# Patient Record
Sex: MALE | Race: WHITE | NOT HISPANIC OR LATINO | Employment: OTHER | URBAN - METROPOLITAN AREA
[De-identification: names, ages, dates, MRNs, and addresses within clinical notes are randomized per-mention and may not be internally consistent; named-entity substitution may affect disease eponyms.]

---

## 2017-01-02 ENCOUNTER — PATIENT MESSAGE (OUTPATIENT)
Dept: RHEUMATOLOGY | Facility: CLINIC | Age: 47
End: 2017-01-02

## 2017-01-03 DIAGNOSIS — L40.50 PSORIATIC ARTHRITIS: Primary | ICD-10-CM

## 2017-01-03 RX ORDER — PREDNISONE 10 MG/1
10 TABLET ORAL DAILY
Qty: 30 TABLET | Refills: 4 | Status: SHIPPED | OUTPATIENT
Start: 2017-01-03 | End: 2017-02-02

## 2017-01-06 ENCOUNTER — HOSPITAL ENCOUNTER (OUTPATIENT)
Dept: RADIOLOGY | Facility: OTHER | Age: 47
Discharge: HOME | End: 2017-01-06
Attending: INTERNAL MEDICINE
Payer: COMMERCIAL

## 2017-01-06 DIAGNOSIS — N20.0 RECURRENT NEPHROLITHIASIS: ICD-10-CM

## 2017-01-06 PROCEDURE — 76770 US EXAM ABDO BACK WALL COMP: CPT | Mod: 26,,, | Performed by: RADIOLOGY

## 2017-01-06 PROCEDURE — 76770 US EXAM ABDO BACK WALL COMP: CPT | Mod: TC

## 2017-01-27 ENCOUNTER — PATIENT MESSAGE (OUTPATIENT)
Dept: UROLOGY | Facility: CLINIC | Age: 47
End: 2017-01-27

## 2017-01-30 DIAGNOSIS — N20.0 RENAL CALCULI: Primary | ICD-10-CM

## 2017-01-31 ENCOUNTER — TELEPHONE (OUTPATIENT)
Dept: UROLOGY | Facility: CLINIC | Age: 47
End: 2017-01-31

## 2017-01-31 ENCOUNTER — HOSPITAL ENCOUNTER (OUTPATIENT)
Dept: RADIOLOGY | Facility: OTHER | Age: 47
Discharge: HOME | End: 2017-01-31
Attending: UROLOGY
Payer: COMMERCIAL

## 2017-01-31 DIAGNOSIS — N20.0 RENAL CALCULI: ICD-10-CM

## 2017-01-31 PROCEDURE — 74000 XR ABDOMEN AP 1 VIEW: CPT | Mod: TC

## 2017-01-31 PROCEDURE — 74000 XR ABDOMEN AP 1 VIEW: CPT | Mod: 26,,, | Performed by: RADIOLOGY

## 2017-01-31 NOTE — TELEPHONE ENCOUNTER
----- Message from Kumar Duval Jr., MD sent at 1/31/2017  1:27 PM CST -----  milind small renal stones  Can consider r renal eswl then left if he wishes or ?pass on his own

## 2017-02-01 ENCOUNTER — OFFICE VISIT (OUTPATIENT)
Dept: UROLOGY | Facility: CLINIC | Age: 47
End: 2017-02-01
Payer: COMMERCIAL

## 2017-02-01 ENCOUNTER — PATIENT MESSAGE (OUTPATIENT)
Dept: RHEUMATOLOGY | Facility: CLINIC | Age: 47
End: 2017-02-01

## 2017-02-01 VITALS
HEART RATE: 68 BPM | WEIGHT: 129 LBS | HEIGHT: 67 IN | DIASTOLIC BLOOD PRESSURE: 63 MMHG | BODY MASS INDEX: 20.25 KG/M2 | SYSTOLIC BLOOD PRESSURE: 101 MMHG

## 2017-02-01 DIAGNOSIS — N20.0 RENAL CALCULI: Primary | ICD-10-CM

## 2017-02-01 PROCEDURE — 99999 PR PBB SHADOW E&M-EST. PATIENT-LVL III: CPT | Mod: PBBFAC,,, | Performed by: UROLOGY

## 2017-02-01 PROCEDURE — 99213 OFFICE O/P EST LOW 20 MIN: CPT | Mod: S$GLB,,, | Performed by: UROLOGY

## 2017-02-01 RX ORDER — PREDNISONE 5 MG/1
TABLET ORAL
Refills: 1 | COMMUNITY
Start: 2017-01-04 | End: 2017-02-01

## 2017-02-01 NOTE — PROGRESS NOTES
Subjective:       Patient ID: Rebel Rodriguez is a 47 y.o. male.    Chief Complaint: renal calculi (rtc today with xray.)    HPI patient with multiple stones.  On KUB he has 43-4 mm stones on the right and 2 small ones on the left on ultrasound he's there is a 9 mm stone in the left upper pole but I really can't see it on KUB.  He's doing well.  He needs a repeat metabolic stone workup.  He is not taking Urocit-K and L1 recheck.  We discussed pros and cons of diet and he will try and eat things in moderation    Past Medical History   Diagnosis Date    Achilles tendon rupture 10/9/2013    Allergy     Anxiety     Arthritis      psoriatric    Degenerative disc disease     Drug-induced lupus erythematosus     Hyperlipidemia     Kidney stone     Psoriatic arthritis     TIA (transient ischemic attack)     Ulcer      high school       Past Surgical History   Procedure Laterality Date    Upper endoscopy w/ esophageal manometry      Ureteral stent placement      Achilles tendon surgery         Family History   Problem Relation Age of Onset    Pancreatic cancer Father     Ulcerative colitis Father     Cancer Father 61     pancreatic ca    Crohn's disease Mother     Osteoarthritis Maternal Grandmother     Crohn's disease Maternal Grandmother     Cataracts Maternal Grandmother     Cataracts Maternal Grandfather     Cataracts Paternal Grandmother     Cataracts Paternal Grandfather        Social History     Social History    Marital status: Single     Spouse name: N/A    Number of children: N/A    Years of education: N/A     Occupational History    music production Self Employed     Social History Main Topics    Smoking status: Never Smoker    Smokeless tobacco: Never Used    Alcohol use No      Comment: cocktails    Drug use: No    Sexual activity: Not on file     Other Topics Concern    Not on file     Social History Narrative       Allergies:  Erythromycin and  Infliximab    Medications:    Current Outpatient Prescriptions:     aspirin (ECOTRIN) 81 MG EC tablet, Take 81 mg by mouth once daily.  , Disp: , Rfl:     atorvastatin (LIPITOR) 10 MG tablet, Take 1 tablet (10 mg total) by mouth once daily., Disp: 90 tablet, Rfl: 3    hydroxychloroquine (PLAQUENIL) 200 mg tablet, TAKE 1 AND 1/2 TABLETS(300 MG) BY MOUTH EVERY DAY, Disp: 135 tablet, Rfl: 1    leflunomide (ARAVA) 20 MG Tab, Take 1 tablet (20 mg total) by mouth once daily., Disp: 90 tablet, Rfl: 0    loratadine-pseudoephedrine  mg (CLARITIN-D 24-HOUR)  mg per 24 hr tablet, Take 1 tablet by mouth once daily., Disp: , Rfl:     predniSONE (DELTASONE) 10 MG tablet, Take 1 tablet (10 mg total) by mouth once daily., Disp: 30 tablet, Rfl: 4    Current Facility-Administered Medications:     acetaminophen tablet 500 mg, 500 mg, Oral, PRN, Esau Beverly MD    promethazine injection 12.5 mg, 12.5 mg, Intravenous, PRN, Esau Beverly MD    Review of Systems   Constitutional: Negative for activity change, appetite change, chills, diaphoresis, fatigue, fever and unexpected weight change.   HENT: Negative for congestion, dental problem, hearing loss, mouth sores, postnasal drip, rhinorrhea, sinus pressure and trouble swallowing.    Eyes: Negative for pain, discharge and itching.   Respiratory: Negative for apnea, cough, choking, chest tightness, shortness of breath and wheezing.    Cardiovascular: Negative for chest pain, palpitations and leg swelling.   Gastrointestinal: Negative for abdominal distention, abdominal pain, anal bleeding, blood in stool, constipation, diarrhea, nausea, rectal pain and vomiting.   Endocrine: Negative for polydipsia and polyuria.   Genitourinary: Negative for decreased urine volume, difficulty urinating, discharge, dysuria, enuresis, flank pain, frequency, genital sores, hematuria, penile pain, penile swelling, scrotal swelling, testicular pain and urgency.   Musculoskeletal:  Negative for arthralgias, back pain and myalgias.   Skin: Negative for color change, rash and wound.   Neurological: Negative for dizziness, syncope, speech difficulty, light-headedness and headaches.   Hematological: Negative for adenopathy. Does not bruise/bleed easily.   Psychiatric/Behavioral: Negative for behavioral problems, confusion, hallucinations and sleep disturbance.       Objective:      Physical Exam   Constitutional: He appears well-developed.   HENT:   Head: Normocephalic.   Cardiovascular: Normal rate.    Pulmonary/Chest: Effort normal.   Abdominal: Soft.   Neurological: He is alert.   Skin: Skin is warm.     Psychiatric: He has a normal mood and affect.       Assessment:       1. Renal calculi        Plan:       Rebel was seen today for renal calculi.    Diagnoses and all orders for this visit:    Renal calculi        Renal calculi    Renal calculi  -     Magnesium; Future; Expected date: 2/1/17  -     PTH, intact; Future; Expected date: 2/1/17  -     Uric acid; Future; Expected date: 2/1/17  -     Phosphorus; Future; Expected date: 2/1/17  -     Calcium; Future; Expected date: 2/1/17  -     UroRisk Diagnostic Profile, 24 Hour; Future    rtc 4 mos w kub

## 2017-02-01 NOTE — MR AVS SNAPSHOT
[facility] 4th Floor  [address]  [facility] [address]  Phone: [phone]                  Rebel Rodriguez   2017 8:45 AM   Office Visit    Description:  Male : 1970   Provider:  Kumar Duval Jr., MD   Department:  [facility] 4th Floor           Reason for Visit     renal calculi           Diagnoses this Visit        Comments    Renal calculi    -  Primary            To Do List           Future Appointments        Provider Department Dept Phone    2017 9:00 AM Emir Almaraz MD [facility] [phone]    2017 10:00 AM LAB, [facility] [phone]    2017 10:15 AM LAB, [facility] [phone]    2/15/2017 8:00 AM Jeronimo Winters MD [facility] [phone]      Goals (5 Years of Data)     None      [facility]     [facility] Nurse Care Line -  Assistance  Registered nurses in the [facility] provide clinical advisement, health education, appointment booking, and other advisory services.  Call for this free service at [phone].             Medications           Message regarding Medications     Verify the changes and/or additions to your medication regime listed below are the same as discussed with your clinician today.  If any of these changes or additions are incorrect, please notify your healthcare provider.        STOP taking these medications     STELARA 45 mg/0.5 mL Syrg syringe Inject 0.5 mLs (45 mg total) every 12 weeks - Subcutaneous    tamsulosin (FLOMAX) 0.4 mg Cp24 Take 1 capsule (0.4 mg total) by mouth once daily.           Verify that the below list of medications is an accurate representation of the medications you are currently taking.  If none reported, the list may be blank. If incorrect, please contact your healthcare provider. Carry this list with you in case of emergency.           Current Medications     aspirin (ECOTRIN) 81  "MG EC tablet Take 81 mg by mouth once daily.      atorvastatin (LIPITOR) 10 MG tablet Take 1 tablet (10 mg total) by mouth once daily.    hydroxychloroquine (PLAQUENIL) 200 mg tablet TAKE 1 AND 1/2 TABLETS(300 MG) BY MOUTH EVERY DAY    leflunomide (ARAVA) 20 MG Tab Take 1 tablet (20 mg total) by mouth once daily.    loratadine-pseudoephedrine  mg (CLARITIN-D 24-HOUR)  mg per 24 hr tablet Take 1 tablet by mouth once daily.    predniSONE (DELTASONE) 10 MG tablet Take 1 tablet (10 mg total) by mouth once daily.           Clinical Reference Information           Vital Signs - Last Recorded  Most recent update: 2/1/2017  9:24 AM by Olga Nagy MA    BP Pulse Ht Wt BMI    101/63 68 5' 7" (1.702 m) 58.5 kg (128 lb 15.5 oz) 20.2 kg/m2      Blood Pressure          Most Recent Value    BP  101/63      Allergies as of 2/1/2017     Erythromycin    Infliximab      Immunizations Administered on Date of Encounter - 2/1/2017     None      Orders Placed During Today's Visit     Future Labs/Procedures Expected by Expires    Calcium  2/1/2017 4/2/2018    Magnesium  2/1/2017 4/2/2018    Phosphorus  2/1/2017 4/2/2018    PTH, intact  2/1/2017 4/2/2018    Uric acid  2/1/2017 4/2/2018    X-Ray Abdomen AP 1 View  2/1/2017 2/1/2018    UroRisk Diagnostic Profile, 24 Hour  As directed 2/1/2018      "

## 2017-02-02 ENCOUNTER — TELEPHONE (OUTPATIENT)
Dept: PHARMACY | Facility: CLINIC | Age: 47
End: 2017-02-02

## 2017-02-02 DIAGNOSIS — L93.2 DRUG-INDUCED LUPUS ERYTHEMATOSUS: ICD-10-CM

## 2017-02-02 DIAGNOSIS — T50.905A DRUG-INDUCED LUPUS ERYTHEMATOSUS: ICD-10-CM

## 2017-02-02 DIAGNOSIS — L40.50 PSORIATIC ARTHRITIS: Primary | ICD-10-CM

## 2017-02-06 ENCOUNTER — PATIENT MESSAGE (OUTPATIENT)
Dept: RHEUMATOLOGY | Facility: CLINIC | Age: 47
End: 2017-02-06

## 2017-02-09 ENCOUNTER — LAB VISIT (OUTPATIENT)
Dept: LAB | Facility: OTHER | Age: 47
End: 2017-02-09
Attending: INTERNAL MEDICINE
Payer: COMMERCIAL

## 2017-02-09 DIAGNOSIS — L93.2 DRUG-INDUCED LUPUS ERYTHEMATOSUS: ICD-10-CM

## 2017-02-09 DIAGNOSIS — T50.905A DRUG-INDUCED LUPUS ERYTHEMATOSUS: ICD-10-CM

## 2017-02-09 PROCEDURE — 81001 URINALYSIS AUTO W/SCOPE: CPT

## 2017-02-09 PROCEDURE — 84156 ASSAY OF PROTEIN URINE: CPT

## 2017-02-10 LAB
BACTERIA #/AREA URNS AUTO: ABNORMAL /HPF
BILIRUB UR QL STRIP: NEGATIVE
CLARITY UR REFRACT.AUTO: CLEAR
COLOR UR AUTO: YELLOW
CREAT UR-MCNC: 216 MG/DL
GLUCOSE UR QL STRIP: NEGATIVE
HGB UR QL STRIP: ABNORMAL
KETONES UR QL STRIP: NEGATIVE
LEUKOCYTE ESTERASE UR QL STRIP: NEGATIVE
MICROSCOPIC COMMENT: ABNORMAL
NITRITE UR QL STRIP: NEGATIVE
PH UR STRIP: 6 [PH] (ref 5–8)
PROT UR QL STRIP: NEGATIVE
PROT UR-MCNC: 15 MG/DL
PROT/CREAT RATIO, UR: 0.07
RBC #/AREA URNS AUTO: 91 /HPF (ref 0–4)
SP GR UR STRIP: 1.01 (ref 1–1.03)
URN SPEC COLLECT METH UR: ABNORMAL
UROBILINOGEN UR STRIP-ACNC: NEGATIVE EU/DL
WBC #/AREA URNS AUTO: 3 /HPF (ref 0–5)

## 2017-02-11 ENCOUNTER — PATIENT MESSAGE (OUTPATIENT)
Dept: RHEUMATOLOGY | Facility: CLINIC | Age: 47
End: 2017-02-11

## 2017-02-17 NOTE — TELEPHONE ENCOUNTER
PA for Cosentyx DENIED because plan criteria requires patient has tried and failed Humira after at least 2 months of therapy. - Preferred regimen on patient's plan.

## 2017-03-02 ENCOUNTER — PATIENT MESSAGE (OUTPATIENT)
Dept: RHEUMATOLOGY | Facility: CLINIC | Age: 47
End: 2017-03-02

## 2017-03-03 RX ORDER — LEFLUNOMIDE 20 MG/1
TABLET ORAL
Qty: 90 TABLET | Refills: 0 | Status: SHIPPED | OUTPATIENT
Start: 2017-03-03 | End: 2017-03-15 | Stop reason: SDUPTHER

## 2017-03-15 ENCOUNTER — OFFICE VISIT (OUTPATIENT)
Dept: RHEUMATOLOGY | Facility: CLINIC | Age: 47
End: 2017-03-15
Payer: COMMERCIAL

## 2017-03-15 VITALS
HEART RATE: 84 BPM | SYSTOLIC BLOOD PRESSURE: 117 MMHG | WEIGHT: 128.13 LBS | BODY MASS INDEX: 20.06 KG/M2 | DIASTOLIC BLOOD PRESSURE: 68 MMHG

## 2017-03-15 DIAGNOSIS — Z51.81 MEDICATION MONITORING ENCOUNTER: ICD-10-CM

## 2017-03-15 DIAGNOSIS — L40.50 PSORIATIC ARTHRITIS: ICD-10-CM

## 2017-03-15 DIAGNOSIS — E55.9 HYPOVITAMINOSIS D: ICD-10-CM

## 2017-03-15 DIAGNOSIS — Z79.899 HIGH RISK MEDICATION USE: Primary | ICD-10-CM

## 2017-03-15 DIAGNOSIS — T50.905A DRUG-INDUCED LUPUS ERYTHEMATOSUS: ICD-10-CM

## 2017-03-15 DIAGNOSIS — L93.2 DRUG-INDUCED LUPUS ERYTHEMATOSUS: ICD-10-CM

## 2017-03-15 DIAGNOSIS — Z79.52 CURRENT USE OF STEROID MEDICATION: ICD-10-CM

## 2017-03-15 PROCEDURE — 99214 OFFICE O/P EST MOD 30 MIN: CPT | Mod: S$GLB,,, | Performed by: INTERNAL MEDICINE

## 2017-03-15 PROCEDURE — 1160F RVW MEDS BY RX/DR IN RCRD: CPT | Mod: S$GLB,,, | Performed by: INTERNAL MEDICINE

## 2017-03-15 PROCEDURE — 99999 PR PBB SHADOW E&M-EST. PATIENT-LVL III: CPT | Mod: PBBFAC,,, | Performed by: INTERNAL MEDICINE

## 2017-03-15 RX ORDER — PREDNISONE 10 MG/1
10 TABLET ORAL DAILY
COMMUNITY
End: 2017-11-03 | Stop reason: SDUPTHER

## 2017-03-15 ASSESSMENT — ROUTINE ASSESSMENT OF PATIENT INDEX DATA (RAPID3)
TOTAL RAPID3 SCORE: 3.61
PSYCHOLOGICAL DISTRESS SCORE: 2.2
FATIGUE SCORE: 6
MDHAQ FUNCTION SCORE: .7
PAIN SCORE: 4
PATIENT GLOBAL ASSESSMENT SCORE: 4.5
WHEN YOU AWAKENED IN THE MORNING OVER THE LAST WEEK, PLEASE INDICATE THE AMOUNT OF TIME IT TAKES UNTIL YOU ARE AS LIMBER AS YOU WILL BE FOR THE DAY: 20 MINS
AM STIFFNESS SCORE: 1, YES

## 2017-03-15 ASSESSMENT — SYSTEMIC LUPUS ERYTHEMATOSUS DISEASE ACTIVITY INDEX (SLEDAI): TOTAL_SCORE: 0

## 2017-03-15 NOTE — PROGRESS NOTES
I  Have personally take the history and examined the patient and agree with fellow's note as stated above.

## 2017-03-15 NOTE — PROGRESS NOTES
Subjective:     Patient ID: Rebel Rodriguez is a 46 y.o. male.    Chief Complaint: Psoriatic Arthritis. Psoriasis, infliximab induced lupus    HPI       Rebel Rodriguez is a 47 y.o. male with psoriatic arthritis and infliximab induced lupus presenting for follow up. Drug induced Lupus: Related to infliximab (last dose 2/29/16; + aphosholipids-anticardiolipin IgM mild +dsDNA: 1:1280,  DEE+ 1:1280 homogenous ,  + anti histone ab, CH50 low at 51 along with recurrent intermittent fevers, chills, arthralgias. Treated with plaquenil 300 mg daily, Leflunomide 20 mg daily    Interval History:   Last visit 12/2016  Pt denies any recurrent fevers or chills with this past lupus flare. No serositis, oral ulcers, new rashes. Pt sts that after getting over mild trauma ( catching a glass with his foot) he is doing fairly well. His joints are not bothering him currently. He is able to function and open doors/jars which was not the case on previous visits. Pt remains on prednisone 10 mg daily. He does report intermittent joint swelling.  He has currently taken 4 doses of Stelara,  4th dose was 11/14/16, 3rd dose was on 8/19/16 but has been off of this in preparation for a transition to Cosentyx however pt's insurance denied the medication as other TNFi have not been tried (given drug induced lupus to infliximab, he can not use other TNFi given this).       The following portions of the patient's history were reviewed and updated as appropriate: allergies, current medications, past family history, past medical history, past social history, past surgical history and problem list.    Review of Systems  General: Appetite good. No fever, chills or sweats. + weight loss   Psychological: No insomnia, +anxiety, no  depression  Ophthalmic: No blurred vision.  ENT: No sore throat or hoarseness. No mouth ulcers.  +dry mouth   Allergy and Immunology: No history of frequent infections or adenopathy.  Hematological and Lymphatic: No easy bruising  or bleeding.  Endocrine: No polyuria, polyphagia or polydipsia. No heat/cold intolerance. No hair loss.  Respiratory: No SOB, cough or wheezing.  Cardiovascular: No CP or palpitations. Denies any PND, orthopnea.   Gastrointestinal: No dyspepsia or change in bowel habits. No melena.  Genito-Urinary: No dysuria, No  Hematuria  Musculoskeletal: see above   Neurological: No TIA or stroke symptoms. + Headache. No weakness.   Dermatological: No new rash.     Objective:       Physical Examination:   Vitals:   /68  Pulse 84  Wt 58.1 kg (128 lb 1.6 oz)  BMI 20.06 kg/m2   Physical Exam   Constitutional: He is oriented to person, place, and time and well-developed, well-nourished, and in no distress.   HENT:   Head: Atraumatic.   Mouth/Throat: Oropharynx is clear and moist.   Eyes: Conjunctivae and EOM are normal. No scleral icterus.   Neck: Normal range of motion. Neck supple.   Cardiovascular: Normal rate, regular rhythm, normal heart sounds and intact distal pulses.  Exam reveals no gallop and no friction rub.    No murmur heard.  Pulmonary/Chest: Effort normal. No respiratory distress. He has no wheezes. He exhibits no tenderness.   Abdominal: Soft. Bowel sounds are normal. There is no tenderness.     Right Side Rheumatological Exam     Examination finds the shoulder, elbow, wrist, knee, 1st PIP, 1st MCP, 2nd PIP, 2nd MCP, 3rd MCP, 3rd PIP, 4th PIP, 4th MCP, 5th PIP, 5th MCP, SC joint, AC joint, 1st CMC, 2nd DIP, 3rd DIP, 4th DIP, 5th DIP, hip, ankle, talocalcaneal, tarsus, 1st MTP, 2nd MTP, 3rd MTP, 4th MTP, 5th MTP, 1st toe IP, 2nd toe IP, 3rd toe IP, 4th toe IP and 5th toe IP normal.      Muscle Strength (0-5 scale):  Neck Flexion:  5  Deltoid:  5  Biceps: 5/5   Triceps:  5  : 5/5   Iliopsoas: 5  Quadriceps:  5   Distal Lower Extremity: 5    Left Side Rheumatological Exam     Examination finds the shoulder, elbow, wrist, knee, 1st PIP, 1st MCP, 2nd PIP, 2nd MCP, 3rd PIP, 3rd MCP, 4th PIP, 4th MCP, 5th PIP,  5th MCP, SC joint, AC joint, 1st CMC, 2nd DIP, 3rd DIP, 4th DIP, 5th DIP, hip, ankle, talocalcaneal, tarsus, 1st MTP, 2nd MTP, 3rd MTP, 4th MTP, 5th MTP, 1st toe IP, 2nd toe IP, 3rd toe IP, 4th toe IP and 5th toe IP normal.    Muscle Strength (0-5 scale):  Neck Flexion:  5  Deltoid:  5  Biceps: 5/5   Triceps:  5  :  5/5   Iliopsoas: 5  Quadriceps:  5   Distal Lower Extremity: 5      Lymphadenopathy:     He has no cervical adenopathy.   Neurological: He is alert and oriented to person, place, and time. Gait normal.   Skin: Skin is warm and dry. Rash (hyperpigmented area to the lateral aspect of the lower leg near ankles bilaterally. Right lower leg with small patch of psoriasis) noted. No pallor.   Musculoskeletal: Normal range of motion except for cervical spine ( limited lateral flexion on left and limited lateral rotation on the right) . He exhibits no tenderness or deformity.         Imaging  No new imaging.     Lab Review    Results for JON RODRIGUEZ (MRN [id number]) as of 3/15/2017 15:41   Ref. Range 2/10/2017 10:46   WBC Latest Ref Range: 3.90 - 12.70 K/uL 8.74   RBC Latest Ref Range: 4.60 - 6.20 M/uL 4.68   Hemoglobin Latest Ref Range: 14.0 - 18.0 g/dL 13.8 (L)   Hematocrit Latest Ref Range: 40.0 - 54.0 % 41.7   MCV Latest Ref Range: 82 - 98 fL 89   MCH Latest Ref Range: 27.0 - 31.0 pg 29.5   MCHC Latest Ref Range: 32.0 - 36.0 % 33.1   RDW Latest Ref Range: 11.5 - 14.5 % 13.9   Platelets Latest Ref Range: 150 - 350 K/uL 208     Results for JON RODRIGUEZ (MRN [id number]) as of 3/15/2017 15:41   Ref. Range 2/10/2017 10:46   Sodium Latest Ref Range: 136 - 145 mmol/L 140   Potassium Latest Ref Range: 3.5 - 5.1 mmol/L 3.7   Chloride Latest Ref Range: 95 - 110 mmol/L 106   CO2 Latest Ref Range: 23 - 29 mmol/L 25   Anion Gap Latest Ref Range: 8 - 16 mmol/L 9   BUN, Bld Latest Ref Range: 6 - 20 mg/dL 12   Creatinine Latest Ref Range: 0.5 - 1.4 mg/dL 1.0   eGFR if non African American Latest Ref Range: >60 mL/min/1.73  m^2 >60.0   eGFR if African American Latest Ref Range: >60 mL/min/1.73 m^2 >60.0   Glucose Latest Ref Range: 70 - 110 mg/dL 84   Calcium Latest Ref Range: 8.7 - 10.5 mg/dL 9.5   Alkaline Phosphatase Latest Ref Range: 55 - 135 U/L 57   Total Protein Latest Ref Range: 6.0 - 8.4 g/dL 6.9   Albumin Latest Ref Range: 3.5 - 5.2 g/dL 3.7   Total Bilirubin Latest Ref Range: 0.1 - 1.0 mg/dL 0.3   AST Latest Ref Range: 10 - 40 U/L 17   ALT Latest Ref Range: 10 - 44 U/L 15   CRP Latest Ref Range: 0.0 - 8.2 mg/L 1.2     Results for JON RODRIGUEZ (MRN [id number]) as of 3/15/2017 15:41   Ref. Range 6/14/2016 11:34 7/11/2016 12:44 9/14/2016 10:30 12/7/2016 09:58 2/10/2017 10:46   CRP Latest Ref Range: 0.0 - 8.2 mg/L 0.6 2.4 0.9 0.9 1.2     Results for JON RODRIGUEZ (MRN [id number]) as of 3/15/2017 15:41   Ref. Range 6/14/2016 11:34 7/11/2016 12:44 9/14/2016 10:30 12/7/2016 09:58 2/10/2017 10:46   Sed Rate Latest Ref Range: 0 - 10 mm/Hr 11 (H) 15 (H) 17 (H) 17 (H) 6         Results for JON RODRIGUEZ (MRN [id number]) as of 3/15/2017 15:41   Ref. Range 7/11/2016 12:44 7/13/2016 11:12 7/13/2016 12:44 9/14/2016 10:30 12/7/2016 09:58 2/10/2017 10:46   ds DNA Ab Latest Ref Range: Negative 1:10  Positive 1:1280 (A)   Positive 1:640 (A) Positive 1:320 (A) Positive 1:80 (A)     Results for JON RODRIGUEZ (MRN [id number]) as of 3/15/2017 15:41   Ref. Range 7/13/2016 12:44   Anti-Histone Antibody Latest Ref Range: 0.0 - 0.9 Units 1.3 (H)       Assessment:      Psoriatic Arthritis- DAPSA 9.62 (low activity), previously 11.09;  Pt has poor control compared to TNF inhibitor but given drug induced lupus unable to take further TNFs. He has failed ustekinumab and is currently off therapy. Awaiting appeal from insurance for secukinumab, plan to start with weekly loading dose once approved.  Sx controlled currently with prednisone 10 mg daily   Drug induced lupus- stable, SLEDAI 0. Complements normal and dsDNA continues to trend down: currently 1:80; Related  to infliximab (last dose 2/29/16; + antiphosholipids-anticardiolipin IgM mild +dsDNA: 1:1280,  DEE+ 1:1280 homogenous , +anti histone, CH50 low at 51 along with recurrent intermittent fevers, chills, arthralgias.  HTN/CVD- no contraindications to current therapy, on statin and aspirin, due for repeat lipid panel   TIA- no NSAIDs for msk pain  High risk medication use/Medication monitoring encounter- no toxic side effects seen. No steroid side effects. Needs DEXA  Immunizations- Pneumococcal up to date, needs Quadrivalent Flu vaccine.    Stelara:  4th dose was 11/14/16, 3rd dose was on 8/19/16.    Plan:     Psoriatic arthritis:   -  Continue prednisone 10 mg daily for now. Awaiting approval for IL 17 inhibitor, secukinumab given poor control with IL 12 and 23 inhibition with ustekinumab. Pt unable to use TNFs  -  Continue plaquenil 300 mg daily and lefluonamide 20 mg daily, no dose changes.   -  No NSAIDs due to past TIA.   - handout on Secukinumab given to patient.   - surveillance labs: Arthritis survey, DEXA given chronic steroid use. Vitamin D level. Lipid panel  TB Gold.   - Discuss appeals process with pharmacy.     RTC  3 months with standing labs.    Signed,  Jeronimo Winters  Rheumatology Fellow

## 2017-03-15 NOTE — PATIENT INSTRUCTIONS
1) Eat Right: Your diet should be rich in fruits, vegetables, potassium, and low-fat dairy products. You should also reduce your intake of sodium and fats, particularly saturated fats.    2) Maintain a Healthy Weight: Try to achieve and maintain a healthy weight. If you are unsure what this means for you, please contact our clinic.    3) Exercise: Try to get at least 30 minutes of aerobic exercise every day.    4) Moderate Your Alcohol Consumption: Limit your alcohol intake to 0-1 drinks per day.    5) Monitor Your Blood Pressure: You should check your blood pressure regularly. Notify our clinic if your blood pressure readings are consistently higher than recommended.

## 2017-03-16 ENCOUNTER — HOSPITAL ENCOUNTER (OUTPATIENT)
Dept: RADIOLOGY | Facility: OTHER | Age: 47
Discharge: HOME | End: 2017-03-16
Attending: INTERNAL MEDICINE
Payer: COMMERCIAL

## 2017-03-16 DIAGNOSIS — Z79.52 CURRENT USE OF STEROID MEDICATION: ICD-10-CM

## 2017-03-16 DIAGNOSIS — L40.50 PSORIATIC ARTHRITIS: ICD-10-CM

## 2017-03-16 DIAGNOSIS — Z51.81 MEDICATION MONITORING ENCOUNTER: ICD-10-CM

## 2017-03-16 DIAGNOSIS — Z79.899 HIGH RISK MEDICATION USE: ICD-10-CM

## 2017-03-16 DIAGNOSIS — L93.2 DRUG-INDUCED LUPUS ERYTHEMATOSUS: ICD-10-CM

## 2017-03-16 DIAGNOSIS — T50.905A DRUG-INDUCED LUPUS ERYTHEMATOSUS: ICD-10-CM

## 2017-03-16 PROCEDURE — 77077 JOINT SURVEY SINGLE VIEW: CPT | Mod: TC

## 2017-03-16 PROCEDURE — 77080 DXA BONE DENSITY AXIAL: CPT | Mod: 26,,, | Performed by: RADIOLOGY

## 2017-03-16 PROCEDURE — 77080 DXA BONE DENSITY AXIAL: CPT | Mod: TC

## 2017-03-16 PROCEDURE — 77077 JOINT SURVEY SINGLE VIEW: CPT | Mod: 26,,, | Performed by: RADIOLOGY

## 2017-03-21 ENCOUNTER — PATIENT MESSAGE (OUTPATIENT)
Dept: RHEUMATOLOGY | Facility: CLINIC | Age: 47
End: 2017-03-21

## 2017-03-23 ENCOUNTER — LAB VISIT (OUTPATIENT)
Dept: LAB | Facility: OTHER | Age: 47
End: 2017-03-23
Attending: INTERNAL MEDICINE
Payer: COMMERCIAL

## 2017-03-23 DIAGNOSIS — L93.2 DRUG-INDUCED LUPUS ERYTHEMATOSUS: ICD-10-CM

## 2017-03-23 DIAGNOSIS — L40.50 PSORIATIC ARTHRITIS: ICD-10-CM

## 2017-03-23 DIAGNOSIS — Z79.52 CURRENT USE OF STEROID MEDICATION: ICD-10-CM

## 2017-03-23 DIAGNOSIS — Z51.81 MEDICATION MONITORING ENCOUNTER: ICD-10-CM

## 2017-03-23 DIAGNOSIS — Z79.899 HIGH RISK MEDICATION USE: ICD-10-CM

## 2017-03-23 DIAGNOSIS — T50.905A DRUG-INDUCED LUPUS ERYTHEMATOSUS: ICD-10-CM

## 2017-03-23 PROCEDURE — 86480 TB TEST CELL IMMUN MEASURE: CPT

## 2017-03-28 LAB
MITOGEN NIL: >10 IU/ML
NIL: 0.06 IU/ML
TB ANTIGEN NIL: 0 IU/ML
TB ANTIGEN: 0.06 IU/ML
TB GOLD: NEGATIVE

## 2017-04-11 ENCOUNTER — TELEPHONE (OUTPATIENT)
Dept: PHARMACY | Facility: CLINIC | Age: 47
End: 2017-04-11

## 2017-04-11 NOTE — TELEPHONE ENCOUNTER
FOR DOCUMENTATION ONLY:  Cosentyx approved via Appeal x 1 year  3/23/17 through 4/7/18   PA# [id number]  $5,143.05 co pay    ANURADHA override approved    Case ID: [id number]    4/11/17 through 4/11/18

## 2017-04-17 ENCOUNTER — PATIENT MESSAGE (OUTPATIENT)
Dept: RHEUMATOLOGY | Facility: CLINIC | Age: 47
End: 2017-04-17

## 2017-04-18 NOTE — TELEPHONE ENCOUNTER
Inform patient the specialty medication Cosentyx co-pay coupon information was received and input in [employer] and copay is $0. Patient scheduled for a face to face consultation along with injection training/  on Thurs 04/20 @2pm at [facility] (due to patient insurance can't bill at [address] location). Inform the medication is in stock. Explain to patient he will only receive the first 4 pen/syringe from [facility] due to insurance won't allow us to bill the medication for QTY 5 for a 35 day supply. Inform patient we will reach out to him regarding his medication refill in about 3 weeks after he receive the medication on Thurs 04/20.  Patient voiced understanding.

## 2017-04-19 ENCOUNTER — TELEPHONE (OUTPATIENT)
Dept: PHARMACY | Facility: CLINIC | Age: 47
End: 2017-04-19

## 2017-04-20 ENCOUNTER — TELEPHONE (OUTPATIENT)
Dept: PHARMACY | Facility: CLINIC | Age: 47
End: 2017-04-20

## 2017-04-20 NOTE — TELEPHONE ENCOUNTER
Initial Cosentyx SensoReady Pens 150mg consult completed on 2017.  Patient started Cosentyx Prefilled Syringe 150mg on 2017. Confirmed 2 patient identifiers - name and . Therapy Appropriate.    - Cosentyx Prefilled Syringe 150mg:  Inject 1 syringe (150 mg) every week x 5 weeks, then 1 syringe  (150 mg) every 4 weeks.    -Storage: Refrigerate between 36-46 degrees Fahrenheit  Use within ONE HOUR of taking out of fridge.    -Injection technique:  - Wash hands before and after injection.  - Monthly RX will come with gauze, bandaids, and alcohol swabs.  - Patient may inject in either the tops of the thighs, abdomen- but at least 2 inches away from her belly button, or the outer part of her upper arm. Patient was instructed to rotate injections sites.  - Examine device to ensure no particulates, cloudiness, etc.  - Patient is to wipe down the injection site with the alcohol pad, wait to dry.  Gently squeeze the area of the cleaned skin and hold it firmly. Place the pen flat at a 90 degree angle against the raised area of skin that is being squeezed, and then push down firmly on the pen to start the injection. The 1st 'click' indicates the start and the second 'click' indicates that the injection is almost complete. A green indicator will fill the window when completed, and pen can be removed.  - Patient will use sharps container; once full, per [place] law, she/ he may lock the sharps container and place in her trash. She/ he can then contact the Pharmacy and we will replace the sharps at no additional charge.    -Potential Side effects:  Injection site reactions, diarrhea, cold like symptoms.  Patient advised to call if any signs of allergic reaction, infection, or use of live vaccines.    -DDI: Medication list reviewed and potential DDIs addressed.  Patient verbalized understanding. Compliance stressed. Patient advised to keep a calendar marking dates of injections to ensure better compliance. Patient advised  to call myself or provider should any questions arise. Patient  Started Cosentyx Prefilled Syringe on 4/20/2017. Consultation included: indication; goals of treatment; administration; storage and handling; side effects; how to handle side effects; the importance of compliance; how to handle missed doses; the importance of laboratory monitoring; the importance of keeping all follow up appointments. Patient understands to report any medication changes to [facility] and provider. All questions answered and addressed to patients satisfaction. I will f/u with her in 1 week from start, [facility] to contact patient in 3 weeks for refills.      Patient came in for consult and to self administer first dose of Cosentyx. He administered it in the right thigh. We discussed other injection sites due to not having much to pinch on his thighs. Was nervous about stomach but after demonstrating how it would be easier to pinch a piece and administer her said he would consider for the next dose.     IMPORTANT TO NOTE WITH FIRST INJECTION:   * In right thigh and did start to swell some, but went    down after a few minutes  * He became very nauseous   * Broke out in a full body sweat    After a few minutes he began to feel much better. He believed the experience he was having could be due to the news of his neighbor dying today, putting his cat to rest, being worried about this treatment starting after his past experience and amongst other things. He stayed for an additional 20 minutes as we discussed the drug further and his goal for the future of being able to put on some weight and participate in recreational activities.     Due to the confusion on the prescription as far as pens and syringes and the prior authorization being filled out specifically for syringes, we dispensed and he administered a syringe. I observed and he expressed some difficulty with being able to completely push down the plunger on the syringe, due to the arthritis in his  hands. He currently has a month supply of the syringe. If possible I would suggest that we get a new Rx from your office specifically for pens so we can submit a prior authorization for the pens and have that option for him in the future for the next fill should the syringe still give him some difficulty.      When leaving he looked much better and expressed great hopefulness that this regimen will work for him. He is aware I will be calling him at the end of next week to see how the second dose was and was advised at that time to have his friend or neighbor stay with him while he administers it, should he have any reaction to it. He acknowledged.

## 2017-04-27 ENCOUNTER — TELEPHONE (OUTPATIENT)
Dept: PHARMACY | Facility: CLINIC | Age: 47
End: 2017-04-27

## 2017-05-03 DIAGNOSIS — L93.2 DRUG-INDUCED LUPUS ERYTHEMATOSUS: ICD-10-CM

## 2017-05-03 DIAGNOSIS — T50.905A DRUG-INDUCED LUPUS ERYTHEMATOSUS: ICD-10-CM

## 2017-05-03 RX ORDER — HYDROXYCHLOROQUINE SULFATE 200 MG/1
TABLET, FILM COATED ORAL
Qty: 135 TABLET | Refills: 0 | Status: SHIPPED | OUTPATIENT
Start: 2017-05-03 | End: 2017-05-09 | Stop reason: SDUPTHER

## 2017-05-09 DIAGNOSIS — T50.905A DRUG-INDUCED LUPUS ERYTHEMATOSUS: ICD-10-CM

## 2017-05-09 DIAGNOSIS — L93.2 DRUG-INDUCED LUPUS ERYTHEMATOSUS: ICD-10-CM

## 2017-05-09 RX ORDER — HYDROXYCHLOROQUINE SULFATE 200 MG/1
TABLET, FILM COATED ORAL
Qty: 135 TABLET | Refills: 3 | Status: SHIPPED | OUTPATIENT
Start: 2017-05-09 | End: 2018-06-04 | Stop reason: SDUPTHER

## 2017-05-11 ENCOUNTER — TELEPHONE (OUTPATIENT)
Dept: PHARMACY | Facility: CLINIC | Age: 47
End: 2017-05-11

## 2017-06-01 DIAGNOSIS — E78.5 HYPERLIPIDEMIA: ICD-10-CM

## 2017-06-01 RX ORDER — ATORVASTATIN CALCIUM 10 MG/1
TABLET, FILM COATED ORAL
Qty: 90 TABLET | Refills: 3 | Status: SHIPPED | OUTPATIENT
Start: 2017-06-01 | End: 2020-06-04 | Stop reason: SDUPTHER

## 2017-06-01 RX ORDER — LEFLUNOMIDE 20 MG/1
TABLET ORAL
Qty: 90 TABLET | Refills: 0 | Status: SHIPPED | OUTPATIENT
Start: 2017-06-01 | End: 2017-06-21 | Stop reason: SDUPTHER

## 2017-06-05 ENCOUNTER — TELEPHONE (OUTPATIENT)
Dept: PHARMACY | Facility: CLINIC | Age: 47
End: 2017-06-05

## 2017-06-05 NOTE — TELEPHONE ENCOUNTER
cosentyx refill call, verified name and . 1 dose on hand for Thursday, patient will need his refill for the following Thursday 6/15. Patient states that he will  his refill on 6/15, $0 copay. No missed doses, no new medications or allergies and has no questions for a pharmacist at this time.    Katie Hondroulis [facility]- Refill Technician  Phone: [phone]

## 2017-06-15 ENCOUNTER — LAB VISIT (OUTPATIENT)
Dept: LAB | Facility: OTHER | Age: 47
End: 2017-06-15
Attending: INTERNAL MEDICINE
Payer: COMMERCIAL

## 2017-06-15 DIAGNOSIS — T50.905A DRUG-INDUCED LUPUS ERYTHEMATOSUS: ICD-10-CM

## 2017-06-15 DIAGNOSIS — L93.2 DRUG-INDUCED LUPUS ERYTHEMATOSUS: ICD-10-CM

## 2017-06-15 LAB
BILIRUB UR QL STRIP: NEGATIVE
CLARITY UR: CLEAR
COLOR UR: YELLOW
CREAT UR-MCNC: 151 MG/DL
GLUCOSE UR QL STRIP: NEGATIVE
HGB UR QL STRIP: ABNORMAL
KETONES UR QL STRIP: NEGATIVE
LEUKOCYTE ESTERASE UR QL STRIP: NEGATIVE
NITRITE UR QL STRIP: NEGATIVE
PH UR STRIP: 6 [PH] (ref 5–8)
PROT UR QL STRIP: NEGATIVE
PROT UR-MCNC: 13 MG/DL
PROT/CREAT RATIO, UR: 0.09
SP GR UR STRIP: 1.02 (ref 1–1.03)
URN SPEC COLLECT METH UR: ABNORMAL
UROBILINOGEN UR STRIP-ACNC: NEGATIVE EU/DL

## 2017-06-15 PROCEDURE — 81003 URINALYSIS AUTO W/O SCOPE: CPT

## 2017-06-15 PROCEDURE — 82570 ASSAY OF URINE CREATININE: CPT

## 2017-06-21 ENCOUNTER — TELEPHONE (OUTPATIENT)
Dept: OPTOMETRY | Facility: CLINIC | Age: 47
End: 2017-06-21

## 2017-06-21 ENCOUNTER — PATIENT MESSAGE (OUTPATIENT)
Dept: RHEUMATOLOGY | Facility: CLINIC | Age: 47
End: 2017-06-21

## 2017-06-21 ENCOUNTER — OFFICE VISIT (OUTPATIENT)
Dept: RHEUMATOLOGY | Facility: CLINIC | Age: 47
End: 2017-06-21
Payer: COMMERCIAL

## 2017-06-21 VITALS
HEIGHT: 66 IN | DIASTOLIC BLOOD PRESSURE: 59 MMHG | SYSTOLIC BLOOD PRESSURE: 111 MMHG | WEIGHT: 128.38 LBS | BODY MASS INDEX: 20.63 KG/M2 | HEART RATE: 78 BPM

## 2017-06-21 DIAGNOSIS — M62.838 MUSCLE SPASM: ICD-10-CM

## 2017-06-21 DIAGNOSIS — E55.9 HYPOVITAMINOSIS D: ICD-10-CM

## 2017-06-21 DIAGNOSIS — M19.90 ARTHRITIS: Primary | ICD-10-CM

## 2017-06-21 PROCEDURE — 99999 PR PBB SHADOW E&M-EST. PATIENT-LVL III: CPT | Mod: PBBFAC,,, | Performed by: INTERNAL MEDICINE

## 2017-06-21 PROCEDURE — 99213 OFFICE O/P EST LOW 20 MIN: CPT | Mod: S$GLB,,, | Performed by: INTERNAL MEDICINE

## 2017-06-21 RX ORDER — CYCLOBENZAPRINE HCL 10 MG
10 TABLET ORAL 3 TIMES DAILY PRN
Qty: 60 TABLET | Refills: 2 | Status: SHIPPED | OUTPATIENT
Start: 2017-06-21 | End: 2017-07-21

## 2017-06-21 RX ORDER — PREDNISONE 5 MG/1
7.5 TABLET ORAL DAILY
Qty: 90 TABLET | Refills: 3 | Status: SHIPPED | OUTPATIENT
Start: 2017-06-21 | End: 2017-08-20

## 2017-06-21 ASSESSMENT — ROUTINE ASSESSMENT OF PATIENT INDEX DATA (RAPID3)
PSYCHOLOGICAL DISTRESS SCORE: 2.2
AM STIFFNESS SCORE: 1, YES
PAIN SCORE: 0
MDHAQ FUNCTION SCORE: .8
WHEN YOU AWAKENED IN THE MORNING OVER THE LAST WEEK, PLEASE INDICATE THE AMOUNT OF TIME IT TAKES UNTIL YOU ARE AS LIMBER AS YOU WILL BE FOR THE DAY: 30 MINS
FATIGUE SCORE: 6
PATIENT GLOBAL ASSESSMENT SCORE: 6
TOTAL RAPID3 SCORE: 2.89

## 2017-06-21 ASSESSMENT — SYSTEMIC LUPUS ERYTHEMATOSUS DISEASE ACTIVITY INDEX (SLEDAI): TOTAL_SCORE: 0

## 2017-06-21 NOTE — PROGRESS NOTES
Subjective:     Patient ID: Rebel Rodriguez is a 46 y.o. male.    Chief Complaint: Psoriatic Arthritis. Psoriasis, infliximab induced lupus    HPI       Rebel Rodriguez is a 47 y.o. male with psoriatic arthritis and infliximab induced lupus presenting for follow up. Drug induced Lupus: Related to infliximab (last dose 2/29/16; + aphosholipids-anticardiolipin IgM mild +dsDNA: 1:1280,  DEE+ 1:1280 homogenous ,  + anti histone ab, CH50 low at 51 along with recurrent intermittent fevers, chills, arthralgias. Treated with plaquenil 300 mg daily, Leflunomide 20 mg daily    Interval History:    Pt denies any recurrent fevers or chills with this past lupus flare. No serositis, oral ulcers, new rashes.He is now on  Cosentyx- did 5 week loading dose starting 4/20, last dose was 6/15/17. He reports he is doing well from his PsA however pt notes worsening neck pain on the left side worsening over the past several weeks. He notes sleeping at an awkward position due to his sinus problems. Pt reports his pain is 7/10 and is persistent throughout the day. Pt reports a small patch of psoriasis on the right lower leg otherwise doing well. He reports poor sleep due to sinus pressure and his neck pain.  Pt is on prednisone 10 mg daily.     The following portions of the patient's history were reviewed and updated as appropriate: allergies, current medications, past family history, past medical history, past social history, past surgical history and problem list.    Review of Systems  General: Appetite good. No fever, chills or sweats. + weight loss   Psychological: No insomnia, +anxiety, no  depression  Ophthalmic: No blurred vision.  ENT: No sore throat or hoarseness. No mouth ulcers.  +dry mouth, +sinus pressure, nasal congestion   Allergy and Immunology: No history of frequent infections or adenopathy.  Hematological and Lymphatic: No easy bruising or bleeding.  Endocrine: No polyuria, polyphagia or polydipsia. No heat/cold  "intolerance. No hair loss.  Respiratory: No SOB, cough or wheezing.  Cardiovascular: No CP or palpitations. Denies any PND, orthopnea.   Gastrointestinal: No dyspepsia or change in bowel habits. No melena.  Genito-Urinary: No dysuria, No  Hematuria  Musculoskeletal: see above   Neurological: No TIA or stroke symptoms. + Headache. No weakness.   Dermatological: No new rash.     Objective:       Physical Examination:   Vitals:   BP (!) 111/59   Pulse 78   Ht 5' 6" (1.676 m)   Wt 58.2 kg (128 lb 6.4 oz)   BMI 20.72 kg/m²    Physical Exam   Constitutional: He is oriented to person, place, and time and well-developed, well-nourished, and in no distress.   HENT:   Head: Atraumatic.   Mouth/Throat: Oropharynx is clear and moist.   Eyes: Conjunctivae and EOM are normal. No scleral icterus.   Neck: Normal range of motion. Neck supple.   Cardiovascular: Normal rate, regular rhythm, normal heart sounds and intact distal pulses.  Exam reveals no gallop and no friction rub.    No murmur heard.  Pulmonary/Chest: Effort normal. No respiratory distress. He has no wheezes. He exhibits no tenderness.   Abdominal: Soft. Bowel sounds are normal. There is no tenderness.     Right Side Rheumatological Exam     Examination finds the shoulder, elbow, wrist, knee, 1st PIP, 1st MCP, 2nd PIP, 2nd MCP, 3rd MCP, 3rd PIP, 4th PIP, 4th MCP, 5th PIP, 5th MCP, SC joint, AC joint, 1st CMC, 2nd DIP, 3rd DIP, 4th DIP, 5th DIP, hip, ankle, talocalcaneal, tarsus, 1st MTP, 2nd MTP, 3rd MTP, 4th MTP, 5th MTP, 1st toe IP, 2nd toe IP, 3rd toe IP, 4th toe IP and 5th toe IP normal.      Muscle Strength (0-5 scale):  Neck Flexion:  5  Deltoid:  5  Biceps: 5/5   Triceps:  5  : 5/5   Iliopsoas: 5  Quadriceps:  5   Distal Lower Extremity: 5    Left Side Rheumatological Exam     Examination finds the shoulder, elbow, wrist, knee, 1st PIP, 1st MCP, 2nd PIP, 2nd MCP, 3rd PIP, 3rd MCP, 4th PIP, 4th MCP, 5th PIP, 5th MCP, SC joint, AC joint, 1st CMC, 2nd DIP, " 3rd DIP, 4th DIP, 5th DIP, hip, ankle, talocalcaneal, tarsus, 1st MTP, 2nd MTP, 3rd MTP, 4th MTP, 5th MTP, 1st toe IP, 2nd toe IP, 3rd toe IP, 4th toe IP and 5th toe IP normal.    Muscle Strength (0-5 scale):  Neck Flexion:  5  Deltoid:  5  Biceps: 5/5   Triceps:  5  :  5/5   Iliopsoas: 5  Quadriceps:  5   Distal Lower Extremity: 5      Lymphadenopathy:     He has no cervical adenopathy.   Neurological: He is alert and oriented to person, place, and time. Gait normal.   Skin: Skin is warm and dry. Rash (hyperpigmented area to the lateral aspect of the lower leg near ankles bilaterally. Right lower leg with small patch of psoriasis) noted. No pallor.   Musculoskeletal: Normal range of motion except for cervical spine ( limited lateral flexion on left and limited lateral rotation on the right) . He exhibits no tenderness or deformity.       SJC-O  TJC- O    Imaging  No new imaging.     Lab Review  Results for JON RODRIGUEZ (MRN [id number]) as of 6/21/2017 17:08   Ref. Range 6/15/2017 12:53   WBC Latest Ref Range: 3.90 - 12.70 K/uL 9.77   RBC Latest Ref Range: 4.60 - 6.20 M/uL 4.72   Hemoglobin Latest Ref Range: 14.0 - 18.0 g/dL 14.0   Hematocrit Latest Ref Range: 40.0 - 54.0 % 41.6   MCV Latest Ref Range: 82 - 98 fL 88   MCH Latest Ref Range: 27.0 - 31.0 pg 29.7   MCHC Latest Ref Range: 32.0 - 36.0 % 33.7   RDW Latest Ref Range: 11.5 - 14.5 % 13.5   Platelets Latest Ref Range: 150 - 350 K/uL 252       Results for JON RODRIGUEZ (MRN [id number]) as of 6/21/2017 17:08   Ref. Range 7/11/2016 12:44 9/14/2016 10:30 12/7/2016 09:58 2/10/2017 10:46 6/15/2017 12:53   Sed Rate Latest Ref Range: 0 - 10 mm/Hr 15 (H) 17 (H) 17 (H) 6 13 (H)     Results for JON RODRIGUEZ (MRN [id number]) as of 6/21/2017 17:08   Ref. Range 7/11/2016 12:44 9/14/2016 10:30 12/7/2016 09:58 2/10/2017 10:46 6/15/2017 12:53   CRP Latest Ref Range: 0.0 - 8.2 mg/L 2.4 0.9 0.9 1.2 0.4     Results for JON RODRIGUEZ (MRN [id number]) as of 6/21/2017 17:08   Ref.  Range 6/15/2017 12:53   Sodium Latest Ref Range: 136 - 145 mmol/L 140   Potassium Latest Ref Range: 3.5 - 5.1 mmol/L 3.6   Chloride Latest Ref Range: 95 - 110 mmol/L 105   CO2 Latest Ref Range: 23 - 29 mmol/L 25   Anion Gap Latest Ref Range: 8 - 16 mmol/L 10   BUN, Bld Latest Ref Range: 6 - 20 mg/dL 12   Creatinine Latest Ref Range: 0.5 - 1.4 mg/dL 1.0   eGFR if non African American Latest Ref Range: >60 mL/min/1.73 m^2 >60   eGFR if  Latest Ref Range: >60 mL/min/1.73 m^2 >60   Glucose Latest Ref Range: 70 - 110 mg/dL 79   Calcium Latest Ref Range: 8.7 - 10.5 mg/dL 9.3   Alkaline Phosphatase Latest Ref Range: 55 - 135 U/L 55   Total Protein Latest Ref Range: 6.0 - 8.4 g/dL 6.6   Albumin Latest Ref Range: 3.5 - 5.2 g/dL 3.6   Total Bilirubin Latest Ref Range: 0.1 - 1.0 mg/dL 0.4   AST Latest Ref Range: 10 - 40 U/L 26   ALT Latest Ref Range: 10 - 44 U/L 26   CRP Latest Ref Range: 0.0 - 8.2 mg/L 0.4     Results for JON RODRIGUEZ (MRN [id number]) as of 6/21/2017 17:08   Ref. Range 9/14/2016 10:30 12/7/2016 09:58 2/10/2017 10:46 6/15/2017 12:53   ds DNA Ab Latest Ref Range: Negative 1:10  Positive 1:640 (A) Positive 1:320 (A) Positive 1:80 (A) Positive 1:10 (A)   Complement (C-3) Latest Ref Range: 50 - 180 mg/dL 96 99 101 95   Complement (C-4) Latest Ref Range: 11 - 44 mg/dL 23 26 26 25     Assessment:      Psoriatic Arthritis- DAPSA 9.5 (low activity), previously 11.09;   TNF inhibitor caused drug induced lupus unable to take further TNFs. He has failed ustekinumab. Doing well on  secukinumabcurrently with prednisone 10 mg daily   Drug induced lupus- stable, SLEDAI 0. Complements normal and dsDNA continues to trend down: currently 1:10; Related to infliximab (last dose 2/29/16; + antiphosholipids-anticardiolipin IgM mild +dsDNA: 1:1280,  DEE+ 1:1280 homogenous , +anti histone, CH50 low at 51 along with recurrent intermittent fevers, chills, arthralgias.  HTN/CVD- no contraindications to current therapy, on  statin and aspirin, due for repeat lipid panel   TIA- no NSAIDs for msk pain  High risk medication use/Medication monitoring encounter- no toxic side effects seen. No steroid side effects. Needs DEXA  Immunizations- Pneumococcal up to date, needs Quadrivalent Flu vaccine.  Psoriasis <10 % BSA  Plan:     Psoriatic arthritis:   -  Decrease prednisone to 7.5 mg daily for 6 weeks then down to 5 mg daily. No change with ustekinumab.  -  Continue plaquenil 300 mg daily and lefluonamide 20 mg daily, no dose changes.   -  No NSAIDs due to past TIA.   - surveillance labs: DEXA given chronic steroid use.   - start 50,000 units ergo for low vitamin D    Muscle spasm  - left trapezius- flexeril 10 mg qhs can increase to TID.  - use moist heat, referral to PT/OT(hand)    RTC  3 months with standing labs.    Singh Richardson Sedrick  Rheumatology Fellow

## 2017-06-22 ENCOUNTER — CLINICAL SUPPORT (OUTPATIENT)
Dept: OPHTHALMOLOGY | Facility: CLINIC | Age: 47
End: 2017-06-22
Payer: COMMERCIAL

## 2017-06-22 ENCOUNTER — OFFICE VISIT (OUTPATIENT)
Dept: OPTOMETRY | Facility: CLINIC | Age: 47
End: 2017-06-22
Payer: COMMERCIAL

## 2017-06-22 DIAGNOSIS — H52.203 HYPEROPIA WITH ASTIGMATISM AND PRESBYOPIA, BILATERAL: ICD-10-CM

## 2017-06-22 DIAGNOSIS — M19.90 ARTHRITIS: ICD-10-CM

## 2017-06-22 DIAGNOSIS — H52.03 HYPEROPIA WITH ASTIGMATISM AND PRESBYOPIA, BILATERAL: ICD-10-CM

## 2017-06-22 DIAGNOSIS — Z79.899 HISTORY OF LONG-TERM TREATMENT WITH HIGH-RISK MEDICATION: Primary | ICD-10-CM

## 2017-06-22 DIAGNOSIS — Z79.899 HISTORY OF LONG-TERM TREATMENT WITH HIGH-RISK MEDICATION: ICD-10-CM

## 2017-06-22 DIAGNOSIS — H52.4 HYPEROPIA WITH ASTIGMATISM AND PRESBYOPIA, BILATERAL: ICD-10-CM

## 2017-06-22 PROCEDURE — 92083 EXTENDED VISUAL FIELD XM: CPT | Mod: S$GLB,,, | Performed by: OPTOMETRIST

## 2017-06-22 PROCEDURE — 92014 COMPRE OPH EXAM EST PT 1/>: CPT | Mod: S$GLB,,, | Performed by: OPTOMETRIST

## 2017-06-22 PROCEDURE — 92015 DETERMINE REFRACTIVE STATE: CPT | Mod: S$GLB,,, | Performed by: OPTOMETRIST

## 2017-06-22 PROCEDURE — 92134 CPTRZ OPH DX IMG PST SGM RTA: CPT | Mod: S$GLB,,, | Performed by: OPTOMETRIST

## 2017-06-22 PROCEDURE — 99999 PR PBB SHADOW E&M-EST. PATIENT-LVL II: CPT | Mod: PBBFAC,,, | Performed by: OPTOMETRIST

## 2017-06-22 RX ORDER — SECUKINUMAB 150 MG/ML
150 INJECTION SUBCUTANEOUS DAILY
COMMUNITY
Start: 2017-06-05 | End: 2017-09-13 | Stop reason: SDUPTHER

## 2017-06-22 NOTE — PROGRESS NOTES
HPI     Patient's last dilated exam was: 10/6/2016  Pt states:  Here for plaquenil check. Would also like to update glasses rx   and try contacts, feels slight blurred vision for reading small print.    Patient denies flashes, floaters, pain and double vision.       Last edited by Vianney Florez, PCT on 6/22/2017  1:27 PM.   (History)        ROS     Negative for: Constitutional, Gastrointestinal, Neurological, Skin,   Genitourinary, Musculoskeletal, HENT, Endocrine, Cardiovascular, Eyes,   Respiratory, Psychiatric, Allergic/Imm, Heme/Lymph    Last edited by Ana Bennett, OD on 6/22/2017  2:13 PM. (History)        Assessment /Plan     For exam results, see Encounter Report.    History of long-term treatment with high-risk medication  -     OCT- Retina     psoriatric Arthritis  -     OCT- Retina    Hyperopia with astigmatism and presbyopia, bilateral          1-2.  All testing normal OU--no retinopathy.  Monitor yearly.  3.  Bifocal rx given.  Distance contact lens trials ordered--new wearer.  RTC 1-2 weeks for dispense and instruct.

## 2017-07-06 ENCOUNTER — CLINICAL SUPPORT (OUTPATIENT)
Dept: REHABILITATION | Facility: HOSPITAL | Age: 47
End: 2017-07-06
Attending: INTERNAL MEDICINE
Payer: COMMERCIAL

## 2017-07-06 DIAGNOSIS — M54.2 NECK PAIN: Primary | ICD-10-CM

## 2017-07-06 PROCEDURE — 97161 PT EVAL LOW COMPLEX 20 MIN: CPT

## 2017-07-06 NOTE — PROGRESS NOTES
Physical Therapy Evaluation    Name: Rebel Rodriguez  Clinic Number: [id number]    Diagnosis: No diagnosis found.  Physician: Jeronimo Winters MD  Treatment Orders: PT Eval and Treat    Past Medical History:   Diagnosis Date    Achilles tendon rupture 10/9/2013    Allergy     Anxiety     Arthritis     psoriatric    Degenerative disc disease     Drug-induced lupus erythematosus     Hyperlipidemia     Kidney stone     Psoriatic arthritis     TIA (transient ischemic attack)     Ulcer     high school     Current Outpatient Prescriptions   Medication Sig    aspirin (ECOTRIN) 81 MG EC tablet Take 81 mg by mouth once daily.      atorvastatin (LIPITOR) 10 MG tablet Take 1 tablet (10 mg total) by mouth once daily.    atorvastatin (LIPITOR) 10 MG tablet TAKE 1 TABLET BY MOUTH DAILY    COSENTYX 150 mg/mL Syrg Take 150 mg by mouth once daily.    cyclobenzaprine (FLEXERIL) 10 MG tablet Take 1 tablet (10 mg total) by mouth 3 (three) times daily as needed for Muscle spasms.    hydroxychloroquine (PLAQUENIL) 200 mg tablet TAKE 1 AND 1/2 TABLETS(300 MG) BY MOUTH EVERY DAY    leflunomide (ARAVA) 20 MG Tab Take 1 tablet (20 mg total) by mouth once daily.    loratadine-pseudoephedrine  mg (CLARITIN-D 24-HOUR)  mg per 24 hr tablet Take 1 tablet by mouth once daily.    predniSONE (DELTASONE) 10 MG tablet Take 10 mg by mouth once daily.    predniSONE (DELTASONE) 5 MG tablet Take 1.5 tablets (7.5 mg total) by mouth once daily.     Current Facility-Administered Medications   Medication    acetaminophen tablet 500 mg    promethazine injection 12.5 mg     Review of patient's allergies indicates:   Allergen Reactions    Erythromycin Nausea And Vomiting    Infliximab Other (See Comments)     Lupus with fever and acute arthritis     Precautions: none    Evaluation Date: 7/6/2017  Visit # authorized: 1/20  Authorization period: 12/31/2017  Plan of Care Expiration: 8/6/2017    Subjective     Onset/BENNETT:  gradual    Primary concern/ Chief complaints:    Rebel is a 47 y.o. male that presents to [facility] secondary to left sided neck pain.  Injury/surgery occurred on 1 year ago.  X-ray was taken and revealed stenosis, degenerative disc disease mainly at C4-5 and to a lesser degree C5-6, and C6-7. Previous treatment included flexeril which seemed to help. Pt reports that sleeping, being upright increases neck pain and reports neck pain at worst is a 8 on the VAS. Pt uses heat, tiger balm, biofreeze to control neck pain symptoms. Is open to trying a TENS unit. Has not had PT for his neck pain before. Pt has a decreased ability to perform ADLs such as standing, checking his mirrors when driving, turning his head, sleeping. Pt reports intermittent numbness and tingling in the fingertips of both hands, and will feel a shocking pain in the ribcage when he sneezes. Reports that his neck pain will keep him up at night, and he will sleep with his neck ending up in a position of extension and left side bending by the time he wakes up. Also has sinus problems and will get intense headaches because of it. 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  No past history of MVA or trauma. No surgeries to the neck. No cultural, environmental, or spiritual barriers identified to treatment or learning.    Pain Scale: Rebel rates pain on a scale of 0-10 to be 8 at worst; 7 currently; 6 at best .    Patient Goals: Pt would like to decrease pain and increase function so he can return to pain-free completion of all normal daily activities.      Objective     Observation: slouched posture    Cervical Range of Motion:    Degrees Pain   Flexion 60 +     Extension 40 Tight, pressure pain in the C7 region     Right Rotation 55 -     Left Rotation 50 +     Right Side Bending 30 -   Left Side Bending 25 +      Shoulder Range of Motion:   Shoulder Left Right    Flexion WNL WNL   Abduction WNL WNL   ER WNL WNL   IR WNL WNL     Strength:  Cervical MMT   Flexion 2-   Extension 3+   Right Side Bend 3+   Left Side Bend 3+     Upper Extremity Strength  (R) UE  (L) UE    Shoulder flexion: 4+/5 Shoulder flexion: 4+/5   Shoulder Abduction: 4+/5 Shoulder abduction: 4+/5   Shoulder ER 5/5 Shoulder ER 5/5   Shoulder IR 5/5 Shoulder IR 5/5   Elbow flexion: 5/5 Elbow flexion: 5/5   Elbow extension: 5/5 Elbow extension: 5/5   Wrist flexion: 5/5 Wrist flexion: 5/5   Wrist extension: 5/5 Wrist extension: 5/5    5/5 : 5/5   Lower Trap 4+/5 Lower Trap 4+/5   Middle Trap 4/5 Middle Trap 4/5   Rhomboids 4+/5 Rhomboids 4+/5       Special Tests:  Distraction -   Compression -       Joint Mobility: WNL to PAs, tender to transverse glides to L in C5-6 going to the right      Palpation: ttp at bilateral levator scapulae, L upper trapezius, bilateral lower trapezius      Sensation: mildly decreased to light touch to left fingertips of index, middle and ring finger    Flexibility: decreased in bilateral levator scapulae, upper trapezius, and pectorals    G-code Reporting:  Category: mobility (Mobility, Self-Care, etc. )  Tool: FOTO neck  Score: 46% impairment  Goal:  CJ ( 20%-40% impairment)  Current:  CK ( 40%-60% impairment)        PT Evaluation Completed? Yes  Discussed Plan of Care with patient: Yes    TREATMENT:  Rebel received therapeutic exercises to develop strength and endurance, flexibility for 15 minutes including: levator scapula stretch, upper trap stretch. (HEP code = [id number])    Instructed pt. Regarding: Proper technique with all exercises. Pt demo good understanding of the education provided. Rebel demonstrated good return demonstration of activities.      Assessment     This is a 47 y.o. male referred to outpatient physical therapy and presents with a medical diagnosis of muscle spasm and demonstrates limitations as described in the problem list. Pt will benefit  from physical therapy services in order to maximize pain free functional independence. The following goals were discussed with the patient and patient is in agreement with them as to be addressed in the treatment plan. Pt was given a HEP consisting of levator scapula stretch, upper trap stretch. (HEP code = [id number]). Pt verbally understood the instructions as they were given and demonstrated proper form and technique during therapy. Pt was advised to perform these exercises free of pain, and to stop performing them if pain occurs.     Pt presents with decreased range of motion into all planes of motion in the cervical spine, and would benefit from stretching of bilateral upper trapezius, bilateral levator scapulae, bilateral scalenes and pectorals in order to decrease muscle guarding and pain as well as to allow for increases of range of motion into all directions. To decrease stresses on the cervical spine with completion of ADLs, pt would benefit from strengthening of periscapular musculature and stretching of pectorals as tolerated for postural re-education. Due to pt's symptoms of tingling/numbness in the median nerve distribution, pt may benefit from nerve mobilization exercises as well in order to decrease nerve impingement in the neck and LUE.    Patient History Examination Clinical Presentation Clinical Decision Making   Comorbidities:  none    Personal Factors:  Decreased availability due to work         Activity and Participation Restriction:  mobility    Body Systems:  Musculoskeletal  neuromuscular    Body Regions:  Neck  shoulder Stable and uncomplicated     Low            Medical necessity is demonstrated by the following IMPAIRMENTS/PROBLEM LIST:   1)Increase in pain level limiting function   2)Decreased range of motion limiting function   3)Decreased strength limiting function   4)Impaired posture   5)Lack of HEP    GOALS: Short Term Goals: 3 weeks  1. Report decreased neck pain  <   / =  6  /10  to  increase tolerance for completion of ADLs  2. Increased sidebending AROM to 30 deg bilaterally to demonstrate improved mobility  3. Increased MMT  for lower trapezius to 5/5 to increase tolerance for ADL and work activities.  4. Pt to report a decreased amount of pain with sitting and standing without support in order to demonstrate improved activity tolerance  5. Pt to tolerate HEP to improve ROM and independence with ADL's    Long Term Goals: 6 weeks  1. Report decreased neck pain  <   / =  5  /10  to increase tolerance for completion of ADLs  2. Increased rotation AROM to 60 deg bilaterally to demonstrate improved mobility  3. Increased MMT  for middle trapezius to 5/5  to increase tolerance for ADL and work activities.  4.  Pt will report at CJ level (20-40% impaired) on FOTO score for neck pain disability to demonstrate decrease in disability and improvement in neck pain.  5. Pt to be Independent with HEP to improve ROM and independence with ADL's      Plan     Pt will be treated by physical therapy 1-3 times a week for 6 weeks for Pt Education, HEP, therapeutic exercises, neuromuscular re-education, joint mobilizations, modalities prn to achieve established goals. Rebel may at times be seen by a PTA as part of the Rehab Team.     Cont PT for  6  weeks.     Petty Maldonado, CHERI  7/6/2017    I certify the need for these services furnished under this plan of treatment and while under my care.    ______________________________ Physician/Referring Practitioner  Date of Signature

## 2017-07-06 NOTE — PLAN OF CARE
Physical Therapy Evaluation    Name: Rebel Rodriguez  Clinic Number: [id number]    Diagnosis: No diagnosis found.  Physician: Jeronimo Winters MD  Treatment Orders: PT Eval and Treat    Past Medical History:   Diagnosis Date    Achilles tendon rupture 10/9/2013    Allergy     Anxiety     Arthritis     psoriatric    Degenerative disc disease     Drug-induced lupus erythematosus     Hyperlipidemia     Kidney stone     Psoriatic arthritis     TIA (transient ischemic attack)     Ulcer     high school     Current Outpatient Prescriptions   Medication Sig    aspirin (ECOTRIN) 81 MG EC tablet Take 81 mg by mouth once daily.      atorvastatin (LIPITOR) 10 MG tablet Take 1 tablet (10 mg total) by mouth once daily.    atorvastatin (LIPITOR) 10 MG tablet TAKE 1 TABLET BY MOUTH DAILY    COSENTYX 150 mg/mL Syrg Take 150 mg by mouth once daily.    cyclobenzaprine (FLEXERIL) 10 MG tablet Take 1 tablet (10 mg total) by mouth 3 (three) times daily as needed for Muscle spasms.    hydroxychloroquine (PLAQUENIL) 200 mg tablet TAKE 1 AND 1/2 TABLETS(300 MG) BY MOUTH EVERY DAY    leflunomide (ARAVA) 20 MG Tab Take 1 tablet (20 mg total) by mouth once daily.    loratadine-pseudoephedrine  mg (CLARITIN-D 24-HOUR)  mg per 24 hr tablet Take 1 tablet by mouth once daily.    predniSONE (DELTASONE) 10 MG tablet Take 10 mg by mouth once daily.    predniSONE (DELTASONE) 5 MG tablet Take 1.5 tablets (7.5 mg total) by mouth once daily.     Current Facility-Administered Medications   Medication    acetaminophen tablet 500 mg    promethazine injection 12.5 mg     Review of patient's allergies indicates:   Allergen Reactions    Erythromycin Nausea And Vomiting    Infliximab Other (See Comments)     Lupus with fever and acute arthritis     Precautions: none    Evaluation Date: 7/6/2017  Visit # authorized: 1/20  Authorization period: 12/31/2017  Plan of Care Expiration: 8/6/2017    Subjective     Onset/BENNETT:  gradual    Primary concern/ Chief complaints:    Rebel is a 47 y.o. male that presents to [facility] secondary to left sided neck pain.  Injury/surgery occurred on 1 year ago.  X-ray was taken and revealed stenosis, degenerative disc disease mainly at C4-5 and to a lesser degree C5-6, and C6-7. Previous treatment included flexeril which seemed to help. Pt reports that sleeping, being upright increases neck pain and reports neck pain at worst is a 8 on the VAS. Pt uses heat, tiger balm, biofreeze to control neck pain symptoms. Is open to trying a TENS unit. Has not had PT for his neck pain before. Pt has a decreased ability to perform ADLs such as standing, checking his mirrors when driving, turning his head, sleeping. Pt reports intermittent numbness and tingling in the fingertips of both hands, and will feel a shocking pain in the ribcage when he sneezes. Reports that his neck pain will keep him up at night, and he will sleep with his neck ending up in a position of extension and left side bending by the time he wakes up. Also has sinus problems and will get intense headaches because of it. 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  No past history of MVA or trauma. No surgeries to the neck. No cultural, environmental, or spiritual barriers identified to treatment or learning.    Pain Scale: Rebel rates pain on a scale of 0-10 to be 8 at worst; 7 currently; 6 at best .    Patient Goals: Pt would like to decrease pain and increase function so he can return to pain-free completion of all normal daily activities.      Objective     Observation: slouched posture    Cervical Range of Motion:    Degrees Pain   Flexion 60 +     Extension 40 Tight, pressure pain in the C7 region     Right Rotation 55 -     Left Rotation 50 +     Right Side Bending 30 -   Left Side Bending 25 +      Shoulder Range of Motion:   Shoulder Left Right    Flexion WNL WNL   Abduction WNL WNL   ER WNL WNL   IR WNL WNL     Strength:  Cervical MMT   Flexion 2-   Extension 3+   Right Side Bend 3+   Left Side Bend 3+     Upper Extremity Strength  (R) UE  (L) UE    Shoulder flexion: 4+/5 Shoulder flexion: 4+/5   Shoulder Abduction: 4+/5 Shoulder abduction: 4+/5   Shoulder ER 5/5 Shoulder ER 5/5   Shoulder IR 5/5 Shoulder IR 5/5   Elbow flexion: 5/5 Elbow flexion: 5/5   Elbow extension: 5/5 Elbow extension: 5/5   Wrist flexion: 5/5 Wrist flexion: 5/5   Wrist extension: 5/5 Wrist extension: 5/5    5/5 : 5/5   Lower Trap 4+/5 Lower Trap 4+/5   Middle Trap 4/5 Middle Trap 4/5   Rhomboids 4+/5 Rhomboids 4+/5       Special Tests:  Distraction -   Compression -       Joint Mobility: WNL to PAs, tender to transverse glides to L in C5-6 going to the right      Palpation: ttp at bilateral levator scapulae, L upper trapezius, bilateral lower trapezius      Sensation: mildly decreased to light touch to left fingertips of index, middle and ring finger    Flexibility: decreased in bilateral levator scapulae, upper trapezius, and pectorals    G-code Reporting:  Category: mobility (Mobility, Self-Care, etc. )  Tool: FOTO neck  Score: 46% impairment  Goal:  CJ ( 20%-40% impairment)  Current:  CK ( 40%-60% impairment)        PT Evaluation Completed? Yes  Discussed Plan of Care with patient: Yes    TREATMENT:  Rebel received therapeutic exercises to develop strength and endurance, flexibility for 15 minutes including: levator scapula stretch, upper trap stretch. (HEP code = [id number])    Instructed pt. Regarding: Proper technique with all exercises. Pt demo good understanding of the education provided. Rebel demonstrated good return demonstration of activities.      Assessment     This is a 47 y.o. male referred to outpatient physical therapy and presents with a medical diagnosis of muscle spasm and demonstrates limitations as described in the problem list. Pt will benefit  from physical therapy services in order to maximize pain free functional independence. The following goals were discussed with the patient and patient is in agreement with them as to be addressed in the treatment plan. Pt was given a HEP consisting of levator scapula stretch, upper trap stretch. (HEP code = [id number]). Pt verbally understood the instructions as they were given and demonstrated proper form and technique during therapy. Pt was advised to perform these exercises free of pain, and to stop performing them if pain occurs.     Pt presents with decreased range of motion into all planes of motion in the cervical spine, and would benefit from stretching of bilateral upper trapezius, bilateral levator scapulae, bilateral scalenes and pectorals in order to decrease muscle guarding and pain as well as to allow for increases of range of motion into all directions. To decrease stresses on the cervical spine with completion of ADLs, pt would benefit from strengthening of periscapular musculature and stretching of pectorals as tolerated for postural re-education. Due to pt's symptoms of tingling/numbness in the median nerve distribution, pt may benefit from nerve mobilization exercises as well in order to decrease nerve impingement in the neck and LUE.    Patient History Examination Clinical Presentation Clinical Decision Making   Comorbidities:  none    Personal Factors:  Decreased availability due to work         Activity and Participation Restriction:  mobility    Body Systems:  Musculoskeletal  neuromuscular    Body Regions:  Neck  shoulder Stable and uncomplicated     Low            Medical necessity is demonstrated by the following IMPAIRMENTS/PROBLEM LIST:   1)Increase in pain level limiting function   2)Decreased range of motion limiting function   3)Decreased strength limiting function   4)Impaired posture   5)Lack of HEP    GOALS: Short Term Goals: 3 weeks  1. Report decreased neck pain  <   / =  6  /10  to  increase tolerance for completion of ADLs  2. Increased sidebending AROM to 30 deg bilaterally to demonstrate improved mobility  3. Increased MMT  for lower trapezius to 5/5 to increase tolerance for ADL and work activities.  4. Pt to report a decreased amount of pain with sitting and standing without support in order to demonstrate improved activity tolerance  5. Pt to tolerate HEP to improve ROM and independence with ADL's    Long Term Goals: 6 weeks  1. Report decreased neck pain  <   / =  5  /10  to increase tolerance for completion of ADLs  2. Increased rotation AROM to 60 deg bilaterally to demonstrate improved mobility  3. Increased MMT  for middle trapezius to 5/5  to increase tolerance for ADL and work activities.  4.  Pt will report at CJ level (20-40% impaired) on FOTO score for neck pain disability to demonstrate decrease in disability and improvement in neck pain.  5. Pt to be Independent with HEP to improve ROM and independence with ADL's      Plan     Pt will be treated by physical therapy 1-3 times a week for 6 weeks for Pt Education, HEP, therapeutic exercises, neuromuscular re-education, joint mobilizations, modalities prn to achieve established goals. Rebel may at times be seen by a PTA as part of the Rehab Team.     Cont PT for  6  weeks.     Petty Maldonado, CHERI  7/6/2017    I certify the need for these services furnished under this plan of treatment and while under my care.    ______________________________ Physician/Referring Practitioner  Date of Signature

## 2017-07-07 ENCOUNTER — TELEPHONE (OUTPATIENT)
Dept: PHARMACY | Facility: CLINIC | Age: 47
End: 2017-07-07

## 2017-07-11 ENCOUNTER — TELEPHONE (OUTPATIENT)
Dept: PHARMACY | Facility: CLINIC | Age: 47
End: 2017-07-11

## 2017-07-11 ENCOUNTER — TELEPHONE (OUTPATIENT)
Dept: OPTOMETRY | Facility: CLINIC | Age: 47
End: 2017-07-11

## 2017-07-11 ENCOUNTER — PATIENT MESSAGE (OUTPATIENT)
Dept: OPTOMETRY | Facility: CLINIC | Age: 47
End: 2017-07-11

## 2017-07-13 ENCOUNTER — CLINICAL SUPPORT (OUTPATIENT)
Dept: REHABILITATION | Facility: HOSPITAL | Age: 47
End: 2017-07-13
Attending: INTERNAL MEDICINE
Payer: COMMERCIAL

## 2017-07-13 DIAGNOSIS — M54.2 NECK PAIN: Primary | ICD-10-CM

## 2017-07-13 PROCEDURE — 97140 MANUAL THERAPY 1/> REGIONS: CPT

## 2017-07-13 PROCEDURE — 97110 THERAPEUTIC EXERCISES: CPT

## 2017-07-13 NOTE — PROGRESS NOTES
"Physical Therapy Evaluation  Time in 1425  Time out 1525    Name: Rebel Rodriguez  Clinic Number: [id number]    Diagnosis:   Encounter Diagnosis   Name Primary?    Neck pain Yes     Physician: Jeronimo Winters MD  Treatment Orders: PT Eval and Treat    Precautions: none    Evaluation Date: 7/6/2017  Visit # authorized: 1/20  Authorization period: 12/31/2017  Plan of Care Expiration: 8/6/2017    Subjective     Pt stated "pain is not as relentless as before". Pain scale 6/10.    Objective     Observation: slouched posture    Cervical Range of Motion:    Degrees Pain   Flexion 60 +     Extension 40 Tight, pressure pain in the C7 region     Right Rotation 55 -     Left Rotation 50 +     Right Side Bending 30 -   Left Side Bending 25 +      Shoulder Range of Motion:   Shoulder Left Right   Flexion WNL WNL   Abduction WNL WNL   ER WNL WNL   IR WNL WNL     Strength:  Cervical MMT   Flexion 2-   Extension 3+   Right Side Bend 3+   Left Side Bend 3+     Upper Extremity Strength  (R) UE  (L) UE    Shoulder flexion: 4+/5 Shoulder flexion: 4+/5   Shoulder Abduction: 4+/5 Shoulder abduction: 4+/5   Shoulder ER 5/5 Shoulder ER 5/5   Shoulder IR 5/5 Shoulder IR 5/5   Elbow flexion: 5/5 Elbow flexion: 5/5   Elbow extension: 5/5 Elbow extension: 5/5   Wrist flexion: 5/5 Wrist flexion: 5/5   Wrist extension: 5/5 Wrist extension: 5/5    5/5 : 5/5   Lower Trap 4+/5 Lower Trap 4+/5   Middle Trap 4/5 Middle Trap 4/5   Rhomboids 4+/5 Rhomboids 4+/5       Special Tests:  Distraction -   Compression -         TREATMENT:  Moist heat - neck x 10'   Manual therapy - occipital realease, cervical PROM and stretching   Seated UT, levator 3x/30" ea   Supine siesta 30x   Supine 1/2 bolster pec stretch 5'  UBE 3' ea scap pro/retraction   GTB Rows 30x/3"      .      Assessment     This is a 47 y.o. male referred to outpatient physical therapy and presents with a medical diagnosis of muscle spasm and demonstrates limitations as described in " the problem list. Pt will benefit from physical therapy services in order to maximize pain free functional independence. The following goals were discussed with the patient and patient is in agreement with them as to be addressed in the treatment plan. Pt was given a HEP consisting of levator scapula stretch, upper trap stretch. (HEP code = [id number]). Pt verbally understood the instructions as they were given and demonstrated proper form and technique during therapy. Pt was advised to perform these exercises free of pain, and to stop performing them if pain occurs.     Pt presents with decreased range of motion into all planes of motion in the cervical spine, and would benefit from stretching of bilateral upper trapezius, bilateral levator scapulae, bilateral scalenes and pectorals in order to decrease muscle guarding and pain as well as to allow for increases of range of motion into all directions. To decrease stresses on the cervical spine with completion of ADLs, pt would benefit from strengthening of periscapular musculature and stretching of pectorals as tolerated for postural re-education. Due to pt's symptoms of tingling/numbness in the median nerve distribution, pt may benefit from nerve mobilization exercises as well in order to decrease nerve impingement in the neck and LUE.    Patient History Examination Clinical Presentation Clinical Decision Making   Comorbidities:  none    Personal Factors:  Decreased availability due to work         Activity and Participation Restriction:  mobility    Body Systems:  Musculoskeletal  neuromuscular    Body Regions:  Neck  shoulder Stable and uncomplicated     Low            Medical necessity is demonstrated by the following IMPAIRMENTS/PROBLEM LIST:   1)Increase in pain level limiting function   2)Decreased range of motion limiting function   3)Decreased strength limiting function   4)Impaired posture   5)Lack of HEP    GOALS: Short Term Goals: 3 weeks  1. Report  decreased neck pain  <   / =  6  /10  to increase tolerance for completion of ADLs  2. Increased sidebending AROM to 30 deg bilaterally to demonstrate improved mobility  3. Increased MMT  for lower trapezius to 5/5 to increase tolerance for ADL and work activities.  4. Pt to report a decreased amount of pain with sitting and standing without support in order to demonstrate improved activity tolerance  5. Pt to tolerate HEP to improve ROM and independence with ADL's    Long Term Goals: 6 weeks  1. Report decreased neck pain  <   / =  5  /10  to increase tolerance for completion of ADLs  2. Increased rotation AROM to 60 deg bilaterally to demonstrate improved mobility  3. Increased MMT  for middle trapezius to 5/5  to increase tolerance for ADL and work activities.  4.  Pt will report at CJ level (20-40% impaired) on FOTO score for neck pain disability to demonstrate decrease in disability and improvement in neck pain.  5. Pt to be Independent with HEP to improve ROM and independence with ADL's      Plan     Pt will be treated by physical therapy 1-3 times a week for 6 weeks for Pt Education, HEP, therapeutic exercises, neuromuscular re-education, joint mobilizations, modalities prn to achieve established goals. Rebel may at times be seen by a PTA as part of the Rehab Team.     Cont PT for  6  weeks.   Tee Burgess PTA, ST[facility]  7/6/2017

## 2017-07-14 ENCOUNTER — TELEPHONE (OUTPATIENT)
Dept: OPTOMETRY | Facility: CLINIC | Age: 47
End: 2017-07-14

## 2017-07-14 NOTE — TELEPHONE ENCOUNTER
----- Message from Wilfredo Sanchez sent at 7/14/2017 12:16 PM CDT -----  Contact: Rebel Rodriguez [[id number]]  Pt called back to schedule cl dispense pt wants to be seen prior to next available,please call back t [phone],thanks

## 2017-07-17 ENCOUNTER — OFFICE VISIT (OUTPATIENT)
Dept: OPTOMETRY | Facility: CLINIC | Age: 47
End: 2017-07-17

## 2017-07-17 DIAGNOSIS — H52.03 HYPEROPIA WITH ASTIGMATISM AND PRESBYOPIA, BILATERAL: Primary | ICD-10-CM

## 2017-07-17 DIAGNOSIS — H52.4 HYPEROPIA WITH ASTIGMATISM AND PRESBYOPIA, BILATERAL: Primary | ICD-10-CM

## 2017-07-17 DIAGNOSIS — H52.203 HYPEROPIA WITH ASTIGMATISM AND PRESBYOPIA, BILATERAL: Primary | ICD-10-CM

## 2017-07-17 PROCEDURE — 92310 CONTACT LENS FITTING OU: CPT | Mod: ,,, | Performed by: OPTOMETRIST

## 2017-07-17 PROCEDURE — 99499 UNLISTED E&M SERVICE: CPT | Mod: S$GLB,,, | Performed by: OPTOMETRIST

## 2017-07-17 PROCEDURE — 99999 PR PBB SHADOW E&M-EST. PATIENT-LVL I: CPT | Mod: PBBFAC,,, | Performed by: OPTOMETRIST

## 2017-07-17 NOTE — PROGRESS NOTES
HPI     Last eye exam was 6/22/17 with Dr. Bennett.  Patient here for CL fitting. Hasn't worn them in a while but remembers how   to insert/remove them. Wanted to know what strength readers to use with   them.     Last edited by Jodie Tran on 7/17/2017  2:31 PM. (History)            Assessment /Plan     For exam results, see Encounter Report.    Hyperopia with astigmatism and presbyopia, bilateral          1.  Contact lens rx given.  Will wear +1.50 OTC readers.    RTC 1 year for plaquenil exam.

## 2017-07-20 ENCOUNTER — CLINICAL SUPPORT (OUTPATIENT)
Dept: REHABILITATION | Facility: HOSPITAL | Age: 47
End: 2017-07-20
Attending: INTERNAL MEDICINE
Payer: COMMERCIAL

## 2017-07-20 DIAGNOSIS — M54.2 NECK PAIN: Primary | ICD-10-CM

## 2017-07-20 PROCEDURE — 97110 THERAPEUTIC EXERCISES: CPT

## 2017-07-20 NOTE — PROGRESS NOTES
"Physical Therapy Daily Note    Name: Rebel Rodriguez  Clinic Number: [id number]    Diagnosis:   No diagnosis found.  Physician: Jeronimo Winters MD  Treatment Orders: PT Eval and Treat    Precautions: none    Evaluation Date: 7/6/2017  Visit # authorized: 3/20  Authorization period: 12/31/2017  Plan of Care Expiration: 8/6/2017    Subjective     Pt stated hasn't done any stretching yet today, but did well after last time. Has no more of that relentless, acute pain. Pain scale 4/10.    Objective     Observation: slouched posture    Cervical Range of Motion:    Degrees Pain   Flexion 60 +     Extension 40 Tight, pressure pain in the C7 region     Right Rotation 55 -     Left Rotation 50 +     Right Side Bending 30 -   Left Side Bending 25 +      TREATMENT: (total therex time = 50 min)  Moist heat - neck x 10'   Manual therapy - occipital realease, cervical PROM and stretching   Seated UT, levator, SCM 3x/45" ea   Supine siesta 30x   Supine 1/2 bolster pec stretch 5'  Standing pec stretch 2x30 sec BUE  UBE 3' ea scap pro/retraction   GTB Rows 30x/3"    .      Assessment     This is a 47 y.o. male referred to outpatient physical therapy and presents with a medical diagnosis of muscle spasm and demonstrates limitations as described in the problem list. Pt will benefit from physical therapy services in order to maximize pain free functional independence. The following goals were discussed with the patient and patient is in agreement with them as to be addressed in the treatment plan. Pt was given a HEP consisting of levator scapula stretch, upper trap stretch. (HEP code = [id number]). Pt verbally understood the instructions as they were given and demonstrated proper form and technique during therapy. Pt was advised to perform these exercises free of pain, and to stop performing them if pain occurs.     Pt presents with decreased range of motion into all planes of motion in the cervical spine, and would benefit from stretching " of bilateral upper trapezius, bilateral levator scapulae, bilateral scalenes and pectorals in order to decrease muscle guarding and pain as well as to allow for increases of range of motion into all directions. To decrease stresses on the cervical spine with completion of ADLs, pt would benefit from strengthening of periscapular musculature and stretching of pectorals as tolerated for postural re-education. Due to pt's symptoms of tingling/numbness in the median nerve distribution, pt may benefit from nerve mobilization exercises as well in order to decrease nerve impingement in the neck and LUE. Added pectoral and sternocleidomastoid stretches to HEP today to decrease hinging with cervical extension and decrease forward head and rounded shoulder posture.    Patient History Examination Clinical Presentation Clinical Decision Making   Comorbidities:  none    Personal Factors:  Decreased availability due to work         Activity and Participation Restriction:  mobility    Body Systems:  Musculoskeletal  neuromuscular    Body Regions:  Neck  shoulder Stable and uncomplicated     Low            Medical necessity is demonstrated by the following IMPAIRMENTS/PROBLEM LIST:   1)Increase in pain level limiting function   2)Decreased range of motion limiting function   3)Decreased strength limiting function   4)Impaired posture   5)Lack of HEP    GOALS: Short Term Goals: 3 weeks  1. Report decreased neck pain  <   / =  6  /10  to increase tolerance for completion of ADLs  2. Increased sidebending AROM to 30 deg bilaterally to demonstrate improved mobility  3. Increased MMT  for lower trapezius to 5/5 to increase tolerance for ADL and work activities.  4. Pt to report a decreased amount of pain with sitting and standing without support in order to demonstrate improved activity tolerance  5. Pt to tolerate HEP to improve ROM and independence with ADL's    Long Term Goals: 6 weeks  1. Report decreased neck pain  <   / =  5  /10   to increase tolerance for completion of ADLs  2. Increased rotation AROM to 60 deg bilaterally to demonstrate improved mobility  3. Increased MMT  for middle trapezius to 5/5  to increase tolerance for ADL and work activities.  4.  Pt will report at CJ level (20-40% impaired) on FOTO score for neck pain disability to demonstrate decrease in disability and improvement in neck pain.  5. Pt to be Independent with HEP to improve ROM and independence with ADL's      Plan     Pt will be treated by physical therapy 1-3 times a week for 6 weeks for Pt Education, HEP, therapeutic exercises, neuromuscular re-education, joint mobilizations, modalities prn to achieve established goals. Rebel may at times be seen by a PTA as part of the Rehab Team.     Cont PT for  6  weeks.     Petty Maldonado DPT  7/20/2017

## 2017-07-27 ENCOUNTER — CLINICAL SUPPORT (OUTPATIENT)
Dept: REHABILITATION | Facility: HOSPITAL | Age: 47
End: 2017-07-27
Attending: INTERNAL MEDICINE
Payer: COMMERCIAL

## 2017-07-27 DIAGNOSIS — M54.2 NECK PAIN: Primary | ICD-10-CM

## 2017-07-27 PROCEDURE — 97110 THERAPEUTIC EXERCISES: CPT

## 2017-07-27 NOTE — PROGRESS NOTES
"Physical Therapy Daily Note    Name: Rebel Rodriguez  Clinic Number: [id number]    Diagnosis:   Encounter Diagnosis   Name Primary?    Neck pain Yes     Physician: Jeronimo Winters MD  Treatment Orders: PT Eval and Treat    Precautions: none    Evaluation Date: 7/6/2017  Visit # authorized: 4/20  Authorization period: 12/31/2017  Plan of Care Expiration: 8/6/2017    Subjective     Pt stated that his neck is a little aggravated today. Doing his HEP regularly. Pain scale 4/10.    Objective     Observation: slouched posture    Cervical Range of Motion:    Degrees Pain   Flexion 60 +     Extension 40 Tight, pressure pain in the C7 region     Right Rotation 55 -     Left Rotation 50 +     Right Side Bending 35 -   Left Side Bending 30 +      TREATMENT: (total therex time = 50 min)  Moist heat - neck x 10'   Manual therapy - occipital release x4 min, cervical PROM and stretching of scalenes 2x1 min each sides  Seated UT, levator, SCM 2x/1min ea   Supine siesta 30x   Supine 1/2 bolster pec stretch 5'  Standing pec stretch 2x30 sec BUE  UBE 3' ea scap pro/retraction   GTB Rows 30x/3" NP      Assessment     This is a 47 y.o. male referred to outpatient physical therapy and presents with a medical diagnosis of muscle spasm and demonstrates limitations as described in the problem list. Pt will benefit from physical therapy services in order to maximize pain free functional independence. The following goals were discussed with the patient and patient is in agreement with them as to be addressed in the treatment plan. Pt was given a HEP consisting of levator scapula stretch, upper trap stretch. (HEP code = [id number]). Pt verbally understood the instructions as they were given and demonstrated proper form and technique during therapy. Pt was advised to perform these exercises free of pain, and to stop performing them if pain occurs.     Pt presents with mildly increased range of motion into side bending today, but would benefit " from continued stretching of bilateral upper trapezius, bilateral levator scapulae, bilateral scalenes and pectorals in order to decrease muscle guarding and pain as well as to allow for increases of range of motion into all directions. Manual scalene stretches were added to today's exercises and this was tolerated well. Increased tightness noted on left side. Pt may benefit from dry needling to further decrease stiffness.    Patient History Examination Clinical Presentation Clinical Decision Making   Comorbidities:  none    Personal Factors:  Decreased availability due to work         Activity and Participation Restriction:  mobility    Body Systems:  Musculoskeletal  neuromuscular    Body Regions:  Neck  shoulder Stable and uncomplicated     Low            Medical necessity is demonstrated by the following IMPAIRMENTS/PROBLEM LIST:   1)Increase in pain level limiting function   2)Decreased range of motion limiting function   3)Decreased strength limiting function   4)Impaired posture   5)Lack of HEP    GOALS: Short Term Goals: 3 weeks  1. Report decreased neck pain  <   / =  6  /10  to increase tolerance for completion of ADLs  2. Increased sidebending AROM to 30 deg bilaterally to demonstrate improved mobility  3. Increased MMT  for lower trapezius to 5/5 to increase tolerance for ADL and work activities.  4. Pt to report a decreased amount of pain with sitting and standing without support in order to demonstrate improved activity tolerance  5. Pt to tolerate HEP to improve ROM and independence with ADL's    Long Term Goals: 6 weeks  1. Report decreased neck pain  <   / =  5  /10  to increase tolerance for completion of ADLs  2. Increased rotation AROM to 60 deg bilaterally to demonstrate improved mobility  3. Increased MMT  for middle trapezius to 5/5  to increase tolerance for ADL and work activities.  4.  Pt will report at CJ level (20-40% impaired) on FOTO score for neck pain disability to demonstrate  decrease in disability and improvement in neck pain.  5. Pt to be Independent with HEP to improve ROM and independence with ADL's      Plan     Pt will be treated by physical therapy 1-3 times a week for 6 weeks for Pt Education, HEP, therapeutic exercises, neuromuscular re-education, joint mobilizations, modalities prn to achieve established goals. Rebel may at times be seen by a PTA as part of the Rehab Team.     Cont PT for  6  weeks.     Petty Maldonado DPT  7/27/2017

## 2017-08-03 ENCOUNTER — CLINICAL SUPPORT (OUTPATIENT)
Dept: REHABILITATION | Facility: HOSPITAL | Age: 47
End: 2017-08-03
Attending: INTERNAL MEDICINE
Payer: COMMERCIAL

## 2017-08-03 DIAGNOSIS — M54.2 NECK PAIN: Primary | ICD-10-CM

## 2017-08-03 PROCEDURE — 97110 THERAPEUTIC EXERCISES: CPT

## 2017-08-03 NOTE — PROGRESS NOTES
"Physical Therapy Daily Note    Name: Rebel Rodriguez  Clinic Number: [id number]    Diagnosis:   Encounter Diagnosis   Name Primary?    Neck pain Yes     Physician: Jeronimo Winters MD  Treatment Orders: PT Eval and Treat    Precautions: none    Evaluation Date: 7/6/2017  Visit # authorized: 4/20  Authorization period: 12/31/2017  Plan of Care Expiration: 8/6/2017    Subjective     Pt doing ok today, isn't having the severe pain he started with. Doing his HEP regularly. Pain scale 4/10.    Objective     Observation: slouched posture    Cervical Range of Motion:    Degrees Pain   Flexion 60 +     Extension 40 Tight, pressure pain in the C7 region     Right Rotation 55 -     Left Rotation 50 +     Right Side Bending 35 -   Left Side Bending 30 +      TREATMENT: (total therex time = 50 min)  Moist heat - neck x 10'   Manual therapy - occipital release x4 min, cervical PROM and stretching of scalenes 2x1 min each sides  Seated UT, levator, SCM 2x/1min ea   Supine siesta 30x   Supine 1/2 bolster pec stretch 5' (T and U formation)  Standing pec stretch 2x30 sec BUE  UBE 3' ea scap pro/retraction   GTB Rows 30x/3" NP      Assessment     This is a 47 y.o. male referred to outpatient physical therapy and presents with a medical diagnosis of muscle spasm and demonstrates limitations as described in the problem list. Pt will benefit from physical therapy services in order to maximize pain free functional independence. The following goals were discussed with the patient and patient is in agreement with them as to be addressed in the treatment plan. Pt was given a HEP consisting of levator scapula stretch, upper trap stretch. (HEP code = [id number]). Pt verbally understood the instructions as they were given and demonstrated proper form and technique during therapy. Pt was advised to perform these exercises free of pain, and to stop performing them if pain occurs.     Pt presents with mildly increased range of motion into side " bending today, but would benefit from continued stretching of bilateral upper trapezius, bilateral levator scapulae, bilateral scalenes and pectorals in order to decrease muscle guarding and pain as well as to allow for increases of range of motion into all directions. Manual scalene stretches were continued today and this was tolerated well. Increased tightness continued to be noted on left side. Pt may benefit from dry needling to further decrease stiffness.    Patient History Examination Clinical Presentation Clinical Decision Making   Comorbidities:  none    Personal Factors:  Decreased availability due to work         Activity and Participation Restriction:  mobility    Body Systems:  Musculoskeletal  neuromuscular    Body Regions:  Neck  shoulder Stable and uncomplicated     Low            Medical necessity is demonstrated by the following IMPAIRMENTS/PROBLEM LIST:   1)Increase in pain level limiting function   2)Decreased range of motion limiting function   3)Decreased strength limiting function   4)Impaired posture   5)Lack of HEP    GOALS: Short Term Goals: 3 weeks  1. Report decreased neck pain  <   / =  6  /10  to increase tolerance for completion of ADLs  2. Increased sidebending AROM to 30 deg bilaterally to demonstrate improved mobility  3. Increased MMT  for lower trapezius to 5/5 to increase tolerance for ADL and work activities.  4. Pt to report a decreased amount of pain with sitting and standing without support in order to demonstrate improved activity tolerance  5. Pt to tolerate HEP to improve ROM and independence with ADL's    Long Term Goals: 6 weeks  1. Report decreased neck pain  <   / =  5  /10  to increase tolerance for completion of ADLs  2. Increased rotation AROM to 60 deg bilaterally to demonstrate improved mobility  3. Increased MMT  for middle trapezius to 5/5  to increase tolerance for ADL and work activities.  4.  Pt will report at CJ level (20-40% impaired) on FOTO score for neck  pain disability to demonstrate decrease in disability and improvement in neck pain.  5. Pt to be Independent with HEP to improve ROM and independence with ADL's      Plan     Pt will be treated by physical therapy 1-3 times a week for 6 weeks for Pt Education, HEP, therapeutic exercises, neuromuscular re-education, joint mobilizations, modalities prn to achieve established goals. Rebel may at times be seen by a PTA as part of the Rehab Team.     Cont PT for  6  weeks.     Petty Maldonado DPT  8/3/2017

## 2017-08-04 ENCOUNTER — TELEPHONE (OUTPATIENT)
Dept: PHARMACY | Facility: CLINIC | Age: 47
End: 2017-08-04

## 2017-08-10 ENCOUNTER — CLINICAL SUPPORT (OUTPATIENT)
Dept: REHABILITATION | Facility: HOSPITAL | Age: 47
End: 2017-08-10
Attending: INTERNAL MEDICINE
Payer: COMMERCIAL

## 2017-08-10 DIAGNOSIS — M54.2 NECK PAIN: Primary | ICD-10-CM

## 2017-08-10 PROCEDURE — 97110 THERAPEUTIC EXERCISES: CPT

## 2017-08-10 NOTE — PROGRESS NOTES
"Physical Therapy Daily Note    Name: Rebel Rodriguez  Clinic Number: [id number]    Diagnosis:   Encounter Diagnosis   Name Primary?    Neck pain Yes     Physician: Jeronimo Winters MD  Treatment Orders: PT Eval and Treat    Precautions: none    Evaluation Date: 7/6/2017  Visit # authorized: 6/20  Authorization period: 12/31/2017  Plan of Care Expiration: 8/6/2017    Subjective     Pt doing ok, still working on his stretches. Doing his HEP regularly. Pain scale 4/10.    Objective     Observation: slouched posture    Cervical Range of Motion:    Degrees Pain   Flexion 60 +     Extension 40 Tight, pressure pain in the C7 region     Right Rotation 55 -     Left Rotation 50 +     Right Side Bending 35 -   Left Side Bending 30 +      TREATMENT: (total therex time = 60 min)  UBE x3 min forward and 3 min reverse  Seated UT, levator, SCM 2x/1min ea   Manual therapy - occipital release x4 min, cervical PROM and stretching of scalenes 2x1 min each sides (NP)  Supine siesta 30x   Supine 1/2 bolster pec stretch 5' (T and U formation)  Standing pec stretch 2x30 sec BUE  UBE 3' ea scap pro/retraction   GTB Rows 30x/3" NP  Standing HA with physioball 3x10  Standing flexion with physioball 3x10      Assessment     This is a 47 y.o. male referred to outpatient physical therapy and presents with a medical diagnosis of muscle spasm and demonstrates limitations as described in the problem list. Pt will benefit from physical therapy services in order to maximize pain free functional independence. The following goals were discussed with the patient and patient is in agreement with them as to be addressed in the treatment plan. Pt was given a HEP consisting of levator scapula stretch, upper trap stretch. (HEP code = [id number]). Pt verbally understood the instructions as they were given and demonstrated proper form and technique during therapy. Pt was advised to perform these exercises free of pain, and to stop performing them if pain occurs. "     Pt presents with mildly increased range of motion into side bending today, but would benefit from continued stretching of bilateral upper trapezius, bilateral levator scapulae, bilateral scalenes and pectorals in order to decrease muscle guarding and pain as well as to allow for increases of range of motion into all directions. Tolerated new physioball exercises for periscapular strengthening well and would benefit from continuation. Pt may benefit from dry needling to further decrease stiffness.    Patient History Examination Clinical Presentation Clinical Decision Making   Comorbidities:  none    Personal Factors:  Decreased availability due to work         Activity and Participation Restriction:  mobility    Body Systems:  Musculoskeletal  neuromuscular    Body Regions:  Neck  shoulder Stable and uncomplicated     Low            Medical necessity is demonstrated by the following IMPAIRMENTS/PROBLEM LIST:   1)Increase in pain level limiting function   2)Decreased range of motion limiting function   3)Decreased strength limiting function   4)Impaired posture   5)Lack of HEP    GOALS: Short Term Goals: 3 weeks  1. Report decreased neck pain  <   / =  6  /10  to increase tolerance for completion of ADLs  2. Increased sidebending AROM to 30 deg bilaterally to demonstrate improved mobility  3. Increased MMT  for lower trapezius to 5/5 to increase tolerance for ADL and work activities.  4. Pt to report a decreased amount of pain with sitting and standing without support in order to demonstrate improved activity tolerance  5. Pt to tolerate HEP to improve ROM and independence with ADL's    Long Term Goals: 6 weeks  1. Report decreased neck pain  <   / =  5  /10  to increase tolerance for completion of ADLs  2. Increased rotation AROM to 60 deg bilaterally to demonstrate improved mobility  3. Increased MMT  for middle trapezius to 5/5  to increase tolerance for ADL and work activities.  4.  Pt will report at CJ level  (20-40% impaired) on FOTO score for neck pain disability to demonstrate decrease in disability and improvement in neck pain.  5. Pt to be Independent with HEP to improve ROM and independence with ADL's      Plan     Pt will be treated by physical therapy 1-3 times a week for 6 weeks for Pt Education, HEP, therapeutic exercises, neuromuscular re-education, joint mobilizations, modalities prn to achieve established goals. Rebel may at times be seen by a PTA as part of the Rehab Team.     Cont PT for  6  weeks.     Petty Maldonado DPT  8/10/2017

## 2017-08-17 ENCOUNTER — CLINICAL SUPPORT (OUTPATIENT)
Dept: REHABILITATION | Facility: HOSPITAL | Age: 47
End: 2017-08-17
Attending: INTERNAL MEDICINE
Payer: COMMERCIAL

## 2017-08-17 DIAGNOSIS — M54.2 NECK PAIN: Primary | ICD-10-CM

## 2017-08-17 PROCEDURE — 97110 THERAPEUTIC EXERCISES: CPT

## 2017-08-17 NOTE — PROGRESS NOTES
"Physical Therapy Daily Note    Name: Rebel Rodriguez  Clinic Number: [id number]    Diagnosis:   Encounter Diagnosis   Name Primary?    Neck pain Yes     Physician: Jeronimo Winters MD  Treatment Orders: PT Eval and Treat    Precautions: none    Evaluation Date: 7/6/2017  Visit # authorized: 7/20  Authorization period: 12/31/2017  Plan of Care Expiration: 8/6/2017    Subjective     Pt doing ok, still working on his stretches. Now has a posture shirt. Doing his HEP regularly. Pain scale 3-4/10.    Objective     Observation: slouched posture    Cervical Range of Motion:    Degrees Pain   Flexion 60 +     Extension 40 Tight, pressure pain in the C7 region     Right Rotation 55 -     Left Rotation 50 +     Right Side Bending 35 -   Left Side Bending 30 +      TREATMENT: (total therex time = 60 min)  UBE x3 min forward and 3 min reverse  Seated UT, levator, SCM 2x/1min ea   Manual therapy - occipital release x4 min, cervical PROM and stretching of scalenes 2x1 min each sides (NP)  Supine siesta 30x   Supine 1/2 bolster pec stretch 5' (T and U formation)  Standing pec stretch 2x30 sec BUE  UBE 3' ea scap pro/retraction   GTB Rows 30x/3" NP  Standing HA with physioball 3x10  Standing flexion with physioball 3x10      Assessment     This is a 47 y.o. male referred to outpatient physical therapy and presents with a medical diagnosis of muscle spasm and demonstrates limitations as described in the problem list. Pt will benefit from physical therapy services in order to maximize pain free functional independence. The following goals were discussed with the patient and patient is in agreement with them as to be addressed in the treatment plan. Pt was given a HEP consisting of levator scapula stretch, upper trap stretch. (HEP code = [id number]). Pt verbally understood the instructions as they were given and demonstrated proper form and technique during therapy. Pt was advised to perform these exercises free of pain, and to stop " performing them if pain occurs.     Pt presents with mildly increased range of motion into side bending today, but would benefit from continued stretching of bilateral upper trapezius, bilateral levator scapulae, bilateral scalenes and pectorals in order to decrease muscle guarding and pain as well as to allow for increases of range of motion into all directions. To continue physioball exercises for periscapular strengthening and chest expansion. Pt may benefit from dry needling to further decrease stiffness.    Patient History Examination Clinical Presentation Clinical Decision Making   Comorbidities:  none    Personal Factors:  Decreased availability due to work         Activity and Participation Restriction:  mobility    Body Systems:  Musculoskeletal  neuromuscular    Body Regions:  Neck  shoulder Stable and uncomplicated     Low            Medical necessity is demonstrated by the following IMPAIRMENTS/PROBLEM LIST:   1)Increase in pain level limiting function   2)Decreased range of motion limiting function   3)Decreased strength limiting function   4)Impaired posture   5)Lack of HEP    GOALS: Short Term Goals: 3 weeks  1. Report decreased neck pain  <   / =  6  /10  to increase tolerance for completion of ADLs  2. Increased sidebending AROM to 30 deg bilaterally to demonstrate improved mobility  3. Increased MMT  for lower trapezius to 5/5 to increase tolerance for ADL and work activities.  4. Pt to report a decreased amount of pain with sitting and standing without support in order to demonstrate improved activity tolerance  5. Pt to tolerate HEP to improve ROM and independence with ADL's    Long Term Goals: 6 weeks  1. Report decreased neck pain  <   / =  5  /10  to increase tolerance for completion of ADLs  2. Increased rotation AROM to 60 deg bilaterally to demonstrate improved mobility  3. Increased MMT  for middle trapezius to 5/5  to increase tolerance for ADL and work activities.  4.  Pt will report at  CJ level (20-40% impaired) on FOTO score for neck pain disability to demonstrate decrease in disability and improvement in neck pain.  5. Pt to be Independent with HEP to improve ROM and independence with ADL's      Plan     Pt will be treated by physical therapy 1-3 times a week for 6 weeks for Pt Education, HEP, therapeutic exercises, neuromuscular re-education, joint mobilizations, modalities prn to achieve established goals. Rebel may at times be seen by a PTA as part of the Rehab Team.     Cont PT for  6  weeks.     Petty Maldonado DPT  8/17/2017

## 2017-08-21 ENCOUNTER — PATIENT MESSAGE (OUTPATIENT)
Dept: UROLOGY | Facility: CLINIC | Age: 47
End: 2017-08-21

## 2017-08-24 ENCOUNTER — CLINICAL SUPPORT (OUTPATIENT)
Dept: REHABILITATION | Facility: HOSPITAL | Age: 47
End: 2017-08-24
Attending: INTERNAL MEDICINE
Payer: COMMERCIAL

## 2017-08-24 DIAGNOSIS — M54.2 NECK PAIN: Primary | ICD-10-CM

## 2017-08-24 PROCEDURE — 97140 MANUAL THERAPY 1/> REGIONS: CPT

## 2017-08-24 PROCEDURE — 97110 THERAPEUTIC EXERCISES: CPT

## 2017-08-28 NOTE — PROGRESS NOTES
"Physical Therapy Daily Note  Diagnosis:   Encounter Diagnosis   Name Primary?    Neck pain Yes     Physician: Jeronimo Winters MD  Treatment Orders: PT Eval and Treat    Precautions: none    Evaluation Date: 7/6/2017  Visit # authorized: 8/20  Authorization period: 12/31/2017  Plan of Care Expiration: 8/6/2017    Subjective     Pt states having mild pain on neck and upper trap muscles but feeling much better overall after started PT.     Objective     Observation: slouched posture    Cervical Range of Motion: done by PT    Degrees Pain   Flexion 60 +     Extension 40 Tight, pressure pain in the C7 region     Right Rotation 55 -     Left Rotation 50 +     Right Side Bending 35 -   Left Side Bending 30 +      TREATMENT: (total therex time = 60 min)  UBE x3 min forward and 3 min reverse  Seated UT, levator, SCM 2x/1min ea   Manual therapy - occipital release with soft tissue mobs on B upper trap muscles and neck extensor muscles ~ 15 minutes   Supine siesta 30x   Supine 1/2 bolster pec stretch 5' (T and U formation)  Standing pec stretch 2x30 sec BUE  UBE 3' ea scap pro/retraction   GTB Rows 30x/3"   GTB shoulder extension 3 x 10 reps   Towel on wall working scapula muscles 20 reps   Bruegger exercise otb 20 reps   Standing HA with physioball 3x10  Standing flexion with physioball 3x10      Assessment     This is a 47 y.o. male referred to outpatient physical therapy and presents with a medical diagnosis of muscle spasm and demonstrates limitations as described in the problem list. Pt will benefit from physical therapy services in order to maximize pain free functional independence. The following goals were discussed with the patient and patient is in agreement with them as to be addressed in the treatment plan. Pt was given a HEP consisting of levator scapula stretch, upper trap stretch. (HEP code = [id number]). Pt verbally understood the instructions as they were given and demonstrated proper form and technique " during therapy. Pt was advised to perform these exercises free of pain, and to stop performing them if pain occurs.     Pt presents with mildly increased range of motion into side bending today, but would benefit from continued stretching of bilateral upper trapezius, bilateral levator scapulae, bilateral scalenes and pectorals in order to decrease muscle guarding and pain as well as to allow for increases of range of motion into all directions. To continue physioball exercises for periscapular strengthening and chest expansion. Pt may benefit from dry needling to further decrease stiffness.    Patient History Examination Clinical Presentation Clinical Decision Making   Comorbidities:  none    Personal Factors:  Decreased availability due to work         Activity and Participation Restriction:  mobility    Body Systems:  Musculoskeletal  neuromuscular    Body Regions:  Neck  shoulder Stable and uncomplicated     Low            Medical necessity is demonstrated by the following IMPAIRMENTS/PROBLEM LIST:   1)Increase in pain level limiting function   2)Decreased range of motion limiting function   3)Decreased strength limiting function   4)Impaired posture   5)Lack of HEP    GOALS: Short Term Goals: 3 weeks  1. Report decreased neck pain  <   / =  6  /10  to increase tolerance for completion of ADLs  2. Increased sidebending AROM to 30 deg bilaterally to demonstrate improved mobility  3. Increased MMT  for lower trapezius to 5/5 to increase tolerance for ADL and work activities.  4. Pt to report a decreased amount of pain with sitting and standing without support in order to demonstrate improved activity tolerance  5. Pt to tolerate HEP to improve ROM and independence with ADL's    Long Term Goals: 6 weeks  1. Report decreased neck pain  <   / =  5  /10  to increase tolerance for completion of ADLs  2. Increased rotation AROM to 60 deg bilaterally to demonstrate improved mobility  3. Increased MMT  for middle  trapezius to 5/5  to increase tolerance for ADL and work activities.  4.  Pt will report at CJ level (20-40% impaired) on FOTO score for neck pain disability to demonstrate decrease in disability and improvement in neck pain.  5. Pt to be Independent with HEP to improve ROM and independence with ADL's      Plan     Pt will be treated by physical therapy 1-3 times a week for 6 weeks for Pt Education, HEP, therapeutic exercises, neuromuscular re-education, joint mobilizations, modalities prn to achieve established goals. Rebel may at times be seen by a PTA as part of the Rehab Team. PT/PTA face-to-face:discussed Pt's POC and progress towards established PT goals.  Cont PT for  6  weeks.   Sharon Morrow, PTA  [facility]

## 2017-08-31 ENCOUNTER — TELEPHONE (OUTPATIENT)
Dept: PHARMACY | Facility: CLINIC | Age: 47
End: 2017-08-31

## 2017-09-04 RX ORDER — LEFLUNOMIDE 20 MG/1
TABLET ORAL
Qty: 90 TABLET | Refills: 0 | Status: SHIPPED | OUTPATIENT
Start: 2017-09-04 | End: 2018-08-03 | Stop reason: SDUPTHER

## 2017-09-05 ENCOUNTER — TELEPHONE (OUTPATIENT)
Dept: PHARMACY | Facility: CLINIC | Age: 47
End: 2017-09-05

## 2017-09-07 ENCOUNTER — CLINICAL SUPPORT (OUTPATIENT)
Dept: REHABILITATION | Facility: HOSPITAL | Age: 47
End: 2017-09-07
Attending: INTERNAL MEDICINE
Payer: COMMERCIAL

## 2017-09-07 DIAGNOSIS — M54.2 NECK PAIN: Primary | ICD-10-CM

## 2017-09-07 PROCEDURE — 97110 THERAPEUTIC EXERCISES: CPT

## 2017-09-07 NOTE — PROGRESS NOTES
"Physical Therapy Daily Note  Diagnosis:   Encounter Diagnosis   Name Primary?    Neck pain Yes     Physician: Jeronimo Winters MD  Treatment Orders: PT Eval and Treat    Precautions: none    Evaluation Date: 7/6/2017  Visit # authorized: 8/20  Authorization period: 12/31/2017  Plan of Care Expiration: 8/6/2017    Subjective     Pt states that he still gets stiffness, but not pain like he used to.    Objective     Observation: slouched posture    Cervical Range of Motion: done by PT    Degrees Pain   Flexion 60 +     Extension 40 Tight, pressure pain in the C7 region     Right Rotation 55 -     Left Rotation 50 +     Right Side Bending 35 -   Left Side Bending 30 +      TREATMENT: (total therex time = 60 min)  UBE x3 min forward and 3 min reverse  Seated UT, levator, SCM 2x/1min ea   Manual therapy - occipital release with soft tissue mobs on B upper trap muscles and neck extensor muscles ~ 15 minutes   Supine siesta 30x   Supine 1/2 bolster pec stretch 5' (T and U formation)  Standing pec stretch 2x30 sec BUE  GTB Rows 30x/3"   GTB shoulder extension 3 x 10 reps   Towel on wall working scapula muscles 20 reps   Bruegger exercise otb 20 reps   Standing HA with physioball 3x10  Standing flexion with physioball 3x10      Assessment     This is a 47 y.o. male referred to outpatient physical therapy and presents with a medical diagnosis of muscle spasm and demonstrates limitations as described in the problem list. Pt will benefit from physical therapy services in order to maximize pain free functional independence. The following goals were discussed with the patient and patient is in agreement with them as to be addressed in the treatment plan. Pt was given a HEP consisting of levator scapula stretch, upper trap stretch. (HEP code = [id number]). Pt verbally understood the instructions as they were given and demonstrated proper form and technique during therapy. Pt was advised to perform these exercises free of pain, " and to stop performing them if pain occurs.     Pt presents with mildly increased range of motion into side bending today, but would benefit from continued stretching of bilateral upper trapezius, bilateral levator scapulae, bilateral scalenes and pectorals in order to decrease muscle guarding and pain as well as to allow for increases of range of motion into all directions. To continue physioball exercises for periscapular strengthening and chest expansion. Pt may benefit from dry needling to further decrease stiffness if cleared by MD staff.    Patient History Examination Clinical Presentation Clinical Decision Making   Comorbidities:  none    Personal Factors:  Decreased availability due to work         Activity and Participation Restriction:  mobility    Body Systems:  Musculoskeletal  neuromuscular    Body Regions:  Neck  shoulder Stable and uncomplicated     Low            Medical necessity is demonstrated by the following IMPAIRMENTS/PROBLEM LIST:   1)Increase in pain level limiting function   2)Decreased range of motion limiting function   3)Decreased strength limiting function   4)Impaired posture   5)Lack of HEP    GOALS: Short Term Goals: 3 weeks  1. Report decreased neck pain  <   / =  6  /10  to increase tolerance for completion of ADLs MET:9/7/2017  2. Increased sidebending AROM to 30 deg bilaterally to demonstrate improved mobility MET:9/7/2017  3. Increased MMT  for lower trapezius to 5/5 to increase tolerance for ADL and work activities.  4. Pt to report a decreased amount of pain with sitting and standing without support in order to demonstrate improved activity tolerance MET:9/7/2017  5. Pt to tolerate HEP to improve ROM and independence with ADL's MET:9/7/2017    Long Term Goals: 6 weeks  1. Report decreased neck pain  <   / =  5  /10  to increase tolerance for completion of ADLs  2. Increased rotation AROM to 60 deg bilaterally to demonstrate improved mobility  3. Increased MMT  for middle  trapezius to 5/5  to increase tolerance for ADL and work activities.  4.  Pt will report at CJ level (20-40% impaired) on FOTO score for neck pain disability to demonstrate decrease in disability and improvement in neck pain.  5. Pt to be Independent with HEP to improve ROM and independence with ADL's      Plan     Pt will be treated by physical therapy 1-3 times a week for 6 weeks for Pt Education, HEP, therapeutic exercises, neuromuscular re-education, joint mobilizations, modalities prn to achieve established goals. Rebel may at times be seen by a PTA as part of the Rehab Team. PT/PTA face-to-face:discussed Pt's POC and progress towards established PT goals.  Cont PT for  6  weeks.   Petty Maldonado, PT  9/7/2017

## 2017-09-11 ENCOUNTER — PATIENT MESSAGE (OUTPATIENT)
Dept: RHEUMATOLOGY | Facility: CLINIC | Age: 47
End: 2017-09-11

## 2017-09-11 ENCOUNTER — LAB VISIT (OUTPATIENT)
Dept: LAB | Facility: OTHER | Age: 47
End: 2017-09-11
Attending: INTERNAL MEDICINE
Payer: COMMERCIAL

## 2017-09-11 DIAGNOSIS — T50.905A DRUG-INDUCED LUPUS ERYTHEMATOSUS: ICD-10-CM

## 2017-09-11 DIAGNOSIS — L93.2 DRUG-INDUCED LUPUS ERYTHEMATOSUS: ICD-10-CM

## 2017-09-11 LAB
BILIRUB UR QL STRIP: ABNORMAL
CLARITY UR: CLEAR
COLOR UR: YELLOW
CREAT UR-MCNC: 231.6 MG/DL
GLUCOSE UR QL STRIP: NEGATIVE
HGB UR QL STRIP: ABNORMAL
KETONES UR QL STRIP: ABNORMAL
LEUKOCYTE ESTERASE UR QL STRIP: NEGATIVE
MICROSCOPIC COMMENT: ABNORMAL
NITRITE UR QL STRIP: NEGATIVE
PH UR STRIP: 6 [PH] (ref 5–8)
PROT UR QL STRIP: NEGATIVE
PROT UR-MCNC: 21 MG/DL
PROT/CREAT RATIO, UR: 0.09
RBC #/AREA URNS HPF: 55 /HPF (ref 0–4)
SP GR UR STRIP: >=1.03 (ref 1–1.03)
URN SPEC COLLECT METH UR: ABNORMAL
UROBILINOGEN UR STRIP-ACNC: NEGATIVE EU/DL

## 2017-09-11 PROCEDURE — 82570 ASSAY OF URINE CREATININE: CPT

## 2017-09-11 PROCEDURE — 81000 URINALYSIS NONAUTO W/SCOPE: CPT

## 2017-09-13 ENCOUNTER — OFFICE VISIT (OUTPATIENT)
Dept: RHEUMATOLOGY | Facility: CLINIC | Age: 47
End: 2017-09-13
Payer: COMMERCIAL

## 2017-09-13 VITALS
SYSTOLIC BLOOD PRESSURE: 108 MMHG | BODY MASS INDEX: 19.25 KG/M2 | HEIGHT: 68 IN | DIASTOLIC BLOOD PRESSURE: 58 MMHG | HEART RATE: 76 BPM | WEIGHT: 127 LBS

## 2017-09-13 DIAGNOSIS — R53.81 PHYSICAL DECONDITIONING: ICD-10-CM

## 2017-09-13 DIAGNOSIS — E55.9 HYPOVITAMINOSIS D: ICD-10-CM

## 2017-09-13 DIAGNOSIS — M47.812 SPONDYLOSIS OF CERVICAL REGION WITHOUT MYELOPATHY OR RADICULOPATHY: ICD-10-CM

## 2017-09-13 DIAGNOSIS — L40.9 PSORIASIS: ICD-10-CM

## 2017-09-13 DIAGNOSIS — R29.2 HYPERREFLEXIA OF LOWER EXTREMITY: ICD-10-CM

## 2017-09-13 DIAGNOSIS — L40.50 PSORIATIC ARTHRITIS: Primary | ICD-10-CM

## 2017-09-13 PROCEDURE — 99213 OFFICE O/P EST LOW 20 MIN: CPT | Mod: S$GLB,,, | Performed by: INTERNAL MEDICINE

## 2017-09-13 PROCEDURE — 3008F BODY MASS INDEX DOCD: CPT | Mod: S$GLB,,, | Performed by: INTERNAL MEDICINE

## 2017-09-13 PROCEDURE — 99999 PR PBB SHADOW E&M-EST. PATIENT-LVL IV: CPT | Mod: PBBFAC,,, | Performed by: INTERNAL MEDICINE

## 2017-09-13 RX ORDER — SECUKINUMAB 150 MG/ML
300 INJECTION SUBCUTANEOUS
Qty: 6 SYRINGE | Refills: 0 | Status: SHIPPED | OUTPATIENT
Start: 2017-09-13 | End: 2018-01-22 | Stop reason: SDUPTHER

## 2017-09-13 ASSESSMENT — ROUTINE ASSESSMENT OF PATIENT INDEX DATA (RAPID3)
PSYCHOLOGICAL DISTRESS SCORE: 2.2
PAIN SCORE: 6
PATIENT GLOBAL ASSESSMENT SCORE: 6
TOTAL RAPID3 SCORE: 4.89
WHEN YOU AWAKENED IN THE MORNING OVER THE LAST WEEK, PLEASE INDICATE THE AMOUNT OF TIME IT TAKES UNTIL YOU ARE AS LIMBER AS YOU WILL BE FOR THE DAY: 30 MINS
FATIGUE SCORE: 7
AM STIFFNESS SCORE: 1, YES
MDHAQ FUNCTION SCORE: .8

## 2017-09-13 NOTE — PROGRESS NOTES
Subjective:     Patient ID: Rebel Rodriguez is a 46 y.o. male.    Chief Complaint: Psoriatic Arthritis. Psoriasis, infliximab induced lupus    HPI       Rebel Rodriguez is a 47 y.o. male with psoriatic arthritis and infliximab induced lupus presenting for follow up. Drug induced Lupus: Related to infliximab (last dose 2/29/16; + aphosholipids-anticardiolipin IgM mild +dsDNA: 1:1280,  DEE+ 1:1280 homogenous ,  + anti histone ab, CH50 low at 51 along with recurrent intermittent fevers, chills, arthralgias. Treated with plaquenil 300 mg daily, Leflunomide 20 mg daily    Interval History:    Pt denies any recurrent fevers or chills with this past lupus flare. No serositis, oral ulcers, new rashes.He is now on  Cosentyx- did 5 week loading dose starting 4/20, last dose was 9/5/17. He is on 150 mg SC monthly. He reports he is doing well with arthritis sx, noted increased swelling with the wrist last week. He has an increase in psoriasis on the left ankle and the groin which is new over the last 6 weeks using tar tx. Pt feels fatigued from his medical situation and he also notes difficulty passing a kidney stone last week. Pt sts he neck is somewhat better with PT and Flexeril q hs helps with sleep and the neck but he uses it prn.  He still has sinus issues and is waiting to have surgery. Pt is on prednisone 2.5 mg which was tapered since our last visit from 10 mg.     Pt reports being off balanced and has fallen a few times. He notes difficulty with running( trouble with coordination and kicking feet forward).   The following portions of the patient's history were reviewed and updated as appropriate: allergies, current medications, past family history, past medical history, past social history, past surgical history and problem list.    Review of Systems  General: Appetite good. No fever, chills or sweats. + weight loss   Psychological: No insomnia, +anxiety, no  depression  Ophthalmic: No blurred vision.  ENT: No sore  "throat or hoarseness. No mouth ulcers.  +dry mouth, +sinus pressure, nasal congestion   Allergy and Immunology: No history of frequent infections or adenopathy.  Hematological and Lymphatic: No easy bruising or bleeding.  Endocrine: No polyuria, polyphagia or polydipsia. No heat/cold intolerance. No hair loss.  Respiratory: No SOB, cough or wheezing.  Cardiovascular: No CP or palpitations. Denies any PND, orthopnea.   Gastrointestinal: No dyspepsia or change in bowel habits. No melena.  Genito-Urinary: No dysuria, No  Hematuria  Musculoskeletal: see above   Neurological: No TIA or stroke symptoms. + Headache. No weakness.   Dermatological: + psoriasis .     Objective:       Physical Examination:   Vitals:   BP (!) 108/58   Pulse 76   Ht 5' 8.4" (1.737 m)   Wt 57.6 kg (127 lb)   BMI 19.09 kg/m²    Physical Exam   Constitutional: He is oriented to person, place, and time and well-developed, well-nourished, and in no distress.   HENT:   Head: Atraumatic.   Mouth/Throat: Oropharynx is clear and moist.   Eyes: Conjunctivae and EOM are normal. No scleral icterus.   Neck: Normal range of motion. Neck supple.   Cardiovascular: Normal rate, regular rhythm, normal heart sounds and intact distal pulses.  Exam reveals no gallop and no friction rub.    No murmur heard.  Pulmonary/Chest: Effort normal. No respiratory distress. He has no wheezes. He exhibits no tenderness.   Abdominal: Soft. Bowel sounds are normal. There is no tenderness.     Right Side Rheumatological Exam     Examination finds the shoulder, elbow, wrist, knee, 1st PIP, 1st MCP, 2nd PIP, 2nd MCP, 3rd MCP, 3rd PIP, 4th PIP, 4th MCP, 5th PIP, 5th MCP, SC joint, AC joint, 1st CMC, 2nd DIP, 3rd DIP, 4th DIP, 5th DIP, hip, ankle, talocalcaneal, tarsus, 1st MTP, 2nd MTP, 3rd MTP, 4th MTP, 5th MTP, 1st toe IP, 2nd toe IP, 3rd toe IP, 4th toe IP and 5th toe IP normal.      Muscle Strength (0-5 scale):  Neck Flexion:  5  Deltoid:  5  Biceps: 5/5   Triceps:  5  : " 5/5   Iliopsoas: 5  Quadriceps:  4.8   Distal Lower Extremity: 5    Left Side Rheumatological Exam     Examination finds the shoulder, elbow, wrist, knee, 1st PIP, 1st MCP, 2nd PIP, 3rd PIP, 3rd MCP, 4th PIP, 4th MCP, 5th PIP, 5th MCP, SC joint, AC joint, 1st CMC, 2nd DIP, 3rd DIP, 4th DIP, 5th DIP, hip, ankle, talocalcaneal, tarsus, 1st MTP, 2nd MTP, 3rd MTP, 4th MTP, 5th MTP, 1st toe IP, 2nd toe IP, 3rd toe IP, 4th toe IP and 5th toe IP normal.    Muscle Strength (0-5 scale):  Neck Flexion:  5  Deltoid:  5  Biceps: 5/5   Triceps:  5  :  5/5   Iliopsoas: 5  Quadriceps:  4.8   Distal Lower Extremity: 5      Lymphadenopathy:     He has no cervical adenopathy.   Neurological: He is alert and oriented to person, place, and time. Gait normal. Pt has hyperreflexia in the knees and UE, normal reflexes in the lower extremities   Skin: Skin is warm and dry. Rash (hyperpigmented area to the lateral aspect of the lower leg near ankles bilaterally. Right lower leg with small patch of psoriasis) noted. No pallor.   Musculoskeletal: Normal range of motion except for cervical spine ( limited lateral flexion on left and limited lateral rotation on the right) . He exhibits no tenderness or deformity.       SJC-1- left 2nd mcp mild synovitis   TJC- O    Imaging  No new imaging.     Lab Review  Labs reviewed. DsDNA pending     Assessment:      Psoriatic Arthritis- DAPSA 13 (low activity), previously 9;   TNF inhibitor caused drug induced lupus unable to take further TNFs. He has failed ustekinumab. Doing well on  secukinumab 150 mg SC monthly, however psoriasis is now more active. currently with prednisone 2.5 mg daily   Drug induced lupus- stable, SLEDAI 0. Complements normal. Pending dsDNA.  Related to infliximab (last dose 2/29/16; + antiphosholipids-anticardiolipin IgM mild +dsDNA: 1:1280,  DEE+ 1:1280 homogenous , +anti histone, CH50 low at 51 along with recurrent intermittent fevers, chills, arthralgias.  HTN/CVD- no  contraindications to current therapy, on statin and aspirin  TIA- no NSAIDs for msk pain  High risk medication use/Medication monitoring encounter- no toxic side effects seen. No steroid side effects.   Immunizations- Pneumococcal up to date, needs Quadrivalent Flu vaccine.  Psoriasis <10 % BSA  Plan:     Psoriatic arthritis:   -  continue prednisone 2.5 mg daily.   - Increase Cosentyx to 300 mg SC monthly given active psoriasis   -  Continue plaquenil 300 mg daily and lefluonamide 20 mg daily, no dose changes.   -  No NSAIDs due to past TIA.   - continue 50,000 units ergo for low vitamin D  - vit d with next labs    Muscle spasm/Neck pain  - left trapezius- flexeril 10 mg qhs can increase to TID.  - use moist heat, referral to PT/OT   - MRI c spine given hyperreflexia on exam     RTC  3 months with standing labs.    Dylan,  Jeronimo Winters  Rheumatology Fellow

## 2017-09-14 ENCOUNTER — TELEPHONE (OUTPATIENT)
Dept: RHEUMATOLOGY | Facility: CLINIC | Age: 47
End: 2017-09-14

## 2017-09-22 ENCOUNTER — TELEPHONE (OUTPATIENT)
Dept: RHEUMATOLOGY | Facility: CLINIC | Age: 47
End: 2017-09-22

## 2017-09-22 ENCOUNTER — HOSPITAL ENCOUNTER (OUTPATIENT)
Dept: RADIOLOGY | Facility: OTHER | Age: 47
Discharge: HOME | End: 2017-09-22
Attending: INTERNAL MEDICINE
Payer: COMMERCIAL

## 2017-09-22 ENCOUNTER — PATIENT MESSAGE (OUTPATIENT)
Dept: RHEUMATOLOGY | Facility: CLINIC | Age: 47
End: 2017-09-22

## 2017-09-22 DIAGNOSIS — M48.02 SPINAL STENOSIS OF CERVICAL REGION: ICD-10-CM

## 2017-09-22 DIAGNOSIS — G95.20 CERVICAL SPINAL CORD COMPRESSION: Primary | ICD-10-CM

## 2017-09-22 DIAGNOSIS — R29.2 HYPERREFLEXIA OF LOWER EXTREMITY: ICD-10-CM

## 2017-09-22 DIAGNOSIS — M47.812 SPONDYLOSIS OF CERVICAL REGION WITHOUT MYELOPATHY OR RADICULOPATHY: ICD-10-CM

## 2017-09-22 PROCEDURE — A9585 GADOBUTROL INJECTION: HCPCS | Performed by: INTERNAL MEDICINE

## 2017-09-22 PROCEDURE — 72156 MRI NECK SPINE W/O & W/DYE: CPT | Mod: TC

## 2017-09-22 PROCEDURE — 25500020 PHARM REV CODE 255: Performed by: INTERNAL MEDICINE

## 2017-09-22 PROCEDURE — 72156 MRI NECK SPINE W/O & W/DYE: CPT | Mod: 26,,, | Performed by: RADIOLOGY

## 2017-09-22 RX ORDER — GADOBUTROL 604.72 MG/ML
6 INJECTION INTRAVENOUS
Status: COMPLETED | OUTPATIENT
Start: 2017-09-22 | End: 2017-09-22

## 2017-09-22 RX ADMIN — GADOBUTROL 6 ML: 604.72 INJECTION INTRAVENOUS at 10:09

## 2017-09-22 NOTE — TELEPHONE ENCOUNTER
Your neck MRI shows severe degenerative disc disease C4-5 due to disc bulge and osteoarthritis of the adjacent joints causing narrowing of the canal through which the nerves to the arms pass and also impinging on the spinal cord especially on the right side. Would suggest you see one of our Neurosurgeon's to review and advise on best course of action, next steps which could include surgery. Liz will arrange. ELIEL

## 2017-09-22 NOTE — TELEPHONE ENCOUNTER
----- Message from Davy Franklin sent at 9/22/2017  2:23 PM CDT -----  Regarding: Pt Call Back  Contact: [phone]  Pt returning call from 2:05 today in regards to scheduling an appointment with Neurosurgery per Dr. Beverly.  Pt can be reached at [phone].

## 2017-09-22 NOTE — TELEPHONE ENCOUNTER
Left message for pt. To call clinic back to schedule appointment with Neurosurgery per Dr. Beverly

## 2017-09-26 ENCOUNTER — PATIENT MESSAGE (OUTPATIENT)
Dept: RHEUMATOLOGY | Facility: CLINIC | Age: 47
End: 2017-09-26

## 2017-09-29 ENCOUNTER — PATIENT MESSAGE (OUTPATIENT)
Dept: RHEUMATOLOGY | Facility: CLINIC | Age: 47
End: 2017-09-29

## 2017-09-29 ENCOUNTER — TELEPHONE (OUTPATIENT)
Dept: PHARMACY | Facility: CLINIC | Age: 47
End: 2017-09-29

## 2017-10-02 DIAGNOSIS — M47.812 SPONDYLOSIS OF CERVICAL REGION WITHOUT MYELOPATHY OR RADICULOPATHY: Primary | ICD-10-CM

## 2017-10-05 ENCOUNTER — PATIENT MESSAGE (OUTPATIENT)
Dept: RHEUMATOLOGY | Facility: CLINIC | Age: 47
End: 2017-10-05

## 2017-10-27 ENCOUNTER — TELEPHONE (OUTPATIENT)
Dept: PHARMACY | Facility: CLINIC | Age: 47
End: 2017-10-27

## 2017-10-30 ENCOUNTER — PATIENT MESSAGE (OUTPATIENT)
Dept: ADMINISTRATIVE | Facility: OTHER | Age: 47
End: 2017-10-30

## 2017-11-01 ENCOUNTER — PATIENT MESSAGE (OUTPATIENT)
Dept: ADMINISTRATIVE | Facility: OTHER | Age: 47
End: 2017-11-01

## 2017-11-03 RX ORDER — PREDNISONE 10 MG/1
10 TABLET ORAL DAILY
Qty: 30 TABLET | Refills: 0 | Status: SHIPPED | OUTPATIENT
Start: 2017-11-03 | End: 2018-08-03

## 2017-11-15 NOTE — PROGRESS NOTES
Name: Rebel Rodriguez  Referring Provider: Jeronimo Winters MD  PT Order: PT evaluate and treat  Clinical #: [id number]  Discharge Summary Date: 11/15/2017  Diagnosis:   Encounter Diagnosis   Name Primary?    Neck pain Yes       Patient was seen for 7 OP PT visits from 7/20/2017 to 9/7/2017. Pt missed/no visit 1 scheduled sessions. Treatment included: evaluation, HEP, pt education, aquatic therapy, joint mobilizations, ther ex, and stretching. PT unable to fully assess goal achievement as pt did not return for follow up sessions/did not reschedule follow up visits. This patient is discharged from OP PT Services.    Petty Maldonado DPT  11/15/2017

## 2017-11-22 RX ORDER — CYCLOBENZAPRINE HCL 10 MG
10 TABLET ORAL 3 TIMES DAILY PRN
Qty: 90 TABLET | Refills: 4 | Status: SHIPPED | OUTPATIENT
Start: 2017-11-22 | End: 2017-12-02

## 2017-11-24 ENCOUNTER — TELEPHONE (OUTPATIENT)
Dept: PHARMACY | Facility: CLINIC | Age: 47
End: 2017-11-24

## 2017-12-06 RX ORDER — LEFLUNOMIDE 20 MG/1
TABLET ORAL
Qty: 90 TABLET | Refills: 0 | Status: SHIPPED | OUTPATIENT
Start: 2017-12-06 | End: 2018-03-07 | Stop reason: SDUPTHER

## 2017-12-13 ENCOUNTER — PATIENT MESSAGE (OUTPATIENT)
Dept: RHEUMATOLOGY | Facility: CLINIC | Age: 47
End: 2017-12-13

## 2017-12-13 ENCOUNTER — TELEPHONE (OUTPATIENT)
Dept: RHEUMATOLOGY | Facility: CLINIC | Age: 47
End: 2017-12-13

## 2017-12-13 ENCOUNTER — LAB VISIT (OUTPATIENT)
Dept: LAB | Facility: OTHER | Age: 47
End: 2017-12-13
Attending: INTERNAL MEDICINE
Payer: COMMERCIAL

## 2017-12-13 DIAGNOSIS — L93.2 DRUG-INDUCED LUPUS ERYTHEMATOSUS: ICD-10-CM

## 2017-12-13 DIAGNOSIS — T50.905A DRUG-INDUCED LUPUS ERYTHEMATOSUS: ICD-10-CM

## 2017-12-13 DIAGNOSIS — D64.9 ANEMIA, UNSPECIFIED TYPE: Primary | ICD-10-CM

## 2017-12-13 LAB
BILIRUB UR QL STRIP: NEGATIVE
CLARITY UR: CLEAR
COLOR UR: YELLOW
CREAT UR-MCNC: 100.4 MG/DL
GLUCOSE UR QL STRIP: NEGATIVE
HGB UR QL STRIP: ABNORMAL
KETONES UR QL STRIP: NEGATIVE
LEUKOCYTE ESTERASE UR QL STRIP: NEGATIVE
NITRITE UR QL STRIP: NEGATIVE
PH UR STRIP: 7 [PH] (ref 5–8)
PROT UR QL STRIP: NEGATIVE
PROT UR-MCNC: 8 MG/DL
PROT/CREAT RATIO, UR: 0.08
SP GR UR STRIP: 1.02 (ref 1–1.03)
URN SPEC COLLECT METH UR: ABNORMAL
UROBILINOGEN UR STRIP-ACNC: NEGATIVE EU/DL

## 2017-12-13 PROCEDURE — 81003 URINALYSIS AUTO W/O SCOPE: CPT

## 2017-12-13 PROCEDURE — 84156 ASSAY OF PROTEIN URINE: CPT

## 2017-12-14 ENCOUNTER — TELEPHONE (OUTPATIENT)
Dept: RHEUMATOLOGY | Facility: CLINIC | Age: 47
End: 2017-12-14

## 2017-12-27 ENCOUNTER — LAB VISIT (OUTPATIENT)
Dept: LAB | Facility: OTHER | Age: 47
End: 2017-12-27
Payer: COMMERCIAL

## 2017-12-27 DIAGNOSIS — D64.9 ANEMIA, UNSPECIFIED TYPE: ICD-10-CM

## 2017-12-27 LAB
BASOPHILS # BLD AUTO: 0.01 K/UL
BASOPHILS NFR BLD: 0.2 %
DIFFERENTIAL METHOD: ABNORMAL
EOSINOPHIL # BLD AUTO: 0.1 K/UL
EOSINOPHIL NFR BLD: 1.7 %
ERYTHROCYTE [DISTWIDTH] IN BLOOD BY AUTOMATED COUNT: 13.3 %
FERRITIN SERPL-MCNC: 80 NG/ML
FOLATE SERPL-MCNC: 14.2 NG/ML
HAPTOGLOB SERPL-MCNC: 216 MG/DL
HCT VFR BLD AUTO: 40.6 %
HGB BLD-MCNC: 13.5 G/DL
IRON SERPL-MCNC: 57 UG/DL
LDH SERPL L TO P-CCNC: 184 U/L
LYMPHOCYTES # BLD AUTO: 1.6 K/UL
LYMPHOCYTES NFR BLD: 27.7 %
MCH RBC QN AUTO: 29.4 PG
MCHC RBC AUTO-ENTMCNC: 33.3 G/DL
MCV RBC AUTO: 89 FL
MONOCYTES # BLD AUTO: 0.8 K/UL
MONOCYTES NFR BLD: 12.8 %
NEUTROPHILS # BLD AUTO: 3.4 K/UL
NEUTROPHILS NFR BLD: 57.4 %
PATH REV BLD -IMP: NORMAL
PLATELET # BLD AUTO: 226 K/UL
PMV BLD AUTO: 9.4 FL
RBC # BLD AUTO: 4.59 M/UL
RETICS/RBC NFR AUTO: 1 %
SATURATED IRON: 17 %
TOTAL IRON BINDING CAPACITY: 339 UG/DL
TRANSFERRIN SERPL-MCNC: 229 MG/DL
VIT B12 SERPL-MCNC: 663 PG/ML
WBC # BLD AUTO: 5.85 K/UL

## 2017-12-27 PROCEDURE — 36415 COLL VENOUS BLD VENIPUNCTURE: CPT

## 2017-12-27 PROCEDURE — 83615 LACTATE (LD) (LDH) ENZYME: CPT

## 2017-12-27 PROCEDURE — 85025 COMPLETE CBC W/AUTO DIFF WBC: CPT

## 2017-12-27 PROCEDURE — 82746 ASSAY OF FOLIC ACID SERUM: CPT

## 2017-12-27 PROCEDURE — 83540 ASSAY OF IRON: CPT

## 2017-12-27 PROCEDURE — 85045 AUTOMATED RETICULOCYTE COUNT: CPT

## 2017-12-27 PROCEDURE — 82607 VITAMIN B-12: CPT

## 2017-12-27 PROCEDURE — 82728 ASSAY OF FERRITIN: CPT

## 2017-12-27 PROCEDURE — 85060 BLOOD SMEAR INTERPRETATION: CPT | Mod: ,,, | Performed by: PATHOLOGY

## 2017-12-27 PROCEDURE — 83010 ASSAY OF HAPTOGLOBIN QUANT: CPT

## 2017-12-28 ENCOUNTER — TELEPHONE (OUTPATIENT)
Dept: PHARMACY | Facility: HOSPITAL | Age: 47
End: 2017-12-28

## 2017-12-28 ENCOUNTER — TELEPHONE (OUTPATIENT)
Dept: PHARMACY | Facility: CLINIC | Age: 47
End: 2017-12-28

## 2017-12-28 LAB — PATH REV BLD -IMP: NORMAL

## 2017-12-28 NOTE — TELEPHONE ENCOUNTER
Patient contacted to schedule pickup of cosentyx. Patient verified he has not started any new medications. Patient has not developed any new drug allergies or medical conditions. Patient scheduled his pickup for 12/29/17. Patient verified no doses were missed in the past 4 weeks and has 0 doses remaining on hand. Patient verified understanding of the refill process and has no additional questions at the time. 12-28 -17.

## 2018-01-06 NOTE — PROGRESS NOTES
[facility] 7th Fl  Neurosurgery    SUBJECTIVE:     History of Present Illness:  Rebel Rodriguez is a 47 y.o. male who presents as a referral from Dr. Beverly for neurosurgical evaluation. He has a history of psoriatic arthritis on cosentyx and infliximab induced lupus, on chronic low dose prednisone (ranging 2.5-10mg) for approximately two years. He presents today for evaluation of chronic midline neck and radiating bilateral posterior shoulder pain, as well as paresthesias of the hands that has been ongoing for years. His radicular pain radiates into the C4 distribution of his shoulders and his paresthesias are in the C6-7 distributions of his hands in the last three digits. Over the past 6 months, he has developed gait imbalance, difficulty with fine motor tasks, dropping objects, and spasticity of the RLE. He denies rapid progression of these symptoms. He denies recent trauma or b/b changes.     Review of patient's allergies indicates:   Allergen Reactions    Erythromycin Nausea And Vomiting    Infliximab Other (See Comments)     Lupus with fever and acute arthritis       Past Medical History:   Diagnosis Date    Achilles tendon rupture 10/9/2013    Allergy     Anxiety     Arthritis     psoriatric    Degenerative disc disease     Drug-induced lupus erythematosus     Hyperlipidemia     Kidney stone     Psoriatic arthritis     TIA (transient ischemic attack)     Ulcer     high school       Past Surgical History:   Procedure Laterality Date    ACHILLES TENDON SURGERY      UPPER ENDOSCOPY W/ ESOPHAGEAL MANOMETRY      URETERAL STENT PLACEMENT          Family History   Problem Relation Age of Onset    Pancreatic cancer Father     Ulcerative colitis Father     Cancer Father 61     pancreatic ca    Crohn's disease Mother     Osteoarthritis Maternal Grandmother     Crohn's disease Maternal Grandmother     Cataracts Maternal Grandmother     Cataracts Maternal Grandfather     Cataracts  "Paternal Grandmother     Cataracts Paternal Grandfather        Social History     Social History    Marital status: Single     Spouse name: N/A    Number of children: N/A    Years of education: N/A     Occupational History    music production Self Employed     Social History Main Topics    Smoking status: Never Smoker    Smokeless tobacco: Never Used    Alcohol use No      Comment: cocktails    Drug use: No    Sexual activity: Not on file     Other Topics Concern    Not on file     Social History Narrative    No narrative on file       Review of Systems:  Constitutional: no fever, chills or night sweats. No changes in weight   Eyes: no visual changes   ENT: no nasal congestion or sore throat   Respiratory: no cough or shortness of breath   Cardiovascular: no chest pain or palpitations   Gastrointestinal: no nausea or vomiting   Genitourinary: no hematuria or dysuria   Integument/Breast: no rash or pruritis   Hematologic/Lymphatic: no easy bruising or lymphadenopathy   Musculoskeletal: no arthralgias or myalgias.   Neurological: no seizures or tremors   Behavioral/Psych: no auditory or visual hallucinations   Endocrine: no heat or cold intolerance     OBJECTIVE:     Vital Signs (Most Recent):  Vitals:    01/08/18 0822   BP: 96/69   Pulse: 79   Temp: 98.1 °F (36.7 °C)   TempSrc: Oral   Weight: 58 kg (127 lb 14.4 oz)   Height: 5' 8.4" (1.737 m)       Physical Exam:  General: well developed, well nourished, no distress.   Head: normocephalic, atraumatic  Neurologic: Alert and oriented. Thought content appropriate.  GCS: Motor: 6/Verbal: 5/Eyes: 4 GCS Total: 15  Mental Status: Awake, Alert, Oriented x 4  Language: No aphasia  Speech: No dysarthria  Cranial nerves: face symmetric, tongue midline, CN II-XII grossly intact.   Eyes: pupils equal, round, reactive to light with accomodation, EOMI.  Pulmonary: normal respirations, no signs of respiratory distress  Abdomen: soft, non-distended, not tender to " palpation  Sensory: intact to light touch throughout    Motor Strength: Moves all extremities spontaneously with good tone. No abnormal movements seen.     Strength  Deltoids Triceps Biceps Wrist Extension Wrist Flexion Hand    Upper: R 5/5 4/5 4+/5 5/5 5/5 5/5    L 5/5 4/5 4+/5 5/5 5/5 5/5     Iliopsoas Quadriceps Knee  Flexion Tibialis  anterior Gastro- cnemius EHL   Lower: R 5/5 5/5 5/5 5/5 5/5 5/5    L 5/5 5/5 5/5 5/5 5/5 5/5     DTR's: 3 + and symmetric in UE; RLE 3+ throughout, LLE 2+ throughout  Magallanes: trace left  Clonus: trace left  Pulses: 2+ and symmetric radial and dorsalis pedis.  Skin: Skin is warm, dry and intact.  Gait: normal  Tandem Gait: Some difficulty         Able to walk on heels & toes    Diagnostic Results:  MRI cervical spine reviewed.  Multilevel cervical spondylosis, most significant of C4-5 with paracentral disc herniation and resultant severe bilateral nf and central stenosis. Also present at C5-6 with left paracentral disc herniation and resultant severe bilateral nf and central stenosis. Also present at C6-7 with moderate-severe bilateral nf and central stenosis.    ASSESSMENT/PLAN:     Rebel Rodriguez is a 47 y.o. male with history of psoriatic arthritis on cosentyx and infliximab induced lupus, on chronic low dose prednisone who presents with cervical stenosis with associated radiuclopathy and myelopathy. His pain travels in the C4 distribution of his shoulders and his paresthesias are in the C6-7 distributions of his hands. These symptoms correlate with his MRI findings. I believe he will ultimately require decompression and fusion. I will refer him for flexion extension films, CT of the cervical spine, and to  For surgical evaluation. I have asked that he obtain clearance for a steroid holiday from his rheumatologist, Dr. Beverly. He knows to contact me urgently for any new or worsening symptoms.      Brenda Zhao PA-C  Neurosurgery

## 2018-01-08 ENCOUNTER — OFFICE VISIT (OUTPATIENT)
Dept: NEUROSURGERY | Facility: CLINIC | Age: 48
End: 2018-01-08
Payer: COMMERCIAL

## 2018-01-08 VITALS
DIASTOLIC BLOOD PRESSURE: 69 MMHG | TEMPERATURE: 98 F | WEIGHT: 127.88 LBS | SYSTOLIC BLOOD PRESSURE: 96 MMHG | BODY MASS INDEX: 19.38 KG/M2 | HEART RATE: 79 BPM | HEIGHT: 68 IN

## 2018-01-08 DIAGNOSIS — G95.9 CERVICAL MYELOPATHY WITH CERVICAL RADICULOPATHY: Primary | ICD-10-CM

## 2018-01-08 DIAGNOSIS — M54.12 CERVICAL MYELOPATHY WITH CERVICAL RADICULOPATHY: Primary | ICD-10-CM

## 2018-01-08 DIAGNOSIS — G95.9 CERVICAL MYELOPATHY: ICD-10-CM

## 2018-01-08 PROCEDURE — 99999 PR PBB SHADOW E&M-EST. PATIENT-LVL IV: CPT | Mod: PBBFAC,,, | Performed by: PHYSICIAN ASSISTANT

## 2018-01-08 PROCEDURE — 99204 OFFICE O/P NEW MOD 45 MIN: CPT | Mod: S$GLB,,, | Performed by: PHYSICIAN ASSISTANT

## 2018-01-22 ENCOUNTER — TELEPHONE (OUTPATIENT)
Dept: PHARMACY | Facility: CLINIC | Age: 48
End: 2018-01-22

## 2018-01-22 DIAGNOSIS — L40.9 PSORIASIS: ICD-10-CM

## 2018-01-22 DIAGNOSIS — L40.50 PSORIATIC ARTHRITIS: ICD-10-CM

## 2018-01-22 RX ORDER — SECUKINUMAB 150 MG/ML
300 INJECTION SUBCUTANEOUS
Qty: 2 ML | Refills: 0 | Status: SHIPPED | OUTPATIENT
Start: 2018-01-22 | End: 2018-05-24

## 2018-02-03 ENCOUNTER — PATIENT MESSAGE (OUTPATIENT)
Dept: RHEUMATOLOGY | Facility: CLINIC | Age: 48
End: 2018-02-03

## 2018-02-06 DIAGNOSIS — L40.50 PSORIATIC ARTHRITIS: Primary | ICD-10-CM

## 2018-03-07 ENCOUNTER — OFFICE VISIT (OUTPATIENT)
Dept: RHEUMATOLOGY | Facility: CLINIC | Age: 48
End: 2018-03-07
Payer: COMMERCIAL

## 2018-03-07 ENCOUNTER — TELEPHONE (OUTPATIENT)
Dept: PHARMACY | Facility: CLINIC | Age: 48
End: 2018-03-07

## 2018-03-07 VITALS
HEIGHT: 68 IN | SYSTOLIC BLOOD PRESSURE: 117 MMHG | DIASTOLIC BLOOD PRESSURE: 75 MMHG | BODY MASS INDEX: 19.27 KG/M2 | HEART RATE: 80 BPM | WEIGHT: 127.13 LBS

## 2018-03-07 DIAGNOSIS — T50.905A DRUG-INDUCED LUPUS ERYTHEMATOSUS: ICD-10-CM

## 2018-03-07 DIAGNOSIS — N20.0 RECURRENT NEPHROLITHIASIS: ICD-10-CM

## 2018-03-07 DIAGNOSIS — Z51.81 MEDICATION MONITORING ENCOUNTER: Primary | ICD-10-CM

## 2018-03-07 DIAGNOSIS — L93.2 DRUG-INDUCED LUPUS ERYTHEMATOSUS: ICD-10-CM

## 2018-03-07 DIAGNOSIS — L40.50 PSORIATIC ARTHRITIS: ICD-10-CM

## 2018-03-07 PROCEDURE — 99214 OFFICE O/P EST MOD 30 MIN: CPT | Mod: S$GLB,,, | Performed by: INTERNAL MEDICINE

## 2018-03-07 PROCEDURE — 99999 PR PBB SHADOW E&M-EST. PATIENT-LVL III: CPT | Mod: PBBFAC,,, | Performed by: INTERNAL MEDICINE

## 2018-03-07 NOTE — PROGRESS NOTES
I  Have personally take the history and examined the patient and agree with fellow's note as stated above. S/p C4-7 cervical fusion late [facility], successful, wears collar when up and about, attending PT [facility] near home. Passed another kidney stone post op. 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 Last dose of secukinumab 300mg sc in Dec. Able to resume now. SLE appears to be resolved. ELIEL

## 2018-03-07 NOTE — PROGRESS NOTES
Subjective:     Patient ID: Rebel Rodriguez is a 46 y.o. male.    Chief Complaint: Psoriatic Arthritis. Psoriasis, infliximab induced lupus    HPI       Rebel Rodriguez is a 48 y.o. male with psoriatic arthritis and infliximab induced lupus presenting for follow up. Drug induced Lupus: Related to infliximab (last dose 2/29/16; + aphosholipids-anticardiolipin IgM mild +dsDNA: 1:1280,  DEE+ 1:1280 homogenous ,  + anti histone ab, CH50 low at 51 along with recurrent intermittent fevers, chills, arthralgias. Treated with plaquenil 300 mg daily, Leflunomide 20 mg daily    Interval History:    Pt denies any recurrent fevers or chills with this past lupus flare. No serositis, oral ulcers, new rashes.He is now on  Cosentyx- did 5 week loading dose starting 4/20.    He is on 150 mg SC monthly but this was later increased to 300 mg due to active psoriasis.    He reports he is doing well with arthritis sx however he has held Cosentyx due to a recent c spine decompression and fusion on 1/29/18.   Pt is doing well post op and is doing PT.  He remains on Leflunomide and HCQ. Pt is on 5 mg prednisone.   Pt also notes an increase joint aches and psoriasis on the left ankle and groin.   He past another renal stone after his surgery as well, previous urology workup showed a hypocitraturic nephrolithiasis in 2013       The following portions of the patient's history were reviewed and updated as appropriate: allergies, current medications, past family history, past medical history, past social history, past surgical history and problem list.    Review of Systems  General: Appetite good. No fever, chills or sweats. + weight loss   Psychological: No insomnia, +anxiety, no  depression  Ophthalmic: No blurred vision.  ENT: No sore throat or hoarseness. No mouth ulcers.  +dry mouth,   Allergy and Immunology: No history of frequent infections or adenopathy.  Hematological and Lymphatic: No easy bruising or bleeding.  Endocrine: No polyuria,  polyphagia or polydipsia. No heat/cold intolerance. No hair loss.  Respiratory: No SOB, cough or wheezing.  Cardiovascular: No CP or palpitations. Denies any PND, orthopnea.   Gastrointestinal: No dyspepsia or change in bowel habits. No melena.  Genito-Urinary: No dysuria, No  Hematuria  Musculoskeletal: see above   Neurological: No TIA or stroke symptoms.  No weakness.   Dermatological: + psoriasis .     Objective:       Physical Examination:   Vitals:   There were no vitals taken for this visit.   Physical Exam   Constitutional: He is oriented to person, place, and time and well-developed, well-nourished, and in no distress.   HENT:   Head: Atraumatic.   Mouth/Throat: Oropharynx is clear and moist.   Eyes: Conjunctivae and EOM are normal. No scleral icterus.   Neck: Normal range of motion. Neck supple.   Cardiovascular: Normal rate, regular rhythm, normal heart sounds and intact distal pulses.  Exam reveals no gallop and no friction rub.    No murmur heard.  Pulmonary/Chest: Effort normal. No respiratory distress. He has no wheezes. He exhibits no tenderness.   Abdominal: Soft. Bowel sounds are normal. There is no tenderness.     Right Side Rheumatological Exam     Examination finds the shoulder, elbow, wrist, knee, 1st PIP, 1st MCP, 2nd PIP, 2nd MCP, 3rd MCP, 3rd PIP, 4th PIP, 4th MCP, 5th PIP, 5th MCP, SC joint, AC joint, 1st CMC, 2nd DIP, 3rd DIP, 4th DIP, 5th DIP, hip, ankle, talocalcaneal, tarsus, 1st MTP, 2nd MTP, 3rd MTP, 4th MTP, 5th MTP, 1st toe IP, 2nd toe IP, 3rd toe IP, 4th toe IP and 5th toe IP normal.      Muscle Strength (0-5 scale):  Neck Flexion:  5  Deltoid:  5  Biceps: 5/5   Triceps:  5  : 5/5   Iliopsoas: 5  Quadriceps:  4.8   Distal Lower Extremity: 5    Left Side Rheumatological Exam     Examination finds the shoulder, elbow, wrist, knee, 1st PIP, 1st MCP, 2nd PIP, 3rd PIP, 3rd MCP, 4th PIP, 4th MCP, 5th PIP, 5th MCP, SC joint, AC joint, 1st CMC, 2nd DIP, 3rd DIP, 4th DIP, 5th DIP, hip,  ankle, talocalcaneal, tarsus, 1st MTP, 2nd MTP, 3rd MTP, 4th MTP, 5th MTP, 1st toe IP, 2nd toe IP, 3rd toe IP, 4th toe IP and 5th toe IP normal.    Muscle Strength (0-5 scale):  Neck Flexion:  5  Deltoid:  5  Biceps: 5/5   Triceps:  5  :  5/5   Iliopsoas: 5  Quadriceps:  4.8   Distal Lower Extremity: 5      Lymphadenopathy:     He has no cervical adenopathy.   Neurological: He is alert and oriented to person, place, and time. Gait normal. hyperreflexia in the ankles/knees improved post decompression surgery.   Skin: Skin is warm and dry. Rash (hyperpigmented area to the lateral aspect of the lower leg near ankles bilaterally. Right lower leg with small patch of psoriasis) noted. No pallor.   Musculoskeletal: Normal range of motion except for cervical spine ( limited lateral flexion on left and limited lateral rotation on the right) . He exhibits no tenderness or deformity.       SJC-0  TJC- 0    Imaging  No new imaging.     Lab Review  Labs reviewed. DsDNA pending            Assessment:      Psoriatic Arthritis- DAPSA  Pending labs.    TNF inhibitor caused drug induced lupus unable to take further TNFs. He has failed ustekinumab. Doing well on  secukinumab  mg SC monthly, however psoriasis and arthritis is now more active after holding Cosentyx for his surgery. currently with prednisone 10 mg daily   Drug induced lupus- stable, SLEDAI 0. Pending complements and dsDNA.  Related to infliximab (last dose 2/29/16; + antiphosholipids-anticardiolipin IgM mild +dsDNA: 1:1280,  DEE+ 1:1280 homogenous , +anti histone, CH50 low at 51 along with recurrent intermittent fevers, chills, arthralgias.  HTN/CVD- no contraindications to current therapy, on statin and aspirin  TIA- no NSAIDs for msk pain  High risk medication use/Medication monitoring encounter- no toxic side effects seen. No steroid side effects.   Immunizations- Pneumococcal up to date, needs Quadrivalent Flu vaccine.  Psoriasis <10 % BSA  Nephrolithiasis:  Previous UroRisk showed  a hypocitraturic nephrolithiasis in 2013  Plan:     Psoriatic arthritis:   -  continue prednisone 5 mg daily.   - continue Cosentyx to 300 mg SC monthly. Plan to wean prednisone once Cosentyx restarted.   -  Continue plaquenil 300 mg daily and lefluonamide 20 mg daily, no dose changes.   -  No NSAIDs due to past TIA.   - continue 50,000 units ergo for low vitamin D      Nephrolithiasis: Previous UroRisk showed  a hypocitraturic nephrolithiasis in 2013  - repeat Uro Risk, if low citrate still present will treat with polycitra k as this may reduce recurrence.     Standing labs today along with lipid panel     RTC  3 months with standing labs.    Signed,  Jeronimo Winters  Rheumatology Fellow

## 2018-03-08 ENCOUNTER — LAB VISIT (OUTPATIENT)
Dept: LAB | Facility: OTHER | Age: 48
End: 2018-03-08
Payer: COMMERCIAL

## 2018-03-08 DIAGNOSIS — Z51.81 MEDICATION MONITORING ENCOUNTER: ICD-10-CM

## 2018-03-08 DIAGNOSIS — L40.50 PSORIATIC ARTHRITIS: ICD-10-CM

## 2018-03-08 LAB
ALBUMIN SERPL BCP-MCNC: 4.1 G/DL
ALP SERPL-CCNC: 71 U/L
ALT SERPL W/O P-5'-P-CCNC: 17 U/L
ANION GAP SERPL CALC-SCNC: 11 MMOL/L
AST SERPL-CCNC: 24 U/L
BASOPHILS # BLD AUTO: 0.02 K/UL
BASOPHILS NFR BLD: 0.3 %
BILIRUB SERPL-MCNC: 0.4 MG/DL
BUN SERPL-MCNC: 11 MG/DL
C3 SERPL-MCNC: 91 MG/DL
C4 SERPL-MCNC: 24 MG/DL
CALCIUM SERPL-MCNC: 9.9 MG/DL
CHLORIDE SERPL-SCNC: 106 MMOL/L
CHOLEST SERPL-MCNC: 152 MG/DL
CHOLEST/HDLC SERPL: 2.1 {RATIO}
CO2 SERPL-SCNC: 25 MMOL/L
CREAT SERPL-MCNC: 1 MG/DL
CRP SERPL-MCNC: 0.2 MG/L
DIFFERENTIAL METHOD: NORMAL
EOSINOPHIL # BLD AUTO: 0.1 K/UL
EOSINOPHIL NFR BLD: 1.5 %
ERYTHROCYTE [DISTWIDTH] IN BLOOD BY AUTOMATED COUNT: 13.5 %
ERYTHROCYTE [SEDIMENTATION RATE] IN BLOOD BY WESTERGREN METHOD: 5 MM/HR
EST. GFR  (AFRICAN AMERICAN): >60 ML/MIN/1.73 M^2
EST. GFR  (NON AFRICAN AMERICAN): >60 ML/MIN/1.73 M^2
GLUCOSE SERPL-MCNC: 87 MG/DL
HCT VFR BLD AUTO: 43.4 %
HDLC SERPL-MCNC: 71 MG/DL
HDLC SERPL: 46.7 %
HGB BLD-MCNC: 14 G/DL
LDLC SERPL CALC-MCNC: 67.8 MG/DL
LYMPHOCYTES # BLD AUTO: 2.2 K/UL
LYMPHOCYTES NFR BLD: 30.1 %
MCH RBC QN AUTO: 28.5 PG
MCHC RBC AUTO-ENTMCNC: 32.3 G/DL
MCV RBC AUTO: 88 FL
MONOCYTES # BLD AUTO: 0.7 K/UL
MONOCYTES NFR BLD: 10.1 %
NEUTROPHILS # BLD AUTO: 4.2 K/UL
NEUTROPHILS NFR BLD: 57.9 %
NONHDLC SERPL-MCNC: 81 MG/DL
PLATELET # BLD AUTO: 220 K/UL
PMV BLD AUTO: 9.3 FL
POTASSIUM SERPL-SCNC: 3.9 MMOL/L
PROT SERPL-MCNC: 7.2 G/DL
RBC # BLD AUTO: 4.91 M/UL
SODIUM SERPL-SCNC: 142 MMOL/L
TRIGL SERPL-MCNC: 66 MG/DL
WBC # BLD AUTO: 7.2 K/UL

## 2018-03-08 PROCEDURE — 86160 COMPLEMENT ANTIGEN: CPT | Mod: 59

## 2018-03-08 PROCEDURE — 86160 COMPLEMENT ANTIGEN: CPT

## 2018-03-08 PROCEDURE — 85025 COMPLETE CBC W/AUTO DIFF WBC: CPT

## 2018-03-08 PROCEDURE — 36415 COLL VENOUS BLD VENIPUNCTURE: CPT

## 2018-03-08 PROCEDURE — 80061 LIPID PANEL: CPT

## 2018-03-08 PROCEDURE — 80053 COMPREHEN METABOLIC PANEL: CPT

## 2018-03-08 PROCEDURE — 86225 DNA ANTIBODY NATIVE: CPT

## 2018-03-08 PROCEDURE — 85651 RBC SED RATE NONAUTOMATED: CPT

## 2018-03-08 PROCEDURE — 86140 C-REACTIVE PROTEIN: CPT

## 2018-03-09 LAB — DSDNA AB SER-ACNC: NORMAL [IU]/ML

## 2018-03-22 RX ORDER — LEFLUNOMIDE 20 MG/1
TABLET ORAL
Qty: 90 TABLET | Refills: 0 | Status: SHIPPED | OUTPATIENT
Start: 2018-03-22 | End: 2018-08-03 | Stop reason: SDUPTHER

## 2018-03-29 ENCOUNTER — TELEPHONE (OUTPATIENT)
Dept: PHARMACY | Facility: CLINIC | Age: 48
End: 2018-03-29

## 2018-04-05 NOTE — TELEPHONE ENCOUNTER
FOR DOCUMENTATION  Cosentyx renewal PA approved x 1 year  Dates: 4/5/18 through 4/5/19  Case ID: [id number]

## 2018-04-27 ENCOUNTER — TELEPHONE (OUTPATIENT)
Dept: PHARMACY | Facility: CLINIC | Age: 48
End: 2018-04-27

## 2018-05-24 ENCOUNTER — TELEPHONE (OUTPATIENT)
Dept: PHARMACY | Facility: CLINIC | Age: 48
End: 2018-05-24

## 2018-05-24 ENCOUNTER — TELEPHONE (OUTPATIENT)
Dept: RHEUMATOLOGY | Facility: CLINIC | Age: 48
End: 2018-05-24

## 2018-05-24 DIAGNOSIS — L40.50 PSORIATIC ARTHRITIS: ICD-10-CM

## 2018-05-24 DIAGNOSIS — L40.9 PSORIASIS: ICD-10-CM

## 2018-05-24 RX ORDER — SECUKINUMAB 150 MG/ML
300 INJECTION SUBCUTANEOUS
Qty: 2 ML | Refills: 0 | Status: SHIPPED | OUTPATIENT
Start: 2018-05-24 | End: 2018-06-22

## 2018-05-24 NOTE — TELEPHONE ENCOUNTER
----- Message from Edelmira Strauss sent at 5/24/2018  3:17 PM CDT -----  [facility]   Need clarification on COSENTYX 150 mg/mL Syrg

## 2018-05-25 ENCOUNTER — TELEPHONE (OUTPATIENT)
Dept: PHARMACY | Facility: CLINIC | Age: 48
End: 2018-05-25

## 2018-05-30 DIAGNOSIS — E78.5 HYPERLIPIDEMIA: ICD-10-CM

## 2018-05-30 RX ORDER — ATORVASTATIN CALCIUM 10 MG/1
TABLET, FILM COATED ORAL
Qty: 90 TABLET | Refills: 3 | Status: SHIPPED | OUTPATIENT
Start: 2018-05-30 | End: 2018-08-03 | Stop reason: SDUPTHER

## 2018-06-02 ENCOUNTER — PATIENT MESSAGE (OUTPATIENT)
Dept: RHEUMATOLOGY | Facility: CLINIC | Age: 48
End: 2018-06-02

## 2018-06-04 DIAGNOSIS — T50.905A DRUG-INDUCED LUPUS ERYTHEMATOSUS: ICD-10-CM

## 2018-06-04 DIAGNOSIS — L93.2 DRUG-INDUCED LUPUS ERYTHEMATOSUS: ICD-10-CM

## 2018-06-04 RX ORDER — HYDROXYCHLOROQUINE SULFATE 200 MG/1
TABLET, FILM COATED ORAL
Qty: 135 TABLET | Refills: 3 | Status: SHIPPED | OUTPATIENT
Start: 2018-06-04 | End: 2019-06-25

## 2018-06-22 DIAGNOSIS — L40.9 PSORIASIS: ICD-10-CM

## 2018-06-22 DIAGNOSIS — L40.50 PSORIATIC ARTHRITIS: ICD-10-CM

## 2018-06-26 RX ORDER — SECUKINUMAB 150 MG/ML
300 INJECTION SUBCUTANEOUS
Qty: 2 ML | Refills: 0 | Status: SHIPPED | OUTPATIENT
Start: 2018-06-26 | End: 2018-07-24 | Stop reason: SDUPTHER

## 2018-06-27 ENCOUNTER — TELEPHONE (OUTPATIENT)
Dept: PHARMACY | Facility: CLINIC | Age: 48
End: 2018-06-27

## 2018-06-28 NOTE — TELEPHONE ENCOUNTER
Refill readiness for Cosentyx confirmed with patient; name/ confirmed; no missed doses; no new medications; no side effects noted; Patient will  18

## 2018-07-12 ENCOUNTER — TELEPHONE (OUTPATIENT)
Dept: RHEUMATOLOGY | Facility: CLINIC | Age: 48
End: 2018-07-12

## 2018-07-12 DIAGNOSIS — L40.50 PSA (PSORIATIC ARTHRITIS): Primary | ICD-10-CM

## 2018-07-12 RX ORDER — LEFLUNOMIDE 20 MG/1
TABLET ORAL
Qty: 30 TABLET | Refills: 0 | Status: SHIPPED | OUTPATIENT
Start: 2018-07-12 | End: 2018-08-14 | Stop reason: SDUPTHER

## 2018-07-12 NOTE — TELEPHONE ENCOUNTER
Please schedule overdue standing lupus labs and QG-TB this week and so I can refill lelfunomide and also needs f/u with me with or without fellow. Thanks ELIEL

## 2018-07-16 ENCOUNTER — TELEPHONE (OUTPATIENT)
Dept: RHEUMATOLOGY | Facility: CLINIC | Age: 48
End: 2018-07-16

## 2018-07-16 ENCOUNTER — LAB VISIT (OUTPATIENT)
Dept: LAB | Facility: OTHER | Age: 48
End: 2018-07-16
Payer: COMMERCIAL

## 2018-07-16 DIAGNOSIS — L40.50 PSORIATIC ARTHRITIS: ICD-10-CM

## 2018-07-16 DIAGNOSIS — D64.9 ANEMIA, UNSPECIFIED TYPE: Primary | ICD-10-CM

## 2018-07-16 LAB
ALBUMIN SERPL BCP-MCNC: 3.8 G/DL
ALP SERPL-CCNC: 57 U/L
ALT SERPL W/O P-5'-P-CCNC: 24 U/L
ANION GAP SERPL CALC-SCNC: 9 MMOL/L
AST SERPL-CCNC: 25 U/L
BASOPHILS # BLD AUTO: 0.02 K/UL
BASOPHILS NFR BLD: 0.3 %
BILIRUB SERPL-MCNC: 0.4 MG/DL
BUN SERPL-MCNC: 12 MG/DL
C3 SERPL-MCNC: 87 MG/DL
C4 SERPL-MCNC: 23 MG/DL
CALCIUM SERPL-MCNC: 9.6 MG/DL
CHLORIDE SERPL-SCNC: 104 MMOL/L
CO2 SERPL-SCNC: 25 MMOL/L
CREAT SERPL-MCNC: 0.9 MG/DL
CRP SERPL-MCNC: 0.3 MG/L
DIFFERENTIAL METHOD: ABNORMAL
EOSINOPHIL # BLD AUTO: 0.2 K/UL
EOSINOPHIL NFR BLD: 2.4 %
ERYTHROCYTE [DISTWIDTH] IN BLOOD BY AUTOMATED COUNT: 13.4 %
ERYTHROCYTE [SEDIMENTATION RATE] IN BLOOD: 5 MM/HR
EST. GFR  (AFRICAN AMERICAN): >60 ML/MIN/1.73 M^2
EST. GFR  (NON AFRICAN AMERICAN): >60 ML/MIN/1.73 M^2
GLUCOSE SERPL-MCNC: 87 MG/DL
HCT VFR BLD AUTO: 41.1 %
HGB BLD-MCNC: 13.6 G/DL
LYMPHOCYTES # BLD AUTO: 1.6 K/UL
LYMPHOCYTES NFR BLD: 25.9 %
MCH RBC QN AUTO: 29 PG
MCHC RBC AUTO-ENTMCNC: 33.1 G/DL
MCV RBC AUTO: 88 FL
MONOCYTES # BLD AUTO: 0.6 K/UL
MONOCYTES NFR BLD: 8.7 %
NEUTROPHILS # BLD AUTO: 3.9 K/UL
NEUTROPHILS NFR BLD: 62.4 %
PLATELET # BLD AUTO: 252 K/UL
PMV BLD AUTO: 9.8 FL
POTASSIUM SERPL-SCNC: 4.1 MMOL/L
PROT SERPL-MCNC: 6.8 G/DL
RBC # BLD AUTO: 4.69 M/UL
SODIUM SERPL-SCNC: 138 MMOL/L
WBC # BLD AUTO: 6.3 K/UL

## 2018-07-16 PROCEDURE — 86140 C-REACTIVE PROTEIN: CPT

## 2018-07-16 PROCEDURE — 86160 COMPLEMENT ANTIGEN: CPT | Mod: 59

## 2018-07-16 PROCEDURE — 86225 DNA ANTIBODY NATIVE: CPT

## 2018-07-16 PROCEDURE — 80053 COMPREHEN METABOLIC PANEL: CPT

## 2018-07-16 PROCEDURE — 86160 COMPLEMENT ANTIGEN: CPT

## 2018-07-16 PROCEDURE — 85025 COMPLETE CBC W/AUTO DIFF WBC: CPT

## 2018-07-16 PROCEDURE — 85651 RBC SED RATE NONAUTOMATED: CPT

## 2018-07-16 PROCEDURE — 36415 COLL VENOUS BLD VENIPUNCTURE: CPT

## 2018-07-17 LAB — DSDNA AB SER-ACNC: NORMAL [IU]/ML

## 2018-07-20 DIAGNOSIS — L40.9 PSORIASIS: ICD-10-CM

## 2018-07-20 DIAGNOSIS — L40.50 PSORIATIC ARTHRITIS: ICD-10-CM

## 2018-07-20 RX ORDER — SECUKINUMAB 150 MG/ML
300 INJECTION SUBCUTANEOUS
Qty: 2 ML | Refills: 0 | OUTPATIENT
Start: 2018-07-20

## 2018-07-20 RX ORDER — SECUKINUMAB 150 MG/ML
300 INJECTION SUBCUTANEOUS
Qty: 2 ML | Refills: 0 | Status: CANCELLED | OUTPATIENT
Start: 2018-07-19

## 2018-07-20 RX ORDER — CYCLOBENZAPRINE HCL 10 MG
TABLET ORAL
COMMUNITY
Start: 2018-07-12 | End: 2018-08-03

## 2018-07-20 NOTE — TELEPHONE ENCOUNTER
Called pt and LVM stating that he is due for an annual visit to cont receiving refills '  Left office number for pt to call and schedule annual

## 2018-07-24 ENCOUNTER — TELEPHONE (OUTPATIENT)
Dept: PHARMACY | Facility: CLINIC | Age: 48
End: 2018-07-24

## 2018-07-24 ENCOUNTER — PATIENT MESSAGE (OUTPATIENT)
Dept: RHEUMATOLOGY | Facility: CLINIC | Age: 48
End: 2018-07-24

## 2018-07-25 ENCOUNTER — TELEPHONE (OUTPATIENT)
Dept: OTOLARYNGOLOGY | Facility: CLINIC | Age: 48
End: 2018-07-25

## 2018-07-25 ENCOUNTER — LAB VISIT (OUTPATIENT)
Dept: LAB | Facility: OTHER | Age: 48
End: 2018-07-25
Payer: COMMERCIAL

## 2018-07-25 DIAGNOSIS — D64.9 ANEMIA, UNSPECIFIED TYPE: ICD-10-CM

## 2018-07-25 LAB
BASOPHILS # BLD AUTO: 0.03 K/UL
BASOPHILS NFR BLD: 0.5 %
DIFFERENTIAL METHOD: NORMAL
EOSINOPHIL # BLD AUTO: 0.2 K/UL
EOSINOPHIL NFR BLD: 4 %
ERYTHROCYTE [DISTWIDTH] IN BLOOD BY AUTOMATED COUNT: 13.5 %
FERRITIN SERPL-MCNC: 38 NG/ML
HAPTOGLOB SERPL-MCNC: 190 MG/DL
HCT VFR BLD AUTO: 42.7 %
HGB BLD-MCNC: 14.2 G/DL
IRON SERPL-MCNC: 67 UG/DL
LDH SERPL L TO P-CCNC: 185 U/L
LYMPHOCYTES # BLD AUTO: 2 K/UL
LYMPHOCYTES NFR BLD: 32.9 %
MCH RBC QN AUTO: 29.2 PG
MCHC RBC AUTO-ENTMCNC: 33.3 G/DL
MCV RBC AUTO: 88 FL
MONOCYTES # BLD AUTO: 0.6 K/UL
MONOCYTES NFR BLD: 9.7 %
NEUTROPHILS # BLD AUTO: 3.2 K/UL
NEUTROPHILS NFR BLD: 52.7 %
PATH REV BLD -IMP: NORMAL
PLATELET # BLD AUTO: 236 K/UL
PMV BLD AUTO: 9.9 FL
RBC # BLD AUTO: 4.87 M/UL
RETICS/RBC NFR AUTO: 1.3 %
SATURATED IRON: 19 %
TOTAL IRON BINDING CAPACITY: 357 UG/DL
TRANSFERRIN SERPL-MCNC: 241 MG/DL
WBC # BLD AUTO: 6.07 K/UL

## 2018-07-25 PROCEDURE — 85045 AUTOMATED RETICULOCYTE COUNT: CPT

## 2018-07-25 PROCEDURE — 36415 COLL VENOUS BLD VENIPUNCTURE: CPT

## 2018-07-25 PROCEDURE — 83010 ASSAY OF HAPTOGLOBIN QUANT: CPT

## 2018-07-25 PROCEDURE — 82728 ASSAY OF FERRITIN: CPT

## 2018-07-25 PROCEDURE — 85025 COMPLETE CBC W/AUTO DIFF WBC: CPT

## 2018-07-25 PROCEDURE — 85060 BLOOD SMEAR INTERPRETATION: CPT | Mod: ,,, | Performed by: PATHOLOGY

## 2018-07-25 PROCEDURE — 83615 LACTATE (LD) (LDH) ENZYME: CPT

## 2018-07-25 PROCEDURE — 83540 ASSAY OF IRON: CPT

## 2018-07-26 ENCOUNTER — TELEPHONE (OUTPATIENT)
Dept: OTOLARYNGOLOGY | Facility: CLINIC | Age: 48
End: 2018-07-26

## 2018-07-26 LAB — PATH REV BLD -IMP: NORMAL

## 2018-07-27 ENCOUNTER — TELEPHONE (OUTPATIENT)
Dept: OTOLARYNGOLOGY | Facility: CLINIC | Age: 48
End: 2018-07-27

## 2018-07-27 ENCOUNTER — PATIENT MESSAGE (OUTPATIENT)
Dept: OTOLARYNGOLOGY | Facility: CLINIC | Age: 48
End: 2018-07-27

## 2018-08-03 ENCOUNTER — CLINICAL SUPPORT (OUTPATIENT)
Dept: OPHTHALMOLOGY | Facility: CLINIC | Age: 48
End: 2018-08-03
Payer: COMMERCIAL

## 2018-08-03 ENCOUNTER — OFFICE VISIT (OUTPATIENT)
Dept: OPTOMETRY | Facility: CLINIC | Age: 48
End: 2018-08-03
Payer: COMMERCIAL

## 2018-08-03 DIAGNOSIS — H52.203 HYPEROPIA WITH ASTIGMATISM AND PRESBYOPIA, BILATERAL: ICD-10-CM

## 2018-08-03 DIAGNOSIS — M19.90 ARTHRITIS: ICD-10-CM

## 2018-08-03 DIAGNOSIS — H52.4 HYPEROPIA WITH ASTIGMATISM AND PRESBYOPIA, BILATERAL: ICD-10-CM

## 2018-08-03 DIAGNOSIS — Z79.899 ENCOUNTER FOR LONG-TERM (CURRENT) USE OF HIGH-RISK MEDICATION: Primary | ICD-10-CM

## 2018-08-03 DIAGNOSIS — H52.03 HYPEROPIA WITH ASTIGMATISM AND PRESBYOPIA, BILATERAL: ICD-10-CM

## 2018-08-03 PROCEDURE — 92134 CPTRZ OPH DX IMG PST SGM RTA: CPT | Mod: S$GLB,,, | Performed by: OPTOMETRIST

## 2018-08-03 PROCEDURE — 99999 PR PBB SHADOW E&M-EST. PATIENT-LVL I: CPT | Mod: PBBFAC,,, | Performed by: OPTOMETRIST

## 2018-08-03 PROCEDURE — 92014 COMPRE OPH EXAM EST PT 1/>: CPT | Mod: S$GLB,,, | Performed by: OPTOMETRIST

## 2018-08-03 PROCEDURE — 92083 EXTENDED VISUAL FIELD XM: CPT | Mod: S$GLB,,, | Performed by: OPTOMETRIST

## 2018-08-03 NOTE — PROGRESS NOTES
HPI     Concerns About Ocular Health    Additional comments: plaquenil ck with HVF and OCT           Comments   Pt here for plaquenil ck with HVF and OCT . Pt state no changes but pt   states he is having difficulty with adjusting to new glasses.    1. Plaquenil Pt  2. Psoriatic Arthritis       Last edited by Deanne Koenig MA on 8/3/2018 11:09 AM. (History)            Assessment /Plan     For exam results, see Encounter Report.    Encounter for long-term (current) use of high-risk medication  -     [facility] Visual Field - OU - Extended - Both Eyes  -     OCT- Retina     psoriatric Arthritis  -     [facility] Visual Field - OU - Extended - Both Eyes  -     OCT- Retina    Hyperopia with astigmatism and presbyopia, bilateral            1-2.  All testing normal OU-no retinopathy.  Monitor yearly.  3.  Bifocal rx given

## 2018-08-14 RX ORDER — LEFLUNOMIDE 20 MG/1
TABLET ORAL
Qty: 90 TABLET | Refills: 0 | Status: SHIPPED | OUTPATIENT
Start: 2018-08-14 | End: 2018-12-12 | Stop reason: SDUPTHER

## 2018-08-17 ENCOUNTER — TELEPHONE (OUTPATIENT)
Dept: PHARMACY | Facility: CLINIC | Age: 48
End: 2018-08-17

## 2018-08-21 ENCOUNTER — TELEPHONE (OUTPATIENT)
Dept: PHARMACY | Facility: CLINIC | Age: 48
End: 2018-08-21

## 2018-08-22 ENCOUNTER — TELEPHONE (OUTPATIENT)
Dept: RHEUMATOLOGY | Facility: CLINIC | Age: 48
End: 2018-08-22

## 2018-08-22 DIAGNOSIS — L40.50 PSA (PSORIATIC ARTHRITIS): Primary | ICD-10-CM

## 2018-09-06 ENCOUNTER — OFFICE VISIT (OUTPATIENT)
Dept: OTOLARYNGOLOGY | Facility: CLINIC | Age: 48
End: 2018-09-06
Payer: COMMERCIAL

## 2018-09-06 ENCOUNTER — LAB VISIT (OUTPATIENT)
Dept: LAB | Facility: HOSPITAL | Age: 48
End: 2018-09-06
Attending: INTERNAL MEDICINE
Payer: COMMERCIAL

## 2018-09-06 VITALS — HEART RATE: 71 BPM | DIASTOLIC BLOOD PRESSURE: 68 MMHG | SYSTOLIC BLOOD PRESSURE: 111 MMHG

## 2018-09-06 DIAGNOSIS — J34.2 DEVIATED NASAL SEPTUM: Primary | ICD-10-CM

## 2018-09-06 DIAGNOSIS — J32.0 CHRONIC MAXILLARY SINUSITIS: ICD-10-CM

## 2018-09-06 DIAGNOSIS — J34.3 NASAL TURBINATE HYPERTROPHY: ICD-10-CM

## 2018-09-06 DIAGNOSIS — L93.2 DRUG-INDUCED LUPUS ERYTHEMATOSUS: ICD-10-CM

## 2018-09-06 DIAGNOSIS — T50.905A DRUG-INDUCED LUPUS ERYTHEMATOSUS: ICD-10-CM

## 2018-09-06 DIAGNOSIS — L40.50 PSA (PSORIATIC ARTHRITIS): ICD-10-CM

## 2018-09-06 LAB
ALBUMIN SERPL BCP-MCNC: 4.3 G/DL
ALP SERPL-CCNC: 67 U/L
ALT SERPL W/O P-5'-P-CCNC: 18 U/L
ANION GAP SERPL CALC-SCNC: 7 MMOL/L
AST SERPL-CCNC: 25 U/L
BASOPHILS # BLD AUTO: 0.05 K/UL
BASOPHILS NFR BLD: 0.9 %
BILIRUB SERPL-MCNC: 0.6 MG/DL
BUN SERPL-MCNC: 11 MG/DL
C3 SERPL-MCNC: 92 MG/DL
C4 SERPL-MCNC: 25 MG/DL
CALCIUM SERPL-MCNC: 10.3 MG/DL
CHLORIDE SERPL-SCNC: 105 MMOL/L
CO2 SERPL-SCNC: 29 MMOL/L
CREAT SERPL-MCNC: 1.1 MG/DL
CRP SERPL-MCNC: 0.3 MG/L
DIFFERENTIAL METHOD: NORMAL
EOSINOPHIL # BLD AUTO: 0.2 K/UL
EOSINOPHIL NFR BLD: 3.9 %
ERYTHROCYTE [DISTWIDTH] IN BLOOD BY AUTOMATED COUNT: 13.1 %
ERYTHROCYTE [SEDIMENTATION RATE] IN BLOOD BY WESTERGREN METHOD: 6 MM/HR
EST. GFR  (AFRICAN AMERICAN): >60 ML/MIN/1.73 M^2
EST. GFR  (NON AFRICAN AMERICAN): >60 ML/MIN/1.73 M^2
GLUCOSE SERPL-MCNC: 92 MG/DL
HBV CORE AB SERPL QL IA: NEGATIVE
HBV SURFACE AB SER-ACNC: NEGATIVE M[IU]/ML
HBV SURFACE AG SERPL QL IA: NEGATIVE
HCT VFR BLD AUTO: 47.1 %
HCV AB SERPL QL IA: NEGATIVE
HEPATITIS A ANTIBODY, IGG: NEGATIVE
HGB BLD-MCNC: 15.1 G/DL
HIV 1+2 AB+HIV1 P24 AG SERPL QL IA: NEGATIVE
IMM GRANULOCYTES # BLD AUTO: 0.01 K/UL
IMM GRANULOCYTES NFR BLD AUTO: 0.2 %
LYMPHOCYTES # BLD AUTO: 1.7 K/UL
LYMPHOCYTES NFR BLD: 30.9 %
MCH RBC QN AUTO: 28.5 PG
MCHC RBC AUTO-ENTMCNC: 32.1 G/DL
MCV RBC AUTO: 89 FL
MONOCYTES # BLD AUTO: 0.7 K/UL
MONOCYTES NFR BLD: 12.6 %
NEUTROPHILS # BLD AUTO: 2.8 K/UL
NEUTROPHILS NFR BLD: 51.5 %
NRBC BLD-RTO: 0 /100 WBC
PLATELET # BLD AUTO: 232 K/UL
PMV BLD AUTO: 9.3 FL
POTASSIUM SERPL-SCNC: 4.2 MMOL/L
PROT SERPL-MCNC: 7.6 G/DL
RBC # BLD AUTO: 5.3 M/UL
SODIUM SERPL-SCNC: 141 MMOL/L
WBC # BLD AUTO: 5.38 K/UL

## 2018-09-06 PROCEDURE — 86706 HEP B SURFACE ANTIBODY: CPT

## 2018-09-06 PROCEDURE — 86703 HIV-1/HIV-2 1 RESULT ANTBDY: CPT

## 2018-09-06 PROCEDURE — 99214 OFFICE O/P EST MOD 30 MIN: CPT | Mod: S$GLB,,, | Performed by: OTOLARYNGOLOGY

## 2018-09-06 PROCEDURE — 86790 VIRUS ANTIBODY NOS: CPT

## 2018-09-06 PROCEDURE — 86225 DNA ANTIBODY NATIVE: CPT

## 2018-09-06 PROCEDURE — 87340 HEPATITIS B SURFACE AG IA: CPT

## 2018-09-06 PROCEDURE — 86803 HEPATITIS C AB TEST: CPT

## 2018-09-06 PROCEDURE — 99999 PR PBB SHADOW E&M-EST. PATIENT-LVL II: CPT | Mod: PBBFAC,,, | Performed by: OTOLARYNGOLOGY

## 2018-09-06 PROCEDURE — 80053 COMPREHEN METABOLIC PANEL: CPT

## 2018-09-06 PROCEDURE — 86160 COMPLEMENT ANTIGEN: CPT | Mod: 59

## 2018-09-06 PROCEDURE — 86140 C-REACTIVE PROTEIN: CPT

## 2018-09-06 PROCEDURE — 86592 SYPHILIS TEST NON-TREP QUAL: CPT

## 2018-09-06 PROCEDURE — 85652 RBC SED RATE AUTOMATED: CPT

## 2018-09-06 PROCEDURE — 86682 HELMINTH ANTIBODY: CPT

## 2018-09-06 PROCEDURE — 85025 COMPLETE CBC W/AUTO DIFF WBC: CPT

## 2018-09-06 PROCEDURE — 86160 COMPLEMENT ANTIGEN: CPT

## 2018-09-06 PROCEDURE — 86704 HEP B CORE ANTIBODY TOTAL: CPT

## 2018-09-06 NOTE — H&P (VIEW-ONLY)
Subjective:      Rebel is a 48 y.o. male who comes for follow-up of nasal obstruction.  His last visit with me was on 7/5/2016.  No change, used Flonase, saline and OTC meds, no improvement in nasal breathing bilaterally, moderately severe and worse at night and in the mornings.  Thick nonpurulent mucus bilaterally and postnasal drip, daily persistent pressure-pain in the cheeks, no recent sinus infections.    SNOT-22 score = 51, NOSE score = 70%    The patient's medications, allergies, past medical, surgical, social and family histories were reviewed and updated as appropriate.    A detailed review of systems was obtained with pertinent positives as per the above HPI, and otherwise negative.        Objective:     /68   Pulse 71        Constitutional:   He appears well-developed. He is cooperative. Normal speech.  No hoarse voice.      Head:  Normocephalic. Salivary glands normal.  Facial strength is normal.      Ears:    Right Ear: No drainage or tenderness. Tympanic membrane is not perforated. Tympanic membrane mobility is normal. No middle ear effusion. No decreased hearing is noted.   Left Ear: No drainage or tenderness. Tympanic membrane is not perforated. Tympanic membrane mobility is normal.  No middle ear effusion. No decreased hearing is noted.     Nose:  Septal deviation present. No mucosal edema, rhinorrhea or polyps. No epistaxis. Turbinate hypertrophy.  Turbinates normal and no turbinate masses.  Right sinus exhibits maxillary sinus tenderness. Right sinus exhibits no frontal sinus tenderness. Left sinus exhibits maxillary sinus tenderness. Left sinus exhibits no frontal sinus tenderness.   Marked septal deviation to right with large anterior bone spur on left.    Mouth/Throat  Oropharynx clear and moist without lesions or asymmetry and normal uvula midline. He does not have dentures. Normal dentition. No oral lesions or mucous membrane lesions. No oropharyngeal exudate or posterior  oropharyngeal erythema. Mirror exam not performed due to patient tolerance.  Mirror exam not performed due to patient tolerance.      Neck:  Neck normal without thyromegaly masses, asymmetry, normal tracheal structure, crepitus, and tenderness, thyroid normal, trachea normal and no adenopathy. Normal range of motion present.     He has no cervical adenopathy.     Cardiovascular:   Regular rhythm.      Pulmonary/Chest:   Effort normal.     Psychiatric:   He has a normal mood and affect. His speech is normal and behavior is normal.     Neurological:   No cranial nerve deficit.     Skin:   No rash noted.       Procedure    None        Data Reviewed    WBC (K/uL)   Date Value   09/06/2018 5.38     Eosinophil% (%)   Date Value   09/06/2018 3.9     Eos # (K/uL)   Date Value   09/06/2018 0.2     Platelets (K/uL)   Date Value   09/06/2018 232     Glucose (mg/dL)   Date Value   09/06/2018 92     No results found for: IGE    I independently reviewed the images of the CT sinuses dated 3/7/16. Pertinent findings include patchy opacification in the maxillary sinuses with narrow OMCs.        Assessment:     1. Deviated nasal septum    2. Nasal turbinate hypertrophy    3. Chronic maxillary sinusitis         Plan:     He would benefit from endoscopic sinus surgery and septoplasty for the treatment of his condition.  This would include the maxillary sinuses and would be bilateral.  Inferior turbinate reduction would be included.  I discussed the risks, benefits and alternatives to surgery with the patient, as well as the expected postoperative course.  I gave him the opportunity to ask questions and I answered all of them.  I provided relevant printed information on his condition for him to review at home.  Same-day discharge is anticipated.  He may have anesthesia triage by telephone. He will also need to stop aspirin 1 week prior to surgery. The surgery will be tentatively scheduled for September 14.  He will need to return for a  postoperative visit 1 week after surgery.  Follow-up for surgery.

## 2018-09-07 LAB
DSDNA AB SER-ACNC: NORMAL [IU]/ML
RPR SER QL: NORMAL

## 2018-09-10 ENCOUNTER — TELEPHONE (OUTPATIENT)
Dept: OTOLARYNGOLOGY | Facility: CLINIC | Age: 48
End: 2018-09-10

## 2018-09-10 DIAGNOSIS — J34.3 NASAL TURBINATE HYPERTROPHY: ICD-10-CM

## 2018-09-10 DIAGNOSIS — J34.2 DEVIATED NASAL SEPTUM: Primary | ICD-10-CM

## 2018-09-10 LAB — STRONGYLOIDES ANTIBODY IGG: NEGATIVE

## 2018-09-10 NOTE — TELEPHONE ENCOUNTER
Spoke with patient to confirm hold ASA 7 days prior to surgery.  Patient stated that he stopped on Saturday. He is asking if he may take melatonin or Benadryl the night before surgery to help him sleep due to anxiety prior to surgery.

## 2018-09-11 ENCOUNTER — OFFICE VISIT (OUTPATIENT)
Dept: RHEUMATOLOGY | Facility: CLINIC | Age: 48
End: 2018-09-11
Payer: COMMERCIAL

## 2018-09-11 ENCOUNTER — TELEPHONE (OUTPATIENT)
Dept: OTOLARYNGOLOGY | Facility: CLINIC | Age: 48
End: 2018-09-11

## 2018-09-11 VITALS
BODY MASS INDEX: 19.48 KG/M2 | SYSTOLIC BLOOD PRESSURE: 100 MMHG | HEIGHT: 68 IN | WEIGHT: 128.5 LBS | HEART RATE: 75 BPM | DIASTOLIC BLOOD PRESSURE: 58 MMHG

## 2018-09-11 DIAGNOSIS — L93.2 DRUG-INDUCED LUPUS ERYTHEMATOSUS: ICD-10-CM

## 2018-09-11 DIAGNOSIS — M19.90 ARTHRITIS: Primary | ICD-10-CM

## 2018-09-11 DIAGNOSIS — T50.905A DRUG-INDUCED LUPUS ERYTHEMATOSUS: ICD-10-CM

## 2018-09-11 DIAGNOSIS — L40.9 PSORIASIS: ICD-10-CM

## 2018-09-11 DIAGNOSIS — N20.0 RECURRENT NEPHROLITHIASIS: ICD-10-CM

## 2018-09-11 DIAGNOSIS — Z51.81 MEDICATION MONITORING ENCOUNTER: ICD-10-CM

## 2018-09-11 PROCEDURE — 99214 OFFICE O/P EST MOD 30 MIN: CPT | Mod: S$GLB,,, | Performed by: INTERNAL MEDICINE

## 2018-09-11 PROCEDURE — 99999 PR PBB SHADOW E&M-EST. PATIENT-LVL III: CPT | Mod: PBBFAC,,, | Performed by: INTERNAL MEDICINE

## 2018-09-11 PROCEDURE — 3008F BODY MASS INDEX DOCD: CPT | Mod: CPTII,S$GLB,, | Performed by: INTERNAL MEDICINE

## 2018-09-11 ASSESSMENT — ROUTINE ASSESSMENT OF PATIENT INDEX DATA (RAPID3)
TOTAL RAPID3 SCORE: 3.05
FATIGUE SCORE: 8
AM STIFFNESS SCORE: 1, YES
PSYCHOLOGICAL DISTRESS SCORE: 3.3
WHEN YOU AWAKENED IN THE MORNING OVER THE LAST WEEK, PLEASE INDICATE THE AMOUNT OF TIME IT TAKES UNTIL YOU ARE AS LIMBER AS YOU WILL BE FOR THE DAY: 3 MINS
MDHAQ FUNCTION SCORE: .8
PATIENT GLOBAL ASSESSMENT SCORE: 3.5
PAIN SCORE: 3

## 2018-09-11 ASSESSMENT — SYSTEMIC LUPUS ERYTHEMATOSUS DISEASE ACTIVITY INDEX (SLEDAI): TOTAL_SCORE: 0

## 2018-09-11 NOTE — PROGRESS NOTES
Subjective:       Patient ID: Rebel Rodriguez is a 48 y.o. male.    Chief Complaint: No chief complaint on file.    HPI  48YM with psoriatic arthritis and infliximab induced lupus presenting for follow up. Drug induced Lupus: Related to infliximab (last dose 2/29/16; + aphosholipids-anticardiolipin IgM mild +dsDNA: 1:1280,  DEE+ 1:1280 homogenous ,  + anti histone ab, CH50 low at 51 along with recurrent intermittent fevers, chills, arthralgias. Pt is currently on plaquenil 300 mg daily, Leflunomide 20 mg daily and Secukinumab 300mg monthly ( last dose 08/27/18.     Since last visit, pt has weaned off prednisone ~ June ending and is no longer on Prednisone. 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 Pt has not yet started the 24hr urine test as part of workup for his recurrent nephrolithiasis.     Pt is scheduled for nasal septoplasty 09/14/18 for deviated nasal septum and nasal turbinate hypertrophy.    Pt denies weight changes, fatigue, oral/nasal/genital ulcers, headaches, fever, chills, n/v, abd pain, CP, SOB, dysphagia, changes in bowel/bladder habits, vision changes,photosensitivity, joint swelling or erythema.    Review of Systems   Constitutional: Negative for fever and unexpected weight change.   HENT: Negative for mouth sores and trouble swallowing.    Eyes: Negative for redness.   Respiratory: Negative for cough and shortness of breath.    Cardiovascular: Negative for chest pain, palpitations and leg swelling.   Gastrointestinal: Negative for abdominal pain, constipation and diarrhea.   Genitourinary: Negative for difficulty urinating, flank pain, genital sores and urgency.   Musculoskeletal: Negative for arthralgias and joint swelling.   Skin: Positive for rash.   Neurological: Positive for headaches.   Hematological: Does not bruise/bleed easily.   Psychiatric/Behavioral: Negative for dysphoric mood and sleep disturbance.         Objective:   BP (!) 100/58   Pulse 75   Ht  "5' 8.4" (1.737 m)   Wt 58.3 kg (128 lb 8 oz)   BMI 19.31 kg/m²      Physical Exam   Constitutional: He is oriented to person, place, and time and well-developed, well-nourished, and in no distress.   HENT:   Head: Normocephalic and atraumatic.   Eyes: EOM are normal. Pupils are equal, round, and reactive to light.   Neck: Normal range of motion. Neck supple.   Cardiovascular: Normal rate and regular rhythm.    Pulmonary/Chest: Effort normal and breath sounds normal.   Abdominal: Soft. Bowel sounds are normal.       Right Side Rheumatological Exam     Examination finds the elbow, wrist, knee, 1st PIP, 1st MCP, 2nd PIP, 2nd MCP, 3rd PIP, 3rd MCP, 4th PIP, 4th MCP, 5th PIP, 5th MCP, 1st MTP, 2nd MTP, 3rd MTP, 4th MTP, 5th MTP, 1st toe IP, 2nd toe IP, 3rd toe IP, 4th toe IP and 5th toe IP normal.    The patient is tender to palpation of the shoulder    Shoulder Exam   Tenderness Location: biceps tendon    Range of Motion   The patient has normal right shoulder ROM.    Crepitus: negative  Swelling: negative    Left Side Rheumatological Exam     Examination finds the shoulder, elbow, wrist, knee, 1st PIP, 1st MCP, 2nd PIP, 2nd MCP, 3rd PIP, 3rd MCP, 4th PIP, 4th MCP, 5th PIP, 5th MCP, 1st MTP, 2nd MTP, 3rd MTP, 4th MTP, 5th MTP, 1st toe IP, 2nd toe IP, 3rd toe IP, 4th toe IP and 5th toe IP normal.    Shoulder Exam   Tenderness Location: no tenderness    Range of Motion   The patient has normal left shoulder ROM.      Lymphadenopathy:     He has no cervical adenopathy.   Neurological: He is alert and oriented to person, place, and time.   Skin: Skin is warm and dry. Rash noted.     RLE with small patch of psoarisis  Hyperpigmented area on the lateral aspect of the lower leg  Hypopigmented areas on neck, chest and upper back     Psychiatric: Affect normal.   Musculoskeletal: Normal range of motion. He exhibits tenderness and deformity. He exhibits no edema.   Reducible deformity on L 4th DIP ( medial subluxation)         " Results for JON RODRIGUEZ (MRN [id number]) as of 9/11/2018 09:32   Ref. Range 9/6/2018 14:16   WBC Latest Ref Range: 3.90 - 12.70 K/uL 5.38   RBC Latest Ref Range: 4.60 - 6.20 M/uL 5.30   Hemoglobin Latest Ref Range: 14.0 - 18.0 g/dL 15.1   Hematocrit Latest Ref Range: 40.0 - 54.0 % 47.1   MCV Latest Ref Range: 82 - 98 fL 89   MCH Latest Ref Range: 27.0 - 31.0 pg 28.5   MCHC Latest Ref Range: 32.0 - 36.0 g/dL 32.1   RDW Latest Ref Range: 11.5 - 14.5 % 13.1   Platelets Latest Ref Range: 150 - 350 K/uL 232   MPV Latest Ref Range: 9.2 - 12.9 fL 9.3   Gran% Latest Ref Range: 38.0 - 73.0 % 51.5   Gran # (ANC) Latest Ref Range: 1.8 - 7.7 K/uL 2.8   Immature Granulocytes Latest Ref Range: 0.0 - 0.5 % 0.2   Immature Grans (Abs) Latest Ref Range: 0.00 - 0.04 K/uL 0.01   Lymph% Latest Ref Range: 18.0 - 48.0 % 30.9   Lymph # Latest Ref Range: 1.0 - 4.8 K/uL 1.7   Mono% Latest Ref Range: 4.0 - 15.0 % 12.6   Mono # Latest Ref Range: 0.3 - 1.0 K/uL 0.7   Eosinophil% Latest Ref Range: 0.0 - 8.0 % 3.9   Eos # Latest Ref Range: 0.0 - 0.5 K/uL 0.2   Basophil% Latest Ref Range: 0.0 - 1.9 % 0.9   Baso # Latest Ref Range: 0.00 - 0.20 K/uL 0.05   nRBC Latest Ref Range: 0 /100 WBC 0   Results for JON RODRIGUEZ (MRN [id number]) as of 9/11/2018 09:32   Ref. Range 9/6/2018 14:16   Sodium Latest Ref Range: 136 - 145 mmol/L 141   Potassium Latest Ref Range: 3.5 - 5.1 mmol/L 4.2   Chloride Latest Ref Range: 95 - 110 mmol/L 105   CO2 Latest Ref Range: 23 - 29 mmol/L 29   Anion Gap Latest Ref Range: 8 - 16 mmol/L 7 (L)   BUN, Bld Latest Ref Range: 6 - 20 mg/dL 11   Creatinine Latest Ref Range: 0.5 - 1.4 mg/dL 1.1   eGFR if non African American Latest Ref Range: >60 mL/min/1.73 m^2 >60.0   eGFR if African American Latest Ref Range: >60 mL/min/1.73 m^2 >60.0   Glucose Latest Ref Range: 70 - 110 mg/dL 92   Calcium Latest Ref Range: 8.7 - 10.5 mg/dL 10.3   Alkaline Phosphatase Latest Ref Range: 55 - 135 U/L 67   Total Protein Latest Ref Range: 6.0 - 8.4  g/dL 7.6   Albumin Latest Ref Range: 3.5 - 5.2 g/dL 4.3   Total Bilirubin Latest Ref Range: 0.1 - 1.0 mg/dL 0.6   AST Latest Ref Range: 10 - 40 U/L 25   ALT Latest Ref Range: 10 - 44 U/L 18     Results for JON RODRIGUEZ (MRN [id number]) as of 9/11/2018 09:32   Ref. Range 7/16/2018 11:46 9/6/2018 14:16   Sed Rate Latest Ref Range: 0 - 23 mm/Hr 5 6     Results for JON RODRIGUEZ (MRN [id number]) as of 9/11/2018 09:32   Ref. Range 7/16/2018 11:46 9/6/2018 14:16   CRP Latest Ref Range: 0.0 - 8.2 mg/L 0.3 0.3   Results for JON RODRIGUEZ (MRN [id number]) as of 9/11/2018 09:32   Ref. Range 9/6/2018 14:16   ds DNA Ab Latest Ref Range: Negative 1:10  Negative 1:10   Complement (C-3) Latest Ref Range: 50 - 180 mg/dL 92   Complement (C-4) Latest Ref Range: 11 - 44 mg/dL 25     The 10-year ASCVD risk score (Aimeechuck SCHULTZ Jr., et al., 2013) is: 0.8%    Values used to calculate the score:      Age: 48 years      Sex: Male      Is Non- : No      Diabetic: No      Tobacco smoker: No      Systolic Blood Pressure: 100 mmHg      Is BP treated: No      HDL Cholesterol: 71 mg/dL      Total Cholesterol: 152 mg/dL      Xray Arthritis survey 03/2017  Findings:  Cervical: Flexion radiograph shows no widening of the atlantoaxial distance.  There is progression of the loss of disc height and anterior spondylosis at C4-C5.  Osteophytic also identified at C5-C6 and C6-C7.    Knees: Standing AP radiographs of the knees demonstrate normal osseous structures, soft tissues and joint spaces.    Hands: PA radiographs of the hands demonstrate normal mineralization.  There is persistent, near-complete loss of joint space with medial subluxation of the left fourth distal phalanx and distal interphalangeal joint.  There is no remodeling of the distal phalanges.  No soft tissue swelling.  No erosions.    Feet: AP radiograph of the demonstrate normal osseous structures, soft tissues and joint spaces.  Specifically normal mineralization with no  erosions.  No soft tissue swelling.    Assessment:       1.  psoriatric Arthritis    2. Drug-induced lupus erythematosus    3. Medication monitoring encounter    4. Recurrent nephrolithiasis    5. Psoriasis            Plan:         Diagnoses and all orders for this visit:     psoriatric Arthritis  DAPSA TJC 0 SJC 0  ~4.3 c/w low disease activity.  Continue Cosentyx 300mg monthly  Continue Leflunomide 20mg daily  -     C3 complement; Standing  -     C4 complement; Standing  -     Sedimentation rate; Standing  -     C-reactive protein; Standing  -     CBC auto differential; Standing  -     Comprehensive metabolic panel; Standing  -     Anti-DNA antibody, double-stranded; Standing  -     Protein / creatinine ratio, urine; Standing  -     Urinalysis; Standing  -     Ambulatory Referral to Infectious Disease    Drug-induced lupus erythematosus  Related to infliximab (last dose 2/29/16; + aphosholipids-anticardiolipin IgM mild +dsDNA: 1:1280,  DEE+ 1:1280 homogenous ,  + anti histone ab, CH50 low at 51 along with recurrent intermittent fevers, chills, arthralgias.  SLEDAI 0  Continue Plaquenil 300mg daily   -     C3 complement; Standing  -     C4 complement; Standing  -     Sedimentation rate; Standing  -     C-reactive protein; Standing  -     CBC auto differential; Standing  -     Comprehensive metabolic panel; Standing  -     Anti-DNA antibody, double-stranded; Standing  -     Protein / creatinine ratio, urine; Standing  -     Urinalysis; Standing  -     Ambulatory Referral to Infectious Disease    Medication monitoring encounter  No evidence of toxicity  UTD Plaquenil eye exam 08/03/18    Recurrent nephrolithiasis  Previous Uro risk showed a hypocitraturic nephrolithiasis in 2013.   Repeat 24hr urine Uro risk. If low citrate, may benefit from polycitra k .    Psoriasis  Stable. < 10% BSA    Other orders  -     (In Office Administered) Tdap Vaccine    Immunizations  Needs TDAP and flu, [employer]

## 2018-09-13 ENCOUNTER — TELEPHONE (OUTPATIENT)
Dept: OTOLARYNGOLOGY | Facility: CLINIC | Age: 48
End: 2018-09-13

## 2018-09-13 ENCOUNTER — ANESTHESIA EVENT (OUTPATIENT)
Dept: SURGERY | Facility: HOSPITAL | Age: 48
End: 2018-09-13
Payer: COMMERCIAL

## 2018-09-13 NOTE — ANESTHESIA PREPROCEDURE EVALUATION
[facility]  Anesthesia Pre-Operative Evaluation         Patient Name: Rebel Rodriguez  YOB: 1970  MRN: [id number]    SUBJECTIVE:     Pre-operative evaluation for Procedure(s) (LRB):  SEPTOPLASTY, NASAL (N/A)  REDUCTION, NASAL TURBINATE (Bilateral)  FESS, USING COMPUTER-ASSISTED NAVIGATION (Bilateral)     09/13/2018    Rebel Rodriguez is a 48 y.o. male w/ a significant PMHx of psoriatic arthritis and infliximab induced lupus , HLD, hx of TIA.    Patient now presents for the above procedure(s).      Prev airway:   10/9./2013  DL yoan #2 grade I view  Mask vent easy  1 attempt      Patient Active Problem List   Diagnosis     psoriatric Arthritis    Psoriasis    Hx-TIA (transient ischemic attack)    Hyperlipidemia    Low back pain    Achilles tendon rupture    Recurrent nephrolithiasis    Pain of left scapula    Cervical spondylosis    Recurrent fever    Unexplained night sweats    Inguinal lymphadenopathy    Disorder of ileum    Sinusitis    Drug-induced lupus erythematosus    High risk medication use    Psoriatic arthritis    Medication monitoring encounter    Neck pain    Hyperreflexia of lower extremity       Review of patient's allergies indicates:   Allergen Reactions    Erythromycin Nausea And Vomiting    Remicade [infliximab]      Lupus with fever and acute arthritis       Current Inpatient Medications:      No current facility-administered medications on file prior to encounter.      Current Outpatient Medications on File Prior to Encounter   Medication Sig Dispense Refill    aspirin (ECOTRIN) 81 MG EC tablet Take 81 mg by mouth once daily.        atorvastatin (LIPITOR) 10 MG tablet TAKE 1 TABLET BY MOUTH DAILY 90 tablet 3    hydroxychloroquine (PLAQUENIL) 200 mg tablet TAKE 1 AND 1/2 TABLETS(300 MG) BY MOUTH EVERY  tablet 3    leflunomide (ARAVA) 20 MG Tab TAKE 1 TABLET BY MOUTH EVERY DAY 90 tablet 0    loratadine-pseudoephedrine  mg  (CLARITIN-D 24-HOUR)  mg per 24 hr tablet Take 1 tablet by mouth once daily.      secukinumab (COSENTYX, 2 SYRINGES,) 150 mg/mL Syrg Inject 300 mg (2 syringes) into the skin every 30 days. 2 mL 1       Past Surgical History:   Procedure Laterality Date    ACHILLES TENDON SURGERY      COLONOSCOPY N/A 9/8/2015    Performed by Sergio Tao MD at [facility] ENDO (4TH FLR)    LITHOTRIPSY, ESWL Left 4/5/2013    Performed by Kumar Duval Jr., MD at [facility] OR 1ST FLR    REPAIR, TENDON, ACHILLES Right 10/9/2013    Performed by Esau Godfrey MD at [facility] OR 1ST FLR    UPPER ENDOSCOPY W/ ESOPHAGEAL MANOMETRY      URETERAL STENT PLACEMENT         Social History     Socioeconomic History    Marital status: Single     Spouse name: Not on file    Number of children: Not on file    Years of education: Not on file    Highest education level: Not on file   Social Needs    Financial resource strain: Not on file    Food insecurity - worry: Not on file    Food insecurity - inability: Not on file    Transportation needs - medical: Not on file    Transportation needs - non-medical: Not on file   Occupational History    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     Employer: self employed   Tobacco Use    Smoking status: Never Smoker    Smokeless tobacco: Never Used   Substance and Sexual Activity    Alcohol use: No     Alcohol/week: 0.0 oz     Types: 1 - 2 Standard drinks or equivalent per week     Comment: cocktails    Drug use: No    Sexual activity: Not on file   Other Topics Concern    Not on file   Social History Narrative    Not on file       OBJECTIVE:     Vital Signs Range (Last 24H):         Significant Labs:  Lab Results   Component Value Date    WBC 5.38 09/06/2018    HGB 15.1 09/06/2018    HCT 47.1 09/06/2018     09/06/2018    CHOL 152 03/08/2018    TRIG 66 03/08/2018    HDL 71 03/08/2018    ALT 18 09/06/2018    AST 25 09/06/2018     09/06/2018    K 4.2 09/06/2018     09/06/2018    CREATININE  1.1 09/06/2018    BUN 11 09/06/2018    CO2 29 09/06/2018    TSH 1.82 09/30/2009    GLUF 95 04/08/2015    HGBA1C 5.6 04/08/2015       Diagnostic Studies: No relevant studies.    EKG: No recent studies available.    2D ECHO: 8/5/2015  CONCLUSIONS     1 - Normal left ventricular systolic function (EF 60-65%).     2 - Normal left ventricular diastolic function.     3 - Normal right ventricular systolic function .     4 - Doppler if clinically indicated.     ASSESSMENT/PLAN:         Anesthesia Evaluation    I have reviewed the Patient Summary Reports.    I have reviewed the Nursing Notes.   I have reviewed the Medications.     Review of Systems  Anesthesia Hx:  No problems with previous Anesthesia Denies Hx of Anesthetic complications  History of prior surgery of interest to airway management or planning: (achilles tendon repair)  Denies Personal Hx of Anesthesia complications.   Social:  Non-Smoker    Hematology/Oncology:  Hematology Normal   Oncology Normal     EENT/Dental:EENT/Dental Normal   Cardiovascular:   Denies Hypertension.  Denies MI.    Denies Angina.     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  Denies Asthma.  Denies Shortness of breath.    Renal/:   renal calculi    Hepatic/GI:  Hepatic/GI Normal    Musculoskeletal:   Arthritis     Neurological:   TIA, (2011 dysphonia lasted 30 seconds, no other symptoms or recurrence)    Endocrine:  Endocrine Normal        Physical Exam  General:  Well nourished    Airway/Jaw/Neck:  Airway Findings: Mouth Opening: Normal Tongue: Normal  General Airway Assessment: Adult  Mallampati: I  Improves to I with phonation.  TM Distance: Normal, at least 6 cm      Dental:  Dental Findings: In tact   Chest/Lungs:  Chest/Lungs Findings: Clear to auscultation, Normal Respiratory Rate     Heart/Vascular:  Heart Findings: Rate: Normal  Rhythm: Regular Rhythm     Abdomen:  Abdomen Findings:  Normal, Soft, Nontender      Musculoskeletal:  Musculoskeletal Findings: Normal    Mental Status:  Mental Status Findings:  Cooperative, Alert and Oriented         Anesthesia Plan  Type of Anesthesia, risks & benefits discussed:  Anesthesia Type:  general  Patient's Preference:   Intra-op Monitoring Plan: standard ASA monitors  Intra-op Monitoring Plan Comments:   Post Op Pain Control Plan: multimodal analgesia, IV/PO Opioids PRN and per primary service following discharge from PACU  Post Op Pain Control Plan Comments:   Induction:   IV  Beta Blocker:  Patient is not currently on a Beta-Blocker (No further documentation required).       Informed Consent: Patient understands risks and agrees with Anesthesia plan.  Questions answered. Anesthesia consent signed with patient.  ASA Score: 1     Day of Surgery Review of History & Physical: I have interviewed and examined the patient. I have reviewed the patient's H&P dated:            Ready For Surgery From Anesthesia Perspective.

## 2018-09-14 ENCOUNTER — HOSPITAL ENCOUNTER (OUTPATIENT)
Facility: HOSPITAL | Age: 48
Discharge: HOME | End: 2018-09-14
Attending: OTOLARYNGOLOGY | Admitting: OTOLARYNGOLOGY
Payer: COMMERCIAL

## 2018-09-14 ENCOUNTER — ANESTHESIA (OUTPATIENT)
Dept: SURGERY | Facility: HOSPITAL | Age: 48
End: 2018-09-14
Payer: COMMERCIAL

## 2018-09-14 VITALS
DIASTOLIC BLOOD PRESSURE: 62 MMHG | HEIGHT: 68 IN | WEIGHT: 130 LBS | HEART RATE: 72 BPM | OXYGEN SATURATION: 100 % | RESPIRATION RATE: 18 BRPM | TEMPERATURE: 98 F | SYSTOLIC BLOOD PRESSURE: 111 MMHG | BODY MASS INDEX: 19.7 KG/M2

## 2018-09-14 DIAGNOSIS — J34.2 NASAL SEPTAL DEVIATION: Primary | ICD-10-CM

## 2018-09-14 PROCEDURE — S0028 INJECTION, FAMOTIDINE, 20 MG: HCPCS | Performed by: ANESTHESIOLOGY

## 2018-09-14 PROCEDURE — 36000710: Performed by: OTOLARYNGOLOGY

## 2018-09-14 PROCEDURE — 63600175 PHARM REV CODE 636 W HCPCS

## 2018-09-14 PROCEDURE — 63600175 PHARM REV CODE 636 W HCPCS: Performed by: OTOLARYNGOLOGY

## 2018-09-14 PROCEDURE — 25000003 PHARM REV CODE 250: Performed by: ANESTHESIOLOGY

## 2018-09-14 PROCEDURE — 25000003 PHARM REV CODE 250: Performed by: STUDENT IN AN ORGANIZED HEALTH CARE EDUCATION/TRAINING PROGRAM

## 2018-09-14 PROCEDURE — 63600175 PHARM REV CODE 636 W HCPCS: Performed by: ANESTHESIOLOGY

## 2018-09-14 PROCEDURE — 36000711: Performed by: OTOLARYNGOLOGY

## 2018-09-14 PROCEDURE — 63600175 PHARM REV CODE 636 W HCPCS: Performed by: STUDENT IN AN ORGANIZED HEALTH CARE EDUCATION/TRAINING PROGRAM

## 2018-09-14 PROCEDURE — 71000039 HC RECOVERY, EACH ADD'L HOUR: Performed by: OTOLARYNGOLOGY

## 2018-09-14 PROCEDURE — D9220A PRA ANESTHESIA: Mod: ,,, | Performed by: ANESTHESIOLOGY

## 2018-09-14 PROCEDURE — 71000015 HC POSTOP RECOV 1ST HR: Performed by: OTOLARYNGOLOGY

## 2018-09-14 PROCEDURE — 25000003 PHARM REV CODE 250: Performed by: OTOLARYNGOLOGY

## 2018-09-14 PROCEDURE — 61782 SCAN PROC CRANIAL EXTRA: CPT | Mod: ,,, | Performed by: OTOLARYNGOLOGY

## 2018-09-14 PROCEDURE — 37000008 HC ANESTHESIA 1ST 15 MINUTES: Performed by: OTOLARYNGOLOGY

## 2018-09-14 PROCEDURE — 37000009 HC ANESTHESIA EA ADD 15 MINS: Performed by: OTOLARYNGOLOGY

## 2018-09-14 PROCEDURE — 27201423 OPTIME MED/SURG SUP & DEVICES STERILE SUPPLY: Performed by: OTOLARYNGOLOGY

## 2018-09-14 PROCEDURE — 25000003 PHARM REV CODE 250

## 2018-09-14 PROCEDURE — 30140 RESECT INFERIOR TURBINATE: CPT | Mod: 50,51,, | Performed by: OTOLARYNGOLOGY

## 2018-09-14 PROCEDURE — 30520 REPAIR OF NASAL SEPTUM: CPT | Mod: ,,, | Performed by: OTOLARYNGOLOGY

## 2018-09-14 PROCEDURE — 71000033 HC RECOVERY, INTIAL HOUR: Performed by: OTOLARYNGOLOGY

## 2018-09-14 PROCEDURE — 31256 EXPLORATION MAXILLARY SINUS: CPT | Mod: 50,51,, | Performed by: OTOLARYNGOLOGY

## 2018-09-14 RX ORDER — EPINEPHRINE 1 MG/ML
INJECTION INTRAMUSCULAR; INTRAVENOUS; SUBCUTANEOUS
Status: DISCONTINUED | OUTPATIENT
Start: 2018-09-14 | End: 2018-09-14 | Stop reason: HOSPADM

## 2018-09-14 RX ORDER — ACETAMINOPHEN 325 MG/1
650 TABLET ORAL ONCE
Status: COMPLETED | OUTPATIENT
Start: 2018-09-14 | End: 2018-09-14

## 2018-09-14 RX ORDER — PHENYLEPHRINE HYDROCHLORIDE 10 MG/ML
INJECTION INTRAVENOUS
Status: DISCONTINUED | OUTPATIENT
Start: 2018-09-14 | End: 2018-09-14

## 2018-09-14 RX ORDER — MIDAZOLAM HYDROCHLORIDE 1 MG/ML
INJECTION, SOLUTION INTRAMUSCULAR; INTRAVENOUS
Status: DISCONTINUED | OUTPATIENT
Start: 2018-09-14 | End: 2018-09-14

## 2018-09-14 RX ORDER — KETAMINE HCL IN 0.9 % NACL 50 MG/5 ML
SYRINGE (ML) INTRAVENOUS
Status: DISCONTINUED | OUTPATIENT
Start: 2018-09-14 | End: 2018-09-14

## 2018-09-14 RX ORDER — DEXTROMETHORPHAN HYDROBROMIDE, GUAIFENESIN 5; 100 MG/5ML; MG/5ML
650 LIQUID ORAL EVERY 8 HOURS
Qty: 30 TABLET | Refills: 0 | Status: SHIPPED | OUTPATIENT
Start: 2018-09-14 | End: 2018-09-24

## 2018-09-14 RX ORDER — LIDOCAINE HYDROCHLORIDE AND EPINEPHRINE 10; 10 MG/ML; UG/ML
INJECTION, SOLUTION INFILTRATION; PERINEURAL
Status: DISCONTINUED | OUTPATIENT
Start: 2018-09-14 | End: 2018-09-14 | Stop reason: HOSPADM

## 2018-09-14 RX ORDER — LIDOCAINE HYDROCHLORIDE 10 MG/ML
1 INJECTION, SOLUTION EPIDURAL; INFILTRATION; INTRACAUDAL; PERINEURAL ONCE
Status: COMPLETED | OUTPATIENT
Start: 2018-09-14 | End: 2018-09-14

## 2018-09-14 RX ORDER — LIDOCAINE HCL/PF 100 MG/5ML
SYRINGE (ML) INTRAVENOUS
Status: DISCONTINUED | OUTPATIENT
Start: 2018-09-14 | End: 2018-09-14

## 2018-09-14 RX ORDER — SODIUM CHLORIDE 9 MG/ML
INJECTION, SOLUTION INTRAVENOUS CONTINUOUS
Status: DISCONTINUED | OUTPATIENT
Start: 2018-09-14 | End: 2018-09-14 | Stop reason: HOSPADM

## 2018-09-14 RX ORDER — NEOSTIGMINE METHYLSULFATE 1 MG/ML
INJECTION, SOLUTION INTRAVENOUS
Status: DISCONTINUED | OUTPATIENT
Start: 2018-09-14 | End: 2018-09-14

## 2018-09-14 RX ORDER — KETOROLAC TROMETHAMINE 30 MG/ML
30 INJECTION, SOLUTION INTRAMUSCULAR; INTRAVENOUS ONCE
Status: COMPLETED | OUTPATIENT
Start: 2018-09-14 | End: 2018-09-14

## 2018-09-14 RX ORDER — METOCLOPRAMIDE HYDROCHLORIDE 5 MG/ML
10 INJECTION INTRAMUSCULAR; INTRAVENOUS ONCE
Status: COMPLETED | OUTPATIENT
Start: 2018-09-14 | End: 2018-09-14

## 2018-09-14 RX ORDER — FENTANYL CITRATE 50 UG/ML
25 INJECTION, SOLUTION INTRAMUSCULAR; INTRAVENOUS EVERY 5 MIN PRN
Status: DISCONTINUED | OUTPATIENT
Start: 2018-09-14 | End: 2018-09-14 | Stop reason: HOSPADM

## 2018-09-14 RX ORDER — SODIUM CHLORIDE 0.9 % (FLUSH) 0.9 %
3 SYRINGE (ML) INJECTION
Status: DISCONTINUED | OUTPATIENT
Start: 2018-09-14 | End: 2018-09-14 | Stop reason: HOSPADM

## 2018-09-14 RX ORDER — ONDANSETRON 4 MG/1
8 TABLET, ORALLY DISINTEGRATING ORAL EVERY 12 HOURS PRN
Qty: 6 TABLET | Refills: 0 | Status: SHIPPED | OUTPATIENT
Start: 2018-09-14 | End: 2018-09-24

## 2018-09-14 RX ORDER — GLYCOPYRROLATE 0.2 MG/ML
INJECTION INTRAMUSCULAR; INTRAVENOUS
Status: DISCONTINUED | OUTPATIENT
Start: 2018-09-14 | End: 2018-09-14

## 2018-09-14 RX ORDER — ONDANSETRON 2 MG/ML
4 INJECTION INTRAMUSCULAR; INTRAVENOUS DAILY PRN
Status: DISCONTINUED | OUTPATIENT
Start: 2018-09-14 | End: 2018-09-14 | Stop reason: HOSPADM

## 2018-09-14 RX ORDER — PROPOFOL 10 MG/ML
VIAL (ML) INTRAVENOUS CONTINUOUS PRN
Status: DISCONTINUED | OUTPATIENT
Start: 2018-09-14 | End: 2018-09-14

## 2018-09-14 RX ORDER — FAMOTIDINE 10 MG/ML
20 INJECTION INTRAVENOUS 2 TIMES DAILY
Status: DISCONTINUED | OUTPATIENT
Start: 2018-09-14 | End: 2018-09-14 | Stop reason: HOSPADM

## 2018-09-14 RX ORDER — FENTANYL CITRATE 50 UG/ML
INJECTION, SOLUTION INTRAMUSCULAR; INTRAVENOUS
Status: DISCONTINUED | OUTPATIENT
Start: 2018-09-14 | End: 2018-09-14

## 2018-09-14 RX ORDER — PROPOFOL 10 MG/ML
VIAL (ML) INTRAVENOUS
Status: DISCONTINUED | OUTPATIENT
Start: 2018-09-14 | End: 2018-09-14

## 2018-09-14 RX ORDER — ROCURONIUM BROMIDE 10 MG/ML
INJECTION, SOLUTION INTRAVENOUS
Status: DISCONTINUED | OUTPATIENT
Start: 2018-09-14 | End: 2018-09-14

## 2018-09-14 RX ORDER — DEXAMETHASONE SODIUM PHOSPHATE 4 MG/ML
INJECTION, SOLUTION INTRA-ARTICULAR; INTRALESIONAL; INTRAMUSCULAR; INTRAVENOUS; SOFT TISSUE
Status: DISCONTINUED | OUTPATIENT
Start: 2018-09-14 | End: 2018-09-14

## 2018-09-14 RX ORDER — CEFAZOLIN SODIUM 1 G/3ML
2 INJECTION, POWDER, FOR SOLUTION INTRAMUSCULAR; INTRAVENOUS ONCE
Status: COMPLETED | OUTPATIENT
Start: 2018-09-14 | End: 2018-09-14

## 2018-09-14 RX ORDER — ACETAMINOPHEN 10 MG/ML
INJECTION, SOLUTION INTRAVENOUS
Status: DISCONTINUED | OUTPATIENT
Start: 2018-09-14 | End: 2018-09-14

## 2018-09-14 RX ADMIN — ACETAMINOPHEN 1000 MG: 10 INJECTION, SOLUTION INTRAVENOUS at 08:09

## 2018-09-14 RX ADMIN — MIDAZOLAM HYDROCHLORIDE 2 MG: 1 INJECTION, SOLUTION INTRAMUSCULAR; INTRAVENOUS at 07:09

## 2018-09-14 RX ADMIN — LIDOCAINE HYDROCHLORIDE 100 MG: 20 INJECTION, SOLUTION INTRAVENOUS at 07:09

## 2018-09-14 RX ADMIN — FENTANYL CITRATE 100 MCG: 50 INJECTION, SOLUTION INTRAMUSCULAR; INTRAVENOUS at 07:09

## 2018-09-14 RX ADMIN — NEOSTIGMINE METHYLSULFATE 4 MG: 1 INJECTION INTRAVENOUS at 10:09

## 2018-09-14 RX ADMIN — SODIUM CHLORIDE: 0.9 INJECTION, SOLUTION INTRAVENOUS at 07:09

## 2018-09-14 RX ADMIN — ROCURONIUM BROMIDE 40 MG: 10 INJECTION, SOLUTION INTRAVENOUS at 07:09

## 2018-09-14 RX ADMIN — PHENYLEPHRINE HYDROCHLORIDE 100 MCG: 10 INJECTION INTRAVENOUS at 08:09

## 2018-09-14 RX ADMIN — DEXAMETHASONE SODIUM PHOSPHATE 8 MG: 4 INJECTION, SOLUTION INTRAMUSCULAR; INTRAVENOUS at 07:09

## 2018-09-14 RX ADMIN — PROPOFOL 150 MG: 10 INJECTION, EMULSION INTRAVENOUS at 07:09

## 2018-09-14 RX ADMIN — KETOROLAC TROMETHAMINE 30 MG: 30 INJECTION, SOLUTION INTRAMUSCULAR at 10:09

## 2018-09-14 RX ADMIN — CEFAZOLIN 2 G: 330 INJECTION, POWDER, FOR SOLUTION INTRAMUSCULAR; INTRAVENOUS at 07:09

## 2018-09-14 RX ADMIN — SODIUM CHLORIDE, SODIUM GLUCONATE, SODIUM ACETATE, POTASSIUM CHLORIDE, MAGNESIUM CHLORIDE, SODIUM PHOSPHATE, DIBASIC, AND POTASSIUM PHOSPHATE: .53; .5; .37; .037; .03; .012; .00082 INJECTION, SOLUTION INTRAVENOUS at 08:09

## 2018-09-14 RX ADMIN — GLYCOPYRROLATE 0.4 MG: 0.2 INJECTION, SOLUTION INTRAMUSCULAR; INTRAVENOUS at 10:09

## 2018-09-14 RX ADMIN — PROPOFOL 150 MCG/KG/MIN: 10 INJECTION, EMULSION INTRAVENOUS at 07:09

## 2018-09-14 RX ADMIN — ONDANSETRON 4 MG: 2 INJECTION INTRAMUSCULAR; INTRAVENOUS at 10:09

## 2018-09-14 RX ADMIN — FAMOTIDINE 20 MG: 10 INJECTION, SOLUTION INTRAVENOUS at 07:09

## 2018-09-14 RX ADMIN — REMIFENTANIL HYDROCHLORIDE 0.05 MCG/KG/MIN: 1 INJECTION, POWDER, LYOPHILIZED, FOR SOLUTION INTRAVENOUS at 07:09

## 2018-09-14 RX ADMIN — PHENYLEPHRINE HYDROCHLORIDE 100 MCG: 10 INJECTION INTRAVENOUS at 07:09

## 2018-09-14 RX ADMIN — ACETAMINOPHEN 650 MG: 325 TABLET, FILM COATED ORAL at 10:09

## 2018-09-14 RX ADMIN — Medication 20 MG: at 08:09

## 2018-09-14 RX ADMIN — Medication 10 MG: at 09:09

## 2018-09-14 RX ADMIN — LIDOCAINE HYDROCHLORIDE 10 MG: 10 INJECTION, SOLUTION EPIDURAL; INFILTRATION; INTRACAUDAL at 07:09

## 2018-09-14 RX ADMIN — METOCLOPRAMIDE 10 MG: 5 INJECTION, SOLUTION INTRAMUSCULAR; INTRAVENOUS at 07:09

## 2018-09-14 NOTE — ANESTHESIA RELEASE NOTE
"Anesthesia Release from PACU Note    Patient: Rebel Rodriguez    Procedure(s) Performed: Procedure(s) (LRB):  SEPTOPLASTY, NASAL (N/A)  REDUCTION, NASAL TURBINATE (Bilateral)  FESS, USING COMPUTER-ASSISTED NAVIGATION (Bilateral)    Anesthesia type: GEN    Post pain: Adequate analgesia reported    Post assessment: no apparent anesthetic complications, tolerated procedure well and no evidence of recall    Post vital signs: BP (!) 108/55   Pulse 89   Temp 36.3 °C (97.4 °F) (Axillary)   Resp 20   Ht 5' 8" (1.727 m)   Wt 59 kg (130 lb)   SpO2 98%   BMI 19.77 kg/m²     Level of consciousness: awake, alert and oriented    Nausea/Vomiting: no nausea/no vomiting    Complications: none    Airway Patency: patent    Respiratory: unassisted, spontaneous ventilation, room air    Cardiovascular: stable and blood pressure at baseline    Hydration: euvolemic    "

## 2018-09-14 NOTE — ANESTHESIA POSTPROCEDURE EVALUATION
"Anesthesia Post Evaluation    Patient: Rebel Rodriguez    Procedure(s) Performed: Procedure(s) (LRB):  SEPTOPLASTY, NASAL (N/A)  REDUCTION, NASAL TURBINATE (Bilateral)  FESS, USING COMPUTER-ASSISTED NAVIGATION (Bilateral)    Final Anesthesia Type: general  Patient location during evaluation: PACU  Patient participation: Yes- Able to Participate  Level of consciousness: awake and alert and oriented  Post-procedure vital signs: reviewed and stable  Pain management: adequate  Airway patency: patent  PONV status at discharge: No PONV  Anesthetic complications: no      Cardiovascular status: stable  Respiratory status: unassisted, spontaneous ventilation and room air  Hydration status: euvolemic  Follow-up not needed.        Visit Vitals  BP (!) 108/55   Pulse 89   Temp 36.3 °C (97.4 °F) (Axillary)   Resp 20   Ht 5' 8" (1.727 m)   Wt 59 kg (130 lb)   SpO2 98%   BMI 19.77 kg/m²       Pain/Tricia Score: Pain Assessment Performed: Yes (9/14/2018 11:45 AM)  Presence of Pain: complains of pain/discomfort (9/14/2018 11:45 AM)  Pain Rating Prior to Med Admin: 3 (9/14/2018 10:39 AM)  Tricai Score: 10 (9/14/2018 11:45 AM)        "

## 2018-09-14 NOTE — BRIEF OP NOTE
[facility]  Brief Operative Note     SUMMARY     Surgery Date: 9/14/2018     Surgeon(s) and Role:     * Gerard Manzo MD - Primary     * Killian Quijano MD - Resident - Assisting    Pre-op Diagnosis:  Deviated nasal septum [J34.2]  Nasal turbinate hypertrophy [J34.3]    Post-op Diagnosis:  Post-Op Diagnosis Codes:     * Deviated nasal septum [J34.2]     * Nasal turbinate hypertrophy [J34.3]    Procedure(s) (LRB):  SEPTOPLASTY, NASAL (N/A)  REDUCTION, NASAL TURBINATE (Bilateral)  FESS, USING COMPUTER-ASSISTED NAVIGATION (Bilateral)    Anesthesia: General    Description of the findings of the procedure: Septoplasty, SMRT, milind max antrostomy    Findings/Key Components: see op note    Estimated Blood Loss: * No values recorded between 9/14/2018  8:07 AM and 9/14/2018 10:20 AM *         Specimens:   Specimen (12h ago, onward)    None          Discharge Note    SUMMARY     Admit Date: 9/14/2018    Discharge Date and Time:  09/14/2018 10:24 AM    Hospital Course: Following completion of an electively scheduled procedure, the patient was transferred to the PACU for postoperative monitoring. The post operative course was uneventful and noted for adequate pain control and PO intake following surgery. The patient is discharged home in good condition and will follow-up with Dr. Gerard Manzo MD    Final Diagnosis: Post-Op Diagnosis Codes:     * Deviated nasal septum [J34.2]     * Nasal turbinate hypertrophy [J34.3]    Disposition: Home or Self Care    Follow Up/Patient Instructions:     Medications:  Reconciled Home Medications:      Medication List      START taking these medications    acetaminophen 650 MG Tbsr  Commonly known as:  TYLENOL  Take 1 tablet (650 mg total) by mouth every 8 (eight) hours.     ondansetron 4 MG Tbdl  Commonly known as:  ZOFRAN-ODT  Take 2 tablets (8 mg total) by mouth every 12 (twelve) hours as needed.        CONTINUE taking these medications    aspirin 81 MG EC  tablet  Commonly known as:  ECOTRIN  Take 81 mg by mouth once daily.     atorvastatin 10 MG tablet  Commonly known as:  LIPITOR  TAKE 1 TABLET BY MOUTH DAILY     COSENTYX (2 SYRINGES) 150 mg/mL Syrg  Generic drug:  secukinumab  Inject 300 mg (2 syringes) into the skin every 30 days.     hydroxychloroquine 200 mg tablet  Commonly known as:  PLAQUENIL  TAKE 1 AND 1/2 TABLETS(300 MG) BY MOUTH EVERY DAY     leflunomide 20 MG Tab  Commonly known as:  ARAVA  TAKE 1 TABLET BY MOUTH EVERY DAY     loratadine-pseudoephedrine  mg  mg per 24 hr tablet  Commonly known as:  CLARITIN-D 24-hour  Take 1 tablet by mouth once daily.          Discharge Procedure Orders   Diet Adult Regular     Notify your health care provider if you experience any of the following:  persistent nausea and vomiting or diarrhea     Notify your health care provider if you experience any of the following:  temperature >100.4     Notify your health care provider if you experience any of the following:  severe uncontrolled pain     Notify your health care provider if you experience any of the following:  redness, tenderness, or signs of infection (pain, swelling, redness, odor or green/yellow discharge around incision site)     Notify your health care provider if you experience any of the following:  difficulty breathing or increased cough     Notify your health care provider if you experience any of the following:  severe persistent headache     Notify your health care provider if you experience any of the following:  worsening rash     Notify your health care provider if you experience any of the following:  persistent dizziness, light-headedness, or visual disturbances     Notify your health care provider if you experience any of the following:  increased confusion or weakness     No dressing needed     Activity as tolerated     Follow-up Information     Gerard Manzo MD In 1 week.    Specialty:  Otolaryngology  Why:  For wound  re-check  Contact information:  [address]  [facility] [address]  [phone]

## 2018-09-14 NOTE — PROGRESS NOTES
Patient tolerating P.O. Fluids without difficulty. After visit summary provided. Discharge education and teaching provided. Patient and spouse verbalized understanding. All concerns addressed. IV removed. Awaiting transport.

## 2018-09-14 NOTE — DISCHARGE INSTRUCTIONS
INSTRUCTIONS TO FOLLOW AFTER SINUS AND NASAL SURGERY  DR. McCOUL - [facility]    Office hours:  Weekdays 8:00 am to 5:00 pm.  Please call [phone] and ask to speak with his nurse, Buffy.    After-hours & weekends:  Please call [phone] and ask to speak with the ENT resident doctor.    Your first office visit with Dr. Manzo after surgery should have been already scheduled.  If you dont know when it is, call Dr. Olson nurse Buffy at [phone].    Please call IMMMEDIATELY if you have:  - Temperature of 101° F or greater  - Any unusual, painful swelling  - Any active bleeding that saturates more than a 4x4 gauze  - Any thick drainage green or yellow drainage  - Changes in vision or swelling around the eye  - Pain not relieved by your prescribed pain medication    ACTIVITY:    Sleep on your back with the head of the bead elevated, up on 2-3 pillows, or in a recliner for the first 3 to 5 days. This will help with swelling.     After surgery you may have a lot of nasal drainage. This is normal. You may breathe through your nose if youre able but avoid inhaling forcibly. Let all drainage fall on your mustache dressing and change it as needed.    You may wake up after surgery with thick white stockings on. Wear them until you are walking around more. It is important to walk around often while at home to keep your blood circulating and prevent blood clots.    If you use CPAP or BiPAP to sleep at night, you should wait at least 48 hours before resuming use.  Dr. Manzo will advise you when it is safe to do this.    You may shower 24 hours after surgery.    RESTRICTED ACTIVITIES AFTER SURGERY:    Do NOT blow your nose for 2 weeks. If you have to sneeze or cough, do so with your mouth open.     AVOID all heavy lifting, straining or bending for 2 weeks.     AVOID any sexual activity for 2 weeks after surgery.    AVOID semi-contact sports or vigorous exercising for 3-4 weeks. Dr. Manzo will let you know  when you are cleared to resume exercise.    AVOID flying or swimming for 2 weeks.      Do NOT operate a motor vehicle or any type of heavy machinery within 24 hours of taking pain medication.    DO NOT smoke or be around smokers.    AVOID irritating substances that might make you sneeze, such as dust, chalk, harsh chemicals, and allergic triggers.  This might also include spicy foods.    DRESSINGS:    Change the gauze mustache dressing under your nose as needed. (If unsure what this dressing is or how to do this, ask your doctor or nurse before you leave the hospital.) You may have pinkish-red drainage for 2-3 days.    Usually there is no gauze packing placed inside the nose.  If packing is necessary, you will be informed by your surgeon.  Do not touch or pull at the packing. The packing will be removed by your doctor at your first visit after surgery.     You may also have a dissolvable stent or dissolvable sponge placed into the sinuses during surgery.  These usually do not need to be removed unless you are told otherwise by Dr. Manzo.  You may notice small fragments of these items come out of your nose in the weeks following surgery.    MEDICATIONS:    After surgery, you will be sent home with prescriptions for pain medication and an anti-nausea medication.  Antibiotics are usually not necessary.    Most people need pain medication for the first few days after surgery, although a narcotic is rarely necessary.  The best pain control comes from a combination of acetaminophen (Tylenol) and ibuprofen (Motrin).  You will be given prescriptions for these at the recommended dose.  These can be alternated so that you are taking something every 2 or 3 hours.    Some people have problems with bowel movements after surgery. If you have NOT had a bowel movement 3-5 days after surgery, go to your local pharmacy and purchase an over the counter stool softener such as COLACE. You can also ask the pharmacist for his or her  recommendation. If you still do not have a bowel movement after starting the softener, please call the office.    You will need these over-the-counter medications after surgery:    SALINE SINUS RINSE (Fady Med brand):  You will use this to rinse out your nose and sinuses after surgery.  Begin doing this the day after surgery, unless instructed otherwise by Dr. Manzo.  You should do this 2 times a day, following the instructions on the box.  AFRIN (regular strength): Only use if you have nasal congestion or bleeding. Use 2 times per day for 3 days, stop for 1 day, continue 2 times per day for 3 days, then stop completely.  NOTE:  You may not need to do this at all.    DIET:    Avoid hot and spicy foods for 1 week after surgery.    Begin with bland foods the evening after surgery and advance to your regular diet as tolerated.  It is not necessary to take only soft food unless you are recovering from tonsil surgery.    Drink plenty of fluids (water is best).     Avoid alcoholic and caffeinated beverages for 1 week after surgery because they can cause you to become dehydrated.

## 2018-09-14 NOTE — TRANSFER OF CARE
"Anesthesia Transfer of Care Note    Patient: Rebel Rodriguez    Procedure(s) Performed: Procedure(s) (LRB):  SEPTOPLASTY, NASAL (N/A)  REDUCTION, NASAL TURBINATE (Bilateral)  FESS, USING COMPUTER-ASSISTED NAVIGATION (Bilateral)    Patient location: PACU    Anesthesia Type: general    Transport from OR: Transported from OR on 6-10 L/min O2 by face mask with adequate spontaneous ventilation    Post pain: adequate analgesia    Post assessment: no apparent anesthetic complications    Post vital signs: stable    Level of consciousness: awake    Nausea/Vomiting: no nausea/vomiting    Complications: none    Transfer of care protocol was followed      Last vitals:   Visit Vitals  /81   Pulse 80   Temp 36.8 °C (98.2 °F) (Oral)   Resp 18   Ht 5' 8" (1.727 m)   Wt 59 kg (130 lb)   SpO2 100%   BMI 19.77 kg/m²     "

## 2018-09-17 ENCOUNTER — TELEPHONE (OUTPATIENT)
Dept: PHARMACY | Facility: CLINIC | Age: 48
End: 2018-09-17

## 2018-09-17 NOTE — OP NOTE
DATE OF OPERATION: 9/14/2018    SURGEON:  Gerard Manzo MD     ASSISTANT SURGEON:  Killian Quijano MD     OPERATION:     1. Endoscopic septoplasty.  2. Bilateral inferior turbinate reduction with submucosal resection.  3. Bilateral endoscopic maxillary antrostomy.       PREOPERATIVE DIAGNOSIS:      1. Deviated nasal septum.  2. Hypertrophic turbinates.  3. Chronic rhinosinusitis.        POSTOPERATIVE DIAGNOSIS:      1. Deviated nasal septum.  2. Hypertrophic turbinates.  3. Chronic rhinosinusitis.        ANESTHESIA: General.     COMPLICATIONS: None.     ESTIMATED BLOOD LOSS: 20 mL     SPECIMEN: None     WOUND EXPECTANCY: Clean-contaminated.    DRESSING: No stent in the bilateral middle meatus. No nasal packing.    FINDINGS: Rightward septal deviation with separate fractured segment obstructing the left nasal cavity at the maxillary crest.  Compound inferior turbinate hypertrophy.   Edematous uncinate processes bilaterally.     INDICATIONS: Chronic rhinosinusitis and anatomic nasal obstruction, not controlled with maximal medical therapy.     I discussed the risks, benefits and alternatives of surgical correction of the septal deviation, turbinate hypertrophy and chronically obstructed sinuses with the patient as well as the expected postoperative course. I gave him the opportunity to ask questions and I answered all of them. On the morning of surgery I again met with the patient and reviewed the indications for surgery and he consented to proceed.     DESCRIPTION OF PROCEDURE: The patient was brought to the operating room and placed supine on the operating table. The patient was placed under general anesthesia and intubated. The patient was positioned with a donut under the head and the image-guidance headset for the [employer] system was applied.  Image-guided navigation was indicated to facilitate exenteration of all ethmoid cells and the extent of sinusotomy.  The CT scan disc was loaded into the  image-guidance system and registered with the patient tracking system according to the 's instructions. The pointer was calibrated and registration was verified using predefined landmarks.  Cottonoid pledgets soaked with 4% cocaine were placed into the nasal cavity bilaterally for mucosal decongestion. The mucosa of the nasal septum was injected with 1% lidocaine with 1:100,000 parts epinephrine. Prophylactic cefazolin was given prior to the surgery start. A time-out was performed to confirm the proper patient, site and procedure.  The CT images were again reviewed prior to the surgical start. The patient was prepped and draped in the usual fashion.  The bed was placed in 20-degree reverse Trendelenberg position.     Surgery began with performance of submucous resection of the nasal septum. This began with additional injections, assisted by a 0-degree endoscope. Then, a hemitransfixion incision was made using a #15 blade on the right side. The submucoperichondrial plane was identified and this was elevated using a Hallie elevator. This plane was carried posteriorly to the bony-cartilaginous junction.  A Audrain elevator was used to incise the cartilage at the junction and a contralateral mucoperiosteal flap was elevated. Then, using a 0-degree endoscope, the septal pocket was visualized and there was an additional long process of cartilage along the floor causing a deviation. This was resected using a Audrain elevator and was removed. There was a hypertrophic crest of the maxillary bone and this was reduced using a 4 mm osteotome.     Additional elevation of the flaps over the vomer revealed a large spur that was jutting into the middle meatus. This portion of the bone was resected top and bottom using a Fomon scissors and the spur was removed using a Aniket forceps. After removal, there was no perforation of the mucoperichondrial flap. At this point, 10,000 units of topical thrombin with 1:10,000 parts  epinephrine was applied to pledgets and placed between the flaps for topical hemostasis.  After these pledgets were removed, there was excellent hemostasis at the septal site.      A separate segment of cartilage that had apparently been fractured in the remote past was protruding from the maxillary crest like a stalagmite and obstructing the left nasal cavity.  This was treated by incising the mucosa and removing the cartilage.  The redundant mucosa was then trimmed to size and affixed to the septum with 4-0 chromic gut suture.     Then, under endoscopic visualization, a transseptal quilting suture of 4-0 plain gut was used to reapproximate the nasal septal flaps.  Then the hemitransfixion incision was closed using 4-0 chromic gut suture in figure-of-eight fashion times two.   Repeated nasal endoscopy at this point revealed marked improvement of the septal deviation and good visualization of the vertical suspension of both middle turbinates.     Attention was then turned to the turbinates.  Additional injections were performed around the inferior turbinates and the sphenopalatine ganglion region bilaterally.  Incisions were made in the anterior head of the inferior turbinate using a #6400 blade.  A Bond elevator was then tunnelled medially to the turbinate bone and used to segmentally outfracture and morselize the bone.  Then, a 2 mm blade on the powered tissue shaver was tunneled submucosally and used to resect soft tissue of the turbinate while overlying mucosa was preserved.    Finally, the turbinates were outfractured using a Tran/Boies elevator.  An identical procedure was performed bilaterally.     Attention was then turned to the sinuses.   The left middle meatus was topically decongested using thrombin with epinephrine pledgets.    The uncinate process was then visualized and a micro-natalio backbiter was used to incise the uncinate process. The uncinate was dissected to its superior attachment and  removed using cutting forceps.  A jed bullosa was not present.       After removal of the bone, the natural ostium of the maxillary sinus was visible. The lumen was visualized with a 30-degree scope and entered using a curved suction to confirm the dimension. The antrostomy was enlarged with cutting instruments to include the natural sinus ostium, taking care not to move anterior to the maxillary line so as to avoid injury to the nasolacrimal duct.  Additional bone was removed using forceps.  No abnormal tissue  was present within the maxillary sinus.  An identical procedure was performed on the contralateral side.        At the conclusion of these procedures, the image-guidance probe was used to verify that all ethmoid cells had been properly opened and that the skull base was visible and that the lamina papyracea had not been traversed on either side.  All sinuses were copiously irrigated with warm normal saline solution.     At this point, the pledgets were all removed. Nelsy hemostatic agent was placed into the surgical sites.  No stent was placed into the bilateral ethmoid cavities to stent open the surgical site.  The nasal cavity was not packed.  Mupirocin ointment was applied to the vestibule bilaterally.  At this point, the headset was removed and the drapes were taken down. Intravenous dexamethasone was given toward the end of the case. The patient was turned back toward the anesthesiologist and awakened from anesthesia, extubated and transferred to the recovery room in stable condition.      POSTOPERATIVE PLAN:  Flonase once stents are out.

## 2018-09-20 ENCOUNTER — OFFICE VISIT (OUTPATIENT)
Dept: OTOLARYNGOLOGY | Facility: CLINIC | Age: 48
End: 2018-09-20
Payer: COMMERCIAL

## 2018-09-20 VITALS — DIASTOLIC BLOOD PRESSURE: 66 MMHG | HEART RATE: 86 BPM | SYSTOLIC BLOOD PRESSURE: 108 MMHG

## 2018-09-20 DIAGNOSIS — J34.2 DEVIATED NASAL SEPTUM: Primary | ICD-10-CM

## 2018-09-20 DIAGNOSIS — J32.0 CHRONIC MAXILLARY SINUSITIS: ICD-10-CM

## 2018-09-20 PROCEDURE — 99999 PR PBB SHADOW E&M-EST. PATIENT-LVL II: CPT | Mod: PBBFAC,,, | Performed by: OTOLARYNGOLOGY

## 2018-09-20 PROCEDURE — 99212 OFFICE O/P EST SF 10 MIN: CPT | Mod: 24,S$GLB,, | Performed by: OTOLARYNGOLOGY

## 2018-09-20 NOTE — PROGRESS NOTES
Subjective:      Rebel Rodriguez is a 48 y.o. male who comes for follow-up 1 week status-post endoscopic sinus surgery.  His last visit with me was on 9/6/2018.  Doing fine, pain improved, no bleeding.  Congestion as expected, using saline rinse with daily clots released.    QOL assessment deferred.      Objective:     /66   Pulse 86      General:   not in distress   Nasal:  edematous mucosa   no septal hematoma   no bleeding   Oral Cavity:   clear   Oropharynx:   no bleeding   Neck:   nontender       Procedure    None        Data Reviewed    Pathology report none.      Assessment:     Doing well following bilateral endoscopic sinus surgery.  He also underwent septum/turbinate surgery which is unrelated to the reason for today's visit.    1. Deviated nasal septum    2. Chronic maxillary sinusitis         Plan:     Continue saline rinse daily.  Stop Afrin.  Follow-up in about 1 month (around 10/20/2018).

## 2018-09-24 ENCOUNTER — PROCEDURE VISIT (OUTPATIENT)
Dept: OPHTHALMOLOGY | Facility: CLINIC | Age: 48
End: 2018-09-24
Payer: COMMERCIAL

## 2018-09-24 VITALS — HEART RATE: 73 BPM | DIASTOLIC BLOOD PRESSURE: 66 MMHG | SYSTOLIC BLOOD PRESSURE: 104 MMHG

## 2018-09-24 DIAGNOSIS — H02.9 EYELID LESION: Primary | ICD-10-CM

## 2018-09-24 PROCEDURE — 88305 TISSUE EXAM BY PATHOLOGIST: CPT | Performed by: PATHOLOGY

## 2018-09-24 PROCEDURE — 92285 EXTERNAL OCULAR PHOTOGRAPHY: CPT | Mod: S$GLB,,, | Performed by: OPHTHALMOLOGY

## 2018-09-24 PROCEDURE — 88305 TISSUE EXAM BY PATHOLOGIST: CPT | Mod: 26,,, | Performed by: PATHOLOGY

## 2018-09-24 PROCEDURE — 67840 REMOVE EYELID LESION: CPT | Mod: E2,S$GLB,, | Performed by: OPHTHALMOLOGY

## 2018-09-24 PROCEDURE — 92014 COMPRE OPH EXAM EST PT 1/>: CPT | Mod: 25,S$GLB,, | Performed by: OPHTHALMOLOGY

## 2018-09-24 NOTE — PROGRESS NOTES
Assessment /Plan     For exam results, see Encounter Report.    Eyelid lesion  -     External/Slit Lamp Photography      ***

## 2018-09-25 ENCOUNTER — CLINICAL SUPPORT (OUTPATIENT)
Dept: INFECTIOUS DISEASES | Facility: CLINIC | Age: 48
End: 2018-09-25
Payer: COMMERCIAL

## 2018-09-25 ENCOUNTER — PATIENT MESSAGE (OUTPATIENT)
Dept: INFECTIOUS DISEASES | Facility: CLINIC | Age: 48
End: 2018-09-25

## 2018-09-25 ENCOUNTER — OFFICE VISIT (OUTPATIENT)
Dept: INFECTIOUS DISEASES | Facility: CLINIC | Age: 48
End: 2018-09-25
Payer: COMMERCIAL

## 2018-09-25 VITALS
HEART RATE: 80 BPM | DIASTOLIC BLOOD PRESSURE: 80 MMHG | SYSTOLIC BLOOD PRESSURE: 113 MMHG | TEMPERATURE: 99 F | HEIGHT: 68 IN | WEIGHT: 130.06 LBS | BODY MASS INDEX: 19.71 KG/M2

## 2018-09-25 DIAGNOSIS — T50.905A DRUG-INDUCED LUPUS ERYTHEMATOSUS: ICD-10-CM

## 2018-09-25 DIAGNOSIS — L93.2 DRUG-INDUCED LUPUS ERYTHEMATOSUS: ICD-10-CM

## 2018-09-25 DIAGNOSIS — D84.9 IMMUNOSUPPRESSION: ICD-10-CM

## 2018-09-25 DIAGNOSIS — M19.90 ARTHRITIS: Primary | ICD-10-CM

## 2018-09-25 DIAGNOSIS — Z23 IMMUNIZATION DUE: ICD-10-CM

## 2018-09-25 PROCEDURE — 90715 TDAP VACCINE 7 YRS/> IM: CPT | Mod: S$GLB,,, | Performed by: INTERNAL MEDICINE

## 2018-09-25 PROCEDURE — 90472 IMMUNIZATION ADMIN EACH ADD: CPT | Mod: S$GLB,,, | Performed by: INTERNAL MEDICINE

## 2018-09-25 PROCEDURE — 99214 OFFICE O/P EST MOD 30 MIN: CPT | Mod: 25,S$GLB,, | Performed by: PHYSICIAN ASSISTANT

## 2018-09-25 PROCEDURE — 99999 PR PBB SHADOW E&M-EST. PATIENT-LVL III: CPT | Mod: PBBFAC,,, | Performed by: PHYSICIAN ASSISTANT

## 2018-09-25 PROCEDURE — 3008F BODY MASS INDEX DOCD: CPT | Mod: CPTII,S$GLB,, | Performed by: PHYSICIAN ASSISTANT

## 2018-09-25 PROCEDURE — 90471 IMMUNIZATION ADMIN: CPT | Mod: S$GLB,,, | Performed by: INTERNAL MEDICINE

## 2018-09-25 PROCEDURE — 90636 HEP A/HEP B VACC ADULT IM: CPT | Mod: S$GLB,,, | Performed by: INTERNAL MEDICINE

## 2018-09-25 NOTE — PROGRESS NOTES
Mr. Rodriguez received initial Hep A/B vaccine and Tdap and tolerated them well. He left the unit in NAD.

## 2018-09-25 NOTE — PROGRESS NOTES
Subjective:      Patient ID: Rebel Rodriguez is a 48 y.o. male.    Chief Complaint:Biologic patient      History of Present Illness    HPI  Mr. Rodriguez is a very pleasant 48 year old male with psoriatic arthritis and infliximab induced lupus who was referred by Rheumatology for pre-biologic evaluation, review of serologies and immunization update. Patient is currently on plaquenil 300 mg daily, Leflunomide 20 mg daily and Secukinumab 300mg monthly. No current steroid use. Patient denies recent fevers, chills, infective illnesses.     No history of diabetes.  No current steroid use. He was weaned off of prednisone in June.   Denies splenectomy    Denies history of chronic, recurring infections of throat, bladder/kidneys, intestines, skin, reproductive organs, teeth or gums.   Reports history of recurrent sinus infections secondary to deviated nasal septum and nasal turbinate hypertrophy. Last week he underwent a nasal septoplasty for this. He feels good today.    Patient is unsure if he has had chickenpox.  No shingles.   Denies history of syphilis, gonorrhea, other STDs/viral hepatitis/ or other infective illnesses.     Denies known exposure to TB. Reports Hx of negative PPD skin tests.   Lives in [place]   Has traveled [place] work. Denies travel related infections     Pet/Animal exposures:  Cat, vaccinated  Food: Denies eating raw/undercooked food  Occupational:   Hobbies: No risky hobbies identified    Immunization History:      Serologies:  -HIV: Negative.  -Hepatitis C: Negative.  -Hepatitis B: Negative for disease and non-immune.   -Hepatitis A: non-immune  -Varicella zoster: N/A  -Tuberculosis: Quantiferon gold negative 3/2017  -Syphilis: RPR Negative  -Strongyloides: Negative serology.    Review of Systems   Constitution: Positive for weight loss. Negative for chills, decreased appetite, fever, weakness, malaise/fatigue and night sweats.   HENT: Negative for congestion, ear  "pain, hearing loss, hoarse voice, sore throat and tinnitus.    Eyes: Negative for blurred vision, redness and visual disturbance.   Cardiovascular: Negative for chest pain, leg swelling and palpitations.   Respiratory: Negative for cough, hemoptysis, shortness of breath, sputum production and wheezing.    Hematologic/Lymphatic: Negative for adenopathy. Does not bruise/bleed easily.   Skin: Negative for dry skin, itching, rash and suspicious lesions.   Musculoskeletal: Positive for arthritis and neck pain. Negative for back pain, joint pain and myalgias.   Gastrointestinal: Negative for abdominal pain, constipation, diarrhea, heartburn, nausea and vomiting.   Genitourinary: Negative for dysuria, flank pain, frequency, hematuria, hesitancy and urgency.   Neurological: Negative for dizziness, headaches, numbness and paresthesias.   Psychiatric/Behavioral: Negative for depression and memory loss. The patient does not have insomnia and is not nervous/anxious.    Allergic/Immunologic: Negative for environmental allergies, HIV exposure, hives and persistent infections.     Objective:     Vitals:    09/25/18 1254   BP: 113/80   Pulse: 80   Temp: 98.6 °F (37 °C)   TempSrc: Oral   Weight: 59 kg (130 lb 1.1 oz)   Height: 5' 8" (1.727 m)   PainSc: 0-No pain     Physical Exam   Constitutional: He is oriented to person, place, and time. No distress.   Very pleasant. Thin appearing   HENT:   Head: Normocephalic and atraumatic.   Mouth/Throat: Uvula is midline, oropharynx is clear and moist and mucous membranes are normal. He does not have dentures. No oral lesions. Normal dentition. No dental abscesses, lacerations or dental caries. No oropharyngeal exudate.   Eyes: Conjunctivae and lids are normal. No scleral icterus.   Neck: Neck supple.   Cardiovascular: Normal rate and regular rhythm.   No murmur heard.  Pulmonary/Chest: Effort normal and breath sounds normal. No respiratory distress. He has no decreased breath sounds. He has " no wheezes. He has no rhonchi. He has no rales.   Abdominal: Soft. Normal appearance, normal aorta and bowel sounds are normal. He exhibits no distension. There is no hepatosplenomegaly. There is no tenderness. There is no guarding.   Musculoskeletal: He exhibits no edema.   Lymphadenopathy:        Head (right side): No submental, no submandibular and no occipital adenopathy present.        Head (left side): No submental, no submandibular and no occipital adenopathy present.     He has no cervical adenopathy.   Neurological: He is alert and oriented to person, place, and time. No cranial nerve deficit.   Skin: Skin is warm, dry and intact. No lesion and no rash noted. He is not diaphoretic. No erythema. No pallor.   RLE with small patch of psoarisis. Scattered hypopigmented areas on thorax.   Psychiatric: He has a normal mood and affect. His behavior is normal.     Assessment:       1.  psoriatric Arthritis    2. Immunosuppression    3. Drug-induced lupus erythematosus    4. Immunization due          Plan:   48 year old male with psoriatic arthritis who is currently on plaquenil, Leflunomide and Secukinumab who is here for pre-biologic evaluation, review of serologies and immunization update. Serologies and immunizations reviewed. He reports history of recurrent sinus infections secondary to deviated nasal septum and nasal turbinate hypertrophy. Last week he underwent a nasal septoplasty for this. He feels well today. Otherwise, no history of chronic or recurring infectious issues.  Based on the available information, no contraindications to Biologic therapy from an infectious disease standpoint.     1. Psoriatic arthritis - followed by Rheumatology     2. Screening for chronic infections:                - HIV, RPR, HCV, Hep B surface antigen and Strongyloides IgG are negative   - Will check a Quantiferon gold and Varicella IgG    3. Immunizations recommended:              - Influenza annually              - Tetanus  booster today and again every 10 years              - Hepatitis A/Hepatitis B combination vaccine (3 doses at 0, 1, 6 months)              - Shingrix if Varicella IgG returns positive.         4. Counseling: I discussed with patient the risk for increased susceptibility to infections with biologic therapy. She has been counseled on the importance of vaccinations including but not limited to a yearly flu vaccine. Immunizations and possible side effects were reviewed. Specific guidance has beenprovided to the patient regarding the patient's occupation, hobbies and activities to avoid future infections, including but not limited to avoiding undercooked meats and seafood, proper hygiene and contact with animals.         5. Follow up as needed.  The patient was encouraged to contact us with any questions or concerns. Business card provided.

## 2018-09-25 NOTE — LETTER
September 25, 2018      Wagner Henry MD  [address] [place] [address] [facility]  [place] [address]           [facility]  [address]  [place] [address]  Phone: [phone]  Fax: [phone]          Patient: Rebel Rodriguez   MR Number: [id number]   YOB: 1970   Date of Visit: 9/25/2018       Dear Dr. Wagner Henry:    Thank you for referring Rebel Rodriguez to me for evaluation. Attached you will find relevant portions of my assessment and plan of care.    If you have questions, please do not hesitate to call me. I look forward to following Rebel Rodriguez along with you.    Sincerely,    ELISE Soniosure  CC:  No Recipients    If you would like to receive this communication electronically, please contact [email or web address] or [phone] to request more information on [facility] access.    For providers and/or their staff who would like to refer a patient to [facility], please contact us through our one-stop-shop provider referral line, [facility], at [phone].    If you feel you have received this communication in error or would no longer like to receive these types of communications, please e-mail [email or web address]

## 2018-09-26 DIAGNOSIS — L40.50 PSA (PSORIATIC ARTHRITIS): ICD-10-CM

## 2018-09-27 ENCOUNTER — TELEPHONE (OUTPATIENT)
Dept: OPHTHALMOLOGY | Facility: CLINIC | Age: 48
End: 2018-09-27

## 2018-09-27 ENCOUNTER — LAB VISIT (OUTPATIENT)
Dept: LAB | Facility: OTHER | Age: 48
End: 2018-09-27
Payer: COMMERCIAL

## 2018-09-27 DIAGNOSIS — D84.9 IMMUNOSUPPRESSION: ICD-10-CM

## 2018-09-27 DIAGNOSIS — M19.90 ARTHRITIS: ICD-10-CM

## 2018-09-27 PROCEDURE — 86480 TB TEST CELL IMMUN MEASURE: CPT

## 2018-09-27 PROCEDURE — 36415 COLL VENOUS BLD VENIPUNCTURE: CPT

## 2018-09-27 NOTE — TELEPHONE ENCOUNTER
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  Pt states doing well.  Follow up oculoplastics as needed, ophthalmology for routine exams

## 2018-09-29 ENCOUNTER — TELEPHONE (OUTPATIENT)
Dept: OPHTHALMOLOGY | Facility: CLINIC | Age: 48
End: 2018-09-29

## 2018-10-01 LAB
M TB IFN-G CD4+ BCKGRND COR BLD-ACNC: 0.01 IU/ML
MITOGEN IGNF BCKGRD COR BLD-ACNC: >10 IU/ML
MITOGEN IGNF BCKGRD COR BLD-ACNC: NEGATIVE [IU]/ML
NIL: 0.04 IU/ML
TB2 - NIL: 0.01 IU/ML

## 2018-10-15 ENCOUNTER — TELEPHONE (OUTPATIENT)
Dept: PHARMACY | Facility: CLINIC | Age: 48
End: 2018-10-15

## 2018-10-26 ENCOUNTER — CLINICAL SUPPORT (OUTPATIENT)
Dept: INFECTIOUS DISEASES | Facility: CLINIC | Age: 48
End: 2018-10-26
Payer: COMMERCIAL

## 2018-10-26 DIAGNOSIS — M19.90 ARTHRITIS: ICD-10-CM

## 2018-10-26 DIAGNOSIS — D84.9 IMMUNOSUPPRESSION: ICD-10-CM

## 2018-10-26 PROCEDURE — 90636 HEP A/HEP B VACC ADULT IM: CPT | Mod: S$GLB,,, | Performed by: INTERNAL MEDICINE

## 2018-10-26 PROCEDURE — 99999 PR PBB SHADOW E&M-EST. PATIENT-LVL II: CPT | Mod: PBBFAC,,,

## 2018-10-26 PROCEDURE — 90471 IMMUNIZATION ADMIN: CPT | Mod: S$GLB,,, | Performed by: INTERNAL MEDICINE

## 2018-10-26 PROCEDURE — 90472 IMMUNIZATION ADMIN EACH ADD: CPT | Mod: S$GLB,,, | Performed by: INTERNAL MEDICINE

## 2018-10-26 PROCEDURE — 90686 IIV4 VACC NO PRSV 0.5 ML IM: CPT | Mod: S$GLB,,, | Performed by: INTERNAL MEDICINE

## 2018-10-26 NOTE — PROGRESS NOTES
Patient received flu and twinrix vaccines tolerated well. Instructed to remain in facility for 15 minutes and return if feelings of reaction occur. Left in NAD

## 2018-11-08 ENCOUNTER — TELEPHONE (OUTPATIENT)
Dept: PHARMACY | Facility: CLINIC | Age: 48
End: 2018-11-08

## 2018-11-12 NOTE — TELEPHONE ENCOUNTER
Patient called to refill Cosentyx.  Patient confirmed he has 0 doses left next injection 11/15.  The patient will  the Cosentyx 11/15.  Copay $0.00 in 004.  Patient confirmed no new meds, allergies, or health conditions.  Verified address.  Declined [facility] .TODD

## 2018-12-03 NOTE — TELEPHONE ENCOUNTER
"Cosentyx f/u complete. Name/ confirmed. Medication list reviewed and updated. Consultation included: indication; goals of treatment; administration; storage and handling; side effects; how to handle side effects; the importance of compliance; how to handle missed doses; the importance of laboratory monitoring; the importance of keeping all follow up appointments. Patient understands to report any medication changes to [facility] and provider. All questions answered and addressed to patients satisfaction. Patient understands the goals of treatment and medication therapy. He tolerated the first injection well and did report some mild swelling at the injection site however he relates this to the volume of liquid. He feels much more comfortable self injecting now and describes "its all mental" I advised him to close his eyes, take a few deep breaths, count to 3, open eyes, and with a quick sandoval dart-like motion inject into the site of his choice. He knows to rotate injection sites. Overall he feels well and is excited to get the loading dose completed and move to once monthly dosing. He was afforded the opportunity to ask questions which he had none. Will f/u in 3mos to assess med tolerance and symptoms.  " Received triage call from [employer] ([employer]). Jeannette states that pt still c/o constant sharp/shooting pain from the scrotum to the anus and into L flank. Jeannette states that the swelling is intermittent (if at work)  Avnatalie states that the abx have not helped nor the tramadol. No current swelling per Jeannette. Jeannette states that pt had US this morning-awaiting results. 9/10 on pain scale. No discoloration per Jeannette. Venkata Bullock informed per Dr. Brent Minor to await US results and contact [facility]. [facility] contact given and urged Jeannette to make appt on behalf of pt. Jeannette verbalized understanding of information discussed w/ no further questions at this time.

## 2018-12-04 ENCOUNTER — TELEPHONE (OUTPATIENT)
Dept: PHARMACY | Facility: CLINIC | Age: 48
End: 2018-12-04

## 2018-12-06 ENCOUNTER — LAB VISIT (OUTPATIENT)
Dept: LAB | Facility: OTHER | Age: 48
End: 2018-12-06
Payer: COMMERCIAL

## 2018-12-06 ENCOUNTER — TELEPHONE (OUTPATIENT)
Dept: RHEUMATOLOGY | Facility: CLINIC | Age: 48
End: 2018-12-06

## 2018-12-06 DIAGNOSIS — M32.19 OTHER SYSTEMIC LUPUS ERYTHEMATOSUS WITH OTHER ORGAN INVOLVEMENT: ICD-10-CM

## 2018-12-06 DIAGNOSIS — D64.9 ANEMIA, UNSPECIFIED TYPE: Primary | ICD-10-CM

## 2018-12-06 DIAGNOSIS — T50.905A DRUG-INDUCED LUPUS ERYTHEMATOSUS: ICD-10-CM

## 2018-12-06 DIAGNOSIS — L40.50 PSA (PSORIATIC ARTHRITIS): ICD-10-CM

## 2018-12-06 DIAGNOSIS — L93.2 DRUG-INDUCED LUPUS ERYTHEMATOSUS: ICD-10-CM

## 2018-12-06 LAB
BACTERIA #/AREA URNS HPF: NORMAL /HPF
BILIRUB UR QL STRIP: NEGATIVE
CLARITY UR: CLEAR
COLOR UR: YELLOW
CREAT UR-MCNC: 148.1 MG/DL
GLUCOSE UR QL STRIP: NEGATIVE
HGB UR QL STRIP: NEGATIVE
KETONES UR QL STRIP: NEGATIVE
LEUKOCYTE ESTERASE UR QL STRIP: ABNORMAL
MICROSCOPIC COMMENT: NORMAL
NITRITE UR QL STRIP: NEGATIVE
PH UR STRIP: 6 [PH] (ref 5–8)
PROT UR QL STRIP: NEGATIVE
PROT UR-MCNC: 13 MG/DL
PROT/CREAT UR: 0.09 MG/G{CREAT}
SP GR UR STRIP: 1.02 (ref 1–1.03)
SQUAMOUS #/AREA URNS HPF: 2 /HPF
URN SPEC COLLECT METH UR: ABNORMAL
UROBILINOGEN UR STRIP-ACNC: NEGATIVE EU/DL
WBC #/AREA URNS HPF: 4 /HPF (ref 0–5)

## 2018-12-06 PROCEDURE — 84156 ASSAY OF PROTEIN URINE: CPT

## 2018-12-06 PROCEDURE — 81000 URINALYSIS NONAUTO W/SCOPE: CPT

## 2018-12-11 ENCOUNTER — OFFICE VISIT (OUTPATIENT)
Dept: RHEUMATOLOGY | Facility: CLINIC | Age: 48
End: 2018-12-11
Payer: COMMERCIAL

## 2018-12-11 VITALS
HEART RATE: 64 BPM | DIASTOLIC BLOOD PRESSURE: 61 MMHG | HEIGHT: 68 IN | SYSTOLIC BLOOD PRESSURE: 104 MMHG | WEIGHT: 130 LBS | BODY MASS INDEX: 19.7 KG/M2

## 2018-12-11 DIAGNOSIS — M19.90 ARTHRITIS: Primary | ICD-10-CM

## 2018-12-11 DIAGNOSIS — T50.905A DRUG-INDUCED LUPUS ERYTHEMATOSUS: ICD-10-CM

## 2018-12-11 DIAGNOSIS — Z51.81 MEDICATION MONITORING ENCOUNTER: ICD-10-CM

## 2018-12-11 DIAGNOSIS — L93.2 DRUG-INDUCED LUPUS ERYTHEMATOSUS: ICD-10-CM

## 2018-12-11 DIAGNOSIS — L40.9 PSORIASIS: ICD-10-CM

## 2018-12-11 DIAGNOSIS — R21 SKIN RASH: ICD-10-CM

## 2018-12-11 PROCEDURE — 99999 PR PBB SHADOW E&M-EST. PATIENT-LVL III: CPT | Mod: PBBFAC,,, | Performed by: INTERNAL MEDICINE

## 2018-12-11 PROCEDURE — 3008F BODY MASS INDEX DOCD: CPT | Mod: CPTII,S$GLB,, | Performed by: INTERNAL MEDICINE

## 2018-12-11 PROCEDURE — 99214 OFFICE O/P EST MOD 30 MIN: CPT | Mod: S$GLB,,, | Performed by: INTERNAL MEDICINE

## 2018-12-11 RX ORDER — FLUCONAZOLE 150 MG/1
150 TABLET ORAL
Qty: 2 TABLET | Refills: 1 | Status: SHIPPED | OUTPATIENT
Start: 2018-12-11 | End: 2018-12-15

## 2018-12-11 ASSESSMENT — ROUTINE ASSESSMENT OF PATIENT INDEX DATA (RAPID3)
PSYCHOLOGICAL DISTRESS SCORE: 3.3
PATIENT GLOBAL ASSESSMENT SCORE: 3.5
WHEN YOU AWAKENED IN THE MORNING OVER THE LAST WEEK, PLEASE INDICATE THE AMOUNT OF TIME IT TAKES UNTIL YOU ARE AS LIMBER AS YOU WILL BE FOR THE DAY: 20 MINS
PAIN SCORE: 3.5
FATIGUE SCORE: 4
AM STIFFNESS SCORE: 1, YES
MDHAQ FUNCTION SCORE: .5
TOTAL RAPID3 SCORE: 2.89

## 2018-12-11 ASSESSMENT — SYSTEMIC LUPUS ERYTHEMATOSUS DISEASE ACTIVITY INDEX (SLEDAI): TOTAL_SCORE: 0

## 2018-12-11 NOTE — PROGRESS NOTES
I have personally reviewed the history, confirmed exam findings, and discussed assessment and plan with fellow.;

## 2018-12-11 NOTE — PROGRESS NOTES
"Subjective:       Patient ID: Rebel Rodriguez is a 48 y.o. male.    Chief Complaint: Disease Management    HPI  48YM with psoriatic arthritis and infliximab induced lupus presenting for follow up. Drug induced Lupus: Related to infliximab (last dose 2/29/16; + aphosholipids-anticardiolipin IgM mild +dsDNA: 1:1280,  DEE+ 1:1280 homogenous ,  + anti histone ab, CH50 low at 51 along with recurrent intermittent fevers, chills, arthralgias. Pt is currently on plaquenil 300 mg daily, Leflunomide 20 mg daily and Secukinumab 300mg monthly ( last dose 11/13/18).     Since last visit, pt is doing well. S/p nasal septoplasty 09/14/18 for deviated nasal septum and nasal turbinate hypertrophy. Reports psoriasis plaques in the groin region.  Pt has not yet started the 24hr urine test as part of workup for his recurrent nephrolithiasis. He has no new complaints.     Pt denies weight changes, fatigue, oral/nasal/genital ulcers, headaches, fever, chills, n/v, abd pain, CP, SOB, dysphagia, changes in bowel/bladder habits, vision changes,photosensitivity, joint swelling or erythema.    Review of Systems   Constitutional: Negative for fever and unexpected weight change.   HENT: Negative for mouth sores and trouble swallowing.    Eyes: Negative for redness.   Respiratory: Negative for cough and shortness of breath.    Cardiovascular: Negative for chest pain, palpitations and leg swelling.   Gastrointestinal: Negative for abdominal pain, constipation and diarrhea.   Genitourinary: Negative for difficulty urinating, flank pain, genital sores and urgency.   Musculoskeletal: Negative for arthralgias and joint swelling.   Skin: Positive for rash.   Neurological: Negative for weakness and headaches.   Hematological: Does not bruise/bleed easily.   Psychiatric/Behavioral: Negative for dysphoric mood and sleep disturbance.         Objective:   /61   Pulse 64   Ht 5' 8.4" (1.737 m)   Wt 59 kg (130 lb)   BMI 19.54 kg/m²      Physical " Exam   Constitutional: He is oriented to person, place, and time and well-developed, well-nourished, and in no distress.   HENT:   Head: Normocephalic and atraumatic.   Eyes: EOM are normal. Pupils are equal, round, and reactive to light.   Neck: Normal range of motion. Neck supple.   Cardiovascular: Normal rate and regular rhythm.    Pulmonary/Chest: Effort normal and breath sounds normal.   Abdominal: Soft. Bowel sounds are normal.       Right Side Rheumatological Exam     Examination finds the shoulder, elbow, wrist, knee, 1st PIP, 1st MCP, 2nd PIP, 2nd MCP, 3rd PIP, 3rd MCP, 4th PIP, 4th MCP, 5th PIP, 5th MCP, 1st MTP, 2nd MTP, 3rd MTP, 4th MTP, 5th MTP, 1st toe IP, 2nd toe IP, 3rd toe IP, 4th toe IP and 5th toe IP normal.    Shoulder Exam   Tenderness Location: no tenderness    Range of Motion   The patient has normal right shoulder ROM.    Crepitus: negative  Swelling: negative    Left Side Rheumatological Exam     Examination finds the shoulder, elbow, wrist, knee, 1st PIP, 1st MCP, 2nd PIP, 2nd MCP, 3rd PIP, 3rd MCP, 4th PIP, 4th MCP, 5th PIP, 5th MCP, 1st MTP, 2nd MTP, 3rd MTP, 4th MTP, 5th MTP, 1st toe IP, 2nd toe IP, 3rd toe IP, 4th toe IP and 5th toe IP normal.    Shoulder Exam   Tenderness Location: no tenderness    Range of Motion   The patient has normal left shoulder ROM.    Crepitus: negative  Swelling: negative      Lymphadenopathy:     He has no cervical adenopathy.   Neurological: He is alert and oriented to person, place, and time.   Skin: Skin is warm and dry. Rash noted.     RLE with small patch of psoarisis- improved  Hyperpigmented area on the lateral aspect of the lower leg  Hypopigmented areas on neck, chest and upper back  Erythema groin and scrotum   Psychiatric: Affect normal.   Musculoskeletal: Normal range of motion. He exhibits deformity. He exhibits no edema or tenderness.   Reducible deformity on L 4th DIP ( medial subluxation)       Results for JON RODRIGUEZ (MRN [id number]) as of  12/11/2018 12:25   Ref. Range 12/6/2018 12:50   WBC Latest Ref Range: 3.90 - 12.70 K/uL 4.94   RBC Latest Ref Range: 4.60 - 6.20 M/uL 4.84   Hemoglobin Latest Ref Range: 14.0 - 18.0 g/dL 13.7 (L)   Hematocrit Latest Ref Range: 40.0 - 54.0 % 42.4   MCV Latest Ref Range: 82 - 98 fL 88   MCH Latest Ref Range: 27.0 - 31.0 pg 28.3   MCHC Latest Ref Range: 32.0 - 36.0 g/dL 32.3   RDW Latest Ref Range: 11.5 - 14.5 % 13.8   Platelets Latest Ref Range: 150 - 350 K/uL 227   MPV Latest Ref Range: 9.2 - 12.9 fL 9.5   Gran% Latest Ref Range: 38.0 - 73.0 % 53.0   Gran # (ANC) Latest Ref Range: 1.8 - 7.7 K/uL 2.6   Lymph% Latest Ref Range: 18.0 - 48.0 % 29.8   Lymph # Latest Ref Range: 1.0 - 4.8 K/uL 1.5   Mono% Latest Ref Range: 4.0 - 15.0 % 13.8   Mono # Latest Ref Range: 0.3 - 1.0 K/uL 0.7   Eosinophil% Latest Ref Range: 0.0 - 8.0 % 3.0   Eos # Latest Ref Range: 0.0 - 0.5 K/uL 0.2   Basophil% Latest Ref Range: 0.0 - 1.9 % 0.2   Baso # Latest Ref Range: 0.00 - 0.20 K/uL 0.01   Differential Method Unknown Automated   Results for JON RODRIGUEZ (MRN [id number]) as of 12/11/2018 12:25   Ref. Range 12/6/2018 12:50   Sodium Latest Ref Range: 136 - 145 mmol/L 140   Potassium Latest Ref Range: 3.5 - 5.1 mmol/L 3.9   Chloride Latest Ref Range: 95 - 110 mmol/L 104   CO2 Latest Ref Range: 23 - 29 mmol/L 27   Anion Gap Latest Ref Range: 8 - 16 mmol/L 9   BUN, Bld Latest Ref Range: 6 - 20 mg/dL 11   Creatinine Latest Ref Range: 0.5 - 1.4 mg/dL 0.9   eGFR if non African American Latest Ref Range: >60 mL/min/1.73 m^2 >60   eGFR if  Latest Ref Range: >60 mL/min/1.73 m^2 >60   Glucose Latest Ref Range: 70 - 110 mg/dL 83   Calcium Latest Ref Range: 8.7 - 10.5 mg/dL 9.4   Alkaline Phosphatase Latest Ref Range: 55 - 135 U/L 58   Total Protein Latest Ref Range: 6.0 - 8.4 g/dL 7.1   Albumin Latest Ref Range: 3.5 - 5.2 g/dL 4.0   Total Bilirubin Latest Ref Range: 0.1 - 1.0 mg/dL 0.5   AST Latest Ref Range: 10 - 40 U/L 25   ALT Latest Ref  Range: 10 - 44 U/L 21   CRP Latest Ref Range: 0.0 - 8.2 mg/L 0.2      Results for JON RODRIGEUZ (MRN [id number]) as of 12/11/2018 12:25   Ref. Range 3/8/2018 11:25 7/16/2018 11:46 9/6/2018 14:16 12/6/2018 12:50   Sed Rate Latest Ref Range: 0 - 10 mm/Hr 5 5 6 3     Results for JON RODRIGUEZ (MRN [id number]) as of 12/11/2018 12:25   Ref. Range 7/16/2018 11:46 9/6/2018 14:16 12/6/2018 12:50   CRP Latest Ref Range: 0.0 - 8.2 mg/L 0.3 0.3 0.2       Results for JON RODRIGUEZ (MRN [id number]) as of 9/11/2018 09:32   Ref. Range 9/6/2018 14:16   ds DNA Ab Latest Ref Range: Negative 1:10  Negative 1:10   Complement (C-3) Latest Ref Range: 50 - 180 mg/dL 92   Complement (C-4) Latest Ref Range: 11 - 44 mg/dL 25     The 10-year ASCVD risk score ([place] SCHULTZ Jr., et al., 2013) is: 0.9%    Values used to calculate the score:      Age: 48 years      Sex: Male      Is Non- : No      Diabetic: No      Tobacco smoker: No      Systolic Blood Pressure: 104 mmHg      Is BP treated: No      HDL Cholesterol: 71 mg/dL      Total Cholesterol: 152 mg/dL      Xray Arthritis survey 03/2017  Findings:  Cervical: Flexion radiograph shows no widening of the atlantoaxial distance.  There is progression of the loss of disc height and anterior spondylosis at C4-C5.  Osteophytic also identified at C5-C6 and C6-C7.    Knees: Standing AP radiographs of the knees demonstrate normal osseous structures, soft tissues and joint spaces.    Hands: PA radiographs of the hands demonstrate normal mineralization.  There is persistent, near-complete loss of joint space with medial subluxation of the left fourth distal phalanx and distal interphalangeal joint.  There is no remodeling of the distal phalanges.  No soft tissue swelling.  No erosions.    Feet: AP radiograph of the demonstrate normal osseous structures, soft tissues and joint spaces.  Specifically normal mineralization with no erosions.  No soft tissue swelling.    Assessment:       1.   psoriatric Arthritis    2. Drug-induced lupus erythematosus    3. Psoriasis    4. Medication monitoring encounter    5. Skin rash            Plan:         Diagnoses and all orders for this visit:     psoriatric Arthritis  DAPSA TJC 0 SJC 0  ~3.52 c/w low disease activity.  Continue Cosentyx 300mg monthly  Continue Leflunomide 20mg daily  -     C3 complement; Standing  -     C4 complement; Standing  -     Sedimentation rate; Standing  -     C-reactive protein; Standing  -     CBC auto differential; Standing  -     Comprehensive metabolic panel; Standing  -     Anti-DNA antibody, double-stranded; Standing  -     Protein / creatinine ratio, urine; Standing  -     Urinalysis; Standing      Drug-induced lupus erythematosus  Related to infliximab (last dose 2/29/16; + aphosholipids-anticardiolipin IgM mild +dsDNA: 1:1280,  DEE+ 1:1280 homogenous ,  + anti histone ab, CH50 low at 51 along with recurrent intermittent fevers, chills, arthralgias.  SLEDAI 0 (neg dsDNA, normal complements)  Continue Plaquenil 300mg daily   -     C3 complement; Standing  -     C4 complement; Standing  -     Sedimentation rate; Standing  -     C-reactive protein; Standing  -     CBC auto differential; Standing  -     Comprehensive metabolic panel; Standing  -     Anti-DNA antibody, double-stranded; Standing  -     Protein / creatinine ratio, urine; Standing  -     Urinalysis; Standing      Medication monitoring encounter  No evidence of toxicity  UTD Plaquenil eye exam 08/03/18    Recurrent nephrolithiasis  Previous Uro risk showed a hypocitraturic nephrolithiasis in 2013.   Repeat 24hr urine Uro risk. If low citrate, may benefit from polycitra k .    Psoriasis  Stable. < 10% BSA  Consider referral to Derm    Skin rash  Increased risk of fungal skin infection with IL17 inhibitors.   Will treat for candida with diflucan 150mg q72hr X2 doses    Immunizations  UTD

## 2018-12-12 RX ORDER — LEFLUNOMIDE 20 MG/1
TABLET ORAL
Qty: 90 TABLET | Refills: 0 | Status: SHIPPED | OUTPATIENT
Start: 2018-12-12 | End: 2019-03-12 | Stop reason: SDUPTHER

## 2018-12-13 ENCOUNTER — TELEPHONE (OUTPATIENT)
Dept: RHEUMATOLOGY | Facility: CLINIC | Age: 48
End: 2018-12-13

## 2018-12-13 ENCOUNTER — PATIENT MESSAGE (OUTPATIENT)
Dept: RHEUMATOLOGY | Facility: CLINIC | Age: 48
End: 2018-12-13

## 2018-12-13 ENCOUNTER — LAB VISIT (OUTPATIENT)
Dept: LAB | Facility: OTHER | Age: 48
End: 2018-12-13
Payer: COMMERCIAL

## 2018-12-13 DIAGNOSIS — T50.905A DRUG-INDUCED LUPUS ERYTHEMATOSUS: ICD-10-CM

## 2018-12-13 DIAGNOSIS — L93.2 DRUG-INDUCED LUPUS ERYTHEMATOSUS: ICD-10-CM

## 2018-12-13 PROCEDURE — 81003 URINALYSIS AUTO W/O SCOPE: CPT

## 2018-12-13 PROCEDURE — 82570 ASSAY OF URINE CREATININE: CPT

## 2018-12-14 LAB
BILIRUB UR QL STRIP: NEGATIVE
CLARITY UR: CLEAR
COLOR UR: YELLOW
CREAT UR-MCNC: 162.7 MG/DL
GLUCOSE UR QL STRIP: NEGATIVE
HGB UR QL STRIP: NEGATIVE
KETONES UR QL STRIP: NEGATIVE
LEUKOCYTE ESTERASE UR QL STRIP: NEGATIVE
NITRITE UR QL STRIP: NEGATIVE
PH UR STRIP: 6 [PH] (ref 5–8)
PROT UR QL STRIP: NEGATIVE
PROT UR-MCNC: 13 MG/DL
PROT/CREAT UR: 0.08 MG/G{CREAT}
SP GR UR STRIP: 1.02 (ref 1–1.03)
URN SPEC COLLECT METH UR: NORMAL
UROBILINOGEN UR STRIP-ACNC: NEGATIVE EU/DL

## 2018-12-14 NOTE — TELEPHONE ENCOUNTER
Liz, he just had his standing labs repeated today, but he just  did them last week. Was supposed to have only additional anemia labs as ordered. See if they can add all the anemia labs.. Otherwise need to be scheduled. Thanks ELIEL

## 2018-12-27 ENCOUNTER — TELEPHONE (OUTPATIENT)
Dept: RHEUMATOLOGY | Facility: CLINIC | Age: 48
End: 2018-12-27

## 2018-12-27 ENCOUNTER — PATIENT MESSAGE (OUTPATIENT)
Dept: RHEUMATOLOGY | Facility: CLINIC | Age: 48
End: 2018-12-27

## 2019-01-03 ENCOUNTER — OFFICE VISIT (OUTPATIENT)
Dept: INTERNAL MEDICINE | Facility: CLINIC | Age: 49
End: 2019-01-03
Attending: INTERNAL MEDICINE
Payer: COMMERCIAL

## 2019-01-03 ENCOUNTER — APPOINTMENT (RX ONLY)
Dept: URBAN - METROPOLITAN AREA CLINIC 98 | Facility: CLINIC | Age: 49
Setting detail: DERMATOLOGY
End: 2019-01-03

## 2019-01-03 VITALS
HEART RATE: 75 BPM | WEIGHT: 129.44 LBS | DIASTOLIC BLOOD PRESSURE: 43 MMHG | HEIGHT: 68 IN | BODY MASS INDEX: 19.62 KG/M2 | SYSTOLIC BLOOD PRESSURE: 92 MMHG | OXYGEN SATURATION: 98 %

## 2019-01-03 DIAGNOSIS — R21 SCROTAL RASH: Primary | ICD-10-CM

## 2019-01-03 DIAGNOSIS — L72.0 EPIDERMAL CYST: ICD-10-CM

## 2019-01-03 DIAGNOSIS — L29.1 PRURITUS SCROTI: ICD-10-CM

## 2019-01-03 PROBLEM — I63.50 CEREBRAL INFARCTION DUE TO UNSPECIFIED OCCLUSION OR STENOSIS OF UNSPECIFIED CEREBRAL ARTERY: Status: ACTIVE | Noted: 2019-01-03

## 2019-01-03 PROBLEM — L40.0 PSORIASIS VULGARIS: Status: ACTIVE | Noted: 2019-01-03

## 2019-01-03 PROBLEM — M12.9 ARTHROPATHY, UNSPECIFIED: Status: ACTIVE | Noted: 2019-01-03

## 2019-01-03 PROCEDURE — ? COUNSELING

## 2019-01-03 PROCEDURE — 3008F BODY MASS INDEX DOCD: CPT | Mod: CPTII,S$GLB,, | Performed by: INTERNAL MEDICINE

## 2019-01-03 PROCEDURE — ? PRESCRIPTION

## 2019-01-03 PROCEDURE — 99999 PR PBB SHADOW E&M-EST. PATIENT-LVL IV: CPT | Mod: PBBFAC,,, | Performed by: INTERNAL MEDICINE

## 2019-01-03 PROCEDURE — ? TREATMENT REGIMEN

## 2019-01-03 PROCEDURE — 99202 OFFICE O/P NEW SF 15 MIN: CPT

## 2019-01-03 PROCEDURE — 99212 PR OFFICE/OUTPT VISIT, EST, LEVL II, 10-19 MIN: ICD-10-PCS | Mod: S$GLB,,, | Performed by: INTERNAL MEDICINE

## 2019-01-03 PROCEDURE — 3008F PR BODY MASS INDEX (BMI) DOCUMENTED: ICD-10-PCS | Mod: CPTII,S$GLB,, | Performed by: INTERNAL MEDICINE

## 2019-01-03 PROCEDURE — 99212 OFFICE O/P EST SF 10 MIN: CPT | Mod: S$GLB,,, | Performed by: INTERNAL MEDICINE

## 2019-01-03 PROCEDURE — 99999 PR PBB SHADOW E&M-EST. PATIENT-LVL IV: ICD-10-PCS | Mod: PBBFAC,,, | Performed by: INTERNAL MEDICINE

## 2019-01-03 RX ORDER — BETAMETHASONE DIPROPIONATE 0.5 MG/G
CREAM TOPICAL
Qty: 1 | Refills: 0 | Status: ERX | COMMUNITY
Start: 2019-01-03

## 2019-01-03 RX ORDER — TACROLIMUS 1 MG/G
OINTMENT TOPICAL
Qty: 1 | Refills: 4 | Status: ERX | COMMUNITY
Start: 2019-01-03

## 2019-01-03 RX ADMIN — TACROLIMUS: 1 OINTMENT TOPICAL at 22:10

## 2019-01-03 RX ADMIN — BETAMETHASONE DIPROPIONATE: 0.5 CREAM TOPICAL at 22:10

## 2019-01-03 ASSESSMENT — LOCATION ZONE DERM: LOCATION ZONE: GENITALIA

## 2019-01-03 ASSESSMENT — LOCATION DETAILED DESCRIPTION DERM: LOCATION DETAILED: RIGHT ANTERIOR SCROTUM

## 2019-01-03 ASSESSMENT — LOCATION SIMPLE DESCRIPTION DERM: LOCATION SIMPLE: SCROTUM

## 2019-01-03 NOTE — PROCEDURE: TREATMENT REGIMEN
Detail Level: Zone
Initiate Treatment: Betamethasone 0.05% cream BID to affected area until outh then start Tacrolimus 0.1% ointment BID PRN for flares
Discontinue Regimen: Current cleanser

## 2019-01-03 NOTE — PROGRESS NOTES
Subjective:       Patient ID: Rebel Rodriguez is a 48 y.o. male.    Chief Complaint: Rash (in groin area)     Rebel Rodriguez is a 48 y.o.  male who presents for Rash (in groin area)  .  Pt complains of rash on scrotum, noticed it about 6 months ago. Has progressively worsened and is now quite irritating.  He thought it was related to his psoriasis but other areas have improved and he did not improve here.  He was his rheumatologist who treated him with po difulcan with mild improvement.  + exposure to long term partner with HSV. Denies other areas or previous rashes in similar area. Wears breathable underclothes either cotton or work out fabric.      Review of Systems   Constitutional: Negative for chills and fever.   Musculoskeletal: Negative for arthralgias and myalgias.   Skin: Positive for rash. Negative for pallor.   Psychiatric/Behavioral: Negative for dysphoric mood. The patient is not nervous/anxious.        Patient Active Problem List   Diagnosis     psoriatric Arthritis    Psoriasis    Hx-TIA (transient ischemic attack)    Hyperlipidemia    Low back pain    Achilles tendon rupture    Recurrent nephrolithiasis    Pain of left scapula    Cervical spondylosis    Recurrent fever    Unexplained night sweats    Inguinal lymphadenopathy    Disorder of ileum    Sinusitis    Drug-induced lupus erythematosus    High risk medication use    Psoriatic arthritis    Medication monitoring encounter    Neck pain    Hyperreflexia of lower extremity    Nasal septal deviation    Immunization due       Past Medical History:   Diagnosis Date    Achilles tendon rupture 10/9/2013    Allergy     Anxiety     Arthritis     psoriatric    Degenerative disc disease     Drug-induced lupus erythematosus     Hyperlipidemia     Kidney stone     Psoriatic arthritis     TIA (transient ischemic attack)     Ulcer     high school       Past Surgical History:   Procedure Laterality Date    ACHILLES TENDON SURGERY  "     BACK SURGERY      COLONOSCOPY N/A 9/8/2015    Performed by Sergio Tao MD at [facility] ENDO (4TH FLR)    FESS, USING COMPUTER-ASSISTED NAVIGATION Bilateral 9/14/2018    Performed by Gerard Manzo MD at [facility] OR 2ND FLR    LITHOTRIPSY, ESWL Left 4/5/2013    Performed by Kumar Duval Jr., MD at [facility] OR 1ST FLR    REDUCTION, NASAL TURBINATE Bilateral 9/14/2018    Performed by Gerard Manzo MD at [facility] OR 2ND FLR    REPAIR, TENDON, ACHILLES Right 10/9/2013    Performed by Esau Godfrey MD at [facility] OR 1ST FLR    SEPTOPLASTY, NASAL N/A 9/14/2018    Performed by [facility]zo MD at [facility] OR 2ND FLR    UPPER ENDOSCOPY W/ ESOPHAGEAL MANOMETRY      URETERAL STENT PLACEMENT         Family History   Problem Relation Age of Onset    Pancreatic cancer Father     Ulcerative colitis Father     Cancer Father 61        pancreatic ca    Crohn's disease Mother     Osteoarthritis Maternal Grandmother     Crohn's disease Maternal Grandmother     Cataracts Maternal Grandmother     Cataracts Maternal Grandfather     Cataracts Paternal Grandmother     Cataracts Paternal Grandfather     Amblyopia Neg Hx     Blindness Neg Hx        Social History     Tobacco Use    Smoking status: Never Smoker    Smokeless tobacco: Never Used   Substance Use Topics    Alcohol use: No     Alcohol/week: 0.0 oz     Types: 1 - 2 Standard drinks or equivalent per week     Comment: cocktails    Drug use: No       Objective:   Blood pressure (!) 92/43, pulse 75, height 5' 8" (1.727 m), weight 58.7 kg (129 lb 6.6 oz), SpO2 98 %.     Physical Exam   Constitutional: He is oriented to person, place, and time. He appears well-developed and well-nourished.   Neurological: He is alert and oriented to person, place, and time.   Skin: Skin is warm. There is erythema (erythema of scrotum, 3 mm palpable subcutaneous mass right, small lesions in groin).   Psychiatric: He has a normal mood and affect. Thought content normal.       Prior " labs reviewed  Assessment/Plan:        Rebel was seen today for rash.    Diagnoses and all orders for this visit:    Scrotal rash  -     Ambulatory referral to Dermatology  -     Herpes Simplex Virus (HSV) Types 1 & 2, IgG, Herpes Titer; Future  -     HERPES SIMPLEX 1 & 2 IGM; Future           Medication List           Accurate as of 1/3/19  5:19 PM. If you have any questions, ask your nurse or doctor.               CONTINUE taking these medications    aspirin 81 MG EC tablet  Commonly known as:  ECOTRIN     atorvastatin 10 MG tablet  Commonly known as:  LIPITOR  TAKE 1 TABLET BY MOUTH DAILY     COSENTYX (2 SYRINGES) 150 mg/mL Syrg  Generic drug:  secukinumab  Inject 300 mg (2 syringes) into the skin every 30 days.     hydroxychloroquine 200 mg tablet  Commonly known as:  PLAQUENIL  TAKE 1 AND 1/2 TABLETS(300 MG) BY MOUTH EVERY DAY     leflunomide 20 MG Tab  Commonly known as:  ARAVA  TAKE 1 TABLET BY MOUTH EVERY DAY

## 2019-01-03 NOTE — HPI: RASH
What Type Of Note Output Would You Prefer (Optional)?: Standard Output
How Severe Is Your Rash?: moderate
Is This A New Presentation, Or A Follow-Up?: Rash
Additional History: OTC anti itch cream

## 2019-01-03 NOTE — PROCEDURE: MIPS QUALITY
Detail Level: Detailed
Quality 110: Preventive Care And Screening: Influenza Immunization: Influenza Immunization Administered during Influenza season
Quality 110: Preventive Care And Screening: Influenza Immunization: Influenza immunization was not ordered or administered, reason not given
Quality 111:Pneumonia Vaccination Status For Older Adults: Pneumococcal Vaccination Previously Received

## 2019-01-04 DIAGNOSIS — L40.50 PSA (PSORIATIC ARTHRITIS): ICD-10-CM

## 2019-01-08 ENCOUNTER — TELEPHONE (OUTPATIENT)
Dept: PHARMACY | Facility: CLINIC | Age: 49
End: 2019-01-08

## 2019-01-09 ENCOUNTER — RX ONLY (OUTPATIENT)
Age: 49
Setting detail: RX ONLY
End: 2019-01-09

## 2019-01-09 RX ORDER — TACROLIMUS 1 MG/G
OINTMENT TOPICAL
Qty: 1 | Refills: 4 | Status: ERX

## 2019-02-01 ENCOUNTER — TELEPHONE (OUTPATIENT)
Dept: PHARMACY | Facility: CLINIC | Age: 49
End: 2019-02-01

## 2019-02-27 ENCOUNTER — TELEPHONE (OUTPATIENT)
Dept: PHARMACY | Facility: CLINIC | Age: 49
End: 2019-02-27

## 2019-03-01 ENCOUNTER — PATIENT MESSAGE (OUTPATIENT)
Dept: PHARMACY | Facility: CLINIC | Age: 49
End: 2019-03-01

## 2019-03-07 ENCOUNTER — LAB VISIT (OUTPATIENT)
Dept: LAB | Facility: OTHER | Age: 49
End: 2019-03-07
Payer: COMMERCIAL

## 2019-03-07 DIAGNOSIS — T50.905A DRUG-INDUCED LUPUS ERYTHEMATOSUS: ICD-10-CM

## 2019-03-07 DIAGNOSIS — L93.2 DRUG-INDUCED LUPUS ERYTHEMATOSUS: ICD-10-CM

## 2019-03-07 LAB
BILIRUB UR QL STRIP: NEGATIVE
CLARITY UR: CLEAR
COLOR UR: YELLOW
CREAT UR-MCNC: 138.2 MG/DL
GLUCOSE UR QL STRIP: NEGATIVE
HGB UR QL STRIP: NEGATIVE
KETONES UR QL STRIP: NEGATIVE
LEUKOCYTE ESTERASE UR QL STRIP: NEGATIVE
NITRITE UR QL STRIP: NEGATIVE
PH UR STRIP: 6 [PH] (ref 5–8)
PROT UR QL STRIP: NEGATIVE
PROT UR-MCNC: 11 MG/DL
PROT/CREAT UR: 0.08 MG/G{CREAT}
SP GR UR STRIP: 1.02 (ref 1–1.03)
URN SPEC COLLECT METH UR: NORMAL
UROBILINOGEN UR STRIP-ACNC: NEGATIVE EU/DL

## 2019-03-07 PROCEDURE — 81003 URINALYSIS AUTO W/O SCOPE: CPT

## 2019-03-07 PROCEDURE — 82570 ASSAY OF URINE CREATININE: CPT

## 2019-03-12 ENCOUNTER — OFFICE VISIT (OUTPATIENT)
Dept: RHEUMATOLOGY | Facility: CLINIC | Age: 49
End: 2019-03-12
Payer: COMMERCIAL

## 2019-03-12 VITALS
HEIGHT: 66 IN | BODY MASS INDEX: 21.62 KG/M2 | HEART RATE: 69 BPM | SYSTOLIC BLOOD PRESSURE: 105 MMHG | DIASTOLIC BLOOD PRESSURE: 64 MMHG | WEIGHT: 134.5 LBS

## 2019-03-12 DIAGNOSIS — R21 GROIN RASH: ICD-10-CM

## 2019-03-12 DIAGNOSIS — L93.2 DRUG-INDUCED LUPUS ERYTHEMATOSUS: ICD-10-CM

## 2019-03-12 DIAGNOSIS — T50.905A DRUG-INDUCED LUPUS ERYTHEMATOSUS: ICD-10-CM

## 2019-03-12 DIAGNOSIS — M19.90 ARTHRITIS: Primary | ICD-10-CM

## 2019-03-12 DIAGNOSIS — Z51.81 MEDICATION MONITORING ENCOUNTER: ICD-10-CM

## 2019-03-12 DIAGNOSIS — L40.9 PSORIASIS: ICD-10-CM

## 2019-03-12 DIAGNOSIS — N20.0 RECURRENT NEPHROLITHIASIS: ICD-10-CM

## 2019-03-12 PROCEDURE — 99214 OFFICE O/P EST MOD 30 MIN: CPT | Mod: S$GLB,,, | Performed by: INTERNAL MEDICINE

## 2019-03-12 PROCEDURE — 99999 PR PBB SHADOW E&M-EST. PATIENT-LVL IV: CPT | Mod: PBBFAC,,, | Performed by: INTERNAL MEDICINE

## 2019-03-12 PROCEDURE — 3008F PR BODY MASS INDEX (BMI) DOCUMENTED: ICD-10-PCS | Mod: CPTII,S$GLB,, | Performed by: INTERNAL MEDICINE

## 2019-03-12 PROCEDURE — 99999 PR PBB SHADOW E&M-EST. PATIENT-LVL IV: ICD-10-PCS | Mod: PBBFAC,,, | Performed by: INTERNAL MEDICINE

## 2019-03-12 PROCEDURE — 99214 PR OFFICE/OUTPT VISIT, EST, LEVL IV, 30-39 MIN: ICD-10-PCS | Mod: S$GLB,,, | Performed by: INTERNAL MEDICINE

## 2019-03-12 PROCEDURE — 3008F BODY MASS INDEX DOCD: CPT | Mod: CPTII,S$GLB,, | Performed by: INTERNAL MEDICINE

## 2019-03-12 RX ORDER — LEFLUNOMIDE 20 MG/1
TABLET ORAL
Qty: 90 TABLET | Refills: 0 | Status: SHIPPED | OUTPATIENT
Start: 2019-03-12 | End: 2019-08-31 | Stop reason: SDUPTHER

## 2019-03-12 ASSESSMENT — ROUTINE ASSESSMENT OF PATIENT INDEX DATA (RAPID3)
PSYCHOLOGICAL DISTRESS SCORE: 2.2
FATIGUE SCORE: 5.5
TOTAL RAPID3 SCORE: 2.56
PAIN SCORE: 3
WHEN YOU AWAKENED IN THE MORNING OVER THE LAST WEEK, PLEASE INDICATE THE AMOUNT OF TIME IT TAKES UNTIL YOU ARE AS LIMBER AS YOU WILL BE FOR THE DAY: 15 MINS
MDHAQ FUNCTION SCORE: .5
AM STIFFNESS SCORE: 1, YES
PATIENT GLOBAL ASSESSMENT SCORE: 3

## 2019-03-12 ASSESSMENT — SYSTEMIC LUPUS ERYTHEMATOSUS DISEASE ACTIVITY INDEX (SLEDAI): TOTAL_SCORE: 0

## 2019-03-12 NOTE — PROGRESS NOTES
Subjective:       Patient ID: Rebel Rodriguez is a 49 y.o. male.    Chief Complaint: Disease Management    HPI  48YM with psoriatic arthritis and infliximab induced lupus presenting for follow up. Drug induced Lupus: Related to infliximab (last dose 2/29/16; + antiphosholipids-anticardiolipin IgM mild +dsDNA: 1:1280,  DEE+ 1:1280 homogenous ,  + anti histone ab, CH50 low at 51 along with recurrent intermittent fevers, chills, arthralgias. Pt is currently on plaquenil 300 mg daily, Leflunomide 20 mg daily and Secukinumab 300mg monthly ( last dose 3/11/18).     Since last visit 12/2018, pt was given diflucan for candida for skin rash which did not help with his symptoms. He was seen by Dr Bates ( Dermatology) and was given tacrolimus ointment and betamethasone cream which has been helping somewhat.  Pt passed a kidney stone ~ 1 week ago. Pt is doing well otherwise with no new complaints. S/p nasal septoplasty 09/14/18 for deviated nasal septum and nasal turbinate hypertrophy. Reports psoriasis plaques in the groin region.  Pt has not yet started the 24hr urine test as part of workup for his recurrent nephrolithiasis.     Pt denies weight changes, fatigue, oral/nasal/genital ulcers, headaches, fever, chills, n/v, abd pain, CP, SOB, dysphagia, changes in bowel/bladder habits, vision changes,photosensitivity, joint swelling or erythema.    Review of Systems   Constitutional: Negative for fever and unexpected weight change.   HENT: Negative for mouth sores and trouble swallowing.    Eyes: Negative for redness.   Respiratory: Negative for cough and shortness of breath.    Cardiovascular: Negative for chest pain, palpitations and leg swelling.   Gastrointestinal: Negative for abdominal pain, constipation and diarrhea.   Genitourinary: Negative for difficulty urinating, flank pain, genital sores and urgency.   Musculoskeletal: Negative for arthralgias and joint swelling.   Skin: Positive for rash.   Neurological: Negative  "for weakness and headaches.   Hematological: Does not bruise/bleed easily.   Psychiatric/Behavioral: Negative for dysphoric mood and sleep disturbance.         Objective:   /64   Pulse 69   Ht 5' 6" (1.676 m)   Wt 61 kg (134 lb 8 oz)   BMI 21.71 kg/m²      Physical Exam   Constitutional: He is oriented to person, place, and time and well-developed, well-nourished, and in no distress.   HENT:   Head: Normocephalic and atraumatic.   Eyes: EOM are normal. Pupils are equal, round, and reactive to light.   Neck: Normal range of motion. Neck supple.   Cardiovascular: Normal rate and regular rhythm.    Pulmonary/Chest: Effort normal and breath sounds normal.   Abdominal: Soft. Bowel sounds are normal.       Right Side Rheumatological Exam     Examination finds the shoulder, elbow, wrist, knee, 1st PIP, 1st MCP, 2nd PIP, 2nd MCP, 3rd PIP, 3rd MCP, 4th PIP, 4th MCP, 5th PIP, 5th MCP, 1st MTP, 2nd MTP, 3rd MTP, 4th MTP, 5th MTP, 1st toe IP, 2nd toe IP, 3rd toe IP, 4th toe IP and 5th toe IP normal.    Shoulder Exam   Tenderness Location: no tenderness    Range of Motion   The patient has normal right shoulder ROM.    Crepitus: negative  Swelling: negative    Left Side Rheumatological Exam     Examination finds the shoulder, elbow, wrist, knee, 1st PIP, 1st MCP, 2nd PIP, 2nd MCP, 3rd PIP, 3rd MCP, 4th PIP, 4th MCP, 5th PIP, 5th MCP, 1st MTP, 2nd MTP, 3rd MTP, 4th MTP, 5th MTP, 1st toe IP, 2nd toe IP, 3rd toe IP, 4th toe IP and 5th toe IP normal.    Shoulder Exam   Tenderness Location: no tenderness    Range of Motion   The patient has normal left shoulder ROM.    Crepitus: negative  Swelling: negative      Lymphadenopathy:     He has no cervical adenopathy.   Neurological: He is alert and oriented to person, place, and time.   Skin: Skin is warm and dry. Rash noted.     RLE with small patch of psoarisis- improved  Hyperpigmented area on the lateral aspect of the lower leg  Hypopigmented areas on neck, chest and upper " back     Psychiatric: Affect normal.   Musculoskeletal: Normal range of motion. He exhibits deformity. He exhibits no edema or tenderness.   Reducible deformity on L 4th DIP ( medial subluxation)       Results for JON RODRIGUEZ (MRN [id number]) as of 3/12/2019 11:03   Ref. Range 12/13/2018 13:00 3/7/2019 11:48   WBC Latest Ref Range: 3.90 - 12.70 K/uL 4.90 4.98   RBC Latest Ref Range: 4.60 - 6.20 M/uL 4.49 (L) 4.76   Hemoglobin Latest Ref Range: 14.0 - 18.0 g/dL 12.9 (L) 13.7 (L)   Hematocrit Latest Ref Range: 40.0 - 54.0 % 40.0 42.0   MCV Latest Ref Range: 82 - 98 fL 89 88   MCH Latest Ref Range: 27.0 - 31.0 pg 28.7 28.8   MCHC Latest Ref Range: 32.0 - 36.0 g/dL 32.3 32.6   RDW Latest Ref Range: 11.5 - 14.5 % 13.7 13.8   Platelets Latest Ref Range: 150 - 350 K/uL 224 239   MPV Latest Ref Range: 9.2 - 12.9 fL 9.9 10.0   Gran% Latest Ref Range: 38.0 - 73.0 % 54.2 58.1   Gran # (ANC) Latest Ref Range: 1.8 - 7.7 K/uL 2.7 2.9   Lymph% Latest Ref Range: 18.0 - 48.0 % 28.6 27.9   Lymph # Latest Ref Range: 1.0 - 4.8 K/uL 1.4 1.4   Mono% Latest Ref Range: 4.0 - 15.0 % 14.1 10.8   Mono # Latest Ref Range: 0.3 - 1.0 K/uL 0.7 0.5   Eosinophil% Latest Ref Range: 0.0 - 8.0 % 2.7 2.8   Eos # Latest Ref Range: 0.0 - 0.5 K/uL 0.1 0.1   Basophil% Latest Ref Range: 0.0 - 1.9 % 0.2 0.2   Baso # Latest Ref Range: 0.00 - 0.20 K/uL 0.01 0.01   Differential Method Unknown Automated Automated   Results for JON RODRIGUEZ (MRN [id number]) as of 3/12/2019 11:03   Ref. Range 12/13/2018 13:00 3/7/2019 11:48   Sodium Latest Ref Range: 136 - 145 mmol/L 139 140   Potassium Latest Ref Range: 3.5 - 5.1 mmol/L 4.2 4.4   Chloride Latest Ref Range: 95 - 110 mmol/L 104 104   CO2 Latest Ref Range: 23 - 29 mmol/L 29 28   Anion Gap Latest Ref Range: 8 - 16 mmol/L 6 (L) 8   BUN, Bld Latest Ref Range: 6 - 20 mg/dL 10 14   Creatinine Latest Ref Range: 0.5 - 1.4 mg/dL 0.9 1.1   eGFR if non African American Latest Ref Range: >60 mL/min/1.73 m^2 >60 >60   eGFR if   Latest Ref Range: >60 mL/min/1.73 m^2 >60 >60   Glucose Latest Ref Range: 70 - 110 mg/dL 84 91   Calcium Latest Ref Range: 8.7 - 10.5 mg/dL 9.3 9.6   Alkaline Phosphatase Latest Ref Range: 55 - 135 U/L 49 (L) 58   Total Protein Latest Ref Range: 6.0 - 8.4 g/dL 6.7 7.0   Albumin Latest Ref Range: 3.5 - 5.2 g/dL 3.8 4.1   Total Bilirubin Latest Ref Range: 0.1 - 1.0 mg/dL 0.6 0.4   AST Latest Ref Range: 10 - 40 U/L 25 25   ALT Latest Ref Range: 10 - 44 U/L 19 20     Results for JON RODRIGUEZ (MRN [id number]) as of 3/12/2019 11:03   Ref. Range 12/6/2018 12:50 12/13/2018 13:00 3/7/2019 11:48   Sed Rate Latest Ref Range: 0 - 10 mm/Hr 3 5 8      Results for JON RODRIGUEZ (MRN [id number]) as of 3/12/2019 11:03   Ref. Range 12/6/2018 12:50 12/13/2018 13:00 3/7/2019 11:48   CRP Latest Ref Range: 0.0 - 8.2 mg/L 0.2 0.2 0.5     Results for JON RODRIGUEZ (MRN [id number]) as of 3/12/2019 11:03   Ref. Range 3/7/2019 11:48   ds DNA Ab Latest Ref Range: Negative 1:10  Negative 1:10   Complement (C-3) Latest Ref Range: 50 - 180 mg/dL 89   Complement (C-4) Latest Ref Range: 11 - 44 mg/dL 24           The 10-year ASCVD risk score ([place] SCHULTZ Jr., et al., 2013) is: 1%    Values used to calculate the score:      Age: 49 years      Sex: Male      Is Non- : No      Diabetic: No      Tobacco smoker: No      Systolic Blood Pressure: 105 mmHg      Is BP treated: No      HDL Cholesterol: 71 mg/dL      Total Cholesterol: 152 mg/dL      Xray Arthritis survey 03/2017  Findings:  Cervical: Flexion radiograph shows no widening of the atlantoaxial distance.  There is progression of the loss of disc height and anterior spondylosis at C4-C5.  Osteophytic also identified at C5-C6 and C6-C7.    Knees: Standing AP radiographs of the knees demonstrate normal osseous structures, soft tissues and joint spaces.    Hands: PA radiographs of the hands demonstrate normal mineralization.  There is persistent, near-complete loss of  joint space with medial subluxation of the left fourth distal phalanx and distal interphalangeal joint.  There is no remodeling of the distal phalanges.  No soft tissue swelling.  No erosions.    Feet: AP radiograph of the demonstrate normal osseous structures, soft tissues and joint spaces.  Specifically normal mineralization with no erosions.  No soft tissue swelling.    Assessment:       1.  psoriatric Arthritis    2. Drug-induced lupus erythematosus    3. Recurrent nephrolithiasis    4. Medication monitoring encounter    5. Psoriasis    6. Groin rash            Plan:         Diagnoses and all orders for this visit:     psoriatric Arthritis  DAPSA TJC 0 SJC 0  ~3.05 c/w low disease activity.  Continue Cosentyx 300mg monthly  Continue Leflunomide 20mg daily  -     C3 complement; Standing  -     C4 complement; Standing  -     Sedimentation rate; Standing  -     C-reactive protein; Standing  -     CBC auto differential; Standing  -     Comprehensive metabolic panel; Standing  -     Anti-DNA antibody, double-stranded; Standing  -     Protein / creatinine ratio, urine; Standing  -     Urinalysis; Standing      Drug-induced lupus erythematosus  Related to infliximab (last dose 2/29/16; + antiphosholipids-anticardiolipin IgM mild +dsDNA: 1:1280,  DEE+ 1:1280 homogenous ,  + anti histone ab, CH50 low at 51 along with recurrent intermittent fevers, chills, arthralgias.  SLEDAI 0 (neg dsDNA, normal complements)  Continue Plaquenil 300mg daily   -     C3 complement; Standing  -     C4 complement; Standing  -     Sedimentation rate; Standing  -     C-reactive protein; Standing  -     CBC auto differential; Standing  -     Comprehensive metabolic panel; Standing  -     Anti-DNA antibody, double-stranded; Standing  -     Protein / creatinine ratio, urine; Standing  -     Urinalysis; Standing      Medication monitoring encounter  No evidence of toxicity  UTD Plaquenil eye exam 08/03/18    Recurrent nephrolithiasis  Previous Uro  risk showed a hypocitraturic nephrolithiasis in 2013.   Repeat 24hr urine Uro risk. If low citrate, may benefit from polycitra k .    Psoriasis  Stable. < 10% BSA      Skin rash  Refer to Dermatology      Immunizations  UTD

## 2019-03-21 ENCOUNTER — PATIENT MESSAGE (OUTPATIENT)
Dept: INFECTIOUS DISEASES | Facility: CLINIC | Age: 49
End: 2019-03-21

## 2019-03-22 ENCOUNTER — CLINICAL SUPPORT (OUTPATIENT)
Dept: INFECTIOUS DISEASES | Facility: CLINIC | Age: 49
End: 2019-03-22
Payer: COMMERCIAL

## 2019-03-22 DIAGNOSIS — D84.9 IMMUNOSUPPRESSION: ICD-10-CM

## 2019-03-22 DIAGNOSIS — M19.90 ARTHRITIS: ICD-10-CM

## 2019-03-22 PROCEDURE — 90636 HEPATITIS A HEPATITIS B COMBINED VACCINE IM: ICD-10-PCS | Mod: S$GLB,,, | Performed by: INTERNAL MEDICINE

## 2019-03-22 PROCEDURE — 90471 IMMUNIZATION ADMIN: CPT | Mod: S$GLB,,, | Performed by: INTERNAL MEDICINE

## 2019-03-22 PROCEDURE — 90636 HEP A/HEP B VACC ADULT IM: CPT | Mod: S$GLB,,, | Performed by: INTERNAL MEDICINE

## 2019-03-22 PROCEDURE — 90471 HEPATITIS A HEPATITIS B COMBINED VACCINE IM: ICD-10-PCS | Mod: S$GLB,,, | Performed by: INTERNAL MEDICINE

## 2019-03-29 DIAGNOSIS — L40.50 PSA (PSORIATIC ARTHRITIS): ICD-10-CM

## 2019-04-02 ENCOUNTER — TELEPHONE (OUTPATIENT)
Dept: PHARMACY | Facility: CLINIC | Age: 49
End: 2019-04-02

## 2019-04-25 ENCOUNTER — TELEPHONE (OUTPATIENT)
Dept: PHARMACY | Facility: CLINIC | Age: 49
End: 2019-04-25

## 2019-05-28 ENCOUNTER — TELEPHONE (OUTPATIENT)
Dept: PHARMACY | Facility: CLINIC | Age: 49
End: 2019-05-28

## 2019-05-28 ENCOUNTER — PATIENT MESSAGE (OUTPATIENT)
Dept: PHARMACY | Facility: CLINIC | Age: 49
End: 2019-05-28

## 2019-05-29 ENCOUNTER — TELEPHONE (OUTPATIENT)
Dept: PHARMACY | Facility: CLINIC | Age: 49
End: 2019-05-29

## 2019-06-19 ENCOUNTER — TELEPHONE (OUTPATIENT)
Dept: PHARMACY | Facility: CLINIC | Age: 49
End: 2019-06-19

## 2019-06-19 DIAGNOSIS — L40.50 PSA (PSORIATIC ARTHRITIS): ICD-10-CM

## 2019-06-20 ENCOUNTER — TELEPHONE (OUTPATIENT)
Dept: RHEUMATOLOGY | Facility: CLINIC | Age: 49
End: 2019-06-20

## 2019-06-20 ENCOUNTER — LAB VISIT (OUTPATIENT)
Dept: LAB | Facility: OTHER | Age: 49
End: 2019-06-20
Attending: INTERNAL MEDICINE
Payer: COMMERCIAL

## 2019-06-20 DIAGNOSIS — T50.905A DRUG-INDUCED LUPUS ERYTHEMATOSUS: ICD-10-CM

## 2019-06-20 DIAGNOSIS — L93.2 DRUG-INDUCED LUPUS ERYTHEMATOSUS: ICD-10-CM

## 2019-06-20 LAB
ALBUMIN SERPL BCP-MCNC: 4 G/DL (ref 3.5–5.2)
ALP SERPL-CCNC: 56 U/L (ref 55–135)
ALT SERPL W/O P-5'-P-CCNC: 21 U/L (ref 10–44)
ANION GAP SERPL CALC-SCNC: 7 MMOL/L (ref 8–16)
AST SERPL-CCNC: 24 U/L (ref 10–40)
BASOPHILS # BLD AUTO: 0.03 K/UL (ref 0–0.2)
BASOPHILS NFR BLD: 0.6 % (ref 0–1.9)
BILIRUB SERPL-MCNC: 0.6 MG/DL (ref 0.1–1)
BUN SERPL-MCNC: 13 MG/DL (ref 6–20)
C3 SERPL-MCNC: 85 MG/DL (ref 50–180)
C4 SERPL-MCNC: 21 MG/DL (ref 11–44)
CALCIUM SERPL-MCNC: 9.5 MG/DL (ref 8.7–10.5)
CHLORIDE SERPL-SCNC: 104 MMOL/L (ref 95–110)
CO2 SERPL-SCNC: 29 MMOL/L (ref 23–29)
CREAT SERPL-MCNC: 1 MG/DL (ref 0.5–1.4)
CRP SERPL-MCNC: 0.2 MG/L (ref 0–8.2)
DIFFERENTIAL METHOD: ABNORMAL
EOSINOPHIL # BLD AUTO: 0.2 K/UL (ref 0–0.5)
EOSINOPHIL NFR BLD: 3.5 % (ref 0–8)
ERYTHROCYTE [DISTWIDTH] IN BLOOD BY AUTOMATED COUNT: 13.5 % (ref 11.5–14.5)
ERYTHROCYTE [SEDIMENTATION RATE] IN BLOOD: 2 MM/HR (ref 0–10)
EST. GFR  (AFRICAN AMERICAN): >60 ML/MIN/1.73 M^2
EST. GFR  (NON AFRICAN AMERICAN): >60 ML/MIN/1.73 M^2
GLUCOSE SERPL-MCNC: 93 MG/DL (ref 70–110)
HCT VFR BLD AUTO: 40.9 % (ref 40–54)
HGB BLD-MCNC: 13.6 G/DL (ref 14–18)
LYMPHOCYTES # BLD AUTO: 1.5 K/UL (ref 1–4.8)
LYMPHOCYTES NFR BLD: 31.6 % (ref 18–48)
MCH RBC QN AUTO: 29 PG (ref 27–31)
MCHC RBC AUTO-ENTMCNC: 33.3 G/DL (ref 32–36)
MCV RBC AUTO: 87 FL (ref 82–98)
MONOCYTES # BLD AUTO: 0.6 K/UL (ref 0.3–1)
MONOCYTES NFR BLD: 11.5 % (ref 4–15)
NEUTROPHILS # BLD AUTO: 2.6 K/UL (ref 1.8–7.7)
NEUTROPHILS NFR BLD: 52.6 % (ref 38–73)
PLATELET # BLD AUTO: 217 K/UL (ref 150–350)
PMV BLD AUTO: 9.3 FL (ref 9.2–12.9)
POTASSIUM SERPL-SCNC: 4.2 MMOL/L (ref 3.5–5.1)
PROT SERPL-MCNC: 6.6 G/DL (ref 6–8.4)
RBC # BLD AUTO: 4.69 M/UL (ref 4.6–6.2)
SODIUM SERPL-SCNC: 140 MMOL/L (ref 136–145)
WBC # BLD AUTO: 4.87 K/UL (ref 3.9–12.7)

## 2019-06-20 PROCEDURE — 36415 COLL VENOUS BLD VENIPUNCTURE: CPT

## 2019-06-20 PROCEDURE — 80053 COMPREHEN METABOLIC PANEL: CPT

## 2019-06-20 PROCEDURE — 86225 DNA ANTIBODY NATIVE: CPT

## 2019-06-20 PROCEDURE — 86160 COMPLEMENT ANTIGEN: CPT

## 2019-06-20 PROCEDURE — 85025 COMPLETE CBC W/AUTO DIFF WBC: CPT

## 2019-06-20 PROCEDURE — 85651 RBC SED RATE NONAUTOMATED: CPT

## 2019-06-20 PROCEDURE — 86160 COMPLEMENT ANTIGEN: CPT | Mod: 59

## 2019-06-20 PROCEDURE — 86140 C-REACTIVE PROTEIN: CPT

## 2019-06-21 ENCOUNTER — LAB VISIT (OUTPATIENT)
Dept: LAB | Facility: OTHER | Age: 49
End: 2019-06-21
Payer: COMMERCIAL

## 2019-06-21 DIAGNOSIS — M19.90 ARTHRITIS: ICD-10-CM

## 2019-06-21 LAB
CHOLEST SERPL-MCNC: 131 MG/DL (ref 120–199)
CHOLEST/HDLC SERPL: 2 {RATIO} (ref 2–5)
DSDNA AB SER-ACNC: NORMAL [IU]/ML
HDLC SERPL-MCNC: 67 MG/DL (ref 40–75)
HDLC SERPL: 51.1 % (ref 20–50)
LDLC SERPL CALC-MCNC: 54.4 MG/DL (ref 63–159)
NONHDLC SERPL-MCNC: 64 MG/DL
TRIGL SERPL-MCNC: 48 MG/DL (ref 30–150)

## 2019-06-21 PROCEDURE — 80061 LIPID PANEL: CPT

## 2019-06-21 PROCEDURE — 36415 COLL VENOUS BLD VENIPUNCTURE: CPT

## 2019-06-25 ENCOUNTER — OFFICE VISIT (OUTPATIENT)
Dept: RHEUMATOLOGY | Facility: CLINIC | Age: 49
End: 2019-06-25
Payer: COMMERCIAL

## 2019-06-25 VITALS
HEART RATE: 62 BPM | BODY MASS INDEX: 20.72 KG/M2 | WEIGHT: 136.69 LBS | HEIGHT: 68 IN | DIASTOLIC BLOOD PRESSURE: 62 MMHG | SYSTOLIC BLOOD PRESSURE: 101 MMHG

## 2019-06-25 DIAGNOSIS — Z51.81 MEDICATION MONITORING ENCOUNTER: ICD-10-CM

## 2019-06-25 DIAGNOSIS — T50.905A DRUG-INDUCED LUPUS ERYTHEMATOSUS: ICD-10-CM

## 2019-06-25 DIAGNOSIS — M19.90 ARTHRITIS: Primary | ICD-10-CM

## 2019-06-25 DIAGNOSIS — Z79.899 LONG-TERM USE OF PLAQUENIL: ICD-10-CM

## 2019-06-25 DIAGNOSIS — L40.50 PSA (PSORIATIC ARTHRITIS): ICD-10-CM

## 2019-06-25 DIAGNOSIS — L40.9 PSORIASIS: ICD-10-CM

## 2019-06-25 DIAGNOSIS — L93.2 DRUG-INDUCED LUPUS ERYTHEMATOSUS: ICD-10-CM

## 2019-06-25 PROCEDURE — 99214 OFFICE O/P EST MOD 30 MIN: CPT | Mod: S$GLB,,, | Performed by: INTERNAL MEDICINE

## 2019-06-25 PROCEDURE — 99999 PR PBB SHADOW E&M-EST. PATIENT-LVL IV: CPT | Mod: PBBFAC,,, | Performed by: INTERNAL MEDICINE

## 2019-06-25 PROCEDURE — 99999 PR PBB SHADOW E&M-EST. PATIENT-LVL IV: ICD-10-PCS | Mod: PBBFAC,,, | Performed by: INTERNAL MEDICINE

## 2019-06-25 PROCEDURE — 99214 PR OFFICE/OUTPT VISIT, EST, LEVL IV, 30-39 MIN: ICD-10-PCS | Mod: S$GLB,,, | Performed by: INTERNAL MEDICINE

## 2019-06-25 PROCEDURE — 3008F BODY MASS INDEX DOCD: CPT | Mod: CPTII,S$GLB,, | Performed by: INTERNAL MEDICINE

## 2019-06-25 PROCEDURE — 3008F PR BODY MASS INDEX (BMI) DOCUMENTED: ICD-10-PCS | Mod: CPTII,S$GLB,, | Performed by: INTERNAL MEDICINE

## 2019-06-25 RX ORDER — HYDROXYCHLOROQUINE SULFATE 200 MG/1
200 TABLET, FILM COATED ORAL DAILY
Qty: 90 TABLET | Refills: 3 | Status: SHIPPED | OUTPATIENT
Start: 2019-06-25 | End: 2020-05-27 | Stop reason: SDUPTHER

## 2019-06-25 ASSESSMENT — ROUTINE ASSESSMENT OF PATIENT INDEX DATA (RAPID3)
PSYCHOLOGICAL DISTRESS SCORE: 3.3
AM STIFFNESS SCORE: 1, YES
PATIENT GLOBAL ASSESSMENT SCORE: 3
MDHAQ FUNCTION SCORE: .5
WHEN YOU AWAKENED IN THE MORNING OVER THE LAST WEEK, PLEASE INDICATE THE AMOUNT OF TIME IT TAKES UNTIL YOU ARE AS LIMBER AS YOU WILL BE FOR THE DAY: 1 HR
FATIGUE SCORE: 4
TOTAL RAPID3 SCORE: 2.56
PAIN SCORE: 3

## 2019-06-25 ASSESSMENT — SYSTEMIC LUPUS ERYTHEMATOSUS DISEASE ACTIVITY INDEX (SLEDAI): TOTAL_SCORE: 0

## 2019-06-25 NOTE — PATIENT INSTRUCTIONS
Labs and urine 3 months    Schedule eye exam with Optho    Reduce plaquenil to 200mg daily ( 1 tablet)

## 2019-06-25 NOTE — PROGRESS NOTES
Subjective:       Patient ID: Rebel Rodriguez is a 49 y.o. male.    Chief Complaint: Disease Management    HPI  48YM with psoriatic arthritis and infliximab induced lupus presenting for follow up. Drug induced Lupus: Related to infliximab (last dose 2/29/16; + antiphosholipids-anticardiolipin IgM mild +dsDNA: 1:1280,  DEE+ 1:1280 homogenous ,  + anti histone ab, CH50 low at 51 along with recurrent intermittent fevers, chills, arthralgias. Pt is currently on plaquenil 300 mg daily, Leflunomide 20 mg daily and Secukinumab 300mg monthly ( last dose 3/11/18).     Since last clinic visit 03/2019, pt reports that he is doing well. + intentional weight gain . Occasional ankle stiffness (r ankle) with no associated swelling/pain/erythema.    Pt denies fatigue, oral/nasal/genital ulcers, headaches, fever, chills, n/v, abd pain, CP, SOB, dysphagia, changes in bowel/bladder habits, vision changes,photosensitivity, joint swelling or erythema.      Prior clinic visit  Since last visit 12/2018, pt was given diflucan for candida for skin rash which did not help with his symptoms. He was seen by Dr Bates ( Dermatology) and was given tacrolimus ointment and betamethasone cream which has been helping somewhat.  Pt passed a kidney stone ~ 1 week ago. Pt is doing well otherwise with no new complaints. S/p nasal septoplasty 09/14/18 for deviated nasal septum and nasal turbinate hypertrophy. Reports psoriasis plaques in the groin region.  Pt has not yet started the 24hr urine test as part of workup for his recurrent nephrolithiasis.       Review of Systems   Constitutional: Negative for fever and unexpected weight change.   HENT: Negative for mouth sores and trouble swallowing.    Eyes: Negative for redness.   Respiratory: Negative for cough and shortness of breath.    Cardiovascular: Negative for chest pain, palpitations and leg swelling.   Gastrointestinal: Negative for abdominal pain, constipation and diarrhea.   Genitourinary:  "Negative for difficulty urinating, flank pain, genital sores and urgency.   Musculoskeletal: Negative for arthralgias and joint swelling.   Skin: Positive for rash.   Neurological: Negative for weakness and headaches.   Hematological: Does not bruise/bleed easily.   Psychiatric/Behavioral: Negative for dysphoric mood and sleep disturbance.         Objective:   /62   Pulse 62   Ht 5' 8.4" (1.737 m)   Wt 62 kg (136 lb 11.2 oz)   BMI 20.54 kg/m²      Physical Exam   Constitutional: He is oriented to person, place, and time and well-developed, well-nourished, and in no distress.   HENT:   Head: Normocephalic and atraumatic.   Eyes: EOM are normal. Pupils are equal, round, and reactive to light.   Neck: Normal range of motion. Neck supple.   Cardiovascular: Normal rate and regular rhythm.    Pulmonary/Chest: Effort normal and breath sounds normal.   Abdominal: Soft. Bowel sounds are normal.       Right Side Rheumatological Exam     Examination finds the shoulder, elbow, wrist, knee, 1st PIP, 1st MCP, 2nd PIP, 2nd MCP, 3rd PIP, 3rd MCP, 4th PIP, 4th MCP, 5th PIP, 5th MCP, 1st MTP, 2nd MTP, 3rd MTP, 4th MTP, 5th MTP, 1st toe IP, 2nd toe IP, 3rd toe IP, 4th toe IP and 5th toe IP normal.    Shoulder Exam   Tenderness Location: no tenderness    Range of Motion   The patient has normal right shoulder ROM.    Crepitus: negative  Swelling: negative    Left Side Rheumatological Exam     Examination finds the shoulder, elbow, wrist, knee, 1st PIP, 1st MCP, 2nd PIP, 2nd MCP, 3rd PIP, 3rd MCP, 4th PIP, 4th MCP, 5th PIP, 5th MCP, 1st MTP, 2nd MTP, 3rd MTP, 4th MTP, 5th MTP, 1st toe IP, 2nd toe IP, 3rd toe IP, 4th toe IP and 5th toe IP normal.    Shoulder Exam   Tenderness Location: no tenderness    Range of Motion   The patient has normal left shoulder ROM.    Crepitus: negative  Swelling: negative      Lymphadenopathy:     He has no cervical adenopathy.   Neurological: He is alert and oriented to person, place, and time. "   Skin: Skin is warm and dry. Rash noted.     RLE with small patch of psoarisis- resolved  Hyperpigmented area on the lateral aspect of the lower leg  Hypopigmented areas on neck, chest and upper back     Psychiatric: Affect normal.   Musculoskeletal: Normal range of motion. He exhibits deformity. He exhibits no edema or tenderness.   Reducible deformity on L 4th DIP ( medial subluxation)         Results for JON RODRIGUEZ (MRN [id number]) as of 6/25/2019 11:57   Ref. Range 6/20/2019 10:53   WBC Latest Ref Range: 3.90 - 12.70 K/uL 4.87   RBC Latest Ref Range: 4.60 - 6.20 M/uL 4.69   Hemoglobin Latest Ref Range: 14.0 - 18.0 g/dL 13.6 (L)   Hematocrit Latest Ref Range: 40.0 - 54.0 % 40.9   MCV Latest Ref Range: 82 - 98 fL 87   MCH Latest Ref Range: 27.0 - 31.0 pg 29.0   MCHC Latest Ref Range: 32.0 - 36.0 g/dL 33.3   RDW Latest Ref Range: 11.5 - 14.5 % 13.5   Platelets Latest Ref Range: 150 - 350 K/uL 217   MPV Latest Ref Range: 9.2 - 12.9 fL 9.3   Gran% Latest Ref Range: 38.0 - 73.0 % 52.6   Gran # (ANC) Latest Ref Range: 1.8 - 7.7 K/uL 2.6   Lymph% Latest Ref Range: 18.0 - 48.0 % 31.6   Lymph # Latest Ref Range: 1.0 - 4.8 K/uL 1.5   Mono% Latest Ref Range: 4.0 - 15.0 % 11.5   Mono # Latest Ref Range: 0.3 - 1.0 K/uL 0.6   Eosinophil% Latest Ref Range: 0.0 - 8.0 % 3.5   Eos # Latest Ref Range: 0.0 - 0.5 K/uL 0.2   Basophil% Latest Ref Range: 0.0 - 1.9 % 0.6   Baso # Latest Ref Range: 0.00 - 0.20 K/uL 0.03   Differential Method Unknown Automated     Results for JON RODRIGUEZ (MRN [id number]) as of 6/25/2019 11:57   Ref. Range 6/20/2019 10:53   Sodium Latest Ref Range: 136 - 145 mmol/L 140   Potassium Latest Ref Range: 3.5 - 5.1 mmol/L 4.2   Chloride Latest Ref Range: 95 - 110 mmol/L 104   CO2 Latest Ref Range: 23 - 29 mmol/L 29   Anion Gap Latest Ref Range: 8 - 16 mmol/L 7 (L)   BUN, Bld Latest Ref Range: 6 - 20 mg/dL 13   Creatinine Latest Ref Range: 0.5 - 1.4 mg/dL 1.0   eGFR if non African American Latest Ref Range: >60  mL/min/1.73 m^2 >60   eGFR if  Latest Ref Range: >60 mL/min/1.73 m^2 >60   Glucose Latest Ref Range: 70 - 110 mg/dL 93   Calcium Latest Ref Range: 8.7 - 10.5 mg/dL 9.5   Alkaline Phosphatase Latest Ref Range: 55 - 135 U/L 56   PROTEIN TOTAL Latest Ref Range: 6.0 - 8.4 g/dL 6.6   Albumin Latest Ref Range: 3.5 - 5.2 g/dL 4.0   BILIRUBIN TOTAL Latest Ref Range: 0.1 - 1.0 mg/dL 0.6   AST Latest Ref Range: 10 - 40 U/L 24   ALT Latest Ref Range: 10 - 44 U/L 21     Results for JON RODRIGUEZ (MRN [id number]) as of 6/25/2019 11:57   Ref. Range 12/13/2018 13:00 3/7/2019 11:48 6/20/2019 10:53   Sed Rate Latest Ref Range: 0 - 10 mm/Hr 5 8 2       Results for JON RODRIGUEZ (MRN [id number]) as of 6/25/2019 11:57   Ref. Range 12/13/2018 13:00 3/7/2019 11:48 6/20/2019 10:53   CRP Latest Ref Range: 0.0 - 8.2 mg/L 0.2 0.5 0.2          Results for JON RODRIGUEZ (MRN [id number]) as of 3/12/2019 11:03   Ref. Range 3/7/2019 11:48   ds DNA Ab Latest Ref Range: Negative 1:10  Negative 1:10   Complement (C-3) Latest Ref Range: 50 - 180 mg/dL 89   Complement (C-4) Latest Ref Range: 11 - 44 mg/dL 24           The 10-year ASCVD risk score ([place] SCHULTZ Jr., et al., 2013) is: 0.8%    Values used to calculate the score:      Age: 49 years      Sex: Male      Is Non- : No      Diabetic: No      Tobacco smoker: No      Systolic Blood Pressure: 101 mmHg      Is BP treated: No      HDL Cholesterol: 67 mg/dL      Total Cholesterol: 131 mg/dL      Xray Arthritis survey 03/2017  Findings:  Cervical: Flexion radiograph shows no widening of the atlantoaxial distance.  There is progression of the loss of disc height and anterior spondylosis at C4-C5.  Osteophytic also identified at C5-C6 and C6-C7.    Knees: Standing AP radiographs of the knees demonstrate normal osseous structures, soft tissues and joint spaces.    Hands: PA radiographs of the hands demonstrate normal mineralization.  There is persistent, near-complete loss  of joint space with medial subluxation of the left fourth distal phalanx and distal interphalangeal joint.  There is no remodeling of the distal phalanges.  No soft tissue swelling.  No erosions.    Feet: AP radiograph of the demonstrate normal osseous structures, soft tissues and joint spaces.  Specifically normal mineralization with no erosions.  No soft tissue swelling.    Assessment:       1.  psoriatric Arthritis    2. Drug-induced lupus erythematosus    3. Medication monitoring encounter    4. Psoriasis            Plan:         Diagnoses and all orders for this visit:     psoriatric Arthritis  DAPSA TJC 0 SJC 0  ~3.02 c/w low disease activity. Labs shows normocytic anemia, normal WBC and platelets. Metabolic profile shows normal renal and liver function. Inflammatory markers remain normal.   Continue Cosentyx 300mg monthly  Continue Leflunomide 20mg daily  ASCVD 0.8% continue low dose statin  -     Sedimentation rate; Standing  -     C-reactive protein; Standing  -     CBC auto differential; Standing  -     Comprehensive metabolic panel; Standing        Drug-induced lupus erythematosus  Related to infliximab (last dose 2/29/16; + antiphosholipids-anticardiolipin IgM mild +dsDNA: 1:1280,  DEE+ 1:1280 homogenous ,  + anti histone ab, CH50 low at 51 along with recurrent intermittent fevers, chills, arthralgias.  SLEDAI 0 (neg dsDNA, normal complements, UA wnl)  Reduce Plaquenil 300mg daily ---> 200mg daily  Will plan to slowly wean off plaquenil since there is no evidence of lupus activity and pt seems to be in remission  -     C3 complement; Standing  -     C4 complement; Standing  -     Sedimentation rate; Standing  -     C-reactive protein; Standing  -     CBC auto differential; Standing  -     Comprehensive metabolic panel; Standing  -     Anti-DNA antibody, double-stranded; Standing  -     Protein / creatinine ratio, urine; Standing  -     Urinalysis; Standing      Medication monitoring encounter  No  evidence of toxicity  UTD Plaquenil eye exam 08/03/18  Needs appt for eye exam   Refer to Optho    Recurrent nephrolithiasis  Previous Uro risk showed a hypocitraturic nephrolithiasis in 2013.   Repeat 24hr urine Uro risk. If low citrate, may benefit from polycitra k .    Psoriasis  Stable. < 5% BSA  Keep appt with Dermatology      Immunizations  UTD

## 2019-07-19 ENCOUNTER — TELEPHONE (OUTPATIENT)
Dept: PHARMACY | Facility: CLINIC | Age: 49
End: 2019-07-19

## 2019-07-31 ENCOUNTER — PATIENT MESSAGE (OUTPATIENT)
Dept: RHEUMATOLOGY | Facility: CLINIC | Age: 49
End: 2019-07-31

## 2019-08-08 ENCOUNTER — PATIENT MESSAGE (OUTPATIENT)
Dept: INTERNAL MEDICINE | Facility: CLINIC | Age: 49
End: 2019-08-08

## 2019-08-08 DIAGNOSIS — Z98.1 H/O SPINAL FUSION: Primary | ICD-10-CM

## 2019-08-08 NOTE — TELEPHONE ENCOUNTER
Hello can you please explain this message I'm a little confused as to what you are requested.  Anthony TOLBERT

## 2019-08-09 ENCOUNTER — PATIENT MESSAGE (OUTPATIENT)
Dept: INTERNAL MEDICINE | Facility: CLINIC | Age: 49
End: 2019-08-09

## 2019-08-09 DIAGNOSIS — E78.5 HYPERLIPIDEMIA, UNSPECIFIED HYPERLIPIDEMIA TYPE: Primary | ICD-10-CM

## 2019-08-15 ENCOUNTER — TELEPHONE (OUTPATIENT)
Dept: PHARMACY | Facility: CLINIC | Age: 49
End: 2019-08-15

## 2019-08-31 DIAGNOSIS — E78.5 HYPERLIPIDEMIA: ICD-10-CM

## 2019-09-01 RX ORDER — ATORVASTATIN CALCIUM 10 MG/1
TABLET, FILM COATED ORAL
Qty: 90 TABLET | Refills: 0 | Status: SHIPPED | OUTPATIENT
Start: 2019-09-01 | End: 2021-03-30 | Stop reason: SDUPTHER

## 2019-09-01 RX ORDER — LEFLUNOMIDE 20 MG/1
TABLET ORAL
Qty: 90 TABLET | Refills: 0 | Status: SHIPPED | OUTPATIENT
Start: 2019-09-01 | End: 2019-11-27 | Stop reason: SDUPTHER

## 2019-09-16 ENCOUNTER — PATIENT MESSAGE (OUTPATIENT)
Dept: RHEUMATOLOGY | Facility: CLINIC | Age: 49
End: 2019-09-16

## 2019-09-16 ENCOUNTER — TELEPHONE (OUTPATIENT)
Dept: PHARMACY | Facility: CLINIC | Age: 49
End: 2019-09-16

## 2019-09-18 DIAGNOSIS — T50.905A DRUG-INDUCED LUPUS ERYTHEMATOSUS: Primary | ICD-10-CM

## 2019-09-18 DIAGNOSIS — L93.2 DRUG-INDUCED LUPUS ERYTHEMATOSUS: Primary | ICD-10-CM

## 2019-09-18 RX ORDER — METHYLPREDNISOLONE 4 MG/1
TABLET ORAL
Qty: 1 PACKAGE | Refills: 0 | Status: SHIPPED | OUTPATIENT
Start: 2019-09-18 | End: 2019-10-09

## 2019-09-18 NOTE — PROGRESS NOTES
Case discussed with Dr. Beverly. Patient with swelling in hands and feet. Will get labs and prescribe medrol dose pack.

## 2019-09-19 ENCOUNTER — TELEPHONE (OUTPATIENT)
Dept: RHEUMATOLOGY | Facility: CLINIC | Age: 49
End: 2019-09-19

## 2019-09-23 ENCOUNTER — LAB VISIT (OUTPATIENT)
Dept: LAB | Facility: OTHER | Age: 49
End: 2019-09-23
Payer: COMMERCIAL

## 2019-09-23 DIAGNOSIS — L93.2 DRUG-INDUCED LUPUS ERYTHEMATOSUS: ICD-10-CM

## 2019-09-23 DIAGNOSIS — T50.905A DRUG-INDUCED LUPUS ERYTHEMATOSUS: ICD-10-CM

## 2019-09-23 LAB
ALBUMIN SERPL BCP-MCNC: 4.2 G/DL (ref 3.5–5.2)
ALP SERPL-CCNC: 54 U/L (ref 55–135)
ALT SERPL W/O P-5'-P-CCNC: 20 U/L (ref 10–44)
ANION GAP SERPL CALC-SCNC: 9 MMOL/L (ref 8–16)
AST SERPL-CCNC: 22 U/L (ref 10–40)
BASOPHILS # BLD AUTO: 0.04 K/UL (ref 0–0.2)
BASOPHILS NFR BLD: 0.7 % (ref 0–1.9)
BILIRUB SERPL-MCNC: 0.7 MG/DL (ref 0.1–1)
BUN SERPL-MCNC: 14 MG/DL (ref 6–20)
C3 SERPL-MCNC: 90 MG/DL (ref 50–180)
C4 SERPL-MCNC: 22 MG/DL (ref 11–44)
CALCIUM SERPL-MCNC: 9.6 MG/DL (ref 8.7–10.5)
CHLORIDE SERPL-SCNC: 106 MMOL/L (ref 95–110)
CO2 SERPL-SCNC: 26 MMOL/L (ref 23–29)
CREAT SERPL-MCNC: 1 MG/DL (ref 0.5–1.4)
CRP SERPL-MCNC: 0.2 MG/L (ref 0–8.2)
DIFFERENTIAL METHOD: NORMAL
EOSINOPHIL # BLD AUTO: 0.2 K/UL (ref 0–0.5)
EOSINOPHIL NFR BLD: 2.5 % (ref 0–8)
ERYTHROCYTE [DISTWIDTH] IN BLOOD BY AUTOMATED COUNT: 13.6 % (ref 11.5–14.5)
ERYTHROCYTE [SEDIMENTATION RATE] IN BLOOD: 4 MM/HR (ref 0–10)
EST. GFR  (AFRICAN AMERICAN): >60 ML/MIN/1.73 M^2
EST. GFR  (NON AFRICAN AMERICAN): >60 ML/MIN/1.73 M^2
GLUCOSE SERPL-MCNC: 79 MG/DL (ref 70–110)
HCT VFR BLD AUTO: 43.6 % (ref 40–54)
HGB BLD-MCNC: 14.1 G/DL (ref 14–18)
IMM GRANULOCYTES # BLD AUTO: 0.01 K/UL (ref 0–0.04)
IMM GRANULOCYTES NFR BLD AUTO: 0.2 % (ref 0–0.5)
LYMPHOCYTES # BLD AUTO: 1.7 K/UL (ref 1–4.8)
LYMPHOCYTES NFR BLD: 27 % (ref 18–48)
MCH RBC QN AUTO: 28.5 PG (ref 27–31)
MCHC RBC AUTO-ENTMCNC: 32.3 G/DL (ref 32–36)
MCV RBC AUTO: 88 FL (ref 82–98)
MONOCYTES # BLD AUTO: 0.7 K/UL (ref 0.3–1)
MONOCYTES NFR BLD: 11.8 % (ref 4–15)
NEUTROPHILS # BLD AUTO: 3.6 K/UL (ref 1.8–7.7)
NEUTROPHILS NFR BLD: 57.8 % (ref 38–73)
NRBC BLD-RTO: 0 /100 WBC
PLATELET # BLD AUTO: 235 K/UL (ref 150–350)
PMV BLD AUTO: 9.6 FL (ref 9.2–12.9)
POTASSIUM SERPL-SCNC: 4.2 MMOL/L (ref 3.5–5.1)
PROT SERPL-MCNC: 7 G/DL (ref 6–8.4)
RBC # BLD AUTO: 4.94 M/UL (ref 4.6–6.2)
SODIUM SERPL-SCNC: 141 MMOL/L (ref 136–145)
WBC # BLD AUTO: 6.12 K/UL (ref 3.9–12.7)

## 2019-09-23 PROCEDURE — 85025 COMPLETE CBC W/AUTO DIFF WBC: CPT

## 2019-09-23 PROCEDURE — 36415 COLL VENOUS BLD VENIPUNCTURE: CPT

## 2019-09-23 PROCEDURE — 86225 DNA ANTIBODY NATIVE: CPT

## 2019-09-23 PROCEDURE — 86160 COMPLEMENT ANTIGEN: CPT

## 2019-09-23 PROCEDURE — 85651 RBC SED RATE NONAUTOMATED: CPT

## 2019-09-23 PROCEDURE — 86160 COMPLEMENT ANTIGEN: CPT | Mod: 59

## 2019-09-23 PROCEDURE — 80053 COMPREHEN METABOLIC PANEL: CPT

## 2019-09-23 PROCEDURE — 86140 C-REACTIVE PROTEIN: CPT

## 2019-09-24 LAB — DSDNA AB SER-ACNC: NORMAL [IU]/ML

## 2019-10-08 ENCOUNTER — OFFICE VISIT (OUTPATIENT)
Dept: RHEUMATOLOGY | Facility: CLINIC | Age: 49
End: 2019-10-08
Payer: COMMERCIAL

## 2019-10-08 VITALS
BODY MASS INDEX: 20.03 KG/M2 | DIASTOLIC BLOOD PRESSURE: 76 MMHG | SYSTOLIC BLOOD PRESSURE: 133 MMHG | HEART RATE: 77 BPM | HEIGHT: 68 IN | WEIGHT: 132.19 LBS

## 2019-10-08 DIAGNOSIS — Z51.81 MEDICATION MONITORING ENCOUNTER: ICD-10-CM

## 2019-10-08 DIAGNOSIS — M19.90 ARTHRITIS: Primary | ICD-10-CM

## 2019-10-08 DIAGNOSIS — L93.2 DRUG-INDUCED LUPUS ERYTHEMATOSUS: ICD-10-CM

## 2019-10-08 DIAGNOSIS — T50.905A DRUG-INDUCED LUPUS ERYTHEMATOSUS: ICD-10-CM

## 2019-10-08 PROCEDURE — 99999 PR PBB SHADOW E&M-EST. PATIENT-LVL III: ICD-10-PCS | Mod: PBBFAC,,, | Performed by: STUDENT IN AN ORGANIZED HEALTH CARE EDUCATION/TRAINING PROGRAM

## 2019-10-08 PROCEDURE — 3008F PR BODY MASS INDEX (BMI) DOCUMENTED: ICD-10-PCS | Mod: CPTII,S$GLB,, | Performed by: STUDENT IN AN ORGANIZED HEALTH CARE EDUCATION/TRAINING PROGRAM

## 2019-10-08 PROCEDURE — 99999 PR PBB SHADOW E&M-EST. PATIENT-LVL III: CPT | Mod: PBBFAC,,, | Performed by: STUDENT IN AN ORGANIZED HEALTH CARE EDUCATION/TRAINING PROGRAM

## 2019-10-08 PROCEDURE — 99214 PR OFFICE/OUTPT VISIT, EST, LEVL IV, 30-39 MIN: ICD-10-PCS | Mod: S$GLB,,, | Performed by: STUDENT IN AN ORGANIZED HEALTH CARE EDUCATION/TRAINING PROGRAM

## 2019-10-08 PROCEDURE — 99214 OFFICE O/P EST MOD 30 MIN: CPT | Mod: S$GLB,,, | Performed by: STUDENT IN AN ORGANIZED HEALTH CARE EDUCATION/TRAINING PROGRAM

## 2019-10-08 PROCEDURE — 3008F BODY MASS INDEX DOCD: CPT | Mod: CPTII,S$GLB,, | Performed by: STUDENT IN AN ORGANIZED HEALTH CARE EDUCATION/TRAINING PROGRAM

## 2019-10-08 RX ORDER — SULFASALAZINE 500 MG/1
1000 TABLET, DELAYED RELEASE ORAL 2 TIMES DAILY
Qty: 40 TABLET | Refills: 0 | Status: SHIPPED | OUTPATIENT
Start: 2019-10-08 | End: 2019-11-14 | Stop reason: SDUPTHER

## 2019-10-08 ASSESSMENT — ROUTINE ASSESSMENT OF PATIENT INDEX DATA (RAPID3)
PAIN SCORE: 6
PATIENT GLOBAL ASSESSMENT SCORE: 6
AM STIFFNESS SCORE: 1, YES
MDHAQ FUNCTION SCORE: .2
WHEN YOU AWAKENED IN THE MORNING OVER THE LAST WEEK, PLEASE INDICATE THE AMOUNT OF TIME IT TAKES UNTIL YOU ARE AS LIMBER AS YOU WILL BE FOR THE DAY: 1 HR
PSYCHOLOGICAL DISTRESS SCORE: 4.4
TOTAL RAPID3 SCORE: 4.22
FATIGUE SCORE: 3.5

## 2019-10-08 ASSESSMENT — SYSTEMIC LUPUS ERYTHEMATOSUS DISEASE ACTIVITY INDEX (SLEDAI): TOTAL_SCORE: 0

## 2019-10-08 NOTE — PROGRESS NOTES
Subjective:       Patient ID: Rebel Rodriguez is a 49 y.o. male.    Chief Complaint: Disease Management. F/u for psoriatic arthritis and drug-induced lupus    48YM with psoriatic arthritis and infliximab induced lupus presenting for follow up. Drug induced Lupus: Related to infliximab (last dose 2/29/16; + antiphosholipids-anticardiolipin IgM mild +dsDNA: 1:1280,  DEE+ 1:1280 homogenous ,  + anti histone ab, CH50 low at 51 along with recurrent intermittent fevers, chills, arthralgias. Pt is currently on plaquenil 200 mg daily, Leflunomide 20 mg daily and Secukinumab 300mg monthly (next dose due 10/16/19).      Since last clinic visit 06/2019 with Dr. Hoskins and Dr. Beverly, pt reports that he is doing well from skin perspective, however is having some arthralgias. Had having increased b/l hand and R ankle swelling/pain end of September. Pt called and lupus labs were checked and Medrol dose pack was prescribed. Lupus labs were wnl (dsDNA, C3,C4, ESR, CRP, UPCR). Today pain is improved s/p steroids. Some swelling and mild tenderness in R 2-3 MCP, L 2 MCP and R ankle. AM stiffness x 20 minutes.    Concerned about mental wellness and ways to increase weight. Has seen nutrition, no benefit. Will discuss with PCP.     Pt denies fatigue, oral/nasal/genital ulcers, headaches, fever, chills, n/v, abd pain, CP, SOB, dysphagia, changes in bowel/bladder habits, vision changes,photosensitivity, or erythema/rashes.    Review of Systems   Constitutional: Negative for fever and unexpected weight change.   HENT: Negative for mouth sores and trouble swallowing.    Eyes: Negative for redness and visual disturbance.   Respiratory: Negative for cough and shortness of breath.    Cardiovascular: Negative for chest pain.   Gastrointestinal: Negative for abdominal pain, constipation and diarrhea.   Genitourinary: Negative for dysuria and genital sores.   Musculoskeletal: Positive for arthralgias. Negative for gait problem.   Skin: Negative  "for rash and wound.   Neurological: Negative for weakness and headaches.   Hematological: Negative for adenopathy. Does not bruise/bleed easily.   Psychiatric/Behavioral: Negative for agitation. The patient is not nervous/anxious.          Objective:   /76   Pulse 77   Ht 5' 8.4" (1.737 m)   Wt 60 kg (132 lb 3.2 oz)   BMI 19.87 kg/m²      Physical Exam   Vitals reviewed.  Constitutional: He is oriented to person, place, and time. No distress.   BMI 19   HENT:   Head: Normocephalic and atraumatic.   Right Ear: External ear normal.   Left Ear: External ear normal.   Nose: Nose normal.   Mouth/Throat: Oropharynx is clear and moist. No oropharyngeal exudate.   Eyes: Conjunctivae and EOM are normal. Pupils are equal, round, and reactive to light. Right eye exhibits no discharge. Left eye exhibits no discharge. No scleral icterus.   Neck: Neck supple. No thyromegaly present.   Cardiovascular: Normal rate, regular rhythm, normal heart sounds and intact distal pulses.    No murmur heard.  Pulmonary/Chest: Effort normal and breath sounds normal. No respiratory distress. He has no wheezes. He has no rales. He exhibits no tenderness.   Abdominal: Soft. Bowel sounds are normal. He exhibits no distension. There is no tenderness. There is no guarding.       Right Side Rheumatological Exam     Examination finds the shoulder, elbow, wrist, knee, 1st PIP, 2nd PIP, 3rd PIP, 4th PIP, 4th MCP, 5th PIP and 5th MCP normal.    The patient is tender to palpation of the 1st MCP, 2nd MCP and 3rd MCP    He has swelling of the 2nd MCP and 3rd MCP    Left Side Rheumatological Exam     Examination finds the shoulder, elbow, wrist, knee, 1st PIP, 1st MCP, 2nd PIP, 3rd PIP, 4th PIP, 4th MCP, 5th PIP and 5th MCP normal.    The patient is tender to palpation of the 2nd MCP and 3rd MCP.    He has swelling of the 2nd MCP and 3rd MCP      Neurological: He is alert and oriented to person, place, and time.   Skin: Skin is warm and dry. No rash " noted. He is not diaphoretic.     Chronic erythema with hypopigmented areas on neck, upper back and chest.  R forearm with small brown circular lesion, no discharge, no TTP   Psychiatric: Mood and affect normal.   Musculoskeletal: Normal range of motion. He exhibits tenderness (R ankle pain with dorsiflexion) and deformity (Reducible deformity on L 4th DIP ( medial subluxation)). He exhibits no edema.        Xray Arthritis Survey 3/2017:  1.  Progressive degenerative change in the cervical spine  2.  Stable degenerative changes at the left distal interphalangeal joint  3.  No signs of erosive arthropathy that would be compatible with psoriatic arthritis     Assessment:       1.  psoriatric Arthritis    2. Drug-induced lupus erythematosus    3. Medication monitoring encounter            Plan:       Problem List Items Addressed This Visit        Immunology/Multi System    Drug-induced lupus erythematosus       Orthopedic     psoriatric Arthritis - Primary    Relevant Orders    CBC auto differential    COMPREHENSIVE METABOLIC PANEL    Sedimentation rate    C-reactive protein    VITAMIN D       Other    Medication monitoring encounter        Psoriatric Arthritis  DAPSA TJC 6 SJC 4  ~22.02 c/w medium disease activity. Labs shows normal CBC. Metabolic profile shows normal renal and liver function. Inflammatory markers remain normal.   Did not tolerate MTX 2/2 nausea.  Continue Cosentyx 300mg monthly  Continue Leflunomide 20mg daily  Will add  mg BID x 1 week, then increase to 1000 mg BID. Labs in 1 month + Vit D, then q3 mo labs.  Can consider switching Cosentyx to Xeljanz if pt remains symptomatic.   ASCVD 0.8% continue low dose statin  -     Sedimentation rate; Standing  -     C-reactive protein; Standing  -     CBC auto differential; Standing  -     Comprehensive metabolic panel; Standing        Drug-induced lupus erythematosus  Related to infliximab (last dose 2/29/16; + antiphosholipids-anticardiolipin IgM  mild +dsDNA: 1:1280,  DEE+ 1:1280 homogenous ,  + anti histone ab, CH50 low at 51 along with recurrent intermittent fevers, chills, arthralgias.  SLEDAI 0 (neg dsDNA, normal complements, UPCR wnl, UA neg for protein)  Increase Plaquenil 200mg daily ---> 300mg daily  Better control with increased plaquenil dose. May plan to slowly wean off plaquenil in the future IF there is no evidence of lupus activity and pt in remission     Medication monitoring encounter  No evidence of toxicity  Pt to schedule next Plaquenil eye exam yearly     Recurrent nephrolithiasis  Previous Uro risk showed a hypocitraturic nephrolithiasis in 2013.      Psoriasis  Stable.  Pt to make f/u appt with Dermatology     Immunizations  UTD. High dose flu vaccine today.  Shingrix next visit.    Discussed with Dr. Beverly. Labs in 1 month then q 3 months. RTC in 3 months.    Kathie Garcia,   Rheumatology, PGY4

## 2019-10-10 ENCOUNTER — TELEPHONE (OUTPATIENT)
Dept: PHARMACY | Facility: CLINIC | Age: 49
End: 2019-10-10

## 2019-10-10 DIAGNOSIS — L40.50 PSA (PSORIATIC ARTHRITIS): ICD-10-CM

## 2019-10-10 NOTE — TELEPHONE ENCOUNTER
refill call attempt for cosentyx, pt. states that he has 0 doses on hand at this time and next injection is on 10/16. Pt's last Rx was under Dr Henry, this MD is no longer with [facility]. Pt states that his new MD Is Dr FELECIA Garcia. Patient needs a new prescription refill to be authorized by the provider. Request has been sent. [facility] will follow up to schedule.

## 2019-10-15 ENCOUNTER — TELEPHONE (OUTPATIENT)
Dept: PHARMACY | Facility: CLINIC | Age: 49
End: 2019-10-15

## 2019-10-23 ENCOUNTER — TELEPHONE (OUTPATIENT)
Dept: INFECTIOUS DISEASES | Facility: CLINIC | Age: 49
End: 2019-10-23

## 2019-10-23 NOTE — TELEPHONE ENCOUNTER
Call returned to Mr. Rodriguez to set up appointment for Flu/shingles vaccine. No answer, voice message left with contact information.

## 2019-10-25 ENCOUNTER — CLINICAL SUPPORT (OUTPATIENT)
Dept: INFECTIOUS DISEASES | Facility: CLINIC | Age: 49
End: 2019-10-25
Payer: COMMERCIAL

## 2019-10-25 PROCEDURE — 90472 ZOSTER RECOMBINANT VACCINE: ICD-10-PCS | Mod: S$GLB,,, | Performed by: INTERNAL MEDICINE

## 2019-10-25 PROCEDURE — 90750 ZOSTER RECOMBINANT VACCINE: ICD-10-PCS | Mod: S$GLB,,, | Performed by: INTERNAL MEDICINE

## 2019-10-25 PROCEDURE — 90750 HZV VACC RECOMBINANT IM: CPT | Mod: S$GLB,,, | Performed by: INTERNAL MEDICINE

## 2019-10-25 PROCEDURE — 90686 FLU VACCINE (QUAD) GREATER THAN OR EQUAL TO 3YO PRESERVATIVE FREE IM: ICD-10-PCS | Mod: S$GLB,,, | Performed by: INTERNAL MEDICINE

## 2019-10-25 PROCEDURE — 90472 IMMUNIZATION ADMIN EACH ADD: CPT | Mod: S$GLB,,, | Performed by: INTERNAL MEDICINE

## 2019-10-25 PROCEDURE — 90471 IMMUNIZATION ADMIN: CPT | Mod: S$GLB,,, | Performed by: INTERNAL MEDICINE

## 2019-10-25 PROCEDURE — 90686 IIV4 VACC NO PRSV 0.5 ML IM: CPT | Mod: S$GLB,,, | Performed by: INTERNAL MEDICINE

## 2019-10-25 PROCEDURE — 90471 FLU VACCINE (QUAD) GREATER THAN OR EQUAL TO 3YO PRESERVATIVE FREE IM: ICD-10-PCS | Mod: S$GLB,,, | Performed by: INTERNAL MEDICINE

## 2019-11-05 ENCOUNTER — LAB VISIT (OUTPATIENT)
Dept: LAB | Facility: OTHER | Age: 49
End: 2019-11-05
Payer: COMMERCIAL

## 2019-11-05 DIAGNOSIS — M19.90 ARTHRITIS: ICD-10-CM

## 2019-11-05 LAB
ALBUMIN SERPL BCP-MCNC: 4.2 G/DL (ref 3.5–5.2)
ALP SERPL-CCNC: 64 U/L (ref 55–135)
ALT SERPL W/O P-5'-P-CCNC: 24 U/L (ref 10–44)
ANION GAP SERPL CALC-SCNC: 9 MMOL/L (ref 8–16)
AST SERPL-CCNC: 29 U/L (ref 10–40)
BASOPHILS # BLD AUTO: 0.03 K/UL (ref 0–0.2)
BASOPHILS NFR BLD: 0.4 % (ref 0–1.9)
BILIRUB SERPL-MCNC: 0.6 MG/DL (ref 0.1–1)
BUN SERPL-MCNC: 10 MG/DL (ref 6–20)
CALCIUM SERPL-MCNC: 9.9 MG/DL (ref 8.7–10.5)
CHLORIDE SERPL-SCNC: 103 MMOL/L (ref 95–110)
CO2 SERPL-SCNC: 27 MMOL/L (ref 23–29)
CREAT SERPL-MCNC: 0.9 MG/DL (ref 0.5–1.4)
CRP SERPL-MCNC: 0.3 MG/L (ref 0–8.2)
DIFFERENTIAL METHOD: NORMAL
EOSINOPHIL # BLD AUTO: 0 K/UL (ref 0–0.5)
EOSINOPHIL NFR BLD: 0.6 % (ref 0–8)
ERYTHROCYTE [DISTWIDTH] IN BLOOD BY AUTOMATED COUNT: 13.2 % (ref 11.5–14.5)
ERYTHROCYTE [SEDIMENTATION RATE] IN BLOOD: 4 MM/HR (ref 0–10)
EST. GFR  (AFRICAN AMERICAN): >60 ML/MIN/1.73 M^2
EST. GFR  (NON AFRICAN AMERICAN): >60 ML/MIN/1.73 M^2
GLUCOSE SERPL-MCNC: 93 MG/DL (ref 70–110)
HCT VFR BLD AUTO: 43.2 % (ref 40–54)
HGB BLD-MCNC: 14.1 G/DL (ref 14–18)
IMM GRANULOCYTES # BLD AUTO: 0.03 K/UL (ref 0–0.04)
IMM GRANULOCYTES NFR BLD AUTO: 0.4 % (ref 0–0.5)
LYMPHOCYTES # BLD AUTO: 1.2 K/UL (ref 1–4.8)
LYMPHOCYTES NFR BLD: 18.4 % (ref 18–48)
MCH RBC QN AUTO: 28.8 PG (ref 27–31)
MCHC RBC AUTO-ENTMCNC: 32.6 G/DL (ref 32–36)
MCV RBC AUTO: 88 FL (ref 82–98)
MONOCYTES # BLD AUTO: 0.6 K/UL (ref 0.3–1)
MONOCYTES NFR BLD: 9.1 % (ref 4–15)
NEUTROPHILS # BLD AUTO: 4.7 K/UL (ref 1.8–7.7)
NEUTROPHILS NFR BLD: 71.1 % (ref 38–73)
NRBC BLD-RTO: 0 /100 WBC
PLATELET # BLD AUTO: 254 K/UL (ref 150–350)
PMV BLD AUTO: 9.9 FL (ref 9.2–12.9)
POTASSIUM SERPL-SCNC: 4 MMOL/L (ref 3.5–5.1)
PROT SERPL-MCNC: 7 G/DL (ref 6–8.4)
RBC # BLD AUTO: 4.9 M/UL (ref 4.6–6.2)
SODIUM SERPL-SCNC: 139 MMOL/L (ref 136–145)
WBC # BLD AUTO: 6.67 K/UL (ref 3.9–12.7)

## 2019-11-05 PROCEDURE — 86140 C-REACTIVE PROTEIN: CPT

## 2019-11-05 PROCEDURE — 85651 RBC SED RATE NONAUTOMATED: CPT

## 2019-11-05 PROCEDURE — 85025 COMPLETE CBC W/AUTO DIFF WBC: CPT

## 2019-11-05 PROCEDURE — 36415 COLL VENOUS BLD VENIPUNCTURE: CPT

## 2019-11-05 PROCEDURE — 80053 COMPREHEN METABOLIC PANEL: CPT

## 2019-11-06 ENCOUNTER — TELEPHONE (OUTPATIENT)
Dept: PHARMACY | Facility: CLINIC | Age: 49
End: 2019-11-06

## 2019-11-14 RX ORDER — SULFASALAZINE 500 MG/1
1000 TABLET, DELAYED RELEASE ORAL 2 TIMES DAILY
Qty: 120 TABLET | Refills: 4 | Status: SHIPPED | OUTPATIENT
Start: 2019-11-14 | End: 2020-05-05 | Stop reason: SDUPTHER

## 2019-11-27 DIAGNOSIS — E78.5 HYPERLIPIDEMIA: ICD-10-CM

## 2019-11-27 RX ORDER — ATORVASTATIN CALCIUM 10 MG/1
TABLET, FILM COATED ORAL
Qty: 90 TABLET | Refills: 3 | Status: SHIPPED | OUTPATIENT
Start: 2019-11-27 | End: 2020-06-04 | Stop reason: SDUPTHER

## 2019-11-27 RX ORDER — LEFLUNOMIDE 20 MG/1
TABLET ORAL
Qty: 90 TABLET | Refills: 0 | Status: SHIPPED | OUTPATIENT
Start: 2019-11-27 | End: 2020-03-18

## 2019-12-05 ENCOUNTER — TELEPHONE (OUTPATIENT)
Dept: PHARMACY | Facility: CLINIC | Age: 49
End: 2019-12-05

## 2020-01-06 ENCOUNTER — TELEPHONE (OUTPATIENT)
Dept: PHARMACY | Facility: CLINIC | Age: 50
End: 2020-01-06

## 2020-01-08 ENCOUNTER — PATIENT MESSAGE (OUTPATIENT)
Dept: RHEUMATOLOGY | Facility: CLINIC | Age: 50
End: 2020-01-08

## 2020-01-08 ENCOUNTER — TELEPHONE (OUTPATIENT)
Dept: RHEUMATOLOGY | Facility: CLINIC | Age: 50
End: 2020-01-08

## 2020-01-08 ENCOUNTER — LAB VISIT (OUTPATIENT)
Dept: LAB | Facility: OTHER | Age: 50
End: 2020-01-08
Attending: STUDENT IN AN ORGANIZED HEALTH CARE EDUCATION/TRAINING PROGRAM
Payer: COMMERCIAL

## 2020-01-08 DIAGNOSIS — D64.9 ANEMIA, UNSPECIFIED TYPE: ICD-10-CM

## 2020-01-08 DIAGNOSIS — M32.9 SYSTEMIC LUPUS ERYTHEMATOSUS, UNSPECIFIED SLE TYPE, UNSPECIFIED ORGAN INVOLVEMENT STATUS: Primary | ICD-10-CM

## 2020-01-08 DIAGNOSIS — L40.50 PSA (PSORIATIC ARTHRITIS): ICD-10-CM

## 2020-01-08 DIAGNOSIS — M19.90 ARTHRITIS: ICD-10-CM

## 2020-01-08 LAB
25(OH)D3+25(OH)D2 SERPL-MCNC: 40 NG/ML (ref 30–96)
ALBUMIN SERPL BCP-MCNC: 4 G/DL (ref 3.5–5.2)
ALP SERPL-CCNC: 61 U/L (ref 55–135)
ALT SERPL W/O P-5'-P-CCNC: 25 U/L (ref 10–44)
ANION GAP SERPL CALC-SCNC: 7 MMOL/L (ref 8–16)
AST SERPL-CCNC: 27 U/L (ref 10–40)
BASOPHILS # BLD AUTO: 0.03 K/UL (ref 0–0.2)
BASOPHILS NFR BLD: 0.5 % (ref 0–1.9)
BILIRUB SERPL-MCNC: 0.3 MG/DL (ref 0.1–1)
BUN SERPL-MCNC: 9 MG/DL (ref 6–20)
CALCIUM SERPL-MCNC: 9.6 MG/DL (ref 8.7–10.5)
CHLORIDE SERPL-SCNC: 105 MMOL/L (ref 95–110)
CO2 SERPL-SCNC: 28 MMOL/L (ref 23–29)
CREAT SERPL-MCNC: 0.9 MG/DL (ref 0.5–1.4)
CRP SERPL-MCNC: 0.3 MG/L (ref 0–8.2)
DIFFERENTIAL METHOD: ABNORMAL
EOSINOPHIL # BLD AUTO: 0.1 K/UL (ref 0–0.5)
EOSINOPHIL NFR BLD: 2.4 % (ref 0–8)
ERYTHROCYTE [DISTWIDTH] IN BLOOD BY AUTOMATED COUNT: 13.7 % (ref 11.5–14.5)
ERYTHROCYTE [SEDIMENTATION RATE] IN BLOOD: 3 MM/HR (ref 0–10)
EST. GFR  (AFRICAN AMERICAN): >60 ML/MIN/1.73 M^2
EST. GFR  (NON AFRICAN AMERICAN): >60 ML/MIN/1.73 M^2
GLUCOSE SERPL-MCNC: 83 MG/DL (ref 70–110)
HCT VFR BLD AUTO: 40.2 % (ref 40–54)
HGB BLD-MCNC: 12.6 G/DL (ref 14–18)
IMM GRANULOCYTES # BLD AUTO: 0.02 K/UL (ref 0–0.04)
IMM GRANULOCYTES NFR BLD AUTO: 0.3 % (ref 0–0.5)
LYMPHOCYTES # BLD AUTO: 1.2 K/UL (ref 1–4.8)
LYMPHOCYTES NFR BLD: 21.3 % (ref 18–48)
MCH RBC QN AUTO: 29.2 PG (ref 27–31)
MCHC RBC AUTO-ENTMCNC: 31.3 G/DL (ref 32–36)
MCV RBC AUTO: 93 FL (ref 82–98)
MONOCYTES # BLD AUTO: 0.7 K/UL (ref 0.3–1)
MONOCYTES NFR BLD: 11.5 % (ref 4–15)
NEUTROPHILS # BLD AUTO: 3.7 K/UL (ref 1.8–7.7)
NEUTROPHILS NFR BLD: 64 % (ref 38–73)
NRBC BLD-RTO: 0 /100 WBC
PLATELET # BLD AUTO: 205 K/UL (ref 150–350)
PMV BLD AUTO: 9.9 FL (ref 9.2–12.9)
POTASSIUM SERPL-SCNC: 4.6 MMOL/L (ref 3.5–5.1)
PROT SERPL-MCNC: 6.7 G/DL (ref 6–8.4)
RBC # BLD AUTO: 4.31 M/UL (ref 4.6–6.2)
SODIUM SERPL-SCNC: 140 MMOL/L (ref 136–145)
WBC # BLD AUTO: 5.74 K/UL (ref 3.9–12.7)

## 2020-01-08 PROCEDURE — 85025 COMPLETE CBC W/AUTO DIFF WBC: CPT

## 2020-01-08 PROCEDURE — 82306 VITAMIN D 25 HYDROXY: CPT

## 2020-01-08 PROCEDURE — 36415 COLL VENOUS BLD VENIPUNCTURE: CPT

## 2020-01-08 PROCEDURE — 80053 COMPREHEN METABOLIC PANEL: CPT

## 2020-01-08 PROCEDURE — 85651 RBC SED RATE NONAUTOMATED: CPT

## 2020-01-08 PROCEDURE — 86140 C-REACTIVE PROTEIN: CPT

## 2020-01-09 ENCOUNTER — LAB VISIT (OUTPATIENT)
Dept: LAB | Facility: OTHER | Age: 50
End: 2020-01-09
Attending: INTERNAL MEDICINE
Payer: COMMERCIAL

## 2020-01-09 ENCOUNTER — TELEPHONE (OUTPATIENT)
Dept: RHEUMATOLOGY | Facility: CLINIC | Age: 50
End: 2020-01-09

## 2020-01-09 DIAGNOSIS — D64.9 ANEMIA, UNSPECIFIED TYPE: ICD-10-CM

## 2020-01-09 DIAGNOSIS — L40.50 PSA (PSORIATIC ARTHRITIS): ICD-10-CM

## 2020-01-09 DIAGNOSIS — M32.9 SYSTEMIC LUPUS ERYTHEMATOSUS, UNSPECIFIED SLE TYPE, UNSPECIFIED ORGAN INVOLVEMENT STATUS: ICD-10-CM

## 2020-01-09 LAB
BASOPHILS # BLD AUTO: 0.04 K/UL (ref 0–0.2)
BASOPHILS NFR BLD: 0.7 % (ref 0–1.9)
DIFFERENTIAL METHOD: ABNORMAL
EOSINOPHIL # BLD AUTO: 0.1 K/UL (ref 0–0.5)
EOSINOPHIL NFR BLD: 2.1 % (ref 0–8)
ERYTHROCYTE [DISTWIDTH] IN BLOOD BY AUTOMATED COUNT: 13.7 % (ref 11.5–14.5)
FERRITIN SERPL-MCNC: 48 NG/ML (ref 20–300)
FOLATE SERPL-MCNC: 17.3 NG/ML (ref 4–24)
HAPTOGLOB SERPL-MCNC: 174 MG/DL (ref 30–250)
HCT VFR BLD AUTO: 40.7 % (ref 40–54)
HGB BLD-MCNC: 12.8 G/DL (ref 14–18)
IMM GRANULOCYTES # BLD AUTO: 0.02 K/UL (ref 0–0.04)
IMM GRANULOCYTES NFR BLD AUTO: 0.3 % (ref 0–0.5)
IRON SERPL-MCNC: 65 UG/DL (ref 45–160)
LDH SERPL L TO P-CCNC: 182 U/L (ref 110–260)
LYMPHOCYTES # BLD AUTO: 1.5 K/UL (ref 1–4.8)
LYMPHOCYTES NFR BLD: 24.2 % (ref 18–48)
MCH RBC QN AUTO: 28.8 PG (ref 27–31)
MCHC RBC AUTO-ENTMCNC: 31.4 G/DL (ref 32–36)
MCV RBC AUTO: 92 FL (ref 82–98)
MONOCYTES # BLD AUTO: 0.7 K/UL (ref 0.3–1)
MONOCYTES NFR BLD: 10.8 % (ref 4–15)
NEUTROPHILS # BLD AUTO: 3.8 K/UL (ref 1.8–7.7)
NEUTROPHILS NFR BLD: 61.9 % (ref 38–73)
NRBC BLD-RTO: 0 /100 WBC
PATH REV BLD -IMP: NORMAL
PATH REV BLD -IMP: NORMAL
PLATELET # BLD AUTO: 215 K/UL (ref 150–350)
PMV BLD AUTO: 9.7 FL (ref 9.2–12.9)
RBC # BLD AUTO: 4.44 M/UL (ref 4.6–6.2)
RETICS/RBC NFR AUTO: 1.7 % (ref 0.4–2)
SATURATED IRON: 19 % (ref 20–50)
TOTAL IRON BINDING CAPACITY: 345 UG/DL (ref 250–450)
TRANSFERRIN SERPL-MCNC: 233 MG/DL (ref 200–375)
VIT B12 SERPL-MCNC: 511 PG/ML (ref 210–950)
WBC # BLD AUTO: 6.11 K/UL (ref 3.9–12.7)

## 2020-01-09 PROCEDURE — 83540 ASSAY OF IRON: CPT

## 2020-01-09 PROCEDURE — 85025 COMPLETE CBC W/AUTO DIFF WBC: CPT

## 2020-01-09 PROCEDURE — 83010 ASSAY OF HAPTOGLOBIN QUANT: CPT

## 2020-01-09 PROCEDURE — 84238 ASSAY NONENDOCRINE RECEPTOR: CPT

## 2020-01-09 PROCEDURE — 82746 ASSAY OF FOLIC ACID SERUM: CPT

## 2020-01-09 PROCEDURE — 82607 VITAMIN B-12: CPT

## 2020-01-09 PROCEDURE — 85045 AUTOMATED RETICULOCYTE COUNT: CPT

## 2020-01-09 PROCEDURE — 83615 LACTATE (LD) (LDH) ENZYME: CPT

## 2020-01-09 PROCEDURE — 36415 COLL VENOUS BLD VENIPUNCTURE: CPT

## 2020-01-09 PROCEDURE — 85060 BLOOD SMEAR INTERPRETATION: CPT | Mod: ,,, | Performed by: PATHOLOGY

## 2020-01-09 PROCEDURE — 82728 ASSAY OF FERRITIN: CPT

## 2020-01-09 PROCEDURE — 85060 PATHOLOGIST REVIEW: ICD-10-PCS | Mod: ,,, | Performed by: PATHOLOGY

## 2020-01-09 NOTE — TELEPHONE ENCOUNTER
Please schedule appt with Dr. Sergio Tao in [facility] for KUSHAL and due for screening colonoscopy. Thanks ELIEL

## 2020-01-10 NOTE — TELEPHONE ENCOUNTER
Chart reviewed.  EGD and colonoscopy appear to be indicated in this case.  If he is amenable to this, we can schedule him directly for the procedures instead of coming to clinic.  Please contact him to find out if he is okay with having the procedures done or if he would prefer to come to clinic first.

## 2020-01-10 NOTE — TELEPHONE ENCOUNTER
MA attempted to contact patient, but he did not answer. Left voicemail for patient to return a call to our office.

## 2020-01-13 LAB — STFR SERPL-MCNC: 3.1 MG/L (ref 1.8–4.6)

## 2020-01-14 ENCOUNTER — CLINICAL SUPPORT (OUTPATIENT)
Dept: INFECTIOUS DISEASES | Facility: CLINIC | Age: 50
End: 2020-01-14
Payer: COMMERCIAL

## 2020-01-14 ENCOUNTER — OFFICE VISIT (OUTPATIENT)
Dept: RHEUMATOLOGY | Facility: CLINIC | Age: 50
End: 2020-01-14
Payer: COMMERCIAL

## 2020-01-14 VITALS
HEIGHT: 68 IN | DIASTOLIC BLOOD PRESSURE: 67 MMHG | WEIGHT: 134.5 LBS | BODY MASS INDEX: 20.38 KG/M2 | HEART RATE: 76 BPM | SYSTOLIC BLOOD PRESSURE: 103 MMHG

## 2020-01-14 DIAGNOSIS — L93.2 DRUG-INDUCED LUPUS ERYTHEMATOSUS: ICD-10-CM

## 2020-01-14 DIAGNOSIS — Z79.899 LONG-TERM USE OF PLAQUENIL: ICD-10-CM

## 2020-01-14 DIAGNOSIS — L40.50 PSA (PSORIATIC ARTHRITIS): Primary | ICD-10-CM

## 2020-01-14 DIAGNOSIS — Z51.81 MEDICATION MONITORING ENCOUNTER: ICD-10-CM

## 2020-01-14 DIAGNOSIS — T50.905A DRUG-INDUCED LUPUS ERYTHEMATOSUS: ICD-10-CM

## 2020-01-14 PROCEDURE — 90471 ZOSTER RECOMBINANT VACCINE: ICD-10-PCS | Mod: S$GLB,,, | Performed by: INTERNAL MEDICINE

## 2020-01-14 PROCEDURE — 99999 PR PBB SHADOW E&M-EST. PATIENT-LVL III: CPT | Mod: PBBFAC,,, | Performed by: STUDENT IN AN ORGANIZED HEALTH CARE EDUCATION/TRAINING PROGRAM

## 2020-01-14 PROCEDURE — 99214 PR OFFICE/OUTPT VISIT, EST, LEVL IV, 30-39 MIN: ICD-10-PCS | Mod: S$GLB,,, | Performed by: STUDENT IN AN ORGANIZED HEALTH CARE EDUCATION/TRAINING PROGRAM

## 2020-01-14 PROCEDURE — 90750 HZV VACC RECOMBINANT IM: CPT | Mod: S$GLB,,, | Performed by: INTERNAL MEDICINE

## 2020-01-14 PROCEDURE — 90471 IMMUNIZATION ADMIN: CPT | Mod: S$GLB,,, | Performed by: INTERNAL MEDICINE

## 2020-01-14 PROCEDURE — 99214 OFFICE O/P EST MOD 30 MIN: CPT | Mod: S$GLB,,, | Performed by: STUDENT IN AN ORGANIZED HEALTH CARE EDUCATION/TRAINING PROGRAM

## 2020-01-14 PROCEDURE — 3008F BODY MASS INDEX DOCD: CPT | Mod: CPTII,S$GLB,, | Performed by: STUDENT IN AN ORGANIZED HEALTH CARE EDUCATION/TRAINING PROGRAM

## 2020-01-14 PROCEDURE — 99999 PR PBB SHADOW E&M-EST. PATIENT-LVL III: ICD-10-PCS | Mod: PBBFAC,,, | Performed by: STUDENT IN AN ORGANIZED HEALTH CARE EDUCATION/TRAINING PROGRAM

## 2020-01-14 PROCEDURE — 3008F PR BODY MASS INDEX (BMI) DOCUMENTED: ICD-10-PCS | Mod: CPTII,S$GLB,, | Performed by: STUDENT IN AN ORGANIZED HEALTH CARE EDUCATION/TRAINING PROGRAM

## 2020-01-14 PROCEDURE — 90750 ZOSTER RECOMBINANT VACCINE: ICD-10-PCS | Mod: S$GLB,,, | Performed by: INTERNAL MEDICINE

## 2020-01-14 RX ORDER — LORAZEPAM 0.5 MG/1
0.5 TABLET ORAL EVERY 6 HOURS PRN
COMMUNITY
End: 2022-04-27

## 2020-01-14 RX ORDER — FLUOXETINE HYDROCHLORIDE 20 MG/1
40 CAPSULE ORAL DAILY
COMMUNITY
End: 2022-04-27

## 2020-01-14 ASSESSMENT — ROUTINE ASSESSMENT OF PATIENT INDEX DATA (RAPID3)
TOTAL RAPID3 SCORE: 2.56
AM STIFFNESS SCORE: 1, YES
FATIGUE SCORE: 6.5
PSYCHOLOGICAL DISTRESS SCORE: 2.2
PATIENT GLOBAL ASSESSMENT SCORE: 3.5
MDHAQ FUNCTION SCORE: .5
PAIN SCORE: 2.5
WHEN YOU AWAKENED IN THE MORNING OVER THE LAST WEEK, PLEASE INDICATE THE AMOUNT OF TIME IT TAKES UNTIL YOU ARE AS LIMBER AS YOU WILL BE FOR THE DAY: 30 MINS

## 2020-01-14 ASSESSMENT — SYSTEMIC LUPUS ERYTHEMATOSUS DISEASE ACTIVITY INDEX (SLEDAI): TOTAL_SCORE: 0

## 2020-01-14 NOTE — PROGRESS NOTES
.  I  Have personally take the history and examined the patient and agree with fellow's note as stated above.

## 2020-01-14 NOTE — PROGRESS NOTES
Subjective:       Patient ID: Rebel Rodriguez is a 49 y.o. male.    Chief Complaint: Disease Management. F/u for psoriatic arthritis and drug-induced lupus    48YM with psoriatic arthritis and infliximab induced lupus presenting for follow up. Drug induced Lupus: Related to infliximab (last dose 2/29/16; + antiphosholipids-anticardiolipin IgM mild +dsDNA: 1:1280,  DEE+ 1:1280 homogenous ,  + anti histone ab, CH50 low at 51 along with recurrent intermittent fevers, chills, arthralgias. Pt is currently on plaquenil 300 mg daily, Leflunomide 20 mg daily, Secukinumab 300mg monthly, and SSZ 1000 qHS BID. Was supposed to be on SSZ 1000 mg BID, but due to decreased appetite is only taking half the dose.     Plan for EGD and C-scope per Dr. Menezes for KUSHAL, to be scheduled vs clinic appt. Endorses gross hematuria yesterday, no pain. Concern for nephrolithiasis again. Has not told his urologist Dr. Oropeza yet.      Since last clinic visit 10/8/2019 with myself and Dr. Beverly, pt reports that he is doing well from skin perspective, and arthralgias improving. Still having Had having R ankle stiffness/intermittent pain since end of September after doing max chi. Dorsi-flexion causes pain. Still with some swelling but improved in R R 2-3 MCP and minimal L 2 MC. No longer painful MCPs. AM stiffness x 20 minutes.    Sept 2019 Lupus labs were wnl (dsDNA, C3,C4, ESR, CRP, UPCR).      Concerned about mental wellness and ways to increase weight. Has decreased appetite, eats only 1 meal/day. Has seen nutrition, no benefit. Will discuss with PCP. Follows with psychiatrist and psychologist, now on fluoxetine.      Pt denies fatigue, oral/nasal/genital ulcers, headaches, fever, chills, n/v, abd pain, CP, SOB, dysphagia, changes in bowel/bladder habits, vision changes,photosensitivity, or erythema/rashes.    Review of Systems   Constitutional: Positive for appetite change (decreased, chronic). Negative for fatigue, fever and unexpected weight  "change.   HENT: Negative for mouth sores and trouble swallowing.    Eyes: Negative for redness and visual disturbance.   Respiratory: Negative for cough and shortness of breath.    Cardiovascular: Negative for chest pain and leg swelling.   Gastrointestinal: Negative for constipation and diarrhea.   Genitourinary: Positive for hematuria. Negative for difficulty urinating, flank pain and genital sores.   Musculoskeletal: Positive for arthralgias (R ankle). Negative for back pain, gait problem, joint swelling and myalgias.   Skin: Positive for rash. Negative for wound.   Allergic/Immunologic: Positive for immunocompromised state.   Neurological: Negative for headaches.   Hematological: Does not bruise/bleed easily.   Psychiatric/Behavioral: Negative for agitation. The patient is not nervous/anxious.          Objective:   /67   Pulse 76   Ht 5' 8.4" (1.737 m)   Wt 61 kg (134 lb 8 oz)   BMI 20.21 kg/m²      Physical Exam   Vitals reviewed.  Constitutional: He is oriented to person, place, and time and well-developed, well-nourished, and in no distress. No distress.   BMI 20   HENT:   Head: Normocephalic and atraumatic.   Right Ear: External ear normal.   Left Ear: External ear normal.   Nose: Nose normal.   Mouth/Throat: Oropharynx is clear and moist. No oropharyngeal exudate.   Eyes: Conjunctivae and EOM are normal. Pupils are equal, round, and reactive to light. Right eye exhibits no discharge. Left eye exhibits no discharge. No scleral icterus.   Neck: Neck supple. No thyromegaly present.   Cardiovascular: Normal rate, regular rhythm, normal heart sounds and intact distal pulses.    No murmur heard.  Pulmonary/Chest: Effort normal and breath sounds normal. No respiratory distress. He has no wheezes. He has no rales. He exhibits no tenderness.   Abdominal: Soft. Bowel sounds are normal. He exhibits no distension. There is no tenderness. There is no guarding.       Right Side Rheumatological Exam "     Examination finds the shoulder, elbow, wrist, knee, 1st PIP, 1st MCP, 2nd PIP, 3rd PIP, 4th PIP, 4th MCP, 5th PIP and 5th MCP normal.    He has swelling of the 2nd MCP and 3rd MCP    Left Side Rheumatological Exam     Examination finds the shoulder, elbow, wrist, knee, 1st PIP, 1st MCP, 2nd PIP, 3rd PIP, 3rd MCP, 4th PIP, 4th MCP, 5th PIP and 5th MCP normal.    He has swelling of the 2nd MCP      Neurological: He is alert and oriented to person, place, and time.   Skin: Skin is warm and dry. Rash (Scalp erythematous lesion likely dry skin) noted. He is not diaphoretic. There is erythema.     Chronic erythema with hypopigmented areas on neck, upper back and chest.  R forearm with small brown circular lesion, no discharge, no TTP    Psychiatric: Mood and affect normal.   Musculoskeletal: Normal range of motion. He exhibits tenderness (R ankle, dorsum). He exhibits no edema or deformity.        Assessment:       1. PSA (psoriatic arthritis)    2. Drug-induced lupus erythematosus    3. Medication monitoring encounter    4. Long-term use of Plaquenil          Plan:       Problem List Items Addressed This Visit        Immunology/Multi System    Drug-induced lupus erythematosus       Other    Medication monitoring encounter      Other Visit Diagnoses     PSA (psoriatic arthritis)    -  Primary    Relevant Orders    Ambulatory Referral to Physical/Occupational Therapy    Long-term use of Plaquenil            Psoriatric Arthritis  DAPSA TJC 1 SJC 3 4.03 c/w low disease activity, improved from prior. Labs shows CBC significant for anemia hgb 12.8. WNL iron, TIBC, transferrin, ferritin, folate, B12, sol. Transferrin receptor, haptoglobin, retic, LDH. Low saturated iron 19.  Metabolic profile shows normal renal and liver function. Inflammatory markers remain normal.   Did not tolerate MTX 2/2 nausea.  Continue Cosentyx 300mg monthly  Continue Leflunomide 20mg daily  Currently on SSZ 1000 mg qHS, recommend increasing to 1500  mg daily (500 mg in AM, 1000 mg in PM)  Can consider switching Cosentyx to Xeljanz if pt remains symptomatic.   ASCVD 1.3% continue low dose statin  -     Sedimentation rate; Standing  -     C-reactive protein; Standing  -     CBC auto differential; Standing  -     Comprehensive metabolic panel; Standing  -     Ambulatory Referral to Physical/Occupational Therapy for aquatic therapy     Drug-induced lupus erythematosus  Related to infliximab (last dose 2/29/16; + antiphosholipids-anticardiolipin IgM mild +dsDNA: 1:1280,  DEE+ 1:1280 homogenous ,  + anti histone ab, CH50 low at 51 along with recurrent intermittent fevers, chills, arthralgias.  SLEDAI 0 (neg dsDNA, normal complements, UPCR wnl, UA neg for protein on last set of labs 9/2019)  Cont Plaquenil 300mg daily  Better control with increased plaquenil dose. May plan to slowly wean off plaquenil in the future IF there is no evidence of lupus activity and pt in remission    Anemia  GI clinic to call pt (Dr. Menezes) to schedule EGD/c-scope or clinic visit for KUSHAL. Pt will schedule clinic visit.  Last Colonoscopy 9/2015: A single diminutive erosion in the terminal ileum. Biopsy unremarkable. Need repeat in 5 yrs so due this year anyway.  Pt with gross hematuria 1/13/2020. Pt to message his urologist Dr. Oropeza for evaluation, concern for recurrent kidney stones     Medication monitoring encounter  No evidence of toxicity  Pt to schedule next Plaquenil eye exam yearly     Recurrent nephrolithiasis  Previous Uro risk showed a hypocitraturic nephrolithiasis in 2013.      Psoriasis  Stable.  Pt to make f/u appt with Dermatology as needed  Recommended Cera-Ve for dry skin     Immunizations  UTD. High dose flu vaccine and Shingrix (1st dose) done 10/25.  2nd dose of Shingrix today.     Discussed with Dr. Beverly. RTC in 3 months with labs prior.     Kathie Garcia,   Rheumatology, PGY4

## 2020-01-28 NOTE — TELEPHONE ENCOUNTER
MA spoke with patient.     Mr. Rodriguez will give us a callback to let us know if her prefers a clinic visit or if he would like to schedule recommended procedure.     Portal message sent as well.

## 2020-01-31 DIAGNOSIS — L40.50 PSA (PSORIATIC ARTHRITIS): ICD-10-CM

## 2020-02-03 ENCOUNTER — TELEPHONE (OUTPATIENT)
Dept: PHARMACY | Facility: CLINIC | Age: 50
End: 2020-02-03

## 2020-02-11 ENCOUNTER — OFFICE VISIT (OUTPATIENT)
Dept: OTOLARYNGOLOGY | Facility: CLINIC | Age: 50
End: 2020-02-11
Payer: COMMERCIAL

## 2020-02-11 VITALS — DIASTOLIC BLOOD PRESSURE: 62 MMHG | SYSTOLIC BLOOD PRESSURE: 111 MMHG | HEART RATE: 75 BPM

## 2020-02-11 DIAGNOSIS — R22.0 SWELLING OF LEFT SIDE OF FACE: Primary | ICD-10-CM

## 2020-02-11 DIAGNOSIS — J31.0 CHRONIC RHINITIS: ICD-10-CM

## 2020-02-11 PROCEDURE — 31231 NASAL/SINUS ENDOSCOPY: ICD-10-PCS | Mod: S$GLB,,, | Performed by: OTOLARYNGOLOGY

## 2020-02-11 PROCEDURE — 99213 OFFICE O/P EST LOW 20 MIN: CPT | Mod: 25,S$GLB,, | Performed by: OTOLARYNGOLOGY

## 2020-02-11 PROCEDURE — 99999 PR PBB SHADOW E&M-EST. PATIENT-LVL III: ICD-10-PCS | Mod: PBBFAC,,, | Performed by: OTOLARYNGOLOGY

## 2020-02-11 PROCEDURE — 99999 PR PBB SHADOW E&M-EST. PATIENT-LVL III: CPT | Mod: PBBFAC,,, | Performed by: OTOLARYNGOLOGY

## 2020-02-11 PROCEDURE — 31231 NASAL ENDOSCOPY DX: CPT | Mod: S$GLB,,, | Performed by: OTOLARYNGOLOGY

## 2020-02-11 PROCEDURE — 99213 PR OFFICE/OUTPT VISIT, EST, LEVL III, 20-29 MIN: ICD-10-PCS | Mod: 25,S$GLB,, | Performed by: OTOLARYNGOLOGY

## 2020-02-11 NOTE — PROCEDURES
Nasal/sinus endoscopy  Date/Time: 2/11/2020 1:30 PM  Performed by: Gerard Manzo MD  Authorized by: Gerard Manzo MD     Consent Done?:  Yes (Verbal)  Anesthesia:     Local anesthetic:  Lidocaine 4% and Sunny-Synephrine 1/2%    Patient tolerance:  Patient tolerated the procedure well with no immediate complications  Nose:     Procedure Performed:  Nasal Endoscopy  External:      No external nasal deformity  Intranasal:      Mucosa no polyps     Mucosa ulcers not present     No mucosa lesions present     Turbinates not enlarged     No septum gross deformity  Nasopharynx:      No mucosa lesions     Adenoids not present     Posterior choanae patent     Eustachian tube patent     Bilateral maxillary antrostomies well healed.  No purulence or masses.  Mild right-sided posterior nonpurulent mucus flow.

## 2020-02-11 NOTE — PROGRESS NOTES
Subjective:      Rebel is a 50 y.o. male who comes for follow-up of sinusitis.  His last visit with me was on 9/20/2018.  Now over 16 months status-post endoscopic sinus surgery.   Doing well overall, some residual variable congestion, crusting in nostrils.  Recently used [place].  About 2 weeks ago noted left medial cheek redness and mild swelling, though nontender and not weeping.    SNOT-22 score: : (P) 45  NOSE score:: (P) 85%  ETDQ-7 score:: (P) 2    The patient's medications, allergies, past medical, surgical, social and family histories were reviewed and updated as appropriate.    A detailed review of systems was obtained with pertinent positives as per the above HPI, and otherwise negative.        Objective:     /62   Pulse 75        Constitutional:   He appears well-developed. He is cooperative.     Head:  Normocephalic.   4 cm circular area of mild redness and mild swelling in left medial cheek skin, no induration or crusting.     Nose:  No mucosal edema, rhinorrhea, septal deviation or polyps. No epistaxis. Turbinates normal, no turbinate masses and no turbinate hypertrophy.  Right sinus exhibits no maxillary sinus tenderness and no frontal sinus tenderness. Left sinus exhibits no maxillary sinus tenderness and no frontal sinus tenderness.     Mouth/Throat  Oropharynx clear and moist without lesions or asymmetry. No oropharyngeal exudate or posterior oropharyngeal erythema.     Neck:  No adenopathy. Normal range of motion present.     He has no cervical adenopathy.       Procedure    Nasal endoscopy performed.  See procedure note.        Data Reviewed    WBC (K/uL)   Date Value   01/09/2020 6.11     Eosinophil% (%)   Date Value   01/09/2020 2.1     Eos # (K/uL)   Date Value   01/09/2020 0.1     Platelets (K/uL)   Date Value   01/09/2020 215     Glucose (mg/dL)   Date Value   01/08/2020 83     No results found for: IGE    Pathology report indicated non-eosinophilic chronic inflammation.            Assessment:     1. Swelling of left side of face    2. Chronic rhinitis         Plan:     No active sinus disease.  Refer to dermatology.  [place] prn.  Follow up if symptoms worsen or fail to improve.

## 2020-02-27 ENCOUNTER — TELEPHONE (OUTPATIENT)
Dept: PHARMACY | Facility: CLINIC | Age: 50
End: 2020-02-27

## 2020-03-18 RX ORDER — LEFLUNOMIDE 20 MG/1
TABLET ORAL
Qty: 90 TABLET | Refills: 0 | Status: SHIPPED | OUTPATIENT
Start: 2020-03-18 | End: 2020-06-14

## 2020-03-25 ENCOUNTER — TELEPHONE (OUTPATIENT)
Dept: PHARMACY | Facility: CLINIC | Age: 50
End: 2020-03-25

## 2020-03-25 ENCOUNTER — PATIENT MESSAGE (OUTPATIENT)
Dept: PHARMACY | Facility: CLINIC | Age: 50
End: 2020-03-25

## 2020-04-06 ENCOUNTER — PATIENT OUTREACH (OUTPATIENT)
Dept: ADMINISTRATIVE | Facility: HOSPITAL | Age: 50
End: 2020-04-06

## 2020-04-30 ENCOUNTER — LAB VISIT (OUTPATIENT)
Dept: LAB | Facility: OTHER | Age: 50
End: 2020-04-30
Payer: COMMERCIAL

## 2020-04-30 DIAGNOSIS — M19.90 ARTHRITIS: ICD-10-CM

## 2020-04-30 LAB
ALBUMIN SERPL BCP-MCNC: 4.1 G/DL (ref 3.5–5.2)
ALP SERPL-CCNC: 73 U/L (ref 55–135)
ALT SERPL W/O P-5'-P-CCNC: 25 U/L (ref 10–44)
ANION GAP SERPL CALC-SCNC: 8 MMOL/L (ref 8–16)
AST SERPL-CCNC: 28 U/L (ref 10–40)
BASOPHILS # BLD AUTO: 0.04 K/UL (ref 0–0.2)
BASOPHILS NFR BLD: 0.6 % (ref 0–1.9)
BILIRUB SERPL-MCNC: 0.5 MG/DL (ref 0.1–1)
BUN SERPL-MCNC: 11 MG/DL (ref 6–20)
CALCIUM SERPL-MCNC: 9.3 MG/DL (ref 8.7–10.5)
CHLORIDE SERPL-SCNC: 102 MMOL/L (ref 95–110)
CO2 SERPL-SCNC: 27 MMOL/L (ref 23–29)
CREAT SERPL-MCNC: 0.9 MG/DL (ref 0.5–1.4)
CRP SERPL-MCNC: 0.3 MG/L (ref 0–8.2)
DIFFERENTIAL METHOD: ABNORMAL
EOSINOPHIL # BLD AUTO: 0.2 K/UL (ref 0–0.5)
EOSINOPHIL NFR BLD: 2.3 % (ref 0–8)
ERYTHROCYTE [DISTWIDTH] IN BLOOD BY AUTOMATED COUNT: 13.1 % (ref 11.5–14.5)
ERYTHROCYTE [SEDIMENTATION RATE] IN BLOOD: 12 MM/HR (ref 0–10)
EST. GFR  (AFRICAN AMERICAN): >60 ML/MIN/1.73 M^2
EST. GFR  (NON AFRICAN AMERICAN): >60 ML/MIN/1.73 M^2
GLUCOSE SERPL-MCNC: 92 MG/DL (ref 70–110)
HCT VFR BLD AUTO: 42.2 % (ref 40–54)
HGB BLD-MCNC: 13.4 G/DL (ref 14–18)
IMM GRANULOCYTES # BLD AUTO: 0.02 K/UL (ref 0–0.04)
IMM GRANULOCYTES NFR BLD AUTO: 0.3 % (ref 0–0.5)
LYMPHOCYTES # BLD AUTO: 1.7 K/UL (ref 1–4.8)
LYMPHOCYTES NFR BLD: 26.9 % (ref 18–48)
MCH RBC QN AUTO: 28.9 PG (ref 27–31)
MCHC RBC AUTO-ENTMCNC: 31.8 G/DL (ref 32–36)
MCV RBC AUTO: 91 FL (ref 82–98)
MONOCYTES # BLD AUTO: 0.8 K/UL (ref 0.3–1)
MONOCYTES NFR BLD: 13.1 % (ref 4–15)
NEUTROPHILS # BLD AUTO: 3.6 K/UL (ref 1.8–7.7)
NEUTROPHILS NFR BLD: 56.8 % (ref 38–73)
NRBC BLD-RTO: 0 /100 WBC
PLATELET # BLD AUTO: 225 K/UL (ref 150–350)
PMV BLD AUTO: 9.6 FL (ref 9.2–12.9)
POTASSIUM SERPL-SCNC: 4.1 MMOL/L (ref 3.5–5.1)
PROT SERPL-MCNC: 7 G/DL (ref 6–8.4)
RBC # BLD AUTO: 4.63 M/UL (ref 4.6–6.2)
SODIUM SERPL-SCNC: 137 MMOL/L (ref 136–145)
WBC # BLD AUTO: 6.4 K/UL (ref 3.9–12.7)

## 2020-04-30 PROCEDURE — 85651 RBC SED RATE NONAUTOMATED: CPT

## 2020-04-30 PROCEDURE — 86140 C-REACTIVE PROTEIN: CPT

## 2020-04-30 PROCEDURE — 36415 COLL VENOUS BLD VENIPUNCTURE: CPT

## 2020-04-30 PROCEDURE — 85025 COMPLETE CBC W/AUTO DIFF WBC: CPT

## 2020-04-30 PROCEDURE — 80053 COMPREHEN METABOLIC PANEL: CPT

## 2020-05-04 ENCOUNTER — TELEPHONE (OUTPATIENT)
Dept: RHEUMATOLOGY | Facility: CLINIC | Age: 50
End: 2020-05-04

## 2020-05-05 ENCOUNTER — TELEPHONE (OUTPATIENT)
Dept: PHARMACY | Facility: CLINIC | Age: 50
End: 2020-05-05

## 2020-05-05 ENCOUNTER — PATIENT MESSAGE (OUTPATIENT)
Dept: RHEUMATOLOGY | Facility: CLINIC | Age: 50
End: 2020-05-05

## 2020-05-05 ENCOUNTER — OFFICE VISIT (OUTPATIENT)
Dept: RHEUMATOLOGY | Facility: CLINIC | Age: 50
End: 2020-05-05
Payer: COMMERCIAL

## 2020-05-05 DIAGNOSIS — Z79.899 LONG-TERM USE OF PLAQUENIL: ICD-10-CM

## 2020-05-05 DIAGNOSIS — T50.905A DRUG-INDUCED LUPUS ERYTHEMATOSUS: ICD-10-CM

## 2020-05-05 DIAGNOSIS — Z51.81 MEDICATION MONITORING ENCOUNTER: ICD-10-CM

## 2020-05-05 DIAGNOSIS — N20.0 RECURRENT NEPHROLITHIASIS: ICD-10-CM

## 2020-05-05 DIAGNOSIS — L93.2 DRUG-INDUCED LUPUS ERYTHEMATOSUS: ICD-10-CM

## 2020-05-05 DIAGNOSIS — L40.50 PSA (PSORIATIC ARTHRITIS): Primary | ICD-10-CM

## 2020-05-05 PROCEDURE — 99213 OFFICE O/P EST LOW 20 MIN: CPT | Mod: 95,,, | Performed by: STUDENT IN AN ORGANIZED HEALTH CARE EDUCATION/TRAINING PROGRAM

## 2020-05-05 PROCEDURE — 99213 PR OFFICE/OUTPT VISIT, EST, LEVL III, 20-29 MIN: ICD-10-PCS | Mod: 95,,, | Performed by: STUDENT IN AN ORGANIZED HEALTH CARE EDUCATION/TRAINING PROGRAM

## 2020-05-05 RX ORDER — SULFASALAZINE 500 MG/1
1000 TABLET, DELAYED RELEASE ORAL 2 TIMES DAILY
Qty: 120 TABLET | Refills: 4 | Status: SHIPPED | OUTPATIENT
Start: 2020-05-05 | End: 2021-08-23

## 2020-05-05 ASSESSMENT — SYSTEMIC LUPUS ERYTHEMATOSUS DISEASE ACTIVITY INDEX (SLEDAI): TOTAL_SCORE: 0

## 2020-05-05 NOTE — PROGRESS NOTES
I have personally reviewed the history, confirmed exam findings, and discussed assessment and plan with fellow. Has new pain, stiffness and swelling right thumb and index mcp and right ankle joint, despite maximal dose secukinumab. Avoid anti-TNF given infliximab induced SLE. Avoid [place] with h/o TIA/CVA. Will try alternative anti-IL17A. Risks and benefits discussed. Had secukinumab dosed yesterday, so will start izekizumab in one month. Re-do pre-DMARD labs given biologic switch. Increase sulfasalazine-EC to 1000mg twice daily RJQ

## 2020-05-05 NOTE — PROGRESS NOTES
Pre chart  Answers for HPI/ROS submitted by the patient on 5/4/2020   fever: No  eye redness: No  headaches: Yes  shortness of breath: No  chest pain: No  trouble swallowing: No  diarrhea: No  constipation: No  unexpected weight change: No  genital sore: No  Bruises or bleeds easily: No  cough: No

## 2020-05-05 NOTE — PROGRESS NOTES
Subjective:       Patient ID: Rebel Rodriguez is a 50 y.o. male.    Chief Complaint: F/u for psoriatic arthritis and drug-induced lupus    The patient location is: home  The chief complaint leading to consultation is: f/u  Visit type: audiovisual  Total time spent with patient: 30 min  Each patient to whom he or she provides medical services by telemedicine is:  (1) informed of the relationship between the physician and patient and the respective role of any other health care provider with respect to management of the patient; and (2) notified that he or she may decline to receive medical services by telemedicine and may withdraw from such care at any time.      48YM with psoriatic arthritis and infliximab induced lupus presenting for follow up. Drug induced Lupus: Related to infliximab (last dose 2/29/16; + antiphosholipids-anticardiolipin IgM mild +dsDNA: 1:1280,  DEE+ 1:1280 homogenous ,  + anti histone ab, CH50 low at 51 along with recurrent intermittent fevers, chills, arthralgias. Pt is currently on plaquenil 300 mg daily, Leflunomide 20 mg daily, Secukinumab 300mg monthly, and SSZ 1000 qHS. Was supposed to be on SSZ 1500 mg daily (500 AM and 1000 PM), but due to decreased appetite and not eating breakfast, he is only taking the PM dose of 1000 mg.      Plan for EGD and C-scope per Dr. Menezes for KUSHAL, to be scheduled vs clinic appt. Endorses gross hematuria last time, no pain, which is usual presentation for nephrolithiasis. Has passed a kidney stone since then. Has not told his urologist Dr. Oropeza yet but plans to.     Since last clinic visit 1/14/2020 with myself and Dr. Beverly, pt reports that he is doing well from skin perspective no rashes, but still with arthralgias. Still having R ankle stiffness/intermittent pain since end of September after doing max chi. Dorsi-flexion causes pain. Has swelling and tenderness of R 1-3 MCP. AM stiffness x 20 minutes.     Lupus labs were wnl (dsDNA, C3,C4, CRP, UPCR). ESR  3 > 12 increased.     Concerned about mental wellness and ways to increase weight. Has decreased appetite, eats only 1 meal/day. Has seen nutrition, no benefit. Will discuss with PCP. Follows with psychiatrist and psychologist, now on fluoxetine.      Pt denies fatigue, oral/nasal/genital ulcers, headaches, fever, chills, n/v, abd pain, CP, SOB, dysphagia, changes in bowel/bladder habits, vision changes,photosensitivity, or erythema/rashes.       Review of Systems   Constitutional: Negative for fatigue, fever and unexpected weight change.   HENT: Negative for mouth sores and trouble swallowing.    Eyes: Negative for redness and visual disturbance.   Respiratory: Negative for cough and shortness of breath.    Cardiovascular: Negative for chest pain.   Gastrointestinal: Negative for abdominal pain, constipation and diarrhea.   Genitourinary: Negative for dysuria, genital sores and hematuria.   Musculoskeletal: Positive for arthralgias and joint swelling. Negative for back pain and gait problem.   Skin: Negative for color change and rash.   Neurological: Positive for headaches. Negative for weakness and numbness.   Hematological: Does not bruise/bleed easily.   Psychiatric/Behavioral: Negative for agitation. The patient is not nervous/anxious.          Objective:   There were no vitals taken for this visit.     Physical Exam   Not performed 2/2 telemedicine.     Assessment:       1. PSA (psoriatic arthritis)    2. Drug-induced lupus erythematosus    3. Medication monitoring encounter    4. Long-term use of Plaquenil    5. Recurrent nephrolithiasis          Plan:       Problem List Items Addressed This Visit        Renal/    Recurrent nephrolithiasis       Immunology/Multi System    Drug-induced lupus erythematosus       Other    Medication monitoring encounter      Other Visit Diagnoses     PSA (psoriatic arthritis)    -  Primary    Relevant Medications    sulfaSALAzine (AZULFIDINE) 500 MG TbEC    Other Relevant  Orders    HIV 1/2 Ag/Ab (4th Gen)    Hepatitis B surface antigen    HBcAB    Hepatitis B surface antibody    Hepatitis C antibody    Hepatitis A antibody, IgG    Strongyloides IgG Antibodies    Quantiferon Gold TB    RPR    Varicella zoster antibody, IgG    Long-term use of Plaquenil            Psoriatric Arthritis  DAPSA TJC 3 SJC 4: 15.03 c/w medium disease activity. Labs shows CBC significant for anemia hgb 13.4. WNL iron, TIBC, transferrin, ferritin, folate, B12, sol. Transferrin receptor, haptoglobin, retic, LDH. Low saturated iron 19.  Metabolic profile shows normal renal and liver function. WNL CRP and ESR elevated 3 > 12.   Did not tolerate MTX 2/2 nausea.  D/C Cosentyx 300mg monthly. Will switch to another IL-17. Prescription for TALTZ sent to [facility]. 160 mg SQ x 1, then 4 wks later 80 mg monthly. Consider IL-23 in the future if IL-17 ineffective.  Continue Leflunomide 20mg daily  Currently on SSZ 1000 mg qHS, recommend increasing to 1000 mg BID  ASCVD 1.3% continue low dose statin  -     Sedimentation rate; Standing  -     C-reactive protein; Standing  -     CBC auto differential; Standing  -     Comprehensive metabolic panel; Standing  -     Ambulatory Referral to Physical/Occupational Therapy for aquatic therapy sent last visit, waiting to be scheduled when able  - HIV 1/2 Ag/Ab (4th Gen); Future  - Hepatitis B surface antigen; Future  - HBcAB; Future  - Hepatitis B surface antibody; Future  - Hepatitis C antibody; Future  - Hepatitis A antibody, IgG; Future  - Strongyloides IgG Antibodies; Future  - Quantiferon Gold TB; Future  - RPR; Future  - Varicella zoster antibody, IgG; Future  - sulfaSALAzine (AZULFIDINE) 500 MG TbEC; Take 2 tablets (1,000 mg total) by mouth 2 (two) times daily.  Dispense: 120 tablet; Refill: 4    Drug-induced lupus erythematosus  Related to infliximab (last dose 2/29/16; + antiphosholipids-anticardiolipin IgM mild +dsDNA: 1:1280,  DEE+ 1:1280 homogenous ,  + anti histone ab, CH50  low at 51 along with recurrent intermittent fevers, chills, arthralgias.  SLEDAI 0 (neg dsDNA, normal complements, UPCR wnl, UA neg for protein)  Cont Plaquenil 300mg daily  Better control with increased plaquenil dose. May plan to slowly wean off plaquenil in the future IF there is no evidence of lupus activity and pt in remission     Anemia  GI clinic to call pt (Dr. Menezes) to schedule EGD/c-scope or clinic visit for KUSHAL. Pt will schedule clinic visit.  Last Colonoscopy 9/2015: A single diminutive erosion in the terminal ileum. Biopsy unremarkable. Need repeat in 5 yrs so due this year anyway.  Pt with gross hematuria 1/13/2020. Pt passed stone since then and this resolved. Pt to message his urologist Dr. Oropeza for evaluation, concern for recurrent kidney stones     Medication monitoring encounter  No evidence of toxicity  Pt to schedule next Plaquenil eye exam yearly     Recurrent nephrolithiasis  Previous Uro risk showed a hypocitraturic nephrolithiasis in 2013.      Psoriasis  Stable.  Pt to make f/u appt with Dermatology as needed  Recommended Cera-Ve for dry skin     Immunizations  UTD. High dose flu vaccine and Shingrix done 10/25 and 1/14/2020.     Discussed with Dr. Beverly. RTC in 3 months with labs prior.     Kathie Garcia,   Rheumatology, PGY4

## 2020-05-06 ENCOUNTER — LAB VISIT (OUTPATIENT)
Dept: LAB | Facility: OTHER | Age: 50
End: 2020-05-06
Payer: COMMERCIAL

## 2020-05-06 DIAGNOSIS — L40.50 PSA (PSORIATIC ARTHRITIS): ICD-10-CM

## 2020-05-06 PROCEDURE — 86682 HELMINTH ANTIBODY: CPT

## 2020-05-06 PROCEDURE — 86704 HEP B CORE ANTIBODY TOTAL: CPT

## 2020-05-06 PROCEDURE — 36415 COLL VENOUS BLD VENIPUNCTURE: CPT

## 2020-05-06 PROCEDURE — 86592 SYPHILIS TEST NON-TREP QUAL: CPT

## 2020-05-06 PROCEDURE — 86787 VARICELLA-ZOSTER ANTIBODY: CPT

## 2020-05-06 PROCEDURE — 86703 HIV-1/HIV-2 1 RESULT ANTBDY: CPT

## 2020-05-06 PROCEDURE — 86803 HEPATITIS C AB TEST: CPT

## 2020-05-06 PROCEDURE — 87340 HEPATITIS B SURFACE AG IA: CPT

## 2020-05-06 PROCEDURE — 86706 HEP B SURFACE ANTIBODY: CPT

## 2020-05-06 PROCEDURE — 86790 VIRUS ANTIBODY NOS: CPT

## 2020-05-07 LAB
HBV CORE AB SERPL QL IA: NEGATIVE
HBV SURFACE AB SER-ACNC: POSITIVE M[IU]/ML
HBV SURFACE AG SERPL QL IA: NEGATIVE
HCV AB SERPL QL IA: NEGATIVE
HEPATITIS A ANTIBODY, IGG: POSITIVE
HIV 1+2 AB+HIV1 P24 AG SERPL QL IA: NEGATIVE
RPR SER QL: NORMAL
VARICELLA INTERPRETATION: POSITIVE
VARICELLA ZOSTER IGG: 1.69 ISR (ref 0–0.9)

## 2020-05-09 LAB — STRONGYLOIDES ANTIBODY IGG: NEGATIVE

## 2020-05-20 ENCOUNTER — TELEPHONE (OUTPATIENT)
Dept: PHARMACY | Facility: CLINIC | Age: 50
End: 2020-05-20

## 2020-05-20 DIAGNOSIS — L40.50 PSA (PSORIATIC ARTHRITIS): ICD-10-CM

## 2020-05-22 ENCOUNTER — PATIENT MESSAGE (OUTPATIENT)
Dept: RHEUMATOLOGY | Facility: CLINIC | Age: 50
End: 2020-05-22

## 2020-05-26 DIAGNOSIS — L40.50 PSA (PSORIATIC ARTHRITIS): ICD-10-CM

## 2020-05-27 ENCOUNTER — PATIENT MESSAGE (OUTPATIENT)
Dept: RHEUMATOLOGY | Facility: CLINIC | Age: 50
End: 2020-05-27

## 2020-05-27 DIAGNOSIS — L40.50 PSA (PSORIATIC ARTHRITIS): ICD-10-CM

## 2020-05-27 DIAGNOSIS — T50.905A DRUG-INDUCED LUPUS ERYTHEMATOSUS: ICD-10-CM

## 2020-05-27 DIAGNOSIS — L93.2 DRUG-INDUCED LUPUS ERYTHEMATOSUS: ICD-10-CM

## 2020-05-27 RX ORDER — HYDROXYCHLOROQUINE SULFATE 200 MG/1
300 TABLET, FILM COATED ORAL DAILY
Qty: 90 TABLET | Refills: 4 | Status: SHIPPED | OUTPATIENT
Start: 2020-05-27 | End: 2021-05-25 | Stop reason: SDUPTHER

## 2020-05-28 NOTE — TELEPHONE ENCOUNTER
DOCUMENTATION ONLY:  Prior authorization for Taltz approved from 5/6/20 to 5/28/21    Case Id: [id number]    Copay Card  BIN: [id number]  PCN: [id number]  ID: [id number]  Group: [id number]

## 2020-06-03 ENCOUNTER — TELEPHONE (OUTPATIENT)
Dept: PHARMACY | Facility: CLINIC | Age: 50
End: 2020-06-03

## 2020-06-03 NOTE — TELEPHONE ENCOUNTER
Initial Taltz 80 mg/ml consult completed on 6/3/20.Taltz 80 mg/ml will be Picked up by pt at [facility] on 20. $5 copay. Patient will start on 20. Address confirmed, CC on file. Confirmed 2 patient identifiers - name and . Therapy Appropriate.    Counseled patient on administration directions (PsA):  - Starter: Inject Taltz 160 mg (2 pens) into the skin on Day 1  - Maintenance: Inject Taltz 80 mg (1 pen) every 4 weeks.  - Wash hands before and after injection.  - Monthly RX will come with gauze, bandaids, and alcohol swabs.  - Patient may inject in either the tops of the thighs, abdomen- but at least 2 inches away from her belly button, or the outer part of her upper arm.  Patient was instructed to rotate injections sites.  - Patient is to wipe down the injection site with the alcohol pad, wait to dry.  Gently squeeze the area of the cleaned skin and hold it firmly.   Place the pen flat against the raised area of skin that is being squeezed, unlock the lock ring, then push down on the button and hold for 10-15 seconds, until the visual injection confirmation is displayed after the second click.   - Patient should rotate injection sites.   - Patient will use sharps container; once full, per [place] law, she/ he may lock the sharps container and place in her trash. he can then contact the Pharmacy and we will replace the sharps at no additional charge.    Patient was counseled on possible side effects:  - Injection site reaction: redness, soreness, itching, bruising, which should resolve within 3-5 days.   - Increased risk for infections. If patient becomes sick, patient is to hold Taltz use until he is better.     DDIs:  Medication list reviewed and potential DDIs addressed.    Patient verbalized understanding. Compliance stressed. Patient advised to keep a calendar marking dates of injections to ensure better compliance. Patient advised to call myself or provider should any questions arise. Patient plans to start  Taltz on 6/26/20. Consultation included: indication; goals of treatment; administration; storage and handling; side effects; how to handle side effects; the importance of compliance; how to handle missed doses; the importance of laboratory monitoring; the importance of keeping all follow up appointments.  Patient understands to report any medication changes to [facility] and provider. All questions answered and addressed to patients satisfaction. [facility] to contact patient in 3 weeks for refills.

## 2020-06-19 ENCOUNTER — TELEPHONE (OUTPATIENT)
Dept: UROLOGY | Facility: CLINIC | Age: 50
End: 2020-06-19

## 2020-06-19 RX ORDER — METHYLPREDNISOLONE 4 MG/1
TABLET ORAL
Qty: 1 PACKAGE | Refills: 0 | Status: SHIPPED | OUTPATIENT
Start: 2020-06-19 | End: 2020-07-10

## 2020-06-25 ENCOUNTER — TELEPHONE (OUTPATIENT)
Dept: PHARMACY | Facility: CLINIC | Age: 50
End: 2020-06-25

## 2020-07-20 ENCOUNTER — TELEPHONE (OUTPATIENT)
Dept: PHARMACY | Facility: CLINIC | Age: 50
End: 2020-07-20

## 2020-08-11 ENCOUNTER — OFFICE VISIT (OUTPATIENT)
Dept: RHEUMATOLOGY | Facility: CLINIC | Age: 50
End: 2020-08-11
Payer: COMMERCIAL

## 2020-08-11 ENCOUNTER — LAB VISIT (OUTPATIENT)
Dept: LAB | Facility: HOSPITAL | Age: 50
End: 2020-08-11
Payer: COMMERCIAL

## 2020-08-11 VITALS
BODY MASS INDEX: 20 KG/M2 | HEART RATE: 79 BPM | DIASTOLIC BLOOD PRESSURE: 62 MMHG | WEIGHT: 132 LBS | HEIGHT: 68 IN | SYSTOLIC BLOOD PRESSURE: 94 MMHG

## 2020-08-11 DIAGNOSIS — R21 RASH: ICD-10-CM

## 2020-08-11 DIAGNOSIS — T50.905A DRUG-INDUCED LUPUS ERYTHEMATOSUS: ICD-10-CM

## 2020-08-11 DIAGNOSIS — L93.2 DRUG-INDUCED LUPUS ERYTHEMATOSUS: ICD-10-CM

## 2020-08-11 DIAGNOSIS — Z79.899 LONG-TERM USE OF PLAQUENIL: ICD-10-CM

## 2020-08-11 DIAGNOSIS — D64.9 ANEMIA, UNSPECIFIED TYPE: ICD-10-CM

## 2020-08-11 DIAGNOSIS — N20.0 RECURRENT NEPHROLITHIASIS: ICD-10-CM

## 2020-08-11 DIAGNOSIS — Z51.81 MEDICATION MONITORING ENCOUNTER: ICD-10-CM

## 2020-08-11 DIAGNOSIS — L40.50 PSA (PSORIATIC ARTHRITIS): Primary | ICD-10-CM

## 2020-08-11 LAB
BACTERIA #/AREA URNS AUTO: ABNORMAL /HPF
BILIRUB UR QL STRIP: NEGATIVE
CLARITY UR REFRACT.AUTO: ABNORMAL
COLOR UR AUTO: ABNORMAL
CREAT UR-MCNC: 156 MG/DL (ref 23–375)
GLUCOSE UR QL STRIP: NEGATIVE
HGB UR QL STRIP: ABNORMAL
KETONES UR QL STRIP: NEGATIVE
LEUKOCYTE ESTERASE UR QL STRIP: NEGATIVE
MICROSCOPIC COMMENT: ABNORMAL
NITRITE UR QL STRIP: NEGATIVE
PH UR STRIP: 6 [PH] (ref 5–8)
PROT UR QL STRIP: NEGATIVE
PROT UR-MCNC: 15 MG/DL (ref 0–15)
PROT/CREAT UR: 0.1 MG/G{CREAT} (ref 0–0.2)
RBC #/AREA URNS AUTO: 33 /HPF (ref 0–4)
SP GR UR STRIP: 1.02 (ref 1–1.03)
SQUAMOUS #/AREA URNS AUTO: 0 /HPF
URN SPEC COLLECT METH UR: ABNORMAL
WBC #/AREA URNS AUTO: 1 /HPF (ref 0–5)

## 2020-08-11 PROCEDURE — 99214 PR OFFICE/OUTPT VISIT, EST, LEVL IV, 30-39 MIN: ICD-10-PCS | Mod: S$GLB,,, | Performed by: STUDENT IN AN ORGANIZED HEALTH CARE EDUCATION/TRAINING PROGRAM

## 2020-08-11 PROCEDURE — 81001 URINALYSIS AUTO W/SCOPE: CPT

## 2020-08-11 PROCEDURE — 3008F BODY MASS INDEX DOCD: CPT | Mod: CPTII,S$GLB,, | Performed by: STUDENT IN AN ORGANIZED HEALTH CARE EDUCATION/TRAINING PROGRAM

## 2020-08-11 PROCEDURE — 82570 ASSAY OF URINE CREATININE: CPT

## 2020-08-11 PROCEDURE — 3008F PR BODY MASS INDEX (BMI) DOCUMENTED: ICD-10-PCS | Mod: CPTII,S$GLB,, | Performed by: STUDENT IN AN ORGANIZED HEALTH CARE EDUCATION/TRAINING PROGRAM

## 2020-08-11 PROCEDURE — 99999 PR PBB SHADOW E&M-EST. PATIENT-LVL III: ICD-10-PCS | Mod: PBBFAC,,, | Performed by: STUDENT IN AN ORGANIZED HEALTH CARE EDUCATION/TRAINING PROGRAM

## 2020-08-11 PROCEDURE — 99214 OFFICE O/P EST MOD 30 MIN: CPT | Mod: S$GLB,,, | Performed by: STUDENT IN AN ORGANIZED HEALTH CARE EDUCATION/TRAINING PROGRAM

## 2020-08-11 PROCEDURE — 99999 PR PBB SHADOW E&M-EST. PATIENT-LVL III: CPT | Mod: PBBFAC,,, | Performed by: STUDENT IN AN ORGANIZED HEALTH CARE EDUCATION/TRAINING PROGRAM

## 2020-08-11 RX ORDER — TAMSULOSIN HYDROCHLORIDE 0.4 MG/1
0.4 CAPSULE ORAL DAILY
COMMUNITY
End: 2022-10-24

## 2020-08-11 RX ORDER — POTASSIUM CITRATE AND CITRIC ACID MONOHYDRATE 1100; 334 MG/5ML; MG/5ML
SOLUTION ORAL 2 TIMES DAILY
COMMUNITY
End: 2023-03-01

## 2020-08-11 NOTE — PROGRESS NOTES
I have personally reviewed the history, confirmed exam findings, and discussed assessment and plan with fellow.

## 2020-08-11 NOTE — PROGRESS NOTES
Pre chart    Answers for HPI/ROS submitted by the patient on 8/9/2020   fever: No  eye redness: No  headaches: Yes  shortness of breath: No  chest pain: No  trouble swallowing: No  diarrhea: No  constipation: No  unexpected weight change: No  genital sore: No  During the last 3 days, have you had a skin rash?: No  Bruises or bleeds easily: Yes  cough: No

## 2020-08-11 NOTE — PROGRESS NOTES
Subjective:       Patient ID: Rebel Rodriguez is a 50 y.o. male.    Chief Complaint: F/u for psoriatic arthritis and drug-induced lupus    48YM with psoriatic arthritis and infliximab induced lupus presenting for follow up. Drug induced Lupus: Related to infliximab (last dose 2/29/16; + antiphosholipids-anticardiolipin IgM mild +dsDNA: 1:1280,  DEE+ 1:1280 homogenous ,  + anti histone ab, CH50 low at 51 along with recurrent intermittent fevers, chills, arthralgias. Pt is currently on plaquenil 300 mg daily, Leflunomide 20 mg daily, Taltz  80 mg SQ monthly, and SSZ 1000 BID.     Last visit, switched from Cosentyx to Taltz. 3 doses in to Taltz and pt states he feels overall better. No psoriasis. Has indurated erythematous area on L cheek. Has seen Dermatology Dr. Grimes in private practice and ENT but no dx made. Not painful or itchy, no discharge.     Plan for EGD and C-scope per Dr. Menezes for KUSHAL, to be scheduled. Follows with  urology Dr. Wayne Paul ([facility]), he did 24 hr urine and has pt on KCl and Flomax for his recurrent kidney stones.      Last Lupus labs from 9/2019 were wnl (dsDNA, C3,C4, CRP, ESR, UPCR) but overdue.     Concerned about mental wellness and ways to increase weight. Has decreased appetite, eats only 1 meal/day. Has seen nutrition, no benefit. Will discuss with PCP. Follows with psychiatrist and psychologist, on fluoxetine. Pt still interested in aquatic therapy when available.     Pt denies fatigue, oral/nasal/genital ulcers, headaches, fever, chills, n/v, abd pain, CP, SOB, dysphagia, changes in bowel/bladder habits, vision changes,photosensitivity, or erythema/rashes.       Review of Systems   Constitutional: Negative for fatigue, fever and unexpected weight change.   HENT: Negative for mouth sores and trouble swallowing.    Eyes: Negative for redness and visual disturbance.   Respiratory: Negative for cough and shortness of breath.    Cardiovascular: Negative for chest pain.  "  Gastrointestinal: Negative for abdominal pain, constipation and diarrhea.   Genitourinary: Negative for dysuria, genital sores and hematuria.   Musculoskeletal: Negative for arthralgias, back pain, gait problem and joint swelling.   Skin: Positive for color change. Negative for rash.   Neurological: Negative for weakness, numbness and headaches.   Hematological: Does not bruise/bleed easily.   Psychiatric/Behavioral: Negative for agitation. The patient is not nervous/anxious.          Objective:   BP 94/62   Pulse 79   Ht 5' 8.4" (1.737 m)   Wt 59.9 kg (132 lb)   BMI 19.84 kg/m²      Physical Exam   Vitals reviewed.  Constitutional: He is oriented to person, place, and time and well-developed, well-nourished, and in no distress. No distress.   HENT:   Head: Normocephalic and atraumatic.   Right Ear: External ear normal.   Left Ear: External ear normal.   Nose: Nose normal.   Mouth/Throat: Oropharynx is clear and moist. No oropharyngeal exudate.   Eyes: Conjunctivae and EOM are normal. Pupils are equal, round, and reactive to light. Right eye exhibits no discharge. Left eye exhibits no discharge. No scleral icterus.   Neck: Neck supple. No thyromegaly present.   Cardiovascular: Normal rate, regular rhythm, normal heart sounds and intact distal pulses.    No murmur heard.  Pulmonary/Chest: Effort normal and breath sounds normal. No respiratory distress. He has no wheezes. He has no rales. He exhibits no tenderness.   Abdominal: Soft. Bowel sounds are normal. He exhibits no distension. There is no abdominal tenderness. There is no guarding.   Neurological: He is alert and oriented to person, place, and time.   Skin: Skin is warm and dry. No rash noted. He is not diaphoretic. There is erythema.     Indurated erythematous area on L cheek   Psychiatric: Mood and affect normal.   Musculoskeletal: Normal range of motion. No tenderness, deformity or edema.      Comments: Synovitis R 2-3 MCP and to a lesser extent L 2 " MCP.  No TTP, no dactylitis or enthesitis.  FROM UE and LE b/l  5/5 mm strength UE and LE b/l        Assessment:       1. PSA (psoriatic arthritis)    2. Drug-induced lupus erythematosus    3. Medication monitoring encounter    4. Long-term use of Plaquenil    5. Recurrent nephrolithiasis    6. Anemia, unspecified type    7. Rash          Plan:       Problem List Items Addressed This Visit        Renal/    Recurrent nephrolithiasis       Immunology/Multi System    Drug-induced lupus erythematosus    Relevant Orders    Urinalysis    Protein / creatinine ratio, urine    C3 COMPLEMENT    C4 COMPLEMENT    ANTI-DNA ANTIBODY, DOUBLE-STRANDED       Other    Medication monitoring encounter      Other Visit Diagnoses     PSA (psoriatic arthritis)    -  Primary    Long-term use of Plaquenil        Anemia, unspecified type        Rash        Relevant Orders    Ambulatory referral/consult to Dermatology        Psoriatric Arthritis  Doing well. DAPSA TJC 0 SJC 3: 5.03 c/w low disease activity. Labs shows CBC significant for anemia hgb 13.4. WNL iron, TIBC, transferrin, ferritin, folate, B12, sol. Transferrin receptor, haptoglobin, retic, LDH. Low saturated iron 19.  Metabolic profile shows normal renal and liver function. WNL CRP and ESR 3>12.   Did not tolerate MTX 2/2 nausea.  No longer on Cosentyx 300mg monthly. Switched to another IL-17. Now on TALTZ. 160 mg SQ x 1, then 4 wks later 80 mg monthly (S/p 3 doses so far). Can consider IL-23 in the future if IL-17 ineffective.  Continue Leflunomide 20mg daily   Currently on SSZ 1000 mg BID  ASCVD 1.3% continue low dose statin  -     Sedimentation rate; Standing  -     C-reactive protein; Standing  -     CBC auto differential; Standing  -     Comprehensive metabolic panel; Standing   - Urinalysis; Future    - Protein / creatinine ratio, urine; Future   - C3 COMPLEMENT; Future   - C4 COMPLEMENT; Future   - ANTI-DNA ANTIBODY, DOUBLE-STRANDED; Future  -     Ambulatory Referral to  Physical/Occupational Therapy for aquatic therapy sent last visit, waiting to be scheduled when able    Drug-induced lupus erythematosus  Related to infliximab (last dose 2/29/16; + antiphosholipids-anticardiolipin IgM mild +dsDNA: 1:1280,  DEE+ 1:1280 homogenous ,  + anti histone ab, CH50 low at 51 along with recurrent intermittent fevers, chills, arthralgias.  SLEDAI 0 (neg dsDNA, normal complements, UPCR wnl, UA neg for protein). Lupus labs today  Cont Plaquenil 300mg daily. Eye exam next month.  Better control with increased plaquenil dose. May plan to slowly wean off plaquenil in the future IF there is no evidence of lupus activity and pt in remission     Anemia  GI clinic to call pt (Dr. Menezes) to schedule EGD/c-scope or clinic visit for KUSHAL. Pt will schedule clinic visit.  Last Colonoscopy 9/2015: A single diminutive erosion in the terminal ileum. Biopsy unremarkable. Need repeat in 5 yrs so due this year anyway.  Pt with gross hematuria 1/13/2020. Pt passed stone since then and this resolved. Follows with urology @ [facility] Dr. Paul. On KCl and Flomax for recurrent kidney stones.     Medication monitoring encounter  No evidence of toxicity  Plaquenil eye exam yearly, scheduled next month.     Recurrent nephrolithiasis  Previous Uro risk showed a hypocitraturic nephrolithiasis in 2013. Follows with urology.     Psoriasis  Stable.  -Ambulatory referral/consult to Dermatology; Future for L cheek rash  Recommended Cera-Ve for dry skin     Immunizations  UTD. High dose flu vaccine and Shingrix done 10/25 and 1/14/2020.     Discussed with Dr. Beverly. RTC in 3 months with labs prior.     Kathie Garcia,   Rheumatology, PGY5

## 2020-08-12 ENCOUNTER — TELEPHONE (OUTPATIENT)
Dept: PHARMACY | Facility: CLINIC | Age: 50
End: 2020-08-12

## 2020-08-21 ENCOUNTER — OFFICE VISIT (OUTPATIENT)
Dept: DERMATOLOGY | Facility: CLINIC | Age: 50
End: 2020-08-21
Payer: COMMERCIAL

## 2020-08-21 DIAGNOSIS — D48.5 NEOPLASM OF UNCERTAIN BEHAVIOR OF SKIN: Primary | ICD-10-CM

## 2020-08-21 DIAGNOSIS — L57.0 AK (ACTINIC KERATOSIS): ICD-10-CM

## 2020-08-21 DIAGNOSIS — R21 RASH: ICD-10-CM

## 2020-08-21 DIAGNOSIS — L72.0 EIC (EPIDERMAL INCLUSION CYST): ICD-10-CM

## 2020-08-21 DIAGNOSIS — L81.4 LENTIGO: ICD-10-CM

## 2020-08-21 DIAGNOSIS — L82.1 SEBORRHEIC KERATOSES: ICD-10-CM

## 2020-08-21 DIAGNOSIS — D18.01 CHERRY ANGIOMA: ICD-10-CM

## 2020-08-21 DIAGNOSIS — D22.9 MULTIPLE BENIGN NEVI: ICD-10-CM

## 2020-08-21 PROCEDURE — 11102 TANGNTL BX SKIN SINGLE LES: CPT | Mod: S$GLB,,, | Performed by: DERMATOLOGY

## 2020-08-21 PROCEDURE — 17000 PR DESTRUCTION(LASER SURGERY,CRYOSURGERY,CHEMOSURGERY),PREMALIGNANT LESIONS,FIRST LESION: ICD-10-PCS | Mod: 59,S$GLB,, | Performed by: DERMATOLOGY

## 2020-08-21 PROCEDURE — 99999 PR PBB SHADOW E&M-EST. PATIENT-LVL IV: ICD-10-PCS | Mod: PBBFAC,,, | Performed by: DERMATOLOGY

## 2020-08-21 PROCEDURE — 17003 DESTRUCT PREMALG LES 2-14: CPT | Mod: 59,S$GLB,, | Performed by: DERMATOLOGY

## 2020-08-21 PROCEDURE — 88304 TISSUE EXAM BY PATHOLOGIST: CPT | Performed by: PATHOLOGY

## 2020-08-21 PROCEDURE — 99203 OFFICE O/P NEW LOW 30 MIN: CPT | Mod: 25,S$GLB,, | Performed by: DERMATOLOGY

## 2020-08-21 PROCEDURE — 17003 DESTRUCTION, PREMALIGNANT LESIONS; SECOND THROUGH 14 LESIONS: ICD-10-PCS | Mod: 59,S$GLB,, | Performed by: DERMATOLOGY

## 2020-08-21 PROCEDURE — 88305 TISSUE EXAM BY PATHOLOGIST: ICD-10-PCS | Mod: 26,,, | Performed by: PATHOLOGY

## 2020-08-21 PROCEDURE — 11102 PR TANGENTIAL BIOPSY, SKIN, SINGLE LESION: ICD-10-PCS | Mod: S$GLB,,, | Performed by: DERMATOLOGY

## 2020-08-21 PROCEDURE — 17000 DESTRUCT PREMALG LESION: CPT | Mod: 59,S$GLB,, | Performed by: DERMATOLOGY

## 2020-08-21 PROCEDURE — 88305 TISSUE EXAM BY PATHOLOGIST: CPT | Mod: 26,,, | Performed by: PATHOLOGY

## 2020-08-21 PROCEDURE — 99999 PR PBB SHADOW E&M-EST. PATIENT-LVL IV: CPT | Mod: PBBFAC,,, | Performed by: DERMATOLOGY

## 2020-08-21 PROCEDURE — 99203 PR OFFICE/OUTPT VISIT, NEW, LEVL III, 30-44 MIN: ICD-10-PCS | Mod: 25,S$GLB,, | Performed by: DERMATOLOGY

## 2020-08-21 NOTE — LETTER
August 21, 2020      Kathie Garcia MD  [address]  [facility] [address]           [facility] D[facility]at[facility] [address]  [address]  [facility] [address]  Phone: [phone]  Fax: [phone]          Patient: Rebel Rodriguez   MR Number: [id number]   YOB: 1970   Date of Visit: 8/21/2020       Dear Dr. Kathie Garcia:    Thank you for referring Rebel Rodriguez to me for evaluation. Attached you will find relevant portions of my assessment and plan of care.    If you have questions, please do not hesitate to call me. I look forward to following Rebel Rodriguez along with you.    Sincerely,    Juliana Huertas MD    Enclosure  CC:  No Recipients    If you would like to receive this communication electronically, please contact [email or web address] or [phone] to request more information on [employer] access.    For providers and/or their staff who would like to refer a patient to [facility], please contact us through our one-stop-shop provider referral line, [facility], at [phone].    If you feel you have received this communication in error or would no longer like to receive these types of communications, please e-mail [email or web address]

## 2020-08-21 NOTE — PROGRESS NOTES
Subjective:       Patient ID:  Rebel Rodriguez is a 50 y.o. male who presents for   Chief Complaint   Patient presents with    Skin Check     tbse     Lesion     left cheek, right hip, neck      Patient presents for a skin cancer screen  No family or self history of skin cancer  No history of tanning bed use or extensive sun exposure/blistering sunburns   history of immunosuppression-yes on taltz         Pt present for TBSE  Pt c/o lesion left cheek X 4 mo, swollen and red denies TX  Pt c/o lesion right hip X 3 weeks, change in size and texture, denies TX    Review of Systems   Skin: Positive for activity-related sunscreen use. Negative for daily sunscreen use and recent sunburn.   Hematologic/Lymphatic: Does not bruise/bleed easily.        Objective:    Physical Exam   Constitutional: He appears well-developed and well-nourished. No distress.   Neurological: He is alert and oriented to person, place, and time. He is not disoriented.   Psychiatric: He has a normal mood and affect.   Skin:   Areas Examined (abnormalities noted in diagram):   Scalp / Hair Palpated and Inspected  Head / Face Inspection Performed  Neck Inspection Performed  Chest / Axilla Inspection Performed  Abdomen Inspection Performed  Genitals / Buttocks / Groin Inspection Performed  Back Inspection Performed  RUE Inspected  LUE Inspection Performed  RLE Inspected  LLE Inspection Performed  Nails and Digits Inspection Performed                   Diagram Legend     Erythematous scaling macule/papule c/w actinic keratosis       Vascular papule c/w angioma      Pigmented verrucoid papule/plaque c/w seborrheic keratosis      Yellow umbilicated papule c/w sebaceous hyperplasia      Irregularly shaped tan macule c/w lentigo     1-2 mm smooth white papules consistent with Milia      Movable subcutaneous cyst with punctum c/w epidermal inclusion cyst      Subcutaneous movable cyst c/w pilar cyst      Firm pink to brown papule c/w dermatofibroma       Pedunculated fleshy papule(s) c/w skin tag(s)      Evenly pigmented macule c/w junctional nevus     Mildly variegated pigmented, slightly irregular-bordered macule c/w mildly atypical nevus      Flesh colored to evenly pigmented papule c/w intradermal nevus       Pink pearly papule/plaque c/w basal cell carcinoma      Erythematous hyperkeratotic cursted plaque c/w SCC      Surgical scar with no sign of skin cancer recurrence      Open and closed comedones      Inflammatory papules and pustules      Verrucoid papule consistent consistent with wart     Erythematous eczematous patches and plaques     Dystrophic onycholytic nail with subungual debris c/w onychomycosis     Umbilicated papule    Erythematous-base heme-crusted tan verrucoid plaque consistent with inflamed seborrheic keratosis     Erythematous Silvery Scaling Plaque c/w Psoriasis     See annotation      Assessment / Plan:      Pathology Orders:     Normal Orders This Visit    Specimen to Pathology, Dermatology     Questions:    Procedure Type: Dermatology and skin neoplasms    Number of Specimens: 1    ------------------------: -------------------------    Spec 1 Procedure: Biopsy    Spec 1 Clinical Impression: r/o cyst (yellow fluid came out) vs. other scar (appeared as red firm nodule)    Spec 1 Source: right lower back        Neoplasm of uncertain behavior of skin  -     Specimen to Pathology, Dermatology  Shave biopsy procedure note:    Shave biopsy performed after verbal consent including risk of infection, scar, recurrence, need for additional treatment of site. Area prepped with alcohol, anesthetized with approximately 1.0cc of 1% lidocaine with epinephrine. Lesional tissue shaved with razor blade. Hemostasis achieved with application of aluminum chloride followed by hyfrecation. No complications. Dressing applied. Wound care explained.      Rash  -     Ambulatory referral/consult to Dermatology    Multiple benign nevi  TBSE body skin examination  performed today including at least 12 points as noted in physical examination. No lesions suspicious for malignancy noted.  Reassurance provided.  Instructed patient to observe lesion(s) for changes and follow up in clinic if changes are noted. Discussed ABCDE's of moles and brochure provided.    Seborrheic keratoses  These are benign inherited growths without a malignant potential. Reassurance given to patient. No treatment is necessary.     Lentigo  This is a benign hyperpigmented sun induced lesion. Daily sun protection will reduce the number of new lesions. Treatment of these benign lesions are considered cosmetic.    Cherry angioma  This is a benign vascular lesion. Reassurance given. No treatment required.     EIC (epidermal inclusion cyst)  Will schedule at later time for excision of lesion to neck  Patient to call urology for removal of scrotal lesions    Actinic keratosis  Cryosurgery Procedure Note    Verbal consent from the patient is obtained including, but not limited to, risk of hypopigmentation/hyperpigmentation, scar, recurrence of lesion. The patient is aware of the precancerous quality and need for treatment of these lesions. Liquid nitrogen cryosurgery is applied to the 2 actinic keratoses, as detailed in the physical exam, to produce a freeze injury. The patient is aware that blisters may form and is instructed on wound care with gentle cleansing and use of vaseline ointment to keep moist until healed. The patient is supplied a handout on cryosurgery and is instructed to call if lesions do not completely resolve.      F/u 1 year for TBSE            No follow-ups on file.

## 2020-08-21 NOTE — PATIENT INSTRUCTIONS
" Shave Biopsy Wound Care    Your doctor has performed a shave biopsy today.  A band aid and vaseline ointment has been placed over the site.  This should remain in place for 24 hours.  It is recommended that you keep the area dry for the first 24 hours.  After 24 hours, you may remove the band aid and wash the area with warm soap and water and apply Vaseline jelly.  Many patients prefer to use Neosporin or Bacitracin ointment.  This is acceptable; however, know that you can develop an allergy to this medication even if you have used it safely for years.  It is important to keep the area moist.  Letting it dry out and get air slows healing time, and will worsen the scar.  Band aid is optional after first 24 hours.      If you notice increasing redness, tenderness, pain, or yellow drainage at the biopsy site, please notify your doctor.  These are signs of an infection.    If your biopsy site is bleeding, apply firm pressure for 15 minutes straight.  Repeat for another 15 minutes, if it is still bleeding.   If the surgical site continues to bleed, then please contact your doctor.      For [facility] users:   You will receive a [facility] notification after the pathologist has finished reviewing your biopsy specimen. Pathology results, however, will not be released online so you will see a "no content" message. Once your dermatologist reviews and clinically correlates your biopsy results, you will either receive a letter in the mail with the results of a phone call from your doctor's office if further explanation or treatment is warranted.       [address] [place] [address]/ [phone] FAX/ [email or web address]      CRYOSURGERY      Your doctor has used a method called cryosurgery to treat your skin condition. Cryosurgery refers to the use of very cold substances to treat a variety of skin conditions such as warts, pre-skin cancers, molluscum contagiosum, sun spots, and several benign " growths. The substance we use in cryosurgery is liquid nitrogen and is so cold (-195 degrees Celsius) that is burns when administered.     Following treatment in the office, the skin may immediately burn and become red. You may find the area around the lesion is affected as well. It is sometimes necessary to treat not only the lesion, but a small area of the surrounding normal skin to achieve a good response.     A blister, and even a blood filled blister, may form after treatment.   This is a normal response. If the blister is painful, it is acceptable to sterilize a needle and with rubbing alcohol and gently pop the blister. It is important that you gently wash the area with soap and warm water as the blister fluid may contain wart virus if a wart was treated. Do no remove the roof of the blister.     The area treated can take anywhere from 1-3 weeks to heal. Healing time depends on the kind skin lesion treated, the location, and how aggressively the lesion was treated. It is recommended that the areas treated are covered with Vaseline or bacitracin ointment and a band-aid. If a band-aid is not practical, just ointment applied several times per day will do. Keeping these areas moist will speed the healing time.    Treatment with liquid nitrogen can leave a scar. In dark skin, it may be a light or dark scar, in light skin it may be a white or pink scar. These will generally fade with time, but may never go away completely.     If you have any concerns after your treatment, please feel free to call the office.       [facility] [place] [address]/ [phone] FAX/ [email or web address]

## 2020-08-26 LAB
FINAL PATHOLOGIC DIAGNOSIS: NORMAL
GROSS: NORMAL

## 2020-08-27 ENCOUNTER — CLINICAL SUPPORT (OUTPATIENT)
Dept: REHABILITATION | Facility: HOSPITAL | Age: 50
End: 2020-08-27
Attending: STUDENT IN AN ORGANIZED HEALTH CARE EDUCATION/TRAINING PROGRAM
Payer: COMMERCIAL

## 2020-08-27 DIAGNOSIS — M54.2 NECK PAIN: Primary | ICD-10-CM

## 2020-08-27 PROCEDURE — 97110 THERAPEUTIC EXERCISES: CPT | Performed by: PHYSICAL THERAPIST

## 2020-08-27 PROCEDURE — 97162 PT EVAL MOD COMPLEX 30 MIN: CPT | Performed by: PHYSICAL THERAPIST

## 2020-08-27 NOTE — PROGRESS NOTES
[facility]  Physical Therapy Initial Evaluation    Name: Rebel Rodriguez  Clinic Number: [id number]    Therapy Diagnosis:   Encounter Diagnosis   Name Primary?    Neck pain Yes     Physician: Kathie Garcia MD, Dr Beverly    Physician Orders: Aquatic Therapy     Medical Diagnosis from Referral:   Diagnosis   L40.50 (ICD-10-CM) - PSA (psoriatic arthritis)       Evaluation Date: 8/27/2020  Authorization Period Expiration: 12/31/20  Plan of Care Expiration: 11/27/20  Visit # / Visits authorized: 1/ 1  Total visits: 1/1    Time In: 1120  Time Out: 1220  Total Billable Time: 60  minutes    Precautions: Standard, covid    Subjective   Date of onset: Aug 2019, chronic  History of current condition - Rebel reports: ROM and flexibilty loss in cervical area and ankles , decreased  cardiovascular endurance worsening over the past year or so.  He had a cervical fusion in 2017 and he still notes tightness of the upper traps and shoulders.  He notes weakness of the LE from the lupus and the Achilles tendon rupture.      Medical History:   Past Medical History:   Diagnosis Date    Achilles tendon rupture 10/9/2013    Allergy     Anxiety     Arthritis     psoriatric    Degenerative disc disease     Drug-induced lupus erythematosus     Hyperlipidemia     Kidney stone     Psoriatic arthritis     TIA (transient ischemic attack)     Ulcer     high school       Surgical History:   Rebel Rodriguez  has a past surgical history that includes Upper endoscopy w/ esophageal manometry; Ureteral stent placement; Achilles tendon surgery; Back surgery; Nasal septoplasty (N/A, 9/14/2018); Nasal turbinate reduction (Bilateral, 9/14/2018); and Functional endoscopic sinus surgery (FESS) using computer-assisted navigation (Bilateral, 9/14/2018).    Medications:   Rebel has a current medication list which includes the following prescription(s): aspirin, atorvastatin, citric acid-potassium citrate, fluoxetine,  hydroxychloroquine, ixekizumab, ixekizumab, leflunomide, lorazepam, sulfasalazine, and tamsulosin.    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 No abnormal postcontrast enhancement.       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  Prior Level of Function:   Current Level of Function: carrying, lifting, speed of running, unable    Pain:  Current 3/10, worst 7/10, best 2/10   Location: bilateral ankles, back , neck  and wrists   Description: Aching and Dull, stiffness at hand  Aggravating Factors: Sitting, Standing, Laying, Morning and Lifting, worse as day goes on  Easing Factors: massage, ice and heating pad    Pts goals: decrease pain, improve stamina, return to work    Objective       GAIT:  right hip and leg tightness with gait  AROM BUE:cervical sidebending decreased 50% all other UE ROM WNL   AROM BLE: tightness at (B) hips, decreased IR and ER, tight hamstrings and gastroc    Upper Extremity Strength  (R) UE  (L) UE    Shoulder flexion: 5/5 Shoulder flexion: 5/5   Shoulder Abduction: 5/5 Shoulder abduction: 5/5   Shoulder ER 4+/5 Shoulder ER 4+/5   Shoulder IR 4+/5 Shoulder IR 4+/5   Elbow flexion: 5/5 Elbow flexion: 5/5   Elbow extension: 4+/5 Elbow extension: 4+/5   Wrist flexion: 5/5 Wrist flexion: 5/5   Wrist extension: 4/5 Wrist extension: 4+/5    4/5 : 4/5         Lower Extremity Strength  Right LE  Left LE    Knee extension: 4/5 Knee extension: 4+/5   Knee flexion: 4/5 Knee flexion: 4+/5   Hip flexion: 4/5 Hip flexion: 4/5   Hip extension:  4/5 Hip extension: 4/5   Hip abduction: 4/5  Hip abduction: 4/5   Hip adduction: 5/5 Hip adduction 5/5   Ankle dorsiflexion: 4/5 Ankle dorsiflexion: 4/5   Ankle plantarflexion: 4/5 Ankle plantarflexion: 4/5         CMS Impairment/Limitation/Restriction for FOTO  Lumbar Survey    Therapist reviewed FOTO scores for Rebel Rodriguez on 8/27/2020.   FOTO documents entered into [employer] - see Media section.    Limitation Score: 50%  Category: Mobility    Current : CK = at least 40% but < 60% impaired, limited or restricted  Goal: CJ = at least 20% but < 40% impaired, limited or restricted         TREATMENT   Treatment Time In: 1200  Treatment Time Out: 1220  Total Treatment time separate from Evaluation:  20 minutes    Rebel received therapeutic exercises to develop strength, endurance, ROM, flexibility and core stabilization for 20 minutes:    Single knee to chest 3x 20  Hamstring stertching 3x 20  Piriformis stretching 3x 20      Education provided:   Role of aquatic therapy      Written Home Exercises Provided: yes.  Exercises were reviewed and Rebel was able to demonstrate them prior to the end of the session.  Rebel demonstrated good  understanding of the education provided.     See  Patient instruction HEP2GO for exercises provided 8/27/2020.    Assessment   Rebel is a 50 y.o. male referred to outpatient Physical Therapy with a medical diagnosis of  Medical Diagnosis from Referral:   Diagnosis   L40.50 (ICD-10-CM) - PSA (psoriatic arthritis)     . Pt presents with hip and hamstring/gastroc tightness (B) worse right, decreased strength of the hip and core musculature which impacts the patient's ability to complete functional tasks & activities safely & timely.  Pt is going to be out of town and will return to therapy in mid September.     Pt prognosis is Good.   Pt will benefit from skilled outpatient Physical Therapy to address the deficits stated above and in the chart below, provide pt/family education, and to maximize pt's level of independence.     Plan of  care discussed with patient: Yes  Pt's spiritual, cultural and educational needs considered and patient is agreeable to the plan of care and goals as stated below:     Anticipated Barriers for therapy: none    Medical Necessity is demonstrated by the following  History  Co-morbidities and personal factors that may impact the plan of care Co-morbidities:   Psoriatic arthrits, lupus    Personal Factors:   no deficits     moderate   Examination  Body Structures and Functions, activity limitations and participation restrictions that may impact the plan of care Body Regions:   back  lower extremities  upper extremities    Body Systems:    ROM  strength  balance  gait    Participation Restrictions:   none    Activity limitations:   Learning and applying knowledge  no deficits    General Tasks and Commands  no deficits    Communication  no deficits    Mobility  lifting and carrying objects  fine hand use (grasping/picking up)  walking    Self care  washing oneself (bathing, drying, washing hands)  caring for body parts (brushing teeth, shaving, grooming)  dressing    Domestic Life  no deficits    Interactions/Relationships  no deficits    Life Areas  no deficits    Community and Social Life  no deficits         moderate   Clinical Presentation evolving clinical presentation with changing clinical characteristics moderate   Decision Making/ Complexity Score: moderate     Goals:  Short Term Goals: 6 weeks   1. Patient will be independent in HEP & progressions  2. Patient will achieve  Quad strength to 4+/5 to demonstrate improved mobility    Long Term Goals: 12 weeks   1. Patient will have FOTO limitation score of  35 % to demonstrate improved function & decreased pain  2. Patient will achieve  4+/5 to 5/5 for LE strength  demonstrate improved transfers & endurance    Plan   Plan of care Certification: 8/27/2020 to 11/27/20    Outpatient Physical Therapy 1-2 times weekly for 12 weeks to include the following interventions:  aquatic therapy, patient education, manual therapy, therapeutic exercise, therapeutic activity. Patient may be seen by PTA as part of rehabilitation team    Elsie Chen, PT

## 2020-08-27 NOTE — PLAN OF CARE
Hi Dr Garcia,  Would you please sign the Plan of Care for Rebel to begin Aquatic Therapy?  Thanks, Jocelynn Chen, PT    [facility]  Physical Therapy Initial Evaluation    Name: Rebel Rodriguez  Clinic Number: [id number]    Therapy Diagnosis:   Encounter Diagnosis   Name Primary?    Neck pain Yes     Physician: Kathie Garcia MD, Dr Beverly    Physician Orders: Aquatic Therapy     Medical Diagnosis from Referral:   Diagnosis   L40.50 (ICD-10-CM) - PSA (psoriatic arthritis)       Evaluation Date: 8/27/2020  Authorization Period Expiration: 12/31/20  Plan of Care Expiration: 11/27/20  Visit # / Visits authorized: 1/ 1  Total visits: 1/1    Time In: 1120  Time Out: 1220  Total Billable Time: 60  minutes    Precautions: Standard, covid    Subjective   Date of onset: Aug 2019  History of current condition - Rebel reports: ROM and flexibilty loss in cervical area and ankles , decreased  cardiovascular endurance worsening over the past year or so.  He had a cervical fusion in 2017 and he still notes tightness of the upper traps and shoulders.  He notes weakness of the LE from the lupus and the Achilles tendon rupture.      Medical History:   Past Medical History:   Diagnosis Date    Achilles tendon rupture 10/9/2013    Allergy     Anxiety     Arthritis     psoriatric    Degenerative disc disease     Drug-induced lupus erythematosus     Hyperlipidemia     Kidney stone     Psoriatic arthritis     TIA (transient ischemic attack)     Ulcer     high school       Surgical History:   Rebel Rodriguez  has a past surgical history that includes Upper endoscopy w/ esophageal manometry; Ureteral stent placement; Achilles tendon surgery; Back surgery; Nasal septoplasty (N/A, 9/14/2018); Nasal turbinate reduction (Bilateral, 9/14/2018); and Functional endoscopic sinus surgery (FESS) using computer-assisted navigation (Bilateral, 9/14/2018).    Medications:   Rebel has a current medication  list which includes the following prescription(s): aspirin, atorvastatin, citric acid-potassium citrate, fluoxetine, hydroxychloroquine, ixekizumab, ixekizumab, leflunomide, lorazepam, sulfasalazine, and tamsulosin.    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 No abnormal postcontrast enhancement.       Prior Therapy: for neck and ankle  Social History:  lives alone   Occupation: music and production events  Prior Level of Function:   Current Level of Function: carrying, lifting, speed of running, unable    Pain:  Current 3/10, worst 7/10, best 2/10   Location: bilateral ankles, back , neck  and wrists   Description: Aching and Dull, stiffness at hand  Aggravating Factors: Sitting, Standing, Laying, Morning and Lifting, worse as day goes on  Easing Factors: massage, ice and heating pad    Pts goals: decrease pain, improve stamina, return to work    Objective       GAIT:  right hip and leg tightness with gait  AROM BUE:cervical sidebending decreased 50% all other UE ROM WNL   AROM BLE: tightness at (B) hips, decreased IR and ER, tight hamstrings and gastroc    Upper Extremity Strength  (R) UE  (L) UE    Shoulder flexion: 5/5 Shoulder flexion: 5/5   Shoulder Abduction: 5/5 Shoulder abduction: 5/5   Shoulder ER 4+/5 Shoulder ER 4+/5   Shoulder IR 4+/5 Shoulder IR 4+/5   Elbow flexion: 5/5 Elbow flexion: 5/5   Elbow extension: 4+/5 Elbow extension: 4+/5   Wrist flexion: 5/5 Wrist flexion: 5/5   Wrist extension: 4/5 Wrist extension: 4+/5    4/5 : 4/5         Lower Extremity Strength  Right LE  Left LE    Knee extension: 4/5 Knee extension: 4+/5   Knee flexion: 4/5  Knee flexion: 4+/5   Hip flexion: 4/5 Hip flexion: 4/5   Hip extension:  4/5 Hip extension: 4/5   Hip abduction: 4/5 Hip abduction: 4/5   Hip adduction: 5/5 Hip adduction 5/5   Ankle dorsiflexion: 4/5 Ankle dorsiflexion: 4/5   Ankle plantarflexion: 4/5 Ankle plantarflexion: 4/5         CMS Impairment/Limitation/Restriction for FOTO  Lumbar Survey    Therapist reviewed FOTO scores for Rebel Rodriguez on 8/27/2020.   FOTO documents entered into [employer] - see Media section.    Limitation Score: 50%  Category: Mobility    Current : CK = at least 40% but < 60% impaired, limited or restricted  Goal: CJ = at least 20% but < 40% impaired, limited or restricted         TREATMENT   Treatment Time In: 1200  Treatment Time Out: 1220  Total Treatment time separate from Evaluation:  20 minutes    Rebel received therapeutic exercises to develop strength, endurance, ROM, flexibility and core stabilization for 20 minutes:    Single knee to chest 3x 20  Hamstring stertching 3x 20  Piriformis stretching 3x 20      Education provided:   Role of aquatic therapy      Written Home Exercises Provided: yes.  Exercises were reviewed and Rebel was able to demonstrate them prior to the end of the session.  Rebel demonstrated good  understanding of the education provided.     See  Patient instruction [id number] for exercises provided 8/27/2020.    Assessment   Rebel is a 50 y.o. male referred to outpatient Physical Therapy with a medical diagnosis of  Medical Diagnosis from Referral:   Diagnosis   L40.50 (ICD-10-CM) - PSA (psoriatic arthritis)     . Pt presents with hip and hamstring/gastroc tightness (B) worse right, decreased strength of the hip and core musculature which impacts the patient's ability to complete functional tasks & activities safely & timely.  Pt is going to be out of town and will return to therapy in mid September.     Pt prognosis is Good.   Pt will benefit from skilled outpatient Physical Therapy to address the deficits  stated above and in the chart below, provide pt/family education, and to maximize pt's level of independence.     Plan of care discussed with patient: Yes  Pt's spiritual, cultural and educational needs considered and patient is agreeable to the plan of care and goals as stated below:     Anticipated Barriers for therapy: none    Medical Necessity is demonstrated by the following  History  Co-morbidities and personal factors that may impact the plan of care Co-morbidities:   Psoriatic arthrits, lupus    Personal Factors:   no deficits     moderate   Examination  Body Structures and Functions, activity limitations and participation restrictions that may impact the plan of care Body Regions:   back  lower extremities  upper extremities    Body Systems:    ROM  strength  balance  gait    Participation Restrictions:   none    Activity limitations:   Learning and applying knowledge  no deficits    General Tasks and Commands  no deficits    Communication  no deficits    Mobility  lifting and carrying objects  fine hand use (grasping/picking up)  walking    Self care  washing oneself (bathing, drying, washing hands)  caring for body parts (brushing teeth, shaving, grooming)  dressing    Domestic Life  no deficits    Interactions/Relationships  no deficits    Life Areas  no deficits    Community and Social Life  no deficits         moderate   Clinical Presentation evolving clinical presentation with changing clinical characteristics moderate   Decision Making/ Complexity Score: moderate     Goals:  Short Term Goals: 6 weeks   1. Patient will be independent in HEP & progressions  2. Patient will achieve  Quad strength to 4+/5 to demonstrate improved mobility    Long Term Goals: 12 weeks   1. Patient will have FOTO limitation score of  35 % to demonstrate improved function & decreased pain  2. Patient will achieve  4+/5 to 5/5 for LE strength  demonstrate improved transfers & endurance    Plan   Plan of care Certification:  8/27/2020 to 11/27/20    Outpatient Physical Therapy 1-2 times weekly for 12 weeks to include the following interventions: aquatic therapy, patient education, manual therapy, therapeutic exercise, therapeutic activity. Patient may be seen by PTA as part of rehabilitation team    Elsie Chen, PT

## 2020-09-14 ENCOUNTER — CLINICAL SUPPORT (OUTPATIENT)
Dept: REHABILITATION | Facility: HOSPITAL | Age: 50
End: 2020-09-14
Attending: STUDENT IN AN ORGANIZED HEALTH CARE EDUCATION/TRAINING PROGRAM
Payer: COMMERCIAL

## 2020-09-14 DIAGNOSIS — M54.2 NECK PAIN: Primary | ICD-10-CM

## 2020-09-14 PROCEDURE — 97113 AQUATIC THERAPY/EXERCISES: CPT | Performed by: PHYSICAL THERAPIST

## 2020-09-14 NOTE — PROGRESS NOTES
Physical Therapy Daily Treatment Note     Name: Rebel Rodriguez  Clinic Number: [id number]    Therapy Diagnosis:   Encounter Diagnosis   Name Primary?    Neck pain Yes     Physician: Kathie Garcia MD    Visit Date: 9/14/2020    Physician Orders: Aquatic Therapy      Medical Diagnosis from Referral:   Diagnosis   L40.50 (ICD-10-CM) - PSA (psoriatic arthritis)     Evaluation Date: 8/27/2020  Authorization Period Expiration: 12/31/20  Plan of Care Expiration: 11/27/20  Visit # / Visits authorized: 2/24   POC signed by Dr. Beverly for 2x a week for 12 weeks (24 visits)  Total visits: 2/2      PTA visits:  /6    Time In: 1045  Time Out: 1130  Total Billable Time: minutes    Precautions: Standard, Covid    PROCEDURES/IMAGING:     Imaging, MRI 2017 IMPRESSION:      Degenerative disc disease which is worst at C4-5, and results in severe neural foraminal narrowing and canal stenosis with associated cervical cord myelopathy at this level. No abnormal postcontrast enhancement.       Subjective     Pt reports: His joints remain stiff and tight, lower back a little sore today  He was compliant with home exercise program.  Response to previous treatment: 1st pool treatment  Functional change: none    Pain: 3/10  Location: bilateral ankles, buttocks , lower legs, shoulder  and upper legs     Objective     Rebel received aquatic therapeutic exercises to develop strength, endurance, ROM, flexibility and core stabilization for 45 minutes including:    WARMUP x 3 laps each  Walk fwd/back/side    STRETCHES 2 x 20sec  Hamstring and calf stretching on stairs 3x 20 sec    LE EX x 20  Mini squat  Heel raise with GS  Hip abd/add  Hip flex/ext      UE EX/CORE  x 20  Shoulder flex/ext TA activation paddles closed  Shoulder horizontal abd/add TA activation paddles closed  Shoulder abd/add TA activation paddles closed  Blue board for push/pull    ENDURANCE  Biking in parallel bars 5 min    COOL DOWN x 1 lap each  Walk fwd/back/side      Home  Exercises Provided and Patient Education Provided     Education provided:   Role of aquatic therapy  Hydration post therapy      Written Home Exercises Provided: Patient instructed to cont prior HEP.  Exercises were reviewed and Rebel was able to demonstrate them prior to the end of the session.  Rebel demonstrated good  understanding of the education provided.     See Patient [id number] for exercises provided prior visit.    Assessment     Patient required mod verbal cues for proper technique & core stabilization. Patient did well post instruction.  Good understanding of Aquatic principles and core stabilization.  Pt noted a little increased lower back soreness with standing hip abduction and extension.  Better after stretching.  Rebel stated he was surprised how difficult the exercises were in the water.  Rebel is progressing well towards his goals.   Pt prognosis is Good.     Pt will continue to benefit from skilled aquatic outpatient physical therapy to address the deficits listed in the problem list box on initial evaluation, provide pt/family education and to maximize pt's level of independence in the home and community environment.     Pt's spiritual, cultural and educational needs considered and pt agreeable to plan of care and goals.    Anticipated barriers to physical therapy: none    Short Term Goals: 6 weeks   1. Patient will be independent in HEP & progressions  2. Patient will achieve  Quad strength to 4+/5 to demonstrate improved mobility     Long Term Goals: 12 weeks   1. Patient will have FOTO limitation score of  35 % to demonstrate improved function & decreased pain  2. Patient will achieve  4+/5 to 5/5 for LE strength  demonstrate improved transfers & endurance     Plan   Plan of care Certification: 8/27/2020 to 11/27/20     Outpatient Physical Therapy 1-2 times weekly for 12 weeks to include the following interventions: aquatic therapy, patient education, manual therapy, therapeutic  exercise, therapeutic activity. Patient may be seen by PTA as part of rehabilitation team      Elsie Chen, PT

## 2020-09-16 ENCOUNTER — TELEPHONE (OUTPATIENT)
Dept: PHARMACY | Facility: CLINIC | Age: 50
End: 2020-09-16

## 2020-09-21 ENCOUNTER — OFFICE VISIT (OUTPATIENT)
Dept: OPTOMETRY | Facility: CLINIC | Age: 50
End: 2020-09-21
Payer: COMMERCIAL

## 2020-09-21 ENCOUNTER — CLINICAL SUPPORT (OUTPATIENT)
Dept: OPHTHALMOLOGY | Facility: CLINIC | Age: 50
End: 2020-09-21
Payer: COMMERCIAL

## 2020-09-21 ENCOUNTER — CLINICAL SUPPORT (OUTPATIENT)
Dept: REHABILITATION | Facility: HOSPITAL | Age: 50
End: 2020-09-21
Attending: STUDENT IN AN ORGANIZED HEALTH CARE EDUCATION/TRAINING PROGRAM
Payer: COMMERCIAL

## 2020-09-21 DIAGNOSIS — Z79.899 ENCOUNTER FOR LONG-TERM (CURRENT) USE OF HIGH-RISK MEDICATION: Primary | ICD-10-CM

## 2020-09-21 DIAGNOSIS — M54.2 NECK PAIN: Primary | ICD-10-CM

## 2020-09-21 DIAGNOSIS — M19.90 ARTHRITIS: ICD-10-CM

## 2020-09-21 DIAGNOSIS — H52.03 HYPEROPIA WITH PRESBYOPIA OF BOTH EYES: ICD-10-CM

## 2020-09-21 DIAGNOSIS — H52.4 HYPEROPIA WITH PRESBYOPIA OF BOTH EYES: ICD-10-CM

## 2020-09-21 PROCEDURE — 99999 PR PBB SHADOW E&M-EST. PATIENT-LVL I: CPT | Mod: PBBFAC,,,

## 2020-09-21 PROCEDURE — 92083 HUMPHREY VISUAL FIELD - OU - BOTH EYES: ICD-10-PCS | Mod: S$GLB,,, | Performed by: OPTOMETRIST

## 2020-09-21 PROCEDURE — 99999 PR PBB SHADOW E&M-EST. PATIENT-LVL I: ICD-10-PCS | Mod: PBBFAC,,,

## 2020-09-21 PROCEDURE — 92015 PR REFRACTION: ICD-10-PCS | Mod: S$GLB,,, | Performed by: OPTOMETRIST

## 2020-09-21 PROCEDURE — 92014 PR EYE EXAM, EST PATIENT,COMPREHESV: ICD-10-PCS | Mod: S$GLB,,, | Performed by: OPTOMETRIST

## 2020-09-21 PROCEDURE — 92134 CPTRZ OPH DX IMG PST SGM RTA: CPT | Mod: S$GLB,,, | Performed by: OPTOMETRIST

## 2020-09-21 PROCEDURE — 92014 COMPRE OPH EXAM EST PT 1/>: CPT | Mod: S$GLB,,, | Performed by: OPTOMETRIST

## 2020-09-21 PROCEDURE — 97113 AQUATIC THERAPY/EXERCISES: CPT | Performed by: PHYSICAL THERAPIST

## 2020-09-21 PROCEDURE — 92083 EXTENDED VISUAL FIELD XM: CPT | Mod: S$GLB,,, | Performed by: OPTOMETRIST

## 2020-09-21 PROCEDURE — 92134 POSTERIOR SEGMENT OCT RETINA (OCULAR COHERENCE TOMOGRAPHY)-BOTH EYES: ICD-10-PCS | Mod: S$GLB,,, | Performed by: OPTOMETRIST

## 2020-09-21 PROCEDURE — 99999 PR PBB SHADOW E&M-EST. PATIENT-LVL III: CPT | Mod: PBBFAC,,, | Performed by: OPTOMETRIST

## 2020-09-21 PROCEDURE — 92015 DETERMINE REFRACTIVE STATE: CPT | Mod: S$GLB,,, | Performed by: OPTOMETRIST

## 2020-09-21 PROCEDURE — 99999 PR PBB SHADOW E&M-EST. PATIENT-LVL III: ICD-10-PCS | Mod: PBBFAC,,, | Performed by: OPTOMETRIST

## 2020-09-21 RX ORDER — POTASSIUM CITRATE 15 MEQ/1
TABLET, EXTENDED RELEASE ORAL
COMMUNITY
Start: 2020-08-06 | End: 2023-02-28

## 2020-09-21 RX ORDER — OXYCODONE AND ACETAMINOPHEN 5; 325 MG/1; MG/1
TABLET ORAL
COMMUNITY
Start: 2020-06-23 | End: 2021-05-25

## 2020-09-21 NOTE — PROGRESS NOTES
Physical Therapy Daily Treatment Note     Name: Rebel Rodriguez  Clinic Number: [id number]    Therapy Diagnosis:   Encounter Diagnosis   Name Primary?    Neck pain Yes     Physician: Kathie Garcia MD    Visit Date: 9/21/2020    Physician Orders: Aquatic Therapy      Medical Diagnosis from Referral:   Diagnosis   L40.50 (ICD-10-CM) - PSA (psoriatic arthritis)     Evaluation Date: 8/27/2020  Authorization Period Expiration: 12/31/20  Plan of Care Expiration: 11/27/20  Visit # / Visits authorized: 3/24   POC signed by Dr. Beverly for 2x a week for 12 weeks (24 visits)  Total visits: 3/3      PTA visits:  /6    Time In: 1250  Time Out: 140  Total Billable Time:  45 minutes    Precautions: Standard, Covid    PROCEDURES/IMAGING:     Imaging, MRI 2017 IMPRESSION:      Degenerative disc disease which is worst at C4-5, and results in severe neural foraminal narrowing and canal stenosis with associated cervical cord myelopathy at this level. No abnormal postcontrast enhancement.       Subjective     Pt reports: His joints remain stiff and tight, lower back a little sore today.  He was tired and a little muscle soreness after the initial visit but doing fine now  He was compliant with home exercise program.  Response to previous treatment: muscle soreness and fatigue  Functional change: none    Pain: 3/10  Location: bilateral ankles, buttocks , lower legs, shoulder  and upper legs     Objective     Rebel received aquatic therapeutic exercises to develop strength, endurance, ROM, flexibility and core stabilization for 45 minutes including:    WARMUP x 3 laps each  Walk fwd/back/side    STRETCHES 2 x 20sec  Hamstring and calf stretching on stairs 3x 20 sec    LE EX x 30 with 2# ankle weights for hip series  Mini squat  Heel raise with GS  Hip abd/add  Hip ext  Marching  Hip flex/knee extend next visit      UE EX/CORE  x 20  Shoulder flex/ext TA activation paddles closed  Shoulder horizontal abd/add TA activation paddles  closed  Shoulder abd/add TA activation paddles closed  Blue board for push/pull    ENDURANCE  Biking in parallel bars 5 min, wt 2# next visit?    COOL DOWN x 1 lap each  Walk fwd/back/side      Home Exercises Provided and Patient Education Provided     Education provided:   Role of aquatic therapy  Hydration post therapy      Written Home Exercises Provided: Patient instructed to cont prior HEP.  Exercises were reviewed and Rebel was able to demonstrate them prior to the end of the session.  Rebel demonstrated good  understanding of the education provided.     See Patient [id number] for exercises provided prior visit.    Assessment     Patient required mod verbal cues for proper technique & core stabilization. Patient did well post instruction.  Good understanding of Aquatic principles and core stabilization.  Pt noted a little increased lower back soreness with standing hip abduction and extension.  Better after stretching.  Rebel stated he was surprised how difficult the exercises were in the water.  Rebel is progressing well towards his goals.   Pt prognosis is Good.     Pt will continue to benefit from skilled aquatic outpatient physical therapy to address the deficits listed in the problem list box on initial evaluation, provide pt/family education and to maximize pt's level of independence in the home and community environment.     Pt's spiritual, cultural and educational needs considered and pt agreeable to plan of care and goals.    Anticipated barriers to physical therapy: none    Short Term Goals: 6 weeks   1. Patient will be independent in HEP & progressions  2. Patient will achieve  Quad strength to 4+/5 to demonstrate improved mobility     Long Term Goals: 12 weeks   1. Patient will have FOTO limitation score of  35 % to demonstrate improved function & decreased pain  2. Patient will achieve  4+/5 to 5/5 for LE strength  demonstrate improved transfers & endurance     Plan   Plan of care  Certification: 8/27/2020 to 11/27/20     Outpatient Physical Therapy 1-2 times weekly for 12 weeks to include the following interventions: aquatic therapy, patient education, manual therapy, therapeutic exercise, therapeutic activity. Patient may be seen by PTA as part of rehabilitation team      Elsie Chen, PT

## 2020-09-21 NOTE — PROGRESS NOTES
Visual field test done   Pt stated no latex allergie use coverlet patch           mrx  4.00+ 1.00 x 10  3.75 + 1.50 x 152

## 2020-09-21 NOTE — PROGRESS NOTES
HPI     Last eye exam was 8/3/18 with Dr. Bennett.  Patient states no vision changes since last exam.  Patient denies diplopia, headaches, flashes/floaters, and pain.    Plaquenil 300 mg Daily PO      Last edited by Jodie Tran on 9/21/2020 10:59 AM. (History)            Assessment /Plan     For exam results, see Encounter Report.    Encounter for long-term (current) use of high-risk medication  -     [facility] Visual Field - OU - Extended - Both Eyes  -     OCT- Retina     psoriatric Arthritis  -     [facility] Visual Field - OU - Extended - Both Eyes  -     OCT- Retina    Hyperopia with presbyopia of both eyes            1-2.  All testing normal OU-no retinopathy.  Monitor yearly.  3.  Bifocal rx given.  Eye health normal OU.

## 2020-09-23 ENCOUNTER — CLINICAL SUPPORT (OUTPATIENT)
Dept: REHABILITATION | Facility: HOSPITAL | Age: 50
End: 2020-09-23
Attending: STUDENT IN AN ORGANIZED HEALTH CARE EDUCATION/TRAINING PROGRAM
Payer: COMMERCIAL

## 2020-09-23 DIAGNOSIS — M54.2 NECK PAIN: Primary | ICD-10-CM

## 2020-09-23 PROCEDURE — 97113 AQUATIC THERAPY/EXERCISES: CPT | Performed by: PHYSICAL THERAPIST

## 2020-09-23 NOTE — PROGRESS NOTES
Physical Therapy Daily Treatment Note     Name: Rebel Rodriguez  Clinic Number: [id number]    Therapy Diagnosis:   Encounter Diagnosis   Name Primary?    Neck pain Yes     Physician: Kathie Garcia MD    Visit Date: 9/23/2020    Physician Orders: Aquatic Therapy      Medical Diagnosis from Referral:   Diagnosis   L40.50 (ICD-10-CM) - PSA (psoriatic arthritis)     Evaluation Date: 8/27/2020  Authorization Period Expiration: 12/31/20  Plan of Care Expiration: 11/27/20  Visit # / Visits authorized: 4/24   POC signed by Dr. Beverly for 2x a week for 12 weeks (24 visits)  Total visits: 4./4      PTA visits:  /6    Time In: 1100  Time Out: 1200  Total Billable Time:  45 minutes    Precautions: Standard, Covid    PROCEDURES/IMAGING:     Imaging, MRI 2017 IMPRESSION:      Degenerative disc disease which is worst at C4-5, and results in severe neural foraminal narrowing and canal stenosis with associated cervical cord myelopathy at this level. No abnormal postcontrast enhancement.       Subjective     Pt reports: His joints remain stiff and tight, lower back a little sore today.  He was tired and a little muscle soreness after the initial visit but doing fine now  He was compliant with home exercise program.  Response to previous treatment: muscle soreness and fatigue  Functional change: none    Pain: 3/10  Location: bilateral ankles, buttocks , lower legs, shoulder  and upper legs     Objective     Rebel received aquatic therapeutic exercises to develop strength, endurance, ROM, flexibility and core stabilization for 45 minutes including:    WARMUP x 3 laps each  Walk fwd/back/side    STRETCHES 2 x 20sec  Hamstring and calf stretching on stairs 3x 20 sec    LE EX x 30 with 2# ankle weights for hip series  Mini squat  Heel raise with GS  Hip abd/add  Hip ext  Marching  Hip flex/knee extend       UE EX/CORE  x 20  Shoulder flex/ext TA activation paddles closed, trial of staggered stance, better with right foot  forward  Shoulder horizontal abd/add TA activation paddles closed  Shoulder abd/add TA activation paddles closed  Blue board for push/pull    ENDURANCE  Biking in parallel bars 5 min, no weights due to HS cramping    COOL DOWN x 1 lap each  Walk fwd/back/side      Home Exercises Provided and Patient Education Provided     Education provided:   Role of aquatic therapy  Hydration post therapy      Written Home Exercises Provided: Patient instructed to cont prior HEP.  Exercises were reviewed and Rebel was able to demonstrate them prior to the end of the session.  Rebel demonstrated good  understanding of the education provided.     See Patient [id number] for exercises provided prior visit.    Assessment     Patient required mod verbal cues for proper technique & core stabilization. Patient did well post instruction.  Good understanding of Aquatic principles and core stabilization.  Pt noted a little increased lower back soreness with standing hip abduction and extension.  Better after stretching.  Rebel states he is enjoying his pool therapy and is looking towards continuing at [facility] or land therapy post Aquatic instruction  Rebel is progressing well towards his goals.   Pt prognosis is Good.     Pt will continue to benefit from skilled aquatic outpatient physical therapy to address the deficits listed in the problem list box on initial evaluation, provide pt/family education and to maximize pt's level of independence in the home and community environment.     Pt's spiritual, cultural and educational needs considered and pt agreeable to plan of care and goals.    Anticipated barriers to physical therapy: none    Short Term Goals: 6 weeks   1. Patient will be independent in HEP & progressions  2. Patient will achieve  Quad strength to 4+/5 to demonstrate improved mobility     Long Term Goals: 12 weeks   1. Patient will have FOTO limitation score of  35 % to demonstrate improved function & decreased pain  2.  Patient will achieve  4+/5 to 5/5 for LE strength  demonstrate improved transfers & endurance     Plan   Plan of care Certification: 8/27/2020 to 11/27/20     Outpatient Physical Therapy 1-2 times weekly for 12 weeks to include the following interventions: aquatic therapy, patient education, manual therapy, therapeutic exercise, therapeutic activity. Patient may be seen by PTA as part of rehabilitation team      Elsie Chen, PT

## 2020-09-24 ENCOUNTER — PATIENT MESSAGE (OUTPATIENT)
Dept: DERMATOLOGY | Facility: CLINIC | Age: 50
End: 2020-09-24

## 2020-09-28 ENCOUNTER — CLINICAL SUPPORT (OUTPATIENT)
Dept: REHABILITATION | Facility: HOSPITAL | Age: 50
End: 2020-09-28
Attending: STUDENT IN AN ORGANIZED HEALTH CARE EDUCATION/TRAINING PROGRAM
Payer: COMMERCIAL

## 2020-09-28 DIAGNOSIS — M54.2 NECK PAIN: Primary | ICD-10-CM

## 2020-09-28 PROCEDURE — 97113 AQUATIC THERAPY/EXERCISES: CPT | Performed by: PHYSICAL THERAPIST

## 2020-09-28 NOTE — PROGRESS NOTES
Physical Therapy Daily Treatment Note     Name: Rebel Rodriguez  Clinic Number: [id number]    Therapy Diagnosis:   Encounter Diagnosis   Name Primary?    Neck pain Yes     Physician: Kathie Garcia MD    Visit Date: 9/28/2020    Physician Orders: Aquatic Therapy      Medical Diagnosis from Referral:   Diagnosis   L40.50 (ICD-10-CM) - PSA (psoriatic arthritis)     Evaluation Date: 8/27/2020  Authorization Period Expiration: 12/31/20  Plan of Care Expiration: 11/27/20  Visit # / Visits authorized: 5/24   POC signed by Dr. Beverly for 2x a week for 12 weeks (24 visits)  Total visits: 5/5      PTA visits:  /6    Time In: 1105  Time Out: 1200  Total Billable Time:45 minutes    Precautions: Standard, Covid    PROCEDURES/IMAGING:     Imaging, MRI 2017 IMPRESSION:      Degenerative disc disease which is worst at C4-5, and results in severe neural foraminal narrowing and canal stenosis with associated cervical cord myelopathy at this level. No abnormal postcontrast enhancement.       Subjective     Pt reports: His joints remain stiff and tight, lower back a little sore today.  He was tired and a little muscle soreness after the last visit but doing fine now.  Pt is prone to HS cramping during exercise, stretches in between reps/exercises  He was compliant with home exercise program.  Response to previous treatment: muscle soreness and fatigue  Functional change: none    Pain: 2-3/10  Location: bilateral ankles, buttocks , lower legs, shoulder  and upper legs     Objective     Rebel received aquatic therapeutic exercises to develop strength, endurance, ROM, flexibility and core stabilization for 45 minutes including:    WARMUP x 3 laps each  Walk fwd/back/side    STRETCHES 2 x 20sec  Hamstring and calf stretching on stairs 3x 20 sec    LE EX x 30 with 2# ankle weights for hip series  Mini squat  Heel raise with GS  Hip abd/add  Hip ext  Marching  Hip flex/knee extend       UE EX/CORE  x 20  Shoulder flex/ext TA activation  paddles closed, trial of staggered stance, better with right foot forward  Shoulder horizontal abd/add TA activation paddles closed  Shoulder abd/add TA activation paddles closed  Blue board for push/pull    ENDURANCE  Biking in parallel bars 5 min, no weights due to HS cramping    COOL DOWN x 1 lap each  Walk fwd/back/side      Home Exercises Provided and Patient Education Provided     Education provided:   Role of aquatic therapy  Hydration post therapy      Written Home Exercises Provided: Patient instructed to cont prior HEP.  Exercises were reviewed and Rebel was able to demonstrate them prior to the end of the session.  Rebel demonstrated good  understanding of the education provided.     See Patient [id number] for exercises provided prior visit.    Assessment     Patient required mod verbal cues for proper technique & core stabilization. Patient did well post instruction.  Good understanding of Aquatic principles and core stabilization.  Pt noted a little increased lower back soreness with standing hip abduction and extension.  Better after stretching.  Rebel states he is enjoying his pool therapy and is looking towards continuing at [facility] or land therapy post Aquatic instruction  Rebel is progressing well towards his goals.   Pt prognosis is Good.     Pt will continue to benefit from skilled aquatic outpatient physical therapy to address the deficits listed in the problem list box on initial evaluation, provide pt/family education and to maximize pt's level of independence in the home and community environment.     Pt's spiritual, cultural and educational needs considered and pt agreeable to plan of care and goals.    Anticipated barriers to physical therapy: none    Short Term Goals: 6 weeks   1. Patient will be independent in HEP & progressions  2. Patient will achieve  Quad strength to 4+/5 to demonstrate improved mobility     Long Term Goals: 12 weeks   1. Patient will have FOTO limitation  score of  35 % to demonstrate improved function & decreased pain  2. Patient will achieve  4+/5 to 5/5 for LE strength  demonstrate improved transfers & endurance     Plan   Plan of care Certification: 8/27/2020 to 11/27/20     Outpatient Physical Therapy 1-2 times weekly for 12 weeks to include the following interventions: aquatic therapy, patient education, manual therapy, therapeutic exercise, therapeutic activity. Patient may be seen by PTA as part of rehabilitation team      Elsie Chen, PT

## 2020-09-29 ENCOUNTER — TELEPHONE (OUTPATIENT)
Dept: RHEUMATOLOGY | Facility: CLINIC | Age: 50
End: 2020-09-29

## 2020-09-29 ENCOUNTER — PATIENT MESSAGE (OUTPATIENT)
Dept: RHEUMATOLOGY | Facility: CLINIC | Age: 50
End: 2020-09-29

## 2020-09-30 ENCOUNTER — CLINICAL SUPPORT (OUTPATIENT)
Dept: REHABILITATION | Facility: HOSPITAL | Age: 50
End: 2020-09-30
Attending: STUDENT IN AN ORGANIZED HEALTH CARE EDUCATION/TRAINING PROGRAM
Payer: COMMERCIAL

## 2020-09-30 DIAGNOSIS — M54.2 NECK PAIN: Primary | ICD-10-CM

## 2020-09-30 PROCEDURE — 97113 AQUATIC THERAPY/EXERCISES: CPT | Performed by: PHYSICAL THERAPIST

## 2020-09-30 NOTE — PROGRESS NOTES
Physical Therapy Daily Treatment Note     Name: Rebel Rodriguez  Clinic Number: [id number]    Therapy Diagnosis:   Encounter Diagnosis   Name Primary?    Neck pain Yes     Physician: Kathie Garcia MD    Visit Date: 9/30/2020    Physician Orders: Aquatic Therapy      Medical Diagnosis from Referral:   Diagnosis   L40.50 (ICD-10-CM) - PSA (psoriatic arthritis)     Evaluation Date: 8/27/2020  Authorization Period Expiration: 12/31/20  Plan of Care Expiration: 11/27/20  Visit # / Visits authorized: 6/24   POC signed by Dr. Beverly for 2x a week for 12 weeks (24 visits)  Total visits: 6/6      PTA visits:  /6    Time In: 1105  Time Out: 1200  Total Billable Time:45 minutes    Precautions: Standard, Covid    PROCEDURES/IMAGING:     Imaging, MRI 2017 IMPRESSION:      Degenerative disc disease which is worst at C4-5, and results in severe neural foraminal narrowing and canal stenosis with associated cervical cord myelopathy at this level. No abnormal postcontrast enhancement.       Subjective     Pt reports: His joints remain stiff and tight, lower back a little sore today.  He was tired and a little muscle soreness after the last visit but doing fine now.  Pt is prone to HS cramping during exercise, stretches in between reps/exercises  He was compliant with home exercise program.  Response to previous treatment: muscle soreness and fatigue  Functional change: none    Pain: 2-3/10  Location: bilateral ankles, buttocks , lower legs, shoulder  and upper legs     Objective     Rebel received aquatic therapeutic exercises to develop strength, endurance, ROM, flexibility and core stabilization for 45 minutes including:    WARMUP x 3 laps each  Walk fwd/back/side    STRETCHES 2 x 20sec  Hamstring and calf stretching on stairs 3x 20 sec    LE EX x 30 with 3.75# ankle weights for hip series  Mini squat  Heel raise with GS  Hip abd/add  Hip ext  Marching  Hip flex/knee extend   Side stepping with orange band 2 laps        UE  EX/CORE  x 20  Shoulder flex/ext TA activation paddles closed, trial of staggered stance, better with right foot forward  Shoulder horizontal abd/add TA activation paddles closed  Shoulder abd/add TA activation paddles closed  Blue board for push/pull    ENDURANCE  Biking in parallel bars 5 min, no weights due to HS cramping    COOL DOWN x 1 lap each  Walk fwd/back/side      Home Exercises Provided and Patient Education Provided     Education provided:   Role of aquatic therapy  Hydration post therapy      Written Home Exercises Provided: Patient instructed to cont prior HEP.  Exercises were reviewed and Rebel was able to demonstrate them prior to the end of the session.  Rebel demonstrated good  understanding of the education provided.     See Patient [id number] for exercises provided prior visit.    Assessment     Patient required mod verbal cues for proper technique & core stabilization. Patient did well post instruction.  Good understanding of Aquatic principles and core stabilization.  Pt noted a little increased lower back soreness with standing hip abduction and extension.  Better after stretching.  Rebel states he is enjoying his pool therapy and is looking towards continuing at [facility] or land therapy post Aquatic instruction  Rebel is progressing well towards his goals.   Pt prognosis is Good.     Pt will continue to benefit from skilled aquatic outpatient physical therapy to address the deficits listed in the problem list box on initial evaluation, provide pt/family education and to maximize pt's level of independence in the home and community environment.     Pt's spiritual, cultural and educational needs considered and pt agreeable to plan of care and goals.    Anticipated barriers to physical therapy: none    Short Term Goals: 6 weeks   1. Patient will be independent in HEP & progressions  2. Patient will achieve  Quad strength to 4+/5 to demonstrate improved mobility     Long Term Goals: 12  weeks   1. Patient will have FOTO limitation score of  35 % to demonstrate improved function & decreased pain  2. Patient will achieve  4+/5 to 5/5 for LE strength  demonstrate improved transfers & endurance     Plan   Plan of care Certification: 8/27/2020 to 11/27/20     Outpatient Physical Therapy 1-2 times weekly for 12 weeks to include the following interventions: aquatic therapy, patient education, manual therapy, therapeutic exercise, therapeutic activity. Patient may be seen by PTA as part of rehabilitation team      Elsie Chen, PT

## 2020-10-15 ENCOUNTER — TELEPHONE (OUTPATIENT)
Dept: PHARMACY | Facility: CLINIC | Age: 50
End: 2020-10-15

## 2020-10-20 ENCOUNTER — CLINICAL SUPPORT (OUTPATIENT)
Dept: REHABILITATION | Facility: HOSPITAL | Age: 50
End: 2020-10-20
Attending: STUDENT IN AN ORGANIZED HEALTH CARE EDUCATION/TRAINING PROGRAM
Payer: COMMERCIAL

## 2020-10-20 DIAGNOSIS — M54.2 NECK PAIN: Primary | ICD-10-CM

## 2020-10-20 PROCEDURE — 97113 AQUATIC THERAPY/EXERCISES: CPT

## 2020-10-20 NOTE — PROGRESS NOTES
Physical Therapy Daily Treatment Note     Name: Rebel Rodriguez  Clinic Number: [id number]    Therapy Diagnosis:   Encounter Diagnosis   Name Primary?    Neck pain Yes     Physician: Kathie Garcia MD    Visit Date: 10/20/2020    Physician Orders: Aquatic Therapy      Medical Diagnosis from Referral:   Diagnosis   L40.50 (ICD-10-CM) - PSA (psoriatic arthritis)     Evaluation Date: 8/27/2020  Authorization Period Expiration: 12/31/20  Plan of Care Expiration: 11/27/20  Visit # / Visits authorized: 7/24   POC signed by Dr. Bevrely for 2x a week for 12 weeks (24 visits)  Total visits: 7/24  PTA visits:  0/6      Time In: 0950  Time Out: 1050  Total Billable Time:30 minutes    Precautions: Standard, Covid    PROCEDURES/IMAGING:     Imaging, MRI 2017 IMPRESSION:      Degenerative disc disease which is worst at C4-5, and results in severe neural foraminal narrowing and canal stenosis with associated cervical cord myelopathy at this level. No abnormal postcontrast enhancement.       Subjective     Pt reports: Feels good, a little stiff from his 8 hour car ride yesterday; mostly in his neck.   He was compliant with home exercise program.  Response to previous treatment: muscle soreness and fatigue  Functional change: none    Pain: 2/10  Location: bilateral ankles, buttocks , lower legs, shoulder  and upper legs     Objective     Rebel received aquatic therapeutic exercises to develop strength, endurance, ROM, flexibility and core stabilization for 30 minutes including:    WARMUP x 3 laps each  Walk fwd/back/side    STRETCHES 2 x 20sec  Hamstring and calf stretching on stairs 3x 20 sec    LE EX x 30 with 3.75# ankle weights for hip series  Mini squat  Heel raise with GS  Hip abd/add  Hip ext  Marching  Hip flex/knee extend - Not today  Side stepping with orange band 2 laps- not today        UE EX/CORE  x 30 (Pt used his own paddles that he brought which don't involve gripping to help with arthritis in hands)   Shoulder  flex/ext TA activation paddles closed, trial of staggered stance, better with right foot forward  Shoulder horizontal abd/add TA activation paddles closed  Shoulder abd/add TA activation paddles closed  Blue board for push/pull    ENDURANCE  Biking in parallel bars 5 min, no weights due to HS cramping    COOL DOWN x 1 lap each  Walk fwd/back/side      Home Exercises Provided and Patient Education Provided     Education provided:   Role of aquatic therapy  Hydration post therapy      Written Home Exercises Provided: Patient instructed to cont prior HEP.  Exercises were reviewed and Rebel was able to demonstrate them prior to the end of the session.  Rebel demonstrated good  understanding of the education provided.     See Patient [id number] for exercises provided prior visit.    Assessment     Pt was able to tolerate all exercises during today's session without any c/o increased pain or discomfort. Pt used his own paddles that he brought which don't involve gripping to help with arthritis in his hands. Pt needed min verbal cuing and demonstration for good posture and core activation for LE exercises. Pt was able to verbalize and demonstrate understanding. PT educated pt on stretches to perform outside of the pool when at home as part of HEP; to include hip flexor, quad, and piriformis stretches.     Rebel is progressing well towards his goals.   Pt prognosis is Good.     Pt will continue to benefit from skilled aquatic outpatient physical therapy to address the deficits listed in the problem list box on initial evaluation, provide pt/family education and to maximize pt's level of independence in the home and community environment.     Pt's spiritual, cultural and educational needs considered and pt agreeable to plan of care and goals.    Anticipated barriers to physical therapy: none    Short Term Goals: 6 weeks   1. Patient will be independent in HEP & progressions  2. Patient will achieve  Quad strength  to 4+/5 to demonstrate improved mobility     Long Term Goals: 12 weeks   1. Patient will have FOTO limitation score of  35 % to demonstrate improved function & decreased pain  2. Patient will achieve  4+/5 to 5/5 for LE strength  demonstrate improved transfers & endurance     Plan   Plan of care Certification: 8/27/2020 to 11/27/20     Outpatient Physical Therapy 1-2 times weekly for 12 weeks to include the following interventions: aquatic therapy, patient education, manual therapy, therapeutic exercise, therapeutic activity. Patient may be seen by PTA as part of rehabilitation team      Mayo Kapoor PT

## 2020-10-26 ENCOUNTER — CLINICAL SUPPORT (OUTPATIENT)
Dept: REHABILITATION | Facility: HOSPITAL | Age: 50
End: 2020-10-26
Attending: STUDENT IN AN ORGANIZED HEALTH CARE EDUCATION/TRAINING PROGRAM
Payer: COMMERCIAL

## 2020-10-26 DIAGNOSIS — M54.2 NECK PAIN: Primary | ICD-10-CM

## 2020-10-26 PROCEDURE — 97113 AQUATIC THERAPY/EXERCISES: CPT | Performed by: PHYSICAL THERAPIST

## 2020-10-26 NOTE — PROGRESS NOTES
Physical Therapy Daily Treatment Note     Name: Rebel Rodriguez  Clinic Number: [id number]    Therapy Diagnosis:   Encounter Diagnosis   Name Primary?    Neck pain Yes     Physician: Kathie Garcia MD    Visit Date: 10/26/2020    Physician Orders: Aquatic Therapy      Medical Diagnosis from Referral:   Diagnosis   L40.50 (ICD-10-CM) - PSA (psoriatic arthritis)     Evaluation Date: 8/27/2020  Authorization Period Expiration: 12/31/20  Plan of Care Expiration: 11/27/20  Visit # / Visits authorized: 7/24   POC signed by Dr. Beverly for 2x a week for 12 weeks (24 visits)  Total visits: 8/24  PTA visits:  0/6      Time In: 925  Time Out: 1010  Total Billable Time:30 minutes    Precautions: Standard, Covid    PROCEDURES/IMAGING:     Imaging, MRI 2017 IMPRESSION:      Degenerative disc disease which is worst at C4-5, and results in severe neural foraminal narrowing and canal stenosis with associated cervical cord myelopathy at this level. No abnormal postcontrast enhancement.       Subjective     Pt reports: Feels good, he is working more now.   He was compliant with home exercise program.  Response to previous treatment: muscle soreness and fatigue  Functional change: none    Pain: 2/10  Location: bilateral ankles, buttocks , lower legs, shoulder  and upper legs     Objective     Rebel received aquatic therapeutic exercises to develop strength, endurance, ROM, flexibility and core stabilization for 30 minutes including:    WARMUP x 3 laps each  Walk fwd/back/side    STRETCHES 2 x 20sec  Hamstring and calf stretching on stairs 3x 20 sec    LE EX x 30 with 3.75# ankle weights for hip series  Mini squat  Heel raise with GS  Hip abd/add  Hip ext  Marching  Hip flex/knee extend - Not today  Side stepping with orange band 2 laps   Monster walks with orange theraband 2 laps        UE EX/CORE  x 30 trial of foam around yellow paddles  Shoulder flex/ext TA activation paddles closed, used foam around handles for ease of ,  trial of staggered stance, better with right foot forward  Shoulder horizontal abd/add TA activation paddles closed  Shoulder abd/add TA activation paddles closed  Blue board for push/pull    ENDURANCE  Biking in parallel bars 6 min, no weights due to HS cramping    COOL DOWN x 1 lap each  Walk fwd/back/side      Home Exercises Provided and Patient Education Provided     Education provided:   Role of aquatic therapy  Hydration post therapy      Written Home Exercises Provided: Patient instructed to cont prior HEP.  Exercises were reviewed and Rebel was able to demonstrate them prior to the end of the session.  Rebel demonstrated good  understanding of the education provided.     See Patient [id number] for exercises provided prior visit.    Assessment     Pt was able to tolerate all exercises during today's session without any c/o increased pain or discomfort. Pt needed min verbal cuing and demonstration for good posture and core activation for LE exercises. Pt was able to verbalize and demonstrate understanding. Continue with  stretches to perform outside of the pool when at home as part of HEP; to include hip flexor, quad, and piriformis stretches.     Rebel is progressing well towards his goals.   Pt prognosis is Good.     Pt will continue to benefit from skilled aquatic outpatient physical therapy to address the deficits listed in the problem list box on initial evaluation, provide pt/family education and to maximize pt's level of independence in the home and community environment.     Pt's spiritual, cultural and educational needs considered and pt agreeable to plan of care and goals.    Anticipated barriers to physical therapy: none    Short Term Goals: 6 weeks   1. Patient will be independent in HEP & progressions  2. Patient will achieve  Quad strength to 4+/5 to demonstrate improved mobility     Long Term Goals: 12 weeks   1. Patient will have FOTO limitation score of  35 % to demonstrate improved  function & decreased pain  2. Patient will achieve  4+/5 to 5/5 for LE strength  demonstrate improved transfers & endurance     Plan   Plan of care Certification: 8/27/2020 to 11/27/20     Outpatient Physical Therapy 1-2 times weekly for 12 weeks to include the following interventions: aquatic therapy, patient education, manual therapy, therapeutic exercise, therapeutic activity. Patient may be seen by PTA as part of rehabilitation team      Elsie Chen, PT

## 2020-10-28 ENCOUNTER — CLINICAL SUPPORT (OUTPATIENT)
Dept: REHABILITATION | Facility: HOSPITAL | Age: 50
End: 2020-10-28
Attending: STUDENT IN AN ORGANIZED HEALTH CARE EDUCATION/TRAINING PROGRAM
Payer: COMMERCIAL

## 2020-10-28 DIAGNOSIS — M54.2 NECK PAIN: Primary | ICD-10-CM

## 2020-10-28 PROCEDURE — 97113 AQUATIC THERAPY/EXERCISES: CPT | Performed by: PHYSICAL THERAPIST

## 2020-10-28 NOTE — PROGRESS NOTES
"  Physical Therapy Daily Treatment Note     Name: Rebel Rodriguez  Clinic Number: [id number]    Therapy Diagnosis:   Encounter Diagnosis   Name Primary?    Neck pain Yes     Physician: Kathie Garcia MD    Visit Date: 10/28/2020    Physician Orders: Aquatic Therapy      Medical Diagnosis from Referral:   Diagnosis   L40.50 (ICD-10-CM) - PSA (psoriatic arthritis)     Evaluation Date: 8/27/2020  Authorization Period Expiration: 12/31/20  Plan of Care Expiration: 11/27/20  Visit # / Visits authorized: 10/20   POC signed by Dr. Beverly for 2x a week for 12 weeks (24 visits)  Total visits: 10/20  PTA visits:  0/6      Time In: 1000  Time Out: 1100  Total Billable Time:30 minutes    Precautions: Standard, Covid    PROCEDURES/IMAGING:     Imaging, MRI 2017 IMPRESSION:      Degenerative disc disease which is worst at C4-5, and results in severe neural foraminal narrowing and canal stenosis with associated cervical cord myelopathy at this level. No abnormal postcontrast enhancement.       Subjective     Pt reports: Feels good, he is working more now. He did "tweak" his left knee a little yesterday and it remains sore.   He was compliant with home exercise program.  Response to previous treatment: muscle soreness and fatigue  Functional change: none    Pain: 2/10  Location: bilateral ankles, buttocks , lower legs, shoulder  and upper legs     Objective     Rebel received aquatic therapeutic exercises to develop strength, endurance, ROM, flexibility and core stabilization for 45 minutes including:    WARMUP x 3 laps each  Walk fwd/back/side    STRETCHES 2 x 20sec  Hamstring and calf stretching on stairs 3x 20 sec    LE EX x 30 with 2# ankle weights for hip series  Mini squat  Heel raise with GS  Hip abd/add  Hip ext  Marching  Hip flex/knee extend - Not today  Side stepping with orange band 2 laps   Monster walks with orange theraband 2 laps        UE EX/CORE  x 30 trial of foam around yellow paddles  Shoulder flex/ext TA " activation paddles pt brings his own, trial of staggered stance, better with right foot forward  Shoulder horizontal abd/add TA activation paddles own  Shoulder abd/add TA activation paddles own  Blue board with hand holds for push/pull    ENDURANCE  Flutter kicks for 3 min, no bike due to knee stiffness    COOL DOWN x 1 lap each  Walk fwd/back/side      Home Exercises Provided and Patient Education Provided     Education provided:   Role of aquatic therapy  Hydration post therapy      Written Home Exercises Provided: Patient instructed to cont prior HEP.  Exercises were reviewed and Rebel was able to demonstrate them prior to the end of the session.  Rebel demonstrated good  understanding of the education provided.     See Patient [id number] for exercises provided prior visit.    Assessment     Pt was able to tolerate all exercises during today's session without any c/o increased pain or discomfort. Pt needed min verbal cuing and demonstration for good posture and core activation for LE exercises. Pt was able to verbalize and demonstrate understanding. Continue with  stretches to perform outside of the pool when at home as part of HEP; to include hip flexor, quad, and piriformis stretches. Pt may be a good candidate for the [facility] after aquatics.     Rebel is progressing well towards his goals.   Pt prognosis is Good.     Pt will continue to benefit from skilled aquatic outpatient physical therapy to address the deficits listed in the problem list box on initial evaluation, provide pt/family education and to maximize pt's level of independence in the home and community environment.     Pt's spiritual, cultural and educational needs considered and pt agreeable to plan of care and goals.    Anticipated barriers to physical therapy: none    Short Term Goals: 6 weeks   1. Patient will be independent in HEP & progressions  2. Patient will achieve  Quad strength to 4+/5 to demonstrate  improved mobility     Long Term Goals: 12 weeks   1. Patient will have FOTO limitation score of  35 % to demonstrate improved function & decreased pain  2. Patient will achieve  4+/5 to 5/5 for LE strength  demonstrate improved transfers & endurance     Plan   Plan of care Certification: 8/27/2020 to 11/27/20     Outpatient Physical Therapy 1-2 times weekly for 12 weeks to include the following interventions: aquatic therapy, patient education, manual therapy, therapeutic exercise, therapeutic activity. Patient may be seen by PTA as part of rehabilitation team      Elsie Chen, PT

## 2020-11-02 ENCOUNTER — CLINICAL SUPPORT (OUTPATIENT)
Dept: REHABILITATION | Facility: HOSPITAL | Age: 50
End: 2020-11-02
Attending: STUDENT IN AN ORGANIZED HEALTH CARE EDUCATION/TRAINING PROGRAM
Payer: COMMERCIAL

## 2020-11-02 DIAGNOSIS — M54.2 NECK PAIN: Primary | ICD-10-CM

## 2020-11-02 PROCEDURE — 97113 AQUATIC THERAPY/EXERCISES: CPT | Performed by: PHYSICAL THERAPIST

## 2020-11-02 NOTE — PROGRESS NOTES
Physical Therapy Daily Treatment Note     Name: Rebel Rodriguez  Clinic Number: [id number]    Therapy Diagnosis:   Encounter Diagnosis   Name Primary?    Neck pain Yes     Physician: Kathie Garcia MD    Visit Date: 11/2/2020    Physician Orders: Aquatic Therapy      Medical Diagnosis from Referral:   Diagnosis   L40.50 (ICD-10-CM) - PSA (psoriatic arthritis)     Evaluation Date: 8/27/2020  Authorization Period Expiration: 12/31/20  Plan of Care Expiration: 11/27/20  Visit # / Visits authorized: 11/20   POC signed by Dr. Beverly for 2x a week for 12 weeks (24 visits)  Total visits: 11/20  PTA visits:  0/6      Time In: 910  Time Out: 1000  Total Billable Time:45 minutes    Precautions: Standard, Covid    PROCEDURES/IMAGING:     Imaging, MRI 2017 IMPRESSION:      Degenerative disc disease which is worst at C4-5, and results in severe neural foraminal narrowing and canal stenosis with associated cervical cord myelopathy at this level. No abnormal postcontrast enhancement.       Subjective     Pt reports: Feels good, he is working more now. His knee remains a little sore with position changes, slight swelling,  Using ice at home  He was compliant with home exercise program.  Response to previous treatment: muscle soreness and fatigue  Functional change: none    Pain: 2/10  Location: bilateral ankles, buttocks , lower legs, shoulder  and upper legs     Objective     Rebel received aquatic therapeutic exercises to develop strength, endurance, ROM, flexibility and core stabilization for 45 minutes including:    WARMUP x 3 laps each  Walk fwd/back/side    STRETCHES 2 x 20sec  Hamstring and calf stretching on stairs 3x 20 sec    LE EX x 30 with 2# ankle weights for hip series  Mini squat  Heel raise with GS  Hip abd/add  Hip ext  Marching  Hip flex/knee extend - Not today  Side stepping with green band 2 laps   Monster walks with green theraband 2 laps        UE EX/CORE  x 30 trial of foam around yellow paddles  Shoulder  flex/ext TA activation paddles pt brings his own, trial of staggered stance, better with right foot forward  Shoulder horizontal abd/add TA activation paddles own  Shoulder abd/add TA activation paddles own  Blue board with hand holds for push/pull    ENDURANCE  Flutter kicks for 3 min, no bike due to knee stiffness    COOL DOWN x 1 lap each  Walk fwd/back/side      Home Exercises Provided and Patient Education Provided     Education provided:   Role of aquatic therapy  Hydration post therapy      Written Home Exercises Provided: Patient instructed to cont prior HEP.  Exercises were reviewed and Rebel was able to demonstrate them prior to the end of the session.  Rebel demonstrated good  understanding of the education provided.     See Patient [id number] for exercises provided prior visit.    Assessment     Pt was able to tolerate all exercises during today's session without any c/o increased pain or discomfort. Pt needed min verbal cuing and demonstration for good posture and core activation for LE exercises. Pt was able to verbalize and demonstrate understanding. Continue with  stretches to perform outside of the pool when at home as part of HEP; to include hip flexor, quad, and piriformis stretches. Pt may be a good candidate for the [facility] after aquatics.     Rebel is progressing well towards his goals.   Pt prognosis is Good.     Pt will continue to benefit from skilled aquatic outpatient physical therapy to address the deficits listed in the problem list box on initial evaluation, provide pt/family education and to maximize pt's level of independence in the home and community environment.     Pt's spiritual, cultural and educational needs considered and pt agreeable to plan of care and goals.    Anticipated barriers to physical therapy: none    Short Term Goals: 6 weeks   1. Patient will be independent in HEP & progressions  2. Patient will achieve  Quad strength to 4+/5 to  demonstrate improved mobility     Long Term Goals: 12 weeks   1. Patient will have FOTO limitation score of  35 % to demonstrate improved function & decreased pain  2. Patient will achieve  4+/5 to 5/5 for LE strength  demonstrate improved transfers & endurance     Plan   Plan of care Certification: 8/27/2020 to 11/27/20     Outpatient Physical Therapy 1-2 times weekly for 12 weeks to include the following interventions: aquatic therapy, patient education, manual therapy, therapeutic exercise, therapeutic activity. Patient may be seen by PTA as part of rehabilitation team      Elsie Chen, PT

## 2020-11-04 ENCOUNTER — CLINICAL SUPPORT (OUTPATIENT)
Dept: REHABILITATION | Facility: HOSPITAL | Age: 50
End: 2020-11-04
Attending: STUDENT IN AN ORGANIZED HEALTH CARE EDUCATION/TRAINING PROGRAM
Payer: COMMERCIAL

## 2020-11-04 DIAGNOSIS — G89.29 CHRONIC RIGHT-SIDED LOW BACK PAIN, UNSPECIFIED WHETHER SCIATICA PRESENT: ICD-10-CM

## 2020-11-04 DIAGNOSIS — M54.50 CHRONIC RIGHT-SIDED LOW BACK PAIN, UNSPECIFIED WHETHER SCIATICA PRESENT: ICD-10-CM

## 2020-11-04 DIAGNOSIS — M47.812 CERVICAL SPONDYLOSIS: ICD-10-CM

## 2020-11-04 PROCEDURE — 97113 AQUATIC THERAPY/EXERCISES: CPT | Performed by: PHYSICAL THERAPIST

## 2020-11-05 ENCOUNTER — OFFICE VISIT (OUTPATIENT)
Dept: DERMATOLOGY | Facility: CLINIC | Age: 50
End: 2020-11-05
Payer: COMMERCIAL

## 2020-11-05 DIAGNOSIS — L72.0 EIC (EPIDERMAL INCLUSION CYST): ICD-10-CM

## 2020-11-05 DIAGNOSIS — D48.5 NEOPLASM OF UNCERTAIN BEHAVIOR OF SKIN: Primary | ICD-10-CM

## 2020-11-05 PROCEDURE — 11900 PR INJECTION INTO SKIN LESIONS, UP TO 7: ICD-10-PCS | Mod: 59,S$GLB,, | Performed by: DERMATOLOGY

## 2020-11-05 PROCEDURE — 88305 TISSUE EXAM BY PATHOLOGIST: CPT | Mod: 26,,, | Performed by: PATHOLOGY

## 2020-11-05 PROCEDURE — 99499 UNLISTED E&M SERVICE: CPT | Mod: S$GLB,,, | Performed by: DERMATOLOGY

## 2020-11-05 PROCEDURE — 88305 TISSUE EXAM BY PATHOLOGIST: ICD-10-PCS | Mod: 26,,, | Performed by: PATHOLOGY

## 2020-11-05 PROCEDURE — 99999 PR PBB SHADOW E&M-EST. PATIENT-LVL III: CPT | Mod: PBBFAC,,, | Performed by: DERMATOLOGY

## 2020-11-05 PROCEDURE — 11900 INJECT SKIN LESIONS </W 7: CPT | Mod: 59,S$GLB,, | Performed by: DERMATOLOGY

## 2020-11-05 PROCEDURE — 11440 EXC FACE-MM B9+MARG 0.5 CM/<: CPT | Mod: S$GLB,,, | Performed by: DERMATOLOGY

## 2020-11-05 PROCEDURE — 99499 NO LOS: ICD-10-PCS | Mod: S$GLB,,, | Performed by: DERMATOLOGY

## 2020-11-05 PROCEDURE — 99999 PR PBB SHADOW E&M-EST. PATIENT-LVL III: ICD-10-PCS | Mod: PBBFAC,,, | Performed by: DERMATOLOGY

## 2020-11-05 PROCEDURE — 88304 TISSUE EXAM BY PATHOLOGIST: CPT | Performed by: PATHOLOGY

## 2020-11-05 PROCEDURE — 11440 PR EXC SKIN BENIG <0.5 CM FACE,FACIAL: ICD-10-PCS | Mod: S$GLB,,, | Performed by: DERMATOLOGY

## 2020-11-05 NOTE — PROGRESS NOTES
Subjective:       Patient ID:  Rebel Rodriguez is a 50 y.o. male who presents for   Chief Complaint   Patient presents with    Follow-up     Rosacea     Follow-up - Initial  Affected locations: face  Duration: 3 months  Signs / symptoms: asymptomatic  Severity: mild  Timing: constant  Aggravated by: nothing  Relieving factors/Treatments tried: nothing  Improvement on treatment: no relief    Large lesion to left cheek, small lesion near left eye he would like removed    Review of Systems   Constitutional: Negative for fever, chills and fatigue.   Skin: Negative for daily sunscreen use, recent sunburn and wears hat.   Hematologic/Lymphatic: Does not bruise/bleed easily.        Objective:    Physical Exam   Constitutional: He appears well-developed and well-nourished.   Neurological: He is alert and oriented to person, place, and time.   Psychiatric: He has a normal mood and affect.   Skin:   Areas Examined (abnormalities noted in diagram):   Head / Face Inspection Performed              Diagram Legend     Erythematous scaling macule/papule c/w actinic keratosis       Vascular papule c/w angioma      Pigmented verrucoid papule/plaque c/w seborrheic keratosis      Yellow umbilicated papule c/w sebaceous hyperplasia      Irregularly shaped tan macule c/w lentigo     1-2 mm smooth white papules consistent with Milia      Movable subcutaneous cyst with punctum c/w epidermal inclusion cyst      Subcutaneous movable cyst c/w pilar cyst      Firm pink to brown papule c/w dermatofibroma      Pedunculated fleshy papule(s) c/w skin tag(s)      Evenly pigmented macule c/w junctional nevus     Mildly variegated pigmented, slightly irregular-bordered macule c/w mildly atypical nevus      Flesh colored to evenly pigmented papule c/w intradermal nevus       Pink pearly papule/plaque c/w basal cell carcinoma      Erythematous hyperkeratotic cursted plaque c/w SCC      Surgical scar with no sign of skin cancer recurrence      Open  and closed comedones      Inflammatory papules and pustules      Verrucoid papule consistent consistent with wart     Erythematous eczematous patches and plaques     Dystrophic onycholytic nail with subungual debris c/w onychomycosis     Umbilicated papule    Erythematous-base heme-crusted tan verrucoid plaque consistent with inflamed seborrheic keratosis     Erythematous Silvery Scaling Plaque c/w Psoriasis     See annotation              Assessment / Plan:      Pathology Orders:     Normal Orders This Visit    Specimen to Pathology, Dermatology     Comments:    Number of Specimens:->1  ------------------------->-------------------------  Spec 1 Procedure:->Biopsy  Spec 1 Clinical Impression:->r/o eic vs lipoma vs other subq  mass  Spec 1 Source:->left cheek    Questions:    Procedure Type: Dermatology and skin neoplasms    Number of Specimens: 1    ------------------------: -------------------------    Spec 1 Procedure: Biopsy    Spec 1 Clinical Impression: r/o eic vs lipoma vs other subq mass    Spec 1 Source: left cheek        Neoplasm of uncertain behavior of skin  -     Specimen to Pathology, Dermatology  EXCISION- CYST     CONSENT: The purpose of the excision as well as potential risks were explained to the patient and written informed consent was obtained.      INDICATION and LOCATION: Likely epidermal inclusion cyst, left cheek near nasal side wall    PRE-OP LESION SIZE: 0.5 cm x 0.5cm    PROCEDURE:The site was prepped with chlorhexidine and draped in a sterile fashion. local anesthesia was achieved using lidocaine 1% with epinephrine for a total of 4 cc A Ute was drawne cyst and oriented in a manner appropriate to skin tension lines. An incision was then made along the marked lines and carried down to subcutaneous tissue. The cyst was dissected free from surrounding subcutaneous tissue and submitted to pathology for examination. Wound edges were undermined by sharp dissection. Hemostasis was achieved by  electrocautery. Wound edge approximation was achieved with interupted sutures using 5-0 Prolene for optimum alignment. A sterile pressure dressing was applied. The patient tolerated the procedure well, and there were no complications. Blood loss was not significant.     POST-OP SIZE: 0.5 cm    DISCHARGE INSTRUCTIONS: The patient was instructed to keep the pressure bandage in place for 24 hours.  After 24 hours the area can be cleaned with soap and water.  Apply petrolatum-based ointment and bandage daily until suture removal visit.  The patient was instructed to call the clinic if any signs of infection develop, such as enlarging area of redness, swelling, increasing pain or purulent drainage. The patient was given an appointment to return to clinic for suture removal and pathology report.     EIC (epidermal inclusion cyst)  Of left lower cheek  Intralesional Kenalog 5mg/cc (0.5 cc total) injected into 1 lesions on the left lower cheek today after obtaining verbal consent including risk of surrounding hypopigmentation. Patient tolerated procedure well.    Units: 0.5  NDC for Kenalog 10mg/cc:  5782-6794-32    F/u in 1 mo for either reinjection vs excision of cyst         No follow-ups on file.

## 2020-11-06 ENCOUNTER — IMMUNIZATION (OUTPATIENT)
Dept: PHARMACY | Facility: CLINIC | Age: 50
End: 2020-11-06
Payer: COMMERCIAL

## 2020-11-09 LAB
FINAL PATHOLOGIC DIAGNOSIS: NORMAL
GROSS: NORMAL

## 2020-11-11 ENCOUNTER — CLINICAL SUPPORT (OUTPATIENT)
Dept: REHABILITATION | Facility: HOSPITAL | Age: 50
End: 2020-11-11
Attending: STUDENT IN AN ORGANIZED HEALTH CARE EDUCATION/TRAINING PROGRAM
Payer: COMMERCIAL

## 2020-11-11 DIAGNOSIS — M54.2 NECK PAIN: Primary | ICD-10-CM

## 2020-11-11 DIAGNOSIS — M47.812 CERVICAL SPONDYLOSIS: Primary | ICD-10-CM

## 2020-11-11 PROCEDURE — 97113 AQUATIC THERAPY/EXERCISES: CPT | Performed by: PHYSICAL THERAPIST

## 2020-11-11 NOTE — PROGRESS NOTES
Physical Therapy Daily Treatment Note     Name: Rebel Rodriguez  Clinic Number: [id number]    Therapy Diagnosis:   Encounter Diagnosis   Name Primary?    Neck pain Yes     Physician: Kathie Garcia MD    Visit Date: 11/11/2020    Physician Orders: Aquatic Therapy      Medical Diagnosis from Referral:   Diagnosis   L40.50 (ICD-10-CM) - PSA (psoriatic arthritis)     Evaluation Date: 8/27/2020  Authorization Period Expiration: 12/31/20  Plan of Care Expiration: 11/27/20  Visit # / Visits authorized: 11/20   POC signed by Dr. Beverly for 2x a week for 12 weeks (24 visits)  Total visits: 13/20  PTA visits:  0/6      Time In: 900 am  Time Out: 1000  Total Billable Time:45 minutes    Precautions: Standard, Covid    PROCEDURES/IMAGING:     Imaging, MRI 2017 IMPRESSION:      Degenerative disc disease which is worst at C4-5, and results in severe neural foraminal narrowing and canal stenosis with associated cervical cord myelopathy at this level. No abnormal postcontrast enhancement.       Subjective     Pt reports: Feels good, he is working more now. His knee remains a little sore along with his lower back.  He is interested in trying the [facility] after he is finished with Aquatics.   He was compliant with home exercise program.  Response to previous treatment: muscle soreness and fatigue  Functional change: none    Pain: 2/10  Location: bilateral ankles, buttocks , lower legs, shoulder  and upper legs     Objective     Rebel received aquatic therapeutic exercises to develop strength, endurance, ROM, flexibility and core stabilization for 45 minutes including:    WARMUP x 3 laps each  Walk fwd/back/side    STRETCHES 2 x 20sec  Hamstring and calf stretching on stairs 3x 20 sec    LE EX x 30 with 3.75# ankle weights for hip series  Mini squat  Heel raise with GS  Hip abd/add  Hip ext  Marching  Hip flex/knee extend   Side stepping with blue band 2 laps  Monster walks with blue theraband 2 laps        UE  EX/CORE  x 30   Shoulder flex/ext TA activation paddles pt brings his own, trial of staggered stance, better with right foot forward  Shoulder horizontal abd/add TA activation paddles own  Shoulder abd/add TA activation paddles own  Blue board with hand holds for push/pull    ENDURANCE  Flutter kicks for 6 min, no bike due to knee stiffness    COOL DOWN x 1 lap each  Walk fwd/back/side      Home Exercises Provided and Patient Education Provided     Education provided:   Role of aquatic therapy  Hydration post therapy      Written Home Exercises Provided: Patient instructed to cont prior HEP.  Exercises were reviewed and Rebel was able to demonstrate them prior to the end of the session.  Rebel demonstrated good  understanding of the education provided.     See Patient [id number] for exercises provided prior visit.    Assessment     Pt was able to tolerate all exercises during today's session without any c/o increased pain or discomfort. Pt needed min verbal cuing and demonstration for good posture and core activation for LE exercises. Pt was able to verbalize and demonstrate understanding. Continue with  stretches to perform outside of the pool when at home as part of HEP; to include hip flexor, quad, and piriformis stretches. Pt may be a good candidate for the [facility] after aquatics.     Rebel is progressing well towards his goals.   Pt prognosis is Good.     Pt will continue to benefit from skilled aquatic outpatient physical therapy to address the deficits listed in the problem list box on initial evaluation, provide pt/family education and to maximize pt's level of independence in the home and community environment.     Pt's spiritual, cultural and educational needs considered and pt agreeable to plan of care and goals.    Anticipated barriers to physical therapy: none    Short Term Goals: 6 weeks   1. Patient will be independent in HEP & progressions  2. Patient will achieve   Quad strength to 4+/5 to demonstrate improved mobility     Long Term Goals: 12 weeks   1. Patient will have FOTO limitation score of  35 % to demonstrate improved function & decreased pain  2. Patient will achieve  4+/5 to 5/5 for LE strength  demonstrate improved transfers & endurance     Plan   Plan of care Certification: 8/27/2020 to 11/27/20     Outpatient Physical Therapy 1-2 times weekly for 12 weeks to include the following interventions: aquatic therapy, patient education, manual therapy, therapeutic exercise, therapeutic activity. Patient may be seen by PTA as part of rehabilitation team      Elsie Chen, PT      Oriented - self; Oriented - place; Oriented - time

## 2020-11-11 NOTE — PLAN OF CARE
Hi  Dr Beverly, Rebel has done very well in Aquatic Therapy.  We are going to finish out the rest of the month or so and then he would like to try the Healthy Back Program.  Would you be able to put in a referral for Healthy Back for Rebel? Thanks so much and he will be  Contacting you to discuss as well.  Thank you!!          Physical Therapy Daily Treatment Note     Name: Rebel Rodriguez  Clinic Number: [id number]    Therapy Diagnosis:   Encounter Diagnosis   Name Primary?    Neck pain Yes     Physician: Kathie Garcia MD    Visit Date: 11/11/2020    Physician Orders: Aquatic Therapy      Medical Diagnosis from Referral:   Diagnosis   L40.50 (ICD-10-CM) - PSA (psoriatic arthritis)     Evaluation Date: 8/27/2020  Authorization Period Expiration: 12/31/20  Plan of Care Expiration: 11/27/20  Visit # / Visits authorized: 11/20   POC signed by Dr. Beverly for 2x a week for 12 weeks (24 visits)  Total visits: 13/20  PTA visits:  0/6      Time In: 900 am  Time Out: 1000  Total Billable Time:45 minutes    Precautions: Standard, Covid    PROCEDURES/IMAGING:     Imaging, MRI 2017 IMPRESSION:      Degenerative disc disease which is worst at C4-5, and results in severe neural foraminal narrowing and canal stenosis with associated cervical cord myelopathy at this level. No abnormal postcontrast enhancement.       Subjective     Pt reports: Feels good, he is working more now. His knee remains a little sore along with his lower back.  [facility] after he is finished with Aquatics.   He was compliant with home exercise program.  Response to previous treatment: muscle soreness and fatigue  Functional change: none    Pain: 2/10  Location: bilateral ankles, buttocks , lower legs, shoulder  and upper legs     Objective     Rebel received aquatic therapeutic exercises to develop strength, endurance, ROM, flexibility and core stabilization for 45 minutes including:    WARMUP x 3 laps  each  Walk fwd/back/side    STRETCHES 2 x 20sec  Hamstring and calf stretching on stairs 3x 20 sec    LE EX x 30 with 3.75# ankle weights for hip series  Mini squat  Heel raise with GS  Hip abd/add  Hip ext  Marching  Hip flex/knee extend   Side stepping with blue band 2 laps  Monster walks with blue theraband 2 laps        UE EX/CORE  x 30   Shoulder flex/ext TA activation paddles pt brings his own, trial of staggered stance, better with right foot forward  Shoulder horizontal abd/add TA activation paddles own  Shoulder abd/add TA activation paddles own  Blue board with hand holds for push/pull    ENDURANCE  Flutter kicks for 6 min, no bike due to knee stiffness    COOL DOWN x 1 lap each  Walk fwd/back/side      Home Exercises Provided and Patient Education Provided     Education provided:   Role of aquatic therapy  Hydration post therapy      Written Home Exercises Provided: Patient instructed to cont prior HEP.  Exercises were reviewed and Rebel was able to demonstrate them prior to the end of the session.  Rebel demonstrated good  understanding of the education provided.     See Patient [id number] for exercises provided prior visit.    Assessment     Pt was able to tolerate all exercises during today's session without any c/o increased pain or discomfort. Pt needed min verbal cuing and demonstration for good posture and core activation for LE exercises. Pt was able to verbalize and demonstrate understanding. Continue with  stretches to perform outside of the pool when at home as part of HEP; to include hip flexor, quad, and piriformis stretches. Pt may be a good candidate for the [facility] after aquatics.     Rebel is progressing well towards his goals.   Pt prognosis is Good.     Pt will continue to benefit from skilled aquatic outpatient physical therapy to address the deficits listed in the problem list box on initial evaluation, provide pt/family education and to maximize pt's  level of independence in the home and community environment.     Pt's spiritual, cultural and educational needs considered and pt agreeable to plan of care and goals.    Anticipated barriers to physical therapy: none    Short Term Goals: 6 weeks   1. Patient will be independent in HEP & progressions  2. Patient will achieve  Quad strength to 4+/5 to demonstrate improved mobility     Long Term Goals: 12 weeks   1. Patient will have FOTO limitation score of  35 % to demonstrate improved function & decreased pain  2. Patient will achieve  4+/5 to 5/5 for LE strength  demonstrate improved transfers & endurance     Plan   Plan of care Certification: 8/27/2020 to 11/27/20     Outpatient Physical Therapy 1-2 times weekly for 12 weeks to include the following interventions: aquatic therapy, patient education, manual therapy, therapeutic exercise, therapeutic activity. Patient may be seen by PTA as part of rehabilitation team      Elsie Chen, PT

## 2020-11-12 ENCOUNTER — CLINICAL SUPPORT (OUTPATIENT)
Dept: DERMATOLOGY | Facility: CLINIC | Age: 50
End: 2020-11-12
Payer: COMMERCIAL

## 2020-11-12 PROCEDURE — 99999 PR PBB SHADOW E&M-EST. PATIENT-LVL III: ICD-10-PCS | Mod: PBBFAC,,,

## 2020-11-12 PROCEDURE — 99999 PR PBB SHADOW E&M-EST. PATIENT-LVL III: CPT | Mod: PBBFAC,,,

## 2020-11-12 PROCEDURE — 99024 PR POST-OP FOLLOW-UP VISIT: ICD-10-PCS | Mod: S$GLB,,, | Performed by: DERMATOLOGY

## 2020-11-12 PROCEDURE — 99024 POSTOP FOLLOW-UP VISIT: CPT | Mod: S$GLB,,, | Performed by: DERMATOLOGY

## 2020-11-12 NOTE — PROGRESS NOTES
Suture Removal note:  CC: 50 y.o. male patient is here for suture removal.         HPI: Patient is s/p excision of punch biopsy from the left cheek on 11/05/2020.  Patient reports no problems.    WOUND PE:  Sutures intact.  Wound healing well.  Good approximation of skin edges.  No signs or symptoms of infection.    IMPRESSION:   1. Skin left cheek, punch biopsy   - Follicular cyst, infundibular type    PLAN:  Sutures removed today.  Continue wound care.    RTC: In 6 months.

## 2020-11-18 ENCOUNTER — SPECIALTY PHARMACY (OUTPATIENT)
Dept: PHARMACY | Facility: CLINIC | Age: 50
End: 2020-11-18

## 2020-11-18 NOTE — TELEPHONE ENCOUNTER
Specialty Pharmacy - Refill Coordination    Specialty Medication Orders Linked to Encounter      Most Recent Value   Medication #1  ixekizumab (TALTZ AUTOINJECTOR) 80 mg/mL AtIn (Order#[id number], Rx#[id number])          Refill Questions - Documented Responses      Most Recent Value   Relationship to patient of person spoken to?  Self   HIPAA/medical authority confirmed?  Yes   Any changes in contact preferences or allowed representatives?  No   Has the patient had any insurance changes?  No   Has the patient had any changes to specialty medication, dose, or instructions?  No   Has the patient started taking any new medications, herbals, or supplements?  No   Has the patient been diagnosed with any new medical conditions?  No   Does the patient have any new allergies to medications or foods?  No   Does the patient have any concerns about side effects?  No   Can the patient store medication/sharps container properly (at the correct temperature, away from children/pets, etc.)?  Yes   Can the patient call emergency services (911) in the event of an emergency?  Yes   Does the patient have any concerns or questions about taking or administering this medication as prescribed?  No   How many doses did the patient miss in the past 4 weeks or since the last fill?  0   How many doses does the patient have on hand?  0   How many days does the patient report on hand quantity will last?  0   Does the number of doses/days supply remaining match pharmacy expected amounts?  Yes   Does the patient feel that this medication is effective?  Yes   During the past 4 weeks, has patient missed any activities due to condition or medication?  No   During the past 4 weeks, did patient have any of the following urgent care visits?  None   How will the patient receive the medication?  Pickup   When does the patient need to receive the medication?  11/18/20   Address in [facility] confirmed and updated if neccessary?  Yes   Expected Copay  ($)  0   Is the patient able to afford the medication copay?  Yes   Payment Method  zero copay   Days supply of Refill  28   Would patient like to speak to a pharmacist?  No   Do you want to trigger an intervention?  No   Do you want to trigger an additional referral task?  No   Refill activity completed?  Yes   Refill activity plan  Refill scheduled   Shipment/Pickup Date:  11/18/20          Current Outpatient Medications   Medication Sig    aspirin (ECOTRIN) 81 MG EC tablet Take 81 mg by mouth once daily.      atorvastatin (LIPITOR) 10 MG tablet TAKE 1 TABLET BY MOUTH DAILY    citric acid-potassium citrate (POLYCITRA) 1,100-334 mg/5 mL solution Take by mouth 2 (two) times a day.    FLUoxetine 20 MG capsule Take 40 mg by mouth once daily.     hydroxychloroquine (PLAQUENIL) 200 mg tablet Take 1.5 tablets (300 mg total) by mouth once daily.    ixekizumab (TALTZ AUTOINJECTOR) 80 mg/mL AtIn Inject 80 mg into the skin every 28 days.    leflunomide (ARAVA) 20 MG Tab TAKE 1 TABLET BY MOUTH EVERY DAY    LORazepam (ATIVAN) 0.5 MG tablet Take 0.5 mg by mouth every 6 (six) hours as needed for Anxiety.    oxyCODONE-acetaminophen (PERCOCET) 5-325 mg per tablet TK 1 T PO  Q 4 H PRN P. MDD 6 TS    potassium citrate (UROCIT-K 15) 15 mEq TbSR TK 1 T PO BID    sulfaSALAzine (AZULFIDINE) 500 MG TbEC Take 2 tablets (1,000 mg total) by mouth 2 (two) times daily.    tamsulosin (FLOMAX) 0.4 mg Cap Take 0.4 mg by mouth once daily.   Last reviewed on 11/18/2020 10:54 AM by Kimberley Gabriel    Review of patient's allergies indicates:   Allergen Reactions    Erythromycin Nausea And Vomiting    Infliximab Other (See Comments)     Lupus with fever and acute arthritis  Lupus with fever and acute arthritis    Last reviewed on  11/12/2020 11:30 AM by Melina Osei      Tasks added this encounter   12/9/2020 - Refill Call (Auto Added)  11/19/2020 - Pickup Reminder   Tasks due within next 3 months   11/18/2020 - Clinical - Follow Up  Jen (90 day)     [facility]  [address] [facility] [address]  Phone: [phone]  Fax: [phone]

## 2020-11-18 NOTE — TELEPHONE ENCOUNTER
Specialty Pharmacy - Clinical Reassessment    Specialty Medication Orders Linked to Encounter      Most Recent Value   Medication #1  ixekizumab (TALTZ AUTOINJECTOR) 80 mg/mL AtIn (Order#[id number], Rx#[id number])        Subjective    Rebel Rodriguez is a 50 y.o. male, who is followed by the specialty pharmacy service for management and education.    Recent Encounters     Date Type Provider Description    11/18/2020 Specialty Pharmacy Pascual Hobbs Follow-up Clinical Reassessment    11/18/2020 Specialty Pharmacy Kimberleyana SolorioNery Refill Coordination        Clinical call attempts since last clinical assessment   No call attempts found.     Today he received follow up education for his specialty medication(s).    Current Outpatient Medications   Medication Sig    aspirin (ECOTRIN) 81 MG EC tablet Take 81 mg by mouth once daily.      atorvastatin (LIPITOR) 10 MG tablet TAKE 1 TABLET BY MOUTH DAILY    citric acid-potassium citrate (POLYCITRA) 1,100-334 mg/5 mL solution Take by mouth 2 (two) times a day.    FLUoxetine 20 MG capsule Take 40 mg by mouth once daily.     hydroxychloroquine (PLAQUENIL) 200 mg tablet Take 1.5 tablets (300 mg total) by mouth once daily.    ixekizumab (TALTZ AUTOINJECTOR) 80 mg/mL AtIn Inject 80 mg into the skin every 28 days.    leflunomide (ARAVA) 20 MG Tab TAKE 1 TABLET BY MOUTH EVERY DAY    LORazepam (ATIVAN) 0.5 MG tablet Take 0.5 mg by mouth every 6 (six) hours as needed for Anxiety.    oxyCODONE-acetaminophen (PERCOCET) 5-325 mg per tablet TK 1 T PO  Q 4 H PRN P. MDD 6 TS    potassium citrate (UROCIT-K 15) 15 mEq TbSR TK 1 T PO BID    sulfaSALAzine (AZULFIDINE) 500 MG TbEC Take 2 tablets (1,000 mg total) by mouth 2 (two) times daily.    tamsulosin (FLOMAX) 0.4 mg Cap Take 0.4 mg by mouth once daily.   Last reviewed on 11/18/2020  3:06 PM by Anjel Huertas, Pascual    Review of patient's allergies indicates:   Allergen Reactions    Erythromycin Nausea And Vomiting    Infliximab Other  (See Comments)     Lupus with fever and acute arthritis  Lupus with fever and acute arthritis   Last reviewed on  11/18/2020 3:06 PM by Anjel Huertas    Drug Interactions    Drug interactions evaluated: yes  Clinically relevant drug interactions identified: no  Provided the patient with educational material regarding drug interactions: not applicable       Medication Adherence    Patient reported X missed doses in the last month: 0  Any gaps in refill history greater than 2 weeks in the last 3 months: no  Demonstrates understanding of importance of adherence: yes  Informant: patient  Reliability of informant: reliable  Provider-estimated medication adherence level: good  Reasons for non-adherence: no problems identified  Adherence tools used: calendar  Support network for adherence: family member  Confirmed plan for next specialty medication refill: delivery by pharmacy  Refills needed for supportive medications: not needed       Adverse Effects    Arthralgias: Pos       Assessment Questions - Documented Responses      Most Recent Value   Assessment   Medication Reconciliation completed for patient  Yes   During the past 4 weeks, has patient missed any activities due to condition or medication?  No   During the past 4 weeks, did patient have any of the following urgent care visits?  None   Goals of Therapy Status  Achieving   Welcome packet contents reviewed and discussed with patient?  No   Assesment completed?  Yes   Plan  Therapy continued   Do you need to open a clinical intervention (i-vent)?  No   Do you want to schedule first shipment?  No   Medication #1 Assessment Info   Patient status  Existing medication, Exisiting to [facility]   Is this medication appropriate for the patient?  Yes   Is this medication effective?  Yes          Objective    He has a past medical history of Achilles tendon rupture (10/9/2013), Allergy, Anxiety, Arthritis, Degenerative disc disease, Drug-induced lupus erythematosus, Hyperlipidemia,  "Kidney stone, Psoriatic arthritis, TIA (transient ischemic attack), and Ulcer.    Tried/failed medications: Cosentyx     BP Readings from Last 4 Encounters:   08/11/20 94/62   02/11/20 111/62   01/14/20 103/67   10/08/19 133/76     Ht Readings from Last 4 Encounters:   08/11/20 5' 8.4" (1.737 m)   01/14/20 5' 8.4" (1.737 m)   10/08/19 5' 8.4" (1.737 m)   06/25/19 5' 8.4" (1.737 m)     Wt Readings from Last 4 Encounters:   08/11/20 59.9 kg (132 lb)   01/14/20 61 kg (134 lb 8 oz)   10/08/19 60 kg (132 lb 3.2 oz)   06/25/19 62 kg (136 lb 11.2 oz)     Recent Labs   Lab Result Units 11/05/20  1211   Creatinine mg/dL 0.9   ALT U/L 16   AST U/L 26     The goals of Psoriasis treatment include:  · Reducing the size, thickness and extent of lesions and erythema  · Relieving the symptoms of psoriasis  · Improving and maintaining physical function  · Preventing infection and other complications of treatment  · Reducing long term complications of psoriasis  · Minimizing psychological impact on overall well-being  · Improving or maintaining quality of life  · Maintaining optimal therapy adherence  · Minimizing and managing side effects    Goals of Therapy Status: Achieving    Assessment/Plan  Patient plans to continue therapy without changes      Indication, dosage, appropriateness, effectiveness, safety and convenience of his specialty medication(s) were reviewed today.     Patient Counseling    Counseled the patient on the following: doses and administration discussed, safe handling, storage, and disposal discussed, possible adverse effects and management discussed, possible drug and prescription drug interactions discussed, possible drug and OTC drug and food interactions discussed, lab monitoring and follow-up discussed, therapeutic rationale discussed, cost of medications and cost implications discussed, adherence and missed doses discussed, pharmacy contact information discussed       Taltz follow up assessment was " completed on 11/18. Pt takes Taltz for psoriatic arthritis. He states that he his skin is completely clear of psoriasis. However, he still experiences 3/10 pain, which affects his hands and feet. Overall his symptoms have greatly improved on Taltz. Pt's medication list was reviewed and reconciled. No DDIs. He verified the dose and frequency of his medications. Pt denies new medications, new allergies, new conditions, and recent ER / urgent care visits. He has not missed any doses. Pt has not experienced any side effects from Taltz. He has not missed work or planned activities due to PsA. Pt had no further questions or concerns.    Tasks added this encounter   No tasks added.   Tasks due within next 3 months   11/18/2020 - Clinical - Follow Up Assesement (90 day)  12/9/2020 - Refill Call (Auto Added)     Pascual Hobbs  [facility]  [address] [place] [id number]  Phone: [phone]  Fax: [phone]

## 2020-11-23 ENCOUNTER — CLINICAL SUPPORT (OUTPATIENT)
Dept: REHABILITATION | Facility: HOSPITAL | Age: 50
End: 2020-11-23
Attending: STUDENT IN AN ORGANIZED HEALTH CARE EDUCATION/TRAINING PROGRAM
Payer: COMMERCIAL

## 2020-11-23 DIAGNOSIS — M54.2 NECK PAIN: Primary | ICD-10-CM

## 2020-11-23 PROCEDURE — 97113 AQUATIC THERAPY/EXERCISES: CPT | Performed by: PHYSICAL THERAPIST

## 2020-11-23 NOTE — PROGRESS NOTES
Physical Therapy Daily Treatment Note     Name: Rebel Rodriguez  Clinic Number: [id number]    Therapy Diagnosis:   Encounter Diagnosis   Name Primary?    Neck pain Yes     Physician: Kathie Garcia MD    Visit Date: 11/23/2020    Physician Orders: Aquatic Therapy      Medical Diagnosis from Referral:   Diagnosis   L40.50 (ICD-10-CM) - PSA (psoriatic arthritis)     Evaluation Date: 8/27/2020  Authorization Period Expiration: 12/31/20  Plan of Care Expiration: 11/27/20  Visit # / Visits authorized: 12/20   POC signed by Dr. Beverly for 2x a week for 12 weeks (24 visits)  Total visits: 14/20  PTA visits:  0/6      Time In: 1010 am  Time Out: 1100  Total Billable Time:45 minutes    Precautions: Standard, Covid    PROCEDURES/IMAGING:     Imaging, MRI 2017 IMPRESSION:      Degenerative disc disease which is worst at C4-5, and results in severe neural foraminal narrowing and canal stenosis with associated cervical cord myelopathy at this level. No abnormal postcontrast enhancement.       Subjective     Pt reports: he is having a bad day with increased stiffness. His knee remains a little sore along with his lower back.  He [facility] going next Monday [facility]   He was compliant with home exercise program.  Response to previous treatment: muscle soreness and fatigue  Functional change: none    Pain: 2/10  Location: bilateral ankles, buttocks , lower legs, shoulder  and upper legs     Objective     Rebel received aquatic therapeutic exercises to develop strength, endurance, ROM, flexibility and core stabilization for 45 minutes including:    WARMUP x 3 laps each  Walk fwd/back/side    STRETCHES 2 x 20sec  Hamstring and calf stretching on stairs 3x 20 sec    LE EX x 30 with 3.75# ankle weights for hip series  Mini squat  Heel raise with GS  Hip abd/add  Hip ext  Marching  Hip flex/knee extend   Side stepping with blue band 2 laps  Monster walks with blue theraband 2 laps        UE EX/CORE  x 30   Shoulder  flex/ext TA activation paddles pt brings his own, trial of staggered stance, better with right foot forward  Shoulder horizontal abd/add TA activation paddles own  Shoulder abd/add TA activation paddles own  Blue board with hand holds for push/pull    ENDURANCE  Flutter kicks for 6 min, no bike due to knee stiffness    COOL DOWN x 1 lap each  Walk fwd/back/side      Home Exercises Provided and Patient Education Provided     Education provided:   Role of aquatic therapy  Hydration post therapy      Written Home Exercises Provided: Patient instructed to cont prior HEP.  Exercises were reviewed and Rebel was able to demonstrate them prior to the end of the session.  Rebel demonstrated good  understanding of the education provided.     See Patient [id number] for exercises provided prior visit.    Assessment     Pt was able to tolerate all exercises during today's session without any c/o increased pain or discomfort. Pt needed min verbal cuing and demonstration for good posture and core activation for LE exercises. Pt was able to verbalize and demonstrate understanding. Continue with  stretches to perform outside of the pool when at home as part of HEP; to include hip flexor, quad, and piriformis stretches. Pt may be a good candidate for the [facility] Program after aquatics.     Rebel is progressing well towards his goals.   Pt prognosis is Good.     Pt will continue to benefit from skilled aquatic outpatient physical therapy to address the deficits listed in the problem list box on initial evaluation, provide pt/family education and to maximize pt's level of independence in the home and community environment.     Pt's spiritual, cultural and educational needs considered and pt agreeable to plan of care and goals.    Anticipated barriers to physical therapy: none    Short Term Goals: 6 weeks   1. Patient will be independent in HEP & progressions  2. Patient will achieve  Quad strength to 4+/5 to  demonstrate improved mobility     Long Term Goals: 12 weeks   1. Patient will have FOTO limitation score of  35 % to demonstrate improved function & decreased pain  2. Patient will achieve  4+/5 to 5/5 for LE strength  demonstrate improved transfers & endurance     Plan   Plan of care Certification: 8/27/2020 to 11/27/20    DC from Aquatic Therapy and progress to [facility]      Elsie Chen, PT

## 2020-11-30 ENCOUNTER — CLINICAL SUPPORT (OUTPATIENT)
Dept: REHABILITATION | Facility: OTHER | Age: 50
End: 2020-11-30
Attending: PHYSICAL MEDICINE & REHABILITATION
Payer: COMMERCIAL

## 2020-11-30 VITALS
WEIGHT: 138 LBS | SYSTOLIC BLOOD PRESSURE: 91 MMHG | HEART RATE: 83 BPM | DIASTOLIC BLOOD PRESSURE: 59 MMHG | BODY MASS INDEX: 20.44 KG/M2 | HEIGHT: 69 IN

## 2020-11-30 DIAGNOSIS — M47.812 CERVICAL SPONDYLOSIS: ICD-10-CM

## 2020-11-30 DIAGNOSIS — L40.50 PSORIATIC ARTHRITIS: ICD-10-CM

## 2020-11-30 DIAGNOSIS — M54.2 NECK PAIN: Primary | ICD-10-CM

## 2020-11-30 PROCEDURE — 99223 PR INITIAL HOSPITAL CARE,LEVL III: ICD-10-PCS | Mod: ,,, | Performed by: PHYSICAL MEDICINE & REHABILITATION

## 2020-11-30 PROCEDURE — 99223 1ST HOSP IP/OBS HIGH 75: CPT | Mod: ,,, | Performed by: PHYSICAL MEDICINE & REHABILITATION

## 2020-11-30 NOTE — PROGRESS NOTES
Health  met with patient to complete initial outcomes for the Healthy Back cervical program.  Questions were reviewed with patient and discussed with Dr. Dior. The patient will meet with Dr. Dior to determine program enrollment.     HealthyBack Questionnaire  11/30/2020   Please select the location of your worst pain:  Neck   Please select the location of any additional pain: (hold down the control key, and click to select multiple responses) Upper back   Did the pain begin after an event, injury, or accident? No   How long has this pain been going on?  8-10 years and worse 4 years ago, had surgery with improvement in 2016.  Pain worsened in 2018   Please indicate how the pain is changing.  Worsening   What is the WORST level of pain that you have experienced in the last week?  6   What is the LEAST level of pain that you have experienced in the past week?  2   What can you NOT do not that you used to be able to do?  Play sports   Are your limitations mainly due to your pain?  Yes   What are your additional complaints, if any? (hold down the control key, and click to select multiple responses) None   Is there ever a time during the day when your pain stops, even for a brief moment, before returning? No   If yes, when your pain stops, does it disappear completely? Is it totally gone? No   Does looking down make your typical pain worse? Yes   Morning: Worse during   Afternoon: Better during   Evening:  Worse during   Nighttime: Worse during   Standing:  Worse   Lying on stomach: Worse   Lying on back: Better   Sitting:  Worse   Walking: Better   Using a pillow: Better   Emergency department  No   Health care providers (hold down the control key, and click to select multiple responses) Family doctor, Physical therapist   Investigations done (hold down the control key, and click to select multiple responses) X-ray, MRI   Procedures (hold down the control key, and click to select multiple responses) Epidural  steroid injections (KENRICK)   Have you had any surgery on your neck? Yes   Please magdiel what you are experiencing. (hold down the control key, and click to select multiple responses) Problems with urinary functions, Involuntary weight loss, Shortness of breath, Arthritic joint pain, Muscle pain in arms or legs, Joint swelling, Difficulty with walking or balance, Difficulty fastening buttons on your clothes   First activity you would like to perform better:  Turning to both sides   Second activity you would like to perform better: Looking up   Third activity you would like to perform better: Looking down   What is your occupation?     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

## 2020-11-30 NOTE — PROGRESS NOTES
Subjective:      Patient ID: Rebel Rodriguez is a 50 y.o. male.    Chief Complaint: No chief complaint on file.    Mr Rodriguez is a 51 yo male with psoriatic arthritis sent in consultation by Dr. Beverly for evaluation for the healthy back cervical program.  he has had neck pain for 8-10 years, but worse 4 years ago and then had surgery with improvement in 2016 and then pain worsened in 2018.  There was no event that started the pain.  The pain is left neck dominant and goes to the right and to the upper back.  The pain is a stiff dull ache.  He does not have tingling numbness burning or weakness.  The pain is constant 2-6/10.  It is worse with looking up, down, standing, lying on stomach, turning head to both sides, morning, evening and bedtime.  The pain worsens as the day goes on.  It is better lying on back, sitting, walking, lying on left side, pillow and afternoon.  He had fusion of C3-5 In 2016 with improvement.  He had PT in 2016, 2017 and 2020 in pool with relief of the pain. He had KENRICK in 2008 and 2010. He has not been to chiropractor.  His goals are turning head to left and right, looking up and down.  Pattern 1    MRI cervical 9/22/2017  Multiplanar, multi-sequence MR images of the cervical spine with 6 cc of gadolinium based IV contrast material.     Findings: There is no evidence of fracture, dislocation or marrow replacement process. No abnormal enhancing lesions. The vertebral body heights and alignment are maintained.  The visualized posterior fossa structures demonstrate no significant abnormality.  The surrounding soft tissue structures demonstrate no significant abnormality. There is cord signal abnormality as outlined below.     C2-3: Unremarkable.     C3-4: Mild uncovertebral hypertrophy. No significant neuroforaminal narrowing or canal stenosis.     C4-5: Uncovertebral hypertrophy with posterior disc bulge. This results in severe bilateral neural foraminal narrowing as well as severe canal  stenosis. There is cord signal abnormality within the cervical cord at this level, more prominent in the right hemicord.     C5-6: Uncovertebral joint hypertrophy and posterior disc bulge, paracentral to the left. This results in severe bilateral neural foraminal narrowing and severe canal stenosis. Minimal cord signal abnormality at this level as well, more prominent on the left.     C6-7: Uncovertebral joint hypertrophy and disc bulge resulting in moderate-severe bilateral neuroforaminal narrowing and moderate canal stenosis. No cord signal abnormality at this level.     C7-T1: Unremarkable.  IMPRESSION:      Degenerative disc disease which is worst at C4-5, and results in severe neural foraminal narrowing and canal stenosis with associated cervical cord myelopathy at this level. No abnormal postcontrast enhancement.    Past Medical History:  10/9/2013: Achilles tendon rupture  No date: Allergy  No date: Anxiety  No date: Arthritis      Comment:  psoriatric  No date: Degenerative disc disease  No date: Drug-induced lupus erythematosus  No date: Hyperlipidemia  No date: Kidney stone  No date: Psoriatic arthritis  No date: TIA (transient ischemic attack)  No date: Ulcer      Comment:  high school    Past Surgical History:  No date: ACHILLES TENDON SURGERY  No date: BACK SURGERY  9/14/2018: FUNCTIONAL ENDOSCOPIC SINUS SURGERY (FESS) USING COMPUTER-  ASSISTED NAVIGATION; Bilateral      Comment:  Procedure: FESS, USING COMPUTER-ASSISTED NAVIGATION;                 Surgeon: Gerard Manzo MD;  Location: [facility] OR [address] [place] [address];  Service: ENT;  Laterality: Bilateral;  9/14/2018: NASAL SEPTOPLASTY; N/A      Comment:  Procedure: SEPTOPLASTY, NASAL;  Surgeon: Gerard Manzo MD;  Location: [facility] OR [address] [place] [address];  Service: ENT;                 Laterality: N/A;  9/14/2018: NASAL TURBINATE REDUCTION; Bilateral      Comment:  Procedure: REDUCTION, NASAL TURBINATE;  Surgeon: Gerard Manzo,  MD;  Location: [facility] OR [address] [place] [address];  Service: ENT;               Laterality: Bilateral;  No date: UPPER ENDOSCOPY W/ ESOPHAGEAL MANOMETRY  No date: URETERAL STENT PLACEMENT    Review of patient's family history indicates:  Problem: Pancreatic cancer      Relation: Father          Age of Onset: (Not Specified)  Problem: Ulcerative colitis      Relation: Father          Age of Onset: (Not Specified)  Problem: Cancer      Relation: Father          Age of Onset: 61          Comment: pancreatic ca  Problem: Crohn's disease      Relation: Mother          Age of Onset: (Not Specified)  Problem: Osteoarthritis      Relation: Maternal Grandmother          Age of Onset: (Not Specified)  Problem: Crohn's disease      Relation: Maternal Grandmother          Age of Onset: (Not Specified)  Problem: Cataracts      Relation: Maternal Grandmother          Age of Onset: (Not Specified)  Problem: Cataracts      Relation: Maternal Grandfather          Age of Onset: (Not Specified)  Problem: Cataracts      Relation: Paternal Grandmother          Age of Onset: (Not Specified)  Problem: Cataracts      Relation: Paternal Grandfather          Age of Onset: (Not Specified)  Problem: Amblyopia      Relation: Neg Hx          Age of Onset: (Not Specified)  Problem: Blindness      Relation: Neg Hx          Age of Onset: (Not Specified)      Social History    Socioeconomic History      Marital status: Single      Spouse name: Not on file      Number of children: Not on file      Years of education: Not on file      Highest education level: Not on file    Occupational History      Occupation: music production        Employer: self employed    Social Needs      Financial resource strain: Not on file      Food insecurity        Worry: Not on file        Inability: Not on file      Transportation needs        Medical: Not on file        Non-medical: Not on file    Tobacco Use      Smoking status: Never Smoker      Smokeless tobacco: Never Used     Substance and Sexual Activity      Alcohol use: No        Alcohol/week: 0.0 standard drinks        Types: 1 - 2 Standard drinks or equivalent per week        Comment: cocktails      Drug use: No      Sexual activity: Not on file    Lifestyle      Physical activity        Days per week: Not on file        Minutes per session: Not on file      Stress: Not on file    Relationships      Social connections        Talks on phone: Not on file        Gets together: Not on file        Attends [employer] service: Not on file        Active member of club or organization: Not on file        Attends meetings of clubs or organizations: Not on file        Relationship status: Not on file    Other Topics      Concerns:        Not on file    Social History Narrative      Not on file      Current Outpatient Medications:  aspirin (ECOTRIN) 81 MG EC tablet, Take 81 mg by mouth once daily.  , Disp: , Rfl:   atorvastatin (LIPITOR) 10 MG tablet, TAKE 1 TABLET BY MOUTH DAILY, Disp: 90 tablet, Rfl: 0  citric acid-potassium citrate (POLYCITRA) 1,100-334 mg/5 mL solution, Take by mouth 2 (two) times a day., Disp: , Rfl:   FLUoxetine 20 MG capsule, Take 40 mg by mouth once daily. , Disp: , Rfl:   hydroxychloroquine (PLAQUENIL) 200 mg tablet, Take 1.5 tablets (300 mg total) by mouth once daily., Disp: 90 tablet, Rfl: 4  ixekizumab (TALTZ AUTOINJECTOR) 80 mg/mL AtIn, Inject 80 mg into the skin every 28 days., Disp: 1 mL, Rfl: 9  leflunomide (ARAVA) 20 MG Tab, TAKE 1 TABLET BY MOUTH EVERY DAY, Disp: 90 tablet, Rfl: 0  LORazepam (ATIVAN) 0.5 MG tablet, Take 0.5 mg by mouth every 6 (six) hours as needed for Anxiety., Disp: , Rfl:   oxyCODONE-acetaminophen (PERCOCET) 5-325 mg per tablet, TK 1 T PO  Q 4 H PRN P. MDD 6 TS, Disp: , Rfl:   potassium citrate (UROCIT-K 15) 15 mEq TbSR, TK 1 T PO BID, Disp: , Rfl:   sulfaSALAzine (AZULFIDINE) 500 MG TbEC, Take 2 tablets (1,000 mg total) by mouth 2 (two) times daily., Disp: 120 tablet, Rfl: 4  tamsulosin  (FLOMAX) 0.4 mg Cap, Take 0.4 mg by mouth once daily., Disp: , Rfl:     No current facility-administered medications for this visit.       Review of patient's allergies indicates:   -- Erythromycin -- Nausea And Vomiting   -- Infliximab -- Other (See Comments)    --  Lupus with fever and acute arthritis             Lupus with fever and acute arthritis        Review of Systems   Constitution: Negative for weight gain and weight loss.   Cardiovascular: Negative for chest pain.   Respiratory: Positive for shortness of breath.    Musculoskeletal: Positive for neck pain. Negative for back pain, joint pain and joint swelling.   Gastrointestinal: Positive for nausea. Negative for abdominal pain, bowel incontinence and vomiting.   Genitourinary: Negative for bladder incontinence.   Neurological: Positive for paresthesias (right leg). Negative for numbness.         Objective:        General: Rebel is well-developed, well-nourished, appears stated age, in no acute distress, alert and oriented to time, place and person.     General    Vitals reviewed.  Constitutional: He is oriented to person, place, and time. He appears well-developed and well-nourished.   HENT:   Head: Normocephalic and atraumatic.   Pulmonary/Chest: Effort normal.   Neurological: He is alert and oriented to person, place, and time.   Psychiatric: He has a normal mood and affect. His behavior is normal. Judgment and thought content normal.     General Musculoskeletal Exam   Gait: antalgic     Right Ankle/Foot Exam     Tests   Heel Walk: able to perform  Tiptoe Walk: able to perform    Left Ankle/Foot Exam     Tests   Heel Walk: able to perform  Tiptoe Walk: able to perform  Back (L-Spine & T-Spine) / Neck (C-Spine) Exam     Tenderness   The patient is tender to palpation of the right trapezial and left trapezial. Right paramedian tenderness of the Lower C-Spine and Upper C-Spine. Left paramedian tenderness of the Lower C-Spine and Upper C-Spine.     Neck  (C-Spine) Range of Motion   Flexion:     30 (with pain)  Extension: 40 (with pain)Right Lateral Bend: 15 Left Lateral Bend: 15 Right Rotation: 30 Left Rotation: 30     Spinal Sensation   Right Side Sensation  C-Spine Level: normal   L-Spine Level: normal  S-Spine Level: normal  Left Side Sensation  C-Spine Level: normal  L-Spine Level: normal  S-Spine Level: normal    Back (L-Spine & T-Spine) Tests   Right Side Tests  Straight leg raise:      Sitting SLR: > 70 degrees      Left Side Tests  Straight leg raise:     Sitting SLR: > 70 degrees          Other He has no scoliosis .  Spinal Kyphosis:  Absent    Comments:  Sits with shoulders elevated and head forward      Muscle Strength   Right Upper Extremity   Biceps: 5/5   Deltoid:  5/5  Triceps:  5/5  Wrist extension: 5/5   Finger Flexors:  5/5  Left Upper Extremity  Biceps: 5/5   Deltoid:  5/5  Triceps:  5/5  Wrist extension: 5/5   Finger Flexors:  5/5  Right Lower Extremity   Hip Flexion: 5/5   Quadriceps:  5/5   Anterior tibial:  5/5   EHL:  5/5  Left Lower Extremity   Hip Flexion: 5/5   Quadriceps:  5/5   Anterior tibial:  5/5   EHL:  5/5    Reflexes     Left Side  Biceps:  2+  Triceps:  2+  Brachioradialis:  2+  Achilles:  2+  Left Magallanes's Sign:  Absent  Babinski Sign:  absent  Quadriceps:  2+    Right Side   Biceps:  2+  Triceps:  2+  Brachioradialis:  2+  Achilles:  2+  Right Magallanes's Sign:  absent  Babinski Sign:  absent  Quadriceps:  2+    Vascular Exam     Right Pulses        Carotid:                  2+    Left Pulses        Carotid:                  2+              Assessment:       1. Neck pain    2. Cervical spondylosis    3. Psoriatic arthritis           Plan:       Orders Placed This Encounter    Ambulatory referral/consult to [facility]     The patient has had a history of neck pain with limitations in there activities of Daily living    Previous treatment has not provided relief.    The situation was discussed at length with the patient.   More than 50% of the total time of 45 minutes was spent in counseling.  We discussed different causes of neck pain and different treatment options.  We discussed the importance of stretching and strengthening.  We discussed posture.  We discussed getting head over spine and taking breaks and working on things we do all day everyday.  we discussed the pros and cons of further diagnostic testing, alternative treatment and Medications    Based on the history, physical exam, and functional index, an active physical therapy program is recommended.  The goal is to restore the patients function and reduce pain.  A program of progressive resistance exercises, biomechanical, and mobility maneuvers, instructions in proper body mechanics, aerobic conditioning and HEP will be utilized. The program will continue as long as making improvements.    An assessment of patients progress will be made at each visit to document change in status.    The patient will be actively involved in there own treatment, and responsible for appointments and home program    The patient's cervical isometric strength will be tested and they will be placed in a program of isolated strength training based on 50% of their total functional torque and advanced as clinically appropriate.      Directional preference of pain will further influence the patients active rehabilitation program    The patient was instructed there might be an initial increase in discomfort    They are enrolled in cervical program with good prognosis  Pattern 1    A consultation note will be sent to Dr. Ding through epic.  Thanks for the consult    Follow-up: No follow-ups on file. If there are any questions prior to this, the patient was instructed to contact the office.

## 2020-12-04 RX ORDER — LEFLUNOMIDE 20 MG/1
20 TABLET ORAL DAILY
Qty: 90 TABLET | Refills: 0 | Status: SHIPPED | OUTPATIENT
Start: 2020-12-04 | End: 2021-04-28 | Stop reason: SDUPTHER

## 2020-12-07 RX ORDER — LEFLUNOMIDE 20 MG/1
20 TABLET ORAL DAILY
Qty: 90 TABLET | Refills: 0 | OUTPATIENT
Start: 2020-12-07

## 2020-12-10 ENCOUNTER — OFFICE VISIT (OUTPATIENT)
Dept: DERMATOLOGY | Facility: CLINIC | Age: 50
End: 2020-12-10
Payer: COMMERCIAL

## 2020-12-10 DIAGNOSIS — L72.0 EIC (EPIDERMAL INCLUSION CYST): Primary | ICD-10-CM

## 2020-12-10 DIAGNOSIS — L82.1 SEBORRHEIC KERATOSES: ICD-10-CM

## 2020-12-10 DIAGNOSIS — L71.9 ROSACEA: ICD-10-CM

## 2020-12-10 DIAGNOSIS — S30.1XXA CONTUSION OF ABDOMINAL WALL, INITIAL ENCOUNTER: ICD-10-CM

## 2020-12-10 PROCEDURE — 99214 PR OFFICE/OUTPT VISIT, EST, LEVL IV, 30-39 MIN: ICD-10-PCS | Mod: 25,S$GLB,, | Performed by: DERMATOLOGY

## 2020-12-10 PROCEDURE — 99214 OFFICE O/P EST MOD 30 MIN: CPT | Mod: 25,S$GLB,, | Performed by: DERMATOLOGY

## 2020-12-10 PROCEDURE — 11900 INJECT SKIN LESIONS </W 7: CPT | Mod: S$GLB,,, | Performed by: DERMATOLOGY

## 2020-12-10 PROCEDURE — 99999 PR PBB SHADOW E&M-EST. PATIENT-LVL III: CPT | Mod: PBBFAC,,, | Performed by: DERMATOLOGY

## 2020-12-10 PROCEDURE — 1125F AMNT PAIN NOTED PAIN PRSNT: CPT | Mod: S$GLB,,, | Performed by: DERMATOLOGY

## 2020-12-10 PROCEDURE — 99999 PR PBB SHADOW E&M-EST. PATIENT-LVL III: ICD-10-PCS | Mod: PBBFAC,,, | Performed by: DERMATOLOGY

## 2020-12-10 PROCEDURE — 11900 PR INJECTION INTO SKIN LESIONS, UP TO 7: ICD-10-PCS | Mod: S$GLB,,, | Performed by: DERMATOLOGY

## 2020-12-10 PROCEDURE — 1125F PR PAIN SEVERITY QUANTIFIED, PAIN PRESENT: ICD-10-PCS | Mod: S$GLB,,, | Performed by: DERMATOLOGY

## 2020-12-10 NOTE — PROGRESS NOTES
Subjective:       Patient ID:  Rebel Rodriguez is a 50 y.o. male who presents for   Chief Complaint   Patient presents with    Cyst     x2 left jawline, right hip    Rosacea     1 year     Cyst - Initial  Symptom course: worsening  Affected locations: right hip and left cheek  Affected locations: face  Duration: 2 weeks  Signs / symptoms: tender and asymptomatic (right hip uncomfortable)  Signs / symptoms: irritated and swelling  Severity: moderate  Aggravated by: nothing  Relieving factors/Treatments tried: OTC antibiotic cream  Improvement on treatment: no relief        Review of Systems   HENT: Negative for headaches.    Eyes: Negative for itching, eye watering, eye irritation and eyelid inflammation.   Gastrointestinal: Positive for Sensitivity to oral antibiotics. Negative for nausea, vomiting and diarrhea.   Skin: Positive for daily sunscreen use, activity-related sunscreen use and wears hat. Negative for recent sunburn.   Neurological: Negative for headaches.        Objective:    Physical Exam   Constitutional: He appears well-developed and well-nourished.   Neurological: He is alert and oriented to person, place, and time.   Psychiatric: He has a normal mood and affect.   Skin:   Areas Examined (abnormalities noted in diagram):   Head / Face Inspection Performed  Neck Inspection Performed  Abdomen Inspection Performed                   Diagram Legend     Erythematous scaling macule/papule c/w actinic keratosis       Vascular papule c/w angioma      Pigmented verrucoid papule/plaque c/w seborrheic keratosis      Yellow umbilicated papule c/w sebaceous hyperplasia      Irregularly shaped tan macule c/w lentigo     1-2 mm smooth white papules consistent with Milia      Movable subcutaneous cyst with punctum c/w epidermal inclusion cyst      Subcutaneous movable cyst c/w pilar cyst      Firm pink to brown papule c/w dermatofibroma      Pedunculated fleshy papule(s) c/w skin tag(s)      Evenly pigmented  macule c/w junctional nevus     Mildly variegated pigmented, slightly irregular-bordered macule c/w mildly atypical nevus      Flesh colored to evenly pigmented papule c/w intradermal nevus       Pink pearly papule/plaque c/w basal cell carcinoma      Erythematous hyperkeratotic cursted plaque c/w SCC      Surgical scar with no sign of skin cancer recurrence      Open and closed comedones      Inflammatory papules and pustules      Verrucoid papule consistent consistent with wart     Erythematous eczematous patches and plaques     Dystrophic onycholytic nail with subungual debris c/w onychomycosis     Umbilicated papule    Erythematous-base heme-crusted tan verrucoid plaque consistent with inflamed seborrheic keratosis     Erythematous Silvery Scaling Plaque c/w Psoriasis     See annotation      Assessment / Plan:        Contusion of abdominal wall, initial encounter  - reassurance given if not resolving in 1 mo, patient instructed to follow up sooner  Rosacea  Start using azelaic acid 10% by the ordinary (https://[employer]/product/rdn-azelaic-acid-suspension-10pct-30ml?ccvis=1)  in the morning before putting on sunscreen and at night right after you wash your face before you put on moisturizer or a retinoid.       EIC (epidermal inclusion cyst)  Would like to prevent these, start following routine  Every am: wash face with gentle cleanser, then apply azelaic acid as above, then apply moisturizer with sunscreen (cerave am or la-roshe posay toleriane am)    Every pm: wash face with gentle cleasner then apply azelaic acid, then retinoid then apply moisturizer (cerave pm or la-roshe posay)    Retnoid=Skin medicinals compound of 0.05 % tretinoin, 2% niacinamide, 8% Azelic acid +HA   Disp 30 g tube with 5 refills        Intralesional Kenalog 10 mg/cc (0.3 cc total) injected into 2 lesions on the face today after obtaining verbal consent including risk of surrounding hypopigmentation. Patient tolerated  procedure well.    Units: 0.5  NDC for Kenalog 10mg/cc:  9974-0821-64    Seborrheic keratoses  These are benign inherited growths without a malignant potential. Reassurance given to patient. No treatment is necessary.              No follow-ups on file.

## 2020-12-10 NOTE — PATIENT INSTRUCTIONS
Start using azelaic acid 10% by the ordinary (https://[employer]/product/rdn-azelaic-acid-suspension-10pct-30ml?ccvis=1)  in the morning before putting on sunscreen and at night right after you wash your face before you put on moisturizer or a retinoid.     Monitor the lesion to the abdomen    RETINOIDS           Your doctor has prescribed a topical retinoid for your skin. A retinoid is a vitamin A derived product used to treat a variety of skin conditions including acne, actinic keratoses (pre-skin cancers), uneven pigmentation from sun damage, fine lines and wrinkles, and enlarged pores.    How do they work?         Retinoids increase skin cell turn over from the normal 30 days to five or six days, minimizing clogged pores-the major factor in acne. Retinoids can also repair the DNA in cells damaged by the sun helping to even out skin pigmentation and clear pre-skin cancers. They can shrink oil glands and minimize the appearance of large pores. These effects can not be appreciated unless the medication is used on a consistent basis!    How do I use a retinoid?         After washing with a mild cleanser (Purpose, Aqua glycolic face cleanser, Cetaphil, Neutrogena deep cream cleanser), the skin should be moisturized with a non-retinol containing moisturizer such as Cerave PM. Then a thin layer of medication is applied to the forehead, nose cheeks, and chin (and around eyes if treating fine lines and wrinkles) at night. The amount of medication needed to cover the entire face should be no more than the size of a green pea. Irritation around the eyes can be treated with Vaseline at night.    What if my skin appears dry, red, and is peeling?          Retinoids do not cause dry skin but rather they cause the top layer of the skin the shed, giving an appearance of dry skin. In fact, new healthy skin cells are replacing older, damaged cells on the surface. This usually occurs the first 2-4 weeks as the skin is  adjusting to the medication. It is reasonable to use the medication every other night or even every 2 nights until your skin adjusts. You can use a MILD exfoliant to remove the peeling skin (Aveeno daily clarifying pads, Aqua glycolic face cream) and can apply a moisturizer throughout the day as needed. Retinoids come in a variety of strengths and vehicles and your doctor can find one best for you. If you cannot tolerate prescription strength retinoids, over the counter products with retinol may be beneficial. (Olay ProX wrinkle cream, SHA deep wrinkle cream, Green Cream at [employer])    Will my skin be more sensitive in the sun?           You will need to use sunscreen with SPF 30 daily. Retinoids will cause the outermost layer of the skin to be thinner and thus more sensitive to ultraviolet rays. However, remember that over time, retinoids actually make the skin thicker by enhancing collagen deposition which protects the skin from sun damage.    When will I see results?            If you are using a retinoid for acne, you should see improvement in 6-8 weeks. Do not be alarmed if you find that your acne gets worse before it gets better-KEEP USING THE MEDICATION- this is a normal response and your acne will improve if you can stick with it.           If you are using the medication for anti-aging and skin dyspigmentation, you may see results in 3 months, but most effects are not visible until 6 months. Retinoids are clinically proven to reverse signs of aging, but only if used on a CONSTISTENT BASIS!             Remember that retinoids should not be used if you are pregnant.           Discontinue use 1 week prior to waxing, as skin is more likely to tear.

## 2020-12-15 ENCOUNTER — SPECIALTY PHARMACY (OUTPATIENT)
Dept: PHARMACY | Facility: CLINIC | Age: 50
End: 2020-12-15

## 2020-12-15 NOTE — TELEPHONE ENCOUNTER
Specialty Pharmacy - Refill Coordination    Specialty Medication Orders Linked to Encounter      Most Recent Value   Medication #1  ixekizumab (TALTZ AUTOINJECTOR) 80 mg/mL AtIn (Order#[id number], Rx#[id number])          Refill Questions - Documented Responses      Most Recent Value   Relationship to patient of person spoken to?  Self   HIPAA/medical authority confirmed?  Yes   Any changes in contact preferences or allowed representatives?  No   Has the patient had any insurance changes?  No   Has the patient had any changes to specialty medication, dose, or instructions?  No   Has the patient started taking any new medications, herbals, or supplements?  No   Has the patient been diagnosed with any new medical conditions?  No   Does the patient have any new allergies to medications or foods?  No   Does the patient have any concerns about side effects?  No   Can the patient store medication/sharps container properly (at the correct temperature, away from children/pets, etc.)?  Yes   Can the patient call emergency services (631) in the event of an emergency?  Yes   How many doses did the patient miss in the past 4 weeks or since the last fill?  0   How many doses does the patient have on hand?  0   How many days does the patient report on hand quantity will last?  0   Does the number of doses/days supply remaining match pharmacy expected amounts?  Yes   Does the patient feel that this medication is effective?  Yes   During the past 4 weeks, has patient missed any activities due to condition or medication?  No   During the past 4 weeks, did patient have any of the following urgent care visits?  None   How will the patient receive the medication?  Pickup   When does the patient need to receive the medication?  12/18/20   Shipping Address  Home   Address in [facility] confirmed and updated if neccessary?  Yes   Expected Copay ($)  0   Is the patient able to afford the medication copay?  Yes   Payment Method  zero  copay   Days supply of Refill  28   Would patient like to speak to a pharmacist?  No   Do you want to trigger an intervention?  No   Do you want to trigger an additional referral task?  No   Refill activity completed?  Yes   Refill activity plan  Refill scheduled   Shipment/Pickup Date:  12/16/20          Current Outpatient Medications   Medication Sig    aspirin (ECOTRIN) 81 MG EC tablet Take 81 mg by mouth once daily.      atorvastatin (LIPITOR) 10 MG tablet TAKE 1 TABLET BY MOUTH DAILY    citric acid-potassium citrate (POLYCITRA) 1,100-334 mg/5 mL solution Take by mouth 2 (two) times a day.    FLUoxetine 20 MG capsule Take 40 mg by mouth once daily.     hydroxychloroquine (PLAQUENIL) 200 mg tablet Take 1.5 tablets (300 mg total) by mouth once daily.    ixekizumab (TALTZ AUTOINJECTOR) 80 mg/mL AtIn Inject 80 mg into the skin every 28 days.    leflunomide (ARAVA) 20 MG Tab Take 1 tablet (20 mg total) by mouth once daily.    LORazepam (ATIVAN) 0.5 MG tablet Take 0.5 mg by mouth every 6 (six) hours as needed for Anxiety.    oxyCODONE-acetaminophen (PERCOCET) 5-325 mg per tablet TK 1 T PO  Q 4 H PRN P. MDD 6 TS    potassium citrate (UROCIT-K 15) 15 mEq TbSR TK 1 T PO BID    sulfaSALAzine (AZULFIDINE) 500 MG TbEC Take 2 tablets (1,000 mg total) by mouth 2 (two) times daily.    tamsulosin (FLOMAX) 0.4 mg Cap Take 0.4 mg by mouth once daily.   Last reviewed on 12/14/2020 12:47 PM by Juliana Huertas MD    Review of patient's allergies indicates:   Allergen Reactions    Erythromycin Nausea And Vomiting    Infliximab Other (See Comments)     Lupus with fever and acute arthritis  Lupus with fever and acute arthritis    Last reviewed on  12/14/2020 12:47 PM by Juliana Huertas      Tasks added this encounter   1/7/2021 - Refill Call (Auto Added)  12/17/2020 - Pickup Reminder   Tasks due within next 3 months   2/9/2021 - Clinical - Follow Up Assesement (90 day)     Ben Marie  [facility] - Specialty  Pharmacy  [address] [address]  Phone: [phone]  Fax: [phone]

## 2020-12-22 ENCOUNTER — CLINICAL SUPPORT (OUTPATIENT)
Dept: REHABILITATION | Facility: OTHER | Age: 50
End: 2020-12-22
Attending: PHYSICAL MEDICINE & REHABILITATION
Payer: COMMERCIAL

## 2020-12-22 DIAGNOSIS — R29.898 DECREASED STRENGTH OF TRUNK AND BACK: ICD-10-CM

## 2020-12-22 DIAGNOSIS — R29.898 DECREASED RANGE OF MOTION OF NECK: ICD-10-CM

## 2020-12-22 DIAGNOSIS — M47.812 CERVICAL SPONDYLOSIS: ICD-10-CM

## 2020-12-22 PROCEDURE — 97110 THERAPEUTIC EXERCISES: CPT

## 2020-12-22 PROCEDURE — 97162 PT EVAL MOD COMPLEX 30 MIN: CPT

## 2020-12-28 PROBLEM — R29.898 DECREASED RANGE OF MOTION OF NECK: Status: ACTIVE | Noted: 2020-12-28

## 2020-12-28 PROBLEM — R29.898 DECREASED STRENGTH OF TRUNK AND BACK: Status: ACTIVE | Noted: 2020-12-28

## 2020-12-28 NOTE — PLAN OF CARE
"  [facility]     Name: Rebel Rodriguez  Clinic Number: [id number]    Therapy Diagnosis:   Encounter Diagnoses   Name Primary?    Cervical spondylosis     Decreased range of motion of neck     Decreased strength of trunk and back      Physician: Monica Dior, *    Physician Orders: PT Eval and Treat   Medical Diagnosis from Referral: M47.812 (ICD-10-CM) - Cervical spondylosis  Evaluation Date: 12/22/2020  Authorization Period Expiration: 12/31/20  Plan of Care Expiration: 03/22/21  Reassessment Due: 01/22/21  Visit # / Visits authorized: 01 / 20    Time In: 10:25 am  Time Out: 12:15 pm  Total Billable Time: 110 minutes    Precautions: Standard, C3-5 fusion, R achilles surg (reattach)     Pattern of pain determined: Pattern 1 unclear       Subjective   Date of onset: progressvie over the last 2 yrs, has stopped exercise "for fun" this past year   History of current condition - Rebel reports pain between scapulas and B UT tightness.  Pt note feeling sig limited mobility due to stiffness.  Pt deny n/t BUE.  Pt report AM stiffness subside in afternoon but then phases into evening "fatigue" and return of cervicothoracic sx.  Pt report difficulties turning head leading to difficulty driving.    Pt report hot showers help sx relief.  Pt report difficulty sleeping and finding comfort on L SL.    Pt note that after R Achilles rupture(2013) pt sig decrease recreational activity level and feels this negatively affected his health.   Pt recently complete aquatic PT for "core training" and reports the warm water was key to his success bc pt reports "lifelong arthritic".  Pt report seeing MD regarding arthritis and controlled /c medication    PMHx psoriatic arthritis, fusion of C3-5 In 2016       Per MD note:   Mr Rodriguez is a 51 yo male with psoriatic arthritis sent in consultation by Dr. Beverly for evaluation for the healthy back cervical program.  he has had neck pain for 8-10 " years, but worse 4 years ago and then had surgery with improvement in 2016 and then pain worsened in 2018.  There was no event that started the pain.  The pain is left neck dominant and goes to the right and to the upper back.  The pain is a stiff dull ache.  He does not have tingling numbness burning or weakness.  The pain is constant 2-6/10.  It is worse with looking up, down, standing, lying on stomach, turning head to both sides, morning, evening and bedtime.  The pain worsens as the day goes on.  It is better lying on back, sitting, walking, lying on left side, pillow and afternoon.  He had fusion of C3-5 In 2016 with improvement.  He had PT in 2016, 2017 and 2020 in pool with relief of the pain. He had KENRICK in 2008 and 2010. He has not been to chiropractor.  His goals are turning head to left and right, looking up and down.  Pattern 1        Medical History:   Past Medical History:   Diagnosis Date    Achilles tendon rupture 10/9/2013    Allergy     Anxiety     Arthritis     psoriatric    Degenerative disc disease     Drug-induced lupus erythematosus     Hyperlipidemia     Kidney stone     Psoriatic arthritis     TIA (transient ischemic attack)     Ulcer     high school       Surgical History:   Rebel Rodriguez  has a past surgical history that includes Upper endoscopy w/ esophageal manometry; Ureteral stent placement; Achilles tendon surgery; Back surgery; Nasal septoplasty (N/A, 9/14/2018); Nasal turbinate reduction (Bilateral, 9/14/2018); and Functional endoscopic sinus surgery (FESS) using computer-assisted navigation (Bilateral, 9/14/2018).    Medications:   Rebel has a current medication list which includes the following prescription(s): aspirin, atorvastatin, citric acid-potassium citrate, fluoxetine, hydroxychloroquine, ixekizumab, leflunomide, lorazepam, oxycodone-acetaminophen, potassium citrate, sulfasalazine, and tamsulosin.    Allergies:   Review of patient's allergies indicates:    Allergen Reactions    Erythromycin Nausea And Vomiting    Infliximab Other (See Comments)     Lupus with fever and acute arthritis  Lupus with fever and acute arthritis        Imaging: MRI studies    Per MD note:      MRI cervical 9/22/2017  Multiplanar, multi-sequence MR images of the cervical spine with 6 cc of gadolinium based IV contrast material.     Findings: There is no evidence of fracture, dislocation or marrow replacement process. No abnormal enhancing lesions. The vertebral body heights and alignment are maintained.  The visualized posterior fossa structures demonstrate no significant abnormality.  The surrounding soft tissue structures demonstrate no significant abnormality. There is cord signal abnormality as outlined below.     C2-3: Unremarkable.     C3-4: Mild uncovertebral hypertrophy. No significant neuroforaminal narrowing or canal stenosis.     C4-5: Uncovertebral hypertrophy with posterior disc bulge. This results in severe bilateral neural foraminal narrowing as well as severe canal stenosis. There is cord signal abnormality within the cervical cord at this level, more prominent in the right hemicord.     C5-6: Uncovertebral joint hypertrophy and posterior disc bulge, paracentral to the left. This results in severe bilateral neural foraminal narrowing and severe canal stenosis. Minimal cord signal abnormality at this level as well, more prominent on the left.     C6-7: Uncovertebral joint hypertrophy and disc bulge resulting in moderate-severe bilateral neuroforaminal narrowing and moderate canal stenosis. No cord signal abnormality at this level.     C7-T1: Unremarkable.  IMPRESSION:      Degenerative disc disease which is worst at C4-5, and results in severe neural foraminal narrowing and canal stenosis with associated cervical cord myelopathy at this level. No abnormal postcontrast enhancement.       Prior Therapy: extensive PT experience - R Achilles, C3-5 fusion, psoriatic arthritis  "(aquatic)   Prior Treatment: injections (mild relief) leading to cervical fusion   Social History: 2 story house, [address]  lives alone, cat   Occupation: music stage management (extended standing, push/pull), events at [employer]  Leisure: none      Prior Level of Function: Ind /c ADLs   Current Level of Function: Ind /c ADLs stopped basketball, working out  DME owned/used:  [employer] (Y, R, B)       Pain:  Current 4/10 pain 6/10 stiffness, worst 4/10, best 4/10   Location: midline mid thoracic stiffness, bilateral upper trap thightness  Description:  Between shoulder blades = stiffness, ache, burning /c movement  Aggravating Factors: Bending, Flexing and Lifting  Easing Factors: heating pad, stretching, aquatic exercise (warm pool)   Disturbed Sleep: Y  only sleeps L side       Pattern of pain questions:  1.  Where is your pain the worst? Neck - thoracic  2.  Is your pain constant or intermittent? Constant  3.  Does bending forward make your typical pain worse? Y  4.  Since the start of your neck pain, has there been a change in your bowel or bladder? N  5.  What can't you do now that you use to be able to do? Tolerate a full day at work      Pts goals: "I want to improve my strength and ROM not just my neck"       Red Flag Screening:   Cough  Sneeze  Strain: (--)  Bladder/ bowel: (--) hx of kidney stones medically tx   Falls: (--)  Night pain: (+) during arthritic flare ups   Unexplained weight loss: (--)  General health: pt report fair     OBJECTIVE   Postural examination/scapula alignment:  slight rounded shoulders, fwd head, slightly elevated L shoulder   Joint integrity: Firm end feeling, dec lateral mobility T3-T10  Sitting: slouched  Correction of posture: better with lumbar roll   Palpation:  TTP B 1st rib,     Upper cervical ROM maintained, min hypertonic suboccipitals    Hypertonic B UT, levator     Range of Motion - MOVEMENT LOSS    ROM Loss   Flexion moderate loss   Extension moderate loss -> /p " repeated motion min loss, upper cervical WFL, dec lower cervical segmental motion   Side bending Right major loss   Side bending Left major loss   Rotation Right minimal loss   Rotation Left moderate loss   Protraction within functional limits   Retraction  moderate loss     Scapulothoracic rhythm - L side inc shoulder elevation during first 1/3 of motion      Upper Extremity Strength  (R) UE  (L) UE    Shoulder flexion: 4+/5 Shoulder flexion: 4+/5   Shoulder Abduction: 4+/5 Shoulder abduction: 4+/5   Elbow flexion: 4+/5 Elbow flexion: 4+/5   Elbow extension: 4+/5 Elbow extension: 4+/5   Wrist flexion: 4+/5 Wrist flexion: 4+/5   Wrist extension: 4+/5 Wrist extension: 4+/5    4+/5 : 4+/5     NEUROLOGICAL SCREEN    Sensory deficit:   BUE LT intact   C8 myotome intact   T1 myotome intact     Special Tests:   Test Name  Testing Result   Compression (--)   Distraction (+) soft tissue stretch    Neural Tension Test (--)   Saddle Sensation (+)     Reflexes:    Left Right   Biceps  2+ 2+   Brachioradialis  1+ 2+   Clonus NT NT   Babinski NT NT        REPEATED TEST MOVEMENTS:   Repeated Protraction in Sitting no effect   Repeated Flexion in Sitting no effect   Repeated Retraction in Sitting  no effect   Repeated Retraction Extension in Sitting Inc L side stiffness base of skull    Repeated Protraction in lying NT   Repeated Flexion in lying NT   Repeated Retraction in lying NT   Repeated Retraction Extension in lying NT       Baseline Isometric Testing on Med X equipment:  Testing administered by PT  Date of testin20  ROM 18 -78 deg   Max Peak Torque 199   Min Peak Torque 62   Flex/Ext Ratio 3.2   % below normative data -53%   SANDRO      GAIT:  Assistive Device used: none  Level of Assistance: independent  Patient displays the following gait deviations:  no gait deviations observed.         Limitation/Restriction for FOTO Cervical Survey    Therapist reviewed FOTO scores for Rebel Rodriguez on  12/22/2020.   FOTO documents entered into [facility] - see Media section.    Limitation Score: 50%    Goal: 43%             Treatment   Treatment Time In: 11:45 am  Treatment Time Out: 12:15 pm  Total Treatment time separate from Evaluation: 30minutes      Rebel received therapeutic exercises to develop/improve posture, lumbar/cervical ROM, strength and muscular endurance for 30 minutes including the following exercises:     Cervical retractions x 10   Scap retracts x 10  UT stretch x 3 10sec hold each side (inc hold time at next visit)  Levator stretch x1 10sec hold each side  Cervical isometric rotation x2 5 sec hold, ext x1 5 sec hold     Med x dynamic exercise and baseline IM testing        Rebel received the following manual therapy techniques: Joint mobilizations, Manual traction, Myofacial release and Soft tissue Mobilization were applied to the: 00 for 00 minutes.         Written Home Exercises Provided: yes.  Exercises were reviewed and Rebel was able to demonstrate them prior to the end of the session.  Rebel demonstrated fair  understanding of the education provided.     Cervical retractions  Cervical isometrics SB, Rotation, Ext  Scap retracts  UT stretch  Levator stretch    See EMR under Patient Instructions for exercises provided 12/22/2020.      Education provided:   - Patient received education regarding proper posture and body mechanics.  Patient was given top [facility] Visit 1 handouts which discuss what to expect in therapy, the purpose and opportunity for health coaching, the program,  wellness when discharged from therapy, back education and care specifically for posture seated, standing, lifting correctly, components of exercise, importance of nutrition and hydration, and importance of sleep.   Information on lumbar rolls provided.  Patient received education regarding proper posture and body mechanics.  - Miesha roll tried, recommended, and purchase information was provided.    -  Patient received a handout regarding anticipated muscular soreness following the isometric test and strategies for management were reviewed with patient including stretching, using ice and scheduled rest.   - Patient received education on the Healthy Back program, purpose of the isometric test, progression of neck strengthening as well as wellness approach and systemic strengthening.  Details of the program were discussed.  Reviewed that patient should feel support/pressure from med ex restraints but no pain or discomfort and patient expressed understanding.      Rebel received cold pack for cervical and thoracic region minutes to 10 min in Z lying.    Assessment   Rebel is a 50 y.o. male referred to [facility] with a medical diagnosis of M47.812 (ICD-10-CM) - Cervical spondylosis. Pt presents with dec cervical ROM, strength as measured via MedX IM testing, L impaired scapulothoracic rhythm indicating dec motor control, sig TTP upon lower cervical muscular palpation leading to dec functional mobility, strength and activity tolerance.  MedX cervical IM testing show -53% vs norms and pt demo sig dec fwd flexion (78 degrees).  Pt lower cervical ROM restricted > upper cervical indicating importance of attention on postural education and postural correction.  Pt demo good body awareness due to past medical dx and connects attention to sx response. However, pt's perseveration on pain affecting functionality may inhibit participation in HB programming.  Pt awareness of pt attitude to activity is warranted.  HEP issued for inc neuromotor control for cervical muscular endurance and UT/Levator stretches to address sx presentation and included cervical retractions for cervical mobility.        Pain Pattern: Pattern 1      Pt prognosis is Good.   Pt will benefit from skilled outpatient Physical Therapy to address the deficits stated above and in the chart below, provide pt/family education, and to maximize pt's  level of independence.     Plan of care discussed with patient: Yes  Pt's spiritual, cultural and educational needs considered and patient is agreeable to the plan of care and goals as stated below:     Anticipated Barriers for therapy: chronicity    PT Evaluation Completed? Yes    Medical necessity is demonstrated by the following problem list.    Pt presents with the following impairments:     History  Co-morbidities and personal factors that may impact the plan of care Co-morbidities:   Prior cervical surgery, prior R ankle surgery, psoriatic arthritis    Personal Factors:   no deficits     moderate   Examination  Body Structures and Functions, activity limitations and participation restrictions that may impact the plan of care Body Regions:   neck  trunk    Body Systems:    ROM  strength  gross coordinated movement  motor control    Participation Restrictions:   none    Activity limitations:   Learning and applying knowledge  no deficits    General Tasks and Commands  no deficits    Communication  no deficits    Mobility  lifting and carrying objects    Self care  washing oneself (bathing, drying, washing hands)  dressing    Domestic Life  shopping  cooking  doing house work (cleaning house, washing dishes, laundry)    Interactions/Relationships  no deficits    Life Areas  no deficits    Community and Social Life  community life  recreation and leisure         low   Clinical Presentation stable and uncomplicated low   Decision Making/ Complexity Score: moderate       GOALS: Pt is in agreement with the following goals.    Short term goals: 6 weeks or 10 visits   1.  Pt will demonstratte increased cervical ROM as measured by med ex by 10 degrees from initial test which results in improved  ROM of neck for ease with ADLs and driving  2. Pt will demonstrate independence with reducing or controlling symptoms with ther ex, movement, or position independently, able to reduce pain 1-2 points on pain scale using  "strategies taught in therapy for inc tolerance to household chores.    3. Pt will demonstrate increased MedX average isometric strength value by 10% with  when compared to the initial testing resulting in improved ability to perform bending, lifting, and carrying activities safely, confidently.      Long term goals: 10 weeks or 20 visits  1. Pt will demonstratte increased cervical ROM as measured by med ex by 20 degrees from initial test which results in functional ROM of neck for ease with ADLs and driving  2. Pt will demonstrate increased MedX average isometric strength value  by 20% from initial test to improve ability to lift and carry, and sustain good posture while performing ADL's  3.Pt will demonstrate reduced pain and improved functional outcomes as reported on the FOTO by reaching a limitation score of < or = 43% or less in order to demonstrate subjective improvement in pt's condition.    4. Pt will demonstrate independence with reducing or controlling symptoms with ther ex, movement, or position independently, able to reduce pain 2-4 points on pain scale using strategies taught in therapy  5. Pt will demonstrate independence with the HEP at discharge.   6.  Pt will lift 20# box floor to chest x 20 /s inc cervical or thoracic pain for inc tolerance to work activities.   7. Pt will perform head turns to end range x 15 each direction at 90 bpm for inc functional mobility and ease /c checking blind spot when turning.     Plan   Outpatient physical therapy 2x week for 10 weeks or 20 visits to include the following:   - Patient education  - Therapeutic exercise  - Manual therapy  - Performance testing   - Neuromuscular Re-education  - Therapeutic activity   - Modalities    Pt may be seen by PTA as part of the rehabilitation team.     Therapist: Christiano Bustamante, PT  12/28/2020      "I certify the need for these services furnished under this plan of treatment and while under my " "care."    ____________________________________  Physician/Referring Practitioner    _______________  Date of Signature            "

## 2021-01-05 ENCOUNTER — PATIENT MESSAGE (OUTPATIENT)
Dept: ADMINISTRATIVE | Facility: HOSPITAL | Age: 51
End: 2021-01-05

## 2021-01-12 ENCOUNTER — SPECIALTY PHARMACY (OUTPATIENT)
Dept: PHARMACY | Facility: CLINIC | Age: 51
End: 2021-01-12

## 2021-01-15 ENCOUNTER — CLINICAL SUPPORT (OUTPATIENT)
Dept: REHABILITATION | Facility: OTHER | Age: 51
End: 2021-01-15
Attending: PHYSICAL MEDICINE & REHABILITATION
Payer: COMMERCIAL

## 2021-01-15 DIAGNOSIS — R29.898 DECREASED RANGE OF MOTION OF NECK: Primary | ICD-10-CM

## 2021-01-15 DIAGNOSIS — R29.898 DECREASED STRENGTH OF TRUNK AND BACK: ICD-10-CM

## 2021-01-15 PROCEDURE — 97110 THERAPEUTIC EXERCISES: CPT | Mod: CQ

## 2021-01-20 ENCOUNTER — CLINICAL SUPPORT (OUTPATIENT)
Dept: REHABILITATION | Facility: OTHER | Age: 51
End: 2021-01-20
Attending: PHYSICAL MEDICINE & REHABILITATION
Payer: COMMERCIAL

## 2021-01-20 DIAGNOSIS — R29.898 DECREASED STRENGTH OF TRUNK AND BACK: ICD-10-CM

## 2021-01-20 DIAGNOSIS — R29.898 DECREASED RANGE OF MOTION OF NECK: Primary | ICD-10-CM

## 2021-01-20 PROCEDURE — 97140 MANUAL THERAPY 1/> REGIONS: CPT

## 2021-01-20 PROCEDURE — 97110 THERAPEUTIC EXERCISES: CPT

## 2021-01-22 ENCOUNTER — CLINICAL SUPPORT (OUTPATIENT)
Dept: REHABILITATION | Facility: OTHER | Age: 51
End: 2021-01-22
Attending: PHYSICAL MEDICINE & REHABILITATION
Payer: COMMERCIAL

## 2021-01-22 DIAGNOSIS — R29.898 DECREASED RANGE OF MOTION OF NECK: Primary | ICD-10-CM

## 2021-01-22 DIAGNOSIS — R29.898 DECREASED STRENGTH OF TRUNK AND BACK: ICD-10-CM

## 2021-01-22 PROCEDURE — 97110 THERAPEUTIC EXERCISES: CPT | Mod: CQ

## 2021-01-26 ENCOUNTER — OFFICE VISIT (OUTPATIENT)
Dept: RHEUMATOLOGY | Facility: CLINIC | Age: 51
End: 2021-01-26
Payer: COMMERCIAL

## 2021-01-26 VITALS
DIASTOLIC BLOOD PRESSURE: 66 MMHG | BODY MASS INDEX: 20.61 KG/M2 | HEIGHT: 68 IN | WEIGHT: 136 LBS | SYSTOLIC BLOOD PRESSURE: 97 MMHG | HEART RATE: 72 BPM

## 2021-01-26 DIAGNOSIS — N20.0 RECURRENT NEPHROLITHIASIS: ICD-10-CM

## 2021-01-26 DIAGNOSIS — Z79.899 LONG-TERM USE OF PLAQUENIL: ICD-10-CM

## 2021-01-26 DIAGNOSIS — T50.905A DRUG-INDUCED LUPUS ERYTHEMATOSUS: ICD-10-CM

## 2021-01-26 DIAGNOSIS — L40.50 PSA (PSORIATIC ARTHRITIS): Primary | ICD-10-CM

## 2021-01-26 DIAGNOSIS — Z51.81 MEDICATION MONITORING ENCOUNTER: ICD-10-CM

## 2021-01-26 DIAGNOSIS — D64.9 ANEMIA, UNSPECIFIED TYPE: ICD-10-CM

## 2021-01-26 DIAGNOSIS — M47.812 CERVICAL SPONDYLOSIS: ICD-10-CM

## 2021-01-26 DIAGNOSIS — L93.2 DRUG-INDUCED LUPUS ERYTHEMATOSUS: ICD-10-CM

## 2021-01-26 PROCEDURE — 99999 PR PBB SHADOW E&M-EST. PATIENT-LVL III: ICD-10-PCS | Mod: PBBFAC,,, | Performed by: STUDENT IN AN ORGANIZED HEALTH CARE EDUCATION/TRAINING PROGRAM

## 2021-01-26 PROCEDURE — 99214 PR OFFICE/OUTPT VISIT, EST, LEVL IV, 30-39 MIN: ICD-10-PCS | Mod: S$GLB,,, | Performed by: STUDENT IN AN ORGANIZED HEALTH CARE EDUCATION/TRAINING PROGRAM

## 2021-01-26 PROCEDURE — 99999 PR PBB SHADOW E&M-EST. PATIENT-LVL III: CPT | Mod: PBBFAC,,, | Performed by: STUDENT IN AN ORGANIZED HEALTH CARE EDUCATION/TRAINING PROGRAM

## 2021-01-26 PROCEDURE — 99214 OFFICE O/P EST MOD 30 MIN: CPT | Mod: S$GLB,,, | Performed by: STUDENT IN AN ORGANIZED HEALTH CARE EDUCATION/TRAINING PROGRAM

## 2021-01-27 ENCOUNTER — CLINICAL SUPPORT (OUTPATIENT)
Dept: REHABILITATION | Facility: OTHER | Age: 51
End: 2021-01-27
Attending: PHYSICAL MEDICINE & REHABILITATION
Payer: COMMERCIAL

## 2021-01-27 DIAGNOSIS — R29.898 DECREASED RANGE OF MOTION OF NECK: Primary | ICD-10-CM

## 2021-01-27 DIAGNOSIS — R29.898 DECREASED STRENGTH OF TRUNK AND BACK: ICD-10-CM

## 2021-01-27 PROCEDURE — 97110 THERAPEUTIC EXERCISES: CPT | Mod: CQ

## 2021-01-29 ENCOUNTER — CLINICAL SUPPORT (OUTPATIENT)
Dept: REHABILITATION | Facility: OTHER | Age: 51
End: 2021-01-29
Attending: PHYSICAL MEDICINE & REHABILITATION
Payer: COMMERCIAL

## 2021-01-29 DIAGNOSIS — R29.898 DECREASED RANGE OF MOTION OF NECK: Primary | ICD-10-CM

## 2021-01-29 DIAGNOSIS — R29.898 DECREASED STRENGTH OF TRUNK AND BACK: ICD-10-CM

## 2021-01-29 PROCEDURE — 97110 THERAPEUTIC EXERCISES: CPT | Mod: CQ

## 2021-02-03 ENCOUNTER — PATIENT MESSAGE (OUTPATIENT)
Dept: PHARMACY | Facility: CLINIC | Age: 51
End: 2021-02-03

## 2021-02-03 ENCOUNTER — CLINICAL SUPPORT (OUTPATIENT)
Dept: REHABILITATION | Facility: OTHER | Age: 51
End: 2021-02-03
Attending: PHYSICAL MEDICINE & REHABILITATION
Payer: COMMERCIAL

## 2021-02-03 ENCOUNTER — LAB VISIT (OUTPATIENT)
Dept: LAB | Facility: OTHER | Age: 51
End: 2021-02-03
Attending: STUDENT IN AN ORGANIZED HEALTH CARE EDUCATION/TRAINING PROGRAM
Payer: COMMERCIAL

## 2021-02-03 ENCOUNTER — SPECIALTY PHARMACY (OUTPATIENT)
Dept: PHARMACY | Facility: CLINIC | Age: 51
End: 2021-02-03

## 2021-02-03 DIAGNOSIS — R29.898 DECREASED RANGE OF MOTION OF NECK: Primary | ICD-10-CM

## 2021-02-03 DIAGNOSIS — R29.898 DECREASED STRENGTH OF TRUNK AND BACK: ICD-10-CM

## 2021-02-03 DIAGNOSIS — L40.50 PSA (PSORIATIC ARTHRITIS): ICD-10-CM

## 2021-02-03 PROCEDURE — 36415 COLL VENOUS BLD VENIPUNCTURE: CPT

## 2021-02-03 PROCEDURE — 97110 THERAPEUTIC EXERCISES: CPT | Mod: CQ

## 2021-02-03 PROCEDURE — 86480 TB TEST CELL IMMUN MEASURE: CPT

## 2021-02-05 LAB
GAMMA INTERFERON BACKGROUND BLD IA-ACNC: 0.02 IU/ML
M TB IFN-G CD4+ BCKGRND COR BLD-ACNC: 0 IU/ML
MITOGEN IGNF BCKGRD COR BLD-ACNC: >10 IU/ML
TB GOLD PLUS: NEGATIVE
TB2 - NIL: 0 IU/ML

## 2021-02-09 DIAGNOSIS — E78.5 HYPERLIPIDEMIA: ICD-10-CM

## 2021-02-09 RX ORDER — ATORVASTATIN CALCIUM 10 MG/1
10 TABLET, FILM COATED ORAL DAILY
Qty: 90 TABLET | Refills: 0 | OUTPATIENT
Start: 2021-02-09

## 2021-02-23 ENCOUNTER — PATIENT MESSAGE (OUTPATIENT)
Dept: RHEUMATOLOGY | Facility: CLINIC | Age: 51
End: 2021-02-23

## 2021-03-01 ENCOUNTER — CLINICAL SUPPORT (OUTPATIENT)
Dept: REHABILITATION | Facility: OTHER | Age: 51
End: 2021-03-01
Attending: PHYSICAL MEDICINE & REHABILITATION
Payer: COMMERCIAL

## 2021-03-01 DIAGNOSIS — R29.898 DECREASED STRENGTH OF TRUNK AND BACK: ICD-10-CM

## 2021-03-01 DIAGNOSIS — R29.898 DECREASED RANGE OF MOTION OF NECK: Primary | ICD-10-CM

## 2021-03-01 PROCEDURE — 97110 THERAPEUTIC EXERCISES: CPT

## 2021-03-02 ENCOUNTER — PATIENT MESSAGE (OUTPATIENT)
Dept: RHEUMATOLOGY | Facility: CLINIC | Age: 51
End: 2021-03-02

## 2021-03-03 ENCOUNTER — CLINICAL SUPPORT (OUTPATIENT)
Dept: REHABILITATION | Facility: OTHER | Age: 51
End: 2021-03-03
Attending: PHYSICAL MEDICINE & REHABILITATION
Payer: COMMERCIAL

## 2021-03-03 DIAGNOSIS — R29.898 DECREASED RANGE OF MOTION OF NECK: Primary | ICD-10-CM

## 2021-03-03 DIAGNOSIS — R29.898 DECREASED STRENGTH OF TRUNK AND BACK: ICD-10-CM

## 2021-03-03 PROCEDURE — 97110 THERAPEUTIC EXERCISES: CPT | Mod: CQ

## 2021-03-06 ENCOUNTER — PATIENT MESSAGE (OUTPATIENT)
Dept: PHARMACY | Facility: CLINIC | Age: 51
End: 2021-03-06

## 2021-03-12 ENCOUNTER — SPECIALTY PHARMACY (OUTPATIENT)
Dept: PHARMACY | Facility: CLINIC | Age: 51
End: 2021-03-12

## 2021-03-24 ENCOUNTER — CLINICAL SUPPORT (OUTPATIENT)
Dept: REHABILITATION | Facility: OTHER | Age: 51
End: 2021-03-24
Attending: PHYSICAL MEDICINE & REHABILITATION
Payer: COMMERCIAL

## 2021-03-24 DIAGNOSIS — R29.898 DECREASED STRENGTH OF TRUNK AND BACK: ICD-10-CM

## 2021-03-24 DIAGNOSIS — R29.898 DECREASED RANGE OF MOTION OF NECK: Primary | ICD-10-CM

## 2021-03-24 PROCEDURE — 97110 THERAPEUTIC EXERCISES: CPT

## 2021-03-25 DIAGNOSIS — E78.5 HYPERLIPIDEMIA: ICD-10-CM

## 2021-03-25 RX ORDER — ATORVASTATIN CALCIUM 10 MG/1
10 TABLET, FILM COATED ORAL DAILY
Qty: 90 TABLET | Refills: 0 | OUTPATIENT
Start: 2021-03-25

## 2021-03-31 ENCOUNTER — CLINICAL SUPPORT (OUTPATIENT)
Dept: REHABILITATION | Facility: OTHER | Age: 51
End: 2021-03-31
Attending: PHYSICAL MEDICINE & REHABILITATION
Payer: COMMERCIAL

## 2021-03-31 DIAGNOSIS — R29.898 DECREASED STRENGTH OF TRUNK AND BACK: ICD-10-CM

## 2021-03-31 DIAGNOSIS — R29.898 DECREASED RANGE OF MOTION OF NECK: Primary | ICD-10-CM

## 2021-03-31 PROCEDURE — 97110 THERAPEUTIC EXERCISES: CPT | Mod: CQ

## 2021-04-05 ENCOUNTER — PATIENT MESSAGE (OUTPATIENT)
Dept: ADMINISTRATIVE | Facility: HOSPITAL | Age: 51
End: 2021-04-05

## 2021-04-05 ENCOUNTER — PATIENT MESSAGE (OUTPATIENT)
Dept: PHARMACY | Facility: CLINIC | Age: 51
End: 2021-04-05

## 2021-04-06 ENCOUNTER — CLINICAL SUPPORT (OUTPATIENT)
Dept: REHABILITATION | Facility: OTHER | Age: 51
End: 2021-04-06
Attending: PHYSICAL MEDICINE & REHABILITATION
Payer: COMMERCIAL

## 2021-04-06 DIAGNOSIS — R29.898 DECREASED STRENGTH OF TRUNK AND BACK: ICD-10-CM

## 2021-04-06 DIAGNOSIS — R29.898 DECREASED RANGE OF MOTION OF NECK: Primary | ICD-10-CM

## 2021-04-06 PROCEDURE — 97110 THERAPEUTIC EXERCISES: CPT | Mod: CQ

## 2021-04-08 ENCOUNTER — CLINICAL SUPPORT (OUTPATIENT)
Dept: REHABILITATION | Facility: OTHER | Age: 51
End: 2021-04-08
Attending: PHYSICAL MEDICINE & REHABILITATION
Payer: COMMERCIAL

## 2021-04-08 DIAGNOSIS — R29.898 DECREASED STRENGTH OF TRUNK AND BACK: ICD-10-CM

## 2021-04-08 DIAGNOSIS — R29.898 DECREASED RANGE OF MOTION OF NECK: Primary | ICD-10-CM

## 2021-04-08 PROCEDURE — 97140 MANUAL THERAPY 1/> REGIONS: CPT

## 2021-04-08 PROCEDURE — 97110 THERAPEUTIC EXERCISES: CPT

## 2021-04-13 ENCOUNTER — CLINICAL SUPPORT (OUTPATIENT)
Dept: REHABILITATION | Facility: OTHER | Age: 51
End: 2021-04-13
Attending: PHYSICAL MEDICINE & REHABILITATION
Payer: COMMERCIAL

## 2021-04-13 ENCOUNTER — SPECIALTY PHARMACY (OUTPATIENT)
Dept: PHARMACY | Facility: CLINIC | Age: 51
End: 2021-04-13

## 2021-04-13 DIAGNOSIS — R29.898 DECREASED STRENGTH OF TRUNK AND BACK: ICD-10-CM

## 2021-04-13 DIAGNOSIS — R29.898 DECREASED RANGE OF MOTION OF NECK: Primary | ICD-10-CM

## 2021-04-13 PROCEDURE — 97110 THERAPEUTIC EXERCISES: CPT | Mod: CQ

## 2021-04-15 ENCOUNTER — CLINICAL SUPPORT (OUTPATIENT)
Dept: REHABILITATION | Facility: OTHER | Age: 51
End: 2021-04-15
Attending: PHYSICAL MEDICINE & REHABILITATION
Payer: COMMERCIAL

## 2021-04-15 DIAGNOSIS — R29.898 DECREASED STRENGTH OF TRUNK AND BACK: ICD-10-CM

## 2021-04-15 DIAGNOSIS — R29.898 DECREASED RANGE OF MOTION OF NECK: Primary | ICD-10-CM

## 2021-04-15 PROCEDURE — 97110 THERAPEUTIC EXERCISES: CPT

## 2021-04-15 PROCEDURE — 97140 MANUAL THERAPY 1/> REGIONS: CPT

## 2021-04-20 ENCOUNTER — CLINICAL SUPPORT (OUTPATIENT)
Dept: REHABILITATION | Facility: OTHER | Age: 51
End: 2021-04-20
Attending: PHYSICAL MEDICINE & REHABILITATION
Payer: COMMERCIAL

## 2021-04-20 DIAGNOSIS — R29.898 DECREASED RANGE OF MOTION OF NECK: Primary | ICD-10-CM

## 2021-04-20 DIAGNOSIS — R29.898 DECREASED STRENGTH OF TRUNK AND BACK: ICD-10-CM

## 2021-04-20 PROCEDURE — 97140 MANUAL THERAPY 1/> REGIONS: CPT

## 2021-04-20 PROCEDURE — 97110 THERAPEUTIC EXERCISES: CPT

## 2021-04-22 ENCOUNTER — CLINICAL SUPPORT (OUTPATIENT)
Dept: REHABILITATION | Facility: OTHER | Age: 51
End: 2021-04-22
Attending: PHYSICAL MEDICINE & REHABILITATION
Payer: COMMERCIAL

## 2021-04-22 DIAGNOSIS — R29.898 DECREASED RANGE OF MOTION OF NECK: Primary | ICD-10-CM

## 2021-04-22 DIAGNOSIS — R29.898 DECREASED STRENGTH OF TRUNK AND BACK: ICD-10-CM

## 2021-04-22 PROCEDURE — 97110 THERAPEUTIC EXERCISES: CPT | Mod: CQ

## 2021-04-26 ENCOUNTER — PATIENT MESSAGE (OUTPATIENT)
Dept: RESEARCH | Facility: HOSPITAL | Age: 51
End: 2021-04-26

## 2021-04-27 ENCOUNTER — CLINICAL SUPPORT (OUTPATIENT)
Dept: REHABILITATION | Facility: OTHER | Age: 51
End: 2021-04-27
Attending: PHYSICAL MEDICINE & REHABILITATION
Payer: COMMERCIAL

## 2021-04-27 DIAGNOSIS — R29.898 DECREASED RANGE OF MOTION OF NECK: Primary | ICD-10-CM

## 2021-04-27 DIAGNOSIS — R29.898 DECREASED STRENGTH OF TRUNK AND BACK: ICD-10-CM

## 2021-04-27 PROCEDURE — 97110 THERAPEUTIC EXERCISES: CPT

## 2021-04-29 ENCOUNTER — CLINICAL SUPPORT (OUTPATIENT)
Dept: REHABILITATION | Facility: OTHER | Age: 51
End: 2021-04-29
Attending: PHYSICAL MEDICINE & REHABILITATION
Payer: COMMERCIAL

## 2021-04-29 DIAGNOSIS — R29.898 DECREASED STRENGTH OF TRUNK AND BACK: ICD-10-CM

## 2021-04-29 DIAGNOSIS — R29.898 DECREASED RANGE OF MOTION OF NECK: Primary | ICD-10-CM

## 2021-04-29 PROCEDURE — 97110 THERAPEUTIC EXERCISES: CPT

## 2021-05-04 ENCOUNTER — DOCUMENTATION ONLY (OUTPATIENT)
Dept: REHABILITATION | Facility: OTHER | Age: 51
End: 2021-05-04

## 2021-05-06 ENCOUNTER — SPECIALTY PHARMACY (OUTPATIENT)
Dept: PHARMACY | Facility: CLINIC | Age: 51
End: 2021-05-06

## 2021-05-06 ENCOUNTER — CLINICAL SUPPORT (OUTPATIENT)
Dept: REHABILITATION | Facility: OTHER | Age: 51
End: 2021-05-06
Attending: PHYSICAL MEDICINE & REHABILITATION
Payer: COMMERCIAL

## 2021-05-06 DIAGNOSIS — R29.898 DECREASED RANGE OF MOTION OF NECK: Primary | ICD-10-CM

## 2021-05-06 DIAGNOSIS — R29.898 DECREASED STRENGTH OF TRUNK AND BACK: ICD-10-CM

## 2021-05-06 PROCEDURE — 97110 THERAPEUTIC EXERCISES: CPT

## 2021-05-06 PROCEDURE — 97750 PHYSICAL PERFORMANCE TEST: CPT

## 2021-05-06 RX ORDER — IXEKIZUMAB 80 MG/ML
80 INJECTION, SOLUTION SUBCUTANEOUS
Qty: 1 ML | Refills: 9 | Status: SHIPPED | OUTPATIENT
Start: 2021-05-06 | End: 2021-06-15

## 2021-05-11 NOTE — PROGRESS NOTES
Physical Therapy Daily Treatment Note     Name: Rebel Rodriguez  Clinic Number: [id number]    Therapy Diagnosis:   No diagnosis found.  Physician: Kathie Garcia MD    Visit Date: 11/4/2020    Physician Orders: Aquatic Therapy      Medical Diagnosis from Referral:   Diagnosis   L40.50 (ICD-10-CM) - PSA (psoriatic arthritis)     Evaluation Date: 8/27/2020  Authorization Period Expiration: 12/31/20  Plan of Care Expiration: 11/27/20  Visit # / Visits authorized: 11/20   POC signed by Dr. Beverly for 2x a week for 12 weeks (24 visits)  Total visits: 12/20  PTA visits:  0/6      Time In: 900  Time Out: 1000  Total Billable Time:30 minutes    Precautions: Standard, Covid    PROCEDURES/IMAGING:     Imaging, MRI 2017 IMPRESSION:      Degenerative disc disease which is worst at C4-5, and results in severe neural foraminal narrowing and canal stenosis with associated cervical cord myelopathy at this level. No abnormal postcontrast enhancement.       Subjective     Pt reports: Feels good, he is working more now. His knee remains a little sore with position changes, slight swelling, trying to use ice at home  He was compliant with home exercise program.  Response to previous treatment: muscle soreness and fatigue  Functional change: none    Pain: 2/10  Location: bilateral ankles, buttocks , lower legs, shoulder  and upper legs     Objective     Rebel received aquatic therapeutic exercises to develop strength, endurance, ROM, flexibility and core stabilization for 45 minutes including:    WARMUP x 3 laps each  Walk fwd/back/side    STRETCHES 2 x 20sec  Hamstring and calf stretching on stairs 3x 20 sec    LE EX x 30 with 3.75# ankle weights for hip series  Mini squat  Heel raise with GS  Hip abd/add  Hip ext  Marching  Hip flex/knee extend - Not today  Side stepping with green band 2 laps- increase to blue theraband next visit  Monster walks with green theraband 2 laps - increase to blue next visit        UE EX/CORE  x 30 trial  PT TREATMENT     05/11/21 1120   Note Type   Note Type Treatment   Pain Assessment   Pain Assessment Tool Pain Assessment not indicated - pt denies pain   Restrictions/Precautions   Other Precautions   (nephrostomy tubes)   General   Chart Reviewed Yes   Cognition   Overall Cognitive Status WFL   Bed Mobility   Supine to Sit 5  Supervision   Sit to Supine 5  Supervision   Transfers   Sit to Stand 5  Supervision   Stand to Sit 5  Supervision   Stand pivot 5  Supervision   Toilet transfer 5  Supervision   Ambulation/Elevation   Gait pattern   (shuffling)   Gait Assistance 5  Supervision   Assistive Device Rolling walker   Distance 2x150 feet   Balance   Static Sitting Good   Dynamic Sitting Good   Static Standing Good   Dynamic Standing Fair +   Ambulatory Fair +   Activity Tolerance   Activity Tolerance Patient tolerated treatment well   Assessment   Prognosis Good   Problem List Decreased strength;Decreased endurance; Impaired balance;Decreased mobility   Assessment Pt demonstrates good tolerance to functional activity with walker  Plan to try to walk with a cane or without next session as pt normally does not use an assistive device  Also plan to try stairs as pt has 6 ALLIE  The patient's [facility] Basic Mobility Inpatient Short Form Raw Score is 23, Standardized Score is 50  88  A standardized score greater than 42 9 suggests the patient may benefit from discharge to home  Plan   Treatment/Interventions ADL retraining;Functional transfer training;LE strengthening/ROM; Elevations; Therapeutic exercise;Gait training;Equipment eval/education;Patient/family training   Progress Progressing toward goals   PT Frequency 5x/wk   Recommendation   PT Discharge Recommendation No rehabilitation needs   Equipment Recommended   (pt issued a cane)   [facility]   Turning in Bed Without Bedrails 4   Lying on Back to Sitting on Edge of Flat Bed 4   Moving Bed to Chair 4   Standing Up From Chair 4   Walk in Room 4   Climb 3-5 Stairs 3   Basic Mobility Inpatient Raw Score 23   Basic Mobility Standardized Score 57 04   Licensure   [place] License Number  [place]   [id number] of foam around yellow paddles  Shoulder flex/ext TA activation paddles pt brings his own, trial of staggered stance, better with right foot forward  Shoulder horizontal abd/add TA activation paddles own  Shoulder abd/add TA activation paddles own  Blue board with hand holds for push/pull    ENDURANCE  Flutter kicks for 5 min, no bike due to knee stiffness    COOL DOWN x 1 lap each  Walk fwd/back/side      Home Exercises Provided and Patient Education Provided     Education provided:   Role of aquatic therapy  Hydration post therapy      Written Home Exercises Provided: Patient instructed to cont prior HEP.  Exercises were reviewed and Rebel was able to demonstrate them prior to the end of the session.  Rebel demonstrated good  understanding of the education provided.     See Patient [id number] for exercises provided prior visit.    Assessment     Pt was able to tolerate all exercises during today's session without any c/o increased pain or discomfort. Pt needed min verbal cuing and demonstration for good posture and core activation for LE exercises. Pt was able to verbalize and demonstrate understanding. Continue with  stretches to perform outside of the pool when at home as part of HEP; to include hip flexor, quad, and piriformis stretches. Pt may be a good candidate for the [facility] after aquatics.     Rebel is progressing well towards his goals.   Pt prognosis is Good.     Pt will continue to benefit from skilled aquatic outpatient physical therapy to address the deficits listed in the problem list box on initial evaluation, provide pt/family education and to maximize pt's level of independence in the home and community environment.     Pt's spiritual, cultural and educational needs considered and pt agreeable to plan of care and goals.    Anticipated barriers to physical therapy: none    Short Term Goals: 6 weeks   1. Patient will be independent in HEP & progressions  2. Patient  will achieve  Quad strength to 4+/5 to demonstrate improved mobility     Long Term Goals: 12 weeks   1. Patient will have FOTO limitation score of  35 % to demonstrate improved function & decreased pain  2. Patient will achieve  4+/5 to 5/5 for LE strength  demonstrate improved transfers & endurance     Plan   Plan of care Certification: 8/27/2020 to 11/27/20     Outpatient Physical Therapy 1-2 times weekly for 12 weeks to include the following interventions: aquatic therapy, patient education, manual therapy, therapeutic exercise, therapeutic activity. Patient may be seen by PTA as part of rehabilitation team      Elsie Chen, PT

## 2021-05-25 ENCOUNTER — OFFICE VISIT (OUTPATIENT)
Dept: RHEUMATOLOGY | Facility: CLINIC | Age: 51
End: 2021-05-25
Payer: COMMERCIAL

## 2021-05-25 VITALS
SYSTOLIC BLOOD PRESSURE: 106 MMHG | WEIGHT: 141 LBS | DIASTOLIC BLOOD PRESSURE: 60 MMHG | HEIGHT: 68 IN | HEART RATE: 81 BPM | BODY MASS INDEX: 21.37 KG/M2

## 2021-05-25 DIAGNOSIS — Z79.899 LONG-TERM USE OF PLAQUENIL: ICD-10-CM

## 2021-05-25 DIAGNOSIS — M54.2 NECK PAIN: ICD-10-CM

## 2021-05-25 DIAGNOSIS — L93.2 DRUG-INDUCED LUPUS ERYTHEMATOSUS: ICD-10-CM

## 2021-05-25 DIAGNOSIS — T50.905A DRUG-INDUCED LUPUS ERYTHEMATOSUS: ICD-10-CM

## 2021-05-25 DIAGNOSIS — L40.50 PSA (PSORIATIC ARTHRITIS): Primary | ICD-10-CM

## 2021-05-25 DIAGNOSIS — D64.9 ANEMIA, UNSPECIFIED TYPE: ICD-10-CM

## 2021-05-25 DIAGNOSIS — M47.812 CERVICAL SPONDYLOSIS: ICD-10-CM

## 2021-05-25 DIAGNOSIS — D50.9 IRON DEFICIENCY ANEMIA, UNSPECIFIED IRON DEFICIENCY ANEMIA TYPE: ICD-10-CM

## 2021-05-25 DIAGNOSIS — N20.0 RECURRENT NEPHROLITHIASIS: ICD-10-CM

## 2021-05-25 DIAGNOSIS — Z51.81 MEDICATION MONITORING ENCOUNTER: ICD-10-CM

## 2021-05-25 PROCEDURE — 99999 PR PBB SHADOW E&M-EST. PATIENT-LVL IV: CPT | Mod: PBBFAC,,, | Performed by: STUDENT IN AN ORGANIZED HEALTH CARE EDUCATION/TRAINING PROGRAM

## 2021-05-25 PROCEDURE — 99214 OFFICE O/P EST MOD 30 MIN: CPT | Mod: S$GLB,,, | Performed by: STUDENT IN AN ORGANIZED HEALTH CARE EDUCATION/TRAINING PROGRAM

## 2021-05-25 PROCEDURE — 99214 PR OFFICE/OUTPT VISIT, EST, LEVL IV, 30-39 MIN: ICD-10-PCS | Mod: S$GLB,,, | Performed by: STUDENT IN AN ORGANIZED HEALTH CARE EDUCATION/TRAINING PROGRAM

## 2021-05-25 PROCEDURE — 99999 PR PBB SHADOW E&M-EST. PATIENT-LVL IV: ICD-10-PCS | Mod: PBBFAC,,, | Performed by: STUDENT IN AN ORGANIZED HEALTH CARE EDUCATION/TRAINING PROGRAM

## 2021-05-25 RX ORDER — APREMILAST 10-20-30MG
KIT ORAL
Qty: 55 TABLET | Refills: 0 | Status: SHIPPED | OUTPATIENT
Start: 2021-05-25 | End: 2021-06-15

## 2021-05-25 RX ORDER — APREMILAST 30 MG/1
30 TABLET, FILM COATED ORAL 2 TIMES DAILY
Qty: 90 TABLET | Refills: 1 | Status: SHIPPED | OUTPATIENT
Start: 2021-05-25 | End: 2021-06-15

## 2021-05-25 RX ORDER — HYDROXYCHLOROQUINE SULFATE 200 MG/1
300 TABLET, FILM COATED ORAL DAILY
Qty: 90 TABLET | Refills: 5 | Status: SHIPPED | OUTPATIENT
Start: 2021-05-25 | End: 2022-03-28 | Stop reason: SDUPTHER

## 2021-05-27 ENCOUNTER — SPECIALTY PHARMACY (OUTPATIENT)
Dept: PHARMACY | Facility: CLINIC | Age: 51
End: 2021-05-27

## 2021-05-31 ENCOUNTER — SPECIALTY PHARMACY (OUTPATIENT)
Dept: PHARMACY | Facility: CLINIC | Age: 51
End: 2021-05-31

## 2021-05-31 ENCOUNTER — HOSPITAL ENCOUNTER (OUTPATIENT)
Dept: RADIOLOGY | Facility: OTHER | Age: 51
Discharge: HOME | End: 2021-05-31
Attending: STUDENT IN AN ORGANIZED HEALTH CARE EDUCATION/TRAINING PROGRAM
Payer: COMMERCIAL

## 2021-05-31 ENCOUNTER — LAB VISIT (OUTPATIENT)
Dept: LAB | Facility: OTHER | Age: 51
End: 2021-05-31
Attending: STUDENT IN AN ORGANIZED HEALTH CARE EDUCATION/TRAINING PROGRAM
Payer: COMMERCIAL

## 2021-05-31 DIAGNOSIS — T50.905A DRUG-INDUCED LUPUS ERYTHEMATOSUS: ICD-10-CM

## 2021-05-31 DIAGNOSIS — L93.2 DRUG-INDUCED LUPUS ERYTHEMATOSUS: ICD-10-CM

## 2021-05-31 DIAGNOSIS — M54.2 NECK PAIN: ICD-10-CM

## 2021-05-31 LAB
BILIRUB UR QL STRIP: NEGATIVE
CLARITY UR: CLEAR
COLOR UR: YELLOW
CREAT UR-MCNC: 199.4 MG/DL (ref 23–375)
GLUCOSE UR QL STRIP: NEGATIVE
HGB UR QL STRIP: NEGATIVE
KETONES UR QL STRIP: NEGATIVE
LEUKOCYTE ESTERASE UR QL STRIP: NEGATIVE
NITRITE UR QL STRIP: NEGATIVE
PH UR STRIP: 6 [PH] (ref 5–8)
PROT UR QL STRIP: NEGATIVE
PROT UR-MCNC: 16 MG/DL (ref 0–15)
PROT/CREAT UR: 0.08 MG/G{CREAT} (ref 0–0.2)
SP GR UR STRIP: 1.02 (ref 1–1.03)
URN SPEC COLLECT METH UR: NORMAL
UROBILINOGEN UR STRIP-ACNC: 1 EU/DL

## 2021-05-31 PROCEDURE — 84156 ASSAY OF PROTEIN URINE: CPT | Performed by: STUDENT IN AN ORGANIZED HEALTH CARE EDUCATION/TRAINING PROGRAM

## 2021-05-31 PROCEDURE — 81003 URINALYSIS AUTO W/O SCOPE: CPT | Performed by: STUDENT IN AN ORGANIZED HEALTH CARE EDUCATION/TRAINING PROGRAM

## 2021-05-31 PROCEDURE — 72052 XR CERVICAL SPINE 5 VIEW WITH FLEX AND EXT: ICD-10-PCS | Mod: 26,,, | Performed by: RADIOLOGY

## 2021-05-31 PROCEDURE — 72052 X-RAY EXAM NECK SPINE 6/>VWS: CPT | Mod: 26,,, | Performed by: RADIOLOGY

## 2021-05-31 PROCEDURE — 72052 X-RAY EXAM NECK SPINE 6/>VWS: CPT | Mod: TC,FY

## 2021-06-15 ENCOUNTER — TELEPHONE (OUTPATIENT)
Dept: RHEUMATOLOGY | Facility: CLINIC | Age: 51
End: 2021-06-15

## 2021-06-15 DIAGNOSIS — L40.50 PSA (PSORIATIC ARTHRITIS): Primary | ICD-10-CM

## 2021-06-15 RX ORDER — GUSELKUMAB 100 MG/ML
INJECTION SUBCUTANEOUS
Qty: 3 ML | Refills: 2 | Status: SHIPPED | OUTPATIENT
Start: 2021-06-15 | End: 2022-07-01 | Stop reason: SDUPTHER

## 2021-06-16 ENCOUNTER — SPECIALTY PHARMACY (OUTPATIENT)
Dept: PHARMACY | Facility: CLINIC | Age: 51
End: 2021-06-16

## 2021-06-22 ENCOUNTER — SPECIALTY PHARMACY (OUTPATIENT)
Dept: PHARMACY | Facility: CLINIC | Age: 51
End: 2021-06-22

## 2021-06-22 DIAGNOSIS — L40.50 PSA (PSORIATIC ARTHRITIS): Primary | ICD-10-CM

## 2021-06-22 RX ORDER — METHYLPREDNISOLONE 4 MG/1
TABLET ORAL
Qty: 1 PACKAGE | Refills: 0 | Status: SHIPPED | OUTPATIENT
Start: 2021-06-22 | End: 2021-07-13

## 2021-07-15 ENCOUNTER — SPECIALTY PHARMACY (OUTPATIENT)
Dept: PHARMACY | Facility: CLINIC | Age: 51
End: 2021-07-15

## 2021-07-15 ENCOUNTER — PATIENT MESSAGE (OUTPATIENT)
Dept: PHARMACY | Facility: CLINIC | Age: 51
End: 2021-07-15

## 2021-07-23 ENCOUNTER — TELEPHONE (OUTPATIENT)
Dept: RHEUMATOLOGY | Facility: CLINIC | Age: 51
End: 2021-07-23

## 2021-07-23 DIAGNOSIS — E78.5 HYPERLIPIDEMIA: ICD-10-CM

## 2021-07-23 RX ORDER — ATORVASTATIN CALCIUM 10 MG/1
10 TABLET, FILM COATED ORAL NIGHTLY
Qty: 90 TABLET | Refills: 3 | Status: SHIPPED | OUTPATIENT
Start: 2021-07-23 | End: 2021-07-27 | Stop reason: SDUPTHER

## 2021-08-03 ENCOUNTER — PATIENT MESSAGE (OUTPATIENT)
Dept: INTERNAL MEDICINE | Facility: CLINIC | Age: 51
End: 2021-08-03

## 2021-08-03 ENCOUNTER — OFFICE VISIT (OUTPATIENT)
Dept: INTERNAL MEDICINE | Facility: CLINIC | Age: 51
End: 2021-08-03
Payer: COMMERCIAL

## 2021-08-03 ENCOUNTER — TELEPHONE (OUTPATIENT)
Dept: INTERNAL MEDICINE | Facility: CLINIC | Age: 51
End: 2021-08-03

## 2021-08-03 VITALS
OXYGEN SATURATION: 98 % | DIASTOLIC BLOOD PRESSURE: 60 MMHG | BODY MASS INDEX: 23.22 KG/M2 | WEIGHT: 136 LBS | HEIGHT: 64 IN | SYSTOLIC BLOOD PRESSURE: 100 MMHG | HEART RATE: 71 BPM

## 2021-08-03 DIAGNOSIS — R06.81 APNEA: ICD-10-CM

## 2021-08-03 DIAGNOSIS — J34.89 NASAL OBSTRUCTION: ICD-10-CM

## 2021-08-03 DIAGNOSIS — F32.0 CURRENT MILD EPISODE OF MAJOR DEPRESSIVE DISORDER WITHOUT PRIOR EPISODE: ICD-10-CM

## 2021-08-03 DIAGNOSIS — F41.1 GENERALIZED ANXIETY DISORDER: Primary | ICD-10-CM

## 2021-08-03 PROCEDURE — 3078F DIAST BP <80 MM HG: CPT | Mod: CPTII,S$GLB,, | Performed by: INTERNAL MEDICINE

## 2021-08-03 PROCEDURE — 99999 PR PBB SHADOW E&M-EST. PATIENT-LVL V: CPT | Mod: PBBFAC,,, | Performed by: INTERNAL MEDICINE

## 2021-08-03 PROCEDURE — 1159F MED LIST DOCD IN RCRD: CPT | Mod: CPTII,S$GLB,, | Performed by: INTERNAL MEDICINE

## 2021-08-03 PROCEDURE — 1159F PR MEDICATION LIST DOCUMENTED IN MEDICAL RECORD: ICD-10-PCS | Mod: CPTII,S$GLB,, | Performed by: INTERNAL MEDICINE

## 2021-08-03 PROCEDURE — 3008F BODY MASS INDEX DOCD: CPT | Mod: CPTII,S$GLB,, | Performed by: INTERNAL MEDICINE

## 2021-08-03 PROCEDURE — 3074F SYST BP LT 130 MM HG: CPT | Mod: CPTII,S$GLB,, | Performed by: INTERNAL MEDICINE

## 2021-08-03 PROCEDURE — 1125F AMNT PAIN NOTED PAIN PRSNT: CPT | Mod: CPTII,S$GLB,, | Performed by: INTERNAL MEDICINE

## 2021-08-03 PROCEDURE — 1125F PR PAIN SEVERITY QUANTIFIED, PAIN PRESENT: ICD-10-PCS | Mod: CPTII,S$GLB,, | Performed by: INTERNAL MEDICINE

## 2021-08-03 PROCEDURE — 99213 PR OFFICE/OUTPT VISIT, EST, LEVL III, 20-29 MIN: ICD-10-PCS | Mod: S$GLB,,, | Performed by: INTERNAL MEDICINE

## 2021-08-03 PROCEDURE — 99999 PR PBB SHADOW E&M-EST. PATIENT-LVL V: ICD-10-PCS | Mod: PBBFAC,,, | Performed by: INTERNAL MEDICINE

## 2021-08-03 PROCEDURE — 3074F PR MOST RECENT SYSTOLIC BLOOD PRESSURE < 130 MM HG: ICD-10-PCS | Mod: CPTII,S$GLB,, | Performed by: INTERNAL MEDICINE

## 2021-08-03 PROCEDURE — 1160F RVW MEDS BY RX/DR IN RCRD: CPT | Mod: CPTII,S$GLB,, | Performed by: INTERNAL MEDICINE

## 2021-08-03 PROCEDURE — 3008F PR BODY MASS INDEX (BMI) DOCUMENTED: ICD-10-PCS | Mod: CPTII,S$GLB,, | Performed by: INTERNAL MEDICINE

## 2021-08-03 PROCEDURE — 99213 OFFICE O/P EST LOW 20 MIN: CPT | Mod: S$GLB,,, | Performed by: INTERNAL MEDICINE

## 2021-08-03 PROCEDURE — 3078F PR MOST RECENT DIASTOLIC BLOOD PRESSURE < 80 MM HG: ICD-10-PCS | Mod: CPTII,S$GLB,, | Performed by: INTERNAL MEDICINE

## 2021-08-03 PROCEDURE — 1160F PR REVIEW ALL MEDS BY PRESCRIBER/CLIN PHARMACIST DOCUMENTED: ICD-10-PCS | Mod: CPTII,S$GLB,, | Performed by: INTERNAL MEDICINE

## 2021-08-06 ENCOUNTER — TELEPHONE (OUTPATIENT)
Dept: INTERNAL MEDICINE | Facility: CLINIC | Age: 51
End: 2021-08-06

## 2021-08-06 ENCOUNTER — PATIENT MESSAGE (OUTPATIENT)
Dept: INTERNAL MEDICINE | Facility: CLINIC | Age: 51
End: 2021-08-06

## 2021-08-07 ENCOUNTER — SPECIALTY PHARMACY (OUTPATIENT)
Dept: PHARMACY | Facility: CLINIC | Age: 51
End: 2021-08-07

## 2021-08-16 ENCOUNTER — PATIENT OUTREACH (OUTPATIENT)
Dept: ADMINISTRATIVE | Facility: HOSPITAL | Age: 51
End: 2021-08-16

## 2021-08-17 ENCOUNTER — TELEPHONE (OUTPATIENT)
Dept: RHEUMATOLOGY | Facility: CLINIC | Age: 51
End: 2021-08-17

## 2021-08-17 DIAGNOSIS — L40.50 PSA (PSORIATIC ARTHRITIS): Primary | ICD-10-CM

## 2021-08-17 DIAGNOSIS — E78.5 HYPERLIPIDEMIA, UNSPECIFIED HYPERLIPIDEMIA TYPE: ICD-10-CM

## 2021-08-17 RX ORDER — LEFLUNOMIDE 20 MG/1
20 TABLET ORAL DAILY
Qty: 90 TABLET | Refills: 0 | Status: SHIPPED | OUTPATIENT
Start: 2021-08-17 | End: 2021-12-19 | Stop reason: SDUPTHER

## 2021-08-19 ENCOUNTER — PATIENT MESSAGE (OUTPATIENT)
Dept: RHEUMATOLOGY | Facility: CLINIC | Age: 51
End: 2021-08-19

## 2021-08-23 ENCOUNTER — OFFICE VISIT (OUTPATIENT)
Dept: SLEEP MEDICINE | Facility: CLINIC | Age: 51
End: 2021-08-23
Payer: COMMERCIAL

## 2021-08-23 VITALS
HEART RATE: 66 BPM | WEIGHT: 134.5 LBS | BODY MASS INDEX: 22.96 KG/M2 | HEIGHT: 64 IN | DIASTOLIC BLOOD PRESSURE: 59 MMHG | SYSTOLIC BLOOD PRESSURE: 107 MMHG

## 2021-08-23 DIAGNOSIS — R06.83 SNORING: ICD-10-CM

## 2021-08-23 DIAGNOSIS — Z86.73 HX-TIA (TRANSIENT ISCHEMIC ATTACK): ICD-10-CM

## 2021-08-23 DIAGNOSIS — R06.81 APNEA: ICD-10-CM

## 2021-08-23 DIAGNOSIS — E78.5 HYPERLIPIDEMIA, UNSPECIFIED HYPERLIPIDEMIA TYPE: ICD-10-CM

## 2021-08-23 DIAGNOSIS — G47.30 SLEEP APNEA, UNSPECIFIED TYPE: ICD-10-CM

## 2021-08-23 DIAGNOSIS — M19.90 ARTHRITIS: Primary | ICD-10-CM

## 2021-08-23 PROCEDURE — 1159F PR MEDICATION LIST DOCUMENTED IN MEDICAL RECORD: ICD-10-PCS | Mod: CPTII,S$GLB,, | Performed by: INTERNAL MEDICINE

## 2021-08-23 PROCEDURE — 99203 PR OFFICE/OUTPT VISIT, NEW, LEVL III, 30-44 MIN: ICD-10-PCS | Mod: S$GLB,,, | Performed by: INTERNAL MEDICINE

## 2021-08-23 PROCEDURE — 3078F DIAST BP <80 MM HG: CPT | Mod: CPTII,S$GLB,, | Performed by: INTERNAL MEDICINE

## 2021-08-23 PROCEDURE — 1159F MED LIST DOCD IN RCRD: CPT | Mod: CPTII,S$GLB,, | Performed by: INTERNAL MEDICINE

## 2021-08-23 PROCEDURE — 3008F PR BODY MASS INDEX (BMI) DOCUMENTED: ICD-10-PCS | Mod: CPTII,S$GLB,, | Performed by: INTERNAL MEDICINE

## 2021-08-23 PROCEDURE — 99999 PR PBB SHADOW E&M-EST. PATIENT-LVL III: CPT | Mod: PBBFAC,,, | Performed by: INTERNAL MEDICINE

## 2021-08-23 PROCEDURE — 3074F SYST BP LT 130 MM HG: CPT | Mod: CPTII,S$GLB,, | Performed by: INTERNAL MEDICINE

## 2021-08-23 PROCEDURE — 1126F AMNT PAIN NOTED NONE PRSNT: CPT | Mod: CPTII,S$GLB,, | Performed by: INTERNAL MEDICINE

## 2021-08-23 PROCEDURE — 1160F PR REVIEW ALL MEDS BY PRESCRIBER/CLIN PHARMACIST DOCUMENTED: ICD-10-PCS | Mod: CPTII,S$GLB,, | Performed by: INTERNAL MEDICINE

## 2021-08-23 PROCEDURE — 99203 OFFICE O/P NEW LOW 30 MIN: CPT | Mod: S$GLB,,, | Performed by: INTERNAL MEDICINE

## 2021-08-23 PROCEDURE — 1126F PR PAIN SEVERITY QUANTIFIED, NO PAIN PRESENT: ICD-10-PCS | Mod: CPTII,S$GLB,, | Performed by: INTERNAL MEDICINE

## 2021-08-23 PROCEDURE — 3008F BODY MASS INDEX DOCD: CPT | Mod: CPTII,S$GLB,, | Performed by: INTERNAL MEDICINE

## 2021-08-23 PROCEDURE — 3078F PR MOST RECENT DIASTOLIC BLOOD PRESSURE < 80 MM HG: ICD-10-PCS | Mod: CPTII,S$GLB,, | Performed by: INTERNAL MEDICINE

## 2021-08-23 PROCEDURE — 1160F RVW MEDS BY RX/DR IN RCRD: CPT | Mod: CPTII,S$GLB,, | Performed by: INTERNAL MEDICINE

## 2021-08-23 PROCEDURE — 99999 PR PBB SHADOW E&M-EST. PATIENT-LVL III: ICD-10-PCS | Mod: PBBFAC,,, | Performed by: INTERNAL MEDICINE

## 2021-08-23 PROCEDURE — 3074F PR MOST RECENT SYSTOLIC BLOOD PRESSURE < 130 MM HG: ICD-10-PCS | Mod: CPTII,S$GLB,, | Performed by: INTERNAL MEDICINE

## 2021-08-24 ENCOUNTER — TELEPHONE (OUTPATIENT)
Dept: SLEEP MEDICINE | Facility: OTHER | Age: 51
End: 2021-08-24

## 2021-08-26 ENCOUNTER — PATIENT MESSAGE (OUTPATIENT)
Dept: RHEUMATOLOGY | Facility: CLINIC | Age: 51
End: 2021-08-26

## 2021-08-27 ENCOUNTER — IMMUNIZATION (OUTPATIENT)
Dept: INTERNAL MEDICINE | Facility: CLINIC | Age: 51
End: 2021-08-27
Payer: COMMERCIAL

## 2021-08-27 DIAGNOSIS — Z23 NEED FOR VACCINATION: Primary | ICD-10-CM

## 2021-08-27 PROCEDURE — 0003A COVID-19, MRNA, LNP-S, PF, 30 MCG/0.3 ML DOSE VACCINE: CPT | Mod: CV19,,, | Performed by: INTERNAL MEDICINE

## 2021-08-27 PROCEDURE — 0003A COVID-19, MRNA, LNP-S, PF, 30 MCG/0.3 ML DOSE VACCINE: ICD-10-PCS | Mod: CV19,,, | Performed by: INTERNAL MEDICINE

## 2021-08-27 PROCEDURE — 91300 COVID-19, MRNA, LNP-S, PF, 30 MCG/0.3 ML DOSE VACCINE: CPT | Mod: ,,, | Performed by: INTERNAL MEDICINE

## 2021-08-27 PROCEDURE — 91300 COVID-19, MRNA, LNP-S, PF, 30 MCG/0.3 ML DOSE VACCINE: ICD-10-PCS | Mod: ,,, | Performed by: INTERNAL MEDICINE

## 2021-09-07 ENCOUNTER — TELEPHONE (OUTPATIENT)
Dept: SLEEP MEDICINE | Facility: OTHER | Age: 51
End: 2021-09-07

## 2021-09-10 ENCOUNTER — SPECIALTY PHARMACY (OUTPATIENT)
Dept: PHARMACY | Facility: CLINIC | Age: 51
End: 2021-09-10

## 2021-09-20 ENCOUNTER — TELEPHONE (OUTPATIENT)
Dept: SLEEP MEDICINE | Facility: OTHER | Age: 51
End: 2021-09-20

## 2021-09-20 ENCOUNTER — TELEPHONE (OUTPATIENT)
Dept: INTERNAL MEDICINE | Facility: CLINIC | Age: 51
End: 2021-09-20

## 2021-09-21 ENCOUNTER — TELEPHONE (OUTPATIENT)
Dept: SLEEP MEDICINE | Facility: OTHER | Age: 51
End: 2021-09-21

## 2021-09-23 ENCOUNTER — TELEPHONE (OUTPATIENT)
Dept: SLEEP MEDICINE | Facility: OTHER | Age: 51
End: 2021-09-23

## 2021-09-27 ENCOUNTER — TELEPHONE (OUTPATIENT)
Dept: SLEEP MEDICINE | Facility: OTHER | Age: 51
End: 2021-09-27

## 2021-09-28 ENCOUNTER — HOSPITAL ENCOUNTER (OUTPATIENT)
Dept: SLEEP MEDICINE | Facility: OTHER | Age: 51
Discharge: HOME | End: 2021-09-28
Attending: INTERNAL MEDICINE
Payer: COMMERCIAL

## 2021-09-28 DIAGNOSIS — G47.30 SLEEP APNEA, UNSPECIFIED TYPE: ICD-10-CM

## 2021-09-28 DIAGNOSIS — R06.81 APNEA: ICD-10-CM

## 2021-09-28 DIAGNOSIS — E78.5 HYPERLIPIDEMIA, UNSPECIFIED HYPERLIPIDEMIA TYPE: ICD-10-CM

## 2021-09-28 DIAGNOSIS — Z86.73 HX-TIA (TRANSIENT ISCHEMIC ATTACK): ICD-10-CM

## 2021-09-28 DIAGNOSIS — G47.33 OSA (OBSTRUCTIVE SLEEP APNEA): Primary | ICD-10-CM

## 2021-09-28 DIAGNOSIS — R06.83 SNORING: ICD-10-CM

## 2021-09-28 DIAGNOSIS — M19.90 ARTHRITIS: ICD-10-CM

## 2021-09-28 PROCEDURE — 95800 PR SLEEP STUDY, UNATTENDED, RECORD HEART RATE/O2 SAT/RESP ANAL/SLEEP TIME: ICD-10-PCS | Mod: 26,52,, | Performed by: INTERNAL MEDICINE

## 2021-09-28 PROCEDURE — 95800 SLP STDY UNATTENDED: CPT | Mod: 26,52,, | Performed by: INTERNAL MEDICINE

## 2021-09-28 PROCEDURE — 95800 SLP STDY UNATTENDED: CPT

## 2021-10-08 ENCOUNTER — PATIENT MESSAGE (OUTPATIENT)
Dept: PHARMACY | Facility: CLINIC | Age: 51
End: 2021-10-08

## 2021-10-08 ENCOUNTER — PATIENT MESSAGE (OUTPATIENT)
Dept: SLEEP MEDICINE | Facility: CLINIC | Age: 51
End: 2021-10-08

## 2021-10-11 ENCOUNTER — OFFICE VISIT (OUTPATIENT)
Dept: RHEUMATOLOGY | Facility: CLINIC | Age: 51
End: 2021-10-11
Payer: COMMERCIAL

## 2021-10-11 VITALS
BODY MASS INDEX: 23.22 KG/M2 | SYSTOLIC BLOOD PRESSURE: 120 MMHG | DIASTOLIC BLOOD PRESSURE: 64 MMHG | HEIGHT: 64 IN | HEART RATE: 67 BPM | WEIGHT: 136 LBS

## 2021-10-11 DIAGNOSIS — L40.50 PSORIATIC ARTHRITIS: ICD-10-CM

## 2021-10-11 DIAGNOSIS — L40.50 PSA (PSORIATIC ARTHRITIS): Primary | ICD-10-CM

## 2021-10-11 DIAGNOSIS — L93.2 DRUG-INDUCED LUPUS ERYTHEMATOSUS: ICD-10-CM

## 2021-10-11 DIAGNOSIS — T50.905A DRUG-INDUCED LUPUS ERYTHEMATOSUS: ICD-10-CM

## 2021-10-11 DIAGNOSIS — Z51.81 MEDICATION MONITORING ENCOUNTER: ICD-10-CM

## 2021-10-11 DIAGNOSIS — Z79.899 LONG-TERM USE OF PLAQUENIL: ICD-10-CM

## 2021-10-11 PROCEDURE — 99214 OFFICE O/P EST MOD 30 MIN: CPT | Mod: S$GLB,,, | Performed by: STUDENT IN AN ORGANIZED HEALTH CARE EDUCATION/TRAINING PROGRAM

## 2021-10-11 PROCEDURE — 99214 PR OFFICE/OUTPT VISIT, EST, LEVL IV, 30-39 MIN: ICD-10-PCS | Mod: S$GLB,,, | Performed by: STUDENT IN AN ORGANIZED HEALTH CARE EDUCATION/TRAINING PROGRAM

## 2021-10-11 PROCEDURE — 99999 PR PBB SHADOW E&M-EST. PATIENT-LVL III: CPT | Mod: PBBFAC,,, | Performed by: STUDENT IN AN ORGANIZED HEALTH CARE EDUCATION/TRAINING PROGRAM

## 2021-10-11 PROCEDURE — 99999 PR PBB SHADOW E&M-EST. PATIENT-LVL III: ICD-10-PCS | Mod: PBBFAC,,, | Performed by: STUDENT IN AN ORGANIZED HEALTH CARE EDUCATION/TRAINING PROGRAM

## 2021-10-11 RX ORDER — APREMILAST 30 MG/1
30 TABLET, FILM COATED ORAL 2 TIMES DAILY
Qty: 60 TABLET | Refills: 2 | Status: SHIPPED | OUTPATIENT
Start: 2021-10-11 | End: 2022-03-23 | Stop reason: SDUPTHER

## 2021-10-11 RX ORDER — APREMILAST 10-20-30MG
1 KIT ORAL 2 TIMES DAILY
Qty: 55 TABLET | Refills: 0 | Status: SHIPPED | OUTPATIENT
Start: 2021-10-11 | End: 2022-03-24 | Stop reason: SDUPTHER

## 2021-10-11 ASSESSMENT — ROUTINE ASSESSMENT OF PATIENT INDEX DATA (RAPID3)
PSYCHOLOGICAL DISTRESS SCORE: 3.3
FATIGUE SCORE: 3
AM STIFFNESS SCORE: 1, YES
PAIN SCORE: 5.5
MDHAQ FUNCTION SCORE: 0.5
TOTAL RAPID3 SCORE: 3.89
PATIENT GLOBAL ASSESSMENT SCORE: 4.5
WHEN YOU AWAKENED IN THE MORNING OVER THE LAST WEEK, PLEASE INDICATE THE AMOUNT OF TIME IT TAKES UNTIL YOU ARE AS LIMBER AS YOU WILL BE FOR THE DAY: 20

## 2021-10-12 ENCOUNTER — TELEPHONE (OUTPATIENT)
Dept: RHEUMATOLOGY | Facility: CLINIC | Age: 51
End: 2021-10-12

## 2021-10-12 ENCOUNTER — LAB VISIT (OUTPATIENT)
Dept: LAB | Facility: OTHER | Age: 51
End: 2021-10-12
Attending: STUDENT IN AN ORGANIZED HEALTH CARE EDUCATION/TRAINING PROGRAM
Payer: COMMERCIAL

## 2021-10-12 ENCOUNTER — SPECIALTY PHARMACY (OUTPATIENT)
Dept: PHARMACY | Facility: CLINIC | Age: 51
End: 2021-10-12

## 2021-10-12 DIAGNOSIS — L40.50 PSA (PSORIATIC ARTHRITIS): ICD-10-CM

## 2021-10-12 DIAGNOSIS — Z12.11 SPECIAL SCREENING FOR MALIGNANT NEOPLASMS, COLON: ICD-10-CM

## 2021-10-12 DIAGNOSIS — D64.9 ANEMIA, UNSPECIFIED TYPE: Primary | ICD-10-CM

## 2021-10-12 PROCEDURE — 36415 COLL VENOUS BLD VENIPUNCTURE: CPT | Performed by: STUDENT IN AN ORGANIZED HEALTH CARE EDUCATION/TRAINING PROGRAM

## 2021-10-12 PROCEDURE — 86480 TB TEST CELL IMMUN MEASURE: CPT | Performed by: STUDENT IN AN ORGANIZED HEALTH CARE EDUCATION/TRAINING PROGRAM

## 2021-10-18 ENCOUNTER — SPECIALTY PHARMACY (OUTPATIENT)
Dept: PHARMACY | Facility: CLINIC | Age: 51
End: 2021-10-18

## 2021-10-22 ENCOUNTER — PATIENT MESSAGE (OUTPATIENT)
Dept: SLEEP MEDICINE | Facility: CLINIC | Age: 51
End: 2021-10-22
Payer: COMMERCIAL

## 2021-11-09 ENCOUNTER — SPECIALTY PHARMACY (OUTPATIENT)
Dept: PHARMACY | Facility: CLINIC | Age: 51
End: 2021-11-09
Payer: COMMERCIAL

## 2021-11-22 ENCOUNTER — PATIENT MESSAGE (OUTPATIENT)
Dept: RHEUMATOLOGY | Facility: CLINIC | Age: 51
End: 2021-11-22
Payer: COMMERCIAL

## 2021-11-23 ENCOUNTER — SPECIALTY PHARMACY (OUTPATIENT)
Dept: PHARMACY | Facility: CLINIC | Age: 51
End: 2021-11-23
Payer: COMMERCIAL

## 2021-12-08 ENCOUNTER — PATIENT MESSAGE (OUTPATIENT)
Dept: PHARMACY | Facility: CLINIC | Age: 51
End: 2021-12-08
Payer: COMMERCIAL

## 2021-12-08 ENCOUNTER — SPECIALTY PHARMACY (OUTPATIENT)
Dept: PHARMACY | Facility: CLINIC | Age: 51
End: 2021-12-08
Payer: COMMERCIAL

## 2021-12-18 ENCOUNTER — PATIENT MESSAGE (OUTPATIENT)
Dept: RHEUMATOLOGY | Facility: CLINIC | Age: 51
End: 2021-12-18

## 2021-12-28 RX ORDER — LEFLUNOMIDE 20 MG/1
20 TABLET ORAL DAILY
Qty: 90 TABLET | Refills: 0 | Status: SHIPPED | OUTPATIENT
Start: 2021-12-28 | End: 2022-03-02 | Stop reason: SDUPTHER

## 2022-01-03 ENCOUNTER — SPECIALTY PHARMACY (OUTPATIENT)
Dept: PHARMACY | Facility: CLINIC | Age: 52
End: 2022-01-03
Payer: COMMERCIAL

## 2022-01-03 NOTE — TELEPHONE ENCOUNTER
Specialty Pharmacy - Refill Coordination    Specialty Medication Orders Linked to Encounter    Flowsheet Row Most Recent Value   Medication #1 guselkumab (TREMFYA) 100 mg/mL AtIn (Order#[id number], Rx#[id number])          Refill Questions - Documented Responses    Flowsheet Row Most Recent Value   Patient Availability and HIPAA Verification    Does patient want to proceed with activity? Yes   HIPAA/medical authority confirmed? Yes   Relationship to patient of person spoken to? Self   Refill Screening Questions    Changes to allergies? No   Changes to medications? No   New conditions since last clinic visit? No   Unplanned office visit, urgent care, ED, or hospital admission in the last 4 weeks? No   How does patient/caregiver feel medication is working? Good   Financial problems or insurance changes? No   How many doses of your specialty medications were missed in the last 4 weeks? 0   Would patient like to speak to a pharmacist? No   When does the patient need to receive the medication? 01/08/22   Refill Delivery Questions    How will the patient receive the medication? Pickup   When does the patient need to receive the medication? 01/08/22   Shipping Address Home   Address in [facility] confirmed and updated if neccessary? Yes   Expected Copay ($) 5   Is the patient able to afford the medication copay? Yes   Payment Method at pickup   Days supply of Refill 30   Supplies needed? No supplies needed   Refill activity completed? Yes   Refill activity plan Refill scheduled   Shipment/Pickup Date: 01/06/22          Current Outpatient Medications   Medication Sig    apremilast (OTEZLA) 30 mg Tab Take 1 tablet (30 mg total) by mouth 2 (two) times daily.    aspirin (ECOTRIN) 81 MG EC tablet Take 81 mg by mouth once daily.      atorvastatin (LIPITOR) 10 MG tablet Take 1 tablet (10 mg total) by mouth every evening.    citric acid-potassium citrate (POLYCITRA) 1,100-334 mg/5 mL solution Take by mouth 2 (two) times  a day.    FLUoxetine 20 MG capsule Take 40 mg by mouth once daily.     guselkumab (TREMFYA) 100 mg/mL AtIn Inject 1 injector (100 mg) into the skin at weeks 0, 4, and then every 8 weeks thereafter    hydrOXYchloroQUINE (PLAQUENIL) 200 mg tablet Take 1.5 tablets (300 mg total) by mouth once daily.    leflunomide (ARAVA) 20 MG Tab Take 1 tablet (20 mg total) by mouth once daily.    LORazepam (ATIVAN) 0.5 MG tablet Take 0.5 mg by mouth every 6 (six) hours as needed for Anxiety.    mirtazapine (REMERON) 30 MG tablet Take 1 tablet (30 mg) by mouth daily at bedtime    OTEZLA STARTER 10 mg (4)-20 mg (4)-30 mg (47) DsPk Take by mouth as directed on Package.    potassium citrate (UROCIT-K 15) 15 mEq TbSR TK 1 T PO BID    tamsulosin (FLOMAX) 0.4 mg Cap Take 0.4 mg by mouth once daily.   Last reviewed on 10/11/2021 11:34 AM by Liz Luevano MA    Review of patient's allergies indicates:   Allergen Reactions    Erythromycin Nausea And Vomiting    Infliximab Other (See Comments)     Lupus with fever and acute arthritis  Lupus with fever and acute arthritis    Last reviewed on  10/11/2021 11:33 AM by Liz Luevano      Tasks added this encounter   1/31/2022 - Refill Call (Auto Added)  1/7/2022 - Pickup Reminder   Tasks due within next 3 months   1/9/2022 - Clinical - Follow Up Assesement (90 day)  1/10/2022 - Refill Call (Auto Added)     Liliane Black  [facility] Specialty Pharm[facility]  [address] [place] [address]  Phone: [phone]  Fax: [phone]

## 2022-01-06 ENCOUNTER — LAB VISIT (OUTPATIENT)
Dept: LAB | Facility: OTHER | Age: 52
End: 2022-01-06
Attending: INTERNAL MEDICINE
Payer: COMMERCIAL

## 2022-01-06 DIAGNOSIS — L40.50 PSA (PSORIATIC ARTHRITIS): ICD-10-CM

## 2022-01-06 LAB
ALBUMIN SERPL BCP-MCNC: 3.8 G/DL (ref 3.5–5.2)
ALP SERPL-CCNC: 90 U/L (ref 55–135)
ALT SERPL W/O P-5'-P-CCNC: 15 U/L (ref 10–44)
ANION GAP SERPL CALC-SCNC: 10 MMOL/L (ref 8–16)
AST SERPL-CCNC: 21 U/L (ref 10–40)
BASOPHILS # BLD AUTO: 0.04 K/UL (ref 0–0.2)
BASOPHILS NFR BLD: 0.5 % (ref 0–1.9)
BILIRUB SERPL-MCNC: 0.4 MG/DL (ref 0.1–1)
BUN SERPL-MCNC: 12 MG/DL (ref 6–20)
C3 SERPL-MCNC: 105 MG/DL (ref 50–180)
C4 SERPL-MCNC: 29 MG/DL (ref 11–44)
CALCIUM SERPL-MCNC: 9.2 MG/DL (ref 8.7–10.5)
CHLORIDE SERPL-SCNC: 106 MMOL/L (ref 95–110)
CO2 SERPL-SCNC: 24 MMOL/L (ref 23–29)
CREAT SERPL-MCNC: 1 MG/DL (ref 0.5–1.4)
CRP SERPL-MCNC: 1.2 MG/L (ref 0–8.2)
DIFFERENTIAL METHOD: ABNORMAL
EOSINOPHIL # BLD AUTO: 0.1 K/UL (ref 0–0.5)
EOSINOPHIL NFR BLD: 1.7 % (ref 0–8)
ERYTHROCYTE [DISTWIDTH] IN BLOOD BY AUTOMATED COUNT: 13.9 % (ref 11.5–14.5)
ERYTHROCYTE [SEDIMENTATION RATE] IN BLOOD: 35 MM/HR (ref 0–10)
EST. GFR  (AFRICAN AMERICAN): >60 ML/MIN/1.73 M^2
EST. GFR  (NON AFRICAN AMERICAN): >60 ML/MIN/1.73 M^2
GLUCOSE SERPL-MCNC: 93 MG/DL (ref 70–110)
HCT VFR BLD AUTO: 40.6 % (ref 40–54)
HGB BLD-MCNC: 13.1 G/DL (ref 14–18)
IMM GRANULOCYTES # BLD AUTO: 0.03 K/UL (ref 0–0.04)
IMM GRANULOCYTES NFR BLD AUTO: 0.4 % (ref 0–0.5)
LYMPHOCYTES # BLD AUTO: 1.5 K/UL (ref 1–4.8)
LYMPHOCYTES NFR BLD: 18.4 % (ref 18–48)
MCH RBC QN AUTO: 28.2 PG (ref 27–31)
MCHC RBC AUTO-ENTMCNC: 32.3 G/DL (ref 32–36)
MCV RBC AUTO: 87 FL (ref 82–98)
MONOCYTES # BLD AUTO: 0.7 K/UL (ref 0.3–1)
MONOCYTES NFR BLD: 8.2 % (ref 4–15)
NEUTROPHILS # BLD AUTO: 5.8 K/UL (ref 1.8–7.7)
NEUTROPHILS NFR BLD: 70.8 % (ref 38–73)
NRBC BLD-RTO: 0 /100 WBC
PLATELET # BLD AUTO: 269 K/UL (ref 150–450)
PMV BLD AUTO: 9.6 FL (ref 9.2–12.9)
POTASSIUM SERPL-SCNC: 4.3 MMOL/L (ref 3.5–5.1)
PROT SERPL-MCNC: 6.6 G/DL (ref 6–8.4)
RBC # BLD AUTO: 4.65 M/UL (ref 4.6–6.2)
SODIUM SERPL-SCNC: 140 MMOL/L (ref 136–145)
WBC # BLD AUTO: 8.21 K/UL (ref 3.9–12.7)

## 2022-01-06 PROCEDURE — 85651 RBC SED RATE NONAUTOMATED: CPT | Performed by: INTERNAL MEDICINE

## 2022-01-06 PROCEDURE — 86160 COMPLEMENT ANTIGEN: CPT | Mod: 59 | Performed by: INTERNAL MEDICINE

## 2022-01-06 PROCEDURE — 36415 COLL VENOUS BLD VENIPUNCTURE: CPT | Performed by: INTERNAL MEDICINE

## 2022-01-06 PROCEDURE — 80053 COMPREHEN METABOLIC PANEL: CPT | Performed by: INTERNAL MEDICINE

## 2022-01-06 PROCEDURE — 86140 C-REACTIVE PROTEIN: CPT | Performed by: INTERNAL MEDICINE

## 2022-01-06 PROCEDURE — 86160 COMPLEMENT ANTIGEN: CPT | Performed by: INTERNAL MEDICINE

## 2022-01-06 PROCEDURE — 86225 DNA ANTIBODY NATIVE: CPT | Performed by: INTERNAL MEDICINE

## 2022-01-06 PROCEDURE — 85025 COMPLETE CBC W/AUTO DIFF WBC: CPT | Performed by: INTERNAL MEDICINE

## 2022-01-07 LAB — DSDNA AB SER-ACNC: NORMAL [IU]/ML

## 2022-01-10 ENCOUNTER — OFFICE VISIT (OUTPATIENT)
Dept: RHEUMATOLOGY | Facility: CLINIC | Age: 52
End: 2022-01-10
Payer: COMMERCIAL

## 2022-01-10 VITALS
SYSTOLIC BLOOD PRESSURE: 98 MMHG | HEART RATE: 66 BPM | HEIGHT: 64 IN | BODY MASS INDEX: 24.07 KG/M2 | DIASTOLIC BLOOD PRESSURE: 64 MMHG | WEIGHT: 141 LBS

## 2022-01-10 DIAGNOSIS — T50.905A DRUG-INDUCED LUPUS ERYTHEMATOSUS: ICD-10-CM

## 2022-01-10 DIAGNOSIS — L93.2 DRUG-INDUCED LUPUS ERYTHEMATOSUS: ICD-10-CM

## 2022-01-10 DIAGNOSIS — Z79.899 LONG-TERM USE OF PLAQUENIL: ICD-10-CM

## 2022-01-10 DIAGNOSIS — E78.5 HYPERLIPIDEMIA, UNSPECIFIED HYPERLIPIDEMIA TYPE: ICD-10-CM

## 2022-01-10 DIAGNOSIS — Z51.81 MEDICATION MONITORING ENCOUNTER: ICD-10-CM

## 2022-01-10 DIAGNOSIS — L40.50 PSORIATIC ARTHRITIS: Primary | ICD-10-CM

## 2022-01-10 DIAGNOSIS — N20.0 RECURRENT NEPHROLITHIASIS: ICD-10-CM

## 2022-01-10 PROCEDURE — 99999 PR PBB SHADOW E&M-EST. PATIENT-LVL III: ICD-10-PCS | Mod: PBBFAC,,, | Performed by: STUDENT IN AN ORGANIZED HEALTH CARE EDUCATION/TRAINING PROGRAM

## 2022-01-10 PROCEDURE — 99999 PR PBB SHADOW E&M-EST. PATIENT-LVL III: CPT | Mod: PBBFAC,,, | Performed by: STUDENT IN AN ORGANIZED HEALTH CARE EDUCATION/TRAINING PROGRAM

## 2022-01-10 PROCEDURE — 99214 PR OFFICE/OUTPT VISIT, EST, LEVL IV, 30-39 MIN: ICD-10-PCS | Mod: S$GLB,,, | Performed by: STUDENT IN AN ORGANIZED HEALTH CARE EDUCATION/TRAINING PROGRAM

## 2022-01-10 PROCEDURE — 99214 OFFICE O/P EST MOD 30 MIN: CPT | Mod: S$GLB,,, | Performed by: STUDENT IN AN ORGANIZED HEALTH CARE EDUCATION/TRAINING PROGRAM

## 2022-01-10 NOTE — PROGRESS NOTES
Pre chart    Answers for HPI/ROS submitted by the patient on 1/10/2022  fever: No  eye redness: No  mouth sores: No  headaches: No  shortness of breath: No  chest pain: No  trouble swallowing: No  diarrhea: No  constipation: No  unexpected weight change: No  genital sore: No  During the last 3 days, have you had a skin rash?: No  Bruises or bleeds easily: No  cough: No

## 2022-01-10 NOTE — PROGRESS NOTES
Subjective:       Patient ID: Rebel Rodriguez is a 51 y.o. male.    Chief Complaint: Psoriatic Arthritis  HPI     This is a 48YM with psoriatic arthritis and infliximab induced lupus, history of TIA (cannot take NSAIDs) presenting for follow up. Drug induced Lupus: Related to infliximab (last dose 2/29/16; + antiphosholipids-anticardiolipin IgM mild +dsDNA: 1:1280,  DEE+ 1:1280 homogenous ,  + anti histone ab, CH50 low at 51 along with recurrent intermittent fevers, chills, arthralgias. Pt is currently on plaquenil 300 mg daily (weight based), Leflunomide 20 mg daily, Taltz  80 mg SQ monthly. Pt self discontinued SSZ 1000 BID ~Dec 2020.      Interval History:    1/10/22: He has swelling in his 1st and 2nd MCP joint. He does have pain in his back and neck. He has difficult opening jars. He reports diffuse pain for the past 2 months. Stiffness in the back, neck, shoulders, and hand last all day He got COVID about 3 weeks ago and held his medications for 2 weeks. He recently started back on his medications 1 week ago. No new rashes or episodes of psoriasis. Pt denies fatigue, oral/nasal/genital ulcers, headaches, fever, chills, n/v, abd pain, CP, SOB, dysphagia, changes in bowel/bladder habits, vision changes,photosensitivity, or erythema/rashes.    10/11/21: Last appt, given not well controlled PsA, he was switched to tremfya. However, he feels that he going backwards has his hands have had more swelling/pain and pain in his feet. Pain in his hand is worse with opening jars due to pain. No new rashes or episodes of psoriasis.    Current regimen: (as of 1/10/22)  Tremfya   Otezla  Arava  Plaquenil    Medication History:  - On infliximab until 2018 when developed recurrent intermittent fevers, chills, arthralgias (labs showed+ antiphosholipids-anticardiolipin IgM mild +dsDNA: 1:1280,  DEE+ 1:1280 homogenous ,  + anti histone ab, CH50 low at 51). Labs were consistent with drug induced lupus, infliximab d/c'd and symptoms  "resolved.  - Aug 2020: switched from Cosentyx to Taltz to lack of response  - June 2021: switched from Taltz to Tremfya (current) to lack of improvement of symptoms  - Can not take TNFi due to drug induced lupus. Can not do [place] due to history of TIA. Tried stelara which did not work, tried costylx and taltz which did not work. Now on tremfya which is currently not working. Cant take NSAIDs due to TIA history.    Review of Systems   Constitutional: Negative for chills and fever.   HENT: Negative for congestion and trouble swallowing.    Eyes: Negative for pain and visual disturbance.   Respiratory: Negative for cough and shortness of breath.    Cardiovascular: Negative for chest pain and palpitations.   Gastrointestinal: Negative for abdominal pain, constipation, diarrhea, nausea and vomiting.   Genitourinary: Negative for dysuria and flank pain.   Musculoskeletal: Positive for arthralgias and joint swelling. Negative for myalgias.   Neurological: Negative for weakness, numbness and headaches.   Psychiatric/Behavioral: The patient is not nervous/anxious.          Objective:   BP 98/64   Pulse 66   Ht 5' 3.6" (1.615 m)   Wt 64 kg (141 lb)   BMI 24.51 kg/m²      Physical Exam   Constitutional: He is oriented to person, place, and time. No distress.   HENT:   Head: Normocephalic and atraumatic.   Right Ear: External ear normal.   Left Ear: External ear normal.   Eyes: Conjunctivae are normal. Right eye exhibits no discharge. Left eye exhibits no discharge. No scleral icterus.   Cardiovascular: Normal rate, regular rhythm and normal heart sounds.   No murmur heard.  Pulmonary/Chest: Effort normal and breath sounds normal. No respiratory distress. He has no wheezes. He has no rales. He exhibits no tenderness.   Abdominal: Soft. Bowel sounds are normal. He exhibits no distension. There is no abdominal tenderness.   Musculoskeletal:         General: No tenderness or deformity. Normal range of motion.      Comments: " Synovitis R 1st and 2nd MCP. Tenderness to palpation of medial malleolus of ankles bilaterally.  No dactylitis or enthesitis.   Neurological: He is alert and oriented to person, place, and time.   Skin: Skin is warm and dry. No rash noted. He is not diaphoretic. No erythema.   Psychiatric: Mood and affect normal.   Vitals reviewed.          1/26/2021 5/25/2021 10/11/2021 1/10/2022   Tender (BRANTLEY-28) 0 / 28 4 / 28 6 / 28 2 / 28    Swollen (BRANTLEY-28) 12 / 28 5 / 28 6 / 28 2 / 28    Provider Global 4 mm -- 45 mm 60 mm   Patient Global 40 mm 65 mm 45 mm 60 mm   ESR 10 mm/hr 9 mm/hr 11 mm/hr 35 mm/hr   CRP 0.8 mg/L 0.4 mg/L 0.8 mg/L 1.2 mg/L   BRANTLEY-28 (ESR) 3.14 (Low disease activity) 4.19 (Moderate disease activity) 4.37 (Moderate disease activity) 4.52 (Moderate disease activity)   BRANTLEY-28 (CRP) 2.7 (Low disease activity) 3.74 (Moderate disease activity) 3.86 (Moderate disease activity) 3.27 (Moderate disease activity)   CDAI Score 12  -- 21  16         Assessment:       1. Psoriatic arthritis    2. Medication monitoring encounter    3. Long-term use of Plaquenil    4. Hyperlipidemia, unspecified hyperlipidemia type    5. Recurrent nephrolithiasis    6. 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            Plan:       Problem List Items Addressed This Visit        Cardiac/Vascular    Hyperlipidemia       Renal/    Recurrent nephrolithiasis       Immunology/Multi System    Drug-induced lupus erythematosus       Orthopedic    Psoriatic arthritis - Primary       Other    Medication monitoring encounter      Other Visit Diagnoses     Long-term use of Plaquenil            - continue Otezla, Tremfya, Arava, and plaquenil as flare may be due to holding medications due to recent COVID infection  - if symptoms do not improve on current regimen, next step is switching tremfya to orencia; if needed to go back to Cosentyx, would consider ordering fecal calprotectin prior to restarting given family history of IBD      Return in 2  months to clinic with labs prior    Patient seen and evaluated with Dr. Beverly

## 2022-01-11 ENCOUNTER — SPECIALTY PHARMACY (OUTPATIENT)
Dept: PHARMACY | Facility: CLINIC | Age: 52
End: 2022-01-11
Payer: COMMERCIAL

## 2022-01-11 NOTE — TELEPHONE ENCOUNTER
Outgoing call regarding OTEzla refill. Pt said MD told him to stop taking it for 2 weeks because he had COVID at the time. He had COVID dec 17th. But is now back on his medication and would like us to call him back to set up refill. Routing to assigned [facility] Anjel. Will follow up with pt to set refill for OTEZLA.

## 2022-01-26 NOTE — TELEPHONE ENCOUNTER
Specialty Pharmacy - Refill Coordination    Specialty Medication Orders Linked to Encounter    Flowsheet Row Most Recent Value   Medication #1 apremilast (OTEZLA) 30 mg Tab (Order#[id number], Rx#[id number])          Refill Questions - Documented Responses    Flowsheet Row Most Recent Value   Patient Availability and HIPAA Verification    Does patient want to proceed with activity? Yes   HIPAA/medical authority confirmed? Yes   Relationship to patient of person spoken to? Self   Refill Screening Questions    Changes to allergies? No   Changes to medications? Yes   New conditions since last clinic visit? Yes   Unplanned office visit, urgent care, ED, or hospital admission in the last 4 weeks? No   How does patient/caregiver feel medication is working? Good   Financial problems or insurance changes? No   How many doses of your specialty medications were missed in the last 4 weeks? > 5   Why were doses missed? Felt ill or sick   Would patient like to speak to a pharmacist? No   When does the patient need to receive the medication? 01/28/22   Refill Delivery Questions    How will the patient receive the medication? Delivery Michelle   When does the patient need to receive the medication? 01/28/22   Shipping Address Home   Address in [facility] confirmed and updated if neccessary? Yes   Expected Copay ($) 0   Is the patient able to afford the medication copay? Yes   Payment Method zero copay   Days supply of Refill 30   Supplies needed? No supplies needed   Refill activity completed? Yes   Refill activity plan Refill scheduled   Shipment/Pickup Date: 01/28/22          Current Outpatient Medications   Medication Sig    apremilast (OTEZLA) 30 mg Tab Take 1 tablet (30 mg total) by mouth 2 (two) times daily.    aspirin (ECOTRIN) 81 MG EC tablet Take 81 mg by mouth once daily.    atorvastatin (LIPITOR) 10 MG tablet Take 1 tablet (10 mg total) by mouth every evening.    citric acid-potassium citrate (POLYCITRA) 1,100-334  mg/5 mL solution Take by mouth 2 (two) times a day.    FLUoxetine 20 MG capsule Take 40 mg by mouth once daily.     guselkumab (TREMFYA) 100 mg/mL AtIn Inject 1 injector (100 mg) into the skin at weeks 0, 4, and then every 8 weeks thereafter (Patient taking differently: Inject 1 injector (100 mg) into the skin at weeks 0, 4, and then every 8 weeks thereafter)    hydrOXYchloroQUINE (PLAQUENIL) 200 mg tablet Take 1.5 tablets (300 mg total) by mouth once daily.    leflunomide (ARAVA) 20 MG Tab Take 1 tablet (20 mg total) by mouth once daily.    LORazepam (ATIVAN) 0.5 MG tablet Take 0.5 mg by mouth every 6 (six) hours as needed for Anxiety.    mirtazapine (REMERON) 15 MG tablet Take 1 tablet (15 mg) by mouth daily at bedtime    mirtazapine (REMERON) 30 MG tablet Take 1 tablet (30 mg) by mouth daily at bedtime    mirtazapine (REMERON) 7.5 MG Tab Take 1 tablet by mouth daily at bedtime    OTEZLA STARTER 10 mg (4)-20 mg (4)-30 mg (47) DsPk Take by mouth as directed on Package.    potassium citrate (UROCIT-K 15) 15 mEq TbSR TK 1 T PO BID    tamsulosin (FLOMAX) 0.4 mg Cap Take 0.4 mg by mouth once daily.   Last reviewed on 1/10/2022 11:17 AM by Ainsley Russell MD    Review of patient's allergies indicates:   Allergen Reactions    Erythromycin Nausea And Vomiting    Infliximab Other (See Comments)     Lupus with fever and acute arthritis  Lupus with fever and acute arthritis    Last reviewed on  1/10/2022 11:17 AM by Ainsley Russell      Tasks added this encounter   2/20/2022 - Refill Call (Auto Added)   Tasks due within next 3 months   No tasks due.     Winter Muhammad, PharmD  [facility] - Specialty Pharmacy  [address] [place] [id number]  Phone: [phone]  Fax: [phone]

## 2022-02-21 ENCOUNTER — SPECIALTY PHARMACY (OUTPATIENT)
Dept: PHARMACY | Facility: CLINIC | Age: 52
End: 2022-02-21
Payer: COMMERCIAL

## 2022-02-21 NOTE — TELEPHONE ENCOUNTER
Specialty Pharmacy - Refill Coordination    Specialty Medication Orders Linked to Encounter    Flowsheet Row Most Recent Value   Medication #1 guselkumab (TREMFYA) 100 mg/mL AtIn (Order#[id number], Rx#[id number])   Medication #2 apremilast (OTEZLA) 30 mg Tab (Order#[id number], Rx#[id number])          Refill Questions - Documented Responses    Flowsheet Row Most Recent Value   Patient Availability and HIPAA Verification    Does patient want to proceed with activity? Yes   HIPAA/medical authority confirmed? Yes   Relationship to patient of person spoken to? Self   Refill Screening Questions    Changes to allergies? No   Changes to medications? No   New conditions since last clinic visit? No   Unplanned office visit, urgent care, ED, or hospital admission in the last 4 weeks? No   How does patient/caregiver feel medication is working? Good   Financial problems or insurance changes? No   How many doses of your specialty medications were missed in the last 4 weeks? 0   Would patient like to speak to a pharmacist? No   When does the patient need to receive the medication? 02/28/22   Refill Delivery Questions    How will the patient receive the medication? Pickup   When does the patient need to receive the medication? 02/28/22   Shipping Address Home   Address in [facility] confirmed and updated if neccessary? Yes   Expected Copay ($) 0   Is the patient able to afford the medication copay? Yes   Payment Method zero copay   Days supply of Refill 30   Supplies needed? No supplies needed   Refill activity completed? Yes   Refill activity plan Refill scheduled   Shipment/Pickup Date: 02/22/22          Current Outpatient Medications   Medication Sig    apremilast (OTEZLA) 30 mg Tab Take 1 tablet (30 mg total) by mouth 2 (two) times daily.    aspirin (ECOTRIN) 81 MG EC tablet Take 81 mg by mouth once daily.    atorvastatin (LIPITOR) 10 MG tablet Take 1 tablet (10 mg total) by mouth every evening.    citric  acid-potassium citrate (POLYCITRA) 1,100-334 mg/5 mL solution Take by mouth 2 (two) times a day.    FLUoxetine 20 MG capsule Take 40 mg by mouth once daily.     guselkumab (TREMFYA) 100 mg/mL AtIn Inject 1 injector (100 mg) into the skin at weeks 0, 4, and then every 8 weeks thereafter (Patient taking differently: Inject 1 injector (100 mg) into the skin at weeks 0, 4, and then every 8 weeks thereafter)    hydrOXYchloroQUINE (PLAQUENIL) 200 mg tablet Take 1.5 tablets (300 mg total) by mouth once daily.    leflunomide (ARAVA) 20 MG Tab Take 1 tablet (20 mg total) by mouth once daily.    LORazepam (ATIVAN) 0.5 MG tablet Take 0.5 mg by mouth every 6 (six) hours as needed for Anxiety.    mirtazapine (REMERON) 15 MG tablet Take 1 tablet (15 mg) by mouth daily at bedtime    mirtazapine (REMERON) 30 MG tablet Take 1 tablet (30 mg) by mouth daily at bedtime    mirtazapine (REMERON) 7.5 MG Tab Take 1 tablet by mouth daily at bedtime    OTEZLA STARTER 10 mg (4)-20 mg (4)-30 mg (47) DsPk Take by mouth as directed on Package.    potassium citrate (UROCIT-K 15) 15 mEq TbSR TK 1 T PO BID    tamsulosin (FLOMAX) 0.4 mg Cap Take 0.4 mg by mouth once daily.   Last reviewed on 1/10/2022 11:17 AM by Ainsley Russell MD    Review of patient's allergies indicates:   Allergen Reactions    Erythromycin Nausea And Vomiting    Infliximab Other (See Comments)     Lupus with fever and acute arthritis  Lupus with fever and acute arthritis    Last reviewed on  1/10/2022 11:17 AM by Ainsley Russell      Tasks added this encounter   No tasks added.   Tasks due within next 3 months   2/20/2022 - Refill Call (Auto Added)     Anjel Landry [facility]  [address] [place] [id number]  Phone: [phone]  Fax: [phone]

## 2022-02-23 DIAGNOSIS — D84.9 IMMUNOSUPPRESSED STATUS: ICD-10-CM

## 2022-03-03 RX ORDER — LEFLUNOMIDE 20 MG/1
20 TABLET ORAL DAILY
Qty: 90 TABLET | Refills: 0 | Status: SHIPPED | OUTPATIENT
Start: 2022-03-03 | End: 2022-03-09 | Stop reason: SDUPTHER

## 2022-03-10 RX ORDER — LEFLUNOMIDE 20 MG/1
20 TABLET ORAL DAILY
Qty: 90 TABLET | Refills: 0 | Status: SHIPPED | OUTPATIENT
Start: 2022-03-10 | End: 2022-10-20 | Stop reason: SDUPTHER

## 2022-03-23 DIAGNOSIS — L40.50 PSA (PSORIATIC ARTHRITIS): ICD-10-CM

## 2022-03-24 RX ORDER — APREMILAST 30 MG/1
30 TABLET, FILM COATED ORAL 2 TIMES DAILY
Qty: 60 TABLET | Refills: 2 | OUTPATIENT
Start: 2022-03-24 | End: 2022-03-28 | Stop reason: SDUPTHER

## 2022-03-26 ENCOUNTER — PATIENT MESSAGE (OUTPATIENT)
Dept: PHARMACY | Facility: CLINIC | Age: 52
End: 2022-03-26
Payer: COMMERCIAL

## 2022-03-28 ENCOUNTER — SPECIALTY PHARMACY (OUTPATIENT)
Dept: PHARMACY | Facility: CLINIC | Age: 52
End: 2022-03-28
Payer: COMMERCIAL

## 2022-03-28 DIAGNOSIS — L93.2 DRUG-INDUCED LUPUS ERYTHEMATOSUS: ICD-10-CM

## 2022-03-28 DIAGNOSIS — L40.50 PSORIATIC ARTHRITIS: Primary | ICD-10-CM

## 2022-03-28 DIAGNOSIS — L40.50 PSA (PSORIATIC ARTHRITIS): ICD-10-CM

## 2022-03-28 DIAGNOSIS — T50.905A DRUG-INDUCED LUPUS ERYTHEMATOSUS: ICD-10-CM

## 2022-03-28 RX ORDER — HYDROXYCHLOROQUINE SULFATE 200 MG/1
300 TABLET, FILM COATED ORAL DAILY
Qty: 90 TABLET | Refills: 5 | Status: SHIPPED | OUTPATIENT
Start: 2022-03-28 | End: 2022-03-31 | Stop reason: SDUPTHER

## 2022-03-28 RX ORDER — APREMILAST 30 MG/1
30 TABLET, FILM COATED ORAL 2 TIMES DAILY
Qty: 60 TABLET | Refills: 2 | Status: SHIPPED | OUTPATIENT
Start: 2022-03-28 | End: 2022-08-08 | Stop reason: SDUPTHER

## 2022-03-28 RX ORDER — HYDROXYCHLOROQUINE SULFATE 200 MG/1
300 TABLET, FILM COATED ORAL DAILY
Qty: 90 TABLET | Refills: 5 | OUTPATIENT
Start: 2022-03-28

## 2022-03-28 NOTE — TELEPHONE ENCOUNTER
Specialty Pharmacy - Refill Coordination    Specialty Medication Orders Linked to Encounter    Flowsheet Row Most Recent Value   Medication #1 guselkumab (TREMFYA) 100 mg/mL AtIn (Order#[id number], Rx#[id number])          Refill Questions - Documented Responses    Flowsheet Row Most Recent Value   Patient Availability and HIPAA Verification    Does patient want to proceed with activity? Yes   HIPAA/medical authority confirmed? Yes   Relationship to patient of person spoken to? Self   Refill Screening Questions    Changes to allergies? No   Changes to medications? No   New conditions since last clinic visit? No   Unplanned office visit, urgent care, ED, or hospital admission in the last 4 weeks? No   How does patient/caregiver feel medication is working? Good   Financial problems or insurance changes? No   How many doses of your specialty medications were missed in the last 4 weeks? 0   Would patient like to speak to a pharmacist? No   When does the patient need to receive the medication? 03/29/22   Refill Delivery Questions    How will the patient receive the medication? Pickup   When does the patient need to receive the medication? 03/29/22   Address in [facility] confirmed and updated if neccessary? No   Expected Copay ($) 5   Is the patient able to afford the medication copay? Yes   Payment Method at pickup   Days supply of Refill 30   Supplies needed? No supplies needed   Refill activity completed? Yes   Refill activity plan Refill scheduled   Shipment/Pickup Date: 03/29/22          Current Outpatient Medications   Medication Sig    apremilast (OTEZLA) 30 mg Tab Take 1 tablet (30 mg total) by mouth 2 (two) times daily.    aspirin (ECOTRIN) 81 MG EC tablet Take 81 mg by mouth once daily.    atorvastatin (LIPITOR) 10 MG tablet Take 1 tablet (10 mg total) by mouth every evening.    citric acid-potassium citrate (POLYCITRA) 1,100-334 mg/5 mL solution Take by mouth 2 (two) times a day.    FLUoxetine 20 MG  capsule Take 40 mg by mouth once daily.     guselkumab (TREMFYA) 100 mg/mL AtIn Inject 1 injector (100 mg) into the skin at weeks 0, 4, and then every 8 weeks thereafter (Patient taking differently: Inject 1 injector (100 mg) into the skin at weeks 0, 4, and then every 8 weeks thereafter)    hydrOXYchloroQUINE (PLAQUENIL) 200 mg tablet Take 1.5 tablets (300 mg total) by mouth once daily.    leflunomide (ARAVA) 20 MG Tab Take 1 tablet (20 mg total) by mouth once daily.    LORazepam (ATIVAN) 0.5 MG tablet Take 0.5 mg by mouth every 6 (six) hours as needed for Anxiety.    mirtazapine (REMERON) 15 MG tablet Take 1 tablet (15 mg) by mouth daily at bedtime    mirtazapine (REMERON) 30 MG tablet Take 1 tablet (30 mg) by mouth daily at bedtime    mirtazapine (REMERON) 7.5 MG Tab Take 1 tablet by mouth daily at bedtime    potassium citrate (UROCIT-K 15) 15 mEq TbSR TK 1 T PO BID    tamsulosin (FLOMAX) 0.4 mg Cap Take 0.4 mg by mouth once daily.   Last reviewed on 1/10/2022 11:17 AM by Ainsley Russell MD    Review of patient's allergies indicates:   Allergen Reactions    Erythromycin Nausea And Vomiting    Infliximab Other (See Comments)     Lupus with fever and acute arthritis  Lupus with fever and acute arthritis    Last reviewed on  1/10/2022 11:17 AM by Ainsley Russell      Tasks added this encounter   4/21/2022 - Refill Call (Auto Added)  3/30/2022 - Pickup Reminder   Tasks due within next 3 months   No tasks due.     Christiane Llanos, PharmD  [facility]  [facility] [facility] [address]  Phone: [phone]  Fax: [phone]

## 2022-03-30 ENCOUNTER — PATIENT MESSAGE (OUTPATIENT)
Dept: RHEUMATOLOGY | Facility: CLINIC | Age: 52
End: 2022-03-30
Payer: COMMERCIAL

## 2022-03-31 DIAGNOSIS — L93.2 DRUG-INDUCED LUPUS ERYTHEMATOSUS: ICD-10-CM

## 2022-03-31 DIAGNOSIS — T50.905A DRUG-INDUCED LUPUS ERYTHEMATOSUS: ICD-10-CM

## 2022-03-31 RX ORDER — HYDROXYCHLOROQUINE SULFATE 200 MG/1
300 TABLET, FILM COATED ORAL DAILY
Qty: 90 TABLET | Refills: 5 | Status: SHIPPED | OUTPATIENT
Start: 2022-03-31 | End: 2022-04-11

## 2022-04-06 ENCOUNTER — LAB VISIT (OUTPATIENT)
Dept: LAB | Facility: OTHER | Age: 52
End: 2022-04-06
Attending: INTERNAL MEDICINE
Payer: COMMERCIAL

## 2022-04-06 DIAGNOSIS — L40.50 PSA (PSORIATIC ARTHRITIS): ICD-10-CM

## 2022-04-06 LAB
ALBUMIN SERPL BCP-MCNC: 4 G/DL (ref 3.5–5.2)
ALP SERPL-CCNC: 67 U/L (ref 55–135)
ALT SERPL W/O P-5'-P-CCNC: 30 U/L (ref 10–44)
ANION GAP SERPL CALC-SCNC: 10 MMOL/L (ref 8–16)
AST SERPL-CCNC: 34 U/L (ref 10–40)
BASOPHILS # BLD AUTO: 0.04 K/UL (ref 0–0.2)
BASOPHILS NFR BLD: 0.6 % (ref 0–1.9)
BILIRUB SERPL-MCNC: 0.3 MG/DL (ref 0.1–1)
BUN SERPL-MCNC: 12 MG/DL (ref 6–20)
C3 SERPL-MCNC: 88 MG/DL (ref 50–180)
C4 SERPL-MCNC: 27 MG/DL (ref 11–44)
CALCIUM SERPL-MCNC: 9.7 MG/DL (ref 8.7–10.5)
CHLORIDE SERPL-SCNC: 103 MMOL/L (ref 95–110)
CO2 SERPL-SCNC: 27 MMOL/L (ref 23–29)
CREAT SERPL-MCNC: 1 MG/DL (ref 0.5–1.4)
CRP SERPL-MCNC: 0.3 MG/L (ref 0–8.2)
DIFFERENTIAL METHOD: ABNORMAL
EOSINOPHIL # BLD AUTO: 0.3 K/UL (ref 0–0.5)
EOSINOPHIL NFR BLD: 4.7 % (ref 0–8)
ERYTHROCYTE [DISTWIDTH] IN BLOOD BY AUTOMATED COUNT: 14.6 % (ref 11.5–14.5)
ERYTHROCYTE [SEDIMENTATION RATE] IN BLOOD: 14 MM/HR (ref 0–10)
EST. GFR  (AFRICAN AMERICAN): >60 ML/MIN/1.73 M^2
EST. GFR  (NON AFRICAN AMERICAN): >60 ML/MIN/1.73 M^2
GLUCOSE SERPL-MCNC: 82 MG/DL (ref 70–110)
HCT VFR BLD AUTO: 42.9 % (ref 40–54)
HGB BLD-MCNC: 13.8 G/DL (ref 14–18)
IMM GRANULOCYTES # BLD AUTO: 0.02 K/UL (ref 0–0.04)
IMM GRANULOCYTES NFR BLD AUTO: 0.3 % (ref 0–0.5)
LYMPHOCYTES # BLD AUTO: 1.5 K/UL (ref 1–4.8)
LYMPHOCYTES NFR BLD: 23.4 % (ref 18–48)
MCH RBC QN AUTO: 28.6 PG (ref 27–31)
MCHC RBC AUTO-ENTMCNC: 32.2 G/DL (ref 32–36)
MCV RBC AUTO: 89 FL (ref 82–98)
MONOCYTES # BLD AUTO: 0.8 K/UL (ref 0.3–1)
MONOCYTES NFR BLD: 12.1 % (ref 4–15)
NEUTROPHILS # BLD AUTO: 3.8 K/UL (ref 1.8–7.7)
NEUTROPHILS NFR BLD: 58.9 % (ref 38–73)
NRBC BLD-RTO: 0 /100 WBC
PLATELET # BLD AUTO: 253 K/UL (ref 150–450)
PMV BLD AUTO: 9.5 FL (ref 9.2–12.9)
POTASSIUM SERPL-SCNC: 4.4 MMOL/L (ref 3.5–5.1)
PROT SERPL-MCNC: 7.2 G/DL (ref 6–8.4)
RBC # BLD AUTO: 4.83 M/UL (ref 4.6–6.2)
SODIUM SERPL-SCNC: 140 MMOL/L (ref 136–145)
WBC # BLD AUTO: 6.44 K/UL (ref 3.9–12.7)

## 2022-04-06 PROCEDURE — 86225 DNA ANTIBODY NATIVE: CPT | Performed by: INTERNAL MEDICINE

## 2022-04-06 PROCEDURE — 86160 COMPLEMENT ANTIGEN: CPT | Mod: 59 | Performed by: INTERNAL MEDICINE

## 2022-04-06 PROCEDURE — 85651 RBC SED RATE NONAUTOMATED: CPT | Performed by: INTERNAL MEDICINE

## 2022-04-06 PROCEDURE — 86160 COMPLEMENT ANTIGEN: CPT | Performed by: INTERNAL MEDICINE

## 2022-04-06 PROCEDURE — 85025 COMPLETE CBC W/AUTO DIFF WBC: CPT | Performed by: INTERNAL MEDICINE

## 2022-04-06 PROCEDURE — 80053 COMPREHEN METABOLIC PANEL: CPT | Performed by: INTERNAL MEDICINE

## 2022-04-06 PROCEDURE — 36415 COLL VENOUS BLD VENIPUNCTURE: CPT | Performed by: INTERNAL MEDICINE

## 2022-04-06 PROCEDURE — 86140 C-REACTIVE PROTEIN: CPT | Performed by: INTERNAL MEDICINE

## 2022-04-07 LAB — DSDNA AB SER-ACNC: NORMAL [IU]/ML

## 2022-04-10 NOTE — PROGRESS NOTES
Subjective:       Patient ID: Reble Rodriguez is a 52 y.o. male.    Chief Complaint: Psoriatic Arthritis  HPI     This is a 48YM with psoriatic arthritis and infliximab induced lupus, history of TIA (cannot take NSAIDs) presenting for follow up. Drug induced Lupus: Related to infliximab (last dose 2/29/16; + antiphosholipids-anticardiolipin IgM mild +dsDNA: 1:1280,  DEE+ 1:1280 homogenous ,  + anti histone ab, CH50 low at 51 along with recurrent intermittent fevers, chills, arthralgias.      Interval History:  4/11/22: He reports stiffness in his neck which does not improved throughout the day, rated 6/10. He has done physical therapy which helped in the past.  He reports for the last week he has had some pain in his anterior lower right leg and is unsure if he has had trauma to the area.  He also reports swelling at that location.  But denies any overlying erythema. He asks today if he needs to continue plaquenil given his remote history if drug induced lupus.  Pt denies fatigue, oral/nasal/genital ulcers, headaches, fever, chills, n/v, abd pain, CP, SOB, dysphagia, changes in bowel/bladder habits, vision changes, photosensitivity, or erythema/rashes.    1/10/22: He has swelling in his 1st and 2nd MCP joint. He does have pain in his back and neck. He has difficult opening jars. He reports diffuse pain for the past 2 months. Stiffness in the back, neck, shoulders, and hand last all day He got COVID about 3 weeks ago and held his medications for 2 weeks. He recently started back on his medications 1 week ago. No new rashes or episodes of psoriasis. Pt denies fatigue, oral/nasal/genital ulcers, headaches, fever, chills, n/v, abd pain, CP, SOB, dysphagia, changes in bowel/bladder habits, vision changes,photosensitivity, or erythema/rashes.    10/11/21: Last appt, given not well controlled PsA, he was switched to tremfya. However, he feels that he going backwards has his hands have had more swelling/pain and pain in his  "feet. Pain in his hand is worse with opening jars due to pain. No new rashes or episodes of psoriasis.    Current regimen: (as of 1/10/22)  Tremfya   Otezla  Arava  Plaquenil    Medication History:  - On infliximab until 2018 when developed recurrent intermittent fevers, chills, arthralgias (labs showed+ antiphosholipids-anticardiolipin IgM mild +dsDNA: 1:1280,  DEE+ 1:1280 homogenous ,  + anti histone ab, CH50 low at 51). Labs were consistent with drug induced lupus, infliximab d/c'd and symptoms resolved.  - Aug 2020: switched from Cosentyx to Taltz to lack of response  - June 2021: switched from Taltz to Tremfya (current) to lack of improvement of symptoms  - self discontinued SSZ 1000 BID ~Dec 2020  - Can not take TNFi due to drug induced lupus. Can not do [place] due to history of TIA. Tried stelara which did not work, tried costylx and taltz which did not work. Now on tremfya which is currently not working. Cant take NSAIDs due to TIA history.    Review of Systems   Constitutional: Negative for chills and fever.   HENT: Negative for congestion and trouble swallowing.    Eyes: Negative for pain and visual disturbance.   Respiratory: Negative for cough and shortness of breath.    Cardiovascular: Negative for chest pain and palpitations.   Gastrointestinal: Negative for abdominal pain, constipation, diarrhea, nausea and vomiting.   Genitourinary: Negative for dysuria and flank pain.   Musculoskeletal: Positive for arthralgias and joint swelling. Negative for myalgias.   Neurological: Negative for weakness, numbness and headaches.   Psychiatric/Behavioral: The patient is not nervous/anxious.          Objective:   /66   Pulse 79   Temp 97.2 °F (36.2 °C)   Ht 5' 3.6" (1.615 m)   Wt 64.9 kg (143 lb)   BMI 24.86 kg/m²      Physical Exam   Constitutional: He is oriented to person, place, and time. No distress.   HENT:   Head: Normocephalic and atraumatic.   Right Ear: External ear normal.   Left Ear: External " ear normal.   Eyes: Conjunctivae are normal. Right eye exhibits no discharge. Left eye exhibits no discharge. No scleral icterus.   Cardiovascular: Normal rate, regular rhythm and normal heart sounds.   No murmur heard.  Pulmonary/Chest: Effort normal and breath sounds normal. No respiratory distress. He has no wheezes. He has no rales. He exhibits no tenderness.   Abdominal: Soft. Bowel sounds are normal. He exhibits no distension. There is no abdominal tenderness.   Musculoskeletal:         General: No tenderness or deformity. Normal range of motion.      Comments: Pain with lateral flexion of neck. No synovitis in the upper or lower extremities. No dactylitis or enthesitis. Enthesitis in lateral ankles bilaterally.   Neurological: He is alert and oriented to person, place, and time.   Skin: Skin is warm and dry. No rash noted. He is not diaphoretic. No erythema.   Psychiatric: Mood and affect normal.   Vitals reviewed.          5/25/2021 10/11/2021 1/10/2022 4/11/2022   Tender (BRANTLEY-28) 4 / 28 6 / 28 2 / 28 0 / 28    Swollen (BRANTLEY-28) 5 / 28 6 / 28 2 / 28  0 / 28    Provider Global -- 45 mm 60 mm --   Patient Global 65 mm 45 mm 60 mm 55 mm   ESR 9 mm/hr 11 mm/hr 35 mm/hr 14 mm/hr   CRP 0.4 mg/L 0.8 mg/L 1.2 mg/L 0.3 mg/L   BRANTLEY-28 (ESR) 4.19 (Moderate disease activity) 4.37 (Moderate disease activity) 4.52 (Moderate disease activity) 2.62 (Low disease activity)   BRANTLEY-28 (CRP) 3.74 (Moderate disease activity) 3.86 (Moderate disease activity) 3.27 (Moderate disease activity) 1.82 (Remission)   CDAI Score -- 21  16  --        Assessment:       1. Psoriatic arthritis    2. Medication monitoring encounter    3. Long-term use of Plaquenil    4. Drug-induced lupus erythematosus    5. Iron deficiency anemia, unspecified iron deficiency anemia type    6. Cervical spondylosis    7. Osteopenia of both hips    8. Lower extremity pain, anterior, left        48YM with psoriatic arthritis and infliximab induced lupus, history  of TIA (cannot take NSAIDs) presenting for follow up. Drug induced Lupus: Related to infliximab (last dose 2/29/16; + antiphosholipids-anticardiolipin IgM mild +dsDNA: 1:1280,  DEE+ 1:1280 homogenous ,  + anti histone ab, CH50 low at 51 along with recurrent intermittent fevers, chills, arthralgias. Will continue current medications.  We discussed the 2nd COVID booster vaccine and he is amenable to getting this.  We also discussed possible trial of physical therapy and pain management to help with his neck pain.  He is unable to take NSAIDs due to his prior remote history of a TIA remotely.  Will continue to follow symptoms and monitor.    Plan:       Problem List Items Addressed This Visit        Neuro    Cervical spondylosis    Relevant Orders    Ambulatory referral/consult to Physical/Occupational Therapy    Ambulatory referral/consult to Pain Clinic       Immunology/Multi System    Drug-induced lupus erythematosus       Orthopedic    Psoriatic arthritis - Primary    Relevant Medications    pneumoc 20-moises conj-dip cr,PF, (PREVNAR 20, PF,) 0.5 mL Syrg injection       Other    Medication monitoring encounter      Other Visit Diagnoses     Long-term use of Plaquenil        Iron deficiency anemia, unspecified iron deficiency anemia type        Osteopenia of both hips        Relevant Orders    DXA Bone Density Spine And Hip    Lower extremity pain, anterior, left        Relevant Orders    Ambulatory referral/consult to Physical/Occupational Therapy    X-Ray Tibia Fibula 2 View Right    US Lower Extremity Veins Right    X-Ray Ankle Complete Bilateral        - continue Otezla, Tremfya, Arava  - will discontinue plaquenil  - discussed importance of 2nd COVID booster vaccine and instructed to hold medications for approximately 1 week after this  - will try trial of physical therapy for neck pain; referral to pain management also placed for neck pain  - lower extremity ultrasound for pain in lower anterior tibia and xray of  tibia/fibula  - if symptoms do not improve on current regimen, next step is switching tremfya to orencia; if needed to go back to Cosentyx, would consider ordering fecal calprotectin prior to restarting given family history of IBD; can also consider risankizumab      Return in 3 months to clinic with labs prior    Patient seen and evaluated with Dr. Beverly

## 2022-04-11 ENCOUNTER — OFFICE VISIT (OUTPATIENT)
Dept: RHEUMATOLOGY | Facility: CLINIC | Age: 52
End: 2022-04-11
Payer: COMMERCIAL

## 2022-04-11 VITALS
HEIGHT: 64 IN | HEART RATE: 79 BPM | WEIGHT: 143 LBS | TEMPERATURE: 97 F | BODY MASS INDEX: 24.41 KG/M2 | DIASTOLIC BLOOD PRESSURE: 66 MMHG | SYSTOLIC BLOOD PRESSURE: 122 MMHG

## 2022-04-11 DIAGNOSIS — M47.812 CERVICAL SPONDYLOSIS: ICD-10-CM

## 2022-04-11 DIAGNOSIS — Z79.899 LONG-TERM USE OF PLAQUENIL: ICD-10-CM

## 2022-04-11 DIAGNOSIS — M85.852 OSTEOPENIA OF BOTH HIPS: ICD-10-CM

## 2022-04-11 DIAGNOSIS — M85.851 OSTEOPENIA OF BOTH HIPS: ICD-10-CM

## 2022-04-11 DIAGNOSIS — D50.9 IRON DEFICIENCY ANEMIA, UNSPECIFIED IRON DEFICIENCY ANEMIA TYPE: ICD-10-CM

## 2022-04-11 DIAGNOSIS — M79.605 LOWER EXTREMITY PAIN, ANTERIOR, LEFT: ICD-10-CM

## 2022-04-11 DIAGNOSIS — Z51.81 MEDICATION MONITORING ENCOUNTER: ICD-10-CM

## 2022-04-11 DIAGNOSIS — T50.905A DRUG-INDUCED LUPUS ERYTHEMATOSUS: ICD-10-CM

## 2022-04-11 DIAGNOSIS — L40.50 PSORIATIC ARTHRITIS: Primary | ICD-10-CM

## 2022-04-11 DIAGNOSIS — L93.2 DRUG-INDUCED LUPUS ERYTHEMATOSUS: ICD-10-CM

## 2022-04-11 PROCEDURE — 99999 PR PBB SHADOW E&M-EST. PATIENT-LVL V: CPT | Mod: PBBFAC,,, | Performed by: STUDENT IN AN ORGANIZED HEALTH CARE EDUCATION/TRAINING PROGRAM

## 2022-04-11 PROCEDURE — 99214 PR OFFICE/OUTPT VISIT, EST, LEVL IV, 30-39 MIN: ICD-10-PCS | Mod: S$GLB,,, | Performed by: STUDENT IN AN ORGANIZED HEALTH CARE EDUCATION/TRAINING PROGRAM

## 2022-04-11 PROCEDURE — 99214 OFFICE O/P EST MOD 30 MIN: CPT | Mod: S$GLB,,, | Performed by: STUDENT IN AN ORGANIZED HEALTH CARE EDUCATION/TRAINING PROGRAM

## 2022-04-11 PROCEDURE — 99999 PR PBB SHADOW E&M-EST. PATIENT-LVL V: ICD-10-PCS | Mod: PBBFAC,,, | Performed by: STUDENT IN AN ORGANIZED HEALTH CARE EDUCATION/TRAINING PROGRAM

## 2022-04-11 RX ORDER — PNEUMOCOCCAL 20-VALENT CONJUGATE VACCINE 2.2; 2.2; 2.2; 2.2; 2.2; 2.2; 2.2; 2.2; 2.2; 2.2; 2.2; 2.2; 2.2; 2.2; 2.2; 2.2; 4.4; 2.2; 2.2; 2.2 UG/.5ML; UG/.5ML; UG/.5ML; UG/.5ML; UG/.5ML; UG/.5ML; UG/.5ML; UG/.5ML; UG/.5ML; UG/.5ML; UG/.5ML; UG/.5ML; UG/.5ML; UG/.5ML; UG/.5ML; UG/.5ML; UG/.5ML; UG/.5ML; UG/.5ML; UG/.5ML
0.5 INJECTION, SUSPENSION INTRAMUSCULAR ONCE
Qty: 0.5 ML | Refills: 0 | Status: SHIPPED | OUTPATIENT
Start: 2022-04-11 | End: 2022-04-11

## 2022-04-11 NOTE — PROGRESS NOTES
Pre chart    Answers for HPI/ROS submitted by the patient on 4/11/2022  fever: No  eye redness: No  mouth sores: No  headaches: Yes  shortness of breath: No  chest pain: No  trouble swallowing: No  diarrhea: No  constipation: No  unexpected weight change: No  genital sore: No  During the last 3 days, have you had a skin rash?: No  Bruises or bleeds easily: Yes  cough: No

## 2022-04-12 ENCOUNTER — PATIENT MESSAGE (OUTPATIENT)
Dept: RHEUMATOLOGY | Facility: CLINIC | Age: 52
End: 2022-04-12
Payer: COMMERCIAL

## 2022-04-12 ENCOUNTER — IMMUNIZATION (OUTPATIENT)
Dept: PHARMACY | Facility: CLINIC | Age: 52
End: 2022-04-12
Payer: COMMERCIAL

## 2022-04-12 ENCOUNTER — TELEPHONE (OUTPATIENT)
Dept: RHEUMATOLOGY | Facility: CLINIC | Age: 52
End: 2022-04-12
Payer: COMMERCIAL

## 2022-04-18 ENCOUNTER — PATIENT MESSAGE (OUTPATIENT)
Dept: ADMINISTRATIVE | Facility: OTHER | Age: 52
End: 2022-04-18
Payer: COMMERCIAL

## 2022-04-21 ENCOUNTER — PATIENT MESSAGE (OUTPATIENT)
Dept: PHARMACY | Facility: CLINIC | Age: 52
End: 2022-04-21
Payer: COMMERCIAL

## 2022-04-25 ENCOUNTER — SPECIALTY PHARMACY (OUTPATIENT)
Dept: PHARMACY | Facility: CLINIC | Age: 52
End: 2022-04-25
Payer: COMMERCIAL

## 2022-04-25 NOTE — TELEPHONE ENCOUNTER
Specialty Pharmacy - Refill Coordination    Specialty Medication Orders Linked to Encounter    Flowsheet Row Most Recent Value   Medication #1 apremilast (OTEZLA) 30 mg Tab (Order#[id number], Rx#[id number])   Medication #2 guselkumab (TREMFYA) 100 mg/mL AtIn (Order#[id number], Rx#[id number])        Patient reported that he may have missed a couple morning doses of otezla, but he declined speaking with a pharmacist. He has discussed missed doses in the past.     Refill Questions - Documented Responses    Flowsheet Row Most Recent Value   Patient Availability and HIPAA Verification    Does patient want to proceed with activity? Yes   HIPAA/medical authority confirmed? Yes   Relationship to patient of person spoken to? Self   Refill Screening Questions    Changes to allergies? No   Changes to medications? No   New conditions since last clinic visit? No   Unplanned office visit, urgent care, ED, or hospital admission in the last 4 weeks? No   How does patient/caregiver feel medication is working? Good   Financial problems or insurance changes? No   How many doses of your specialty medications were missed in the last 4 weeks? 1   Would patient like to speak to a pharmacist? No   When does the patient need to receive the medication? 05/02/22   Refill Delivery Questions    How will the patient receive the medication? Pickup   When does the patient need to receive the medication? 05/02/22   Expected Copay ($) 0   Is the patient able to afford the medication copay? Yes   Payment Method zero copay   Days supply of Refill 30   Supplies needed? No supplies needed   Refill activity completed? Yes   Refill activity plan Refill scheduled   Shipment/Pickup Date: 04/27/22          Current Outpatient Medications   Medication Sig    apremilast (OTEZLA) 30 mg Tab Take 1 tablet (30 mg total) by mouth 2 (two) times daily.    aspirin (ECOTRIN) 81 MG EC tablet Take 81 mg by mouth once daily.    atorvastatin (LIPITOR) 10 MG tablet Take 1  tablet (10 mg total) by mouth every evening.    citric acid-potassium citrate (POLYCITRA) 1,100-334 mg/5 mL solution Take by mouth 2 (two) times a day.    FLUoxetine 20 MG capsule Take 40 mg by mouth once daily.     FLUoxetine 40 MG capsule Take 1 capsule (40 mg) by mouth daily    guselkumab (TREMFYA) 100 mg/mL AtIn Inject 1 injector (100 mg) into the skin at weeks 0, 4, and then every 8 weeks thereafter (Patient taking differently: Inject 1 injector (100 mg) into the skin at weeks 0, 4, and then every 8 weeks thereafter)    leflunomide (ARAVA) 20 MG Tab Take 1 tablet (20 mg total) by mouth once daily.    LORazepam (ATIVAN) 0.5 MG tablet Take 0.5 mg by mouth every 6 (six) hours as needed for Anxiety.    mirtazapine (REMERON) 15 MG tablet Take 1 tablet (15 mg) by mouth daily at bedtime    mirtazapine (REMERON) 15 MG tablet Take 1 tablet (15 mg) by mouth daily at bedtime    mirtazapine (REMERON) 30 MG tablet Take 1 tablet (30 mg) by mouth daily at bedtime    mirtazapine (REMERON) 7.5 MG Tab Take 1 tablet by mouth daily at bedtime    mirtazapine (REMERON) 7.5 MG Tab take one tablet by mouth every night at bedtime    potassium citrate (UROCIT-K 15) 15 mEq TbSR TK 1 T PO BID    tamsulosin (FLOMAX) 0.4 mg Cap Take 0.4 mg by mouth once daily.   Last reviewed on 4/11/2022 11:19 AM by Ainsley Russell MD    Review of patient's allergies indicates:   Allergen Reactions    Erythromycin Nausea And Vomiting    Infliximab Other (See Comments)     Lupus with fever and acute arthritis  Lupus with fever and acute arthritis    Last reviewed on  4/11/2022 11:19 AM by Ainsley Russell      Tasks added this encounter   5/25/2022 - Refill Call (Auto Added)  4/28/2022 - Pickup Reminder   Tasks due within next 3 months   No tasks due.     Radha Landry [facility]  [address] [place] [id number]  Phone: [phone]  Fax: [phone]

## 2022-04-26 ENCOUNTER — HOSPITAL ENCOUNTER (OUTPATIENT)
Dept: RADIOLOGY | Facility: HOSPITAL | Age: 52
Discharge: HOME | End: 2022-04-26
Attending: STUDENT IN AN ORGANIZED HEALTH CARE EDUCATION/TRAINING PROGRAM
Payer: COMMERCIAL

## 2022-04-26 ENCOUNTER — TELEPHONE (OUTPATIENT)
Dept: PAIN MEDICINE | Facility: CLINIC | Age: 52
End: 2022-04-26
Payer: COMMERCIAL

## 2022-04-26 DIAGNOSIS — M79.605 LOWER EXTREMITY PAIN, ANTERIOR, LEFT: ICD-10-CM

## 2022-04-26 PROCEDURE — 93971 EXTREMITY STUDY: CPT | Mod: TC,RT

## 2022-04-26 PROCEDURE — 93971 EXTREMITY STUDY: CPT | Mod: 26,RT,, | Performed by: RADIOLOGY

## 2022-04-26 PROCEDURE — 73610 X-RAY EXAM OF ANKLE: CPT | Mod: TC,50

## 2022-04-26 PROCEDURE — 73610 XR ANKLE COMPLETE 3 VIEW BILATERAL: ICD-10-PCS | Mod: 26,,, | Performed by: RADIOLOGY

## 2022-04-26 PROCEDURE — 73610 X-RAY EXAM OF ANKLE: CPT | Mod: 26,,, | Performed by: RADIOLOGY

## 2022-04-26 PROCEDURE — 93971 US LOWER EXTREMITY VEINS RIGHT: ICD-10-PCS | Mod: 26,RT,, | Performed by: RADIOLOGY

## 2022-04-26 PROCEDURE — 73590 X-RAY EXAM OF LOWER LEG: CPT | Mod: 26,RT,, | Performed by: RADIOLOGY

## 2022-04-26 PROCEDURE — 73590 XR TIBIA FIBULA 2 VIEW RIGHT: ICD-10-PCS | Mod: 26,RT,, | Performed by: RADIOLOGY

## 2022-04-26 PROCEDURE — 73590 X-RAY EXAM OF LOWER LEG: CPT | Mod: TC,RT

## 2022-04-26 NOTE — TELEPHONE ENCOUNTER
"This message is for patient in regards to his/her appointment 04/27/22 at 11:00a       [facility] Policy: For the safety of all patients and staff members.     Patient Visitor policy: Due to social distancing and limited seating staff are requesting patient to arrive to their schedule appointments on time.     Upon arriving to facility; patients are required to wear a face mask, if patient do not have a face mask one will be provided. Upon arriving patient we ask patients to contact clinic at this number [phone] to notify staff that they have arrived or they may do so by utilizing the Mobile [employer] in [place](if patient have patient portal; clinical staff will send a message through there letting them know it's okay to proceed to their visit). Staff will give the patient the "okay" to come up or wait inside their vehicle until clinic is ready for patient to be seen by Dr. Kimberly Wilson MD. If you have any questions or concerns please contact [phone]      [facility]  "

## 2022-04-27 ENCOUNTER — OFFICE VISIT (OUTPATIENT)
Dept: PAIN MEDICINE | Facility: CLINIC | Age: 52
End: 2022-04-27
Payer: COMMERCIAL

## 2022-04-27 VITALS
SYSTOLIC BLOOD PRESSURE: 107 MMHG | WEIGHT: 138 LBS | HEART RATE: 73 BPM | TEMPERATURE: 98 F | BODY MASS INDEX: 24.45 KG/M2 | DIASTOLIC BLOOD PRESSURE: 62 MMHG | RESPIRATION RATE: 18 BRPM | HEIGHT: 63 IN

## 2022-04-27 DIAGNOSIS — M47.812 CERVICAL SPONDYLOSIS: Primary | ICD-10-CM

## 2022-04-27 PROCEDURE — 3078F PR MOST RECENT DIASTOLIC BLOOD PRESSURE < 80 MM HG: ICD-10-PCS | Mod: CPTII,S$GLB,, | Performed by: ANESTHESIOLOGY

## 2022-04-27 PROCEDURE — 99999 PR PBB SHADOW E&M-EST. PATIENT-LVL IV: CPT | Mod: PBBFAC,,, | Performed by: ANESTHESIOLOGY

## 2022-04-27 PROCEDURE — 3008F BODY MASS INDEX DOCD: CPT | Mod: CPTII,S$GLB,, | Performed by: ANESTHESIOLOGY

## 2022-04-27 PROCEDURE — 99204 OFFICE O/P NEW MOD 45 MIN: CPT | Mod: S$GLB,,, | Performed by: ANESTHESIOLOGY

## 2022-04-27 PROCEDURE — 3008F PR BODY MASS INDEX (BMI) DOCUMENTED: ICD-10-PCS | Mod: CPTII,S$GLB,, | Performed by: ANESTHESIOLOGY

## 2022-04-27 PROCEDURE — 1160F PR REVIEW ALL MEDS BY PRESCRIBER/CLIN PHARMACIST DOCUMENTED: ICD-10-PCS | Mod: CPTII,S$GLB,, | Performed by: ANESTHESIOLOGY

## 2022-04-27 PROCEDURE — 3078F DIAST BP <80 MM HG: CPT | Mod: CPTII,S$GLB,, | Performed by: ANESTHESIOLOGY

## 2022-04-27 PROCEDURE — 3074F SYST BP LT 130 MM HG: CPT | Mod: CPTII,S$GLB,, | Performed by: ANESTHESIOLOGY

## 2022-04-27 PROCEDURE — 3074F PR MOST RECENT SYSTOLIC BLOOD PRESSURE < 130 MM HG: ICD-10-PCS | Mod: CPTII,S$GLB,, | Performed by: ANESTHESIOLOGY

## 2022-04-27 PROCEDURE — 1159F MED LIST DOCD IN RCRD: CPT | Mod: CPTII,S$GLB,, | Performed by: ANESTHESIOLOGY

## 2022-04-27 PROCEDURE — 99999 PR PBB SHADOW E&M-EST. PATIENT-LVL IV: ICD-10-PCS | Mod: PBBFAC,,, | Performed by: ANESTHESIOLOGY

## 2022-04-27 PROCEDURE — 1159F PR MEDICATION LIST DOCUMENTED IN MEDICAL RECORD: ICD-10-PCS | Mod: CPTII,S$GLB,, | Performed by: ANESTHESIOLOGY

## 2022-04-27 PROCEDURE — 1160F RVW MEDS BY RX/DR IN RCRD: CPT | Mod: CPTII,S$GLB,, | Performed by: ANESTHESIOLOGY

## 2022-04-27 PROCEDURE — 99204 PR OFFICE/OUTPT VISIT, NEW, LEVL IV, 45-59 MIN: ICD-10-PCS | Mod: S$GLB,,, | Performed by: ANESTHESIOLOGY

## 2022-04-27 NOTE — PROGRESS NOTES
"PCP: Nanda Smith MD    REFERRING PHYSICIAN: Ainsley Russell*    CHIEF COMPLAINT: left neck pain    Original HISTORY OF PRESENT ILLNESS: Rebel Rodriguez presents to the clinic for the evaluation of the above pain. The pain started over the course of the past 3 years after no particular event. It is similar to the pain he had prior to neck surgery. He had an ACDF of C3-4-5 in 2017 at [facility].     Original Pain Description:  The pain is located in the left lateral neck and does not radiate. The pain is described as tight, burning. Exacerbating factors: nothing. Mitigating factors ice and physical therapy. Symptoms interfere with daily activity, sleeping and work. The patient feels like symptoms have been worsening. He denies falls and myelopathic symptoms.     Original PAIN SCORES:  Best: Pain is 5  Worst: Pain is 8  Usually: Pain is 6  Current: Pain is 5    INTERVAL HISTORY:     6 weeks of Conservative therapy (PT/Chiro/Home Exercises with Dates)  PT: 8/20-5/21  Aquatherapy: Very helpful  HEP: "Habitually" Daily as prescribed at the PT above    Treatments / Medications: (Ice/Heat/NSAIDS/APAP/etc):  Ice  BenGay  [place] Balm  Lidocaine  APAP - no help  NSAIDS - not allowed to take due to TIA     Report:    Interventional Pain Procedures: (Previous injections)  CALLUM/CTFESI - preoperatively for severe spinal stenosis    Past Medical History:   Diagnosis Date    Achilles tendon rupture 10/9/2013    Allergy     Anxiety     Arthritis     psoriatric    Degenerative disc disease     Drug-induced lupus erythematosus     Hyperlipidemia     Kidney stone     Psoriatic arthritis     TIA (transient ischemic attack)     Ulcer     high school     Past Surgical History:   Procedure Laterality Date    ACHILLES TENDON SURGERY      BACK SURGERY      FUNCTIONAL ENDOSCOPIC SINUS SURGERY (FESS) USING COMPUTER-ASSISTED NAVIGATION Bilateral 9/14/2018    Procedure: FESS, USING COMPUTER-ASSISTED NAVIGATION;  Surgeon: " Gerard Manzo MD;  [facility];  Service: ENT;  Laterality: Bilateral;    NASAL SEPTOPLASTY N/A 9/14/2018    Procedure: SEPTOPLASTY, NASAL;  Surgeon: Gerard Manzo MD;  [facility];  Service: ENT;  Laterality: N/A;    NASAL TURBINATE REDUCTION Bilateral 9/14/2018    Procedure: REDUCTION, NASAL TURBINATE;  Surgeon: Gerard Manzo MD;  [facility];  Service: ENT;  Laterality: Bilateral;    UPPER ENDOSCOPY W/ ESOPHAGEAL MANOMETRY      URETERAL STENT PLACEMENT       Social History     Socioeconomic History    Marital status: Single   Occupational History    Occupation: Com[employer]able     Employer: self employed   Tobacco Use    Smoking status: Never Smoker    Smokeless tobacco: Never Used   Substance and Sexual Activity    Alcohol use: No     Alcohol/week: 0.0 standard drinks     Types: 1 - 2 Standard drinks or equivalent per week     Comment: cocktails    Drug use: No     Family History   Problem Relation Age of Onset    Pancreatic cancer Father     Ulcerative colitis Father     Cancer Father 61        pancreatic ca    Crohn's disease Mother     Osteoarthritis Maternal Grandmother     Crohn's disease Maternal Grandmother     Cataracts Maternal Grandmother     Cataracts Maternal Grandfather     Cataracts Paternal Grandmother     Cataracts Paternal Grandfather     Amblyopia Neg Hx     Blindness Neg Hx        Review of patient's allergies indicates:   Allergen Reactions    Erythromycin Nausea And Vomiting    Infliximab Other (See Comments)     Lupus with fever and acute arthritis  Lupus with fever and acute arthritis       Current Outpatient Medications   Medication Sig    apremilast (OTEZLA) 30 mg Tab Take 1 tablet (30 mg total) by mouth 2 (two) times daily.    aspirin (ECOTRIN) 81 MG EC tablet Take 81 mg by mouth once daily.    atorvastatin (LIPITOR) 10 MG tablet Take 1 tablet (10 mg total) by mouth every evening.    citric acid-potassium  citrate (POLYCITRA) 1,100-334 mg/5 mL solution Take by mouth 2 (two) times a day.    FLUoxetine 40 MG capsule Take 1 capsule (40 mg) by mouth daily    guselkumab (TREMFYA) 100 mg/mL AtIn Inject 1 injector (100 mg) into the skin at weeks 0, 4, and then every 8 weeks thereafter (Patient taking differently: Inject 1 injector (100 mg) into the skin at weeks 0, 4, and then every 8 weeks thereafter)    leflunomide (ARAVA) 20 MG Tab Take 1 tablet (20 mg total) by mouth once daily.    mirtazapine (REMERON) 15 MG tablet Take 1 tablet (15 mg) by mouth daily at bedtime    mirtazapine (REMERON) 7.5 MG Tab take one tablet by mouth every night at bedtime    potassium citrate (UROCIT-K 15) 15 mEq TbSR TK 1 T PO BID    FLUoxetine 20 MG capsule Take 40 mg by mouth once daily.     LORazepam (ATIVAN) 0.5 MG tablet Take 0.5 mg by mouth every 6 (six) hours as needed for Anxiety.    mirtazapine (REMERON) 15 MG tablet Take 1 tablet (15 mg) by mouth daily at bedtime    mirtazapine (REMERON) 30 MG tablet Take 1 tablet (30 mg) by mouth daily at bedtime    mirtazapine (REMERON) 7.5 MG Tab Take 1 tablet by mouth daily at bedtime    tamsulosin (FLOMAX) 0.4 mg Cap Take 0.4 mg by mouth once daily.     No current facility-administered medications for this visit.       ROS:  GENERAL: No fever. No chills. No fatigue. Denies weight loss. Denies weight gain.  HEENT: Denies headaches. Denies vision change. Denies eye pain. Denies double vision. Denies ear pain.   CV: Denies chest pain.   PULM: Denies of shortness of breath.  GI: Denies constipation. No diarrhea. No abdominal pain. Denies nausea. Denies vomiting. No blood in stool.  HEME: Denies bleeding problems.  : Denies urgency. No painful urination. No blood in urine.  MS: Denies joint stiffness. Denies joint swelling.  +Neck Pain  SKIN: Denies rash.   NEURO: Denies seizures. No weakness.  PSYCH:  Denies difficulty sleeping. No anxiety. Denies depression. No suicidal thoughts.  "      VITALS:   Vitals:    04/27/22 1102   BP: 107/62   Pulse: 73   Resp: 18   Temp: 98.2 °F (36.8 °C)   TempSrc: Oral   Weight: 62.6 kg (138 lb)   Height: 5' 3" (1.6 m)   PainSc:   6   PainLoc: Neck         PHYSICAL EXAM:   GENERAL: Well appearing, in no acute distress, alert and oriented x3.  PSYCH:  Mood and affect appropriate.  SKIN: Skin color, texture, turgor normal, no rashes or lesions.  HEENT:  Normocephalic, atraumatic. Cranial nerves grossly intact.  NECK: Good ROM for a fusion. Pain with left rotation. No pain to palpation over the cervical paraspinous muscles. No pain with neck flexion, extension, or lateral flexion. Pain reproduced with palpation of the upper left cervical facets.   PULM: No evidence of respiratory difficulty, symmetric chest rise.  GI:  Non-distended  BACK: Normal range of motion. No pain to palpation over the spinous processes. No pain to palpation over facet joints. There is no pain with palpation over the sacroiliac joints bilaterally. Straight leg raising in the supine position is negative to radicular pain.   EXTREMITIES: No deformities, edema, or skin discoloration.   MUSCULOSKELETAL: Shoulder, hip, and knee provocative maneuvers are negative. Bilateral upper and lower extremity strength is normal and symmetric. No atrophy is noted.  NEURO: Sensation is equal and appropriate bilaterally. Bilateral upper and lower extremity coordination and muscle stretch reflexes are physiologic and symmetric. Plantar response are downgoing.   GAIT: normal.      LABS:      IMAGING:    EXAMINATION:  XR CERVICAL SPINE 5 VIEW WITH FLEX AND EXT     CLINICAL HISTORY:  Cervicalgia     TECHNIQUE:  Five views of the cervical spine plus flexion and extension views were performed.     COMPARISON:  11/03/2008     FINDINGS:  Interval operative change C4/C7 anterior cervical fusion with metallic plate and, screw device and interbody devices.  The cervical sagittal alignment is slightly improved.  There is no " significant listhesis with flexion extension positioning.  No evidence for hardware failure.  Cervical vertebral body heights and contours within normal is without evidence for acute fracture.  Surgical clips left neck soft tissues.  Open mouth view limited by overlapping structures.  Questioned bilateral bony neural foraminal stenosis evaluation limited by overlapping structures on the oblique imaging.  Further evaluation as warranted clinically.     Impression:     Please see above        Electronically signed by: Hung Muniz DO  Date:                                            05/31/2021  Time:                                           13:33    ASSESSMENT: 52 y.o. year old male with pain, consistent with left cervical facet arthropathy.      DISCUSSION: Mr. Rodriguez previously had a C4-7 ACDF at [facility] and comes to us with progressive left sided neck pain. On exam his pain is reproduced with palpation of the left upper facets.     PLAN:  1. Left C2-C5 MBB    I would like to thank Ainsley Russell* for the opportunity to assist in the care of this patient. We had a very nice visit and I look forward to continuing his care. Please let me know if I can be of further assistance.     Kimberly Wilson  04/27/2022

## 2022-04-27 NOTE — H&P (VIEW-ONLY)
"PCP: Nanda Smith MD    REFERRING PHYSICIAN: Ainsley Russell*    CHIEF COMPLAINT: left neck pain    Original HISTORY OF PRESENT ILLNESS: Rebel Rodriguez presents to the clinic for the evaluation of the above pain. The pain started over the course of the past 3 years after no particular event. It is similar to the pain he had prior to neck surgery. He had an ACDF of C3-4-5 in 2017 at [facility].     Original Pain Description:  The pain is located in the left lateral neck and does not radiate. The pain is described as tight, burning. Exacerbating factors: nothing. Mitigating factors ice and physical therapy. Symptoms interfere with daily activity, sleeping and work. The patient feels like symptoms have been worsening. He denies falls and myelopathic symptoms.     Original PAIN SCORES:  Best: Pain is 5  Worst: Pain is 8  Usually: Pain is 6  Current: Pain is 5    INTERVAL HISTORY:     6 weeks of Conservative therapy (PT/Chiro/Home Exercises with Dates)  PT: 8/20-5/21  Aquatherapy: Very helpful  HEP: "Habitually" Daily as prescribed at the PT above    Treatments / Medications: (Ice/Heat/NSAIDS/APAP/etc):  Ice  BenGay  [place] Balm  Lidocaine  APAP - no help  NSAIDS - not allowed to take due to TIA     Report:    Interventional Pain Procedures: (Previous injections)  CALLUM/CTFESI - preoperatively for severe spinal stenosis    Past Medical History:   Diagnosis Date    Achilles tendon rupture 10/9/2013    Allergy     Anxiety     Arthritis     psoriatric    Degenerative disc disease     Drug-induced lupus erythematosus     Hyperlipidemia     Kidney stone     Psoriatic arthritis     TIA (transient ischemic attack)     Ulcer     high school     Past Surgical History:   Procedure Laterality Date    ACHILLES TENDON SURGERY      BACK SURGERY      FUNCTIONAL ENDOSCOPIC SINUS SURGERY (FESS) USING COMPUTER-ASSISTED NAVIGATION Bilateral 9/14/2018    Procedure: FESS, USING COMPUTER-ASSISTED NAVIGATION;  Surgeon: " Gerard Manzo MD;  [facility];  Service: ENT;  Laterality: Bilateral;    NASAL SEPTOPLASTY N/A 9/14/2018    Procedure: SEPTOPLASTY, NASAL;  Surgeon: Gerard Manzo MD;  [facility];  Service: ENT;  Laterality: N/A;    NASAL TURBINATE REDUCTION Bilateral 9/14/2018    Procedure: REDUCTION, NASAL TURBINATE;  Surgeon: Gerard Manzo MD;  [facility];  Service: ENT;  Laterality: Bilateral;    UPPER ENDOSCOPY W/ ESOPHAGEAL MANOMETRY      URETERAL STENT PLACEMENT       Social History     Socioeconomic History    Marital status: Single   Occupational History    Occupation: [employer]     Employer: self employed   Tobacco Use    Smoking status: Never Smoker    Smokeless tobacco: Never Used   Substance and Sexual Activity    Alcohol use: No     Alcohol/week: 0.0 standard drinks     Types: 1 - 2 Standard drinks or equivalent per week     Comment: cocktails    Drug use: No     Family History   Problem Relation Age of Onset    Pancreatic cancer Father     Ulcerative colitis Father     Cancer Father 61        pancreatic ca    Crohn's disease Mother     Osteoarthritis Maternal Grandmother     Crohn's disease Maternal Grandmother     Cataracts Maternal Grandmother     Cataracts Maternal Grandfather     Cataracts Paternal Grandmother     Cataracts Paternal Grandfather     Amblyopia Neg Hx     Blindness Neg Hx        Review of patient's allergies indicates:   Allergen Reactions    Erythromycin Nausea And Vomiting    Infliximab Other (See Comments)     Lupus with fever and acute arthritis  Lupus with fever and acute arthritis       Current Outpatient Medications   Medication Sig    apremilast (OTEZLA) 30 mg Tab Take 1 tablet (30 mg total) by mouth 2 (two) times daily.    aspirin (ECOTRIN) 81 MG EC tablet Take 81 mg by mouth once daily.    atorvastatin (LIPITOR) 10 MG tablet Take 1 tablet (10 mg total) by mouth every evening.    citric acid-potassium  citrate (POLYCITRA) 1,100-334 mg/5 mL solution Take by mouth 2 (two) times a day.    FLUoxetine 40 MG capsule Take 1 capsule (40 mg) by mouth daily    guselkumab (TREMFYA) 100 mg/mL AtIn Inject 1 injector (100 mg) into the skin at weeks 0, 4, and then every 8 weeks thereafter (Patient taking differently: Inject 1 injector (100 mg) into the skin at weeks 0, 4, and then every 8 weeks thereafter)    leflunomide (ARAVA) 20 MG Tab Take 1 tablet (20 mg total) by mouth once daily.    mirtazapine (REMERON) 15 MG tablet Take 1 tablet (15 mg) by mouth daily at bedtime    mirtazapine (REMERON) 7.5 MG Tab take one tablet by mouth every night at bedtime    potassium citrate (UROCIT-K 15) 15 mEq TbSR TK 1 T PO BID    FLUoxetine 20 MG capsule Take 40 mg by mouth once daily.     LORazepam (ATIVAN) 0.5 MG tablet Take 0.5 mg by mouth every 6 (six) hours as needed for Anxiety.    mirtazapine (REMERON) 15 MG tablet Take 1 tablet (15 mg) by mouth daily at bedtime    mirtazapine (REMERON) 30 MG tablet Take 1 tablet (30 mg) by mouth daily at bedtime    mirtazapine (REMERON) 7.5 MG Tab Take 1 tablet by mouth daily at bedtime    tamsulosin (FLOMAX) 0.4 mg Cap Take 0.4 mg by mouth once daily.     No current facility-administered medications for this visit.       ROS:  GENERAL: No fever. No chills. No fatigue. Denies weight loss. Denies weight gain.  HEENT: Denies headaches. Denies vision change. Denies eye pain. Denies double vision. Denies ear pain.   CV: Denies chest pain.   PULM: Denies of shortness of breath.  GI: Denies constipation. No diarrhea. No abdominal pain. Denies nausea. Denies vomiting. No blood in stool.  HEME: Denies bleeding problems.  : Denies urgency. No painful urination. No blood in urine.  MS: Denies joint stiffness. Denies joint swelling.  +Neck Pain  SKIN: Denies rash.   NEURO: Denies seizures. No weakness.  PSYCH:  Denies difficulty sleeping. No anxiety. Denies depression. No suicidal thoughts.  "      VITALS:   Vitals:    04/27/22 1102   BP: 107/62   Pulse: 73   Resp: 18   Temp: 98.2 °F (36.8 °C)   TempSrc: Oral   Weight: 62.6 kg (138 lb)   Height: 5' 3" (1.6 m)   PainSc:   6   PainLoc: Neck         PHYSICAL EXAM:   GENERAL: Well appearing, in no acute distress, alert and oriented x3.  PSYCH:  Mood and affect appropriate.  SKIN: Skin color, texture, turgor normal, no rashes or lesions.  HEENT:  Normocephalic, atraumatic. Cranial nerves grossly intact.  NECK: Good ROM for a fusion. Pain with left rotation. No pain to palpation over the cervical paraspinous muscles. No pain with neck flexion, extension, or lateral flexion. Pain reproduced with palpation of the upper left cervical facets.   PULM: No evidence of respiratory difficulty, symmetric chest rise.  GI:  Non-distended  BACK: Normal range of motion. No pain to palpation over the spinous processes. No pain to palpation over facet joints. There is no pain with palpation over the sacroiliac joints bilaterally. Straight leg raising in the supine position is negative to radicular pain.   EXTREMITIES: No deformities, edema, or skin discoloration.   MUSCULOSKELETAL: Shoulder, hip, and knee provocative maneuvers are negative. Bilateral upper and lower extremity strength is normal and symmetric. No atrophy is noted.  NEURO: Sensation is equal and appropriate bilaterally. Bilateral upper and lower extremity coordination and muscle stretch reflexes are physiologic and symmetric. Plantar response are downgoing.   GAIT: normal.      LABS:      IMAGING:    EXAMINATION:  XR CERVICAL SPINE 5 VIEW WITH FLEX AND EXT     CLINICAL HISTORY:  Cervicalgia     TECHNIQUE:  Five views of the cervical spine plus flexion and extension views were performed.     COMPARISON:  11/03/2008     FINDINGS:  Interval operative change C4/C7 anterior cervical fusion with metallic plate and, screw device and interbody devices.  The cervical sagittal alignment is slightly improved.  There is no " significant listhesis with flexion extension positioning.  No evidence for hardware failure.  Cervical vertebral body heights and contours within normal is without evidence for acute fracture.  Surgical clips left neck soft tissues.  Open mouth view limited by overlapping structures.  Questioned bilateral bony neural foraminal stenosis evaluation limited by overlapping structures on the oblique imaging.  Further evaluation as warranted clinically.     Impression:     Please see above        Electronically signed by: Hung Muniz DO  Date:                                            05/31/2021  Time:                                           13:33    ASSESSMENT: 52 y.o. year old male with pain, consistent with left cervical facet arthropathy.      DISCUSSION: Mr. Rodriguez previously had a C4-7 ACDF at [facility] and comes to us with progressive left sided neck pain. On exam his pain is reproduced with palpation of the left upper facets.     PLAN:  1. Left C2-C5 MBB    I would like to thank Ainsley Russell* for the opportunity to assist in the care of this patient. We had a very nice visit and I look forward to continuing his care. Please let me know if I can be of further assistance.     Kimberly Wilson  04/27/2022

## 2022-04-27 NOTE — H&P (VIEW-ONLY)
"PCP: Nanda Smith MD    REFERRING PHYSICIAN: Ainsley Russell*    CHIEF COMPLAINT: left neck pain    Original HISTORY OF PRESENT ILLNESS: Rebel Rodriguez presents to the clinic for the evaluation of the above pain. The pain started over the course of the past 3 years after no particular event. It is similar to the pain he had prior to neck surgery. He had an ACDF of C3-4-5 in 2017 at [facility].     Original Pain Description:  The pain is located in the left lateral neck and does not radiate. The pain is described as tight, burning. Exacerbating factors: nothing. Mitigating factors ice and physical therapy. Symptoms interfere with daily activity, sleeping and work. The patient feels like symptoms have been worsening. He denies falls and myelopathic symptoms.     Original PAIN SCORES:  Best: Pain is 5  Worst: Pain is 8  Usually: Pain is 6  Current: Pain is 5    INTERVAL HISTORY:     6 weeks of Conservative therapy (PT/Chiro/Home Exercises with Dates)  PT: 8/20-5/21  Aquatherapy: Very helpful  HEP: "Habitually" Daily as prescribed at the PT above    Treatments / Medications: (Ice/Heat/NSAIDS/APAP/etc):  Ice  BenGay  [place] Balm  Lidocaine  APAP - no help  NSAIDS - not allowed to take due to TIA     Report:    Interventional Pain Procedures: (Previous injections)  CALLUM/CTFESI - preoperatively for severe spinal stenosis    Past Medical History:   Diagnosis Date    Achilles tendon rupture 10/9/2013    Allergy     Anxiety     Arthritis     psoriatric    Degenerative disc disease     Drug-induced lupus erythematosus     Hyperlipidemia     Kidney stone     Psoriatic arthritis     TIA (transient ischemic attack)     Ulcer     high school     Past Surgical History:   Procedure Laterality Date    ACHILLES TENDON SURGERY      BACK SURGERY      FUNCTIONAL ENDOSCOPIC SINUS SURGERY (FESS) USING COMPUTER-ASSISTED NAVIGATION Bilateral 9/14/2018    Procedure: FESS, USING COMPUTER-ASSISTED NAVIGATION;  Surgeon: " Gerard Manzo MD;  [facility];  Service: ENT;  Laterality: Bilateral;    NASAL SEPTOPLASTY N/A 9/14/2018    Procedure: SEPTOPLASTY, NASAL;  Surgeon: Gerard Manzo MD;  [facility];  Service: ENT;  Laterality: N/A;    NASAL TURBINATE REDUCTION Bilateral 9/14/2018    Procedure: REDUCTION, NASAL TURBINATE;  Surgeon: Gerard Manzo MD;  [facility];  Service: ENT;  Laterality: Bilateral;    UPPER ENDOSCOPY W/ ESOPHAGEAL MANOMETRY      URETERAL STENT PLACEMENT       Social History     Socioeconomic History    Marital status: Single   Occupational History    Occupation: [employer]     Employer: self employed   Tobacco Use    Smoking status: Never Smoker    Smokeless tobacco: Never Used   Substance and Sexual Activity    Alcohol use: No     Alcohol/week: 0.0 standard drinks     Types: 1 - 2 Standard drinks or equivalent per week     Comment: cocktails    Drug use: No     Family History   Problem Relation Age of Onset    Pancreatic cancer Father     Ulcerative colitis Father     Cancer Father 61        pancreatic ca    Crohn's disease Mother     Osteoarthritis Maternal Grandmother     Crohn's disease Maternal Grandmother     Cataracts Maternal Grandmother     Cataracts Maternal Grandfather     Cataracts Paternal Grandmother     Cataracts Paternal Grandfather     Amblyopia Neg Hx     Blindness Neg Hx        Review of patient's allergies indicates:   Allergen Reactions    Erythromycin Nausea And Vomiting    Infliximab Other (See Comments)     Lupus with fever and acute arthritis  Lupus with fever and acute arthritis       Current Outpatient Medications   Medication Sig    apremilast (OTEZLA) 30 mg Tab Take 1 tablet (30 mg total) by mouth 2 (two) times daily.    aspirin (ECOTRIN) 81 MG EC tablet Take 81 mg by mouth once daily.    atorvastatin (LIPITOR) 10 MG tablet Take 1 tablet (10 mg total) by mouth every evening.    citric acid-potassium  citrate (POLYCITRA) 1,100-334 mg/5 mL solution Take by mouth 2 (two) times a day.    FLUoxetine 40 MG capsule Take 1 capsule (40 mg) by mouth daily    guselkumab (TREMFYA) 100 mg/mL AtIn Inject 1 injector (100 mg) into the skin at weeks 0, 4, and then every 8 weeks thereafter (Patient taking differently: Inject 1 injector (100 mg) into the skin at weeks 0, 4, and then every 8 weeks thereafter)    leflunomide (ARAVA) 20 MG Tab Take 1 tablet (20 mg total) by mouth once daily.    mirtazapine (REMERON) 15 MG tablet Take 1 tablet (15 mg) by mouth daily at bedtime    mirtazapine (REMERON) 7.5 MG Tab take one tablet by mouth every night at bedtime    potassium citrate (UROCIT-K 15) 15 mEq TbSR TK 1 T PO BID    FLUoxetine 20 MG capsule Take 40 mg by mouth once daily.     LORazepam (ATIVAN) 0.5 MG tablet Take 0.5 mg by mouth every 6 (six) hours as needed for Anxiety.    mirtazapine (REMERON) 15 MG tablet Take 1 tablet (15 mg) by mouth daily at bedtime    mirtazapine (REMERON) 30 MG tablet Take 1 tablet (30 mg) by mouth daily at bedtime    mirtazapine (REMERON) 7.5 MG Tab Take 1 tablet by mouth daily at bedtime    tamsulosin (FLOMAX) 0.4 mg Cap Take 0.4 mg by mouth once daily.     No current facility-administered medications for this visit.       ROS:  GENERAL: No fever. No chills. No fatigue. Denies weight loss. Denies weight gain.  HEENT: Denies headaches. Denies vision change. Denies eye pain. Denies double vision. Denies ear pain.   CV: Denies chest pain.   PULM: Denies of shortness of breath.  GI: Denies constipation. No diarrhea. No abdominal pain. Denies nausea. Denies vomiting. No blood in stool.  HEME: Denies bleeding problems.  : Denies urgency. No painful urination. No blood in urine.  MS: Denies joint stiffness. Denies joint swelling.  +Neck Pain  SKIN: Denies rash.   NEURO: Denies seizures. No weakness.  PSYCH:  Denies difficulty sleeping. No anxiety. Denies depression. No suicidal thoughts.  "      VITALS:   Vitals:    04/27/22 1102   BP: 107/62   Pulse: 73   Resp: 18   Temp: 98.2 °F (36.8 °C)   TempSrc: Oral   Weight: 62.6 kg (138 lb)   Height: 5' 3" (1.6 m)   PainSc:   6   PainLoc: Neck         PHYSICAL EXAM:   GENERAL: Well appearing, in no acute distress, alert and oriented x3.  PSYCH:  Mood and affect appropriate.  SKIN: Skin color, texture, turgor normal, no rashes or lesions.  HEENT:  Normocephalic, atraumatic. Cranial nerves grossly intact.  NECK: Good ROM for a fusion. Pain with left rotation. No pain to palpation over the cervical paraspinous muscles. No pain with neck flexion, extension, or lateral flexion. Pain reproduced with palpation of the upper left cervical facets.   PULM: No evidence of respiratory difficulty, symmetric chest rise.  GI:  Non-distended  BACK: Normal range of motion. No pain to palpation over the spinous processes. No pain to palpation over facet joints. There is no pain with palpation over the sacroiliac joints bilaterally. Straight leg raising in the supine position is negative to radicular pain.   EXTREMITIES: No deformities, edema, or skin discoloration.   MUSCULOSKELETAL: Shoulder, hip, and knee provocative maneuvers are negative. Bilateral upper and lower extremity strength is normal and symmetric. No atrophy is noted.  NEURO: Sensation is equal and appropriate bilaterally. Bilateral upper and lower extremity coordination and muscle stretch reflexes are physiologic and symmetric. Plantar response are downgoing.   GAIT: normal.      LABS:      IMAGING:    EXAMINATION:  XR CERVICAL SPINE 5 VIEW WITH FLEX AND EXT     CLINICAL HISTORY:  Cervicalgia     TECHNIQUE:  Five views of the cervical spine plus flexion and extension views were performed.     COMPARISON:  11/03/2008     FINDINGS:  Interval operative change C4/C7 anterior cervical fusion with metallic plate and, screw device and interbody devices.  The cervical sagittal alignment is slightly improved.  There is no " significant listhesis with flexion extension positioning.  No evidence for hardware failure.  Cervical vertebral body heights and contours within normal is without evidence for acute fracture.  Surgical clips left neck soft tissues.  Open mouth view limited by overlapping structures.  Questioned bilateral bony neural foraminal stenosis evaluation limited by overlapping structures on the oblique imaging.  Further evaluation as warranted clinically.     Impression:     Please see above        Electronically signed by: Hung Muniz DO  Date:                                            05/31/2021  Time:                                           13:33    ASSESSMENT: 52 y.o. year old male with pain, consistent with left cervical facet arthropathy.      DISCUSSION: Mr. Rodriguez previously had a C4-7 ACDF at [facility] and comes to us with progressive left sided neck pain. On exam his pain is reproduced with palpation of the left upper facets.     PLAN:  1. Left C2-C5 MBB    I would like to thank Ainsley Russell* for the opportunity to assist in the care of this patient. We had a very nice visit and I look forward to continuing his care. Please let me know if I can be of further assistance.     Kimberly Wilson  04/27/2022

## 2022-04-28 ENCOUNTER — TELEPHONE (OUTPATIENT)
Dept: ADMINISTRATIVE | Facility: OTHER | Age: 52
End: 2022-04-28
Payer: COMMERCIAL

## 2022-05-04 ENCOUNTER — TELEPHONE (OUTPATIENT)
Dept: ADMINISTRATIVE | Facility: OTHER | Age: 52
End: 2022-05-04
Payer: COMMERCIAL

## 2022-05-05 ENCOUNTER — PATIENT MESSAGE (OUTPATIENT)
Dept: PAIN MEDICINE | Facility: OTHER | Age: 52
End: 2022-05-05
Payer: COMMERCIAL

## 2022-05-05 ENCOUNTER — TELEPHONE (OUTPATIENT)
Dept: PAIN MEDICINE | Facility: OTHER | Age: 52
End: 2022-05-05
Payer: COMMERCIAL

## 2022-05-05 DIAGNOSIS — M47.812 SPONDYLOSIS WITHOUT MYELOPATHY OR RADICULOPATHY, CERVICAL REGION: Primary | ICD-10-CM

## 2022-05-12 ENCOUNTER — HOSPITAL ENCOUNTER (OUTPATIENT)
Dept: RADIOLOGY | Facility: OTHER | Age: 52
Discharge: HOME | End: 2022-05-12
Attending: STUDENT IN AN ORGANIZED HEALTH CARE EDUCATION/TRAINING PROGRAM
Payer: COMMERCIAL

## 2022-05-12 DIAGNOSIS — M85.852 OSTEOPENIA OF BOTH HIPS: ICD-10-CM

## 2022-05-12 DIAGNOSIS — M85.851 OSTEOPENIA OF BOTH HIPS: ICD-10-CM

## 2022-05-12 PROCEDURE — 77080 DXA BONE DENSITY AXIAL: CPT | Mod: 26,,, | Performed by: RADIOLOGY

## 2022-05-12 PROCEDURE — 77080 DEXA BONE DENSITY SPINE HIP: ICD-10-PCS | Mod: 26,,, | Performed by: RADIOLOGY

## 2022-05-12 PROCEDURE — 77080 DXA BONE DENSITY AXIAL: CPT | Mod: TC

## 2022-05-13 ENCOUNTER — HOSPITAL ENCOUNTER (OUTPATIENT)
Facility: OTHER | Age: 52
Discharge: HOME | End: 2022-05-13
Attending: ANESTHESIOLOGY | Admitting: ANESTHESIOLOGY
Payer: COMMERCIAL

## 2022-05-13 VITALS
DIASTOLIC BLOOD PRESSURE: 68 MMHG | TEMPERATURE: 98 F | OXYGEN SATURATION: 100 % | HEART RATE: 57 BPM | RESPIRATION RATE: 14 BRPM | SYSTOLIC BLOOD PRESSURE: 118 MMHG

## 2022-05-13 DIAGNOSIS — M47.816 SPONDYLOSIS WITHOUT MYELOPATHY OR RADICULOPATHY, LUMBAR REGION: Primary | ICD-10-CM

## 2022-05-13 DIAGNOSIS — M47.812 SPONDYLOSIS WITHOUT MYELOPATHY OR RADICULOPATHY, CERVICAL REGION: ICD-10-CM

## 2022-05-13 PROCEDURE — 64490 PR INJ DX/THER AGNT PARAVERT FACET JOINT,IMG GUIDE,CERV/THORAC, 1ST LEVEL: ICD-10-PCS | Mod: KX,LT,, | Performed by: ANESTHESIOLOGY

## 2022-05-13 PROCEDURE — 99152 PR MOD CONSCIOUS SEDATION, SAME PHYS, 5+ YRS, FIRST 15 MIN: ICD-10-PCS | Mod: ,,, | Performed by: ANESTHESIOLOGY

## 2022-05-13 PROCEDURE — 64491 INJ PARAVERT F JNT C/T 2 LEV: CPT | Mod: KX,LT,, | Performed by: ANESTHESIOLOGY

## 2022-05-13 PROCEDURE — 64492 PR INJ DX/THER AGNT PARAVERT FACET JOINT,IMG GUIDE,CERV/THORAC, ADD LEVEL: ICD-10-PCS | Mod: KX,LT,, | Performed by: ANESTHESIOLOGY

## 2022-05-13 PROCEDURE — 64492 INJ PARAVERT F JNT C/T 3 LEV: CPT | Mod: KX,LT | Performed by: ANESTHESIOLOGY

## 2022-05-13 PROCEDURE — 64491 PR INJ DX/THER AGNT PARAVERT FACET JOINT,IMG GUIDE,CERV/THORAC, 2ND LEVEL: ICD-10-PCS | Mod: KX,LT,, | Performed by: ANESTHESIOLOGY

## 2022-05-13 PROCEDURE — 64490 INJ PARAVERT F JNT C/T 1 LEV: CPT | Mod: KX,LT | Performed by: ANESTHESIOLOGY

## 2022-05-13 PROCEDURE — 63600175 PHARM REV CODE 636 W HCPCS: Performed by: ANESTHESIOLOGY

## 2022-05-13 PROCEDURE — 25000003 PHARM REV CODE 250: Performed by: ANESTHESIOLOGY

## 2022-05-13 PROCEDURE — 64491 INJ PARAVERT F JNT C/T 2 LEV: CPT | Mod: KX,LT | Performed by: ANESTHESIOLOGY

## 2022-05-13 PROCEDURE — 99152 MOD SED SAME PHYS/QHP 5/>YRS: CPT | Performed by: ANESTHESIOLOGY

## 2022-05-13 PROCEDURE — 64490 INJ PARAVERT F JNT C/T 1 LEV: CPT | Mod: KX,LT,, | Performed by: ANESTHESIOLOGY

## 2022-05-13 PROCEDURE — 99152 MOD SED SAME PHYS/QHP 5/>YRS: CPT | Mod: ,,, | Performed by: ANESTHESIOLOGY

## 2022-05-13 PROCEDURE — 64492 INJ PARAVERT F JNT C/T 3 LEV: CPT | Mod: KX,LT,, | Performed by: ANESTHESIOLOGY

## 2022-05-13 RX ORDER — MIDAZOLAM HYDROCHLORIDE 1 MG/ML
INJECTION INTRAMUSCULAR; INTRAVENOUS
Status: DISCONTINUED | OUTPATIENT
Start: 2022-05-13 | End: 2022-05-13 | Stop reason: HOSPADM

## 2022-05-13 RX ORDER — LIDOCAINE HYDROCHLORIDE 20 MG/ML
INJECTION, SOLUTION INFILTRATION; PERINEURAL
Status: DISCONTINUED | OUTPATIENT
Start: 2022-05-13 | End: 2022-05-13 | Stop reason: HOSPADM

## 2022-05-13 RX ORDER — SODIUM CHLORIDE 9 MG/ML
500 INJECTION, SOLUTION INTRAVENOUS CONTINUOUS
Status: DISCONTINUED | OUTPATIENT
Start: 2022-05-13 | End: 2022-05-13 | Stop reason: HOSPADM

## 2022-05-13 RX ORDER — FENTANYL CITRATE 50 UG/ML
INJECTION, SOLUTION INTRAMUSCULAR; INTRAVENOUS
Status: DISCONTINUED | OUTPATIENT
Start: 2022-05-13 | End: 2022-05-13 | Stop reason: HOSPADM

## 2022-05-13 RX ORDER — BUPIVACAINE HYDROCHLORIDE 2.5 MG/ML
INJECTION, SOLUTION EPIDURAL; INFILTRATION; INTRACAUDAL
Status: DISCONTINUED | OUTPATIENT
Start: 2022-05-13 | End: 2022-05-13 | Stop reason: HOSPADM

## 2022-05-13 NOTE — DISCHARGE SUMMARY
[facility] - Pain Management ([facility])  Discharge Note  Short Stay    Procedure(s) (LRB):  Block, Nerve MEDIAL BRANCH BLOCK LEFT C2,3,4,5 (Left)    OUTCOME: Patient tolerated treatment/procedure well without complication and is now ready for discharge.    DISPOSITION: Home or Self Care    FINAL DIAGNOSIS:  Cervical spondylosis    FOLLOWUP: In clinic    DISCHARGE INSTRUCTIONS:  No discharge procedures on file.     TIME SPENT ON DISCHARGE: 2 minutes

## 2022-05-13 NOTE — OP NOTE
Diagnostic Cervical Medial Branch Block under Fluoroscopy Guidance    The procedure, risks, benefits, and options were discussed with the patient. There are no contraindications to the procedure. The patent expressed understanding and agreed to the procedure. Informed written consent was obtained prior to the start of the procedure and can be found in the patient's chart.        PATIENT NAME: Rebel Rodriguez   MRN: [id number]     DATE OF PROCEDURE: 05/13/2022                                                             PROCEDURE:  Diagnostic Left C2, C3, C4 and C5 Cervical Medial Branch Block under Fluoroscopy Guidance    PRE-OP DIAGNOSIS: Spondylosis without myelopathy or radiculopathy, cervical region [M47.812] Cervical Spondylosis [M47.812]    POST-OP DIAGNOSIS: Same    PHYSICIAN: Kimberly Wilson MD    ASSISTANTS: none    MEDICATIONS INJECTED:  Bupivacaine 0.25%    LOCAL ANESTHETIC INJECTED:   Xylocaine 2%    SEDATION:   Versed 1mg and Fentanyl 50mcg                                                                                                                                                                                     Conscious sedation ordered by M.D. Patient re-evaluation prior to administration of conscious sedation. No changes noted in patient's status from initial evaluation. The patient's vital signs were monitored by RN and patient remained hemodynamically stable throughout the procedure.    Event Time In   Sedation Start 1131   Sedation End 1143       ESTIMATED BLOOD LOSS:  None    COMPLICATIONS:  None.    INDICATIONS: Patient has clinical and imaging findings suggestive of facet mediated pain.    TECHNIQUE:   Time-out was performed to identify the patient and procedure to be performed. With the patient laying in a prone position, the surgical area was prepped and draped in the usual sterile fashion using ChloraPrep and fenestrated drape. The levels were determined under fluoroscopic guidance. Skin  anesthesia was achieved by injecting Lidocaine 2% over the injection sites.  A 25 gauge, 3.5 inch needle was introduced to each level using AP, lateral and/or contralateral oblique fluoroscopic imaging. After negative aspiration for blood or CSF was confirmed, 1 mL of the anesthetic listed above was then slowly injected at each site. The needles were removed and bleeding was nil. A sterile dressing was applied. No specimens collected. The patient tolerated the procedure well.       The patient was monitored after the procedure in the recovery area. They were given post-procedure and discharge instructions to follow at home. The patient was discharged in a stable condition.    Kimberly Wilson MD

## 2022-05-13 NOTE — DISCHARGE INSTRUCTIONS
Thank you for allowing us to care for you today. You may receive a survey about the care we provided. Your feedback is valuable and helps us provide excellent care throughout the community.     Home Care Instructions for Pain Management:    1. DIET:   You may resume your normal diet today.   2. BATHING:   You may shower with luke warm water. No tub baths or anything that will soak injection sites under water for the next 24 hours.  3. DRESSING:   You may remove your bandage today.   4. ACTIVITY LEVEL:   You may resume your normal activities 24 hrs after your procedure. Nothing strenuous today.  5. MEDICATIONS:   You may resume your normal medications today. To restart blood thinners, ask your doctor.  6. DRIVING    If you have received any sedatives by mouth today, you may not drive for 12 hours.    If you have received any sedation through your IV, you may not drive for 24 hrs.   7. SPECIAL INSTRUCTIONS:   No heat to the injection site for 24 hrs including, hot bath or shower, heating pad, moist heat, or hot tubs.    Use ice pack to injection site for any pain or discomfort.  Apply ice packs for 20 minute intervals as needed.    IF you have diabetes, be sure to monitor your blood sugar more closely. IF your injection contained steroids your blood sugar levels may become higher than normal.    If you are still having pain upon discharge:  Your pain may improve over the next 48 hours. The anesthetic (numbing medication) works immediately to 48 hours. IF your injection contained a steroid (anti-inflammatory medication), it takes approximately 3 days to start feeling relief and 7-10 days to see your greatest results from the medication. It is possible you may need subsequent injections. This would be discussed at your follow up appointment with pain management or your referring doctor.    Please call the PAIN MANAGEMENT office at [phone] or ON CALL pager at [phone] if you experienced any:   -Weakness or  loss of sensation  -Fever > 101.5  -Pain uncontrolled with oral medications   -Persistent nausea, vomiting, or diarrhea  -Redness or drainage from the injection sites, or any other worrisome concerns.   If physician on call was not reached or could not communicate with our office for any reason please go to the nearest emergency department.   Lumbar/Cervical/Joint Medial Branch Block Pain Diary    Patient Name:  :    Pre-procedure Pain Score: _____/10    Time of injection:     Please fill out the chart below to the best of your ability. We need to know how much percentage of relief in your pain that you have while the numbing medication is active. Please be mindful that this is a diagnostic test and may not last for a long time. If you are asleep during one of the times listed below, you do not have to record that time.     Time After the   Procedure % of pain relief   achieved Pain Score (0-10)   1 hour post-procedure     2 hours post-procedure     3 hours post-procedure     4 hours post-procedure     5 hours post-procedure     6 hours post-procedure     12 hours post-procedure     24 hours post-procedure       Please make sure to return this form or information to the clinic. This information is needed to proceed with your plan of care. There are several options that you can use to send this back to us.    Form can be faxed to us at [phone]  Call us to provide the information at [phone]  Send the information to us through your [facility] Chart    If you have any questions about completing this diary, please call us at [phone].

## 2022-05-15 ENCOUNTER — PATIENT MESSAGE (OUTPATIENT)
Dept: PAIN MEDICINE | Facility: CLINIC | Age: 52
End: 2022-05-15
Payer: COMMERCIAL

## 2022-05-15 DIAGNOSIS — M47.812 CERVICAL SPONDYLOSIS: Primary | ICD-10-CM

## 2022-05-16 ENCOUNTER — PATIENT MESSAGE (OUTPATIENT)
Dept: PAIN MEDICINE | Facility: OTHER | Age: 52
End: 2022-05-16
Payer: COMMERCIAL

## 2022-05-24 ENCOUNTER — HOSPITAL ENCOUNTER (OUTPATIENT)
Facility: OTHER | Age: 52
Discharge: HOME | End: 2022-05-24
Attending: ANESTHESIOLOGY | Admitting: ANESTHESIOLOGY
Payer: COMMERCIAL

## 2022-05-24 VITALS
RESPIRATION RATE: 16 BRPM | OXYGEN SATURATION: 100 % | HEIGHT: 67 IN | SYSTOLIC BLOOD PRESSURE: 112 MMHG | TEMPERATURE: 98 F | WEIGHT: 135 LBS | BODY MASS INDEX: 21.19 KG/M2 | HEART RATE: 62 BPM | DIASTOLIC BLOOD PRESSURE: 60 MMHG

## 2022-05-24 DIAGNOSIS — M47.812 SPONDYLOSIS WITHOUT MYELOPATHY OR RADICULOPATHY, CERVICAL REGION: Primary | ICD-10-CM

## 2022-05-24 PROCEDURE — 64492 INJ PARAVERT F JNT C/T 3 LEV: CPT | Mod: LT | Performed by: ANESTHESIOLOGY

## 2022-05-24 PROCEDURE — 64492 PR INJ DX/THER AGNT PARAVERT FACET JOINT,IMG GUIDE,CERV/THORAC, ADD LEVEL: ICD-10-PCS | Mod: LT,,, | Performed by: ANESTHESIOLOGY

## 2022-05-24 PROCEDURE — 64491 PR INJ DX/THER AGNT PARAVERT FACET JOINT,IMG GUIDE,CERV/THORAC, 2ND LEVEL: ICD-10-PCS | Mod: LT,,, | Performed by: ANESTHESIOLOGY

## 2022-05-24 PROCEDURE — 64492 INJ PARAVERT F JNT C/T 3 LEV: CPT | Mod: LT,,, | Performed by: ANESTHESIOLOGY

## 2022-05-24 PROCEDURE — 64490 INJ PARAVERT F JNT C/T 1 LEV: CPT | Mod: LT | Performed by: ANESTHESIOLOGY

## 2022-05-24 PROCEDURE — 64491 INJ PARAVERT F JNT C/T 2 LEV: CPT | Mod: LT | Performed by: ANESTHESIOLOGY

## 2022-05-24 PROCEDURE — 64490 INJ PARAVERT F JNT C/T 1 LEV: CPT | Mod: LT,,, | Performed by: ANESTHESIOLOGY

## 2022-05-24 PROCEDURE — 64491 INJ PARAVERT F JNT C/T 2 LEV: CPT | Mod: LT,,, | Performed by: ANESTHESIOLOGY

## 2022-05-24 PROCEDURE — 64490 PR INJ DX/THER AGNT PARAVERT FACET JOINT,IMG GUIDE,CERV/THORAC, 1ST LEVEL: ICD-10-PCS | Mod: LT,,, | Performed by: ANESTHESIOLOGY

## 2022-05-24 PROCEDURE — 63600175 PHARM REV CODE 636 W HCPCS: Performed by: ANESTHESIOLOGY

## 2022-05-24 PROCEDURE — 25000003 PHARM REV CODE 250: Performed by: STUDENT IN AN ORGANIZED HEALTH CARE EDUCATION/TRAINING PROGRAM

## 2022-05-24 PROCEDURE — 25000003 PHARM REV CODE 250: Performed by: ANESTHESIOLOGY

## 2022-05-24 RX ORDER — MIDAZOLAM HYDROCHLORIDE 1 MG/ML
INJECTION, SOLUTION INTRAMUSCULAR; INTRAVENOUS
Status: DISCONTINUED | OUTPATIENT
Start: 2022-05-24 | End: 2022-05-24 | Stop reason: HOSPADM

## 2022-05-24 RX ORDER — SODIUM CHLORIDE 9 MG/ML
500 INJECTION, SOLUTION INTRAVENOUS CONTINUOUS
Status: DISCONTINUED | OUTPATIENT
Start: 2022-05-24 | End: 2022-05-24 | Stop reason: HOSPADM

## 2022-05-24 RX ORDER — BUPIVACAINE HYDROCHLORIDE 2.5 MG/ML
INJECTION, SOLUTION EPIDURAL; INFILTRATION; INTRACAUDAL
Status: DISCONTINUED | OUTPATIENT
Start: 2022-05-24 | End: 2022-05-24 | Stop reason: HOSPADM

## 2022-05-24 RX ORDER — LIDOCAINE HYDROCHLORIDE 20 MG/ML
INJECTION, SOLUTION INFILTRATION; PERINEURAL
Status: DISCONTINUED | OUTPATIENT
Start: 2022-05-24 | End: 2022-05-24 | Stop reason: HOSPADM

## 2022-05-24 NOTE — DISCHARGE SUMMARY
[facility] - Pain Management ([facility])  Discharge Note  Short Stay    Procedure(s) (LRB):  BLOCK, NERVE, LEFT C2-C5 MEDIAL BRANCH (Left)    OUTCOME: Patient tolerated treatment/procedure well without complication and is now ready for discharge.    DISPOSITION: Home or Self Care    FINAL DIAGNOSIS:  Cervical spondylosis    FOLLOWUP: In clinic    DISCHARGE INSTRUCTIONS:  No discharge procedures on file.     TIME SPENT ON DISCHARGE: 2 minutes

## 2022-05-24 NOTE — DISCHARGE INSTRUCTIONS

## 2022-05-24 NOTE — OP NOTE
Diagnostic Cervical Medial Branch Block under Fluoroscopy Guidance    The procedure, risks, benefits, and options were discussed with the patient. There are no contraindications to the procedure. The patent expressed understanding and agreed to the procedure. Informed written consent was obtained prior to the start of the procedure and can be found in the patient's chart.        PATIENT NAME: Rebel Rodriguez   MRN: [id number]     DATE OF PROCEDURE: 05/24/2022                                                             PROCEDURE:  Diagnostic Left C2, C3, C4 and C5 Cervical Medial Branch Block under Fluoroscopy Guidance    PRE-OP DIAGNOSIS: Cervical spondylosis [M47.812] Cervical Spondylosis [M47.812]    POST-OP DIAGNOSIS: Same    PHYSICIAN: Kimberly Wilson MD    ASSISTANTS: none    MEDICATIONS INJECTED:  Bupivacaine 0.25%    LOCAL ANESTHETIC INJECTED:   Xylocaine 2%    ANXIOLYSIS: Versed 0.5mg    ESTIMATED BLOOD LOSS:  None    COMPLICATIONS:  None.    INDICATIONS: Patient has clinical and imaging findings suggestive of facet mediated pain. Patient had a previous diagnostic block performed with at least 80% relief in pain and/or at least 50% improvement in the ability to perform previously painful movements and ADLs for the expected duration of the local anesthetic utilized.    TECHNIQUE:   Time-out was performed to identify the patient and procedure to be performed. With the patient laying in a prone position, the surgical area was prepped and draped in the usual sterile fashion using ChloraPrep and fenestrated drape. The levels were determined under fluoroscopic guidance. Skin anesthesia was achieved by injecting Lidocaine 2% over the injection sites.  A 25 gauge, 3.5 inch needle was introduced to each level using AP, lateral and/or contralateral oblique fluoroscopic imaging. After negative aspiration for blood or CSF was confirmed, 0.5 mL of the anesthetic listed above was then slowly injected at each site. The needles  were removed and bleeding was nil. A sterile dressing was applied. No specimens collected. The patient tolerated the procedure well.       The patient was monitored after the procedure in the recovery area. They were given post-procedure and discharge instructions to follow at home. The patient was discharged in a stable condition.    Kimberly Wilson MD

## 2022-05-25 ENCOUNTER — SPECIALTY PHARMACY (OUTPATIENT)
Dept: PHARMACY | Facility: CLINIC | Age: 52
End: 2022-05-25
Payer: COMMERCIAL

## 2022-05-25 NOTE — TELEPHONE ENCOUNTER
Specialty Pharmacy - Refill Coordination    Specialty Medication Orders Linked to Encounter    Flowsheet Row Most Recent Value   Medication #1 apremilast (OTEZLA) 30 mg Tab (Order#[id number], Rx#[id number])          Refill Questions - Documented Responses    Flowsheet Row Most Recent Value   Patient Availability and HIPAA Verification    Does patient want to proceed with activity? Yes   HIPAA/medical authority confirmed? Yes   Relationship to patient of person spoken to? Self   Refill Screening Questions    Changes to allergies? No   Changes to medications? No   New conditions since last clinic visit? No   Unplanned office visit, urgent care, ED, or hospital admission in the last 4 weeks? No   How does patient/caregiver feel medication is working? Good   Financial problems or insurance changes? No   How many doses of your specialty medications were missed in the last 4 weeks? 0   Would patient like to speak to a pharmacist? No   When does the patient need to receive the medication? 05/30/22   Refill Delivery Questions    How will the patient receive the medication? Delivery Michelle   When does the patient need to receive the medication? 05/30/22   Shipping Address Temporary   Address in [facility] confirmed and updated if neccessary? Yes   Expected Copay ($) 0   Is the patient able to afford the medication copay? Yes   Payment Method zero copay   Days supply of Refill 30   Supplies needed? No supplies needed   Refill activity completed? Yes   Refill activity plan Refill scheduled   Shipment/Pickup Date: 05/30/22          Current Outpatient Medications   Medication Sig    apremilast (OTEZLA) 30 mg Tab Take 1 tablet (30 mg total) by mouth 2 (two) times daily.    aspirin (ECOTRIN) 81 MG EC tablet Take 81 mg by mouth once daily.    atorvastatin (LIPITOR) 10 MG tablet Take 1 tablet (10 mg total) by mouth every evening.    citric acid-potassium citrate (POLYCITRA) 1,100-334 mg/5 mL solution Take by mouth 2 (two)  times a day.    FLUoxetine 40 MG capsule Take 1 capsule (40 mg) by mouth daily    guselkumab (TREMFYA) 100 mg/mL AtIn Inject 1 injector (100 mg) into the skin at weeks 0, 4, and then every 8 weeks thereafter (Patient taking differently: Inject 1 injector (100 mg) into the skin at weeks 0, 4, and then every 8 weeks thereafter)    leflunomide (ARAVA) 20 MG Tab Take 1 tablet (20 mg total) by mouth once daily.    mirtazapine (REMERON) 15 MG tablet Take 1 tablet (15 mg) by mouth daily at bedtime    mirtazapine (REMERON) 7.5 MG Tab take one tablet by mouth every night at bedtime    potassium citrate (UROCIT-K 15) 15 mEq TbSR TK 1 T PO BID    tamsulosin (FLOMAX) 0.4 mg Cap Take 0.4 mg by mouth once daily.   Last reviewed on 5/24/2022  7:53 AM by Cale Hudson RN    Review of patient's allergies indicates:   Allergen Reactions    Erythromycin Nausea And Vomiting    Infliximab Other (See Comments)     Lupus with fever and acute arthritis  Lupus with fever and acute arthritis    Last reviewed on  5/24/2022 7:52 AM by Cale Hudson      Tasks added this encounter   6/22/2022 - Refill Call (Auto Added)   Tasks due within next 3 months   No tasks due.     [facility]  [address] [facility] [address]  Phone: [phone]  Fax: [phone]

## 2022-05-26 ENCOUNTER — PATIENT MESSAGE (OUTPATIENT)
Dept: PAIN MEDICINE | Facility: CLINIC | Age: 52
End: 2022-05-26
Payer: COMMERCIAL

## 2022-06-08 ENCOUNTER — IMMUNIZATION (OUTPATIENT)
Dept: INTERNAL MEDICINE | Facility: CLINIC | Age: 52
End: 2022-06-08
Payer: COMMERCIAL

## 2022-06-08 DIAGNOSIS — Z23 NEED FOR VACCINATION: Primary | ICD-10-CM

## 2022-06-08 PROCEDURE — 91305 COVID-19, MRNA, LNP-S, PF, 30 MCG/0.3 ML DOSE VACCINE (PFIZER): CPT | Mod: PBBFAC | Performed by: INTERNAL MEDICINE

## 2022-06-22 ENCOUNTER — SPECIALTY PHARMACY (OUTPATIENT)
Dept: PHARMACY | Facility: CLINIC | Age: 52
End: 2022-06-22
Payer: COMMERCIAL

## 2022-06-22 DIAGNOSIS — L40.50 PSA (PSORIATIC ARTHRITIS): ICD-10-CM

## 2022-06-22 NOTE — TELEPHONE ENCOUNTER
Spoke w pt regarding Tremfya and Otezla refills. Refill request sent to [facility] for Tremfya. Pt requested both medications be shipped at the same time. Will follow up once received.

## 2022-06-23 ENCOUNTER — PATIENT MESSAGE (OUTPATIENT)
Dept: PAIN MEDICINE | Facility: CLINIC | Age: 52
End: 2022-06-23
Payer: COMMERCIAL

## 2022-06-23 RX ORDER — GUSELKUMAB 100 MG/ML
100 INJECTION SUBCUTANEOUS
Qty: 3 ML | Refills: 2 | Status: SHIPPED | OUTPATIENT
Start: 2022-06-23 | End: 2022-10-24 | Stop reason: SDUPTHER

## 2022-06-24 NOTE — TELEPHONE ENCOUNTER
Tremfya dose due 7/13 and he is good on otezla. PA required for both medications. Patient is aware we will follow up once approved.

## 2022-06-27 ENCOUNTER — TELEPHONE (OUTPATIENT)
Dept: PAIN MEDICINE | Facility: OTHER | Age: 52
End: 2022-06-27
Payer: COMMERCIAL

## 2022-06-27 DIAGNOSIS — M47.812 CERVICAL SPONDYLOSIS: Primary | ICD-10-CM

## 2022-06-28 ENCOUNTER — TELEPHONE (OUTPATIENT)
Dept: ADMINISTRATIVE | Facility: OTHER | Age: 52
End: 2022-06-28
Payer: COMMERCIAL

## 2022-06-28 NOTE — TELEPHONE ENCOUNTER
Tremfya prior authorization approved from 6/14/2022-3/28/2023. Case ID: [id number]    Yadiel PRUITT denied - plan does not allow use in combination with a biologic. LVM for patient to update. Urgent appeal faxed to [phone].

## 2022-06-29 NOTE — TELEPHONE ENCOUNTER
Patient returned call from [facility] --- informed that the PA for tremfya was approved, but otezla was not. He is aware the appeal has been sent in and waiting to be reviewed.     He would still like to wait for both medications to be ready as he usually comes to pickup. [facility] will continue to follow up.

## 2022-06-30 ENCOUNTER — TELEPHONE (OUTPATIENT)
Dept: ADMINISTRATIVE | Facility: OTHER | Age: 52
End: 2022-06-30
Payer: COMMERCIAL

## 2022-07-01 NOTE — TELEPHONE ENCOUNTER
PA status check [[phone]]. Rep- Romel. First level appeal denied, Otezla considered investigational when used in combo with a biologic. Will submit second level urgently.     Second level appeal faxed urgently to [phone]. Case ID: [id number].

## 2022-07-05 ENCOUNTER — PATIENT MESSAGE (OUTPATIENT)
Dept: PAIN MEDICINE | Facility: OTHER | Age: 52
End: 2022-07-05
Payer: COMMERCIAL

## 2022-07-05 ENCOUNTER — TELEPHONE (OUTPATIENT)
Dept: ADMINISTRATIVE | Facility: OTHER | Age: 52
End: 2022-07-05
Payer: COMMERCIAL

## 2022-07-06 ENCOUNTER — LAB VISIT (OUTPATIENT)
Dept: LAB | Facility: OTHER | Age: 52
End: 2022-07-06
Attending: INTERNAL MEDICINE
Payer: COMMERCIAL

## 2022-07-06 ENCOUNTER — TELEPHONE (OUTPATIENT)
Dept: RHEUMATOLOGY | Facility: CLINIC | Age: 52
End: 2022-07-06
Payer: COMMERCIAL

## 2022-07-06 ENCOUNTER — PATIENT MESSAGE (OUTPATIENT)
Dept: RHEUMATOLOGY | Facility: CLINIC | Age: 52
End: 2022-07-06
Payer: COMMERCIAL

## 2022-07-06 DIAGNOSIS — L40.50 PSA (PSORIATIC ARTHRITIS): ICD-10-CM

## 2022-07-06 DIAGNOSIS — D64.9 ANEMIA, UNSPECIFIED TYPE: Primary | ICD-10-CM

## 2022-07-06 LAB
ALBUMIN SERPL BCP-MCNC: 3.7 G/DL (ref 3.5–5.2)
ALP SERPL-CCNC: 79 U/L (ref 55–135)
ALT SERPL W/O P-5'-P-CCNC: 19 U/L (ref 10–44)
ANION GAP SERPL CALC-SCNC: 6 MMOL/L (ref 8–16)
AST SERPL-CCNC: 23 U/L (ref 10–40)
BASOPHILS # BLD AUTO: 0.03 K/UL (ref 0–0.2)
BASOPHILS NFR BLD: 0.5 % (ref 0–1.9)
BILIRUB SERPL-MCNC: 0.2 MG/DL (ref 0.1–1)
BUN SERPL-MCNC: 9 MG/DL (ref 6–20)
C3 SERPL-MCNC: 90 MG/DL (ref 50–180)
C4 SERPL-MCNC: 24 MG/DL (ref 11–44)
CALCIUM SERPL-MCNC: 9.3 MG/DL (ref 8.7–10.5)
CHLORIDE SERPL-SCNC: 106 MMOL/L (ref 95–110)
CO2 SERPL-SCNC: 27 MMOL/L (ref 23–29)
CREAT SERPL-MCNC: 1 MG/DL (ref 0.5–1.4)
CRP SERPL-MCNC: 0.7 MG/L (ref 0–8.2)
DIFFERENTIAL METHOD: ABNORMAL
EOSINOPHIL # BLD AUTO: 0.3 K/UL (ref 0–0.5)
EOSINOPHIL NFR BLD: 4.9 % (ref 0–8)
ERYTHROCYTE [DISTWIDTH] IN BLOOD BY AUTOMATED COUNT: 14 % (ref 11.5–14.5)
ERYTHROCYTE [SEDIMENTATION RATE] IN BLOOD: 14 MM/HR (ref 0–10)
EST. GFR  (AFRICAN AMERICAN): >60 ML/MIN/1.73 M^2
EST. GFR  (NON AFRICAN AMERICAN): >60 ML/MIN/1.73 M^2
GLUCOSE SERPL-MCNC: 108 MG/DL (ref 70–110)
HCT VFR BLD AUTO: 40.5 % (ref 40–54)
HGB BLD-MCNC: 13.2 G/DL (ref 14–18)
IMM GRANULOCYTES # BLD AUTO: 0.02 K/UL (ref 0–0.04)
IMM GRANULOCYTES NFR BLD AUTO: 0.3 % (ref 0–0.5)
LYMPHOCYTES # BLD AUTO: 1.6 K/UL (ref 1–4.8)
LYMPHOCYTES NFR BLD: 25.5 % (ref 18–48)
MCH RBC QN AUTO: 28.7 PG (ref 27–31)
MCHC RBC AUTO-ENTMCNC: 32.6 G/DL (ref 32–36)
MCV RBC AUTO: 88 FL (ref 82–98)
MONOCYTES # BLD AUTO: 0.6 K/UL (ref 0.3–1)
MONOCYTES NFR BLD: 9.2 % (ref 4–15)
NEUTROPHILS # BLD AUTO: 3.7 K/UL (ref 1.8–7.7)
NEUTROPHILS NFR BLD: 59.6 % (ref 38–73)
NRBC BLD-RTO: 0 /100 WBC
PLATELET # BLD AUTO: 239 K/UL (ref 150–450)
PMV BLD AUTO: 9.9 FL (ref 9.2–12.9)
POTASSIUM SERPL-SCNC: 4.2 MMOL/L (ref 3.5–5.1)
PROT SERPL-MCNC: 6.7 G/DL (ref 6–8.4)
RBC # BLD AUTO: 4.6 M/UL (ref 4.6–6.2)
SODIUM SERPL-SCNC: 139 MMOL/L (ref 136–145)
WBC # BLD AUTO: 6.28 K/UL (ref 3.9–12.7)

## 2022-07-06 PROCEDURE — 85651 RBC SED RATE NONAUTOMATED: CPT | Performed by: INTERNAL MEDICINE

## 2022-07-06 PROCEDURE — 80053 COMPREHEN METABOLIC PANEL: CPT | Performed by: INTERNAL MEDICINE

## 2022-07-06 PROCEDURE — 86225 DNA ANTIBODY NATIVE: CPT | Performed by: INTERNAL MEDICINE

## 2022-07-06 PROCEDURE — 85025 COMPLETE CBC W/AUTO DIFF WBC: CPT | Performed by: INTERNAL MEDICINE

## 2022-07-06 PROCEDURE — 36415 COLL VENOUS BLD VENIPUNCTURE: CPT | Performed by: INTERNAL MEDICINE

## 2022-07-06 PROCEDURE — 86160 COMPLEMENT ANTIGEN: CPT | Mod: 59 | Performed by: INTERNAL MEDICINE

## 2022-07-06 PROCEDURE — 86160 COMPLEMENT ANTIGEN: CPT | Performed by: INTERNAL MEDICINE

## 2022-07-06 PROCEDURE — 86140 C-REACTIVE PROTEIN: CPT | Performed by: INTERNAL MEDICINE

## 2022-07-07 ENCOUNTER — LAB VISIT (OUTPATIENT)
Dept: LAB | Facility: OTHER | Age: 52
End: 2022-07-07
Attending: INTERNAL MEDICINE
Payer: COMMERCIAL

## 2022-07-07 DIAGNOSIS — L40.50 PSA (PSORIATIC ARTHRITIS): ICD-10-CM

## 2022-07-07 DIAGNOSIS — D64.9 ANEMIA, UNSPECIFIED TYPE: ICD-10-CM

## 2022-07-07 LAB
DSDNA AB SER-ACNC: NORMAL [IU]/ML
FERRITIN SERPL-MCNC: 26 NG/ML (ref 20–300)
FOLATE SERPL-MCNC: 11.9 NG/ML (ref 4–24)
IRON SERPL-MCNC: 41 UG/DL (ref 45–160)
SATURATED IRON: 11 % (ref 20–50)
TOTAL IRON BINDING CAPACITY: 358 UG/DL (ref 250–450)
TRANSFERRIN SERPL-MCNC: 242 MG/DL (ref 200–375)
VIT B12 SERPL-MCNC: 438 PG/ML (ref 210–950)

## 2022-07-07 PROCEDURE — 36415 COLL VENOUS BLD VENIPUNCTURE: CPT | Performed by: INTERNAL MEDICINE

## 2022-07-07 PROCEDURE — 82746 ASSAY OF FOLIC ACID SERUM: CPT | Performed by: INTERNAL MEDICINE

## 2022-07-07 PROCEDURE — 82607 VITAMIN B-12: CPT | Performed by: INTERNAL MEDICINE

## 2022-07-07 PROCEDURE — 84466 ASSAY OF TRANSFERRIN: CPT | Performed by: INTERNAL MEDICINE

## 2022-07-07 PROCEDURE — 82728 ASSAY OF FERRITIN: CPT | Performed by: INTERNAL MEDICINE

## 2022-07-08 ENCOUNTER — TELEPHONE (OUTPATIENT)
Dept: RHEUMATOLOGY | Facility: CLINIC | Age: 52
End: 2022-07-08
Payer: COMMERCIAL

## 2022-07-08 DIAGNOSIS — D50.9 IRON DEFICIENCY ANEMIA, UNSPECIFIED IRON DEFICIENCY ANEMIA TYPE: Primary | ICD-10-CM

## 2022-07-08 NOTE — TELEPHONE ENCOUNTER
Please schedule appt in Gastro with Dr. Menezes for KUSHAL, overdue for colonoscopy as well. Thank you ELIEL

## 2022-07-11 ENCOUNTER — OFFICE VISIT (OUTPATIENT)
Dept: RHEUMATOLOGY | Facility: CLINIC | Age: 52
End: 2022-07-11
Payer: COMMERCIAL

## 2022-07-11 ENCOUNTER — PATIENT MESSAGE (OUTPATIENT)
Dept: PAIN MEDICINE | Facility: OTHER | Age: 52
End: 2022-07-11
Payer: COMMERCIAL

## 2022-07-11 VITALS
DIASTOLIC BLOOD PRESSURE: 59 MMHG | BODY MASS INDEX: 22.13 KG/M2 | SYSTOLIC BLOOD PRESSURE: 105 MMHG | HEART RATE: 68 BPM | HEIGHT: 67 IN | WEIGHT: 141 LBS

## 2022-07-11 DIAGNOSIS — E78.5 HYPERLIPIDEMIA, UNSPECIFIED HYPERLIPIDEMIA TYPE: ICD-10-CM

## 2022-07-11 DIAGNOSIS — M47.812 CERVICAL SPONDYLOSIS: ICD-10-CM

## 2022-07-11 DIAGNOSIS — N20.0 RECURRENT NEPHROLITHIASIS: ICD-10-CM

## 2022-07-11 DIAGNOSIS — D50.9 IRON DEFICIENCY ANEMIA, UNSPECIFIED IRON DEFICIENCY ANEMIA TYPE: ICD-10-CM

## 2022-07-11 DIAGNOSIS — M85.851 OSTEOPENIA OF BOTH HIPS: ICD-10-CM

## 2022-07-11 DIAGNOSIS — Z51.81 MEDICATION MONITORING ENCOUNTER: ICD-10-CM

## 2022-07-11 DIAGNOSIS — L40.50 PSA (PSORIATIC ARTHRITIS): Primary | ICD-10-CM

## 2022-07-11 DIAGNOSIS — L85.3 DRY SKIN DERMATITIS: ICD-10-CM

## 2022-07-11 DIAGNOSIS — M85.852 OSTEOPENIA OF BOTH HIPS: ICD-10-CM

## 2022-07-11 PROCEDURE — 99999 PR PBB SHADOW E&M-EST. PATIENT-LVL IV: CPT | Mod: PBBFAC,,, | Performed by: STUDENT IN AN ORGANIZED HEALTH CARE EDUCATION/TRAINING PROGRAM

## 2022-07-11 PROCEDURE — 99214 PR OFFICE/OUTPT VISIT, EST, LEVL IV, 30-39 MIN: ICD-10-PCS | Mod: S$GLB,,, | Performed by: STUDENT IN AN ORGANIZED HEALTH CARE EDUCATION/TRAINING PROGRAM

## 2022-07-11 PROCEDURE — 99999 PR PBB SHADOW E&M-EST. PATIENT-LVL IV: ICD-10-PCS | Mod: PBBFAC,,, | Performed by: STUDENT IN AN ORGANIZED HEALTH CARE EDUCATION/TRAINING PROGRAM

## 2022-07-11 PROCEDURE — 99214 OFFICE O/P EST MOD 30 MIN: CPT | Mod: S$GLB,,, | Performed by: STUDENT IN AN ORGANIZED HEALTH CARE EDUCATION/TRAINING PROGRAM

## 2022-07-11 NOTE — PROGRESS NOTES
Pre chart    Answers for HPI/ROS submitted by the patient on 7/8/2022  fever: No  eye redness: No  mouth sores: No  headaches: No  shortness of breath: No  chest pain: No  trouble swallowing: No  diarrhea: No  constipation: No  unexpected weight change: No  genital sore: No  During the last 3 days, have you had a skin rash?: No  Bruises or bleeds easily: No  cough: No

## 2022-07-11 NOTE — H&P (VIEW-ONLY)
Subjective:       Patient ID: Rebel Rodriguez is a 52 y.o. male.    Chief Complaint: Psoriatic Arthritis  HPI     This is a 48YM with psoriatic arthritis and infliximab induced lupus, history of TIA (cannot take NSAIDs) presenting for follow up. Drug induced Lupus: Related to infliximab (last dose 2/29/16; + antiphosholipids-anticardiolipin IgM mild +dsDNA: 1:1280,  DEE+ 1:1280 homogenous ,  + anti histone ab, CH50 low at 51 along with recurrent intermittent fevers, chills, arthralgias.      Interval History:  7/11/22: He states that neck pain has bothering him a lot lately. He has stiffness in neck and lower back. He says that lower back stiffness lasts 20-30 mins in AM. The neck stiffness that last all day. He was seen by pain management for ablation tomorrow. He was not able to follow up with PT as he states that he has been busy. He denies dysuria or frequency. He has a history of kidney stones reoccurrence and follows with urology but has not seen them within the last year. He reports compliance of Otezla, Tremfya, and Arava.     4/11/22: He reports stiffness in his neck which does not improved throughout the day, rated 6/10. He has done physical therapy which helped in the past.  He reports for the last week he has had some pain in his anterior lower right leg and is unsure if he has had trauma to the area.  He also reports swelling at that location.  But denies any overlying erythema. He asks today if he needs to continue plaquenil given his remote history if drug induced lupus.  Pt denies fatigue, oral/nasal/genital ulcers, headaches, fever, chills, n/v, abd pain, CP, SOB, dysphagia, changes in bowel/bladder habits, vision changes, photosensitivity, or erythema/rashes.    1/10/22: He has swelling in his 1st and 2nd MCP joint. He does have pain in his back and neck. He has difficult opening jars. He reports diffuse pain for the past 2 months. Stiffness in the back, neck, shoulders, and hand last all day He got  COVID about 3 weeks ago and held his medications for 2 weeks. He recently started back on his medications 1 week ago. No new rashes or episodes of psoriasis. Pt denies fatigue, oral/nasal/genital ulcers, headaches, fever, chills, n/v, abd pain, CP, SOB, dysphagia, changes in bowel/bladder habits, vision changes,photosensitivity, or erythema/rashes.    10/11/21: Last appt, given not well controlled PsA, he was switched to tremfya. However, he feels that he going backwards has his hands have had more swelling/pain and pain in his feet. Pain in his hand is worse with opening jars due to pain. No new rashes or episodes of psoriasis.    Current regimen: (as of 7/11/22)  Tremfya   Otezla  Arava    Medication History:  - On infliximab until 2018 when developed recurrent intermittent fevers, chills, arthralgias (labs showed+ antiphosholipids-anticardiolipin IgM mild +dsDNA: 1:1280,  DEE+ 1:1280 homogenous ,  + anti histone ab, CH50 low at 51). Labs were consistent with drug induced lupus, infliximab d/c'd and symptoms resolved.  - Aug 2020: switched from Cosentyx to Taltz to lack of response  - June 2021: switched from Taltz to Tremfya (current) to lack of improvement of symptoms  - self discontinued SSZ 1000 BID ~Dec 2020  - Can not take TNFi due to drug induced lupus. Can not do [place] due to history of TIA. Tried stelara which did not work, tried costylx and taltz which did not work. Now on tremfya which is currently not working. Cant take NSAIDs due to TIA history.    Review of Systems   Constitutional: Negative for chills and fever.   HENT: Negative for congestion and trouble swallowing.    Eyes: Negative for pain and visual disturbance.   Respiratory: Negative for cough and shortness of breath.    Cardiovascular: Negative for chest pain and palpitations.   Gastrointestinal: Negative for abdominal pain, constipation, diarrhea, nausea and vomiting.   Genitourinary: Negative for dysuria and flank pain.   Musculoskeletal:  "Positive for arthralgias and joint swelling. Negative for myalgias.   Neurological: Negative for weakness, numbness and headaches.   Psychiatric/Behavioral: The patient is not nervous/anxious.          Objective:   BP (!) 105/59   Pulse 68   Ht 5' 7.2" (1.707 m)   Wt 64 kg (141 lb)   BMI 21.95 kg/m²      Physical Exam   Constitutional: He is oriented to person, place, and time. No distress.   HENT:   Head: Normocephalic and atraumatic.   Right Ear: External ear normal.   Left Ear: External ear normal.   Eyes: Conjunctivae are normal. Right eye exhibits no discharge. Left eye exhibits no discharge. No scleral icterus.   Cardiovascular: Normal rate, regular rhythm and normal heart sounds.   No murmur heard.  Pulmonary/Chest: Effort normal and breath sounds normal. No respiratory distress. He has no wheezes. He has no rales. He exhibits no tenderness.   Abdominal: Soft. Bowel sounds are normal. He exhibits no distension. There is no abdominal tenderness.   Musculoskeletal:         General: No tenderness or deformity. Normal range of motion.      Comments: Pain with lateral flexion of neck. Swelling of 1st-3rd MCP with TTP of 2nd MCP. TTP of 2nd & 3rd MCP of left hand as well as Left CMC.  Neurological: He is alert and oriented to person, place, and time.   Skin: Skin is warm and dry. No rash noted. He is not diaphoretic. No erythema. Areas of dry skin on lateral sides of 2nd digits bilaterally  Psychiatric: Mood and affect normal.   Vitals reviewed.          10/11/2021 1/10/2022 4/11/2022 7/11/2022   Tender (BRANTLEY-28) 6 / 28 2 / 28  0 / 28  3 / 28    Swollen (BRANTLEY-28) 6 / 28 2 / 28  0 / 28  3 / 28    Provider Global 45 mm 60 mm -- 35 mm   Patient Global 45 mm 60 mm 55 mm 35 mm   ESR 11 mm/hr 35 mm/hr 14 mm/hr 14 mm/hr   CRP 0.8 mg/L 1.2 mg/L 0.3 mg/L 0.7 mg/L   BRANTLEY-28 (ESR) 4.37 (Moderate disease activity) 4.52 (Moderate disease activity) 2.62 (Low disease activity) 3.79 (Moderate disease activity)   BRANTLEY-28 (CRP) " 3.86 (Moderate disease activity) 3.27 (Moderate disease activity) 1.82 (Remission) 3.1 (Low disease activity)   CDAI Score 21  16  -- 13      DAPSA: 14.51 (07/11/2022) :            Assessment:       1. PSA (psoriatic arthritis)    2. Medication monitoring encounter    3. Iron deficiency anemia, unspecified iron deficiency anemia type    4. Cervical spondylosis    5. Hyperlipidemia, unspecified hyperlipidemia type    6. Osteopenia of both hips    7. Recurrent nephrolithiasis    8. Dry skin dermatitis        48YM with psoriatic arthritis and infliximab induced lupus, history of TIA (cannot take NSAIDs) presenting for follow up. Drug induced Lupus: Related to infliximab (last dose 2/29/16; + antiphosholipids-anticardiolipin IgM mild +dsDNA: 1:1280,  DEE+ 1:1280 homogenous ,  + anti histone ab, CH50 low at 51 along with recurrent intermittent fevers, chills, arthralgias. He reports that he is doing well but states that his neck has been bothering him more lately. He denies joint pain but does have some mild synovitis in a few MCPs. He also has areas of dry skin on lateral sides of 2nd digits bilaterally, suggestive of mechanics hands though no weakness on exam. Will get myomarker, CK, and aldolase out of abundance of caution. He believes that he is doing well with Tremfya. DAPSA: 14.51 but neck pain likely large contributor to score. Will continue current treatment for now.    Plan:       Problem List Items Addressed This Visit        Neuro    Cervical spondylosis    Relevant Orders    Ambulatory referral/consult to Physical/Occupational Therapy       Cardiac/Vascular    Hyperlipidemia       Renal/    Recurrent nephrolithiasis    Relevant Orders    Ambulatory referral/consult to Urology       Other    Medication monitoring encounter      Other Visit Diagnoses     PSA (psoriatic arthritis)    -  Primary    Relevant Orders    Protein/Creatinine Ratio, Urine    Urinalysis    Sedimentation rate    C-Reactive Protein     CBC Auto Differential    Comprehensive Metabolic Panel    Iron deficiency anemia, unspecified iron deficiency anemia type        Osteopenia of both hips        Dry skin dermatitis        Relevant Orders    Aldolase    CK    MyoMarker Panel 3        - continue Otezla, Tremfannamarie Arava  - plan for ablation per pain management tomorrow  - will need to have follow up with urology for recurrent kidney stones, placed referral to urology today  - will replace PT order for neck pain  - if symptoms do not improve on current regimen, next step is switching tremfya to orencia; if needed to go back to Cosentyx, would consider ordering fecal calprotectin prior to restarting given family history of IBD; can also consider risankizumab      Return in 3 months to clinic with labs prior    Patient seen and evaluated with Dr. Beverly        Answers for HPI/ROS submitted by the patient on 7/8/2022  fever: No  eye redness: No  mouth sores: No  headaches: No  shortness of breath: No  chest pain: No  trouble swallowing: No  diarrhea: No  constipation: No  unexpected weight change: No  genital sore: No  During the last 3 days, have you had a skin rash?: No  Bruises or bleeds easily: No  cough: No

## 2022-07-12 ENCOUNTER — HOSPITAL ENCOUNTER (OUTPATIENT)
Facility: OTHER | Age: 52
Discharge: HOME | End: 2022-07-12
Attending: ANESTHESIOLOGY | Admitting: ANESTHESIOLOGY
Payer: COMMERCIAL

## 2022-07-12 VITALS
RESPIRATION RATE: 16 BRPM | TEMPERATURE: 98 F | OXYGEN SATURATION: 96 % | DIASTOLIC BLOOD PRESSURE: 64 MMHG | HEART RATE: 63 BPM | SYSTOLIC BLOOD PRESSURE: 113 MMHG

## 2022-07-12 DIAGNOSIS — G89.29 CHRONIC PAIN: ICD-10-CM

## 2022-07-12 DIAGNOSIS — M47.812 OSTEOARTHRITIS OF CERVICAL SPINE, UNSPECIFIED SPINAL OSTEOARTHRITIS COMPLICATION STATUS: Primary | ICD-10-CM

## 2022-07-12 DIAGNOSIS — M47.819 SPONDYLOSIS WITHOUT MYELOPATHY: ICD-10-CM

## 2022-07-12 PROCEDURE — 64634 PR DESTROY C/TH FACET JNT ADDL: ICD-10-PCS | Mod: LT,,, | Performed by: ANESTHESIOLOGY

## 2022-07-12 PROCEDURE — 99152 MOD SED SAME PHYS/QHP 5/>YRS: CPT | Mod: ,,, | Performed by: ANESTHESIOLOGY

## 2022-07-12 PROCEDURE — 64633 DESTROY CERV/THOR FACET JNT: CPT | Mod: LT,,, | Performed by: ANESTHESIOLOGY

## 2022-07-12 PROCEDURE — 63600175 PHARM REV CODE 636 W HCPCS: Performed by: ANESTHESIOLOGY

## 2022-07-12 PROCEDURE — 64633 DESTROY CERV/THOR FACET JNT: CPT | Mod: LT | Performed by: ANESTHESIOLOGY

## 2022-07-12 PROCEDURE — 25000003 PHARM REV CODE 250: Performed by: STUDENT IN AN ORGANIZED HEALTH CARE EDUCATION/TRAINING PROGRAM

## 2022-07-12 PROCEDURE — 99152 PR MOD CONSCIOUS SEDATION, SAME PHYS, 5+ YRS, FIRST 15 MIN: ICD-10-PCS | Mod: ,,, | Performed by: ANESTHESIOLOGY

## 2022-07-12 PROCEDURE — 25000003 PHARM REV CODE 250: Performed by: ANESTHESIOLOGY

## 2022-07-12 PROCEDURE — 64634 DESTROY C/TH FACET JNT ADDL: CPT | Mod: LT | Performed by: ANESTHESIOLOGY

## 2022-07-12 PROCEDURE — 64633 PR DESTROY CERV/THOR FACET JNT: ICD-10-PCS | Mod: LT,,, | Performed by: ANESTHESIOLOGY

## 2022-07-12 PROCEDURE — 64634 DESTROY C/TH FACET JNT ADDL: CPT | Mod: LT,,, | Performed by: ANESTHESIOLOGY

## 2022-07-12 RX ORDER — DEXAMETHASONE SODIUM PHOSPHATE 10 MG/ML
INJECTION INTRAMUSCULAR; INTRAVENOUS
Status: DISCONTINUED | OUTPATIENT
Start: 2022-07-12 | End: 2022-07-12 | Stop reason: HOSPADM

## 2022-07-12 RX ORDER — BUPIVACAINE HYDROCHLORIDE 2.5 MG/ML
INJECTION, SOLUTION EPIDURAL; INFILTRATION; INTRACAUDAL
Status: DISCONTINUED | OUTPATIENT
Start: 2022-07-12 | End: 2022-07-12 | Stop reason: HOSPADM

## 2022-07-12 RX ORDER — MIDAZOLAM HYDROCHLORIDE 1 MG/ML
INJECTION INTRAMUSCULAR; INTRAVENOUS
Status: DISCONTINUED | OUTPATIENT
Start: 2022-07-12 | End: 2022-07-12 | Stop reason: HOSPADM

## 2022-07-12 RX ORDER — LIDOCAINE HYDROCHLORIDE 20 MG/ML
INJECTION, SOLUTION INFILTRATION; PERINEURAL
Status: DISCONTINUED | OUTPATIENT
Start: 2022-07-12 | End: 2022-07-12 | Stop reason: HOSPADM

## 2022-07-12 RX ORDER — FENTANYL CITRATE 50 UG/ML
INJECTION, SOLUTION INTRAMUSCULAR; INTRAVENOUS
Status: DISCONTINUED | OUTPATIENT
Start: 2022-07-12 | End: 2022-07-12 | Stop reason: HOSPADM

## 2022-07-12 RX ORDER — SODIUM CHLORIDE 9 MG/ML
500 INJECTION, SOLUTION INTRAVENOUS CONTINUOUS
Status: DISCONTINUED | OUTPATIENT
Start: 2022-07-12 | End: 2022-07-12 | Stop reason: HOSPADM

## 2022-07-12 NOTE — PLAN OF CARE
Patient tolerated procedure well. Patient reports 0/10 pain after procedure. Assisted patient up for first time. Gait steady. All discharge instructions given.  Patient states he lives 1 block away from hospital and is walking home.

## 2022-07-12 NOTE — DISCHARGE SUMMARY
[facility] - Pain Management ([facility])  Discharge Note  Short Stay    Procedure(s) (LRB):  RADIOFREQUENCY ABLATION, LEFT C2-C3, C3-C4, C4-C5 (Left)    OUTCOME: Patient tolerated treatment/procedure well without complication and is now ready for discharge.    DISPOSITION: Home or Self Care    FINAL DIAGNOSIS: Cervical Spondylosis    FOLLOWUP: In clinic    DISCHARGE INSTRUCTIONS:  No discharge procedures on file.     TIME SPENT ON DISCHARGE: 2 minutes

## 2022-07-12 NOTE — DISCHARGE INSTRUCTIONS
Thank you for allowing us to care for you today. You may receive a survey about the care we provided. Your feedback is valuable and helps us provide excellent care throughout the community.     Home Care Instructions for Pain Management:    1. DIET:   You may resume your normal diet today.   2. BATHING:   You may shower with luke warm water. No tub baths or anything that will soak injection sites under water for the next 24 hours.  3. DRESSING:   You may remove your bandage today.   4. ACTIVITY LEVEL:   You may resume your normal activities 24 hrs after your procedure. Nothing strenuous today.  5. MEDICATIONS:   You may resume your normal medications today. To restart blood thinners, ask your doctor.  6. DRIVING    If you have received any sedatives by mouth today, you may not drive for 12 hours.    If you have received any sedation through your IV, you may not drive for 24 hrs.   7. SPECIAL INSTRUCTIONS:   No heat to the injection site for 24 hrs including, hot bath or shower, heating pad, moist heat, or hot tubs.    Use ice pack to injection site for any pain or discomfort.  Apply ice packs for 20 minute intervals as needed.    IF you have diabetes, be sure to monitor your blood sugar more closely. IF your injection contained steroids your blood sugar levels may become higher than normal.    If you are still having pain upon discharge:  Your pain may improve over the next 48 hours. The anesthetic (numbing medication) works immediately to 48 hours. IF your injection contained a steroid (anti-inflammatory medication), it takes approximately 3 days to start feeling relief and 7-10 days to see your greatest results from the medication. It is possible you may need subsequent injections. This would be discussed at your follow up appointment with pain management or your referring doctor.    Please call the PAIN MANAGEMENT office at [phone] or ON CALL pager at [phone] if you experienced any:   -Weakness or  loss of sensation  -Fever > 101.5  -Pain uncontrolled with oral medications   -Persistent nausea, vomiting, or diarrhea  -Redness or drainage from the injection sites, or any other worrisome concerns.   If physician on call was not reached or could not communicate with our office for any reason please go to the nearest emergency department. Adult Procedural Sedation Instructions    Recovery After Procedural Sedation (Adult)  You have been given medicine by vein to make you sleep during your surgery. This may have included both a pain medicine and sleeping medicine. Most of the effects have worn off. But you may still have some drowsiness for the next 6 to 8 hours.  Home care  Follow these guidelines when you get home:  For the next 8 hours, you should be watched by a responsible adult. This person should make sure your condition is not getting worse.  Don't drink any alcohol for the next 24 hours.  Don't drive, operate dangerous machinery, or make important business or personal decisions during the next 24 hours.  Note: Your healthcare provider may tell you not to take any medicine by mouth for pain or sleep in the next 4 hours. These medicines may react with the medicines you were given in the hospital. This could cause a much stronger response than usual.  Follow-up care  Follow up with your healthcare provider if you are not alert and back to your usual level of activity within 12 hours.  When to seek medical advice  Call your healthcare provider right away if any of these occur:  Drowsiness gets worse  Weakness or dizziness gets worse  Repeated vomiting  You can't be awakened   Date Last Reviewed: 10/18/2016  © 2072-7250 [employer]. [address] [place] [address], [place] [address]. All rights reserved. This information is not intended as a substitute for professional medical care. Always follow your healthcare professional's instructions.

## 2022-07-12 NOTE — OP NOTE
Therapeutic Cervical Medial Branch Radiofrequency Ablation Under Fluoroscopy     The procedure, risks, benefits, and options were discussed with the patient. There are no contraindications to the procedure. The patent expressed understanding and agreed to the procedure. Informed written consent was obtained prior to the start of the procedure and can be found in the patient's chart.        PATIENT NAME: Rebel Rodriguez   MRN: [id number]     DATE OF PROCEDURE: 07/12/2022       PROCEDURE: Therapeutic Left C2, C3, C4 and C5 Cervical Radiofrequency Ablation under Fluoroscopy    PRE-OP DIAGNOSIS: Cervical spondylosis [M47.812] Cervical Spondylosis [M47.812]    POST-OP DIAGNOSIS: Same    PHYSICIAN: Kimberly Wilson MD    ASSISTANTS: Paige Wright, Anesthesia PGY3     MEDICATIONS INJECTED:  Preservative-free Decadron 10mg with 9cc of Bupivicaine 0.25%    LOCAL ANESTHETIC INJECTED:   Xylocaine 2%    SEDATION:   Versed 1.5mg and Fentanyl 50mcg                                                                                                                                                                                     Conscious sedation ordered by M.D. Patient re-evaluation prior to administration of conscious sedation. No changes noted in patient's status from initial evaluation. The patient's vital signs were monitored by RN and patient remained hemodynamically stable throughout the procedure.    Event Time In   Sedation Start 0913   Sedation End 0936       ESTIMATED BLOOD LOSS:  None    COMPLICATIONS:  None.     INTERVAL HISTORY: Patient has clinical and imaging findings suggestive of facet mediated pain. Patients has completed 2 previous diagnostic medial branch blocks at specified levels with at least 80% relief for the expected duration of the local anesthetic utilized.    TECHNIQUE: Time-out was performed to identify the patient and procedure to be performed. With the patient laying in a prone position, the surgical  area was prepped and draped in the usual sterile fashion using ChloraPrep and fenestrated drape. The levels were determined under fluoroscopic guidance. Skin anesthesia was achieved by injecting Lidocaine 2% over the injection sites. A 20 gauge 10mm curved active tip needle was introduced to the anatomic local of the medial branch at each of the above levels using AP, lateral and/or contralateral oblique fluoroscopic imaging. Then the sensory and motor testing was performed to confirm that the needle tips were in the correct location. After negative aspiration for blood or CSF was confirmed, 1 mL of the lidocaine 2% listed above was injected slowly at each site. This was followed by thermal lesioning at 80 degrees celsius for 90 seconds. That was followed by slowly injecting 1 mL of the medication mixture listed above at each site. The needles were removed and bleeding was nil. A sterile dressing was applied. No specimens collected. The patient tolerated the procedure well and did not have any procedure related motor deficit at the conclusion of the procedure.    The patient was monitored after the procedure in the recovery area. They were given post-procedure and discharge instructions to follow at home. The patient was discharged in a stable condition.     Kimberly Wilson MD

## 2022-07-13 ENCOUNTER — PATIENT MESSAGE (OUTPATIENT)
Dept: PAIN MEDICINE | Facility: OTHER | Age: 52
End: 2022-07-13
Payer: COMMERCIAL

## 2022-07-13 NOTE — TELEPHONE ENCOUNTER
[employer] appeal overturned. Approved from 6/21/2022-7/3/2023. Case ID: [id number].     Tremfya now rejecting for PA. Called PA dept [[phone]]. Rep- April. PA was approved for syringes. Resubmitted for auto injector.   Approved from 7/13/2022-7/13/2023. Case ID: [id number]    [employer] override approved from 7/13/22-7/13/23. Ref #: [id number]

## 2022-07-14 NOTE — TELEPHONE ENCOUNTER
Specialty Pharmacy - Refill Coordination  Specialty Pharmacy - Medication/Referral Authorization    Specialty Medication Orders Linked to Encounter    Flowsheet Row Most Recent Value   Medication #1 apremilast (OTEZLA) 30 mg Tab (Order#[id number], Rx#[id number])   Medication #2 guselkumab (TREMFYA) 100 mg/mL AtIn (Order#[id number], Rx#[id number])          Refill Questions - Documented Responses    Flowsheet Row Most Recent Value   Patient Availability and HIPAA Verification    Does patient want to proceed with activity? Yes   HIPAA/medical authority confirmed? Yes   Relationship to patient of person spoken to? Self   Refill Screening Questions    Changes to allergies? No   Changes to medications? No   New conditions since last clinic visit? No   Unplanned office visit, urgent care, ED, or hospital admission in the last 4 weeks? No   How does patient/caregiver feel medication is working? Good   Financial problems or insurance changes? No   How many doses of your specialty medications were missed in the last 4 weeks? 1   Why were doses missed? Simply forgot   Would patient like to speak to a pharmacist? No   When does the patient need to receive the medication? 07/14/22   Refill Delivery Questions    How will the patient receive the medication? Pickup   When does the patient need to receive the medication? 07/14/22   Address in [facility] confirmed and updated if neccessary? Yes   Expected Copay ($) 0   Is the patient able to afford the medication copay? Yes   Payment Method zero copay   Days supply of Refill 30   Supplies needed? No supplies needed   Refill activity completed? Yes   Refill activity plan Refill scheduled   Shipment/Pickup Date: 07/14/22          Current Outpatient Medications   Medication Sig    apremilast (OTEZLA) 30 mg Tab Take 1 tablet (30 mg total) by mouth 2 (two) times daily.    aspirin (ECOTRIN) 81 MG EC tablet Take 81 mg by mouth once daily.    atorvastatin (LIPITOR) 10 MG tablet  Take 1 tablet (10 mg total) by mouth every evening.    citric acid-potassium citrate (POLYCITRA) 1,100-334 mg/5 mL solution Take by mouth 2 (two) times a day.    FLUoxetine 40 MG capsule Take 1 capsule (40 mg) by mouth daily    guselkumab (TREMFYA) 100 mg/mL AtIn Inject 100 mg into the skin every 8 weeks.    leflunomide (ARAVA) 20 MG Tab Take 1 tablet (20 mg total) by mouth once daily.    mirtazapine (REMERON) 15 MG tablet Take 1 tablet (15 mg) by mouth daily at bedtime    mirtazapine (REMERON) 7.5 MG Tab take one tablet by mouth every night at bedtime    potassium citrate (UROCIT-K 15) 15 mEq TbSR TK 1 T PO BID    tamsulosin (FLOMAX) 0.4 mg Cap Take 0.4 mg by mouth once daily.   Last reviewed on 7/12/2022  8:45 AM by Cale Hudson RN    Review of patient's allergies indicates:   Allergen Reactions    Erythromycin Nausea And Vomiting    Infliximab Other (See Comments)     Lupus with fever and acute arthritis  Lupus with fever and acute arthritis    Last reviewed on  7/12/2022 8:34 AM by Cale Hudson      Tasks added this encounter   8/6/2022 - Refill Call (Auto Added)  7/15/2022 - Pickup Reminder  9/1/2022 - Refill Call (Auto Added)   Tasks due within next 3 months   No tasks due.     Christiane Alfonso, Patient Care Assistant  [facility]  [address]  [facility] [address]  Phone: [phone]  Fax: [phone]

## 2022-07-26 ENCOUNTER — LAB VISIT (OUTPATIENT)
Dept: LAB | Facility: OTHER | Age: 52
End: 2022-07-26
Attending: STUDENT IN AN ORGANIZED HEALTH CARE EDUCATION/TRAINING PROGRAM
Payer: COMMERCIAL

## 2022-07-26 DIAGNOSIS — L85.3 DRY SKIN DERMATITIS: ICD-10-CM

## 2022-07-26 LAB — CK SERPL-CCNC: 151 U/L (ref 20–200)

## 2022-07-26 PROCEDURE — 86235 NUCLEAR ANTIGEN ANTIBODY: CPT | Mod: 59 | Performed by: STUDENT IN AN ORGANIZED HEALTH CARE EDUCATION/TRAINING PROGRAM

## 2022-07-26 PROCEDURE — 82550 ASSAY OF CK (CPK): CPT | Performed by: STUDENT IN AN ORGANIZED HEALTH CARE EDUCATION/TRAINING PROGRAM

## 2022-07-26 PROCEDURE — 82085 ASSAY OF ALDOLASE: CPT | Performed by: STUDENT IN AN ORGANIZED HEALTH CARE EDUCATION/TRAINING PROGRAM

## 2022-07-26 PROCEDURE — 36415 COLL VENOUS BLD VENIPUNCTURE: CPT | Performed by: STUDENT IN AN ORGANIZED HEALTH CARE EDUCATION/TRAINING PROGRAM

## 2022-07-26 PROCEDURE — 83516 IMMUNOASSAY NONANTIBODY: CPT | Mod: 59 | Performed by: STUDENT IN AN ORGANIZED HEALTH CARE EDUCATION/TRAINING PROGRAM

## 2022-07-27 LAB — ALDOLASE SERPL-CCNC: 5.7 U/L (ref 1.2–7.6)

## 2022-07-28 ENCOUNTER — PATIENT MESSAGE (OUTPATIENT)
Dept: PAIN MEDICINE | Facility: CLINIC | Age: 52
End: 2022-07-28
Payer: COMMERCIAL

## 2022-07-29 ENCOUNTER — OFFICE VISIT (OUTPATIENT)
Dept: PAIN MEDICINE | Facility: CLINIC | Age: 52
End: 2022-07-29
Payer: COMMERCIAL

## 2022-07-29 VITALS
BODY MASS INDEX: 21.8 KG/M2 | SYSTOLIC BLOOD PRESSURE: 112 MMHG | HEIGHT: 67 IN | DIASTOLIC BLOOD PRESSURE: 63 MMHG | HEART RATE: 70 BPM | WEIGHT: 138.88 LBS | RESPIRATION RATE: 18 BRPM | TEMPERATURE: 98 F

## 2022-07-29 DIAGNOSIS — M79.10 MYALGIA: ICD-10-CM

## 2022-07-29 DIAGNOSIS — M47.812 CERVICAL SPONDYLOSIS: Primary | ICD-10-CM

## 2022-07-29 PROCEDURE — 99213 PR OFFICE/OUTPT VISIT, EST, LEVL III, 20-29 MIN: ICD-10-PCS | Mod: S$GLB,,, | Performed by: ANESTHESIOLOGY

## 2022-07-29 PROCEDURE — 3008F BODY MASS INDEX DOCD: CPT | Mod: CPTII,S$GLB,, | Performed by: ANESTHESIOLOGY

## 2022-07-29 PROCEDURE — 1159F MED LIST DOCD IN RCRD: CPT | Mod: CPTII,S$GLB,, | Performed by: ANESTHESIOLOGY

## 2022-07-29 PROCEDURE — 3078F DIAST BP <80 MM HG: CPT | Mod: CPTII,S$GLB,, | Performed by: ANESTHESIOLOGY

## 2022-07-29 PROCEDURE — 3074F SYST BP LT 130 MM HG: CPT | Mod: CPTII,S$GLB,, | Performed by: ANESTHESIOLOGY

## 2022-07-29 PROCEDURE — 99999 PR PBB SHADOW E&M-EST. PATIENT-LVL III: CPT | Mod: PBBFAC,,, | Performed by: ANESTHESIOLOGY

## 2022-07-29 PROCEDURE — 3078F PR MOST RECENT DIASTOLIC BLOOD PRESSURE < 80 MM HG: ICD-10-PCS | Mod: CPTII,S$GLB,, | Performed by: ANESTHESIOLOGY

## 2022-07-29 PROCEDURE — 99999 PR PBB SHADOW E&M-EST. PATIENT-LVL III: ICD-10-PCS | Mod: PBBFAC,,, | Performed by: ANESTHESIOLOGY

## 2022-07-29 PROCEDURE — 99213 OFFICE O/P EST LOW 20 MIN: CPT | Mod: S$GLB,,, | Performed by: ANESTHESIOLOGY

## 2022-07-29 PROCEDURE — 3074F PR MOST RECENT SYSTOLIC BLOOD PRESSURE < 130 MM HG: ICD-10-PCS | Mod: CPTII,S$GLB,, | Performed by: ANESTHESIOLOGY

## 2022-07-29 PROCEDURE — 3008F PR BODY MASS INDEX (BMI) DOCUMENTED: ICD-10-PCS | Mod: CPTII,S$GLB,, | Performed by: ANESTHESIOLOGY

## 2022-07-29 PROCEDURE — 1159F PR MEDICATION LIST DOCUMENTED IN MEDICAL RECORD: ICD-10-PCS | Mod: CPTII,S$GLB,, | Performed by: ANESTHESIOLOGY

## 2022-07-29 NOTE — PROGRESS NOTES
"PCP: Nanda Smith MD    REFERRING PHYSICIAN: No ref. provider found    CHIEF COMPLAINT: left neck pain      Original HISTORY OF PRESENT ILLNESS: Rebel Rodriguez presents to the clinic for the evaluation of the above pain. The pain started over the course of the past 3 years after no particular event. It is similar to the pain he had prior to neck surgery. He had an ACDF of C3-4-5 in 2017 at [facility].     Original Pain Description:  The pain is located in the left lateral neck and does not radiate. The pain is described as tight, burning. Exacerbating factors: nothing. Mitigating factors ice and physical therapy. Symptoms interfere with daily activity, sleeping and work. The patient feels like symptoms have been worsening. He denies falls and myelopathic symptoms.     Original PAIN SCORES:  Best: Pain is 5  Worst: Pain is 8  Usually: Pain is 6  Current: Pain is 5    INTERVAL HISTORY:     Interval History 7/29/2022: Patient returns to clinic today for f/u after Left Cervical RFA C2-C5.  Patient reports 90% relief.  Patient has an appointment with physical therapy in one week.  He reports neck and upper back muscle tightness and tenderness.    6 weeks of Conservative therapy (PT/Chiro/Home Exercises with Dates)  PT: 8/20-5/21  Aquatherapy: Very helpful  HEP: "Habitually" Daily as prescribed at the PT above    Treatments / Medications: (Ice/Heat/NSAIDS/APAP/etc):  Ice  BenGay  [place] Balm  Lidocaine  APAP - no help  NSAIDS - not allowed to take due to TIA     Report:    Interventional Pain Procedures: (Previous injections)  CALLUM/CTFESI - preoperatively for severe spinal stenosis  7/12/2022:  Left C2-C5 RFA 90% Relief - ongoing    Past Medical History:   Diagnosis Date    Achilles tendon rupture 10/9/2013    Allergy     Anxiety     Arthritis     psoriatric    Degenerative disc disease     Drug-induced lupus erythematosus     Hyperlipidemia     Kidney stone     Psoriatic arthritis     TIA (transient ischemic " attack)     Ulcer     high school     Past Surgical History:   Procedure Laterality Date    ACHILLES TENDON SURGERY      BACK SURGERY      FUNCTIONAL ENDOSCOPIC SINUS SURGERY (FESS) USING COMPUTER-ASSISTED NAVIGATION Bilateral 9/14/2018    Procedure: FESS, USING COMPUTER-ASSISTED NAVIGATION;  Surgeon: Gerard Manzo MD;  Location: [facility] OR 2ND FLR;  Service: ENT;  Laterality: Bilateral;    INJECTION OF ANESTHETIC AGENT AROUND NERVE Left 5/13/2022    Procedure: Block, Nerve MEDIAL BRANCH BLOCK LEFT C2,3,4,5;  Surgeon: Kimberly Wilson MD;  Location: [facility] PAIN MGT;  Service: Pain Management;  Laterality: Left;    INJECTION OF ANESTHETIC AGENT AROUND NERVE Left 5/24/2022    Procedure: BLOCK, NERVE, LEFT C2-C5 MEDIAL BRANCH;  Surgeon: Kimberly Wilson MD;  Location: [facility] PAIN MGT;  Service: Pain Management;  Laterality: Left;    NASAL SEPTOPLASTY N/A 9/14/2018    Procedure: SEPTOPLASTY, NASAL;  Surgeon: Gerard Manzo MD;  Location: [facility] OR [facility] FLR;  Service: ENT;  Laterality: N/A;    NASAL TURBINATE REDUCTION Bilateral 9/14/2018    Procedure: REDUCTION, NASAL TURBINATE;  Surgeon: Gerard Manzo MD;  Location: [facility] OR 2ND FLR;  Service: ENT;  Laterality: Bilateral;    RADIOFREQUENCY ABLATION Left 7/12/2022    Procedure: RADIOFREQUENCY ABLATION, LEFT C2-C3, C3-C4, C4-C5;  Surgeon: Kimberly Wilson MD;  Location: [facility] PAIN MGT;  Service: Pain Management;  Laterality: Left;    UPPER ENDOSCOPY W/ ESOPHAGEAL MANOMETRY      URETERAL STENT PLACEMENT       Social History     Socioeconomic History    Marital status: Single   Occupational History    Occupation: music production     Employer: self employed   Tobacco Use    Smoking status: Never Smoker    Smokeless tobacco: Never Used   Substance and Sexual Activity    Alcohol use: No     Alcohol/week: 0.0 standard drinks     Types: 1 - 2 Standard drinks or equivalent per week     Comment: cocktails    Drug use: No     Family History   Problem  Relation Age of Onset    Pancreatic cancer Father     Ulcerative colitis Father     Cancer Father 61        pancreatic ca    Crohn's disease Mother     Osteoarthritis Maternal Grandmother     Crohn's disease Maternal Grandmother     Cataracts Maternal Grandmother     Cataracts Maternal Grandfather     Cataracts Paternal Grandmother     Cataracts Paternal Grandfather     Amblyopia Neg Hx     Blindness Neg Hx        Review of patient's allergies indicates:   Allergen Reactions    Erythromycin Nausea And Vomiting    Infliximab Other (See Comments)     Lupus with fever and acute arthritis  Lupus with fever and acute arthritis       Current Outpatient Medications   Medication Sig    apremilast (OTEZLA) 30 mg Tab Take 1 tablet (30 mg total) by mouth 2 (two) times daily.    aspirin (ECOTRIN) 81 MG EC tablet Take 81 mg by mouth once daily.    atorvastatin (LIPITOR) 10 MG tablet Take 1 tablet (10 mg total) by mouth every evening.    FLUoxetine 40 MG capsule Take 1 capsule (40 mg) by mouth daily    guselkumab (TREMFYA) 100 mg/mL AtIn Inject 100 mg into the skin every 8 weeks.    leflunomide (ARAVA) 20 MG Tab Take 1 tablet (20 mg total) by mouth once daily.    mirtazapine (REMERON) 15 MG tablet Take 1 tablet (15 mg) by mouth daily at bedtime    mirtazapine (REMERON) 7.5 MG Tab take one tablet by mouth every night at bedtime    potassium citrate (UROCIT-K 15) 15 mEq TbSR TK 1 T PO BID    citric acid-potassium citrate (POLYCITRA) 1,100-334 mg/5 mL solution Take by mouth 2 (two) times a day.    tamsulosin (FLOMAX) 0.4 mg Cap Take 0.4 mg by mouth once daily.     No current facility-administered medications for this visit.       ROS:  GENERAL: No fever. No chills. No fatigue. Denies weight loss. Denies weight gain.  HEENT: Denies headaches. Denies vision change. Denies eye pain. Denies double vision. Denies ear pain.   CV: Denies chest pain.   PULM: Denies of shortness of breath.  GI: Denies constipation.  "No diarrhea. No abdominal pain. Denies nausea. Denies vomiting. No blood in stool.  HEME: Denies bleeding problems.  : Denies urgency. No painful urination. No blood in urine.  MS: Denies joint stiffness. Denies joint swelling.  +Neck Pain  SKIN: Denies rash.   NEURO: Denies seizures. No weakness.  PSYCH:  Denies difficulty sleeping. No anxiety. Denies depression. No suicidal thoughts.       VITALS:   Vitals:    07/29/22 0913   BP: 112/63   Pulse: 70   Resp: 18   Temp: 98 °F (36.7 °C)   Weight: 63 kg (138 lb 14.2 oz)   Height: 5' 7" (1.702 m)   PainSc:   5   PainLoc: Neck         PHYSICAL EXAM:   GENERAL: Well appearing, in no acute distress, alert and oriented x3.  PSYCH:  Mood and affect appropriate.  SKIN: Skin color, texture, turgor normal, no rashes or lesions.  HEENT:  Normocephalic, atraumatic. Cranial nerves grossly intact.  NECK: Good ROM for a fusion. Tender to palpation over the left cervical paraspinous muscles. No pain with neck flexion, extension, or lateral flexion.  PULM: No evidence of respiratory difficulty, symmetric chest rise.  GI:  Non-distended  EXTREMITIES: No deformities, edema, or skin discoloration.   MUSCULOSKELETAL: Bilateral upper extremity strength is normal and symmetric. No atrophy is noted.  NEURO: Sensation is equal and appropriate bilaterally.   GAIT: normal.      LABS:      IMAGING:    EXAMINATION:  XR CERVICAL SPINE 5 VIEW WITH FLEX AND EXT     CLINICAL HISTORY:  Cervicalgia     TECHNIQUE:  Five views of the cervical spine plus flexion and extension views were performed.     COMPARISON:  11/03/2008     FINDINGS:  Interval operative change C4/C7 anterior cervical fusion with metallic plate and, screw device and interbody devices.  The cervical sagittal alignment is slightly improved.  There is no significant listhesis with flexion extension positioning.  No evidence for hardware failure.  Cervical vertebral body heights and contours within normal is without evidence for acute " fracture.  Surgical clips left neck soft tissues.  Open mouth view limited by overlapping structures.  Questioned bilateral bony neural foraminal stenosis evaluation limited by overlapping structures on the oblique imaging.  Further evaluation as warranted clinically.     Impression:     Please see above        Electronically signed by: Hung Muniz DO  Date:                                            05/31/2021  Time:                                           13:33    ASSESSMENT: 52 y.o. year old male with pain, consistent with left cervical facet arthropathy.      DISCUSSION: Mr. Rodriguez previously had a C4-7 ACDF at [facility] and comes to us with progressive left sided neck pain. Significant improvement in neck pain after left C2-C5 RFA.     PLAN:  1. Patient encouraged to continue Physical Therapy and home exercise program  2. TENS unit as needed  3. F/u in 3 months  4. If patient continues to have muscles tenderness at f/u, consider Trigger Point injections.    Kimberly Wilson  07/29/2022

## 2022-08-04 ENCOUNTER — CLINICAL SUPPORT (OUTPATIENT)
Dept: REHABILITATION | Facility: OTHER | Age: 52
End: 2022-08-04
Payer: COMMERCIAL

## 2022-08-04 DIAGNOSIS — M54.2 PAINFUL CERVICAL ROM: ICD-10-CM

## 2022-08-04 DIAGNOSIS — M47.812 CERVICAL SPONDYLOSIS: ICD-10-CM

## 2022-08-04 DIAGNOSIS — M54.2 CHRONIC NECK PAIN: ICD-10-CM

## 2022-08-04 DIAGNOSIS — G89.29 CHRONIC NECK PAIN: ICD-10-CM

## 2022-08-04 PROCEDURE — 97161 PT EVAL LOW COMPLEX 20 MIN: CPT | Mod: PN

## 2022-08-04 PROCEDURE — 97140 MANUAL THERAPY 1/> REGIONS: CPT | Mod: PN

## 2022-08-04 NOTE — PLAN OF CARE
[facility]  Physical Therapy Initial Evaluation    Name: Rebel Rodriguez  [facility] Number: [id number]    Therapy Diagnosis:   Encounter Diagnoses   Name Primary?    Cervical spondylosis     Chronic neck pain     Painful cervical ROM      Physician: Ainsley Russell*    Physician Orders: PT Eval and Treat  Medical Diagnosis from Referral: Cervical spondylosis (M47.812)  Evaluation Date: 8/4/2022  Authorization Period Expiration: 9/1/2022  Plan of Care Expiration: 10/28/2022  Visit # / Visits authorized: 1/ 1    Time In: 2:37 pm  Time Out: 3:18 pm  Total Billable Time: 10 minutes    Precautions: Standard and cervical fusion C3-5    Subjective   Date of onset: chronic condition with recent exacerbation   History of current condition - Rebel reports: he just got a nerve ablation in the neck and was very surprised as to how little his muscles relaxed.  He has done PT before and has been trying to release the muscles himself but nothing was working. He tried some suction cups over the areas he could reach and it provided a lot of relief for him.   A lot of the tightness is located around the base of the skull, levator scap and along the neck and upper trap area. He can't get it to release and has been keeping up with his stretches. He is interested in dry needling but does have psoriatic arthritis. He does report a prior cervical fusion along C3-5.     Burning, tingling, numbness: none  Falls: none  Difficulty fine motor skills: none    Patient denies dizziness, headaches, blurry vision, nausea, vomiting, impaired sensations in groin and saddle region and changes in bowel/bladder.     Medical History:   Past Medical History:   Diagnosis Date    Achilles tendon rupture 10/9/2013    Allergy     Anxiety     Arthritis     psoriatric    Degenerative disc disease     Drug-induced lupus erythematosus     Hyperlipidemia     Kidney stone     Psoriatic arthritis     TIA (transient ischemic  attack)     Ulcer     high school       Surgical History:   Rebel Rodriguez  has a past surgical history that includes Upper endoscopy w/ esophageal manometry; Ureteral stent placement; Achilles tendon surgery; Back surgery; Nasal septoplasty (N/A, 9/14/2018); Nasal turbinate reduction (Bilateral, 9/14/2018); Functional endoscopic sinus surgery (FESS) using computer-assisted navigation (Bilateral, 9/14/2018); Injection of anesthetic agent around nerve (Left, 5/13/2022); Injection of anesthetic agent around nerve (Left, 5/24/2022); and Radiofrequency ablation (Left, 7/12/2022).    Medications:   Rebel has a current medication list which includes the following prescription(s): otezla, aspirin, atorvastatin, citric acid-potassium citrate, fluoxetine, tremfya, leflunomide, mirtazapine, mirtazapine, potassium citrate, and tamsulosin.    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   Prior Level of Function: Pt independent with all ADLs, job responsibilities and participating in regular physical activity  Current Level of Function: Independent with tightness and discomfort in the neck, reduced ROM      Pain:  Current 4/10, worst 8/10, best 4/10   Location: left neck   Description: Tight and Sharp  Aggravating Factors: neck movement - extension, L SB and L levator scap stretch  Easing Factors: ice, heating pad and cupping    Pts goals: less pain, improving ROM    Objective     Observation:   Shoulder elevated, FHP     Palpation:  TTP over cervical  paraspinals, Upper trap, levator scap and suboccipitals    Flexibility:   Min-Mod limitation in levator scap  Mod limitation in upper trap    Sensation:  Grossly intact    Range of Motion:     Cervical    Flexion  60   Extension  40   R Rotation  50   L Rotation  42   R Sidebending  20   L Sidebending  25       Strength/MMT:     Shoulder Left Right   Flexion 4+/5 4+/5   Abduction 4+/5 4+/5   Extension 5/5 5/5   Internal Rotation 5/5 5/5   External Rotation 4+/5 4+/5     ! = pain        Special Tests:    Spurling's Compression/Distraction: (-)      CMS Impairment/Limitation/Restriction for FOTO Neck Survey    Therapist reviewed FOTO scores for Rebel Rodriguez on 8/4/2022.   FOTO documents entered into DocASAP - see Media section.    Limitation Score: 48%  Category: Carrying    Current : CK = at least 40% but < 60% impaired, limited or restricted  Goal: CK = at least 40% but < 60% impaired, limited or restricted             TREATMENT   Treatment Time In: 3:05 pm  Treatment Time Out: 3:15 pm  Total Treatment time separate from Evaluation: 10 minutes    Rebel received the following manual therapy techniques: Soft tissue Mobilization were applied to the: B shoulder/neck for 5 minutes, including:  Static cupping to upper trap, levator scap, and cervical paraspinals B x5 minutes    Education regarding dry needling in future visits including purpose, expectations,       Home Exercises and Patient Education Provided    Education provided:   Patient was educated on initial evaluation findings and expectations as well as future PT services, procedures, and expectations for optimal compliance with therapy.     Written Home Exercises Provided:  HEP will be provided at initial treatment session.     Assessment   Rebel is a 52 y.o. male referred to outpatient Physical Therapy with a medical diagnosis of Cervical spondylosis. Pt presents with decreased ROM, strength, flexibility, TTP with moderate palpation, poor posture causing  increased pain and decreased participation with recreational and household duties.       Pt prognosis is Good.   Pt will benefit from skilled outpatient Physical Therapy to address the deficits stated above and in the chart below, provide pt/family education, and to maximize pt's level of independence to return to PLOF.     Plan of care discussed with patient: Yes  Pt's spiritual, cultural and educational needs considered and patient is agreeable to the plan of care and goals as stated below:     Anticipated Barriers for therapy: chronic condition    Medical Necessity is demonstrated by the following  History  Co-morbidities and personal factors that may impact the plan of care Co-morbidities:   prior cervical fusion    Personal Factors:   no deficits     low   Examination  Body Structures and Functions, activity limitations and participation restrictions that may impact the plan of care Body Regions:   head  neck    Body Systems:    ROM    Participation Restrictions:   tightness and discomfort in the neck, reduced ROM    Activity limitations:   Learning and applying knowledge  no deficits    General Tasks and Commands  no deficits    Communication  no deficits    Mobility  lifting and carrying objects  using transportation (bus, train, plane, car)  driving (bike, car, motorcycle)    Self care  no deficits    Domestic Life  doing house work (cleaning house, washing dishes, laundry)    Interactions/Relationships  no deficits    Life Areas  employment    Community and Social Life  recreation and leisure         low   Clinical Presentation stable and uncomplicated low   Decision Making/ Complexity Score: low     Goals:    In 6 weeks,   Goal Status   1. Patient will be independent with HEP to promote improved therapy outcomes.  STG    2. Patient will require min VC from PT for proper scapular retraction in order to improve postural awareness. STG    3. Patient will perform cervical retraction and chin tuck independently  to improve posture and reduce muscle tension.  STG    4. Patient will improve cervical rotation ROM by 5 degrees to demonstrate improved functional mobility. STG      In 12 weeks,   Goal Status   5. Patient will be independent with progression of HEP for self maintenance of symptoms.  LTG    6. Patient will improve cervical rotation ROM by 10 degrees to demonstrate improved functional mobility. LTG    7. Patient will lift 10-20 lbs with <2/10 pain to perform work duties LTG    8. Patient will improve FOTO to </= 40% limitation to demonstrate improved functional mobility.  LTG             Plan   Plan of care Certification: 8/4/2022 to 10/28/2022.    Outpatient Physical Therapy 2 times weekly for 24 visits to include the following interventions: Manual Therapy, Moist Heat/ Ice, Neuromuscular Re-ed, Patient Education, Therapeutic Activities, Therapeutic Exercise and dry needling.     Rafaela Bullock, PT

## 2022-08-08 ENCOUNTER — SPECIALTY PHARMACY (OUTPATIENT)
Dept: PHARMACY | Facility: CLINIC | Age: 52
End: 2022-08-08
Payer: COMMERCIAL

## 2022-08-08 DIAGNOSIS — L40.50 PSA (PSORIATIC ARTHRITIS): ICD-10-CM

## 2022-08-08 RX ORDER — APREMILAST 30 MG/1
30 TABLET, FILM COATED ORAL 2 TIMES DAILY
Qty: 60 TABLET | Refills: 2 | Status: SHIPPED | OUTPATIENT
Start: 2022-08-08 | End: 2022-10-24 | Stop reason: SDUPTHER

## 2022-08-09 ENCOUNTER — CLINICAL SUPPORT (OUTPATIENT)
Dept: REHABILITATION | Facility: OTHER | Age: 52
End: 2022-08-09
Payer: COMMERCIAL

## 2022-08-09 DIAGNOSIS — G89.29 CHRONIC NECK PAIN: Primary | ICD-10-CM

## 2022-08-09 DIAGNOSIS — M54.2 CHRONIC NECK PAIN: Primary | ICD-10-CM

## 2022-08-09 DIAGNOSIS — M54.2 PAINFUL CERVICAL ROM: ICD-10-CM

## 2022-08-09 PROCEDURE — 97110 THERAPEUTIC EXERCISES: CPT | Mod: PN,CQ

## 2022-08-09 PROCEDURE — 97140 MANUAL THERAPY 1/> REGIONS: CPT | Mod: PN,CQ

## 2022-08-09 NOTE — TELEPHONE ENCOUNTER
Spoke to patient this morning regarding Otezla refills. States that he sometimes misses this morning dose. Counseled on the importance of adherence and recommended setting reminders on his phone. Stated he has about 20 tablets left. Rescheduling refill call for 1 week to 8/16

## 2022-08-09 NOTE — PROGRESS NOTES
[facility]   Physical Therapy Treatment Note     Name: Rebel Rodriguez  Clinic Number: [id number]    Therapy Diagnosis:   Encounter Diagnoses   Name Primary?    Chronic neck pain Yes    Painful cervical ROM      Physician: Ainsley Russell*    Visit Date: 8/9/2022    Physician Orders: PT Eval and Treat  Medical Diagnosis from Referral: Cervical spondylosis (M47.812)  Evaluation Date: 8/4/2022  Authorization Period Expiration: 9/1/2022  Plan of Care Expiration: 10/28/2022  Visit # / Visits authorized: 2/12  PTA Visit: 1/5    Precautions: Standard and cervical fusion C3-5    Time In: 3:10pm  Time Out: 3:55pm   Total Billable Time: 45 minutes      SUBJECTIVE     Pt reports: He is feeling well today, he has been dealing with consistent tightness and pain in his neck that radiates to his L upper trap. He reports his desk set up at work is not ergonomic and may be contributing to his neck pain.    He was compliant with home exercise program.   Response to previous treatment: Patient states that the cupping from last visit helped relieve some stiffness in his neck.  Functional change: Pt is able to work but with limitations     Pain: 4/10  Location: cervical region, right upper trap.     OBJECTIVE     Objective Measures updated at progress report unless specified.       TREATMENT     Total Treatment time (time-based codes) separate from Evaluation: 45 minutes     Rebel received the treatments listed below:      Patient received therapeutic exercises for 37 minutes for improved strength and AROM including:  +UT Stretch 3x30 sec    +LS stretch 3x30 sec   +Chin tucks 3x8   +Scap Retraction 3x8    Rebel received the following manual therapy techniques: Soft tissue Mobilization were applied to the: B shoulder/neck for 8 minutes, including:  Static cupping to upper trap, levator scap, and cervical paraspinals B x8 minutes        PATIENT EDUCATION AND HOME EXERCISES     Home Exercises Provided  and Patient Education Provided     Education provided:   PT educated pt on importance of compliance with their HEP this visit.     Written Home Exercises Provided: yes. Exercises were reviewed and Rebel was able to demonstrate them prior to the end of the session.  Rebel demonstrated good  understanding of the education provided. See EMR under Patient Instructions for exercises provided during therapy sessions    ASSESSMENT   Pt was able to actively participate in therapy today. He presented with moderate tightness in the L upper trap and L cervical paraspinal region, limiting cervical side bend ROM. Patient experienced increased symptoms with stretches today and required longer rest breaks between exercises for pain to subside. Continued cupping, focusing on the L upper trap region.      Rebel Is progressing well towards his goals.   Pt prognosis is Fair.     Pt will continue to benefit from skilled outpatient physical therapy to address the deficits listed in the problem list box on initial evaluation, provide pt/family education and to maximize pt's level of independence in the home and community environment.     Pt's spiritual, cultural and educational needs considered and pt agreeable to plan of care and goals.     Anticipated barriers to physical therapy: None    Goals:  In 6 weeks,    Goal Status   1. Patient will be independent with HEP to promote improved therapy outcomes.  STG     2. Patient will require min VC from PT for proper scapular retraction in order to improve postural awareness. STG     3. Patient will perform cervical retraction and chin tuck independently to improve posture and reduce muscle tension.  STG     4. Patient will improve cervical rotation ROM by 5 degrees to demonstrate improved functional mobility. STG        In 12 weeks,    Goal Status   5. Patient will be independent with progression of HEP for self maintenance of symptoms.  LTG     6. Patient will improve cervical rotation  ROM by 10 degrees to demonstrate improved functional mobility. LTG     7. Patient will lift 10-20 lbs with <2/10 pain to perform work duties LTG     8. Patient will improve FOTO to </= 40% limitation to demonstrate improved functional mobility.  LTG           PLAN   Plan of care Certification: 8/4/2022 to 10/28/2022.     Outpatient Physical Therapy 2 times weekly for 24 visits to include the following interventions: Manual Therapy, Moist Heat/ Ice, Neuromuscular Re-ed, Patient Education, Therapeutic Activities, Therapeutic Exercise and dry needling.         Juvenal Jennings, PTA

## 2022-08-11 ENCOUNTER — CLINICAL SUPPORT (OUTPATIENT)
Dept: REHABILITATION | Facility: OTHER | Age: 52
End: 2022-08-11
Payer: COMMERCIAL

## 2022-08-11 DIAGNOSIS — M54.2 PAINFUL CERVICAL ROM: ICD-10-CM

## 2022-08-11 DIAGNOSIS — M54.2 CHRONIC NECK PAIN: Primary | ICD-10-CM

## 2022-08-11 DIAGNOSIS — G89.29 CHRONIC NECK PAIN: Primary | ICD-10-CM

## 2022-08-11 PROCEDURE — 97140 MANUAL THERAPY 1/> REGIONS: CPT | Mod: PN

## 2022-08-11 PROCEDURE — 97110 THERAPEUTIC EXERCISES: CPT | Mod: PN

## 2022-08-11 NOTE — PROGRESS NOTES
[facility]   Physical Therapy Treatment Note     Name: Rebel Rodriguez  Clinic Number: [id number]    Therapy Diagnosis:   Encounter Diagnoses   Name Primary?    Chronic neck pain Yes    Painful cervical ROM      Physician: Ainsley Russell*    Visit Date: 8/11/2022    Physician Orders: PT Eval and Treat  Medical Diagnosis from Referral: Cervical spondylosis (M47.812)  Evaluation Date: 8/4/2022  Authorization Period Expiration: 9/1/2022  Plan of Care Expiration: 10/28/2022  Visit # / Visits authorized: 2/12 (3 visits total)  Re-assessment: due 9/4/2022  FOTO: 8/4/2022 (1/3)  PTA Visit: 0/5    Precautions: Standard and cervical fusion C3-5    Time In: 3:17 pm  Time Out: 4:15 pm   Total Billable Time: 55 minutes      SUBJECTIVE     Pt reports: he is feeling better and better each time.      He was compliant with home exercise program.   Response to previous treatment: Patient states that the cupping from last visit helped relieve some stiffness in his neck.  Functional change: Pt is able to work but with limitations     Pain: 4/10  Location: cervical region, right upper trap.     OBJECTIVE     Objective Measures updated at progress report unless specified.       TREATMENT     Rebel received the treatments listed below:      Patient received therapeutic exercises for 30 minutes for improved strength and AROM including:  Review of dry needling consent form   Exercises performed over MHP   Pec stretch x3 minutes   Chin tucks 20x 3 seconds   Cervical retraction 20x 3 seconds   Scap retraction 20x 3 second holds   Horizontal ABD with OTB 2x 10     Not performed today:  +UT Stretch 3x30 sec    +LS stretch 3x30 sec     Rebel received the following manual therapy techniques: Soft tissue Mobilization were applied to the: B shoulder/neck for 25 minutes, including:  Application of TDN: Pt educated on benefits and potential side effects of dry needling. Educated pt on benefits, precautions, side  effects following TDN. Educated pt to use heat following treatment sessions if pt is experiencing pain or soreness. Pt verbalized good understanding of education.  Pt signed written consent to dry needling. Pt gave verbal consent for DN    Pt received dry needling to the below listed muscles using 30 needles.  B upper traps   B Levator scap  B C7 along bladder line  B C2 along bladder line    Winding performed every 5 minutes during treatment.    Not performed:   Static cupping to upper trap, levator scap, and cervical paraspinals B x8 minutes      Rebel received hot pack for 15 minutes to neck and shoulders.    PATIENT EDUCATION AND HOME EXERCISES     Home Exercises Provided and Patient Education Provided     Education provided:   PT educated pt on importance of compliance with their HEP this visit.     Written Home Exercises Provided: yes. Exercises were reviewed and Rebel was able to demonstrate them prior to the end of the session.  Rebel demonstrated good  understanding of the education provided. See EMR under Patient Instructions for exercises provided during therapy sessions    ASSESSMENT     Verbal and written consent obtained prior to initiation of dry needling today.  Good soft tissue response to dry needling evident by increased grasp with unilateral winding at all insertion points. Winding performed every 5 minutes during treatment. No adverse effects following treatment. Continues to have tightness over L Levator scap with palpation and will continue to benefit from needling and other soft tissue/manual techniques to reduce tightness and stiffness to improve available mobility.      Rebel Is progressing well towards his goals.   Pt prognosis is Fair.     Pt will continue to benefit from skilled outpatient physical therapy to address the deficits listed in the problem list box on initial evaluation, provide pt/family education and to maximize pt's level of independence in the home and community  environment.     Pt's spiritual, cultural and educational needs considered and pt agreeable to plan of care and goals.     Anticipated barriers to physical therapy: None    Goals:  In 6 weeks,    Goal Status   1. Patient will be independent with HEP to promote improved therapy outcomes.  STG     2. Patient will require min VC from PT for proper scapular retraction in order to improve postural awareness. STG     3. Patient will perform cervical retraction and chin tuck independently to improve posture and reduce muscle tension.  STG     4. Patient will improve cervical rotation ROM by 5 degrees to demonstrate improved functional mobility. STG        In 12 weeks,    Goal Status   5. Patient will be independent with progression of HEP for self maintenance of symptoms.  LTG     6. Patient will improve cervical rotation ROM by 10 degrees to demonstrate improved functional mobility. LTG     7. Patient will lift 10-20 lbs with <2/10 pain to perform work duties LTG     8. Patient will improve FOTO to </= 40% limitation to demonstrate improved functional mobility.  LTG           PLAN   Plan of care Certification: 8/4/2022 to 10/28/2022.     Outpatient Physical Therapy 2 times weekly for 24 visits to include the following interventions: Manual Therapy, Moist Heat/ Ice, Neuromuscular Re-ed, Patient Education, Therapeutic Activities, Therapeutic Exercise and dry needling.     PT/PTA met face to face to discuss pt's treatment plan and progress towards established goals. Pt will be seen by a physical therapist minimally every 6th visit or every 30 days.    Continue with established PT POC and progress per pt tolerance.  Monitor effects of dry needling    Rafaela Bullock, PT

## 2022-08-14 LAB
ANTI-PM/SCL AB: <20 UNITS
ANTI-SS-A 52 KD AB, IGG: <20 UNITS
EJ AB SER QL: NEGATIVE
ENA JO1 AB SER IA-ACNC: <20 UNITS
ENA SM+RNP AB SER IA-ACNC: <20 UNITS
FIBRILLARIN (U3 RNP): NEGATIVE
KU AB SER QL: NEGATIVE
MDA-5 (P140): <20 UNITS
MI2 AB SER QL: NEGATIVE
NXP-2 (P140): <20 UNITS
OJ AB SER QL: NEGATIVE
PL12 AB SER QL: NEGATIVE
PL7 AB SER QL: NEGATIVE
SRP AB SERPL QL: NEGATIVE
TIF1 GAMMA (P155/140): <20 UNITS
U2 SNRNP: NEGATIVE

## 2022-08-15 DIAGNOSIS — E78.5 HYPERLIPIDEMIA: ICD-10-CM

## 2022-08-15 RX ORDER — ATORVASTATIN CALCIUM 10 MG/1
10 TABLET, FILM COATED ORAL NIGHTLY
Qty: 90 TABLET | Refills: 3 | Status: SHIPPED | OUTPATIENT
Start: 2022-08-15 | End: 2022-11-21 | Stop reason: SDUPTHER

## 2022-08-16 ENCOUNTER — SPECIALTY PHARMACY (OUTPATIENT)
Dept: PHARMACY | Facility: CLINIC | Age: 52
End: 2022-08-16
Payer: COMMERCIAL

## 2022-08-16 ENCOUNTER — CLINICAL SUPPORT (OUTPATIENT)
Dept: REHABILITATION | Facility: OTHER | Age: 52
End: 2022-08-16
Payer: COMMERCIAL

## 2022-08-16 DIAGNOSIS — G89.29 CHRONIC NECK PAIN: Primary | ICD-10-CM

## 2022-08-16 DIAGNOSIS — M54.2 PAINFUL CERVICAL ROM: ICD-10-CM

## 2022-08-16 DIAGNOSIS — M54.2 CHRONIC NECK PAIN: Primary | ICD-10-CM

## 2022-08-16 PROCEDURE — 97110 THERAPEUTIC EXERCISES: CPT | Mod: PN,CQ

## 2022-08-16 PROCEDURE — 97140 MANUAL THERAPY 1/> REGIONS: CPT | Mod: PN,CQ

## 2022-08-16 NOTE — PROGRESS NOTES
[facility]   Physical Therapy Treatment Note     Name: Rebel Rodriguez  Clinic Number: [id number]    Therapy Diagnosis:   Encounter Diagnoses   Name Primary?    Chronic neck pain Yes    Painful cervical ROM      Physician: Ainsley Russell*    Visit Date: 8/16/2022    Physician Orders: PT Eval and Treat  Medical Diagnosis from Referral: Cervical spondylosis (M47.812)  Evaluation Date: 8/4/2022  Authorization Period Expiration: 9/1/2022  Plan of Care Expiration: 10/28/2022  Visit # / Visits authorized: 4/12 (4 visits total)  Re-assessment: due 9/4/2022  FOTO: 8/4/2022 (1/3)    Precautions: Standard and cervical fusion C3-5    Time In: 3:05 pm  Time Out: 3:45 pm   Total Billable Time: 40 minutes (Co-treat with Rafaela Bullock, PT, DPT, Cert DN for dry needling today)      SUBJECTIVE     Pt reports: he is feeling better and better each time.      He was compliant with home exercise program.   Response to previous treatment: Patient states that the cupping from last visit helped relieve some stiffness in his neck.  Functional change: Pt is able to work but with limitations     Pain: 4/10  Location: cervical region, right upper trap.     OBJECTIVE     Objective Measures updated at progress report unless specified.       TREATMENT     Rebel received the treatments listed below:      Patient received therapeutic exercises for 30 minutes for improved strength and AROM including:  Exercises performed over MHP   Pec stretch x3 minutes   Chin tucks 20x 3 seconds   Cervical retraction 20x 3 seconds   Scap retraction 20x 3 second holds (NP)    Horizontal ABD with OTB 2x 10     Not performed today:  +UT Stretch 3x30 sec    +LS stretch 3x30 sec     Rebel received the following manual therapy techniques: Soft tissue Mobilization were applied to the: B shoulder/neck for 10 minutes, including:      Dry needling performed by Rafaela Bullock, PT, DPT, Cert DN x5 minutes Application of TDN: Pt educated on  benefits and potential side effects of dry needling. Educated pt on benefits, precautions, side effects following TDN. Educated pt to use heat following treatment sessions if pt is experiencing pain or soreness. Pt verbalized good understanding of education.  Pt signed written consent to dry needling. Pt gave verbal consent for DN    Pt received dry needling to the below listed muscles using 40mm needles.  B upper traps anteriorly between fingers; not left in situ     STM to cervical paraspinals for 5 mins     Not performed:   Static cupping to upper trap, levator scap, and cervical paraspinals B x8 minutes      Rebel received hot pack for 10 minutes to neck and shoulders.    PATIENT EDUCATION AND HOME EXERCISES     Home Exercises Provided and Patient Education Provided     Education provided:   PT educated pt on importance of compliance with their HEP this visit.   - soreness after twitches in upper traps after needling, use of heat at home.     Written Home Exercises Provided: yes. Exercises were reviewed and Rebel was able to demonstrate them prior to the end of the session.  Rebel demonstrated good  understanding of the education provided. See EMR under Patient Instructions for exercises provided during therapy sessions    ASSESSMENT   Patient tolerated treatment well without any adverse affects today. Patient demonstrated improved cervical side bending range of motion. Integrated 1/2 foam during supine pec stretching today to increase intensity. Continue to progress exercises as needed to address stiffness/pain in neck region.    Good soft tissue response to dry needling evident by reduced tension and muscle twitches indicating trigger point release. No adverse effects following treatment.     Rebel Is progressing well towards his goals.   Pt prognosis is Fair.     Pt will continue to benefit from skilled outpatient physical therapy to address the deficits listed in the problem list box on initial  evaluation, provide pt/family education and to maximize pt's level of independence in the home and community environment.     Pt's spiritual, cultural and educational needs considered and pt agreeable to plan of care and goals.     Anticipated barriers to physical therapy: None    Goals:  In 6 weeks,    Goal Status   1. Patient will be independent with HEP to promote improved therapy outcomes.  STG     2. Patient will require min VC from PT for proper scapular retraction in order to improve postural awareness. STG     3. Patient will perform cervical retraction and chin tuck independently to improve posture and reduce muscle tension.  STG     4. Patient will improve cervical rotation ROM by 5 degrees to demonstrate improved functional mobility. STG        In 12 weeks,    Goal Status   5. Patient will be independent with progression of HEP for self maintenance of symptoms.  LTG     6. Patient will improve cervical rotation ROM by 10 degrees to demonstrate improved functional mobility. LTG     7. Patient will lift 10-20 lbs with <2/10 pain to perform work duties LTG     8. Patient will improve FOTO to </= 40% limitation to demonstrate improved functional mobility.  LTG           PLAN   Plan of care Certification: 8/4/2022 to 10/28/2022.     Outpatient Physical Therapy 2 times weekly for 24 visits to include the following interventions: Manual Therapy, Moist Heat/ Ice, Neuromuscular Re-ed, Patient Education, Therapeutic Activities, Therapeutic Exercise and dry needling.     PT/PTA met face to face to discuss pt's treatment plan and progress towards established goals. Pt will be seen by a physical therapist minimally every 6th visit or every 30 days.    Continue with established PT POC and progress per pt tolerance.  Monitor effects of dry needling    Juvenal Jennings, PTA     Rafaela Bullock, PT, DPT  8/16/2022

## 2022-08-16 NOTE — TELEPHONE ENCOUNTER
Specialty Pharmacy - Clinical Reassessment    Specialty Medication Orders Linked to Encounter    Flowsheet Row Most Recent Value   Medication #1 guselkumab (TREMFYA) 100 mg/mL AtIn (Order#[id number], Rx#[id number])   Medication #2 apremilast (OTEZLA) 30 mg Tab (Order#[id number], Rx#[id number])        Patient Diagnosis   L40.50 - Psoriatic arthritis    Specialty clinical pharmacist review completed for an annual review of reassessment. Reviewed the following areas: current med list, reports of adverse effects, adherence and progress towards therapeutic goals.    Recommendations: none at this time. Patient counseled by Nunu Urena regarding adherence strategies as patient reported forgetting AM doses occasionally. Patient doing well on both Otezla and Tremfya- adequate control of PsA.     Tasks added this encounter   5/16/2023 - Clinical - Follow Up Assesement (Annual)   Tasks due within next 3 months   8/16/2022 - Refill Call (Auto Added)  9/1/2022 - Refill Call (Auto Added)     Winter Muhammad, PharmD  [facility]  [address]  [facility] [address]  Phone: [phone]  Fax: [phone]

## 2022-08-18 ENCOUNTER — CLINICAL SUPPORT (OUTPATIENT)
Dept: REHABILITATION | Facility: OTHER | Age: 52
End: 2022-08-18
Payer: COMMERCIAL

## 2022-08-18 DIAGNOSIS — G89.29 CHRONIC NECK PAIN: Primary | ICD-10-CM

## 2022-08-18 DIAGNOSIS — M54.2 CHRONIC NECK PAIN: Primary | ICD-10-CM

## 2022-08-18 DIAGNOSIS — M54.2 PAINFUL CERVICAL ROM: ICD-10-CM

## 2022-08-18 PROCEDURE — 97110 THERAPEUTIC EXERCISES: CPT | Mod: PN,CQ

## 2022-08-18 NOTE — PROGRESS NOTES
[facility]   Physical Therapy Treatment Note     Name: Rebel Rodriguez  Clinic Number: [id number]    Therapy Diagnosis:   Encounter Diagnoses   Name Primary?    Chronic neck pain Yes    Painful cervical ROM      Physician: Ainsley Russell*    Visit Date: 8/18/2022    Physician Orders: PT Eval and Treat  Medical Diagnosis from Referral: Cervical spondylosis (M47.812)  Evaluation Date: 8/4/2022  Authorization Period Expiration: 9/1/2022  Plan of Care Expiration: 10/28/2022  Visit # / Visits authorized: 5/12 (5 visits total)  Re-assessment: due 9/4/2022  FOTO: 8/4/2022 (1/3)    Precautions: Standard and cervical fusion C3-5    Time In: 3:15 pm  Time Out: 4:00 pm   Total Billable Time: 45 minutes      SUBJECTIVE     Pt reports: He responded well to dry needling last visit and he reports a 0/10 pain rating today. The stiffness in his L upper trap region continues but is not giving him any pain today.   He was compliant with home exercise program.   Response to previous treatment: Decreased pain   Functional change: Pt is able to work but with limitations. He is able to go to the gym to work out occasionally      Pain: 0/10  Location: cervical region, left upper trap.     OBJECTIVE     Objective Measures updated at progress report unless specified.       TREATMENT     Rebel received the treatments listed below:      Patient received therapeutic exercises in bold for 45 minutes for improved strength and AROM including:  Exercises performed over MHP   Pec stretch x3 minutes   Chin tucks 20x 3 seconds   Cervical retraction 20x 3 seconds   Scap retraction 20x 3 second holds      Horizontal ABD with OTB 2x 10  +Shoulder shrugs x20  +Shoulder clocks x20  +Slouch corrections with vinod band 2x10 5 sec hold     Not performed today:  +UT Stretch 3x30 sec    +LS stretch 3x30 sec     Rebel received the following manual therapy techniques: Soft tissue Mobilization were applied to the: B  shoulder/neck for 00 minutes, including:      Dry needling performed by Rafaela Bullock, PT, DPT, Cert DN x00 minutes Application of TDN: Pt educated on benefits and potential side effects of dry needling. Educated pt on benefits, precautions, side effects following TDN. Educated pt to use heat following treatment sessions if pt is experiencing pain or soreness. Pt verbalized good understanding of education.  Pt signed written consent to dry needling. Pt gave verbal consent for DN    Pt received dry needling to the below listed muscles using 40mm needles.  B upper traps anteriorly between fingers; not left in situ     STM to cervical paraspinals for 00 mins     Not performed:   Static cupping to upper trap, levator scap, and cervical paraspinals B x8 minutes      Rebel received hot pack for 10 minutes to neck and shoulders.    PATIENT EDUCATION AND HOME EXERCISES     Home Exercises Provided and Patient Education Provided     Education provided:   PT educated pt on importance of compliance with their HEP this visit.   - soreness after twitches in upper traps after needling, use of heat at home.     Written Home Exercises Provided: yes. Exercises were reviewed and Rebel was able to demonstrate them prior to the end of the session.  Rebel demonstrated good  understanding of the education provided. See EMR under Patient Instructions for exercises provided during therapy sessions    ASSESSMENT   Patient tolerated treatment well without any adverse affects today. Patient continued to demonstrate improved cervical side bending range of motion and cervical rotation today. Increased exercise tolerance today with good response. Continue to progress exercises as needed to address stiffness/pain in neck region.    Rebel Is progressing well towards his goals.   Pt prognosis is Fair.     Pt will continue to benefit from skilled outpatient physical therapy to address the deficits listed in the problem list box on initial  evaluation, provide pt/family education and to maximize pt's level of independence in the home and community environment.     Pt's spiritual, cultural and educational needs considered and pt agreeable to plan of care and goals.     Anticipated barriers to physical therapy: None    Goals:  In 6 weeks,    Goal Status   1. Patient will be independent with HEP to promote improved therapy outcomes.  STG     2. Patient will require min VC from PT for proper scapular retraction in order to improve postural awareness. STG     3. Patient will perform cervical retraction and chin tuck independently to improve posture and reduce muscle tension.  STG     4. Patient will improve cervical rotation ROM by 5 degrees to demonstrate improved functional mobility. STG        In 12 weeks,    Goal Status   5. Patient will be independent with progression of HEP for self maintenance of symptoms.  LTG     6. Patient will improve cervical rotation ROM by 10 degrees to demonstrate improved functional mobility. LTG     7. Patient will lift 10-20 lbs with <2/10 pain to perform work duties LTG     8. Patient will improve FOTO to </= 40% limitation to demonstrate improved functional mobility.  LTG           PLAN   Plan of care Certification: 8/4/2022 to 10/28/2022.     Outpatient Physical Therapy 2 times weekly for 24 visits to include the following interventions: Manual Therapy, Moist Heat/ Ice, Neuromuscular Re-ed, Patient Education, Therapeutic Activities, Therapeutic Exercise and dry needling.     PT/PTA met face to face to discuss pt's treatment plan and progress towards established goals. Pt will be seen by a physical therapist minimally every 6th visit or every 30 days.    Continue with established PT POC and progress per pt tolerance.  Monitor effects of dry needling    Juvenal Jennings PTA

## 2022-08-19 NOTE — TELEPHONE ENCOUNTER
Outgoing call to patient regarding Otezla refill. Patient expressed having more than a week on hand and asked to have his Tremfya and Otezla filled together. Test claim ran for Tremfya and the earliest fill date is 8/23. Patient is due to inject on 8/24. [facility] refill call to 8/23. [facility] routed.

## 2022-08-22 NOTE — PROGRESS NOTES
[facility]   Physical Therapy Treatment Note     Name: Rebel Rodriguez  Clinic Number: [id number]    Therapy Diagnosis:   Encounter Diagnoses   Name Primary?    Chronic neck pain Yes    Painful cervical ROM      Physician: Ainsley Russell*    Visit Date: 8/23/2022    Physician Orders: PT Eval and Treat  Medical Diagnosis from Referral: Cervical spondylosis (M47.812)  Evaluation Date: 8/4/2022  Authorization Period Expiration: 9/1/2022  Plan of Care Expiration: 10/28/2022  Visit # / Visits authorized: 5/12 (6 visits total)  Re-assessment: due 9/4/2022  FOTO: 8/22/2022 (2/3)     Precautions: Standard and cervical fusion C3-5    Time In: 3:20 pm  Time Out: 4:00 pm   Total Billable Time: 40 minutes      SUBJECTIVE     Pt reports: he is still having tightness mainly in the L upper trap and lower neck area but overall things have gotten significantly better since IE.   He was compliant with home exercise program.   Response to previous treatment: Decreased pain   Functional change: Pt is able to work but with limitations. He is able to go to the gym to work out occasionally      Pain: 0/10  Location: cervical region, left upper trap.     OBJECTIVE     Objective Measures updated at progress report unless specified.       TREATMENT     Rebel received the treatments listed below:      Patient received therapeutic exercises in bold for 10 minutes for improved strength and AROM including:  Exercises performed over MHP   Cervical rotation 15x 5 second holds each   Pec stretch x3 minutes   Chin tuck and cervical retraction with supine snow angels       Not performed today:  UT Stretch 3x30 sec    LS stretch 3x30 sec   Chin tucks 20x 3 seconds  Cervical retraction 20x 3 seconds  Scap retraction 20x 3 second holds   Horizontal ABD with OTB 2x 10  Shoulder shrugs x20  Shoulder clocks x20  Slouch corrections with vinod band 2x10 5 sec hold    Rebel received the following manual therapy techniques:  Soft tissue Mobilization were applied to the: B shoulder/neck for 30 minutes, including:    Application of TDN: Pt educated on benefits and potential side effects of dry needling. Educated pt on benefits, precautions, side effects following TDN. Educated pt to use heat following treatment sessions if pt is experiencing pain or soreness. Pt verbalized good understanding of education.  Pt signed written consent to dry needling. Pt gave verbal consent for DN    Pt received dry needling to the below listed muscles using 30 and 40 mm needles.  L upper traps anteriorly between fingers; not left in situ   B GB 20  B C2 and C7 along bladder line    Winding performed every 5 minutes during treatment.     Not performed:   Static cupping to upper trap, levator scap, and cervical paraspinals B x8 minutes      Rebel received hot pack for 10 minutes to neck and shoulders.    PATIENT EDUCATION AND HOME EXERCISES     Home Exercises Provided and Patient Education Provided     Education provided:   PT educated pt on importance of compliance with their HEP this visit.   - soreness after twitches in upper traps after needling, use of heat at home.     Written Home Exercises Provided: yes. Exercises were reviewed and Rebel was able to demonstrate them prior to the end of the session.  Rebel demonstrated good  understanding of the education provided. See EMR under Patient Instructions for exercises provided during therapy sessions    ASSESSMENT     Good soft tissue response to dry needling evident by increased grasp with unilateral winding at all insertion points. Winding performed every 5 minutes during treatment. No adverse effects following treatment.  Patient demonstrated improve cervical rotation ROM post needling.      Rebel Is progressing well towards his goals.   Pt prognosis is Fair.     Pt will continue to benefit from skilled outpatient physical therapy to address the deficits listed in the problem list box on initial  evaluation, provide pt/family education and to maximize pt's level of independence in the home and community environment.     Pt's spiritual, cultural and educational needs considered and pt agreeable to plan of care and goals.     Anticipated barriers to physical therapy: None    Goals:  In 6 weeks,    Goal Status   1. Patient will be independent with HEP to promote improved therapy outcomes.  STG     2. Patient will require min VC from PT for proper scapular retraction in order to improve postural awareness. STG     3. Patient will perform cervical retraction and chin tuck independently to improve posture and reduce muscle tension.  STG     4. Patient will improve cervical rotation ROM by 5 degrees to demonstrate improved functional mobility. STG        In 12 weeks,    Goal Status   5. Patient will be independent with progression of HEP for self maintenance of symptoms.  LTG     6. Patient will improve cervical rotation ROM by 10 degrees to demonstrate improved functional mobility. LTG     7. Patient will lift 10-20 lbs with <2/10 pain to perform work duties LTG     8. Patient will improve FOTO to </= 40% limitation to demonstrate improved functional mobility.  LTG           PLAN   Plan of care Certification: 8/4/2022 to 10/28/2022.     Outpatient Physical Therapy 2 times weekly for 24 visits to include the following interventions: Manual Therapy, Moist Heat/ Ice, Neuromuscular Re-ed, Patient Education, Therapeutic Activities, Therapeutic Exercise and dry needling.     PT/PTA met face to face to discuss pt's treatment plan and progress towards established goals. Pt will be seen by a physical therapist minimally every 6th visit or every 30 days.    Continue with established PT POC and progress per pt tolerance.  Monitor effects of dry needling    Rafaela Bullock, PT

## 2022-08-23 ENCOUNTER — CLINICAL SUPPORT (OUTPATIENT)
Dept: REHABILITATION | Facility: OTHER | Age: 52
End: 2022-08-23
Payer: COMMERCIAL

## 2022-08-23 DIAGNOSIS — M54.2 PAINFUL CERVICAL ROM: ICD-10-CM

## 2022-08-23 DIAGNOSIS — G89.29 CHRONIC NECK PAIN: Primary | ICD-10-CM

## 2022-08-23 DIAGNOSIS — M54.2 CHRONIC NECK PAIN: Primary | ICD-10-CM

## 2022-08-23 PROCEDURE — 97140 MANUAL THERAPY 1/> REGIONS: CPT | Mod: PN

## 2022-08-23 PROCEDURE — 97110 THERAPEUTIC EXERCISES: CPT | Mod: PN

## 2022-08-25 ENCOUNTER — CLINICAL SUPPORT (OUTPATIENT)
Dept: REHABILITATION | Facility: OTHER | Age: 52
End: 2022-08-25
Payer: COMMERCIAL

## 2022-08-25 DIAGNOSIS — M54.2 CHRONIC NECK PAIN: Primary | ICD-10-CM

## 2022-08-25 DIAGNOSIS — G89.29 CHRONIC NECK PAIN: Primary | ICD-10-CM

## 2022-08-25 DIAGNOSIS — M54.2 PAINFUL CERVICAL ROM: ICD-10-CM

## 2022-08-25 PROCEDURE — 97110 THERAPEUTIC EXERCISES: CPT | Mod: PN,CQ

## 2022-08-25 PROCEDURE — 97140 MANUAL THERAPY 1/> REGIONS: CPT | Mod: PN,CQ

## 2022-08-25 NOTE — PROGRESS NOTES
[facility]   Physical Therapy Treatment Note     Name: Rebel Rodriguez  Clinic Number: [id number]    Therapy Diagnosis:   Encounter Diagnoses   Name Primary?    Chronic neck pain Yes    Painful cervical ROM      Physician: Ainsley Russell*    Visit Date: 8/25/2022    Physician Orders: PT Eval and Treat  Medical Diagnosis from Referral: Cervical spondylosis (M47.812)  Evaluation Date: 8/4/2022  Authorization Period Expiration: 9/1/2022  Plan of Care Expiration: 10/28/2022  Visit # / Visits authorized: 7/12 (8 visits total)  Re-assessment: due 9/4/2022  FOTO: 8/22/2022 (2/3)     Precautions: Standard and cervical fusion C3-5    Time In: 3:05 pm  Time Out: 3:45pm   Total Billable Time: 40 minutes      SUBJECTIVE     Pt reports: he is still having tightness mainly in the L upper trap and lower neck area but overall things have gotten significantly better since IE.   He was compliant with home exercise program.   Response to previous treatment: Decreased pain   Functional change: Pt is able to work but with limitations. He is able to go to the gym to work out occasionally      Pain: 0/10  Location: cervical region, left upper trap.     OBJECTIVE     Objective Measures updated at progress report unless specified.       TREATMENT     Rebel received the treatments listed below:      Patient received therapeutic exercises in bold for 30 minutes for improved strength and AROM including:  Exercises performed over MHP   Cervical rotation 15x 5 second holds each   Pec stretch x3 minutes   Chin tuck and cervical retraction with supine snow angels   Supine Horizontal ABD with OTB 2x 10  Scap retraction 20x 3 second holds   Shoulder clocks x20    Not performed today:  UT Stretch 3x30 sec    LS stretch 3x30 sec   Chin tucks 20x 3 seconds  Cervical retraction 20x 3 seconds  Scap retraction 20x 3 second holds   Horizontal ABD with OTB 2x 10  Shoulder shrugs x20  Shoulder clocks x20  Slouch  corrections with vinod band 2x10 5 sec hold    Rebel received the following manual therapy techniques: Soft tissue Mobilization were applied to the: B shoulder/neck for 10 minutes, including:  Static & dynamic cupping to upper trap, levator scap, and cervical paraspinals B x10 minutes    Not performed:     Application of TDN: Pt educated on benefits and potential side effects of dry needling. Educated pt on benefits, precautions, side effects following TDN. Educated pt to use heat following treatment sessions if pt is experiencing pain or soreness. Pt verbalized good understanding of education.  Pt signed written consent to dry needling. Pt gave verbal consent for DN    Pt received dry needling to the below listed muscles using 30 and 40 mm needles.  L upper traps anteriorly between fingers; not left in situ   B GB 20  B C2 and C7 along bladder line    Winding performed every 5 minutes during treatment.       Rebel received hot pack for 10 minutes to neck and shoulders.    PATIENT EDUCATION AND HOME EXERCISES     Home Exercises Provided and Patient Education Provided     Education provided:   PT educated pt on importance of compliance with their HEP this visit.   - soreness after twitches in upper traps after needling, use of heat at home.     Written Home Exercises Provided: yes. Exercises were reviewed and Rebel was able to demonstrate them prior to the end of the session.  Rebel demonstrated good  understanding of the education provided. See EMR under Patient Instructions for exercises provided during therapy sessions    ASSESSMENT   Patient tolerated treatment today without increased neck pain or discomfort. Patient continues to respond well to cupping techniques, with improved cervical range of motion. Continued with postural correction exercises to reduce neck pain.       Rebel Is progressing well towards his goals.   Pt prognosis is Fair.     Pt will continue to benefit from skilled outpatient physical  therapy to address the deficits listed in the problem list box on initial evaluation, provide pt/family education and to maximize pt's level of independence in the home and community environment.     Pt's spiritual, cultural and educational needs considered and pt agreeable to plan of care and goals.     Anticipated barriers to physical therapy: None    Goals:  In 6 weeks,    Goal Status   1. Patient will be independent with HEP to promote improved therapy outcomes.  STG     2. Patient will require min VC from PT for proper scapular retraction in order to improve postural awareness. STG     3. Patient will perform cervical retraction and chin tuck independently to improve posture and reduce muscle tension.  STG     4. Patient will improve cervical rotation ROM by 5 degrees to demonstrate improved functional mobility. STG        In 12 weeks,    Goal Status   5. Patient will be independent with progression of HEP for self maintenance of symptoms.  LTG     6. Patient will improve cervical rotation ROM by 10 degrees to demonstrate improved functional mobility. LTG     7. Patient will lift 10-20 lbs with <2/10 pain to perform work duties LTG     8. Patient will improve FOTO to </= 40% limitation to demonstrate improved functional mobility.  LTG           PLAN   Plan of care Certification: 8/4/2022 to 10/28/2022.     Outpatient Physical Therapy 2 times weekly for 24 visits to include the following interventions: Manual Therapy, Moist Heat/ Ice, Neuromuscular Re-ed, Patient Education, Therapeutic Activities, Therapeutic Exercise and dry needling.     PT/PTA met face to face to discuss pt's treatment plan and progress towards established goals. Pt will be seen by a physical therapist minimally every 6th visit or every 30 days.    Continue with established PT POC and progress per pt tolerance.  Monitor effects of dry needling    Juvenal Jennings PTA

## 2022-08-26 ENCOUNTER — PATIENT MESSAGE (OUTPATIENT)
Dept: PHARMACY | Facility: CLINIC | Age: 52
End: 2022-08-26
Payer: COMMERCIAL

## 2022-08-26 NOTE — TELEPHONE ENCOUNTER
LVM for refills. Patient's last injection was 7/14- Tremfya should be due 9/8. Will continue following up for refills.

## 2022-08-29 NOTE — TELEPHONE ENCOUNTER
Specialty Pharmacy - Refill Coordination    Specialty Medication Orders Linked to Encounter      Flowsheet Row Most Recent Value   Medication #1 apremilast (OTEZLA) 30 mg Tab (Order#[id number], Rx#[id number])   Medication #2 guselkumab (TREMFYA) 100 mg/mL AtIn (Order#[id number], Rx#[id number])            Refill Questions - Documented Responses      Flowsheet Row Most Recent Value   Patient Availability and HIPAA Verification    Does patient want to proceed with activity? Yes   HIPAA/medical authority confirmed? Yes   Relationship to patient of person spoken to? Self   Refill Screening Questions    Changes to allergies? No   Changes to medications? No   New conditions since last clinic visit? No   Unplanned office visit, urgent care, ED, or hospital admission in the last 4 weeks? No   How does patient/caregiver feel medication is working? Good   Financial problems or insurance changes? No   How many doses of your specialty medications were missed in the last 4 weeks? 0   Would patient like to speak to a pharmacist? No   When does the patient need to receive the medication? 08/31/22   Refill Delivery Questions    How will the patient receive the medication? Pickup   When does the patient need to receive the medication? 08/31/22   Shipping Address Home   Address in [facility] confirmed and updated if neccessary? Yes   Expected Copay ($) 0   Is the patient able to afford the medication copay? Yes   Payment Method zero copay   Days supply of Refill 30   Supplies needed? No supplies needed   Refill activity completed? Yes   Refill activity plan Refill scheduled   Shipment/Pickup Date: 08/30/22            Current Outpatient Medications   Medication Sig    apremilast (OTEZLA) 30 mg Tab Take 1 tablet (30 mg total) by mouth 2 (two) times daily.    aspirin (ECOTRIN) 81 MG EC tablet Take 81 mg by mouth once daily.    atorvastatin (LIPITOR) 10 MG tablet Take 1 tablet (10 mg total) by mouth every evening.    citric  acid-potassium citrate (POLYCITRA) 1,100-334 mg/5 mL solution Take by mouth 2 (two) times a day.    FLUoxetine 40 MG capsule Take 1 capsule (40 mg) by mouth daily    guselkumab (TREMFYA) 100 mg/mL AtIn Inject 100 mg into the skin every 8 weeks.    leflunomide (ARAVA) 20 MG Tab Take 1 tablet (20 mg total) by mouth once daily.    mirtazapine (REMERON) 15 MG tablet Take 1 tablet (15 mg) by mouth daily at bedtime    mirtazapine (REMERON) 7.5 MG Tab take one tablet by mouth every night at bedtime    potassium citrate (UROCIT-K 15) 15 mEq TbSR TK 1 T PO BID    tamsulosin (FLOMAX) 0.4 mg Cap Take 0.4 mg by mouth once daily.   Last reviewed on 7/29/2022  9:15 AM by Tameka Michaud MA    Review of patient's allergies indicates:   Allergen Reactions    Erythromycin Nausea And Vomiting    Infliximab Other (See Comments)     Lupus with fever and acute arthritis  Lupus with fever and acute arthritis    Last reviewed on  7/29/2022 9:14 AM by Tameka Michaud      Tasks added this encounter   9/23/2022 - Refill Call (Auto Added)  8/31/2022 - Pickup Reminder   Tasks due within next 3 months   9/1/2022 - Refill Call (Auto Added)     Giuliana Rutherford, PharmD  [facility]  [address] [place] [id number]  Phone: [phone]  Fax: [phone]

## 2022-09-19 ENCOUNTER — SPECIALTY PHARMACY (OUTPATIENT)
Dept: PHARMACY | Facility: CLINIC | Age: 52
End: 2022-09-19
Payer: COMMERCIAL

## 2022-09-19 NOTE — TELEPHONE ENCOUNTER
Outgoing call: Spoke with patient regarding refill, he stated he have plenty on hand. He was interested in syncing his meds together from [facility]. He stated he will call [facility] will he is running low but [facility] can call him in a few weeks for refill. [facility] will follow up.

## 2022-09-30 ENCOUNTER — PATIENT MESSAGE (OUTPATIENT)
Dept: PHARMACY | Facility: CLINIC | Age: 52
End: 2022-09-30
Payer: COMMERCIAL

## 2022-09-30 NOTE — TELEPHONE ENCOUNTER
Specialty Pharmacy - Refill Coordination    Specialty Medication Orders Linked to Encounter      Flowsheet Row Most Recent Value   Medication #1 guselkumab (TREMFYA) 100 mg/mL AtIn (Order#[id number], Rx#[id number])            Refill Questions - Documented Responses      Flowsheet Row Most Recent Value   Patient Availability and HIPAA Verification    Does patient want to proceed with activity? Yes   HIPAA/medical authority confirmed? Yes   Relationship to patient of person spoken to? Self   Refill Screening Questions    Changes to allergies? No   Changes to medications? No   New conditions since last clinic visit? No   Unplanned office visit, urgent care, ED, or hospital admission in the last 4 weeks? No   How does patient/caregiver feel medication is working? Good   Financial problems or insurance changes? No   How many doses of your specialty medications were missed in the last 4 weeks? 0   Would patient like to speak to a pharmacist? No   When does the patient need to receive the medication? 10/03/22   Refill Delivery Questions    How will the patient receive the medication? Pickup   When does the patient need to receive the medication? 10/03/22   Address in [facility] confirmed and updated if neccessary? Yes   Expected Copay ($) 0   Is the patient able to afford the medication copay? Yes   Payment Method zero copay   Days supply of Refill 30   Supplies needed? No supplies needed   Refill activity completed? Yes   Refill activity plan Refill scheduled   Shipment/Pickup Date: 10/03/22            Current Outpatient Medications   Medication Sig    apremilast (OTEZLA) 30 mg Tab Take 1 tablet (30 mg total) by mouth 2 (two) times daily.    aspirin (ECOTRIN) 81 MG EC tablet Take 81 mg by mouth once daily.    atorvastatin (LIPITOR) 10 MG tablet Take 1 tablet (10 mg total) by mouth every evening.    citric acid-potassium citrate (POLYCITRA) 1,100-334 mg/5 mL solution Take by mouth 2 (two) times a day.    FLUoxetine  40 MG capsule Take 1 capsule (40 mg) by mouth daily    guselkumab (TREMFYA) 100 mg/mL AtIn Inject 100 mg into the skin every 8 weeks.    leflunomide (ARAVA) 20 MG Tab Take 1 tablet (20 mg total) by mouth once daily.    mirtazapine (REMERON) 15 MG tablet Take 1 tablet (15 mg) by mouth daily at bedtime    mirtazapine (REMERON) 7.5 MG Tab take one tablet by mouth every night at bedtime    mirtazapine (REMERON) 7.5 MG Tab Take 1 tablet by mouth at bedtime    potassium citrate (UROCIT-K 15) 15 mEq TbSR TK 1 T PO BID    tamsulosin (FLOMAX) 0.4 mg Cap Take 0.4 mg by mouth once daily.   Last reviewed on 7/29/2022  9:15 AM by Tameka Michadu MA    Review of patient's allergies indicates:   Allergen Reactions    Erythromycin Nausea And Vomiting    Infliximab Other (See Comments)     Lupus with fever and acute arthritis  Lupus with fever and acute arthritis    Last reviewed on  7/29/2022 9:14 AM by Tameka Michaud      Tasks added this encounter   10/26/2022 - Refill Call (Auto Added)  10/4/2022 - Pickup Reminder   Tasks due within next 3 months   10/12/2022 - Refill Call (Auto Added)     Christiane Alfonso, Patient Care Assistant  [facility]  [address] [place], [place] [address]  [facility] [address]  Phone: [phone]  Fax: [phone]

## 2022-10-12 ENCOUNTER — IMMUNIZATION (OUTPATIENT)
Dept: INTERNAL MEDICINE | Facility: CLINIC | Age: 52
End: 2022-10-12
Payer: COMMERCIAL

## 2022-10-12 DIAGNOSIS — Z23 NEED FOR VACCINATION: Primary | ICD-10-CM

## 2022-10-12 PROCEDURE — 0124A COVID-19, MRNA, LNP-S, BIVALENT BOOSTER, PF, 30 MCG/0.3 ML DOSE: CPT | Mod: PBBFAC | Performed by: INTERNAL MEDICINE

## 2022-10-12 PROCEDURE — 91312 COVID-19, MRNA, LNP-S, BIVALENT BOOSTER, PF, 30 MCG/0.3 ML DOSE: ICD-10-PCS | Mod: S$GLB,,, | Performed by: INTERNAL MEDICINE

## 2022-10-12 PROCEDURE — 91312 COVID-19, MRNA, LNP-S, BIVALENT BOOSTER, PF, 30 MCG/0.3 ML DOSE: CPT | Mod: S$GLB,,, | Performed by: INTERNAL MEDICINE

## 2022-10-17 ENCOUNTER — PATIENT MESSAGE (OUTPATIENT)
Dept: RHEUMATOLOGY | Facility: CLINIC | Age: 52
End: 2022-10-17
Payer: COMMERCIAL

## 2022-10-17 ENCOUNTER — SPECIALTY PHARMACY (OUTPATIENT)
Dept: PHARMACY | Facility: CLINIC | Age: 52
End: 2022-10-17
Payer: COMMERCIAL

## 2022-10-17 RX ORDER — LEFLUNOMIDE 20 MG/1
20 TABLET ORAL DAILY
Qty: 90 TABLET | Refills: 0 | OUTPATIENT
Start: 2022-10-17

## 2022-10-17 NOTE — TELEPHONE ENCOUNTER
Specialty Pharmacy - Refill Coordination    Specialty Medication Orders Linked to Encounter      Flowsheet Row Most Recent Value   Medication #1 guselkumab (TREMFYA) 100 mg/mL AtIn (Order#[id number], Rx#[id number])          Refill Questions - Documented Responses      Flowsheet Row Most Recent Value   Patient Availability and HIPAA Verification    Does patient want to proceed with activity? Yes   HIPAA/medical authority confirmed? Yes   Relationship to patient of person spoken to? Self   Refill Screening Questions    Changes to allergies? No   Changes to medications? No   New conditions since last clinic visit? No   Unplanned office visit, urgent care, ED, or hospital admission in the last 4 weeks? No   How does patient/caregiver feel medication is working? Good   Financial problems or insurance changes? No   How many doses of your specialty medications were missed in the last 4 weeks? 0   Would patient like to speak to a pharmacist? No   When does the patient need to receive the medication? 10/21/22   Refill Delivery Questions    How will the patient receive the medication? Pickup   When does the patient need to receive the medication? 10/21/22   Expected Copay ($) 0   Is the patient able to afford the medication copay? Yes   Payment Method zero copay   Days supply of Refill 56   Supplies needed? Injection Device   Refill activity completed? Yes   Refill activity plan Refill scheduled   Shipment/Pickup Date: 10/19/22            Current Outpatient Medications   Medication Sig    apremilast (OTEZLA) 30 mg Tab Take 1 tablet (30 mg total) by mouth 2 (two) times daily.    aspirin (ECOTRIN) 81 MG EC tablet Take 81 mg by mouth once daily.    atorvastatin (LIPITOR) 10 MG tablet Take 1 tablet (10 mg total) by mouth every evening.    citric acid-potassium citrate (POLYCITRA) 1,100-334 mg/5 mL solution Take by mouth 2 (two) times a day.    FLUoxetine 40 MG capsule Take 1 capsule (40 mg) by mouth daily    guselkumab (TREMFYA)  100 mg/mL AtIn Inject 100 mg into the skin every 8 weeks.    leflunomide (ARAVA) 20 MG Tab Take 1 tablet (20 mg total) by mouth once daily.    mirtazapine (REMERON) 15 MG tablet Take 1 tablet (15 mg) by mouth daily at bedtime    mirtazapine (REMERON) 7.5 MG Tab take one tablet by mouth every night at bedtime    mirtazapine (REMERON) 7.5 MG Tab Take 1 tablet by mouth at bedtime    potassium citrate (UROCIT-K 15) 15 mEq TbSR TK 1 T PO BID    tamsulosin (FLOMAX) 0.4 mg Cap Take 0.4 mg by mouth once daily.   Last reviewed on 7/29/2022  9:15 AM by Tameka Michaud MA    Review of patient's allergies indicates:   Allergen Reactions    Erythromycin Nausea And Vomiting    Infliximab Other (See Comments)     Lupus with fever and acute arthritis  Lupus with fever and acute arthritis    Last reviewed on  7/29/2022 9:14 AM by Tameka Michaud      Tasks added this encounter   12/9/2022 - Refill Call (Auto Added)  10/20/2022 - Pickup Reminder   Tasks due within next 3 months   10/26/2022 - Refill Call (Auto Added)     Jacob Beal, PharmD  [facility]  [address]  [facility] [address]  Phone: [phone]  Fax: [phone]

## 2022-10-19 ENCOUNTER — LAB VISIT (OUTPATIENT)
Dept: LAB | Facility: OTHER | Age: 52
End: 2022-10-19
Attending: STUDENT IN AN ORGANIZED HEALTH CARE EDUCATION/TRAINING PROGRAM
Payer: COMMERCIAL

## 2022-10-19 DIAGNOSIS — L40.50 PSA (PSORIATIC ARTHRITIS): ICD-10-CM

## 2022-10-19 LAB
ALBUMIN SERPL BCP-MCNC: 3.9 G/DL (ref 3.5–5.2)
ALP SERPL-CCNC: 90 U/L (ref 55–135)
ALT SERPL W/O P-5'-P-CCNC: 21 U/L (ref 10–44)
ANION GAP SERPL CALC-SCNC: 10 MMOL/L (ref 8–16)
AST SERPL-CCNC: 26 U/L (ref 10–40)
BASOPHILS # BLD AUTO: 0.04 K/UL (ref 0–0.2)
BASOPHILS NFR BLD: 0.5 % (ref 0–1.9)
BILIRUB SERPL-MCNC: 0.4 MG/DL (ref 0.1–1)
BUN SERPL-MCNC: 9 MG/DL (ref 6–20)
CALCIUM SERPL-MCNC: 9.5 MG/DL (ref 8.7–10.5)
CHLORIDE SERPL-SCNC: 105 MMOL/L (ref 95–110)
CO2 SERPL-SCNC: 24 MMOL/L (ref 23–29)
CREAT SERPL-MCNC: 0.9 MG/DL (ref 0.5–1.4)
CRP SERPL-MCNC: 0.8 MG/L (ref 0–8.2)
DIFFERENTIAL METHOD: ABNORMAL
EOSINOPHIL # BLD AUTO: 0.3 K/UL (ref 0–0.5)
EOSINOPHIL NFR BLD: 3.2 % (ref 0–8)
ERYTHROCYTE [DISTWIDTH] IN BLOOD BY AUTOMATED COUNT: 13.8 % (ref 11.5–14.5)
ERYTHROCYTE [SEDIMENTATION RATE] IN BLOOD: 15 MM/HR (ref 0–10)
EST. GFR  (NO RACE VARIABLE): >60 ML/MIN/1.73 M^2
GLUCOSE SERPL-MCNC: 85 MG/DL (ref 70–110)
HCT VFR BLD AUTO: 41.8 % (ref 40–54)
HGB BLD-MCNC: 13.6 G/DL (ref 14–18)
IMM GRANULOCYTES # BLD AUTO: 0.03 K/UL (ref 0–0.04)
IMM GRANULOCYTES NFR BLD AUTO: 0.4 % (ref 0–0.5)
LYMPHOCYTES # BLD AUTO: 2 K/UL (ref 1–4.8)
LYMPHOCYTES NFR BLD: 24.3 % (ref 18–48)
MCH RBC QN AUTO: 28.6 PG (ref 27–31)
MCHC RBC AUTO-ENTMCNC: 32.5 G/DL (ref 32–36)
MCV RBC AUTO: 88 FL (ref 82–98)
MONOCYTES # BLD AUTO: 0.8 K/UL (ref 0.3–1)
MONOCYTES NFR BLD: 9.4 % (ref 4–15)
NEUTROPHILS # BLD AUTO: 5.1 K/UL (ref 1.8–7.7)
NEUTROPHILS NFR BLD: 62.2 % (ref 38–73)
NRBC BLD-RTO: 0 /100 WBC
PLATELET # BLD AUTO: 278 K/UL (ref 150–450)
PMV BLD AUTO: 9.6 FL (ref 9.2–12.9)
POTASSIUM SERPL-SCNC: 4.1 MMOL/L (ref 3.5–5.1)
PROT SERPL-MCNC: 7.2 G/DL (ref 6–8.4)
RBC # BLD AUTO: 4.76 M/UL (ref 4.6–6.2)
SODIUM SERPL-SCNC: 139 MMOL/L (ref 136–145)
WBC # BLD AUTO: 8.12 K/UL (ref 3.9–12.7)

## 2022-10-19 PROCEDURE — 36415 COLL VENOUS BLD VENIPUNCTURE: CPT | Performed by: STUDENT IN AN ORGANIZED HEALTH CARE EDUCATION/TRAINING PROGRAM

## 2022-10-19 PROCEDURE — 86140 C-REACTIVE PROTEIN: CPT | Performed by: STUDENT IN AN ORGANIZED HEALTH CARE EDUCATION/TRAINING PROGRAM

## 2022-10-19 PROCEDURE — 80053 COMPREHEN METABOLIC PANEL: CPT | Performed by: STUDENT IN AN ORGANIZED HEALTH CARE EDUCATION/TRAINING PROGRAM

## 2022-10-19 PROCEDURE — 85651 RBC SED RATE NONAUTOMATED: CPT | Performed by: STUDENT IN AN ORGANIZED HEALTH CARE EDUCATION/TRAINING PROGRAM

## 2022-10-19 PROCEDURE — 85025 COMPLETE CBC W/AUTO DIFF WBC: CPT | Performed by: STUDENT IN AN ORGANIZED HEALTH CARE EDUCATION/TRAINING PROGRAM

## 2022-10-19 NOTE — PROGRESS NOTES
The sed rate is only minimally elevated and stable. The crp remains normal. The liver and kidney tests are normal. The cbc shows mild and slightly improved iron deficiency anemia.  Have you seen your gastroenterologist for evaluation of the iron deficiency anemia?

## 2022-10-20 RX ORDER — LEFLUNOMIDE 20 MG/1
20 TABLET ORAL DAILY
Qty: 90 TABLET | Refills: 0 | Status: SHIPPED | OUTPATIENT
Start: 2022-10-20 | End: 2023-01-26 | Stop reason: SDUPTHER

## 2022-10-24 ENCOUNTER — OFFICE VISIT (OUTPATIENT)
Dept: RHEUMATOLOGY | Facility: CLINIC | Age: 52
End: 2022-10-24
Payer: COMMERCIAL

## 2022-10-24 ENCOUNTER — TELEPHONE (OUTPATIENT)
Dept: NEUROSURGERY | Facility: CLINIC | Age: 52
End: 2022-10-24
Payer: COMMERCIAL

## 2022-10-24 VITALS
HEIGHT: 67 IN | HEART RATE: 76 BPM | DIASTOLIC BLOOD PRESSURE: 67 MMHG | SYSTOLIC BLOOD PRESSURE: 122 MMHG | WEIGHT: 142.44 LBS | BODY MASS INDEX: 22.36 KG/M2

## 2022-10-24 DIAGNOSIS — N20.0 RECURRENT NEPHROLITHIASIS: ICD-10-CM

## 2022-10-24 DIAGNOSIS — M47.812 CERVICAL SPONDYLOSIS: ICD-10-CM

## 2022-10-24 DIAGNOSIS — Z51.81 MEDICATION MONITORING ENCOUNTER: ICD-10-CM

## 2022-10-24 DIAGNOSIS — L40.50 PSA (PSORIATIC ARTHRITIS): Primary | ICD-10-CM

## 2022-10-24 DIAGNOSIS — D50.9 IRON DEFICIENCY ANEMIA, UNSPECIFIED IRON DEFICIENCY ANEMIA TYPE: ICD-10-CM

## 2022-10-24 PROCEDURE — 99214 OFFICE O/P EST MOD 30 MIN: CPT | Mod: S$GLB,,, | Performed by: STUDENT IN AN ORGANIZED HEALTH CARE EDUCATION/TRAINING PROGRAM

## 2022-10-24 PROCEDURE — 3074F SYST BP LT 130 MM HG: CPT | Mod: CPTII,S$GLB,, | Performed by: STUDENT IN AN ORGANIZED HEALTH CARE EDUCATION/TRAINING PROGRAM

## 2022-10-24 PROCEDURE — 1159F PR MEDICATION LIST DOCUMENTED IN MEDICAL RECORD: ICD-10-PCS | Mod: CPTII,S$GLB,, | Performed by: STUDENT IN AN ORGANIZED HEALTH CARE EDUCATION/TRAINING PROGRAM

## 2022-10-24 PROCEDURE — 1159F MED LIST DOCD IN RCRD: CPT | Mod: CPTII,S$GLB,, | Performed by: STUDENT IN AN ORGANIZED HEALTH CARE EDUCATION/TRAINING PROGRAM

## 2022-10-24 PROCEDURE — 99999 PR PBB SHADOW E&M-EST. PATIENT-LVL IV: ICD-10-PCS | Mod: PBBFAC,,, | Performed by: STUDENT IN AN ORGANIZED HEALTH CARE EDUCATION/TRAINING PROGRAM

## 2022-10-24 PROCEDURE — 3078F PR MOST RECENT DIASTOLIC BLOOD PRESSURE < 80 MM HG: ICD-10-PCS | Mod: CPTII,S$GLB,, | Performed by: STUDENT IN AN ORGANIZED HEALTH CARE EDUCATION/TRAINING PROGRAM

## 2022-10-24 PROCEDURE — 3078F DIAST BP <80 MM HG: CPT | Mod: CPTII,S$GLB,, | Performed by: STUDENT IN AN ORGANIZED HEALTH CARE EDUCATION/TRAINING PROGRAM

## 2022-10-24 PROCEDURE — 99999 PR PBB SHADOW E&M-EST. PATIENT-LVL IV: CPT | Mod: PBBFAC,,, | Performed by: STUDENT IN AN ORGANIZED HEALTH CARE EDUCATION/TRAINING PROGRAM

## 2022-10-24 PROCEDURE — 1160F RVW MEDS BY RX/DR IN RCRD: CPT | Mod: CPTII,S$GLB,, | Performed by: STUDENT IN AN ORGANIZED HEALTH CARE EDUCATION/TRAINING PROGRAM

## 2022-10-24 PROCEDURE — 99214 PR OFFICE/OUTPT VISIT, EST, LEVL IV, 30-39 MIN: ICD-10-PCS | Mod: S$GLB,,, | Performed by: STUDENT IN AN ORGANIZED HEALTH CARE EDUCATION/TRAINING PROGRAM

## 2022-10-24 PROCEDURE — 3074F PR MOST RECENT SYSTOLIC BLOOD PRESSURE < 130 MM HG: ICD-10-PCS | Mod: CPTII,S$GLB,, | Performed by: STUDENT IN AN ORGANIZED HEALTH CARE EDUCATION/TRAINING PROGRAM

## 2022-10-24 PROCEDURE — 1160F PR REVIEW ALL MEDS BY PRESCRIBER/CLIN PHARMACIST DOCUMENTED: ICD-10-PCS | Mod: CPTII,S$GLB,, | Performed by: STUDENT IN AN ORGANIZED HEALTH CARE EDUCATION/TRAINING PROGRAM

## 2022-10-24 RX ORDER — APREMILAST 30 MG/1
30 TABLET, FILM COATED ORAL 2 TIMES DAILY
Qty: 180 TABLET | Refills: 2 | OUTPATIENT
Start: 2022-10-24 | End: 2022-11-21 | Stop reason: SDUPTHER

## 2022-10-24 RX ORDER — GUSELKUMAB 100 MG/ML
100 INJECTION SUBCUTANEOUS
Qty: 3 ML | Refills: 2 | OUTPATIENT
Start: 2022-10-24 | End: 2022-11-21 | Stop reason: SDUPTHER

## 2022-10-24 ASSESSMENT — ROUTINE ASSESSMENT OF PATIENT INDEX DATA (RAPID3)
PATIENT GLOBAL ASSESSMENT SCORE: 3.5
MDHAQ FUNCTION SCORE: 0.3
FATIGUE SCORE: 4
AM STIFFNESS SCORE: 1, YES
TOTAL RAPID3 SCORE: 3
PAIN SCORE: 4.5
PSYCHOLOGICAL DISTRESS SCORE: 2.2
WHEN YOU AWAKENED IN THE MORNING OVER THE LAST WEEK, PLEASE INDICATE THE AMOUNT OF TIME IT TAKES UNTIL YOU ARE AS LIMBER AS YOU WILL BE FOR THE DAY: 30 MINUTES

## 2022-10-24 NOTE — TELEPHONE ENCOUNTER
Attempted to reach pt to discuss scheduling his MRI C and an appt w/a JOHNNY MAURER requesting a return call.

## 2022-10-24 NOTE — PROGRESS NOTES
Subjective:       Patient ID: Rebel Rodriguez is a 52 y.o. male.    Chief Complaint: Psoriatic Arthritis  HPI     This is a 48YM with psoriatic arthritis and infliximab induced lupus, history of TIA (cannot take NSAIDs) presenting for follow up. Drug induced Lupus: Related to infliximab (last dose 2/29/16; + antiphosholipids-anticardiolipin IgM mild +dsDNA: 1:1280,  DEE+ 1:1280 homogenous ,  + anti histone ab, CH50 low at 51 along with recurrent intermittent fevers, chills, arthralgias.      Interval History:  10/24/22: He continues to have chronic neck pain. He has tried PT which he thinks helps. He does have some hip stiffness. He has 15-20 mins in the AM of morning stiffness in his hands. He reports compliance of Otezla, Tremfya, and Arava. He does have diarrhea with Otezla.     7/11/22: He states that neck pain has bothering him a lot lately. He has stiffness in neck and lower back. He says that lower back stiffness lasts 20-30 mins in AM. The neck stiffness that last all day. He was seen by pain management for ablation tomorrow. He was not able to follow up with PT as he states that he has been busy. He denies dysuria or frequency. He has a history of kidney stones reoccurrence and follows with urology but has not seen them within the last year. He reports compliance of Otezla, Tremfya, and Arava.     4/11/22: He reports stiffness in his neck which does not improved throughout the day, rated 6/10. He has done physical therapy which helped in the past.  He reports for the last week he has had some pain in his anterior lower right leg and is unsure if he has had trauma to the area.  He also reports swelling at that location.  But denies any overlying erythema. He asks today if he needs to continue plaquenil given his remote history if drug induced lupus.  Pt denies fatigue, oral/nasal/genital ulcers, headaches, fever, chills, n/v, abd pain, CP, SOB, dysphagia, changes in bowel/bladder habits, vision changes,  photosensitivity, or erythema/rashes.    1/10/22: He has swelling in his 1st and 2nd MCP joint. He does have pain in his back and neck. He has difficult opening jars. He reports diffuse pain for the past 2 months. Stiffness in the back, neck, shoulders, and hand last all day He got COVID about 3 weeks ago and held his medications for 2 weeks. He recently started back on his medications 1 week ago. No new rashes or episodes of psoriasis. Pt denies fatigue, oral/nasal/genital ulcers, headaches, fever, chills, n/v, abd pain, CP, SOB, dysphagia, changes in bowel/bladder habits, vision changes,photosensitivity, or erythema/rashes.    10/11/21: Last appt, given not well controlled PsA, he was switched to tremfya. However, he feels that he going backwards has his hands have had more swelling/pain and pain in his feet. Pain in his hand is worse with opening jars due to pain. No new rashes or episodes of psoriasis.    Current regimen: (as of 10/24/22)  Tremfya   Otezla  Arava    Medication History:  - On infliximab until 2018 when developed recurrent intermittent fevers, chills, arthralgias (labs showed+ antiphosholipids-anticardiolipin IgM mild +dsDNA: 1:1280,  DEE+ 1:1280 homogenous ,  + anti histone ab, CH50 low at 51). Labs were consistent with drug induced lupus, infliximab d/c'd and symptoms resolved.  - Aug 2020: switched from Cosentyx to Taltz to lack of response  - June 2021: switched from Taltz to Tremfya (current) to lack of improvement of symptoms  - self discontinued SSZ 1000 BID ~Dec 2020  - Can not take TNFi due to drug induced lupus. Can not do [place] due to history of TIA. Tried stelara which did not work, tried costylx and taltz which did not work. Now on tremfya which is currently not working. Cant take NSAIDs due to TIA history.    Review of Systems   Constitutional: Negative for chills and fever.   HENT: Negative for congestion and trouble swallowing.    Eyes: Negative for pain and visual disturbance.  "  Respiratory: Negative for cough and shortness of breath.    Cardiovascular: Negative for chest pain and palpitations.   Gastrointestinal: Negative for abdominal pain, constipation, diarrhea, nausea and vomiting.   Genitourinary: Negative for dysuria and flank pain.   Musculoskeletal: Positive for arthralgias and joint swelling. Negative for myalgias.   Neurological: Negative for weakness, numbness and headaches.   Psychiatric/Behavioral: The patient is not nervous/anxious.          Objective:   /67   Pulse 76   Ht 5' 7" (1.702 m)   Wt 64.6 kg (142 lb 6.7 oz)   BMI 22.31 kg/m²      Physical Exam   Constitutional: He is oriented to person, place, and time. No distress.   HENT:   Head: Normocephalic and atraumatic.   Right Ear: External ear normal.   Left Ear: External ear normal.   Eyes: Conjunctivae are normal. Right eye exhibits no discharge. Left eye exhibits no discharge. No scleral icterus.   Cardiovascular: Normal rate, regular rhythm and normal heart sounds.   No murmur heard.  Pulmonary/Chest: Effort normal and breath sounds normal. No respiratory distress. He has no wheezes. He has no rales. He exhibits no tenderness.   Abdominal: Soft. Bowel sounds are normal. He exhibits no distension. There is no abdominal tenderness.   Musculoskeletal:         General: No tenderness or deformity. Normal range of motion.      Comments: Pain with lateral flexion of neck.  Tenderness to palpation of the 2nd MCP on right hand.  No joint swelling noted in upper or lower extremities.  Neurological: He is alert and oriented to person, place, and time.   Skin: Skin is warm and dry. No rash noted. He is not diaphoretic. No erythema. Areas of dry skin on lateral sides of 2nd digits bilaterally  Psychiatric: Mood and affect normal.   Vitals reviewed.          1/10/2022 4/11/2022 7/11/2022 10/24/2022   Tender (BRANTLEY-28) 2 / 28  0 / 28  3 / 28  1 / 28    Swollen (BRANTLEY-28) 2 / 28  0 / 28  3 / 28  0 / 28    Provider Global 60 " mm -- 35 mm 35 mm   Patient Global 60 mm 55 mm 35 mm 35 mm   ESR 35 mm/hr 14 mm/hr 14 mm/hr 15 mm/hr   CRP 1.2 mg/L 0.3 mg/L 0.7 mg/L 0.8 mg/L   BRANTLEY-28 (ESR) 4.52 (Moderate disease activity) 2.62 (Low disease activity) 3.79 (Moderate disease activity) 2.95 (Low disease activity)   BRANTLEY-28 (CRP) 3.27 (Moderate disease activity) 1.82 (Remission) 3.1 (Low disease activity) 2.22 (Remission)   CDAI Score 16  -- 13  8         Assessment:       1. PSA (psoriatic arthritis)    2. Medication monitoring encounter    3. Cervical spondylosis    4. Recurrent nephrolithiasis    5. Iron deficiency anemia, unspecified iron deficiency anemia type          48YM with psoriatic arthritis and infliximab induced lupus, history of TIA (cannot take NSAIDs) presenting for follow up. Drug induced Lupus: Related to infliximab (last dose 2/29/16; + antiphosholipids-anticardiolipin IgM mild +dsDNA: 1:1280,  DEE+ 1:1280 homogenous ,  + anti histone ab, CH50 low at 51 along with recurrent intermittent fevers, chills, arthralgias. He reports that he is doing well but states that his neck has been bothering him more lately.  In terms of his head arthritis treatment, he appears to be stable on his current regimen of Otezla, Tremfya, and arava.  Will continue current treatment for now.  Will discuss with Radiology if he would benefit from a CT cervical spine verses MRI cervical spine given his history of cervical fusion.  Will refer to Neurosurgery for the further evaluation once imaging is back.    Plan:       Problem List Items Addressed This Visit          Neuro    Cervical spondylosis       Renal/    Recurrent nephrolithiasis       Other    Medication monitoring encounter     Other Visit Diagnoses       PSA (psoriatic arthritis)    -  Primary    Iron deficiency anemia, unspecified iron deficiency anemia type              - PsA symptoms appear to be stable currently  - continue Otezla, Tremfya, Arava  - will discuss with Radiology whether he  would benefit from CT cervical verses MRI cervical spine for further evaluation given his history of cervical fusion in the past  - referral placed to Neurosurgery to be scheduled after imaging results  - encouraged to get colonoscopy today given mildly low hemoglobin, he states that he was consider having this done  - if symptoms do not improve on current regimen, next step is switching tremfya to orencia; if needed to go back to Cosentyx, would consider ordering fecal calprotectin prior to restarting given family history of IBD; can also consider risankizumab      Return in 3 months to clinic with labs prior    Patient seen and evaluated with Dr. Beverly        Answers for HPI/ROS submitted by the patient on 7/8/2022  fever: No  eye redness: No  mouth sores: No  headaches: No  shortness of breath: No  chest pain: No  trouble swallowing: No  diarrhea: No  constipation: No  unexpected weight change: No  genital sore: No  During the last 3 days, have you had a skin rash?: No  Bruises or bleeds easily: No  cough: No      Answers submitted by the patient for this visit:  Rheumatology Questionnaire (Submitted on 10/21/2022)  fever: No  eye redness: No  mouth sores: No  headaches: No  shortness of breath: No  chest pain: No  trouble swallowing: No  diarrhea: Yes  constipation: No  unexpected weight change: No  genital sore: No  During the last 3 days, have you had a skin rash?: No  Bruises or bleeds easily: No  cough: No

## 2022-10-24 NOTE — PROGRESS NOTES
I  Have personally take the history and examined the patient and agree with fellow's note as stated above. Confirmed erosion right index distal metcarpal head.  No tender or swollen joints. No psoriasis or nail changes. Will continue guselkumab, apremilast and leflunomide. Neck remains an issue post fusion. Will order CT or MR (check with radiology given large metallic fusion area) and Neurosurgical referral to Dr. Maradiaga.

## 2022-10-24 NOTE — PROGRESS NOTES
Rapid3 Question Responses and Scores 10/21/2022   MDHAQ Score 0.3   Psychologic Score 2.2   Pain Score 4.5   When you awakened in the morning OVER THE LAST WEEK, did you feel stiff? Yes   If Yes, please indicate the number of hours until you are as limber as you will be for the day 0.5   Fatigue Score 4   Global Health Score 3.5   RAPID3 Score 3

## 2022-10-31 ENCOUNTER — PATIENT MESSAGE (OUTPATIENT)
Dept: RHEUMATOLOGY | Facility: CLINIC | Age: 52
End: 2022-10-31
Payer: COMMERCIAL

## 2022-11-01 ENCOUNTER — TELEPHONE (OUTPATIENT)
Dept: PAIN MEDICINE | Facility: CLINIC | Age: 52
End: 2022-11-01
Payer: COMMERCIAL

## 2022-11-01 NOTE — TELEPHONE ENCOUNTER
This message is for patient in regards to his/her appointment 11/02/22 at 9:00 am   With Dr. Kimberly Wilson MD.        [facility] Policy: For the safety of all patients and staff members.   During this visit we're informing all patients and visitors to wear face mask at all time here at [facility].  If you have any questions or concerns please contact [phone]

## 2022-11-02 ENCOUNTER — TELEPHONE (OUTPATIENT)
Dept: RHEUMATOLOGY | Facility: CLINIC | Age: 52
End: 2022-11-02
Payer: COMMERCIAL

## 2022-11-02 ENCOUNTER — OFFICE VISIT (OUTPATIENT)
Dept: PAIN MEDICINE | Facility: CLINIC | Age: 52
End: 2022-11-02
Payer: COMMERCIAL

## 2022-11-02 VITALS
WEIGHT: 142 LBS | RESPIRATION RATE: 18 BRPM | BODY MASS INDEX: 22.29 KG/M2 | HEART RATE: 71 BPM | HEIGHT: 67 IN | TEMPERATURE: 97 F | DIASTOLIC BLOOD PRESSURE: 68 MMHG | SYSTOLIC BLOOD PRESSURE: 105 MMHG

## 2022-11-02 DIAGNOSIS — M79.10 MYALGIA: Primary | ICD-10-CM

## 2022-11-02 DIAGNOSIS — M47.812 CERVICAL SPONDYLOSIS: ICD-10-CM

## 2022-11-02 PROCEDURE — 99999 PR PBB SHADOW E&M-EST. PATIENT-LVL IV: CPT | Mod: PBBFAC,,, | Performed by: ANESTHESIOLOGY

## 2022-11-02 PROCEDURE — 99214 PR OFFICE/OUTPT VISIT, EST, LEVL IV, 30-39 MIN: ICD-10-PCS | Mod: S$GLB,,, | Performed by: ANESTHESIOLOGY

## 2022-11-02 PROCEDURE — 1159F PR MEDICATION LIST DOCUMENTED IN MEDICAL RECORD: ICD-10-PCS | Mod: CPTII,S$GLB,, | Performed by: ANESTHESIOLOGY

## 2022-11-02 PROCEDURE — 3008F PR BODY MASS INDEX (BMI) DOCUMENTED: ICD-10-PCS | Mod: CPTII,S$GLB,, | Performed by: ANESTHESIOLOGY

## 2022-11-02 PROCEDURE — 3078F DIAST BP <80 MM HG: CPT | Mod: CPTII,S$GLB,, | Performed by: ANESTHESIOLOGY

## 2022-11-02 PROCEDURE — 99214 OFFICE O/P EST MOD 30 MIN: CPT | Mod: S$GLB,,, | Performed by: ANESTHESIOLOGY

## 2022-11-02 PROCEDURE — 1159F MED LIST DOCD IN RCRD: CPT | Mod: CPTII,S$GLB,, | Performed by: ANESTHESIOLOGY

## 2022-11-02 PROCEDURE — 1160F RVW MEDS BY RX/DR IN RCRD: CPT | Mod: CPTII,S$GLB,, | Performed by: ANESTHESIOLOGY

## 2022-11-02 PROCEDURE — 1160F PR REVIEW ALL MEDS BY PRESCRIBER/CLIN PHARMACIST DOCUMENTED: ICD-10-PCS | Mod: CPTII,S$GLB,, | Performed by: ANESTHESIOLOGY

## 2022-11-02 PROCEDURE — 3074F SYST BP LT 130 MM HG: CPT | Mod: CPTII,S$GLB,, | Performed by: ANESTHESIOLOGY

## 2022-11-02 PROCEDURE — 99999 PR PBB SHADOW E&M-EST. PATIENT-LVL IV: ICD-10-PCS | Mod: PBBFAC,,, | Performed by: ANESTHESIOLOGY

## 2022-11-02 PROCEDURE — 3074F PR MOST RECENT SYSTOLIC BLOOD PRESSURE < 130 MM HG: ICD-10-PCS | Mod: CPTII,S$GLB,, | Performed by: ANESTHESIOLOGY

## 2022-11-02 PROCEDURE — 3008F BODY MASS INDEX DOCD: CPT | Mod: CPTII,S$GLB,, | Performed by: ANESTHESIOLOGY

## 2022-11-02 PROCEDURE — 3078F PR MOST RECENT DIASTOLIC BLOOD PRESSURE < 80 MM HG: ICD-10-PCS | Mod: CPTII,S$GLB,, | Performed by: ANESTHESIOLOGY

## 2022-11-02 RX ORDER — METHOCARBAMOL 500 MG/1
500 TABLET, FILM COATED ORAL 3 TIMES DAILY
Qty: 90 TABLET | Refills: 1 | Status: SHIPPED | OUTPATIENT
Start: 2022-11-02 | End: 2022-12-10

## 2022-11-02 NOTE — TELEPHONE ENCOUNTER
Called radiology in MRI ([id number]), spoke with shaq. Given that the patient's cervical fusion was performed within past 10 years (2017/2018), cervical hardware is MRI compatible. Okay to precede with MRI scheduled for tomorrow.    LBR

## 2022-11-02 NOTE — PROGRESS NOTES
"PCP: Nanda Smith MD    REFERRING PHYSICIAN: No ref. provider found    CHIEF COMPLAINT: left neck pain      Original HISTORY OF PRESENT ILLNESS: Rebel Rodriguez presents to the clinic for the evaluation of the above pain. The pain started over the course of the past 3 years after no particular event. It is similar to the pain he had prior to neck surgery. He had an ACDF of C3-4-5 in 2017 at [facility].     Original Pain Description:  The pain is located in the left lateral neck and does not radiate. The pain is described as  tight, burning . Exacerbating factors: nothing. Mitigating factors ice and physical therapy. Symptoms interfere with daily activity, sleeping and work. The patient feels like symptoms have been worsening. He denies falls and myelopathic symptoms.     Original PAIN SCORES:  Best: Pain is 5  Worst: Pain is 8  Usually: Pain is 6  Current: Pain is 5    INTERVAL HISTORY:     Interval History 7/29/2022: Patient returns to clinic today for f/u after Left Cervical RFA C2-C5.  Patient reports 90% relief.  Patient has an appointment with physical therapy in one week.  He reports neck and upper back muscle tightness and tenderness.    Interval History 11/2/2022: Patient returns to clinic today for 4 month f/u after Left Cervical RFA C2-C5. Patient reports continued relief of sharp, piercing pain in neck, but reports stiffness in trapezius and upper neck L>R. Continues w/ home exercise and fluoxetine. Exam indicates most of his pain in the left trapezius and up into the left neck, though it is difficult to fully reproduce his pain. He finds pressure on the area helps.     6 weeks of Conservative therapy (PT/Chiro/Home Exercises with Dates)  PT: 8/20-5/21  Aquatherapy: Very helpful  HEP: "Habitually" Daily as prescribed at the PT above    Treatments / Medications: (Ice/Heat/NSAIDS/APAP/etc):  Ice  BenGay  Bradley Balm  Lidocaine  APAP - no help  NSAIDS - not allowed to take due to TIA     " Report:    Interventional Pain Procedures: (Previous injections)  CALLUM/CTFESI - preoperatively for severe spinal stenosis  7/12/2022:  Left C2-C5 RFA 90% Relief - ongoing    Past Medical History:   Diagnosis Date    Achilles tendon rupture 10/9/2013    Allergy     Anxiety     Arthritis     psoriatric    Degenerative disc disease     Drug-induced lupus erythematosus     Hyperlipidemia     Kidney stone     Psoriatic arthritis     TIA (transient ischemic attack)     Ulcer     high school     Past Surgical History:   Procedure Laterality Date    ACHILLES TENDON SURGERY      BACK SURGERY      FUNCTIONAL ENDOSCOPIC SINUS SURGERY (FESS) USING COMPUTER-ASSISTED NAVIGATION Bilateral 9/14/2018    Procedure: FESS, USING COMPUTER-ASSISTED NAVIGATION;  Surgeon: Gerard Manzo MD;  Location: [facility] OR [facility] FLR;  Service: ENT;  Laterality: Bilateral;    INJECTION OF ANESTHETIC AGENT AROUND NERVE Left 5/13/2022    Procedure: Block, Nerve MEDIAL BRANCH BLOCK LEFT C2,3,4,5;  Surgeon: Kimberly Wilson MD;  Location: [facility] PAIN MGT;  Service: Pain Management;  Laterality: Left;    INJECTION OF ANESTHETIC AGENT AROUND NERVE Left 5/24/2022    Procedure: BLOCK, NERVE, LEFT C2-C5 MEDIAL BRANCH;  Surgeon: Kimberly Wilson MD;  Location: [facility] PAIN MGT;  Service: Pain Management;  Laterality: Left;    NASAL SEPTOPLASTY N/A 9/14/2018    Procedure: SEPTOPLASTY, NASAL;  Surgeon: Gerard Manzo MD;  Location: [facility] OR [facility] FLR;  Service: ENT;  Laterality: N/A;    NASAL TURBINATE REDUCTION Bilateral 9/14/2018    Procedure: REDUCTION, NASAL TURBINATE;  Surgeon: Gerard Manzo MD;  Location: [facility] OR [facility] FLR;  Service: ENT;  Laterality: Bilateral;    RADIOFREQUENCY ABLATION Left 7/12/2022    Procedure: RADIOFREQUENCY ABLATION, LEFT C2-C3, C3-C4, C4-C5;  Surgeon: Kimberly Wilson MD;  Location: [facility] PAIN MGT;  Service: Pain Management;  Laterality: Left;    UPPER ENDOSCOPY W/ ESOPHAGEAL MANOMETRY      URETERAL STENT PLACEMENT       Social  History     Socioeconomic History    Marital status: Single   Occupational History    Occupation: [employer]     Employer: self employed   Tobacco Use    Smoking status: Never    Smokeless tobacco: Never   Substance and Sexual Activity    Alcohol use: No     Alcohol/week: 0.0 standard drinks     Types: 1 - 2 Standard drinks or equivalent per week     Comment: cocktails    Drug use: No     Family History   Problem Relation Age of Onset    Pancreatic cancer Father     Ulcerative colitis Father     Cancer Father 61        pancreatic ca    Crohn's disease Mother     Osteoarthritis Maternal Grandmother     Crohn's disease Maternal Grandmother     Cataracts Maternal Grandmother     Cataracts Maternal Grandfather     Cataracts Paternal Grandmother     Cataracts Paternal Grandfather     Amblyopia Neg Hx     Blindness Neg Hx        Review of patient's allergies indicates:   Allergen Reactions    Erythromycin Nausea And Vomiting    Infliximab Other (See Comments)     Lupus with fever and acute arthritis  Lupus with fever and acute arthritis       Current Outpatient Medications   Medication Sig    apremilast (OTEZLA) 30 mg Tab Take 1 tablet (30 mg total) by mouth 2 (two) times daily.    aspirin (ECOTRIN) 81 MG EC tablet Take 81 mg by mouth once daily.    atorvastatin (LIPITOR) 10 MG tablet Take 1 tablet (10 mg total) by mouth every evening.    citric acid-potassium citrate (POLYCITRA) 1,100-334 mg/5 mL solution Take by mouth 2 (two) times a day.    guselkumab (TREMFYA) 100 mg/mL AtIn Inject 100 mg into the skin every 8 weeks.    leflunomide (ARAVA) 20 MG Tab Take 1 tablet (20 mg total) by mouth once daily.    mirtazapine (REMERON) 15 MG tablet Take 1 tablet (15 mg) by mouth daily at bedtime    mirtazapine (REMERON) 7.5 MG Tab take one tablet by mouth every night at bedtime    mirtazapine (REMERON) 7.5 MG Tab Take 1 tablet by mouth at bedtime    potassium citrate (UROCIT-K 15) 15 mEq TbSR TK 1 T PO BID    FLUoxetine 40 MG  "capsule Take 1 capsule (40 mg) by mouth daily (Patient not taking: Reported on 11/2/2022)     No current facility-administered medications for this visit.       ROS:  GENERAL: No fever. No chills. No fatigue. Denies weight loss. Denies weight gain.  HEENT: Denies headaches. Denies vision change. Denies eye pain. Denies double vision. Denies ear pain.   CV: Denies chest pain.   PULM: Denies of shortness of breath.  GI: Denies constipation. No diarrhea. No abdominal pain. Denies nausea. Denies vomiting. No blood in stool.  HEME: Denies bleeding problems.  : Denies urgency. No painful urination. No blood in urine.  MS: Denies joint stiffness. Denies joint swelling.  +Neck Pain and stiffness  SKIN: Denies rash.   NEURO: Denies seizures. No weakness.  PSYCH:  Denies difficulty sleeping. No anxiety. Denies depression. No suicidal thoughts.       VITALS:   Vitals:    11/02/22 0900   BP: 105/68   Pulse: 71   Resp: 18   Temp: 97 °F (36.1 °C)   TempSrc: Oral   Weight: 64.4 kg (142 lb)   Height: 5' 7" (1.702 m)   PainSc:   4   PainLoc: Back         PHYSICAL EXAM:   GENERAL: Well appearing, in no acute distress, alert and oriented x3.  PSYCH:  Mood and affect appropriate.  SKIN: Skin color, texture, turgor normal, no rashes or lesions.  HEENT:  Normocephalic, atraumatic. Cranial nerves grossly intact.  NECK: Good ROM for a fusion. Tender to palpation over the left cervical paraspinous muscles. No pain with neck flexion. Moderate pain w/ extension and L lateral flexion.  PULM: No evidence of respiratory difficulty, symmetric chest rise.  GI:  Non-distended  EXTREMITIES: No deformities, edema, or skin discoloration.   MUSCULOSKELETAL: Bilateral upper extremity strength is normal and symmetric. No atrophy is noted.  NEURO: Sensation is equal and appropriate bilaterally.   GAIT: normal.      LABS:      IMAGING:    EXAMINATION:  XR CERVICAL SPINE 5 VIEW WITH FLEX AND EXT     CLINICAL HISTORY:  Cervicalgia     TECHNIQUE:  Five " views of the cervical spine plus flexion and extension views were performed.     COMPARISON:  11/03/2008     FINDINGS:  Interval operative change C4/C7 anterior cervical fusion with metallic plate and, screw device and interbody devices.  The cervical sagittal alignment is slightly improved.  There is no significant listhesis with flexion extension positioning.  No evidence for hardware failure.  Cervical vertebral body heights and contours within normal is without evidence for acute fracture.  Surgical clips left neck soft tissues.  Open mouth view limited by overlapping structures.  Questioned bilateral bony neural foraminal stenosis evaluation limited by overlapping structures on the oblique imaging.  Further evaluation as warranted clinically.     Impression:     Please see above        Electronically signed by: Hung Muniz,   Date:                                            05/31/2021  Time:                                           13:33    ASSESSMENT: 52 y.o. year old male with pain, consistent with left cervical facet arthropathy.      DISCUSSION: Mr. Rodriguez previously had a C4-7 ACDF at [facility] and comes to us with progressive left sided neck pain. Significant improvement in neck pain after left C2-C5 RFA but continued trapezius pain. New CMRI scheduled for 11/3.     PLAN:  Patient encouraged to continue Physical Therapy and home exercise program  TENS unit as needed  Left trapezius and scalene trigger point injections in office today  Start Robaxin 500mg TID  RTC 1 month for f/u and to evaluate new CMRI.     Christopher Mason MD  11/02/2022    Patient Name: Rebel Rodriguez  MRN: [id number]    INFORMED CONSENT: The procedure, risks, benefits and options were discussed with patient. There are no contraindications to the procedure. The patient expressed understanding and agreed to proceed. The personnel performing the procedure was discussed. I verify that I personally obtained Rebel Rodriguez's consent prior to the  start of the procedure and the signed consent can be found on the patient's chart.    Procedure Date: 11/02/2022    Anesthesia: None    Pre Procedure diagnosis: M79.1 Myalgia    Post-Procedure diagnosis: same    Sedation: None    PROCEDURE: Left Trapezius, levator scapulae, and posterior scalene TRIGGER POINT INJECTION  The patient was placed in a seated position and time out was perfomed. The patient's  trigger points were identified and marked within each muscle. The skin was prepped with chlorhexidine three times.  The muscle was grasped between the thumb and forefinger and a 27-gauge 1.5 inch  needle was advanced through the skin and subcutaneous tissues and into the muscle at each location. Aspiration for blood, air and CSF was negative.  A total of 10 ml of Bupivacaine 0.25% and 40mg Kenalog was divided among the trigger points. The needle was removed intact each time and bleeding was nil. No complications were evident.     Christopher Mason MD  11/02/2022

## 2022-11-03 ENCOUNTER — HOSPITAL ENCOUNTER (OUTPATIENT)
Dept: RADIOLOGY | Facility: OTHER | Age: 52
Discharge: HOME | End: 2022-11-03
Attending: STUDENT IN AN ORGANIZED HEALTH CARE EDUCATION/TRAINING PROGRAM
Payer: COMMERCIAL

## 2022-11-03 DIAGNOSIS — M47.812 CERVICAL SPONDYLOSIS: ICD-10-CM

## 2022-11-03 PROCEDURE — 72141 MRI NECK SPINE W/O DYE: CPT | Mod: 26,,, | Performed by: RADIOLOGY

## 2022-11-03 PROCEDURE — 72141 MRI CERVICAL SPINE WITHOUT CONTRAST: ICD-10-PCS | Mod: 26,,, | Performed by: RADIOLOGY

## 2022-11-03 PROCEDURE — 72141 MRI NECK SPINE W/O DYE: CPT | Mod: TC

## 2022-11-06 ENCOUNTER — PATIENT MESSAGE (OUTPATIENT)
Dept: NEUROSURGERY | Facility: CLINIC | Age: 52
End: 2022-11-06
Payer: COMMERCIAL

## 2022-11-14 ENCOUNTER — OFFICE VISIT (OUTPATIENT)
Dept: NEUROSURGERY | Facility: CLINIC | Age: 52
End: 2022-11-14
Payer: COMMERCIAL

## 2022-11-14 VITALS
WEIGHT: 142 LBS | DIASTOLIC BLOOD PRESSURE: 70 MMHG | SYSTOLIC BLOOD PRESSURE: 116 MMHG | HEART RATE: 70 BPM | BODY MASS INDEX: 22.29 KG/M2 | HEIGHT: 67 IN

## 2022-11-14 DIAGNOSIS — M48.02 SPINAL STENOSIS, CERVICAL REGION: Primary | ICD-10-CM

## 2022-11-14 DIAGNOSIS — Z98.1 HX OF FUSION OF CERVICAL SPINE: ICD-10-CM

## 2022-11-14 DIAGNOSIS — M47.812 CERVICAL SPONDYLOSIS: ICD-10-CM

## 2022-11-14 PROCEDURE — 3008F BODY MASS INDEX DOCD: CPT | Mod: CPTII,S$GLB,, | Performed by: NURSE PRACTITIONER

## 2022-11-14 PROCEDURE — 3074F PR MOST RECENT SYSTOLIC BLOOD PRESSURE < 130 MM HG: ICD-10-PCS | Mod: CPTII,S$GLB,, | Performed by: NURSE PRACTITIONER

## 2022-11-14 PROCEDURE — 1160F PR REVIEW ALL MEDS BY PRESCRIBER/CLIN PHARMACIST DOCUMENTED: ICD-10-PCS | Mod: CPTII,S$GLB,, | Performed by: NURSE PRACTITIONER

## 2022-11-14 PROCEDURE — 1160F RVW MEDS BY RX/DR IN RCRD: CPT | Mod: CPTII,S$GLB,, | Performed by: NURSE PRACTITIONER

## 2022-11-14 PROCEDURE — 3078F PR MOST RECENT DIASTOLIC BLOOD PRESSURE < 80 MM HG: ICD-10-PCS | Mod: CPTII,S$GLB,, | Performed by: NURSE PRACTITIONER

## 2022-11-14 PROCEDURE — 3008F PR BODY MASS INDEX (BMI) DOCUMENTED: ICD-10-PCS | Mod: CPTII,S$GLB,, | Performed by: NURSE PRACTITIONER

## 2022-11-14 PROCEDURE — 1159F MED LIST DOCD IN RCRD: CPT | Mod: CPTII,S$GLB,, | Performed by: NURSE PRACTITIONER

## 2022-11-14 PROCEDURE — 99999 PR PBB SHADOW E&M-EST. PATIENT-LVL IV: CPT | Mod: PBBFAC,,, | Performed by: NURSE PRACTITIONER

## 2022-11-14 PROCEDURE — 3078F DIAST BP <80 MM HG: CPT | Mod: CPTII,S$GLB,, | Performed by: NURSE PRACTITIONER

## 2022-11-14 PROCEDURE — 99204 PR OFFICE/OUTPT VISIT, NEW, LEVL IV, 45-59 MIN: ICD-10-PCS | Mod: S$GLB,,, | Performed by: NURSE PRACTITIONER

## 2022-11-14 PROCEDURE — 1159F PR MEDICATION LIST DOCUMENTED IN MEDICAL RECORD: ICD-10-PCS | Mod: CPTII,S$GLB,, | Performed by: NURSE PRACTITIONER

## 2022-11-14 PROCEDURE — 99999 PR PBB SHADOW E&M-EST. PATIENT-LVL IV: ICD-10-PCS | Mod: PBBFAC,,, | Performed by: NURSE PRACTITIONER

## 2022-11-14 PROCEDURE — 3074F SYST BP LT 130 MM HG: CPT | Mod: CPTII,S$GLB,, | Performed by: NURSE PRACTITIONER

## 2022-11-14 PROCEDURE — 99204 OFFICE O/P NEW MOD 45 MIN: CPT | Mod: S$GLB,,, | Performed by: NURSE PRACTITIONER

## 2022-11-15 NOTE — PROGRESS NOTES
Neurosurgery  History & Physical    SUBJECTIVE:     Chief Complaint: Cervical radiculopathy    History of Present Illness: Rebel Rodriguez is a 52 y.o. male being seen in clinic today as a referral from Dr. Beverly for Dr. Maradiaga to evaluate worsening cervical radiculopathy. He has a surgical hx ACD C4-7 with Dr. Huff about 8 years ago. States that he has struggled with intermittent neck pain; however, over the past 3 year the pain has progressed despite conservative therapy. Describes the pain as constant and aching down the neck extending down the left side. Rates the pain as a 7/10. Aggravating factors include movement. Alleviating factors include rest and PT. Denies weakness, numbness or tingling, b/b dysfunction, saddle anesthesia, or gait instability.      Review of patient's allergies indicates:   Allergen Reactions    Erythromycin Nausea And Vomiting    Infliximab Other (See Comments)     Lupus with fever and acute arthritis  Lupus with fever and acute arthritis       Current Outpatient Medications   Medication Sig Dispense Refill    apremilast (OTEZLA) 30 mg Tab Take 1 tablet (30 mg total) by mouth 2 (two) times daily. 180 tablet 2    aspirin (ECOTRIN) 81 MG EC tablet Take 81 mg by mouth once daily.      atorvastatin (LIPITOR) 10 MG tablet Take 1 tablet (10 mg total) by mouth every evening. 90 tablet 3    citric acid-potassium citrate (POLYCITRA) 1,100-334 mg/5 mL solution Take by mouth 2 (two) times a day.      guselkumab (TREMFYA) 100 mg/mL AtIn Inject 100 mg into the skin every 8 weeks. 3 mL 2    leflunomide (ARAVA) 20 MG Tab Take 1 tablet (20 mg total) by mouth once daily. 90 tablet 0    methocarbamoL (ROBAXIN) 500 MG Tab Take 1 tablet (500 mg total) by mouth 3 (three) times daily. 90 tablet 1    mirtazapine (REMERON) 15 MG tablet Take 1 tablet (15 mg) by mouth daily at bedtime 30 tablet 3    mirtazapine (REMERON) 7.5 MG Tab take one tablet by mouth every night at bedtime 30 tablet 3    mirtazapine  (REMERON) 7.5 MG Tab Take 1 tablet by mouth at bedtime 30 tablet 3    potassium citrate (UROCIT-K 15) 15 mEq TbSR TK 1 T PO BID      FLUoxetine 40 MG capsule Take 1 capsule (40 mg) by mouth daily (Patient not taking: Reported on 11/2/2022) 30 capsule 3     No current facility-administered medications for this visit.       Past Medical History:   Diagnosis Date    Achilles tendon rupture 10/9/2013    Allergy     Anxiety     Arthritis     psoriatric    Degenerative disc disease     Drug-induced lupus erythematosus     Hyperlipidemia     Kidney stone     Psoriatic arthritis     TIA (transient ischemic attack)     Ulcer     high school     Past Surgical History:   Procedure Laterality Date    ACHILLES TENDON SURGERY      BACK SURGERY      FUNCTIONAL ENDOSCOPIC SINUS SURGERY (FESS) USING COMPUTER-ASSISTED NAVIGATION Bilateral 9/14/2018    Procedure: FESS, USING COMPUTER-ASSISTED NAVIGATION;  Surgeon: Gerard Manzo MD;  Location: [facility] OR [address] [place] [address];  Service: ENT;  Laterality: Bilateral;    INJECTION OF ANESTHETIC AGENT AROUND NERVE Left 5/13/2022    Procedure: Block, Nerve MEDIAL BRANCH BLOCK LEFT C2,3,4,5;  Surgeon: Kimberly Wilson MD;  Location: [facility] PAIN MGT;  Service: Pain Management;  Laterality: Left;    INJECTION OF ANESTHETIC AGENT AROUND NERVE Left 5/24/2022    Procedure: BLOCK, NERVE, LEFT C2-C5 MEDIAL BRANCH;  Surgeon: Kimberly Wilson MD;  Location: [facility] PAIN [facility]GT;  Service: Pain Management;  Laterality: Left;    NASAL SEPTOPLASTY N/A 9/14/2018    Procedure: SEPTOPLASTY, NASAL;  Surgeon: Gerard Manzo MD;  [facility];  Service: ENT;  Laterality: N/A;    NASAL TURBINATE REDUCTION Bilateral 9/14/2018    Procedure: REDUCTION, NASAL TURBINATE;  Surgeon: Gerard Manzo MD;  Location: [facility] OR [address] [place] [address];  Service: ENT;  Laterality: Bilateral;    RADIOFREQUENCY ABLATION Left 7/12/2022    Procedure: RADIOFREQUENCY ABLATION, LEFT C2-C3, C3-C4, C4-C5;  Surgeon: Kimberly Wilson MD;  Location:  "[facility] PAIN MGT;  Service: Pain Management;  Laterality: Left;    UPPER ENDOSCOPY W/ ESOPHAGEAL MANOMETRY      URETERAL STENT PLACEMENT       Family History       Problem Relation (Age of Onset)    Cancer Father (61)    Cataracts Maternal Grandmother, Maternal Grandfather, Paternal Grandmother, Paternal Grandfather    Crohn's disease Mother, Maternal Grandmother    Osteoarthritis Maternal Grandmother    Pancreatic cancer Father    Ulcerative colitis Father          Social History     Socioeconomic History    Marital status: Single   Occupational History    Occupation: music production     Employer: self employed   Tobacco Use    Smoking status: Never    Smokeless tobacco: Never   Substance and Sexual Activity    Alcohol use: No     Alcohol/week: 0.0 standard drinks     Types: 1 - 2 Standard drinks or equivalent per week     Comment: cocktails    Drug use: No       Review of Systems   Constitutional:  Negative for activity change, appetite change and fever.   HENT:  Negative for hearing loss and postnasal drip.    Eyes:  Negative for pain and visual disturbance.   Respiratory:  Negative for shortness of breath.    Cardiovascular:  Negative for chest pain and palpitations.   Gastrointestinal:  Negative for abdominal pain.   Endocrine: Negative for polydipsia, polyphagia and polyuria.   Musculoskeletal:  Positive for arthralgias, myalgias, neck pain and neck stiffness. Negative for back pain and gait problem.   Neurological:  Negative for dizziness, weakness, numbness and headaches.   Psychiatric/Behavioral:  Negative for confusion and dysphoric mood.      OBJECTIVE:     Vital Signs  Pulse: 70  BP: 116/70  Pain Score:   7  Height: 5' 7" (170.2 cm)  Weight: 64.4 kg (141 lb 15.6 oz)  Body mass index is 22.24 kg/m².      Neurosurgery Physical Exam  General: well developed, well nourished, no distress.   Head: normocephalic, atraumatic  Neurologic: Alert and oriented. Thought content appropriate.  GCS: Motor: 6/Verbal: " 5/Eyes: 4 GCS Total: 15  Mental Status: Awake, Alert, Oriented x 4  Language: No aphasia  Speech: No dysarthria  Cranial nerves: face symmetric, tongue midline, CN II-XII grossly intact.   Eyes: pupils equal, round, reactive to light with accomodation, EOMI.   Pulmonary: normal respirations, no signs of respiratory distress  Abdomen: soft, non-distended  Skin: Skin is warm, dry and intact.  Sensory: intact to light touch throughout  Motor Strength:Moves all extremities spontaneously with good tone.   Strength  Deltoids Triceps Biceps Wrist Extension Wrist Flexion Hand    Upper: R 5/5 5/5 5/5 5/5 5/5 5/5    L 5/5 5/5 5/5 5/5 5/5 5/5     HF KE KF DF PF EHL   Lower: R 5/5 5/5 5/5 5/5 5/5 5/5    L 5/5 5/5 5/5 5/5 5/5 5/5     Reflexes:   Magallanes's: Positive     Cerebellar:   Gait stable, fluid.   Tandem Gait: slight loss of balance  Able to walk on heels & toes     Cervical:   ROM: Pain limited with flexion, extension, lateral rotation and ear-to-shoulder bend.   Midline TTP: Positive     Diagnostic Results:  I have personally reviewed the MRI cervical spine dated 11/3/22. The imaging shows Degenerative change most pronounced C3/4 with moderate canal narrowing and uncovertebral spurring and facet arthropathy result in severe left and moderate right neural foraminal narrowing.    ASSESSMENT/PLAN:   Rebel Rodriguez is a 52 y.o. male seen in clinic today as a referral from Dr. Beverly for Dr. Maradiaga to evaluate worsening cervical radiculopathy. He has a surgical hx ACD C4-7 with Dr. Huff about 8 years ago. States that he has struggled with intermittent neck pain; however, over the past 3 year the pain has progressed despite conservative therapy. I have ordered a dynamic x-ray for instability. CT cervical spine to assess the surgical hardware and bony fusion.    The patient would like to follow-up in clinic with Dr. Maradiaga to discuss next steps. I have encouraged her to contact the clinic with any questions, concerns,  or adverse clinical changes. She verbalized understanding.      MARYAN Canseco  Neurosurgery  [facility].      Note dictated with voice recognition software, please excuse any grammatical errors.

## 2022-11-18 ENCOUNTER — TELEPHONE (OUTPATIENT)
Dept: NEUROSURGERY | Facility: CLINIC | Age: 52
End: 2022-11-18
Payer: COMMERCIAL

## 2022-11-21 ENCOUNTER — HOSPITAL ENCOUNTER (OUTPATIENT)
Dept: RADIOLOGY | Facility: OTHER | Age: 52
Discharge: HOME | End: 2022-11-21
Attending: NURSE PRACTITIONER
Payer: COMMERCIAL

## 2022-11-21 ENCOUNTER — SPECIALTY PHARMACY (OUTPATIENT)
Dept: PHARMACY | Facility: CLINIC | Age: 52
End: 2022-11-21
Payer: COMMERCIAL

## 2022-11-21 DIAGNOSIS — M48.02 SPINAL STENOSIS, CERVICAL REGION: ICD-10-CM

## 2022-11-21 DIAGNOSIS — L40.50 PSA (PSORIATIC ARTHRITIS): ICD-10-CM

## 2022-11-21 DIAGNOSIS — Z98.1 HX OF FUSION OF CERVICAL SPINE: ICD-10-CM

## 2022-11-21 DIAGNOSIS — M47.812 CERVICAL SPONDYLOSIS: ICD-10-CM

## 2022-11-21 PROCEDURE — 72125 CT CERVICAL SPINE WITHOUT CONTRAST: ICD-10-PCS | Mod: 26,,, | Performed by: RADIOLOGY

## 2022-11-21 PROCEDURE — 72125 CT NECK SPINE W/O DYE: CPT | Mod: 26,,, | Performed by: RADIOLOGY

## 2022-11-21 PROCEDURE — 72125 CT NECK SPINE W/O DYE: CPT | Mod: TC

## 2022-11-21 RX ORDER — GUSELKUMAB 100 MG/ML
100 INJECTION SUBCUTANEOUS
Qty: 3 ML | Refills: 2 | Status: SHIPPED | OUTPATIENT
Start: 2022-11-21 | End: 2023-08-21 | Stop reason: SDUPTHER

## 2022-11-21 RX ORDER — APREMILAST 30 MG/1
30 TABLET, FILM COATED ORAL 2 TIMES DAILY
Qty: 180 TABLET | Refills: 2 | Status: SHIPPED | OUTPATIENT
Start: 2022-11-21 | End: 2022-11-21 | Stop reason: SDUPTHER

## 2022-11-21 NOTE — TELEPHONE ENCOUNTER
Outgoing call to patient for otezla refill --- he has about a week left on hand. Rx printed and [facility] did not receive. Refills requested and patient aware we will follow up once approved.

## 2022-11-30 ENCOUNTER — OFFICE VISIT (OUTPATIENT)
Dept: PAIN MEDICINE | Facility: CLINIC | Age: 52
End: 2022-11-30
Payer: COMMERCIAL

## 2022-11-30 ENCOUNTER — PATIENT MESSAGE (OUTPATIENT)
Dept: PAIN MEDICINE | Facility: OTHER | Age: 52
End: 2022-11-30
Payer: COMMERCIAL

## 2022-11-30 VITALS
DIASTOLIC BLOOD PRESSURE: 61 MMHG | RESPIRATION RATE: 18 BRPM | HEIGHT: 67 IN | BODY MASS INDEX: 21.66 KG/M2 | WEIGHT: 138 LBS | SYSTOLIC BLOOD PRESSURE: 106 MMHG | HEART RATE: 68 BPM

## 2022-11-30 DIAGNOSIS — M48.02 NEUROFORAMINAL STENOSIS OF CERVICAL SPINE: Primary | ICD-10-CM

## 2022-11-30 PROCEDURE — 99214 PR OFFICE/OUTPT VISIT, EST, LEVL IV, 30-39 MIN: ICD-10-PCS | Mod: S$GLB,,, | Performed by: NURSE PRACTITIONER

## 2022-11-30 PROCEDURE — 3074F SYST BP LT 130 MM HG: CPT | Mod: CPTII,S$GLB,, | Performed by: NURSE PRACTITIONER

## 2022-11-30 PROCEDURE — 1160F PR REVIEW ALL MEDS BY PRESCRIBER/CLIN PHARMACIST DOCUMENTED: ICD-10-PCS | Mod: CPTII,S$GLB,, | Performed by: NURSE PRACTITIONER

## 2022-11-30 PROCEDURE — 3008F BODY MASS INDEX DOCD: CPT | Mod: CPTII,S$GLB,, | Performed by: NURSE PRACTITIONER

## 2022-11-30 PROCEDURE — 99999 PR PBB SHADOW E&M-EST. PATIENT-LVL IV: CPT | Mod: PBBFAC,,, | Performed by: NURSE PRACTITIONER

## 2022-11-30 PROCEDURE — 3078F DIAST BP <80 MM HG: CPT | Mod: CPTII,S$GLB,, | Performed by: NURSE PRACTITIONER

## 2022-11-30 PROCEDURE — 3078F PR MOST RECENT DIASTOLIC BLOOD PRESSURE < 80 MM HG: ICD-10-PCS | Mod: CPTII,S$GLB,, | Performed by: NURSE PRACTITIONER

## 2022-11-30 PROCEDURE — 99999 PR PBB SHADOW E&M-EST. PATIENT-LVL IV: ICD-10-PCS | Mod: PBBFAC,,, | Performed by: NURSE PRACTITIONER

## 2022-11-30 PROCEDURE — 3074F PR MOST RECENT SYSTOLIC BLOOD PRESSURE < 130 MM HG: ICD-10-PCS | Mod: CPTII,S$GLB,, | Performed by: NURSE PRACTITIONER

## 2022-11-30 PROCEDURE — 1159F MED LIST DOCD IN RCRD: CPT | Mod: CPTII,S$GLB,, | Performed by: NURSE PRACTITIONER

## 2022-11-30 PROCEDURE — 1160F RVW MEDS BY RX/DR IN RCRD: CPT | Mod: CPTII,S$GLB,, | Performed by: NURSE PRACTITIONER

## 2022-11-30 PROCEDURE — 99214 OFFICE O/P EST MOD 30 MIN: CPT | Mod: S$GLB,,, | Performed by: NURSE PRACTITIONER

## 2022-11-30 PROCEDURE — 3008F PR BODY MASS INDEX (BMI) DOCUMENTED: ICD-10-PCS | Mod: CPTII,S$GLB,, | Performed by: NURSE PRACTITIONER

## 2022-11-30 PROCEDURE — 1159F PR MEDICATION LIST DOCUMENTED IN MEDICAL RECORD: ICD-10-PCS | Mod: CPTII,S$GLB,, | Performed by: NURSE PRACTITIONER

## 2022-11-30 NOTE — TELEPHONE ENCOUNTER
Specialty Pharmacy - Refill Coordination    Set up both Otezla and Tremfya for patient- wants to  together. Due for Tremfya 12/16- pending activity accordingly.     Specialty Medication Orders Linked to Encounter      Flowsheet Row Most Recent Value   Medication #1 guselkumab (TREMFYA) 100 mg/mL AtIn (Order#[id number], Rx#[id number])   Medication #2 apremilast (OTEZLA) 30 mg Tab (Order#[id number], Rx#[id number])            Refill Questions - Documented Responses      Flowsheet Row Most Recent Value   Patient Availability and HIPAA Verification    Does patient want to proceed with activity? Yes   HIPAA/medical authority confirmed? Yes   Relationship to patient of person spoken to? Self   Refill Screening Questions    Changes to allergies? No   Changes to medications? No   New conditions since last clinic visit? No   Unplanned office visit, urgent care, ED, or hospital admission in the last 4 weeks? No   How does patient/caregiver feel medication is working? Very good   Financial problems or insurance changes? No   How many doses of your specialty medications were missed in the last 4 weeks? 0   Why were doses missed? Simply forgot   Would patient like to speak to a pharmacist? No   When does the patient need to receive the medication? 12/02/22   Refill Delivery Questions    How will the patient receive the medication? Pickup   When does the patient need to receive the medication? 12/02/22   Expected Copay ($) 0   Is the patient able to afford the medication copay? Yes   Payment Method zero copay   Days supply of Refill 30   Supplies needed? No supplies needed   Refill activity completed? Yes   Refill activity plan Refill scheduled   Shipment/Pickup Date: 12/01/22            Current Outpatient Medications   Medication Sig    apremilast (OTEZLA) 30 mg Tab Take 1 tablet (30 mg total) by mouth 2 (two) times daily.    aspirin (ECOTRIN) 81 MG EC tablet Take 81 mg by mouth once daily.    atorvastatin (LIPITOR) 10 MG  tablet Take 1 tablet (10 mg total) by mouth every evening.    citric acid-potassium citrate (POLYCITRA) 1,100-334 mg/5 mL solution Take by mouth 2 (two) times a day.    FLUoxetine 40 MG capsule Take 1 capsule (40 mg) by mouth daily (Patient not taking: Reported on 11/2/2022)    guselkumab (TREMFYA) 100 mg/mL AtIn Inject 100 mg into the skin every 8 weeks.    leflunomide (ARAVA) 20 MG Tab Take 1 tablet (20 mg total) by mouth once daily.    methocarbamoL (ROBAXIN) 500 MG Tab Take 1 tablet (500 mg total) by mouth 3 (three) times daily.    mirtazapine (REMERON) 15 MG tablet Take 1 tablet (15 mg) by mouth daily at bedtime    mirtazapine (REMERON) 7.5 MG Tab take one tablet by mouth every night at bedtime    mirtazapine (REMERON) 7.5 MG Tab Take 1 tablet by mouth at bedtime    potassium citrate (UROCIT-K 15) 15 mEq TbSR TK 1 T PO BID   Last reviewed on 11/30/2022  9:12 AM by Hilton Powell NP    Review of patient's allergies indicates:   Allergen Reactions    Erythromycin Nausea And Vomiting    Infliximab Other (See Comments)     Lupus with fever and acute arthritis  Lupus with fever and acute arthritis    Last reviewed on  11/30/2022 9:12 AM by Hilton Powell      Tasks added this encounter   12/25/2022 - Refill Call (Auto Added)  12/2/2022 - Pickup Reminder   Tasks due within next 3 months   12/9/2022 - Refill Call (Auto Added)     Winter Muhammad, PharmD  [facility]  [address] [place] [id number]  Phone: [phone]  Fax: [phone]

## 2022-11-30 NOTE — TELEPHONE ENCOUNTER
Specialty Pharmacy - Refill Coordination    Set up both Ote    Specialty Medication Orders Linked to Encounter      Flowsheet Row Most Recent Value   Medication #1 guselkumab (TREMFYA) 100 mg/mL AtIn (Order#[id number], Rx#[id number])   Medication #2 apremilast (OTEZLA) 30 mg Tab (Order#[id number], Rx#[id number])            Refill Questions - Documented Responses      Flowsheet Row Most Recent Value   Patient Availability and HIPAA Verification    Does patient want to proceed with activity? Yes   HIPAA/medical authority confirmed? Yes   Relationship to patient of person spoken to? Self   Refill Screening Questions    Changes to allergies? No   Changes to medications? No   New conditions since last clinic visit? No   Unplanned office visit, urgent care, ED, or hospital admission in the last 4 weeks? No   How does patient/caregiver feel medication is working? Very good   Financial problems or insurance changes? No   How many doses of your specialty medications were missed in the last 4 weeks? 0   Why were doses missed? Simply forgot   Would patient like to speak to a pharmacist? No   When does the patient need to receive the medication? 12/02/22   Refill Delivery Questions    How will the patient receive the medication? Pickup   When does the patient need to receive the medication? 12/02/22   Expected Copay ($) 0   Is the patient able to afford the medication copay? Yes   Payment Method zero copay   Days supply of Refill 30   Supplies needed? No supplies needed   Refill activity completed? Yes   Refill activity plan Refill scheduled   Shipment/Pickup Date: 12/01/22            Current Outpatient Medications   Medication Sig    apremilast (OTEZLA) 30 mg Tab Take 1 tablet (30 mg total) by mouth 2 (two) times daily.    aspirin (ECOTRIN) 81 MG EC tablet Take 81 mg by mouth once daily.    atorvastatin (LIPITOR) 10 MG tablet Take 1 tablet (10 mg total) by mouth every evening.    citric acid-potassium citrate (POLYCITRA)  1,100-334 mg/5 mL solution Take by mouth 2 (two) times a day.    FLUoxetine 40 MG capsule Take 1 capsule (40 mg) by mouth daily (Patient not taking: Reported on 11/2/2022)    guselkumab (TREMFYA) 100 mg/mL AtIn Inject 100 mg into the skin every 8 weeks.    leflunomide (ARAVA) 20 MG Tab Take 1 tablet (20 mg total) by mouth once daily.    methocarbamoL (ROBAXIN) 500 MG Tab Take 1 tablet (500 mg total) by mouth 3 (three) times daily.    mirtazapine (REMERON) 15 MG tablet Take 1 tablet (15 mg) by mouth daily at bedtime    mirtazapine (REMERON) 7.5 MG Tab take one tablet by mouth every night at bedtime    mirtazapine (REMERON) 7.5 MG Tab Take 1 tablet by mouth at bedtime    potassium citrate (UROCIT-K 15) 15 mEq TbSR TK 1 T PO BID   Last reviewed on 11/30/2022  9:12 AM by Hilton Powell NP    Review of patient's allergies indicates:   Allergen Reactions    Erythromycin Nausea And Vomiting    Infliximab Other (See Comments)     Lupus with fever and acute arthritis  Lupus with fever and acute arthritis    Last reviewed on  11/30/2022 9:12 AM by Hilton Powell      Tasks added this encounter   12/25/2022 - Refill Call (Auto Added)  12/2/2022 - Pickup Reminder   Tasks due within next 3 months   12/9/2022 - Refill Call (Auto Added)     Winter Muhammad, PharmD  [facility]  [address] [place] [id number]  Phone: [phone]  Fax: [phone]

## 2022-11-30 NOTE — PROGRESS NOTES
PCP: Nanda Smith MD    REFERRING PHYSICIAN: No ref. provider found    CHIEF COMPLAINT: left neck pain      Original HISTORY OF PRESENT ILLNESS: Rebel Rodriguez presents to the clinic for the evaluation of the above pain. The pain started over the course of the past 3 years after no particular event. It is similar to the pain he had prior to neck surgery. He had an ACDF of C3-4-5 in 2017 at [facility].     Original Pain Description:  The pain is located in the left lateral neck and does not radiate. The pain is described as  tight, burning . Exacerbating factors: nothing. Mitigating factors ice and physical therapy. Symptoms interfere with daily activity, sleeping and work. The patient feels like symptoms have been worsening. He denies falls and myelopathic symptoms.     Original PAIN SCORES:  Best: Pain is 5  Worst: Pain is 8  Usually: Pain is 6  Current: Pain is 5    INTERVAL HISTORY:     Interval History 7/29/2022: Patient returns to clinic today for f/u after Left Cervical RFA C2-C5.  Patient reports 90% relief.  Patient has an appointment with physical therapy in one week.  He reports neck and upper back muscle tightness and tenderness.    Interval History 11/2/2022: Patient returns to clinic today for 4 month f/u after Left Cervical RFA C2-C5. Patient reports continued relief of sharp, piercing pain in neck, but reports stiffness in trapezius and upper neck L>R. Continues w/ home exercise and fluoxetine. Exam indicates most of his pain in the left trapezius and up into the left neck, though it is difficult to fully reproduce his pain. He finds pressure on the area helps.     Interval History 11/30/2022:  Mr Rodriguez presents for follow up. He has had continuous but now intermittent cervicalgia L>R. He did have some benefit from prior TPI. He has had MRI and additionally CT of cervical by NS. He is awaiting FU with Dr Maradiaga to discuss further options but voices he would like to exhaust all non-surgical options  "prior.     6 weeks of Conservative therapy (PT/Chiro/Home Exercises with Dates)  PT: 8/20-5/21  Aquatherapy: Very helpful  HEP: "Habitually" Daily as prescribed at the PT above    Treatments / Medications: (Ice/Heat/NSAIDS/APAP/etc):  Ice  BenGay  G[place] Balm  Lidocaine  APAP - no help  NSAIDS - not allowed to take due to TIA     Report:    Interventional Pain Procedures: (Previous injections)  CALLUM/CTFESI - preoperatively for severe spinal stenosis  7/12/2022:  Left C2-C5 RFA 90% Relief - ongoing    Past Medical History:   Diagnosis Date    Achilles tendon rupture 10/9/2013    Allergy     Anxiety     Arthritis     psoriatric    Degenerative disc disease     Drug-induced lupus erythematosus     Hyperlipidemia     Kidney stone     Psoriatic arthritis     TIA (transient ischemic attack)     Ulcer     high school     Past Surgical History:   Procedure Laterality Date    ACHILLES TENDON SURGERY      BACK SURGERY      FUNCTIONAL ENDOSCOPIC SINUS SURGERY (FESS) USING COMPUTER-ASSISTED NAVIGATION Bilateral 9/14/2018    Procedure: FESS, USING COMPUTER-ASSISTED NAVIGATION;  Surgeon: Gerard Manzo MD;  Location: [facility] OR [address] [place] [address];  Service: ENT;  Laterality: Bilateral;    INJECTION OF ANESTHETIC AGENT AROUND NERVE Left 5/13/2022    Procedure: Block, Nerve MEDIAL BRANCH BLOCK LEFT C2,3,4,5;  Surgeon: Kimberly Wilson MD;  Location: [facility] PAIN MGT;  Service: Pain Management;  Laterality: Left;    INJECTION OF ANESTHETIC AGENT AROUND NERVE Left 5/24/2022    Procedure: BLOCK, NERVE, LEFT C2-C5 MEDIAL BRANCH;  Surgeon: Kimberly Wilson MD;  Location: [facility] PAIN MGT;  Service: Pain Management;  Laterality: Left;    NASAL SEPTOPLASTY N/A 9/14/2018    Procedure: SEPTOPLASTY, NASAL;  Surgeon: Gerard Manzo MD;  Location: [facility] OR [address] [place] [address];  Service: ENT;  Laterality: N/A;    NASAL TURBINATE REDUCTION Bilateral 9/14/2018    Procedure: REDUCTION, NASAL TURBINATE;  Surgeon: Gerard Manzo MD;  Location: [facility] OR [address] [place] [address];  " Service: ENT;  Laterality: Bilateral;    RADIOFREQUENCY ABLATION Left 7/12/2022    Procedure: RADIOFREQUENCY ABLATION, LEFT C2-C3, C3-C4, C4-C5;  Surgeon: Kimberly Wilson MD;  Location: [facility];  Service: Pain Management;  Laterality: Left;    UPPER ENDOSCOPY W/ ESOPHAGEAL MANOMETRY      URETERAL STENT PLACEMENT       Social History     Socioeconomic History    Marital status: Single   Occupational History    Occupation: music production     Employer: self employed   Tobacco Use    Smoking status: Never    Smokeless tobacco: Never   Substance and Sexual Activity    Alcohol use: No     Alcohol/week: 0.0 standard drinks     Types: 1 - 2 Standard drinks or equivalent per week     Comment: cocktails    Drug use: No     Family History   Problem Relation Age of Onset    Pancreatic cancer Father     Ulcerative colitis Father     Cancer Father 61        pancreatic ca    Crohn's disease Mother     Osteoarthritis Maternal Grandmother     Crohn's disease Maternal Grandmother     Cataracts Maternal Grandmother     Cataracts Maternal Grandfather     Cataracts Paternal Grandmother     Cataracts Paternal Grandfather     Amblyopia Neg Hx     Blindness Neg Hx        Review of patient's allergies indicates:   Allergen Reactions    Erythromycin Nausea And Vomiting    Infliximab Other (See Comments)     Lupus with fever and acute arthritis  Lupus with fever and acute arthritis       Current Outpatient Medications   Medication Sig    apremilast (OTEZLA) 30 mg Tab Take 1 tablet (30 mg total) by mouth 2 (two) times daily.    aspirin (ECOTRIN) 81 MG EC tablet Take 81 mg by mouth once daily.    atorvastatin (LIPITOR) 10 MG tablet Take 1 tablet (10 mg total) by mouth every evening.    citric acid-potassium citrate (POLYCITRA) 1,100-334 mg/5 mL solution Take by mouth 2 (two) times a day.    FLUoxetine 40 MG capsule Take 1 capsule (40 mg) by mouth daily (Patient not taking: Reported on 11/2/2022)    guselkumab (TREMFYA) 100 mg/mL AtIn  "Inject 100 mg into the skin every 8 weeks.    leflunomide (ARAVA) 20 MG Tab Take 1 tablet (20 mg total) by mouth once daily.    methocarbamoL (ROBAXIN) 500 MG Tab Take 1 tablet (500 mg total) by mouth 3 (three) times daily.    mirtazapine (REMERON) 15 MG tablet Take 1 tablet (15 mg) by mouth daily at bedtime    mirtazapine (REMERON) 7.5 MG Tab take one tablet by mouth every night at bedtime    mirtazapine (REMERON) 7.5 MG Tab Take 1 tablet by mouth at bedtime    potassium citrate (UROCIT-K 15) 15 mEq TbSR TK 1 T PO BID     No current facility-administered medications for this visit.       ROS:  GENERAL: No fever. No chills. No fatigue. Denies weight loss. Denies weight gain.  HEENT: Denies headaches. Denies vision change. Denies eye pain. Denies double vision. Denies ear pain.   CV: Denies chest pain.   PULM: Denies of shortness of breath.  GI: Denies constipation. No diarrhea. No abdominal pain. Denies nausea. Denies vomiting. No blood in stool.  HEME: Denies bleeding problems.  : Denies urgency. No painful urination. No blood in urine.  MS: Denies joint stiffness. Denies joint swelling.  +Neck Pain and stiffness  SKIN: Denies rash.   NEURO: Denies seizures. No weakness.  PSYCH:  Denies difficulty sleeping. No anxiety. Denies depression. No suicidal thoughts.       VITALS:   Vitals:    11/30/22 0900   BP: 106/61   Pulse: 68   Resp: 18   Weight: 62.6 kg (138 lb)   Height: 5' 7" (1.702 m)   PainSc:   5   PainLoc: Neck           PHYSICAL EXAM:   GENERAL: Well appearing, in no acute distress, alert and oriented x3.  PSYCH:  Mood and affect appropriate.  SKIN: Skin color, texture, turgor normal, no rashes or lesions.  HEENT:  Normocephalic, atraumatic. Cranial nerves grossly intact.  NECK: Good ROM for a fusion. Tender to palpation over the left cervical paraspinous muscles. No pain with neck flexion. Moderate pain w/ extension and L lateral flexion.  PULM: No evidence of respiratory difficulty, symmetric chest " rise.  GI:  Non-distended  EXTREMITIES: No deformities, edema, or skin discoloration.   MUSCULOSKELETAL: Bilateral upper extremity strength is normal and symmetric. No atrophy is noted.  NEURO: Sensation is equal and appropriate bilaterally.   GAIT: normal.      LABS:      IMAGING:    EXAMINATION:  XR CERVICAL SPINE 5 VIEW WITH FLEX AND EXT     CLINICAL HISTORY:  Cervicalgia     TECHNIQUE:  Five views of the cervical spine plus flexion and extension views were performed.     COMPARISON:  11/03/2008     FINDINGS:  Interval operative change C4/C7 anterior cervical fusion with metallic plate and, screw device and interbody devices.  The cervical sagittal alignment is slightly improved.  There is no significant listhesis with flexion extension positioning.  No evidence for hardware failure.  Cervical vertebral body heights and contours within normal is without evidence for acute fracture.  Surgical clips left neck soft tissues.  Open mouth view limited by overlapping structures.  Questioned bilateral bony neural foraminal stenosis evaluation limited by overlapping structures on the oblique imaging.  Further evaluation as warranted clinically.     Impression:     Please see above        Electronically signed by: Hung Muniz DO  Date:                                            05/31/2021  Time:                                           13:33    ASSESSMENT: 52 y.o. year old male with pain, consistent with left cervical facet arthropathy.      DISCUSSION: Mr. Rodriguez previously had a C4-7 ACDF at [facility] and comes to us with progressive left sided neck pain. Significant improvement in neck pain after left C2-C5 RFA but continued trapezius pain. Noted severe left and moderate R NF narrowing at C3/4 above fusion and no relief from RFA.      PLAN:  - Prior records reviewed  - Updated imaging reviewed  - No significant benefit from RFA, mild benefit from TPI  - He would like to exhaust all non-surgical options  - Discussed with  Dr Amaro and he will perform Left C3/4 TFESI  - Advised to continue medication and HEP  - Advised to keep NS FU thereafter   - RTC 2 weeks after procedure.     NICK White  11/30/2022    I spent a total of 30 minutes on the day of the visit.  This includes face to face time and non-face to face time preparing to see the patient by reviewing previous labs/imaging, obtaining and/or reviewing separately obtained history, documenting clinical information in the electronic or other health record, independently interpreting results and communicating results to the patient/family/caregiver.

## 2022-12-27 ENCOUNTER — PATIENT MESSAGE (OUTPATIENT)
Dept: PHARMACY | Facility: CLINIC | Age: 52
End: 2022-12-27
Payer: COMMERCIAL

## 2023-01-03 ENCOUNTER — SPECIALTY PHARMACY (OUTPATIENT)
Dept: PHARMACY | Facility: CLINIC | Age: 53
End: 2023-01-03
Payer: COMMERCIAL

## 2023-01-03 NOTE — TELEPHONE ENCOUNTER
Specialty Pharmacy - Refill Coordination    Specialty Medication Orders Linked to Encounter      Flowsheet Row Most Recent Value   Medication #1 apremilast (OTEZLA) 30 mg Tab (Order#[id number], Rx#[id number])            Refill Questions - Documented Responses      Flowsheet Row Most Recent Value   Patient Availability and HIPAA Verification    Does patient want to proceed with activity? Yes   HIPAA/medical authority confirmed? Yes   Relationship to patient of person spoken to? Self   Refill Screening Questions    Changes to allergies? No   Changes to medications? No   New conditions since last clinic visit? No   Unplanned office visit, urgent care, ED, or hospital admission in the last 4 weeks? No   How does patient/caregiver feel medication is working? Very good   Financial problems or insurance changes? No   How many doses of your specialty medications were missed in the last 4 weeks? 0   Would patient like to speak to a pharmacist? No   When does the patient need to receive the medication? 01/07/23   Refill Delivery Questions    How will the patient receive the medication? Pickup   When does the patient need to receive the medication? 01/07/23   Expected Copay ($) 0   Is the patient able to afford the medication copay? Yes   Payment Method zero copay   Days supply of Refill 30   Supplies needed? No supplies needed   Refill activity completed? Yes   Refill activity plan Refill scheduled   Shipment/Pickup Date: 01/05/23            Current Outpatient Medications   Medication Sig    apremilast (OTEZLA) 30 mg Tab Take 1 tablet (30 mg total) by mouth 2 (two) times daily.    aspirin (ECOTRIN) 81 MG EC tablet Take 81 mg by mouth once daily.    atorvastatin (LIPITOR) 10 MG tablet Take 1 tablet (10 mg total) by mouth every evening.    citric acid-potassium citrate (POLYCITRA) 1,100-334 mg/5 mL solution Take by mouth 2 (two) times a day.    FLUoxetine 40 MG capsule Take 1 capsule (40 mg) by mouth daily (Patient not taking:  Reported on 11/2/2022)    guselkumab (TREMFYA) 100 mg/mL AtIn Inject 100 mg into the skin every 8 weeks.    leflunomide (ARAVA) 20 MG Tab Take 1 tablet (20 mg total) by mouth once daily.    mirtazapine (REMERON) 15 MG tablet Take 1 tablet (15 mg) by mouth daily at bedtime    mirtazapine (REMERON) 7.5 MG Tab take one tablet by mouth every night at bedtime    mirtazapine (REMERON) 7.5 MG Tab Take 1 tablet by mouth at bedtime    potassium citrate (UROCIT-K 15) 15 mEq TbSR TK 1 T PO BID   Last reviewed on 11/30/2022  9:12 AM by Hilton Powell NP    Review of patient's allergies indicates:   Allergen Reactions    Erythromycin Nausea And Vomiting    Infliximab Other (See Comments)     Lupus with fever and acute arthritis  Lupus with fever and acute arthritis    Last reviewed on  11/30/2022 9:12 AM by Hilton Powell      Tasks added this encounter   2/1/2023 - Refill Call (Auto Added)  1/6/2023 - Pickup Reminder   Tasks due within next 3 months   2/1/2023 - Refill Call (Auto Added)     Winter Baldwin, PharmD  [facility]  [address] [place] [id number]  Phone: [phone]  Fax: [phone]

## 2023-01-05 ENCOUNTER — OFFICE VISIT (OUTPATIENT)
Dept: NEUROSURGERY | Facility: CLINIC | Age: 53
End: 2023-01-05
Payer: COMMERCIAL

## 2023-01-05 ENCOUNTER — TELEPHONE (OUTPATIENT)
Dept: RHEUMATOLOGY | Facility: CLINIC | Age: 53
End: 2023-01-05
Payer: COMMERCIAL

## 2023-01-05 ENCOUNTER — LAB VISIT (OUTPATIENT)
Dept: LAB | Facility: HOSPITAL | Age: 53
End: 2023-01-05
Attending: STUDENT IN AN ORGANIZED HEALTH CARE EDUCATION/TRAINING PROGRAM
Payer: COMMERCIAL

## 2023-01-05 VITALS
HEIGHT: 67 IN | DIASTOLIC BLOOD PRESSURE: 67 MMHG | HEART RATE: 66 BPM | BODY MASS INDEX: 21.66 KG/M2 | SYSTOLIC BLOOD PRESSURE: 120 MMHG | WEIGHT: 138 LBS

## 2023-01-05 DIAGNOSIS — M48.02 STENOSIS OF CERVICAL SPINE WITH MYELOPATHY: Primary | ICD-10-CM

## 2023-01-05 DIAGNOSIS — G99.2 STENOSIS OF CERVICAL SPINE WITH MYELOPATHY: Primary | ICD-10-CM

## 2023-01-05 DIAGNOSIS — L40.50 PSA (PSORIATIC ARTHRITIS): ICD-10-CM

## 2023-01-05 LAB
ALBUMIN SERPL BCP-MCNC: 3.7 G/DL (ref 3.5–5.2)
ALP SERPL-CCNC: 88 U/L (ref 55–135)
ALT SERPL W/O P-5'-P-CCNC: 18 U/L (ref 10–44)
ANION GAP SERPL CALC-SCNC: 9 MMOL/L (ref 8–16)
AST SERPL-CCNC: 23 U/L (ref 10–40)
BASOPHILS # BLD AUTO: 0.04 K/UL (ref 0–0.2)
BASOPHILS NFR BLD: 0.5 % (ref 0–1.9)
BILIRUB SERPL-MCNC: 0.2 MG/DL (ref 0.1–1)
BUN SERPL-MCNC: 12 MG/DL (ref 6–20)
CALCIUM SERPL-MCNC: 8.9 MG/DL (ref 8.7–10.5)
CHLORIDE SERPL-SCNC: 105 MMOL/L (ref 95–110)
CO2 SERPL-SCNC: 25 MMOL/L (ref 23–29)
CREAT SERPL-MCNC: 0.9 MG/DL (ref 0.5–1.4)
CRP SERPL-MCNC: 1.3 MG/L (ref 0–8.2)
DIFFERENTIAL METHOD: ABNORMAL
EOSINOPHIL # BLD AUTO: 0.3 K/UL (ref 0–0.5)
EOSINOPHIL NFR BLD: 3.5 % (ref 0–8)
ERYTHROCYTE [DISTWIDTH] IN BLOOD BY AUTOMATED COUNT: 14.3 % (ref 11.5–14.5)
ERYTHROCYTE [SEDIMENTATION RATE] IN BLOOD BY PHOTOMETRIC METHOD: 16 MM/HR (ref 0–23)
EST. GFR  (NO RACE VARIABLE): >60 ML/MIN/1.73 M^2
GLUCOSE SERPL-MCNC: 75 MG/DL (ref 70–110)
HCT VFR BLD AUTO: 40.7 % (ref 40–54)
HGB BLD-MCNC: 12.7 G/DL (ref 14–18)
IMM GRANULOCYTES # BLD AUTO: 0.04 K/UL (ref 0–0.04)
IMM GRANULOCYTES NFR BLD AUTO: 0.5 % (ref 0–0.5)
LYMPHOCYTES # BLD AUTO: 2 K/UL (ref 1–4.8)
LYMPHOCYTES NFR BLD: 25.2 % (ref 18–48)
MCH RBC QN AUTO: 27.5 PG (ref 27–31)
MCHC RBC AUTO-ENTMCNC: 31.2 G/DL (ref 32–36)
MCV RBC AUTO: 88 FL (ref 82–98)
MONOCYTES # BLD AUTO: 0.9 K/UL (ref 0.3–1)
MONOCYTES NFR BLD: 11 % (ref 4–15)
NEUTROPHILS # BLD AUTO: 4.8 K/UL (ref 1.8–7.7)
NEUTROPHILS NFR BLD: 59.3 % (ref 38–73)
NRBC BLD-RTO: 0 /100 WBC
PLATELET # BLD AUTO: 256 K/UL (ref 150–450)
PMV BLD AUTO: 9.4 FL (ref 9.2–12.9)
POTASSIUM SERPL-SCNC: 4.2 MMOL/L (ref 3.5–5.1)
PROT SERPL-MCNC: 6.7 G/DL (ref 6–8.4)
RBC # BLD AUTO: 4.61 M/UL (ref 4.6–6.2)
SODIUM SERPL-SCNC: 139 MMOL/L (ref 136–145)
WBC # BLD AUTO: 8.11 K/UL (ref 3.9–12.7)

## 2023-01-05 PROCEDURE — 3074F SYST BP LT 130 MM HG: CPT | Mod: CPTII,S$GLB,, | Performed by: NEUROLOGICAL SURGERY

## 2023-01-05 PROCEDURE — 99214 PR OFFICE/OUTPT VISIT, EST, LEVL IV, 30-39 MIN: ICD-10-PCS | Mod: S$GLB,,, | Performed by: NEUROLOGICAL SURGERY

## 2023-01-05 PROCEDURE — 1159F PR MEDICATION LIST DOCUMENTED IN MEDICAL RECORD: ICD-10-PCS | Mod: CPTII,S$GLB,, | Performed by: NEUROLOGICAL SURGERY

## 2023-01-05 PROCEDURE — 99999 PR PBB SHADOW E&M-EST. PATIENT-LVL III: CPT | Mod: PBBFAC,,, | Performed by: NEUROLOGICAL SURGERY

## 2023-01-05 PROCEDURE — 3078F DIAST BP <80 MM HG: CPT | Mod: CPTII,S$GLB,, | Performed by: NEUROLOGICAL SURGERY

## 2023-01-05 PROCEDURE — 99214 OFFICE O/P EST MOD 30 MIN: CPT | Mod: S$GLB,,, | Performed by: NEUROLOGICAL SURGERY

## 2023-01-05 PROCEDURE — 85025 COMPLETE CBC W/AUTO DIFF WBC: CPT | Performed by: STUDENT IN AN ORGANIZED HEALTH CARE EDUCATION/TRAINING PROGRAM

## 2023-01-05 PROCEDURE — 36415 COLL VENOUS BLD VENIPUNCTURE: CPT | Performed by: STUDENT IN AN ORGANIZED HEALTH CARE EDUCATION/TRAINING PROGRAM

## 2023-01-05 PROCEDURE — 3078F PR MOST RECENT DIASTOLIC BLOOD PRESSURE < 80 MM HG: ICD-10-PCS | Mod: CPTII,S$GLB,, | Performed by: NEUROLOGICAL SURGERY

## 2023-01-05 PROCEDURE — 85652 RBC SED RATE AUTOMATED: CPT | Performed by: STUDENT IN AN ORGANIZED HEALTH CARE EDUCATION/TRAINING PROGRAM

## 2023-01-05 PROCEDURE — 86140 C-REACTIVE PROTEIN: CPT | Performed by: STUDENT IN AN ORGANIZED HEALTH CARE EDUCATION/TRAINING PROGRAM

## 2023-01-05 PROCEDURE — 3008F PR BODY MASS INDEX (BMI) DOCUMENTED: ICD-10-PCS | Mod: CPTII,S$GLB,, | Performed by: NEUROLOGICAL SURGERY

## 2023-01-05 PROCEDURE — 3008F BODY MASS INDEX DOCD: CPT | Mod: CPTII,S$GLB,, | Performed by: NEUROLOGICAL SURGERY

## 2023-01-05 PROCEDURE — 3074F PR MOST RECENT SYSTOLIC BLOOD PRESSURE < 130 MM HG: ICD-10-PCS | Mod: CPTII,S$GLB,, | Performed by: NEUROLOGICAL SURGERY

## 2023-01-05 PROCEDURE — 80053 COMPREHEN METABOLIC PANEL: CPT | Performed by: STUDENT IN AN ORGANIZED HEALTH CARE EDUCATION/TRAINING PROGRAM

## 2023-01-05 PROCEDURE — 99999 PR PBB SHADOW E&M-EST. PATIENT-LVL III: ICD-10-PCS | Mod: PBBFAC,,, | Performed by: NEUROLOGICAL SURGERY

## 2023-01-05 PROCEDURE — 1159F MED LIST DOCD IN RCRD: CPT | Mod: CPTII,S$GLB,, | Performed by: NEUROLOGICAL SURGERY

## 2023-01-05 NOTE — PROGRESS NOTES
Dear Dr. Swanson ref. provider found,    Thank you for referring this patient to my clinic. The full details of my evaluation will also be forthcoming to you by letter.    CHIEF COMPLAINT:  Neck pain    HPI:  Rebel Rodriguez is a 52 y.o.  male with psoriatic arthritis presents with chronic progressive neck pain and radicular pain to the left shoulder blade. No pain down arm. No s/s of myelopathy. Hx of C4-7 fusion from left side with tung.    Review of patient's allergies indicates:   Allergen Reactions    Erythromycin Nausea And Vomiting    Infliximab Other (See Comments)     Lupus with fever and acute arthritis  Lupus with fever and acute arthritis       Past Medical History:   Diagnosis Date    Achilles tendon rupture 10/9/2013    Allergy     Anxiety     Arthritis     psoriatric    Degenerative disc disease     Drug-induced lupus erythematosus     Hyperlipidemia     Kidney stone     Psoriatic arthritis     TIA (transient ischemic attack)     Ulcer     high school     Past Surgical History:   Procedure Laterality Date    ACHILLES TENDON SURGERY      BACK SURGERY      FUNCTIONAL ENDOSCOPIC SINUS SURGERY (FESS) USING COMPUTER-ASSISTED NAVIGATION Bilateral 9/14/2018    Procedure: FESS, USING COMPUTER-ASSISTED NAVIGATION;  Surgeon: Gerard Manzo MD;  Location: [facility] OR [address] [place] [address];  Service: ENT;  Laterality: Bilateral;    INJECTION OF ANESTHETIC AGENT AROUND NERVE Left 5/13/2022    Procedure: Block, Nerve MEDIAL BRANCH BLOCK LEFT C2,3,4,5;  Surgeon: Kimberly Wilson MD;  Location: [facility] [place]IN [facility]GT;  Service: Pain Management;  Laterality: Left;    INJECTION OF ANESTHETIC AGENT AROUND NERVE Left 5/24/2022    Procedure: BLOCK, NERVE, LEFT C2-C5 MEDIAL BRANCH;  Surgeon: Kimberly Wilson MD;  Location: [facility] [place] PAIN [facility]GT;  Service: Pain Management;  Laterality: Left;    NASAL SEPTOPLASTY N/A 9/14/2018    Procedure: SEPTOPLASTY, NASAL;  Surgeon: Gerard Manzo MD;  Location: [facility] OR [address] [place] [address];  Service: ENT;  Laterality: N/A;     NASAL TURBINATE REDUCTION Bilateral 9/14/2018    Procedure: REDUCTION, NASAL TURBINATE;  Surgeon: Gerard Manzo MD;  [facility];  Service: ENT;  Laterality: Bilateral;    RADIOFREQUENCY ABLATION Left 7/12/2022    Procedure: RADIOFREQUENCY ABLATION, LEFT C2-C3, C3-C4, C4-C5;  Surgeon: Kimberly Wilson MD;  Location: [facility] MGT;  Service: Pain Management;  Laterality: Left;    UPPER ENDOSCOPY W/ ESOPHAGEAL MANOMETRY      URETERAL STENT PLACEMENT       Family History   Problem Relation Age of Onset    Pancreatic cancer Father     Ulcerative colitis Father     Cancer Father 61        pancreatic ca    Crohn's disease Mother     Osteoarthritis Maternal Grandmother     Crohn's disease Maternal Grandmother     Cataracts Maternal Grandmother     Cataracts Maternal Grandfather     Cataracts Paternal Grandmother     Cataracts Paternal Grandfather     Amblyopia Neg Hx     Blindness Neg Hx      Social History     Tobacco Use    Smoking status: Never    Smokeless tobacco: Never   Substance Use Topics    Alcohol use: No     Alcohol/week: 0.0 standard drinks     Types: 1 - 2 Standard drinks or equivalent per week     Comment: cocktails    Drug use: No        Review of Systems    OBJECTIVE:       Vital Signs:  Pulse: 66 (01/05/23 1313)  BP: 120/67 (01/05/23 1313)    Physical Exam:    Vital signs: All nursing notes and vital signs reviewed -- afebrile, vital signs stable.  Constitutional: Patient sitting comfortably in chair. Appears well developed and well nourished.  Skin: Exposed areas are intact without abnormal markings, rashes or other lesions.  HEENT: Normocephalic. Normal conjunctivae.  Cardiovascular: Normal rate and regular rhythm.  Respiratory: Chest wall rises and falls symmetrically, without signs of respiratory distress.  Abdomen: Soft and non-tender.  Extremities: Warm and without edema. Calves supple, non-tender.  Psych/Behavior: Normal affect.    Neurological:    Mental status: Alert and  oriented. Conversational and appropriate.       Cranial Nerves: grossly intact    Motor:    Upper:  Deltoids Triceps Biceps WE WF     R 5/5 5/5 5/5 5/5 5/5 5/5    L 5/5 5/5 5/5 5/5 5/5 5/5          Reflexes:          Magallanes's: Positive bilaterally    Diagnostic Results:  All imaging was independently reviewed by me.    MRI C spine:  1. C3-4 foraminal stenosis, severe on left    CT C spine:  1. Solid ACD fusion C4-7    Flex/Ex X-ray C spine:  1. No instability      ASSESSMENT/PLAN:     Rebel Rodriguez has left C4 radiculopathy with C3-4 DDD above his prior C4-7 fusion. There are no s/s of myelopathy or severe cord compression on MRI. I recommend neck PT at [facility] and C3-4 KENRICK. He will RTC in 3 months.    The patient understands and agrees with the plan of care. All questions were answered.     1. [facility]  2. C3-4 KENRICK  3. RTC in 3 months          Rogerio Maradiaga M.D.  Department of Neurosurgery  [facility]      .

## 2023-01-06 ENCOUNTER — TELEPHONE (OUTPATIENT)
Dept: ADMINISTRATIVE | Facility: OTHER | Age: 53
End: 2023-01-06
Payer: COMMERCIAL

## 2023-01-06 NOTE — TELEPHONE ENCOUNTER
The crp is normal. The sed rate is minimally elevated. The liver and kidney tests are normal. The cbc shows slight worsening of the iron deficiency anemia. This indicates occult blood loss from somewhere in GI tract. Have asked Clarita aragon [email or web address] to work with you on scheduling visit with Gastroenterology to have this evaluated as previously discussed. The tests are not really that bad. ELIEL

## 2023-01-09 ENCOUNTER — TELEPHONE (OUTPATIENT)
Dept: ADMINISTRATIVE | Facility: OTHER | Age: 53
End: 2023-01-09
Payer: COMMERCIAL

## 2023-01-12 ENCOUNTER — TELEPHONE (OUTPATIENT)
Dept: ADMINISTRATIVE | Facility: OTHER | Age: 53
End: 2023-01-12
Payer: COMMERCIAL

## 2023-01-13 ENCOUNTER — TELEPHONE (OUTPATIENT)
Dept: ADMINISTRATIVE | Facility: OTHER | Age: 53
End: 2023-01-13
Payer: COMMERCIAL

## 2023-01-13 ENCOUNTER — PATIENT MESSAGE (OUTPATIENT)
Dept: PAIN MEDICINE | Facility: OTHER | Age: 53
End: 2023-01-13
Payer: COMMERCIAL

## 2023-01-26 ENCOUNTER — PATIENT MESSAGE (OUTPATIENT)
Dept: RHEUMATOLOGY | Facility: CLINIC | Age: 53
End: 2023-01-26
Payer: COMMERCIAL

## 2023-01-26 DIAGNOSIS — M19.90 ARTHRITIS: Primary | ICD-10-CM

## 2023-01-26 RX ORDER — LEFLUNOMIDE 20 MG/1
20 TABLET ORAL DAILY
Qty: 90 TABLET | Refills: 0 | Status: SHIPPED | OUTPATIENT
Start: 2023-01-26 | End: 2023-03-28 | Stop reason: SDUPTHER

## 2023-01-27 ENCOUNTER — PATIENT MESSAGE (OUTPATIENT)
Dept: PAIN MEDICINE | Facility: OTHER | Age: 53
End: 2023-01-27
Payer: COMMERCIAL

## 2023-02-07 ENCOUNTER — PATIENT MESSAGE (OUTPATIENT)
Dept: PHARMACY | Facility: CLINIC | Age: 53
End: 2023-02-07
Payer: COMMERCIAL

## 2023-02-07 ENCOUNTER — SPECIALTY PHARMACY (OUTPATIENT)
Dept: PHARMACY | Facility: CLINIC | Age: 53
End: 2023-02-07
Payer: COMMERCIAL

## 2023-02-07 NOTE — TELEPHONE ENCOUNTER
Incoming call from pt returning [facility] call regarding refills, Pt is currently out of town but will call back to set up refill when he gets back.

## 2023-02-15 NOTE — TELEPHONE ENCOUNTER
Specialty Pharmacy - Refill Coordination    Specialty Medication Orders Linked to Encounter      Flowsheet Row Most Recent Value   Medication #1 guselkumab (TREMFYA) 100 mg/mL AtIn (Order#[id number], Rx#[id number])   Medication #2 apremilast (OTEZLA) 30 mg Tab (Order#[id number], Rx#[id number])            Refill Questions - Documented Responses      Flowsheet Row Most Recent Value   Patient Availability and HIPAA Verification    Does patient want to proceed with activity? Yes   HIPAA/medical authority confirmed? Yes   Relationship to patient of person spoken to? Self   Refill Screening Questions    Changes to allergies? No   Changes to medications? No   New conditions since last clinic visit? No   Unplanned office visit, urgent care, ED, or hospital admission in the last 4 weeks? No   How does patient/caregiver feel medication is working? Very good   Financial problems or insurance changes? No   How many doses of your specialty medications were missed in the last 4 weeks? 0   Would patient like to speak to a pharmacist? No   When does the patient need to receive the medication? 02/16/23   Refill Delivery Questions    How will the patient receive the medication? MEDRx   When does the patient need to receive the medication? 02/16/23   Shipping Address Home   Address in [facility] confirmed and updated if neccessary? Yes   Expected Copay ($) 5   Is the patient able to afford the medication copay? Yes   Payment Method CC on file   Days supply of Refill 56   Supplies needed? No supplies needed   Refill activity completed? Yes   Refill activity plan Refill scheduled   Shipment/Pickup Date: 02/16/23            Current Outpatient Medications   Medication Sig    apremilast (OTEZLA) 30 mg Tab Take 1 tablet (30 mg total) by mouth 2 (two) times daily.    aspirin (ECOTRIN) 81 MG EC tablet Take 81 mg by mouth once daily.    atorvastatin (LIPITOR) 10 MG tablet Take 1 tablet (10 mg total) by mouth every evening.    citric  acid-potassium citrate (POLYCITRA) 1,100-334 mg/5 mL solution Take by mouth 2 (two) times a day.    FLUoxetine 40 MG capsule Take 1 capsule (40 mg) by mouth daily    guselkumab (TREMFYA) 100 mg/mL AtIn Inject 100 mg into the skin every 8 weeks.    leflunomide (ARAVA) 20 MG Tab Take 1 tablet (20 mg total) by mouth once daily.    mirtazapine (REMERON) 15 MG tablet Take 1 tablet (15 mg) by mouth daily at bedtime    mirtazapine (REMERON) 7.5 MG Tab take one tablet by mouth every night at bedtime    mirtazapine (REMERON) 7.5 MG Tab Take 1 tablet by mouth at bedtime    mirtazapine (REMERON) 7.5 MG Tab take one tablet by mouth at bedtime    potassium citrate (UROCIT-K 15) 15 mEq TbSR TK 1 T PO BID   Last reviewed on 1/5/2023  1:17 PM by Michael Prescott MA    Review of patient's allergies indicates:   Allergen Reactions    Erythromycin Nausea And Vomiting    Infliximab Other (See Comments)     Lupus with fever and acute arthritis  Lupus with fever and acute arthritis    Last reviewed on  1/26/2023 5:33 PM by Ainsley Russell      Tasks added this encounter   3/15/2023 - Refill Call (Auto Added)  4/4/2023 - Refill Call (Auto Added)   Tasks due within next 3 months   No tasks due.     Winter Baldwin, PharmD  [facility]  [address] [place], [place] [address]  [facility] [address]  Phone: [phone]  Fax: [phone]

## 2023-02-28 ENCOUNTER — OFFICE VISIT (OUTPATIENT)
Dept: GASTROENTEROLOGY | Facility: CLINIC | Age: 53
End: 2023-02-28
Payer: COMMERCIAL

## 2023-02-28 DIAGNOSIS — L40.50 PSORIATIC ARTHRITIS: ICD-10-CM

## 2023-02-28 DIAGNOSIS — Z83.79 FAMILY HISTORY OF INFLAMMATORY BOWEL DISEASE: ICD-10-CM

## 2023-02-28 DIAGNOSIS — D64.9 NORMOCYTIC ANEMIA: Primary | ICD-10-CM

## 2023-02-28 DIAGNOSIS — K52.9 ILEITIS: ICD-10-CM

## 2023-02-28 PROCEDURE — 1159F PR MEDICATION LIST DOCUMENTED IN MEDICAL RECORD: ICD-10-PCS | Mod: CPTII,95,, | Performed by: INTERNAL MEDICINE

## 2023-02-28 PROCEDURE — 99203 PR OFFICE/OUTPT VISIT, NEW, LEVL III, 30-44 MIN: ICD-10-PCS | Mod: 95,,, | Performed by: INTERNAL MEDICINE

## 2023-02-28 PROCEDURE — 1159F MED LIST DOCD IN RCRD: CPT | Mod: CPTII,95,, | Performed by: INTERNAL MEDICINE

## 2023-02-28 PROCEDURE — 1160F RVW MEDS BY RX/DR IN RCRD: CPT | Mod: CPTII,95,, | Performed by: INTERNAL MEDICINE

## 2023-02-28 PROCEDURE — 1160F PR REVIEW ALL MEDS BY PRESCRIBER/CLIN PHARMACIST DOCUMENTED: ICD-10-PCS | Mod: CPTII,95,, | Performed by: INTERNAL MEDICINE

## 2023-02-28 PROCEDURE — 99203 OFFICE O/P NEW LOW 30 MIN: CPT | Mod: 95,,, | Performed by: INTERNAL MEDICINE

## 2023-03-01 PROBLEM — Z23 IMMUNIZATION DUE: Status: RESOLVED | Noted: 2018-09-25 | Resolved: 2023-03-01

## 2023-03-01 NOTE — PROGRESS NOTES
Gastroenterology Telemedicine Virtual Visit    The patient location is:  Patient Home  The chief complaint leading to consultation is:  Anemia  Visit type: Virtual visit with synchronous audio and video      Narrative:  53 y.o. male here on a telemedicine visit for evaluation of anemia.  I saw him in September 2015 for recurrent fevers with terminal ileal thickening seen by CT imaging.  This was in the setting of a history of psoriatic arthritis and family history of inflammatory bowel disease.  I arranged for a colonoscopy which was done 09/08/2015 by my partner, Dr. Tao.  He noted a single, diminutive erosion in the terminal ileum.  Biopsies revealed changes of chronic inflammation that were nonspecific.  AFB stain was negative.  He had been on Remicade for psoriatic arthritis treatment.  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  He has been on several medications since then for psoriasis.  He is currently taking Otezla, Tremfya, and Arava.  He was last seen by Rheumatology 10/24/2022 with reporting diarrhea after starting Otezla in May of 2021.  He has a loose bowel movement for morning.  He noticed increased gas.  This has been relatively unchanged ever since then.  He denies seeing blood in the stool.  He also denies abdominal pain, fecal urgency or tenesmus.  He has a normal appetite and denies weight loss.  He denies NSAID use.  He is not on an iron supplement.      Labs reviewed.     Latest Reference Range & Units 01/06/22 10:21 04/06/22 11:10 07/06/22 09:40 07/07/22 11:31 10/19/22 11:40 01/05/23 14:04   WBC 3.90 - 12.70 K/uL 8.21 6.44 6.28  8.12 8.11   RBC 4.60 - 6.20 M/uL 4.65 4.83 4.60  4.76 4.61   Hemoglobin 14.0 - 18.0 g/dL 13.1 (L) 13.8 (L) 13.2 (L)  13.6 (L) 12.7 (L)   Hematocrit 40.0 - 54.0 % 40.6 42.9 40.5  41.8 40.7   MCV 82 - 98 fL 87 89 88  88 88   MCH 27.0 - 31.0 pg 28.2 28.6 28.7  28.6 27.5   MCHC 32.0 - 36.0 g/dL 32.3 32.2 32.6  32.5 31.2 (L)   RDW 11.5 - 14.5 % 13.9 14.6 (H) 14.0   13.8 14.3   Platelets 150 - 450 K/uL 269 253 239  278 256   MPV 9.2 - 12.9 fL 9.6 9.5 9.9  9.6 9.4   Gran % 38.0 - 73.0 % 70.8 58.9 59.6  62.2 59.3   Lymph % 18.0 - 48.0 % 18.4 23.4 25.5  24.3 25.2   Mono % 4.0 - 15.0 % 8.2 12.1 9.2  9.4 11.0   Eosinophil % 0.0 - 8.0 % 1.7 4.7 4.9  3.2 3.5   Basophil % 0.0 - 1.9 % 0.5 0.6 0.5  0.5 0.5   Immature Granulocytes 0.0 - 0.5 % 0.4 0.3 0.3  0.4 0.5   Gran # (ANC) 1.8 - 7.7 K/uL 5.8 3.8 3.7  5.1 4.8   Lymph # 1.0 - 4.8 K/uL 1.5 1.5 1.6  2.0 2.0   Mono # 0.3 - 1.0 K/uL 0.7 0.8 0.6  0.8 0.9   Eos # 0.0 - 0.5 K/uL 0.1 0.3 0.3  0.3 0.3   Baso # 0.00 - 0.20 K/uL 0.04 0.04 0.03  0.04 0.04   Immature Grans (Abs) 0.00 - 0.04 K/uL 0.03 0.02 0.02  0.03 0.04   nRBC 0 /100 WBC 0 0 0  0 0   Differential Method  Automated Automated Automated  Automated Automated   Iron 45 - 160 ug/dL    41 (L)     TIBC 250 - 450 ug/dL    358     Saturated Iron 20 - 50 %    11 (L)     Transferrin 200 - 375 mg/dL    242     Ferritin 20.0 - 300.0 ng/mL    26     Folate 4.0 - 24.0 ng/mL    11.9     Vitamin B-12 210 - 950 pg/mL    438          [facility] Reference Range & Units 01/09/20 09:12   Sol. Transferrin Receptor 1.8 - 4.6 mg/L 3.1                     Assessment:  1. Normocytic anemia    2. Ileitis    3. Family history of inflammatory bowel disease    4. Psoriatic arthritis      Longstanding, chronic, normocytic anemia that has been relatively stable over the past several years.  Ferritin is borderline.  TIBC is normal.  Soluble transferrin receptor from 2020 was normal.  He is not on an iron supplement.  He has no overt signs of GI bleeding.  He has a daily loose bowel movement ever since starting Otezla.  There was a diminutive, nonspecific erosions seen in the terminal ileum during colonoscopy in September 2015.  Biopsies revealed chronic inflammatory changes, but were not conclusive as to the etiology.  He had been on Remicade.  It is possible, given his family history, that he could have underlying  Crohn's disease that has been treated by medications used for psoriasis, but these findings are not definitive.  He has no other significant GI symptoms or complaints right now.  He will be due for routine colon cancer screening in 2025.  He is hesitant to undergo anesthesia and endoscopic procedures.      Recommendation:  At this time, we will defer endoscopic evaluation due to stability of blood counts and lack of significant GI symptoms or complaints.  Would reconsider if he had a drop in hemoglobin, overt signs of GI bleeding, or development of new or worsening GI symptoms.  Colonoscopy recommended in 2025 for routine colon cancer screening.          Total time spent with patient:  30 minutes including time spent face-to-face with patient and time spent reviewing records      Each patient to whom he or she provides medical services by telemedicine is:  (1) informed of the relationship between the physician and patient and the respective role of any other health care provider with respect to management of the patient; and (2) notified that he or she may decline to receive medical services by telemedicine and may withdraw from such care at any time.

## 2023-03-20 ENCOUNTER — SPECIALTY PHARMACY (OUTPATIENT)
Dept: PHARMACY | Facility: CLINIC | Age: 53
End: 2023-03-20
Payer: COMMERCIAL

## 2023-03-20 ENCOUNTER — OFFICE VISIT (OUTPATIENT)
Dept: OPTOMETRY | Facility: CLINIC | Age: 53
End: 2023-03-20
Payer: COMMERCIAL

## 2023-03-20 DIAGNOSIS — H52.03 HYPEROPIA OF BOTH EYES WITH REGULAR ASTIGMATISM AND PRESBYOPIA: ICD-10-CM

## 2023-03-20 DIAGNOSIS — Z01.00 EYE EXAM, ROUTINE: Primary | ICD-10-CM

## 2023-03-20 DIAGNOSIS — H52.223 HYPEROPIA OF BOTH EYES WITH REGULAR ASTIGMATISM AND PRESBYOPIA: ICD-10-CM

## 2023-03-20 DIAGNOSIS — H52.4 HYPEROPIA OF BOTH EYES WITH REGULAR ASTIGMATISM AND PRESBYOPIA: ICD-10-CM

## 2023-03-20 PROCEDURE — 99999 PR PBB SHADOW E&M-EST. PATIENT-LVL II: CPT | Mod: PBBFAC,,, | Performed by: OPTOMETRIST

## 2023-03-20 PROCEDURE — 99999 PR PBB SHADOW E&M-EST. PATIENT-LVL II: ICD-10-PCS | Mod: PBBFAC,,, | Performed by: OPTOMETRIST

## 2023-03-20 PROCEDURE — 1159F PR MEDICATION LIST DOCUMENTED IN MEDICAL RECORD: ICD-10-PCS | Mod: CPTII,S$GLB,, | Performed by: OPTOMETRIST

## 2023-03-20 PROCEDURE — 92014 COMPRE OPH EXAM EST PT 1/>: CPT | Mod: S$GLB,,, | Performed by: OPTOMETRIST

## 2023-03-20 PROCEDURE — 92015 DETERMINE REFRACTIVE STATE: CPT | Mod: S$GLB,,, | Performed by: OPTOMETRIST

## 2023-03-20 PROCEDURE — 1159F MED LIST DOCD IN RCRD: CPT | Mod: CPTII,S$GLB,, | Performed by: OPTOMETRIST

## 2023-03-20 PROCEDURE — 92015 PR REFRACTION: ICD-10-PCS | Mod: S$GLB,,, | Performed by: OPTOMETRIST

## 2023-03-20 PROCEDURE — 92014 PR EYE EXAM, EST PATIENT,COMPREHESV: ICD-10-PCS | Mod: S$GLB,,, | Performed by: OPTOMETRIST

## 2023-03-20 NOTE — PROGRESS NOTES
HPI    Annual eye exam , Hx of Plaquenil. Stopped Several years ago only used 3   yrs.   First Time Patient    53 y.o. is here annual eye exam  Pt states vision is the same as previous  Pt wear pal gl most of there time  Pt want to get a new rx for gl    (-)Flashes (-)Floaters  (-)Itch, (-)tear, (-)burn, (-)Dryness.  (-) OTC Drops  (-)Photophobia(-)Glare   (-) Headaches (-) Eye Pain (-) Double vision    Eye Medications: None    No Past Ocular history     No Family Ocular history         Last edited by Esau Faith, OD on 3/20/2023  2:54 PM.            Assessment /Plan     For exam results, see Encounter Report.    Eye exam, routine  -annual DFE  -No signs of Plaquenil impact 2 yrs after stopping. Ok annual DFE    Hyperopia of both eyes with regular astigmatism and presbyopia  Eyeglass Final Rx       Eyeglass Final Rx         Sphere Cylinder Axis Dist VA Add    Right +4.00 +1.00 015 20/20 +2.00    Left +4.00 +1.25 155 20/20 +2.00      Type: PAL    Expiration Date: 3/20/2024                      RTC 1 yr

## 2023-03-20 NOTE — TELEPHONE ENCOUNTER
Outgoing call regarding otezla refill; per pt, he has about 12 days on hand; informed him that [facility] will follow up on 3/23 to schedule delivery

## 2023-03-28 DIAGNOSIS — M19.90 ARTHRITIS: ICD-10-CM

## 2023-03-28 RX ORDER — LEFLUNOMIDE 20 MG/1
20 TABLET ORAL DAILY
Qty: 90 TABLET | Refills: 0 | Status: SHIPPED | OUTPATIENT
Start: 2023-03-28 | End: 2023-07-19 | Stop reason: SDUPTHER

## 2023-03-28 NOTE — TELEPHONE ENCOUNTER
Outgoing call to pt regarding Otezla refill. Pt said she is still good with tablets on hand and wants to wait to fill with his Taltz on 4. Will call pt back on 4/4 to schedule refills for both taltz and otezla.

## 2023-04-04 NOTE — TELEPHONE ENCOUNTER
Specialty Pharmacy - Refill Coordination    Specialty Medication Orders Linked to Encounter      Flowsheet Row Most Recent Value   Medication #1 guselkumab (TREMFYA) 100 mg/mL AtIn (Order#[id number], Rx#[id number])   Medication #2 apremilast (OTEZLA) 30 mg Tab (Order#[id number], Rx#[id number])            Refill Questions - Documented Responses      Flowsheet Row Most Recent Value   Patient Availability and HIPAA Verification    Does patient want to proceed with activity? Yes   HIPAA/medical authority confirmed? Yes   Relationship to patient of person spoken to? Self   Refill Screening Questions    Changes to allergies? No   Changes to medications? No   New conditions since last clinic visit? No   Unplanned office visit, urgent care, ED, or hospital admission in the last 4 weeks? No   How does patient/caregiver feel medication is working? Good   Financial problems or insurance changes? No   How many doses of your specialty medications were missed in the last 4 weeks? 0   Would patient like to speak to a pharmacist? No   When does the patient need to receive the medication? 04/11/23   Refill Delivery Questions    How will the patient receive the medication? Pickup   When does the patient need to receive the medication? 04/11/23   Expected Copay ($) 0   Is the patient able to afford the medication copay? Yes   Payment Method zero copay   Days supply of Refill 30   Supplies needed? No supplies needed   Refill activity completed? Yes   Refill activity plan Refill scheduled   Shipment/Pickup Date: 04/06/23            Current Outpatient Medications   Medication Sig    apremilast (OTEZLA) 30 mg Tab Take 1 tablet (30 mg total) by mouth 2 (two) times daily.    aspirin (ECOTRIN) 81 MG EC tablet Take 81 mg by mouth once daily.    atorvastatin (LIPITOR) 10 MG tablet Take 1 tablet (10 mg total) by mouth every evening.    FLUoxetine 40 MG capsule Take 1 capsule (40 mg) by mouth daily    FLUoxetine 40 MG capsule Take 1 capsule (40  mg) by mouth daily    guselkumab (TREMFYA) 100 mg/mL AtIn Inject 100 mg into the skin every 8 weeks.    HYDROcodone-acetaminophen (NORCO) 7.5-325 mg per tablet Take 1 tablet by mouth every 4-6 hours as needed for post procedure pain. Do not take at the same time as taking muscle relaxants.    leflunomide (ARAVA) 20 MG Tab Take 1 tablet (20 mg total) by mouth once daily.    mirtazapine (REMERON) 15 MG tablet Take 1 tablet (15 mg) by mouth daily at bedtime    mirtazapine (REMERON) 7.5 MG Tab take one tablet by mouth at bedtime    mirtazapine (REMERON) 7.5 MG Tab take one tab po q hs   Last reviewed on 3/20/2023  3:06 PM by Esau Faith OD    Review of patient's allergies indicates:   Allergen Reactions    Erythromycin Nausea And Vomiting    Infliximab Other (See Comments)     Lupus with fever and acute arthritis  Lupus with fever and acute arthritis    Last reviewed on  3/20/2023 3:06 PM by Esau Faith    Interventions added this encounter   Closed: [facility] Patient Intervention - Drug therapy adherence: apremilast (OTEZLA) 30 mg Tab     Tasks added this encounter   5/4/2023 - Refill Call (Auto Added)  4/7/2023 - Pickup Reminder  5/30/2023 - Refill Call (Auto Added)  4/7/2023 - Pickup Reminder   Tasks due within next 3 months   5/16/2023 - Clinical - Follow Up Assesement (Annual)     Alda Navarro, PharmD  [facility]  [address] [place] [address]  Phone: [phone]  Fax: [phone]

## 2023-05-15 ENCOUNTER — SPECIALTY PHARMACY (OUTPATIENT)
Dept: PHARMACY | Facility: CLINIC | Age: 53
End: 2023-05-15
Payer: COMMERCIAL

## 2023-05-15 NOTE — TELEPHONE ENCOUNTER
Specialty Pharmacy - Refill Coordination    Specialty Medication Orders Linked to Encounter      Flowsheet Row Most Recent Value   Medication #1 guselkumab (TREMFYA) 100 mg/mL AtIn (Order#[id number], Rx#[id number])   Medication #2 apremilast (OTEZLA) 30 mg Tab (Order#[id number], Rx#[id number])            Refill Questions - Documented Responses      Flowsheet Row Most Recent Value   Patient Availability and HIPAA Verification    Does patient want to proceed with activity? Yes   HIPAA/medical authority confirmed? Yes   Relationship to patient of person spoken to? Self   Refill Screening Questions    Changes to allergies? No   Changes to medications? No   New conditions since last clinic visit? No   Unplanned office visit, urgent care, ED, or hospital admission in the last 4 weeks? No   How does patient/caregiver feel medication is working? Good   Financial problems or insurance changes? No   How many doses of your specialty medications were missed in the last 4 weeks? 0   Would patient like to speak to a pharmacist? No   When does the patient need to receive the medication? 05/17/23   Refill Delivery Questions    How will the patient receive the medication? Pickup   When does the patient need to receive the medication? 05/17/23   Shipping Address Home   Address in [facility] confirmed and updated if neccessary? Yes   Expected Copay ($) 0   Is the patient able to afford the medication copay? Yes   Payment Method zero copay   Days supply of Refill 56  [30 ds for Otezla]   Supplies needed? No supplies needed   Refill activity completed? Yes   Refill activity plan Refill scheduled   Shipment/Pickup Date: 05/17/23            Current Outpatient Medications   Medication Sig    apremilast (OTEZLA) 30 mg Tab Take 1 tablet (30 mg total) by mouth 2 (two) times daily.    aspirin (ECOTRIN) 81 MG EC tablet Take 81 mg by mouth once daily.    atorvastatin (LIPITOR) 10 MG tablet Take 1 tablet (10 mg total) by mouth every  evening.    cyclobenzaprine (FLEXERIL) 10 MG tablet Take 1 tablet (10 mg total) by mouth 2 (two) times daily for muscle spasms and pain. Do not take with pain medication, may cause sedation    FLUoxetine 40 MG capsule Take 1 capsule (40 mg) by mouth daily    FLUoxetine 40 MG capsule Take 1 capsule (40 mg) by mouth daily    guselkumab (TREMFYA) 100 mg/mL AtIn Inject 100 mg into the skin every 8 weeks.    HYDROcodone-acetaminophen (NORCO) 7.5-325 mg per tablet Take 1 tablet by mouth every 4-6 hours as needed for post procedure pain. Do not take at the same time as taking muscle relaxants.    leflunomide (ARAVA) 20 MG Tab Take 1 tablet (20 mg total) by mouth once daily.    mirtazapine (REMERON) 15 MG tablet Take 1 tablet (15 mg) by mouth daily at bedtime    mirtazapine (REMERON) 7.5 MG Tab take one tablet by mouth at bedtime    mirtazapine (REMERON) 7.5 MG Tab take one tab po q hs   Last reviewed on 3/20/2023  3:06 PM by Esau Faith, TAVIA    Review of patient's allergies indicates:   Allergen Reactions    Erythromycin Nausea And Vomiting    Infliximab Other (See Comments)     Lupus with fever and acute arthritis  Lupus with fever and acute arthritis    Last reviewed on  3/20/2023 3:06 PM by Esau Faith      Tasks added this encounter   5/18/2023 - Pickup Reminder   Tasks due within next 3 months   5/16/2023 - Clinical Assessment (1 year recurrence)     Alda Navarro, PharmD  [facility]  [address] [place] [address]  Phone: [phone]  Fax: [phone]

## 2023-07-05 ENCOUNTER — SPECIALTY PHARMACY (OUTPATIENT)
Dept: PHARMACY | Facility: CLINIC | Age: 53
End: 2023-07-05
Payer: COMMERCIAL

## 2023-07-05 DIAGNOSIS — M19.90 ARTHRITIS: Primary | ICD-10-CM

## 2023-07-05 NOTE — TELEPHONE ENCOUNTER
Specialty Pharmacy - Refill Coordination  Specialty Pharmacy - Medication/Referral Authorization  Specialty Pharmacy - Clinical Reassessment    Specialty Medication Orders Linked to Encounter      Flowsheet Row Most Recent Value   Medication #1 guselkumab (TREMFYA) 100 mg/mL AtIn (Order#[id number], Rx#[id number])   Medication #2 apremilast (OTEZLA) 30 mg Tab (Order#[id number], Rx#[id number])          Patient Diagnosis   L40.50 - Psoriatic arthritis    Rebel Rodriguez is a 53 y.o. male, who is followed by the specialty pharmacy service for management and education of his PsA.  He has been on therapy with Otezla (5/2021) and Tremfya (6/2021).  I have reviewed his electronic medical record and current medication list and determined that specialty medication adjustment Is not needed at this time.    Patient has not experienced adverse events.  He Is adherent reporting 0 missed doses since last review but seems to be skipping months of Otezla so he can get filled with Tremfya since April (skipped April and June Otezla refill. Adherence has been encouraged with the following mechanism(s): Proactive refill calls, calendar.  He is on target to meet goals of therapy and will continue treatment.        5/15/2023 4/4/2023 2/15/2023 1/3/2023 11/30/2022 10/17/2022 9/30/2022   Follow Up Review   # of missed doses 0 0 0 0 0 0 0   Reason     Simply forgot     New Medications? No No No No No No No   New Conditions? No No No No No No No   New Allergies? No No No No No No No   Med Effective? Good Good Very good Very good Very good Good Good   Urgent Care? No No No No No No No   Requested Pharmacist? No No No No No No No         Therapy is appropriate to continue.    Therapy is effective: Yes  On scale of 1 to 10, how does patient rank quality of life? (10 - Best): Unable to Assess  Recommendations:  Adherence with Otezla and schedule f/u with Rheum  Review Method: Chart Review    Tasks added this encounter   4/5/2024 - Clinical  Assessment (1 year recurrence)   Tasks due within next 3 months   7/5/2023 - Refill Coordination Outreach (1 time occurrence)  7/5/2023 - Refill Coordination Outreach (1 time occurrence)     Alda Navarro, PharmD  [facility]  [facility]  [facility] [address]  Phone: [phone]  Fax: [phone]

## 2023-07-07 ENCOUNTER — SPECIALTY PHARMACY (OUTPATIENT)
Dept: PHARMACY | Facility: CLINIC | Age: 53
End: 2023-07-07
Payer: COMMERCIAL

## 2023-07-07 NOTE — TELEPHONE ENCOUNTER
Specialty Pharmacy - Refill Coordination    Specialty Medication Orders Linked to Encounter      Flowsheet Row Most Recent Value   Medication #1 guselkumab (TREMFYA) 100 mg/mL AtIn (Order#[id number], Rx#[id number])            Refill Questions - Documented Responses      Flowsheet Row Most Recent Value   Refill Screening Questions    Changes to allergies? No   Changes to medications? No   New conditions since last clinic visit? No   Unplanned office visit, urgent care, ED, or hospital admission in the last 4 weeks? No   How does patient/caregiver feel medication is working? Good   Financial problems or insurance changes? No   How many doses of your specialty medications were missed in the last 4 weeks? 0   Would patient like to speak to a pharmacist? No   When does the patient need to receive the medication? 07/12/23   Refill Delivery Questions    How will the patient receive the medication? Pickup   When does the patient need to receive the medication? 07/12/23   Expected Copay ($) 0   Is the patient able to afford the medication copay? Yes   Payment Method zero copay   Days supply of Refill 56   Supplies needed? No supplies needed   Refill activity completed? Yes   Refill activity plan Refill scheduled   Shipment/Pickup Date: 07/12/23            Current Outpatient Medications   Medication Sig    aspirin (ECOTRIN) 81 MG EC tablet Take 81 mg by mouth once daily.    atorvastatin (LIPITOR) 10 MG tablet Take 1 tablet (10 mg total) by mouth every evening.    cyclobenzaprine (FLEXERIL) 10 MG tablet Take 1 tablet (10 mg total) by mouth 2 (two) times daily for muscle spasms and pain. Do not take with pain medication, may cause sedation    FLUoxetine 40 MG capsule Take 1 capsule (40 mg) by mouth daily    FLUoxetine 40 MG capsule Take 1 capsule (40 mg) by mouth daily    guselkumab (TREMFYA) 100 mg/mL AtIn Inject 100 mg into the skin every 8 weeks.    HYDROcodone-acetaminophen (NORCO) 7.5-325 mg per tablet Take 1 tablet by  mouth every 4-6 hours as needed for post procedure pain. Do not take at the same time as taking muscle relaxants.    leflunomide (ARAVA) 20 MG Tab Take 1 tablet (20 mg total) by mouth once daily.    mirtazapine (REMERON) 15 MG tablet Take 1 tablet (15 mg) by mouth daily at bedtime    mirtazapine (REMERON) 7.5 MG Tab take one tablet by mouth at bedtime    mirtazapine (REMERON) 7.5 MG Tab take one tablet by mouth every night at bedtime   Last reviewed on 3/20/2023  3:06 PM by Esau Faith OD    Review of patient's allergies indicates:   Allergen Reactions    Erythromycin Nausea And Vomiting    Infliximab Other (See Comments)     Lupus with fever and acute arthritis  Lupus with fever and acute arthritis    Last reviewed on  7/5/2023 10:38 AM by Alda Navarro      Tasks added this encounter   7/13/2023 - Pickup Reminder   Tasks due within next 3 months   7/7/2023 - Refill Coordination Outreach (1 time occurrence)     Alda Navarro, PharmD  [facility]  [address] [place] [address]  Phone: [phone]  Fax: [phone]

## 2023-07-19 ENCOUNTER — PATIENT MESSAGE (OUTPATIENT)
Dept: RHEUMATOLOGY | Facility: CLINIC | Age: 53
End: 2023-07-19
Payer: COMMERCIAL

## 2023-07-19 ENCOUNTER — TELEPHONE (OUTPATIENT)
Dept: RHEUMATOLOGY | Facility: CLINIC | Age: 53
End: 2023-07-19
Payer: COMMERCIAL

## 2023-07-19 DIAGNOSIS — M19.90 ARTHRITIS: ICD-10-CM

## 2023-07-19 DIAGNOSIS — L40.50 PSA (PSORIATIC ARTHRITIS): Primary | ICD-10-CM

## 2023-07-19 RX ORDER — LEFLUNOMIDE 20 MG/1
20 TABLET ORAL DAILY
Qty: 30 TABLET | Refills: 0 | Status: SHIPPED | OUTPATIENT
Start: 2023-07-19 | End: 2023-08-21 | Stop reason: SDUPTHER

## 2023-07-19 NOTE — TELEPHONE ENCOUNTER
Please schedule way overdue standing labs and TB gold and also appt with me a/o fellow. Thank you ELIEL

## 2023-07-20 ENCOUNTER — LAB VISIT (OUTPATIENT)
Dept: LAB | Facility: OTHER | Age: 53
End: 2023-07-20
Attending: INTERNAL MEDICINE
Payer: COMMERCIAL

## 2023-07-20 DIAGNOSIS — L40.50 PSA (PSORIATIC ARTHRITIS): ICD-10-CM

## 2023-07-20 PROCEDURE — 86480 TB TEST CELL IMMUN MEASURE: CPT | Performed by: INTERNAL MEDICINE

## 2023-07-21 LAB
GAMMA INTERFERON BACKGROUND BLD IA-ACNC: 0.02 IU/ML
M TB IFN-G CD4+ BCKGRND COR BLD-ACNC: -0.01 IU/ML
M TB IFN-G CD4+ BCKGRND COR BLD-ACNC: 0 IU/ML
MITOGEN IGNF BCKGRD COR BLD-ACNC: 9.98 IU/ML
TB GOLD PLUS: NEGATIVE

## 2023-08-17 NOTE — INTERVAL H&P NOTE
Patient is requesting medication refill. Please approve or deny this request.    Rx requested:  Requested Prescriptions     Pending Prescriptions Disp Refills    amphetamine-dextroamphetamine (ADDERALL XR) 15 MG extended release capsule 30 capsule 0     Sig: Take 1 capsule by mouth daily for 30 days.          Last Office Visit:   3/22/2023      Next Visit Date:  Future Appointments   Date Time Provider [address]   9/18/2023  3:15 PM MD Prerna Zepeda [facility] -   6/13/2024  1:30 PM Kirt Parker [facility] The patient has been examined and the H&P has been reviewed:    I concur with the findings and no changes have occurred since H&P was written.    Anesthesia/Surgery risks, benefits and alternative options discussed and understood by patient/family.          Active Hospital Problems    Diagnosis  POA    Nasal septal deviation [J34.2]  Yes      Resolved Hospital Problems   No resolved problems to display.

## 2023-08-21 ENCOUNTER — OFFICE VISIT (OUTPATIENT)
Dept: RHEUMATOLOGY | Facility: CLINIC | Age: 53
End: 2023-08-21
Payer: COMMERCIAL

## 2023-08-21 VITALS
SYSTOLIC BLOOD PRESSURE: 112 MMHG | HEART RATE: 72 BPM | DIASTOLIC BLOOD PRESSURE: 64 MMHG | WEIGHT: 141 LBS | BODY MASS INDEX: 21.37 KG/M2 | HEIGHT: 68 IN

## 2023-08-21 DIAGNOSIS — T50.905A DRUG-INDUCED LUPUS ERYTHEMATOSUS: ICD-10-CM

## 2023-08-21 DIAGNOSIS — G89.29 CHRONIC NECK PAIN: ICD-10-CM

## 2023-08-21 DIAGNOSIS — E78.5 HYPERLIPIDEMIA, UNSPECIFIED HYPERLIPIDEMIA TYPE: ICD-10-CM

## 2023-08-21 DIAGNOSIS — L93.2 DRUG-INDUCED LUPUS ERYTHEMATOSUS: ICD-10-CM

## 2023-08-21 DIAGNOSIS — L40.50 PSA (PSORIATIC ARTHRITIS): Primary | ICD-10-CM

## 2023-08-21 DIAGNOSIS — Z51.81 MEDICATION MONITORING ENCOUNTER: ICD-10-CM

## 2023-08-21 DIAGNOSIS — M19.90 ARTHRITIS: ICD-10-CM

## 2023-08-21 DIAGNOSIS — Z79.899 HIGH RISK MEDICATION USE: ICD-10-CM

## 2023-08-21 DIAGNOSIS — M54.2 CHRONIC NECK PAIN: ICD-10-CM

## 2023-08-21 PROCEDURE — 99214 OFFICE O/P EST MOD 30 MIN: CPT | Mod: S$GLB,,, | Performed by: STUDENT IN AN ORGANIZED HEALTH CARE EDUCATION/TRAINING PROGRAM

## 2023-08-21 PROCEDURE — 3078F DIAST BP <80 MM HG: CPT | Mod: CPTII,S$GLB,, | Performed by: STUDENT IN AN ORGANIZED HEALTH CARE EDUCATION/TRAINING PROGRAM

## 2023-08-21 PROCEDURE — 3008F BODY MASS INDEX DOCD: CPT | Mod: CPTII,S$GLB,, | Performed by: STUDENT IN AN ORGANIZED HEALTH CARE EDUCATION/TRAINING PROGRAM

## 2023-08-21 PROCEDURE — 1160F PR REVIEW ALL MEDS BY PRESCRIBER/CLIN PHARMACIST DOCUMENTED: ICD-10-PCS | Mod: CPTII,S$GLB,, | Performed by: STUDENT IN AN ORGANIZED HEALTH CARE EDUCATION/TRAINING PROGRAM

## 2023-08-21 PROCEDURE — 1159F MED LIST DOCD IN RCRD: CPT | Mod: CPTII,S$GLB,, | Performed by: STUDENT IN AN ORGANIZED HEALTH CARE EDUCATION/TRAINING PROGRAM

## 2023-08-21 PROCEDURE — 99214 PR OFFICE/OUTPT VISIT, EST, LEVL IV, 30-39 MIN: ICD-10-PCS | Mod: S$GLB,,, | Performed by: STUDENT IN AN ORGANIZED HEALTH CARE EDUCATION/TRAINING PROGRAM

## 2023-08-21 PROCEDURE — 1160F RVW MEDS BY RX/DR IN RCRD: CPT | Mod: CPTII,S$GLB,, | Performed by: STUDENT IN AN ORGANIZED HEALTH CARE EDUCATION/TRAINING PROGRAM

## 2023-08-21 PROCEDURE — 3074F PR MOST RECENT SYSTOLIC BLOOD PRESSURE < 130 MM HG: ICD-10-PCS | Mod: CPTII,S$GLB,, | Performed by: STUDENT IN AN ORGANIZED HEALTH CARE EDUCATION/TRAINING PROGRAM

## 2023-08-21 PROCEDURE — 3074F SYST BP LT 130 MM HG: CPT | Mod: CPTII,S$GLB,, | Performed by: STUDENT IN AN ORGANIZED HEALTH CARE EDUCATION/TRAINING PROGRAM

## 2023-08-21 PROCEDURE — 1159F PR MEDICATION LIST DOCUMENTED IN MEDICAL RECORD: ICD-10-PCS | Mod: CPTII,S$GLB,, | Performed by: STUDENT IN AN ORGANIZED HEALTH CARE EDUCATION/TRAINING PROGRAM

## 2023-08-21 PROCEDURE — 99999 PR PBB SHADOW E&M-EST. PATIENT-LVL III: ICD-10-PCS | Mod: PBBFAC,,, | Performed by: STUDENT IN AN ORGANIZED HEALTH CARE EDUCATION/TRAINING PROGRAM

## 2023-08-21 PROCEDURE — 3008F PR BODY MASS INDEX (BMI) DOCUMENTED: ICD-10-PCS | Mod: CPTII,S$GLB,, | Performed by: STUDENT IN AN ORGANIZED HEALTH CARE EDUCATION/TRAINING PROGRAM

## 2023-08-21 PROCEDURE — 99999 PR PBB SHADOW E&M-EST. PATIENT-LVL III: CPT | Mod: PBBFAC,,, | Performed by: STUDENT IN AN ORGANIZED HEALTH CARE EDUCATION/TRAINING PROGRAM

## 2023-08-21 PROCEDURE — 3078F PR MOST RECENT DIASTOLIC BLOOD PRESSURE < 80 MM HG: ICD-10-PCS | Mod: CPTII,S$GLB,, | Performed by: STUDENT IN AN ORGANIZED HEALTH CARE EDUCATION/TRAINING PROGRAM

## 2023-08-21 RX ORDER — GUSELKUMAB 100 MG/ML
100 INJECTION SUBCUTANEOUS
Qty: 3 ML | Refills: 2 | Status: SHIPPED | OUTPATIENT
Start: 2023-08-21 | End: 2023-08-21

## 2023-08-21 RX ORDER — APREMILAST 30 MG/1
30 TABLET, FILM COATED ORAL 2 TIMES DAILY
Qty: 180 TABLET | Refills: 1 | Status: ACTIVE | OUTPATIENT
Start: 2023-08-21

## 2023-08-21 RX ORDER — LEFLUNOMIDE 20 MG/1
20 TABLET ORAL DAILY
Qty: 30 TABLET | Refills: 5 | Status: SHIPPED | OUTPATIENT
Start: 2023-08-21 | End: 2023-12-11

## 2023-08-21 RX ORDER — GUSELKUMAB 100 MG/ML
100 INJECTION SUBCUTANEOUS
Qty: 3 ML | Refills: 2 | Status: ON HOLD | OUTPATIENT
Start: 2023-08-21 | End: 2024-02-14

## 2023-08-21 RX ORDER — APREMILAST 30 MG/1
30 TABLET, FILM COATED ORAL 2 TIMES DAILY
Qty: 180 TABLET | Refills: 1 | Status: SHIPPED | OUTPATIENT
Start: 2023-08-21 | End: 2023-08-21

## 2023-08-21 ASSESSMENT — ROUTINE ASSESSMENT OF PATIENT INDEX DATA (RAPID3)
MDHAQ FUNCTION SCORE: 0.4
TOTAL RAPID3 SCORE: 2.44
AM STIFFNESS SCORE: 1, YES
WHEN YOU AWAKENED IN THE MORNING OVER THE LAST WEEK, PLEASE INDICATE THE AMOUNT OF TIME IT TAKES UNTIL YOU ARE AS LIMBER AS YOU WILL BE FOR THE DAY: 30 MIN
PSYCHOLOGICAL DISTRESS SCORE: 2.2
PAIN SCORE: 3.5
FATIGUE SCORE: 4
PATIENT GLOBAL ASSESSMENT SCORE: 2.5

## 2023-08-21 NOTE — PROGRESS NOTES
I have personally reviewed the history, confirmed exam findings, and discussed assessment and plan with fellow.       Psoriasis in complete remission   PsA TJ 0 SJ 0 Pt global  4.5  Pt pain 3.5 CRP 0.08=7(LDA)      Cont guselkumab 100mg sc q 8 wks  Cont apremilast 30mg twice daily   Cont leflunomide 20mg daily   Cont atorvastatin 10mg nightly   *flu shot Sept/Oct  New Covid vaccine when available  RTC 3 months with CBC, CMP, ESR, CRP lipid panel

## 2023-08-21 NOTE — PROGRESS NOTES
Subjective:      Patient ID: Rebel Rodriguez is a 53 y.o. male.    Chief Complaint: follow up for PsA and drug induced lupus    Rebel Rodriguez is a 54yo M with history of psoriatic arthritis and infliximab induced lupus. He also has a PMH of TIA, KUSHAL, cervical degenerative disease s/p ACDF C4-7. Pt was previously following with fellow, Dr. Russell.    Rheum History:  - Psoriatic arthritis dx 2000s  - Infliximab induced lupus dx 2016 when pt had recurrent intermittent fevers, chills, arthralgias, rash  - +anti histone Ab 1.3 (2/2016)  - +ds DNA 1:1280 (2/2016), positive through 9/2017 and has been negative since then  - +DEE 1:1280 (2/2016)  - CH50 mildly decreased (3/2016), increased after stopping infliximab  - +anti cardiolipin Ab IgM 14.2 (2011, 2016)  - Most recently, primary symptoms appear to be neck and lower back stiffness/pain with mild hip and hand stiffness    Prior Treatments:  - Infliximab (~[id number]) d/c'd due to concern for drug induced lupus; symptoms resolved with stopping med  - Stelara (4/2016-11/2016)  - Cosentyx (4/2017-5/2020) d/c'd due to loss of response  - Taltz (8/2020-6/2021) d/c'd due to poor symptom control  - Hydroxychloroquine (3/2016-4/2022) d/c'd since drug induced lupus completed resolved  - Sulfasalazine 1000 BID (11/2019-self d/c'd 12/2020)  - Methotrexate (d/c'd 1/2013)  - Cannot take TNFi due to drug induced lupus  - Cannot take Selma due to history of TIA  - Cannot take NSAIDs due to history of TIA    Current Treatment:  - Tremfya (6/2021-current)  - Otezla (10/2021-current)  - Arava (1/2013-current)    Interval History:  Pt presents today for follow up. He states that he has been doing quite well regarding the PsA. The main issue that has caused pain/stiffness is more his neck, related to the cervical disease and prior surgery. He has seen a couple of pain management specialists and underwent nerve ablation 3-4mos ago, which has helped. Pt continues to tolerate the current meds  "well without noted side effects. He has no other complaints or concerns at this time.        Review of Systems   Constitutional:  Negative for activity change, appetite change, chills, fatigue, fever and unexpected weight change.   HENT:  Negative for congestion, dental problem, facial swelling, mouth sores, nosebleeds, sinus pain, sore throat and trouble swallowing.    Eyes:  Negative for photophobia, discharge, redness and visual disturbance.   Respiratory:  Negative for cough, chest tightness and shortness of breath.    Cardiovascular:  Negative for chest pain and leg swelling.   Gastrointestinal:  Negative for abdominal pain, constipation, diarrhea, nausea and vomiting.   Endocrine: Negative for cold intolerance and heat intolerance.   Genitourinary:  Negative for dysuria, frequency, genital sores, hematuria and urgency.   Musculoskeletal:  Positive for neck pain and neck stiffness. Negative for arthralgias, back pain, joint swelling and myalgias.   Skin:  Negative for color change, rash and wound.   Neurological:  Negative for dizziness, tremors, syncope, weakness, light-headedness and headaches.   Hematological:  Negative for adenopathy. Does not bruise/bleed easily.   Psychiatric/Behavioral:  Negative for agitation, confusion and sleep disturbance. The patient is not nervous/anxious.         Objective:   /64   Pulse 72   Ht 5' 8" (1.727 m)   Wt 64 kg (141 lb)   BMI 21.44 kg/m²   Physical Exam   Constitutional: He is oriented to person, place, and time. normal appearance. He appears well-developed and well-nourished. No distress.   HENT:   Head: Normocephalic and atraumatic.   Nose: Nose normal. No nasal congestion.   Mouth/Throat: Mucous membranes are moist. Oropharynx is clear.   Eyes: Pupils are equal, round, and reactive to light. Conjunctivae are normal.   Cardiovascular: Normal rate, regular rhythm and normal heart sounds.   No murmur heard.  Pulmonary/Chest: Effort normal and breath sounds " normal. No respiratory distress. He has no wheezes.   Abdominal: Soft. Bowel sounds are normal. He exhibits no distension. There is no abdominal tenderness.   Musculoskeletal:         General: No swelling, tenderness or signs of injury. Normal range of motion.      Cervical back: Neck supple.      Comments: Refer to carolunculus for full joint exam. No acute synovitis or enthesitis noted.   Neurological: He is alert and oriented to person, place, and time.   Skin: Skin is warm and dry. No lesion and no rash noted.   Psychiatric: His behavior is normal. Mood normal.   Vitals reviewed.        DAPSA 6.01 - low disease activity     4/11/2022 7/11/2022 10/24/2022 8/21/2023   Tender (BRANTLEY-28) 0 / 28  3 / 28  1 / 28  0 / 28    Swollen (BRANTLEY-28) 0 / 28  3 / 28  0 / 28  0 / 28    Provider Global -- 35 mm 35 mm --   Patient Global 55 mm 35 mm 35 mm --   ESR 14 mm/hr 14 mm/hr 15 mm/hr --   CRP 0.3 mg/L 0.7 mg/L 0.8 mg/L --   BRANTLEY-28 (ESR) 2.62 (Low disease activity) 3.79 (Moderate disease activity) 2.95 (Low disease activity) --   BRANTLEY-28 (CRP) 1.82 (Remission) 3.1 (Low disease activity) 2.22 (Remission) --   CDAI Score -- 13  8  --        Assessment:     1. PSA (psoriatic arthritis)    2. Drug-induced lupus erythematosus    3. Chronic neck pain    4. High risk medication use    5. Medication monitoring encounter    6.  psoriatric Arthritis    7. Hyperlipidemia, unspecified hyperlipidemia type      - Pt with low disease activity for PsA today with DAPSA 6.01  - Continues to be in remission with drug induced lupus and psoriasis  - Primary issue lately has been neck pain, for which he is following with pain specialist and getting massage therapy  - Labs stable and mostly within normal ranges  - ESR mildly elevated to 21 one month ago    Plan:     Problem List Items Addressed This Visit          Cardiac/Vascular    Hyperlipidemia    Relevant Orders    LIPID PANEL       Immunology/Multi System    Drug-induced lupus erythematosus        Orthopedic     psoriatric Arthritis    Relevant Medications    leflunomide (ARAVA) 20 MG Tab    Chronic neck pain       Palliative Care    High risk medication use       Other    Medication monitoring encounter     Other Visit Diagnoses       PSA (psoriatic arthritis)    -  Primary    Relevant Medications    apremilast (OTEZLA) 30 mg Tab    guselkumab (TREMFYA) 100 mg/mL AtIn          - Continue current management: leflunomide 20mg PO daily, apremilast 30mg PO BID, and guselkumab 100mg SQ Q8wks  - Labs before next visit: CBC, CMP, CRP, ESR, lipid panel  - Advised pt to get new covid booster and influenza vaccines once they are available in the next 1-2mos    Follow up here in clinic in 3-4 months.    Assessment and plan discussed with supervising attending, Dr. Beverly.      Allie Hidalgo MD  PGY-4, Rheumatology

## 2023-10-10 ENCOUNTER — LAB VISIT (OUTPATIENT)
Dept: LAB | Facility: OTHER | Age: 53
End: 2023-10-10
Attending: INTERNAL MEDICINE
Payer: COMMERCIAL

## 2023-10-10 ENCOUNTER — TELEPHONE (OUTPATIENT)
Dept: RHEUMATOLOGY | Facility: CLINIC | Age: 53
End: 2023-10-10
Payer: COMMERCIAL

## 2023-10-10 ENCOUNTER — PATIENT MESSAGE (OUTPATIENT)
Dept: RHEUMATOLOGY | Facility: CLINIC | Age: 53
End: 2023-10-10
Payer: COMMERCIAL

## 2023-10-10 DIAGNOSIS — L40.50 PSA (PSORIATIC ARTHRITIS): ICD-10-CM

## 2023-10-10 DIAGNOSIS — E78.5 HYPERLIPIDEMIA, UNSPECIFIED HYPERLIPIDEMIA TYPE: ICD-10-CM

## 2023-10-10 DIAGNOSIS — D50.9 IRON DEFICIENCY ANEMIA, UNSPECIFIED IRON DEFICIENCY ANEMIA TYPE: Primary | ICD-10-CM

## 2023-10-10 LAB
ALBUMIN SERPL BCP-MCNC: 3.8 G/DL (ref 3.5–5.2)
ALP SERPL-CCNC: 95 U/L (ref 55–135)
ALT SERPL W/O P-5'-P-CCNC: 26 U/L (ref 10–44)
ANION GAP SERPL CALC-SCNC: 7 MMOL/L (ref 8–16)
AST SERPL-CCNC: 31 U/L (ref 10–40)
BASOPHILS # BLD AUTO: 0.04 K/UL (ref 0–0.2)
BASOPHILS NFR BLD: 0.5 % (ref 0–1.9)
BILIRUB SERPL-MCNC: 0.3 MG/DL (ref 0.1–1)
BUN SERPL-MCNC: 10 MG/DL (ref 6–20)
CALCIUM SERPL-MCNC: 9.5 MG/DL (ref 8.7–10.5)
CHLORIDE SERPL-SCNC: 108 MMOL/L (ref 95–110)
CHOLEST SERPL-MCNC: 135 MG/DL (ref 120–199)
CHOLEST/HDLC SERPL: 2.9 {RATIO} (ref 2–5)
CO2 SERPL-SCNC: 24 MMOL/L (ref 23–29)
CREAT SERPL-MCNC: 1 MG/DL (ref 0.5–1.4)
CRP SERPL-MCNC: 0.7 MG/L (ref 0–8.2)
DIFFERENTIAL METHOD: ABNORMAL
EOSINOPHIL # BLD AUTO: 0.3 K/UL (ref 0–0.5)
EOSINOPHIL NFR BLD: 4.1 % (ref 0–8)
ERYTHROCYTE [DISTWIDTH] IN BLOOD BY AUTOMATED COUNT: 14 % (ref 11.5–14.5)
ERYTHROCYTE [SEDIMENTATION RATE] IN BLOOD: 20 MM/HR (ref 0–10)
EST. GFR  (NO RACE VARIABLE): >60 ML/MIN/1.73 M^2
GLUCOSE SERPL-MCNC: 100 MG/DL (ref 70–110)
HCT VFR BLD AUTO: 40.3 % (ref 40–54)
HDLC SERPL-MCNC: 47 MG/DL (ref 40–75)
HDLC SERPL: 34.8 % (ref 20–50)
HGB BLD-MCNC: 13.1 G/DL (ref 14–18)
IMM GRANULOCYTES # BLD AUTO: 0.02 K/UL (ref 0–0.04)
IMM GRANULOCYTES NFR BLD AUTO: 0.2 % (ref 0–0.5)
LDLC SERPL CALC-MCNC: 61 MG/DL (ref 63–159)
LYMPHOCYTES # BLD AUTO: 1.7 K/UL (ref 1–4.8)
LYMPHOCYTES NFR BLD: 20.8 % (ref 18–48)
MCH RBC QN AUTO: 28.9 PG (ref 27–31)
MCHC RBC AUTO-ENTMCNC: 32.5 G/DL (ref 32–36)
MCV RBC AUTO: 89 FL (ref 82–98)
MONOCYTES # BLD AUTO: 0.8 K/UL (ref 0.3–1)
MONOCYTES NFR BLD: 9.3 % (ref 4–15)
NEUTROPHILS # BLD AUTO: 5.2 K/UL (ref 1.8–7.7)
NEUTROPHILS NFR BLD: 65.1 % (ref 38–73)
NONHDLC SERPL-MCNC: 88 MG/DL
NRBC BLD-RTO: 0 /100 WBC
PLATELET # BLD AUTO: 257 K/UL (ref 150–450)
PMV BLD AUTO: 9.3 FL (ref 9.2–12.9)
POTASSIUM SERPL-SCNC: 4.2 MMOL/L (ref 3.5–5.1)
PROT SERPL-MCNC: 6.8 G/DL (ref 6–8.4)
RBC # BLD AUTO: 4.54 M/UL (ref 4.6–6.2)
SODIUM SERPL-SCNC: 139 MMOL/L (ref 136–145)
TRIGL SERPL-MCNC: 135 MG/DL (ref 30–150)
WBC # BLD AUTO: 8.06 K/UL (ref 3.9–12.7)

## 2023-10-10 PROCEDURE — 80053 COMPREHEN METABOLIC PANEL: CPT | Performed by: INTERNAL MEDICINE

## 2023-10-10 PROCEDURE — 36415 COLL VENOUS BLD VENIPUNCTURE: CPT | Performed by: INTERNAL MEDICINE

## 2023-10-10 PROCEDURE — 85025 COMPLETE CBC W/AUTO DIFF WBC: CPT | Performed by: INTERNAL MEDICINE

## 2023-10-10 PROCEDURE — 80061 LIPID PANEL: CPT | Performed by: INTERNAL MEDICINE

## 2023-10-10 PROCEDURE — 85651 RBC SED RATE NONAUTOMATED: CPT | Performed by: INTERNAL MEDICINE

## 2023-10-10 PROCEDURE — 86140 C-REACTIVE PROTEIN: CPT | Performed by: INTERNAL MEDICINE

## 2023-11-07 ENCOUNTER — LAB VISIT (OUTPATIENT)
Dept: LAB | Facility: OTHER | Age: 53
End: 2023-11-07
Attending: INTERNAL MEDICINE
Payer: COMMERCIAL

## 2023-11-07 DIAGNOSIS — D50.9 IRON DEFICIENCY ANEMIA, UNSPECIFIED IRON DEFICIENCY ANEMIA TYPE: ICD-10-CM

## 2023-11-07 LAB
FERRITIN SERPL-MCNC: 58 NG/ML (ref 20–300)
FOLATE SERPL-MCNC: 13 NG/ML (ref 4–24)
IRON SERPL-MCNC: 71 UG/DL (ref 45–160)
SATURATED IRON: 21 % (ref 20–50)
TOTAL IRON BINDING CAPACITY: 345 UG/DL (ref 250–450)
TRANSFERRIN SERPL-MCNC: 233 MG/DL (ref 200–375)
VIT B12 SERPL-MCNC: 529 PG/ML (ref 210–950)

## 2023-11-07 PROCEDURE — 83540 ASSAY OF IRON: CPT | Performed by: INTERNAL MEDICINE

## 2023-11-07 PROCEDURE — 36415 COLL VENOUS BLD VENIPUNCTURE: CPT | Performed by: INTERNAL MEDICINE

## 2023-11-07 PROCEDURE — 82607 VITAMIN B-12: CPT | Performed by: INTERNAL MEDICINE

## 2023-11-07 PROCEDURE — 82728 ASSAY OF FERRITIN: CPT | Performed by: INTERNAL MEDICINE

## 2023-11-07 PROCEDURE — 84466 ASSAY OF TRANSFERRIN: CPT | Performed by: INTERNAL MEDICINE

## 2023-11-07 PROCEDURE — 82746 ASSAY OF FOLIC ACID SERUM: CPT | Performed by: INTERNAL MEDICINE

## 2023-11-15 ENCOUNTER — PATIENT MESSAGE (OUTPATIENT)
Dept: NEUROSURGERY | Facility: CLINIC | Age: 53
End: 2023-11-15
Payer: COMMERCIAL

## 2023-12-04 ENCOUNTER — TELEPHONE (OUTPATIENT)
Dept: NEUROSURGERY | Facility: CLINIC | Age: 53
End: 2023-12-04
Payer: COMMERCIAL

## 2023-12-04 DIAGNOSIS — G99.2 STENOSIS OF CERVICAL SPINE WITH MYELOPATHY: ICD-10-CM

## 2023-12-04 DIAGNOSIS — Z98.1 HX OF FUSION OF CERVICAL SPINE: Primary | ICD-10-CM

## 2023-12-04 DIAGNOSIS — M48.02 STENOSIS OF CERVICAL SPINE WITH MYELOPATHY: ICD-10-CM

## 2023-12-11 ENCOUNTER — PATIENT MESSAGE (OUTPATIENT)
Dept: SPINE | Facility: CLINIC | Age: 53
End: 2023-12-11
Payer: COMMERCIAL

## 2023-12-11 ENCOUNTER — OFFICE VISIT (OUTPATIENT)
Dept: RHEUMATOLOGY | Facility: CLINIC | Age: 53
End: 2023-12-11
Payer: COMMERCIAL

## 2023-12-11 VITALS
WEIGHT: 140.13 LBS | BODY MASS INDEX: 21.24 KG/M2 | HEART RATE: 75 BPM | DIASTOLIC BLOOD PRESSURE: 66 MMHG | HEIGHT: 68 IN | SYSTOLIC BLOOD PRESSURE: 115 MMHG

## 2023-12-11 DIAGNOSIS — L93.2 DRUG-INDUCED LUPUS ERYTHEMATOSUS: ICD-10-CM

## 2023-12-11 DIAGNOSIS — Z79.899 ON STATIN THERAPY: ICD-10-CM

## 2023-12-11 DIAGNOSIS — M19.90 ARTHRITIS: Primary | ICD-10-CM

## 2023-12-11 DIAGNOSIS — R29.2 HYPERREFLEXIA: ICD-10-CM

## 2023-12-11 DIAGNOSIS — M54.12 CERVICAL RADICULOPATHY: ICD-10-CM

## 2023-12-11 DIAGNOSIS — G62.9 PERIPHERAL POLYNEUROPATHY: ICD-10-CM

## 2023-12-11 DIAGNOSIS — D84.821 IMMUNOSUPPRESSION DUE TO DRUG THERAPY: ICD-10-CM

## 2023-12-11 DIAGNOSIS — G95.9 CERVICAL MYELOPATHY: ICD-10-CM

## 2023-12-11 DIAGNOSIS — Z79.899 HIGH RISK MEDICATION USE: ICD-10-CM

## 2023-12-11 DIAGNOSIS — R29.6 MULTIPLE FALLS: ICD-10-CM

## 2023-12-11 DIAGNOSIS — T50.905A DRUG-INDUCED LUPUS ERYTHEMATOSUS: ICD-10-CM

## 2023-12-11 DIAGNOSIS — Z51.81 ENCOUNTER FOR THERAPEUTIC DRUG MONITORING: ICD-10-CM

## 2023-12-11 DIAGNOSIS — Z79.899 IMMUNOSUPPRESSION DUE TO DRUG THERAPY: ICD-10-CM

## 2023-12-11 PROCEDURE — 99999 PR PBB SHADOW E&M-EST. PATIENT-LVL IV: ICD-10-PCS | Mod: PBBFAC,,, | Performed by: STUDENT IN AN ORGANIZED HEALTH CARE EDUCATION/TRAINING PROGRAM

## 2023-12-11 PROCEDURE — 1160F RVW MEDS BY RX/DR IN RCRD: CPT | Mod: CPTII,S$GLB,, | Performed by: STUDENT IN AN ORGANIZED HEALTH CARE EDUCATION/TRAINING PROGRAM

## 2023-12-11 PROCEDURE — 3078F PR MOST RECENT DIASTOLIC BLOOD PRESSURE < 80 MM HG: ICD-10-PCS | Mod: CPTII,S$GLB,, | Performed by: STUDENT IN AN ORGANIZED HEALTH CARE EDUCATION/TRAINING PROGRAM

## 2023-12-11 PROCEDURE — 99999 PR PBB SHADOW E&M-EST. PATIENT-LVL IV: CPT | Mod: PBBFAC,,, | Performed by: STUDENT IN AN ORGANIZED HEALTH CARE EDUCATION/TRAINING PROGRAM

## 2023-12-11 PROCEDURE — 99214 OFFICE O/P EST MOD 30 MIN: CPT | Mod: S$GLB,,, | Performed by: STUDENT IN AN ORGANIZED HEALTH CARE EDUCATION/TRAINING PROGRAM

## 2023-12-11 PROCEDURE — 1159F PR MEDICATION LIST DOCUMENTED IN MEDICAL RECORD: ICD-10-PCS | Mod: CPTII,S$GLB,, | Performed by: STUDENT IN AN ORGANIZED HEALTH CARE EDUCATION/TRAINING PROGRAM

## 2023-12-11 PROCEDURE — 3008F PR BODY MASS INDEX (BMI) DOCUMENTED: ICD-10-PCS | Mod: CPTII,S$GLB,, | Performed by: STUDENT IN AN ORGANIZED HEALTH CARE EDUCATION/TRAINING PROGRAM

## 2023-12-11 PROCEDURE — 1160F PR REVIEW ALL MEDS BY PRESCRIBER/CLIN PHARMACIST DOCUMENTED: ICD-10-PCS | Mod: CPTII,S$GLB,, | Performed by: STUDENT IN AN ORGANIZED HEALTH CARE EDUCATION/TRAINING PROGRAM

## 2023-12-11 PROCEDURE — 3008F BODY MASS INDEX DOCD: CPT | Mod: CPTII,S$GLB,, | Performed by: STUDENT IN AN ORGANIZED HEALTH CARE EDUCATION/TRAINING PROGRAM

## 2023-12-11 PROCEDURE — 99214 PR OFFICE/OUTPT VISIT, EST, LEVL IV, 30-39 MIN: ICD-10-PCS | Mod: S$GLB,,, | Performed by: STUDENT IN AN ORGANIZED HEALTH CARE EDUCATION/TRAINING PROGRAM

## 2023-12-11 PROCEDURE — 3074F SYST BP LT 130 MM HG: CPT | Mod: CPTII,S$GLB,, | Performed by: STUDENT IN AN ORGANIZED HEALTH CARE EDUCATION/TRAINING PROGRAM

## 2023-12-11 PROCEDURE — 3078F DIAST BP <80 MM HG: CPT | Mod: CPTII,S$GLB,, | Performed by: STUDENT IN AN ORGANIZED HEALTH CARE EDUCATION/TRAINING PROGRAM

## 2023-12-11 PROCEDURE — 3074F PR MOST RECENT SYSTOLIC BLOOD PRESSURE < 130 MM HG: ICD-10-PCS | Mod: CPTII,S$GLB,, | Performed by: STUDENT IN AN ORGANIZED HEALTH CARE EDUCATION/TRAINING PROGRAM

## 2023-12-11 PROCEDURE — 1159F MED LIST DOCD IN RCRD: CPT | Mod: CPTII,S$GLB,, | Performed by: STUDENT IN AN ORGANIZED HEALTH CARE EDUCATION/TRAINING PROGRAM

## 2023-12-11 NOTE — PROGRESS NOTES
I have personally reviewed the history, confirmed exam findings, and discussed assessment and plan with fellow.       PsA  TJ 0 SJ 0 Pt global 40  ESR 20 CRP 0.7 BRANTLEY 28 2.66  [id number] 1.71 CDAI 4  DAPSA  TJ 0 SJ 0 Pt global 4 Pt pain 3.5 CRP 0.07=7.57(LDA)  Paresthesiae both hands  Falls  Motor strength normal  UEs 2+ reflexes, LEs  right knee 3.5+  left knee 4.5 + right ankle 2+ left ankle 3+ no clonus  Has been in touch with Ash[facility] Avendano and Dr. Maradiaga but MRI neck  and visit not scheduled until 1/22/23       CBC, CMP, ESR, CRP CK on or about 1/10/24  Will hold leflunomide until Neurosurgery evaluation completed but the symptoms and exam more c/w cervical myelopathy than polyneuropathy  Secure chart Ashly Avendano and Dr. Maradiaga to see if cervical MRI and appt can be moved up as the symptoms are affecting his function von at work  Cont apremilast 30mg twice daily  Cont guselkumab 100mg sc q 8 wks  Ref to PT for LE strengthening, fall prevention  RTC 4 months

## 2023-12-11 NOTE — PROGRESS NOTES
Subjective:      Patient ID: Rebel Rodriguez is a 53 y.o. male.    Chief Complaint: follow up for PsA    Rebel Rodriguez is a 54yo M with history of psoriatic arthritis and infliximab induced lupus. He also has a PMH of TIA, KUSHAL, cervical degenerative disease s/p ACDF C4-7. Pt was previously following with fellow, Dr. Russell.    Rheum History:  - Psoriatic arthritis dx 2000s  - Infliximab induced lupus dx 2016 when pt had recurrent intermittent fevers, chills, arthralgias, rash  - +anti histone Ab 1.3 (2/2016)  - +ds DNA 1:1280 (2/2016), positive through 9/2017 and has been negative since then  - +DEE 1:1280 (2/2016)  - CH50 mildly decreased (3/2016), increased after stopping infliximab  - +anti cardiolipin Ab IgM 14.2 (2011, 2016)  - Most recently, primary symptoms appear to be neck and lower back stiffness/pain with mild hip and hand stiffness    Prior Treatments:  - Infliximab (~[id number]) d/c'd due to concern for drug induced lupus; symptoms resolved with stopping med  - Stelara (4/2016-11/2016)  - Cosentyx (4/2017-5/2020) d/c'd due to loss of response  - Taltz (8/2020-6/2021) d/c'd due to poor symptom control  - Hydroxychloroquine (3/2016-4/2022) d/c'd since drug induced lupus completed resolved  - Sulfasalazine 1000 BID (11/2019-self d/c'd 12/2020)  - Methotrexate (d/c'd 1/2013)  - Cannot take TNFi due to drug induced lupus  - Cannot take Selma due to history of TIA  - Cannot take NSAIDs due to history of TIA    Current Treatment:  - Tremfya (6/2021-current)  - Otezla (10/2021-current)  - Arava (1/2013-current)    Interval History:  Pt presents today for follow up. He states that he has been doing quite well regarding the PsA. However, the main issue recently has been significantly worsening paresthesia/neuropathy in the hands, falls, loss of balance. Pt saw his interventional pain specialist, who apparently saw clonus and hyperreflexia on exam. He improved a bit with a medrol pack, but symptoms never completely  "resolved. After stopping steroids, the symptoms returned. Overall, pain is better that it has been in the past, but pt is not sure if that is due to the neuro symptoms he is currently experiencing.     Pt continues to tolerate the current meds well without noted side effects. No additional complaints at this time.        Review of Systems   Constitutional:  Negative for activity change, appetite change, chills, fatigue, fever and unexpected weight change.   HENT:  Negative for congestion, dental problem, facial swelling, mouth sores, nosebleeds, sinus pain, sore throat and trouble swallowing.    Eyes:  Negative for photophobia, discharge, redness and visual disturbance.   Respiratory:  Negative for cough, chest tightness and shortness of breath.    Cardiovascular:  Negative for chest pain and leg swelling.   Gastrointestinal:  Negative for abdominal pain, constipation, diarrhea, nausea and vomiting.   Endocrine: Negative for cold intolerance and heat intolerance.   Genitourinary:  Negative for dysuria, frequency, genital sores, hematuria and urgency.   Musculoskeletal:  Positive for neck pain and neck stiffness. Negative for arthralgias, back pain, joint swelling and myalgias.   Skin:  Negative for color change, rash and wound.   Neurological:  Negative for dizziness, tremors, syncope, weakness, light-headedness and headaches.   Hematological:  Negative for adenopathy. Does not bruise/bleed easily.   Psychiatric/Behavioral:  Negative for agitation, confusion and sleep disturbance. The patient is not nervous/anxious.       Objective:   /66   Pulse 75   Ht 5' 8" (1.727 m)   Wt 63.6 kg (140 lb 1.6 oz)   BMI 21.30 kg/m²   Physical Exam   Constitutional: He is oriented to person, place, and time. normal appearance. He appears well-developed and well-nourished. No distress.   HENT:   Head: Normocephalic and atraumatic.   Right Ear: External ear normal.   Left Ear: External ear normal.   Nose: Nose normal. No " nasal congestion.   Mouth/Throat: Mucous membranes are moist. Oropharynx is clear.   Eyes: Conjunctivae are normal.   Neck:   Mildly limited neck ROM in setting of prior c-spine surgery   Cardiovascular: Normal rate, regular rhythm, normal heart sounds and normal pulses.   Pulmonary/Chest: Effort normal and breath sounds normal. No respiratory distress. He has no wheezes.   Abdominal: Soft. Bowel sounds are normal. He exhibits no distension. There is no abdominal tenderness.   Musculoskeletal:         General: No swelling, tenderness or signs of injury. Normal range of motion.      Cervical back: Neck supple.      Comments: Refer to homunculus for full joint exam. No acute synovitis or enthesitis noted.   Neurological: He is alert and oriented to person, place, and time.   Reflexes: 3+ in LLE patellar/Achilles, 2+ else where in RLE/BUE (triceps, biceps, brachioradialis, patellar, achilles).    Skin: Skin is warm and dry. No lesion and no rash noted.   Psychiatric: His behavior is normal. Mood normal.   Vitals reviewed.    Assessment:     1.  psoriatric Arthritis    2. Cervical myelopathy    3. Cervical radiculopathy    4. Hyperreflexia    5. Multiple falls    6. Peripheral polyneuropathy    7. On statin therapy    8. Drug-induced lupus erythematosus    9. High risk medication use    10. Encounter for therapeutic drug monitoring    11. Immunosuppression due to drug therapy      - Pt with low disease activity for PsA   - Continues to be in remission with drug induced lupus and psoriasis  - Labs stable and mostly within normal ranges    - Primary issue lately has been neuro changes with hyperreflexia in LLE, frequent falls, inability to do all his work/activities    Plan:     Problem List Items Addressed This Visit          Immunology/Multi System    Drug-induced lupus erythematosus       Orthopedic     psoriatric Arthritis - Primary       Palliative Care    High risk medication use     Other Visit Diagnoses        Cervical myelopathy        Relevant Orders    Ambulatory referral/consult to Physical/Occupational Therapy    Cervical radiculopathy        Relevant Orders    EMG W/ ULTRASOUND AND NERVE CONDUCTION TEST 4 Extremities    Hyperreflexia        Relevant Orders    EMG W/ ULTRASOUND AND NERVE CONDUCTION TEST 4 Extremities    Multiple falls        Relevant Orders    EMG W/ ULTRASOUND AND NERVE CONDUCTION TEST 4 Extremities    Peripheral polyneuropathy        Relevant Orders    EMG W/ ULTRASOUND AND NERVE CONDUCTION TEST 4 Extremities    On statin therapy        Relevant Orders    CK    Encounter for therapeutic drug monitoring        Immunosuppression due to drug therapy              - Continue current management: apremilast 30mg PO BID, and guselkumab 100mg SQ Q8wks  - Hold leflunomide 20mg PO daily for now in case that could be contributing to any degree of peripheral neuropathy (however, this seems very unlikely at this time)  - Will send message to hepatology to see if they can get pt and his MRI completed any sooner than 1/22/24  - Labs before next visit: CBC, CMP, CRP, ESR  - Advised pt to get new covid booster and influenza vaccines and time with meds accordingly    Follow up here in clinic in about 4 months or earlier if needed.    Assessment and plan discussed with supervising attending, Dr. Beverly.      Allie Hidalgo MD  PGY-4, Rheumatology

## 2023-12-11 NOTE — PROGRESS NOTES
12/4/2023    11:31 AM   Rapid3 Question Responses and Scores   MDHAQ Score 1.1   Psychologic Score 1.1   Pain Score 3.5   When you awakened in the morning OVER THE LAST WEEK, did you feel stiff? Yes   If Yes, please indicate the number of hours until you are as limber as you will be for the day 0.25   Fatigue Score 7   Global Health Score 4   RAPID3 Score 3.72     Answers submitted by the patient for this visit:  Rheumatology Questionnaire (Submitted on 12/4/2023)  fever: Yes  eye redness: No  mouth sores: No  headaches: Yes  shortness of breath: Yes  chest pain: No  trouble swallowing: No  diarrhea: No  constipation: No  unexpected weight change: No  genital sore: No  During the last 3 days, have you had a skin rash?: No  Bruises or bleeds easily: Yes  cough: No

## 2023-12-11 NOTE — Clinical Note
Good Afternoon,  My attending (Dr. Beverly) and I saw this pt today in our clinic for follow up. He was telling how he has had significantly progressive/worsening neuropathy symptoms in his hands, balance difficulty, multiple falls, and this has been interfering with his daily life a lot.   I know he has an appt and MRI scheduled with your team on 1/22/24, but we wanted to inquire if there was any possibility of a slightly earlier/sooner appt and/or atleast the imaging.  We would really appreciate your help and input! Thank you very much!  Regards, Allie Hidalgo MD Rheumatology Fellow

## 2023-12-21 ENCOUNTER — PATIENT MESSAGE (OUTPATIENT)
Dept: ADMINISTRATIVE | Facility: OTHER | Age: 53
End: 2023-12-21
Payer: COMMERCIAL

## 2024-01-03 ENCOUNTER — HOSPITAL ENCOUNTER (OUTPATIENT)
Dept: RADIOLOGY | Facility: HOSPITAL | Age: 54
Discharge: HOME | End: 2024-01-03
Attending: NURSE PRACTITIONER
Payer: COMMERCIAL

## 2024-01-03 DIAGNOSIS — Z98.1 HX OF FUSION OF CERVICAL SPINE: ICD-10-CM

## 2024-01-03 DIAGNOSIS — M48.02 STENOSIS OF CERVICAL SPINE WITH MYELOPATHY: ICD-10-CM

## 2024-01-03 DIAGNOSIS — G99.2 STENOSIS OF CERVICAL SPINE WITH MYELOPATHY: ICD-10-CM

## 2024-01-03 PROCEDURE — 72141 MRI NECK SPINE W/O DYE: CPT | Mod: TC

## 2024-01-03 PROCEDURE — 72141 MRI NECK SPINE W/O DYE: CPT | Mod: 26,,, | Performed by: RADIOLOGY

## 2024-01-04 ENCOUNTER — PATIENT MESSAGE (OUTPATIENT)
Dept: RHEUMATOLOGY | Facility: CLINIC | Age: 54
End: 2024-01-04
Payer: COMMERCIAL

## 2024-01-04 ENCOUNTER — TELEPHONE (OUTPATIENT)
Dept: NEUROSURGERY | Facility: CLINIC | Age: 54
End: 2024-01-04

## 2024-01-04 ENCOUNTER — HOSPITAL ENCOUNTER (OUTPATIENT)
Dept: RADIOLOGY | Facility: HOSPITAL | Age: 54
Discharge: HOME | End: 2024-01-04
Attending: PHYSICIAN ASSISTANT
Payer: COMMERCIAL

## 2024-01-04 ENCOUNTER — OFFICE VISIT (OUTPATIENT)
Dept: NEUROSURGERY | Facility: CLINIC | Age: 54
End: 2024-01-04
Payer: COMMERCIAL

## 2024-01-04 VITALS
HEART RATE: 73 BPM | HEIGHT: 68 IN | SYSTOLIC BLOOD PRESSURE: 114 MMHG | WEIGHT: 140 LBS | DIASTOLIC BLOOD PRESSURE: 70 MMHG | BODY MASS INDEX: 21.22 KG/M2

## 2024-01-04 DIAGNOSIS — M47.812 CERVICAL SPONDYLOSIS: Primary | ICD-10-CM

## 2024-01-04 DIAGNOSIS — M48.02 CERVICAL SPINAL STENOSIS: ICD-10-CM

## 2024-01-04 DIAGNOSIS — G99.2 STENOSIS OF CERVICAL SPINE WITH MYELOPATHY: ICD-10-CM

## 2024-01-04 DIAGNOSIS — M50.30 DDD (DEGENERATIVE DISC DISEASE), CERVICAL: Primary | ICD-10-CM

## 2024-01-04 DIAGNOSIS — Z98.1 HX OF FUSION OF CERVICAL SPINE: ICD-10-CM

## 2024-01-04 DIAGNOSIS — M47.812 CERVICAL SPONDYLOSIS: ICD-10-CM

## 2024-01-04 DIAGNOSIS — Z98.1 HX OF FUSION OF CERVICAL SPINE: Primary | ICD-10-CM

## 2024-01-04 DIAGNOSIS — M48.02 STENOSIS OF CERVICAL SPINE WITH MYELOPATHY: ICD-10-CM

## 2024-01-04 DIAGNOSIS — G95.9 MYELOPATHY: ICD-10-CM

## 2024-01-04 PROCEDURE — 99215 OFFICE O/P EST HI 40 MIN: CPT | Mod: S$GLB,,, | Performed by: PHYSICIAN ASSISTANT

## 2024-01-04 PROCEDURE — 1159F MED LIST DOCD IN RCRD: CPT | Mod: CPTII,S$GLB,, | Performed by: PHYSICIAN ASSISTANT

## 2024-01-04 PROCEDURE — 99999 PR PBB SHADOW E&M-EST. PATIENT-LVL III: CPT | Mod: PBBFAC,,, | Performed by: PHYSICIAN ASSISTANT

## 2024-01-04 PROCEDURE — 3074F SYST BP LT 130 MM HG: CPT | Mod: CPTII,S$GLB,, | Performed by: PHYSICIAN ASSISTANT

## 2024-01-04 PROCEDURE — 3008F BODY MASS INDEX DOCD: CPT | Mod: CPTII,S$GLB,, | Performed by: PHYSICIAN ASSISTANT

## 2024-01-04 PROCEDURE — 3078F DIAST BP <80 MM HG: CPT | Mod: CPTII,S$GLB,, | Performed by: PHYSICIAN ASSISTANT

## 2024-01-04 NOTE — PROGRESS NOTES
[facility] - Neurosurgery 8th Fl  Neurosurgery    SUBJECTIVE:     History of Present Illness:  Rebel Rodriguez is a 53 y.o. male with hx of psoriatic arthritis, hx TIA on Aspirin 81 mg, s/p C4-7 ACDF with Dr. Huff 10 years ago who presents for evaluation of gait imbalance. Last seen 1 year ago for neck pain and radicular pain into the left shoulder. He underwent C1-2 KENRICK with moderate relief of pain. Reports rapid functional decline over the last 3 weeks. Endorses hand clumsiness, difficulty typing, gait imbalance, difficulty butoning, dropping items, falls. Difficulty with ambulating also due to LLE weakness. States it feels like he has rubber bands around his wrists. Reports difficulty with overhead mobility and shock-like pains down spine when raising arms overhead. Denies b/b incontinence.     Denies tobacco use.       Review of patient's allergies indicates:   Allergen Reactions    Erythromycin Nausea And Vomiting    Infliximab Other (See Comments)     Lupus with fever and acute arthritis  Lupus with fever and acute arthritis       Past Medical History:   Diagnosis Date    Achilles tendon rupture 10/9/2013    Allergy     Anxiety     Arthritis     psoriatric    Degenerative disc disease     Drug-induced lupus erythematosus     Hyperlipidemia     Kidney stone     Psoriatic arthritis     TIA (transient ischemic attack)     Ulcer     high school       Past Surgical History:   Procedure Laterality Date    ACHILLES TENDON SURGERY      BACK SURGERY      FUNCTIONAL ENDOSCOPIC SINUS SURGERY (FESS) USING COMPUTER-ASSISTED NAVIGATION Bilateral 9/14/2018    Procedure: FESS, USING COMPUTER-ASSISTED NAVIGATION;  Surgeon: Gerard Manzo MD;  Location: [facility] OR [address] [place] [address];  Service: ENT;  Laterality: Bilateral;    INJECTION OF ANESTHETIC AGENT AROUND NERVE Left 5/13/2022    Procedure: Block, Nerve MEDIAL BRANCH BLOCK LEFT C2,3,4,5;  Surgeon: Kimberly Wilson MD;  Location: [facility];  Service: Pain Management;  Laterality:  Left;    INJECTION OF ANESTHETIC AGENT AROUND NERVE Left 5/24/2022    Procedure: BLOCK, NERVE, LEFT C2-C5 MEDIAL BRANCH;  Surgeon: Kimberly Wilson MD;  Location: [facility] operated [facility]IN [facility]GT;  Service: Pain Management;  Laterality: Left;    NASAL SEPTOPLASTY N/A 9/14/2018    Procedure: SEPTOPLASTY, NASAL;  Surgeon: Gerard Manzo MD;  Location: [facility] OR 2ND FLR;  Service: ENT;  Laterality: N/A;    NASAL TURBINATE REDUCTION Bilateral 9/14/2018    Procedure: REDUCTION, NASAL TURBINATE;  Surgeon: Gerard Manzo MD;  Location: [facility] OR [facility] FLR;  Service: ENT;  Laterality: Bilateral;    RADIOFREQUENCY ABLATION Left 7/12/2022    Procedure: RADIOFREQUENCY ABLATION, LEFT C2-C3, C3-C4, C4-C5;  Surgeon: Kimberly Wilson MD;  Location: [facility]IN [facility]GT;  Service: Pain Management;  Laterality: Left;    UPPER ENDOSCOPY W/ ESOPHAGEAL MANOMETRY      URETERAL STENT PLACEMENT          Family History   Problem Relation Age of Onset    Pancreatic cancer Father     Ulcerative colitis Father     Cancer Father 61        pancreatic ca    Crohn's disease Mother     Osteoarthritis Maternal Grandmother     Crohn's disease Maternal Grandmother     Cataracts Maternal Grandmother     Cataracts Maternal Grandfather     Cataracts Paternal Grandmother     Cataracts Paternal Grandfather     Amblyopia Neg Hx     Blindness Neg Hx        Social History     Socioeconomic History    Marital status: Single   Occupational History    Occupation: music production     Employer: self employed   Tobacco Use    Smoking status: Never    Smokeless tobacco: Never   Substance and Sexual Activity    Alcohol use: No     Alcohol/week: 0.0 standard drinks of alcohol     Types: 1 - 2 Standard drinks or equivalent per week     Comment: cocktails    Drug use: No     Social Determinants of Health     Financial Resource Strain: Low Risk  (12/4/2023)    Overall Financial Resource Strain (CARDIA)     Difficulty of Paying Living Expenses: Not very hard   Food Insecurity: No Food  "Insecurity (12/4/2023)    Hunger Vital Sign     Worried About Running Out of Food in the Last Year: Never true     Ran Out of Food in the Last Year: Never true   Transportation Needs: Unmet Transportation Needs (12/4/2023)    PRAPARE - Transportation     Lack of Transportation (Medical): Yes     Lack of Transportation (Non-Medical): Yes   Physical Activity: Sufficiently Active (12/4/2023)    Exercise Vital Sign     Days of Exercise per Week: 3 days     Minutes of Exercise per Session: 60 min   Stress: No Stress Concern Present (12/4/2023)    [employer] - Occupational Stress Questionnaire     Feeling of Stress : Only a little   Social Connections: Unknown (12/4/2023)    Social Connection and Isolation Panel [NHANES]     Frequency of Communication with Friends and Family: More than three times a week     Frequency of Social Gatherings with Friends and Family: Three times a week     Active Member of Clubs or Organizations: No     Attends Club or Organization Meetings: Never     Marital Status: Never    Housing Stability: Unknown (12/4/2023)    Housing Stability Vital Sign     Unable to Pay for Housing in the Last Year: No     Unstable Housing in the Last Year: No       Review of Systems:  Per HPI    OBJECTIVE:     Vital Signs (Most Recent):  Vitals:    01/04/24 0924   BP: 114/70   Pulse: 73   Weight: 63.5 kg (140 lb)   Height: 5' 8" (1.727 m)       Physical Exam:  General: well developed, well nourished, no distress.   Head: normocephalic, atraumatic  Neurologic: Alert and oriented. Thought content appropriate.  GCS: Motor: 6/Verbal: 5/Eyes: 4 GCS Total: 15  Mental Status: Awake, Alert, Oriented x 4  Language: No aphasia  Speech: No dysarthria  Cranial nerves: face symmetric, tongue midline, CN II-XII grossly intact.   Eyes: pupils equal, round, reactive to light with accomodation, EOMI.  Pulmonary: normal respirations, no signs of respiratory distress  Abdomen: soft, non-distended, " not tender to palpation  Sensory: intact to light touch throughout  Motor Strength: Moves all extremities spontaneously with good tone. No abnormal movements seen.     Strength  Deltoids Triceps Biceps Wrist Extension Wrist Flexion Hand    Upper: R 5/5 5/5 5/5 5/5 5/5 4+/5    L 5/5 5/5 5/5 5/5 5/5 4+/5     Iliopsoas Quadriceps Knee  Flexion Tibialis  anterior Gastro- cnemius EHL   Lower: R 5/5 5/5 5/5 5/5 5/5 5/5    L 5/5 5/5 5/5 5/5 5/5 5/5   Hand instrinsics 5/5.     DTR's: 3 + and symmetric in UE and LE  Magallanes: present bilaterally  Clonus: absent  Skin: Skin is warm, dry and intact.  Gait: ataxic  Tandem Gait: difficulty attempting   Cervical ROM: limited    Lhermittes sign: negative  Well healed left sided incision.     Diagnostic Results:  I have personally reviewed all pertinent imaging.    MRI cervical spine w/o contrast 1/3/2024:  - posterior disc osteophyte, facet hypertrophy, uncovertebral spurring with ligamentum flavum buckling resulting in severe canal stenosis C3-4 with associated cord signal change    ASSESSMENT/PLAN:     Rebel Rodriguez is a 53 y.o. male with cervical myelopathy due to C3-4 severe stenosis with cord signal change, above his prior fusion. Patient was seen today by Dr. Maradiaga. Recommending C3-4 ACDF. All risks, benefits, alternatives, indications and methods were reviewed in detail by the patient and surgeon. All questions were answered, and the patient wishes to proceed with surgery. No guarantees about the results of the procedure were made. Will obtain XR today to evaluate for instability. Will need ENT scope prior to surgery for vocal cord evaluation given prior ACDF on the left. We discussed concerning signs and symptoms that would prompt return to clinic or urgent medical attention, patient v/u. All questions answered. Encouraged to call the clinic with questions/concerns prior to the next visit.        Melanie Torres PA-C  Neurosurgery      Note dictated with voice  recognition software, please excuse any grammatical errors.

## 2024-01-04 NOTE — H&P (VIEW-ONLY)
[facility] - Neurosurgery 8th Fl  Neurosurgery    SUBJECTIVE:     History of Present Illness:  Rebel Rodriguez is a 53 y.o. male with hx of psoriatic arthritis, hx TIA on Aspirin 81 mg, s/p C4-7 ACDF with Dr. Huff 10 years ago who presents for evaluation of gait imbalance. Last seen 1 year ago for neck pain and radicular pain into the left shoulder. He underwent C1-2 KENRICK with moderate relief of pain. Reports rapid functional decline over the last 3 weeks. Endorses hand clumsiness, difficulty typing, gait imbalance, difficulty butoning, dropping items, falls. Difficulty with ambulating also due to LLE weakness. States it feels like he has rubber bands around his wrists. Reports difficulty with overhead mobility and shock-like pains down spine when raising arms overhead. Denies b/b incontinence.     Denies tobacco use.       Review of patient's allergies indicates:   Allergen Reactions    Erythromycin Nausea And Vomiting    Infliximab Other (See Comments)     Lupus with fever and acute arthritis  Lupus with fever and acute arthritis       Past Medical History:   Diagnosis Date    Achilles tendon rupture 10/9/2013    Allergy     Anxiety     Arthritis     psoriatric    Degenerative disc disease     Drug-induced lupus erythematosus     Hyperlipidemia     Kidney stone     Psoriatic arthritis     TIA (transient ischemic attack)     Ulcer     high school       Past Surgical History:   Procedure Laterality Date    ACHILLES TENDON SURGERY      BACK SURGERY      FUNCTIONAL ENDOSCOPIC SINUS SURGERY (FESS) USING COMPUTER-ASSISTED NAVIGATION Bilateral 9/14/2018    Procedure: FESS, USING COMPUTER-ASSISTED NAVIGATION;  Surgeon: Gerard Manzo MD;  Location: [facility] OR [address] [place] [address];  Service: ENT;  Laterality: Bilateral;    INJECTION OF ANESTHETIC AGENT AROUND NERVE Left 5/13/2022    Procedure: Block, Nerve MEDIAL BRANCH BLOCK LEFT C2,3,4,5;  Surgeon: Kimberly Wilson MD;  Location: [facility];  Service: Pain Management;  Laterality:  Left;    INJECTION OF ANESTHETIC AGENT AROUND NERVE Left 5/24/2022    Procedure: BLOCK, NERVE, LEFT C2-C5 MEDIAL BRANCH;  Surgeon: Kimberly Wilson MD;  Location: [facility] PAIN MGT;  Service: Pain Management;  Laterality: Left;    NASAL SEPTOPLASTY N/A 9/14/2018    Procedure: SEPTOPLASTY, NASAL;  Surgeon: Gerard Manzo MD;  Location: [facility] OR 2ND FLR;  Service: ENT;  Laterality: N/A;    NASAL TURBINATE REDUCTION Bilateral 9/14/2018    Procedure: REDUCTION, NASAL TURBINATE;  Surgeon: Gerard Manzo MD;  Location: [facility] OR [facility] FLR;  Service: ENT;  Laterality: Bilateral;    RADIOFREQUENCY ABLATION Left 7/12/2022    Procedure: RADIOFREQUENCY ABLATION, LEFT C2-C3, C3-C4, C4-C5;  Surgeon: Kimberly Wilson MD;  Location: [facility] PAIN MGT;  Service: Pain Management;  Laterality: Left;    UPPER ENDOSCOPY W/ ESOPHAGEAL MANOMETRY      URETERAL STENT PLACEMENT          Family History   Problem Relation Age of Onset    Pancreatic cancer Father     Ulcerative colitis Father     Cancer Father 61        pancreatic ca    Crohn's disease Mother     Osteoarthritis Maternal Grandmother     Crohn's disease Maternal Grandmother     Cataracts Maternal Grandmother     Cataracts Maternal Grandfather     Cataracts Paternal Grandmother     Cataracts Paternal Grandfather     Amblyopia Neg Hx     Blindness Neg Hx        Social History     Socioeconomic History    Marital status: Single   Occupational History    Occupation: music production     Employer: self employed   Tobacco Use    Smoking status: Never    Smokeless tobacco: Never   Substance and Sexual Activity    Alcohol use: No     Alcohol/week: 0.0 standard drinks of alcohol     Types: 1 - 2 Standard drinks or equivalent per week     Comment: cocktails    Drug use: No     Social Determinants of Health     Financial Resource Strain: Low Risk  (12/4/2023)    Overall Financial Resource Strain (CARDIA)     Difficulty of Paying Living Expenses: Not very hard   Food Insecurity: No Food  "Insecurity (12/4/2023)    Hunger Vital Sign     Worried About Running Out of Food in the Last Year: Never true     Ran Out of Food in the Last Year: Never true   Transportation Needs: Unmet Transportation Needs (12/4/2023)    PRAPARE - Transportation     Lack of Transportation (Medical): Yes     Lack of Transportation (Non-Medical): Yes   Physical Activity: Sufficiently Active (12/4/2023)    Exercise Vital Sign     Days of Exercise per Week: 3 days     Minutes of Exercise per Session: 60 min   Stress: No Stress Concern Present (12/4/2023)    [employer] [employer] - Occupational Stress Questionnaire     Feeling of Stress : Only a little   Social Connections: Unknown (12/4/2023)    Social Connection and Isolation Panel [NHANES]     Frequency of Communication with Friends and Family: More than three times a week     Frequency of Social Gatherings with Friends and Family: Three times a week     Active Member of Clubs or Organizations: No     Attends Club or Organization Meetings: Never     Marital Status: Never    Housing Stability: Unknown (12/4/2023)    Housing Stability Vital Sign     Unable to Pay for Housing in the Last Year: No     Unstable Housing in the Last Year: No       Review of Systems:  Per HPI    OBJECTIVE:     Vital Signs (Most Recent):  Vitals:    01/04/24 0924   BP: 114/70   Pulse: 73   Weight: 63.5 kg (140 lb)   Height: 5' 8" (1.727 m)       Physical Exam:  General: well developed, well nourished, no distress.   Head: normocephalic, atraumatic  Neurologic: Alert and oriented. Thought content appropriate.  GCS: Motor: 6/Verbal: 5/Eyes: 4 GCS Total: 15  Mental Status: Awake, Alert, Oriented x 4  Language: No aphasia  Speech: No dysarthria  Cranial nerves: face symmetric, tongue midline, CN II-XII grossly intact.   Eyes: pupils equal, round, reactive to light with accomodation, EOMI.  Pulmonary: normal respirations, no signs of respiratory distress  Abdomen: soft, non-distended, " not tender to palpation  Sensory: intact to light touch throughout  Motor Strength: Moves all extremities spontaneously with good tone. No abnormal movements seen.     Strength  Deltoids Triceps Biceps Wrist Extension Wrist Flexion Hand    Upper: R 5/5 5/5 5/5 5/5 5/5 4+/5    L 5/5 5/5 5/5 5/5 5/5 4+/5     Iliopsoas Quadriceps Knee  Flexion Tibialis  anterior Gastro- cnemius EHL   Lower: R 5/5 5/5 5/5 5/5 5/5 5/5    L 5/5 5/5 5/5 5/5 5/5 5/5   Hand instrinsics 5/5.     DTR's: 3 + and symmetric in UE and LE  Magallanes: present bilaterally  Clonus: absent  Skin: Skin is warm, dry and intact.  Gait: ataxic  Tandem Gait: difficulty attempting   Cervical ROM: limited    Lhermittes sign: negative  Well healed left sided incision.     Diagnostic Results:  I have personally reviewed all pertinent imaging.    MRI cervical spine w/o contrast 1/3/2024:  - posterior disc osteophyte, facet hypertrophy, uncovertebral spurring with ligamentum flavum buckling resulting in severe canal stenosis C3-4 with associated cord signal change    ASSESSMENT/PLAN:     Rebel Rodriguez is a 53 y.o. male with cervical myelopathy due to C3-4 severe stenosis with cord signal change, above his prior fusion. Patient was seen today by Dr. Maradiaga. Recommending C3-4 ACDF. All risks, benefits, alternatives, indications and methods were reviewed in detail by the patient and surgeon. All questions were answered, and the patient wishes to proceed with surgery. No guarantees about the results of the procedure were made. Will obtain XR today to evaluate for instability. Will need ENT scope prior to surgery for vocal cord evaluation given prior ACDF on the left. We discussed concerning signs and symptoms that would prompt return to clinic or urgent medical attention, patient v/u. All questions answered. Encouraged to call the clinic with questions/concerns prior to the next visit.        Melanie Torres PA-C  Neurosurgery      Note dictated with voice  recognition software, please excuse any grammatical errors.

## 2024-01-05 ENCOUNTER — TELEPHONE (OUTPATIENT)
Dept: OTOLARYNGOLOGY | Facility: CLINIC | Age: 54
End: 2024-01-05
Payer: COMMERCIAL

## 2024-01-08 DIAGNOSIS — E78.5 HYPERLIPIDEMIA: ICD-10-CM

## 2024-01-09 RX ORDER — ATORVASTATIN CALCIUM 10 MG/1
10 TABLET, FILM COATED ORAL NIGHTLY
Qty: 90 TABLET | Refills: 3 | Status: SHIPPED | OUTPATIENT
Start: 2024-01-09

## 2024-01-16 NOTE — DISCHARGE INSTRUCTIONS
Your surgery has been scheduled for:________1/24/24__________________________________    You should report to:  ____[facility], located on the [place] side of the first floor of the           [facility] ([phone])  __X__[facility], located on the [facility] side of the            Second floor of the [facility] ([phone])  ____3rd Floor [facility] located on the [facility] side of the [facility] [phone]  ____[facility]: located at [address] [facility] [place] [address]. [address].     Please Note   Tell your doctor if you take Aspirin, products containing Aspirin, herbal medications  or blood thinners, such as Coumadin, Ticlid, or Plavix.  (Consult your provider regarding holding or stopping before surgery).  Arrange for someone to drive you home following surgery.  You will not be allowed to leave the surgical facility alone or drive yourself home following sedation and anesthesia.    Before Surgery  Stop taking all herbal medications, vitamins, and supplements 7 days prior to surgery  No Motrin/Advil (Ibuprofen) 7 days before surgery  No Aleve (Naproxen) 7 days before surgery  Stop Taking Asprin, products containing Aspirin ___7__days before surgery   No Goody's/BC Powder 7 days before surgery  Refrain from drinking alcoholic beverages for 24 hours before and after surgery  Stop or limit smoking at least 24 hours prior to surgery  You may take Tylenol for pain    Night before Surgery  Do not eat or drink after midnight  Take a shower or bath (shower is recommended).  Bathe with Hibiclens soap or an antibacterial soap from the neck down.  If not supplied by your surgeon, hibiclens soap will need to be purchased over the counter in pharmacy.  Rinse soap off thoroughly.  Shampoo your hair with your regular shampoo    The Day of Surgery  Take another bath or shower with hibiclens  or any antibacterial soap, to reduce the chance of infection.  Take heart and blood pressure medications with a small sip of water, as advised by the perioperative team.  Do not take fluid pills  You may brush your teeth and rinse your mouth, but do not swallow any additional water.   Do not apply perfumes, powder, body lotions or deodorant on the day of surgery.  Nail polish should be removed.  Do not wear makeup or moisturizer  Wear comfortable clothes, such as a button front shirt and loose fitting pants.  Leave all jewelry, including body piercings, and valuables at home.    Bring any devices you will neeed after surgery such as crutches or canes.  If you have sleep apnea, please bring your CPAP machine  In the event that your physical condition changes including the onset of a cold or respiratory illness, or if you have to delay or cancel your surgery, please notify your surgeon.

## 2024-01-17 ENCOUNTER — LAB VISIT (OUTPATIENT)
Dept: LAB | Facility: HOSPITAL | Age: 54
End: 2024-01-17
Payer: COMMERCIAL

## 2024-01-17 ENCOUNTER — HOSPITAL ENCOUNTER (OUTPATIENT)
Dept: CARDIOLOGY | Facility: CLINIC | Age: 54
Discharge: HOME | End: 2024-01-17
Payer: COMMERCIAL

## 2024-01-17 ENCOUNTER — OFFICE VISIT (OUTPATIENT)
Dept: INTERNAL MEDICINE | Facility: CLINIC | Age: 54
End: 2024-01-17
Payer: COMMERCIAL

## 2024-01-17 ENCOUNTER — TELEPHONE (OUTPATIENT)
Dept: NEUROSURGERY | Facility: CLINIC | Age: 54
End: 2024-01-17
Payer: COMMERCIAL

## 2024-01-17 VITALS
HEART RATE: 65 BPM | HEIGHT: 68 IN | DIASTOLIC BLOOD PRESSURE: 64 MMHG | SYSTOLIC BLOOD PRESSURE: 120 MMHG | BODY MASS INDEX: 21.05 KG/M2 | TEMPERATURE: 98 F | OXYGEN SATURATION: 99 % | WEIGHT: 138.88 LBS

## 2024-01-17 DIAGNOSIS — R06.81 APNEA: ICD-10-CM

## 2024-01-17 DIAGNOSIS — L40.9 PSORIASIS: ICD-10-CM

## 2024-01-17 DIAGNOSIS — J34.2 NASAL SEPTAL DEVIATION: ICD-10-CM

## 2024-01-17 DIAGNOSIS — Z86.73 HX-TIA (TRANSIENT ISCHEMIC ATTACK): ICD-10-CM

## 2024-01-17 DIAGNOSIS — Z98.1 S/P CERVICAL SPINAL FUSION: Primary | ICD-10-CM

## 2024-01-17 DIAGNOSIS — Z86.73 HX-TIA (TRANSIENT ISCHEMIC ATTACK): Primary | ICD-10-CM

## 2024-01-17 DIAGNOSIS — M19.90 ARTHRITIS: ICD-10-CM

## 2024-01-17 DIAGNOSIS — G47.33 OSA (OBSTRUCTIVE SLEEP APNEA): ICD-10-CM

## 2024-01-17 DIAGNOSIS — D64.9 ANEMIA, UNSPECIFIED TYPE: ICD-10-CM

## 2024-01-17 PROBLEM — M54.2 NECK PAIN: Status: RESOLVED | Noted: 2017-07-06 | Resolved: 2024-01-17

## 2024-01-17 LAB
ALBUMIN SERPL BCP-MCNC: 4 G/DL (ref 3.5–5.2)
ALP SERPL-CCNC: 87 U/L (ref 55–135)
ALT SERPL W/O P-5'-P-CCNC: 21 U/L (ref 10–44)
ANION GAP SERPL CALC-SCNC: 8 MMOL/L (ref 8–16)
AST SERPL-CCNC: 28 U/L (ref 10–40)
BASOPHILS # BLD AUTO: 0.04 K/UL (ref 0–0.2)
BASOPHILS NFR BLD: 0.4 % (ref 0–1.9)
BILIRUB SERPL-MCNC: 0.4 MG/DL (ref 0.1–1)
BUN SERPL-MCNC: 14 MG/DL (ref 6–20)
CALCIUM SERPL-MCNC: 9.8 MG/DL (ref 8.7–10.5)
CHLORIDE SERPL-SCNC: 103 MMOL/L (ref 95–110)
CO2 SERPL-SCNC: 28 MMOL/L (ref 23–29)
CREAT SERPL-MCNC: 0.9 MG/DL (ref 0.5–1.4)
DIFFERENTIAL METHOD BLD: ABNORMAL
EOSINOPHIL # BLD AUTO: 0.4 K/UL (ref 0–0.5)
EOSINOPHIL NFR BLD: 3.2 % (ref 0–8)
ERYTHROCYTE [DISTWIDTH] IN BLOOD BY AUTOMATED COUNT: 14.1 % (ref 11.5–14.5)
EST. GFR  (NO RACE VARIABLE): >60 ML/MIN/1.73 M^2
GLUCOSE SERPL-MCNC: 87 MG/DL (ref 70–110)
HCT VFR BLD AUTO: 43 % (ref 40–54)
HGB BLD-MCNC: 13.5 G/DL (ref 14–18)
IMM GRANULOCYTES # BLD AUTO: 0.06 K/UL (ref 0–0.04)
IMM GRANULOCYTES NFR BLD AUTO: 0.5 % (ref 0–0.5)
INR PPP: 0.9 (ref 0.8–1.2)
LYMPHOCYTES # BLD AUTO: 2.1 K/UL (ref 1–4.8)
LYMPHOCYTES NFR BLD: 18.4 % (ref 18–48)
MCH RBC QN AUTO: 28.3 PG (ref 27–31)
MCHC RBC AUTO-ENTMCNC: 31.4 G/DL (ref 32–36)
MCV RBC AUTO: 90 FL (ref 82–98)
MONOCYTES # BLD AUTO: 0.8 K/UL (ref 0.3–1)
MONOCYTES NFR BLD: 6.9 % (ref 4–15)
NEUTROPHILS # BLD AUTO: 8 K/UL (ref 1.8–7.7)
NEUTROPHILS NFR BLD: 70.6 % (ref 38–73)
NRBC BLD-RTO: 0 /100 WBC
PLATELET # BLD AUTO: 272 K/UL (ref 150–450)
PMV BLD AUTO: 9.2 FL (ref 9.2–12.9)
POTASSIUM SERPL-SCNC: 4.4 MMOL/L (ref 3.5–5.1)
PROT SERPL-MCNC: 7.2 G/DL (ref 6–8.4)
PROTHROMBIN TIME: 9.8 SEC (ref 9–12.5)
RBC # BLD AUTO: 4.77 M/UL (ref 4.6–6.2)
SODIUM SERPL-SCNC: 139 MMOL/L (ref 136–145)
WBC # BLD AUTO: 11.36 K/UL (ref 3.9–12.7)

## 2024-01-17 PROCEDURE — 85610 PROTHROMBIN TIME: CPT | Performed by: NURSE PRACTITIONER

## 2024-01-17 PROCEDURE — 36415 COLL VENOUS BLD VENIPUNCTURE: CPT | Performed by: NURSE PRACTITIONER

## 2024-01-17 PROCEDURE — 93005 ELECTROCARDIOGRAM TRACING: CPT | Mod: S$GLB,,, | Performed by: NURSE PRACTITIONER

## 2024-01-17 PROCEDURE — 3008F BODY MASS INDEX DOCD: CPT | Mod: CPTII,S$GLB,, | Performed by: NURSE PRACTITIONER

## 2024-01-17 PROCEDURE — 99999 PR PBB SHADOW E&M-EST. PATIENT-LVL IV: CPT | Mod: PBBFAC,,, | Performed by: NURSE PRACTITIONER

## 2024-01-17 PROCEDURE — 3074F SYST BP LT 130 MM HG: CPT | Mod: CPTII,S$GLB,, | Performed by: NURSE PRACTITIONER

## 2024-01-17 PROCEDURE — 80053 COMPREHEN METABOLIC PANEL: CPT | Performed by: NURSE PRACTITIONER

## 2024-01-17 PROCEDURE — 3078F DIAST BP <80 MM HG: CPT | Mod: CPTII,S$GLB,, | Performed by: NURSE PRACTITIONER

## 2024-01-17 PROCEDURE — 1159F MED LIST DOCD IN RCRD: CPT | Mod: CPTII,S$GLB,, | Performed by: NURSE PRACTITIONER

## 2024-01-17 PROCEDURE — 85025 COMPLETE CBC W/AUTO DIFF WBC: CPT | Performed by: NURSE PRACTITIONER

## 2024-01-17 PROCEDURE — 1160F RVW MEDS BY RX/DR IN RCRD: CPT | Mod: CPTII,S$GLB,, | Performed by: NURSE PRACTITIONER

## 2024-01-17 PROCEDURE — 99215 OFFICE O/P EST HI 40 MIN: CPT | Mod: S$GLB,,, | Performed by: NURSE PRACTITIONER

## 2024-01-17 PROCEDURE — 93010 ELECTROCARDIOGRAM REPORT: CPT | Mod: S$GLB,,, | Performed by: INTERNAL MEDICINE

## 2024-01-17 NOTE — ASSESSMENT & PLAN NOTE
Has h/o psoriatic arthritis  Treated by Dr. Beverly    Recommendations per Rheumatology located in letters dated 1/9/24:  - Hold guselkumab dose in the perioperative period, typically we recommend surgery to be about 9 weeks after the last dose and resume the medication 2 weeks after surgery as long as patient is having appropriate post operative wound healing.  - Continue apremilast without any change in dosing.  - Leflunomide has already been on hold, will discuss with patient to resume post operatively at follow up appointment.

## 2024-01-17 NOTE — HPI
This is a 53 y.o. male  who presents today for a preoperative evaluation in preparation for C 3-4 anterior cervical discectomy and fusion  Surgery is indicated for cervical spinal stenosis myelopathy    .   Patient is new to me.    The history has been obtained by speaking with the patient and reviewing the electronic medical record and/or outside health information. Significant health conditions for the perioperative period are discussed below in assessment and plan.     Patient reports current health status to be Good.  Denies any new symptoms before surgery.

## 2024-01-17 NOTE — ASSESSMENT & PLAN NOTE
This client has a possible diagnosis of obstructive sleep apnea (LU)    Instructions were given to avoid the following: sleeping supine, weight gain ,alcoholic beverages , sedative medications, and CNS depressant use as these can all worsen LU.    Untreated sleep apnea has been shown to increase daytime sleepiness, hypertension, heart disease and stroke which were discussed with the patient at this time    Please use caution with medications that induce respiratory depression in the perioperative period

## 2024-01-17 NOTE — ASSESSMENT & PLAN NOTE
"Occurred about 2012  Speech troubles  "Word salad"    TEsting performed    He was placed on statins and baby ASA  "

## 2024-01-17 NOTE — OUTPATIENT SUBJECTIVE & OBJECTIVE
Outpatient Subjective & Objective      Chief Complaint: Preoperative evaulation, perioperative medical management, and complication reduction plan.     Functional Capacity:  Able to climb a flight of stairs without CP SOB or Syncope.  Able to meet 4 METs      Anesthesia issues: None    Difficulty mouth opening: n    Steroid use in the last 12 months: Dose pack in the past year     Dental Issues: n    Family anesthesia difficulty: None     Family Hx of Thrombosis n    Past Medical History:   Diagnosis Date    Achilles tendon rupture 10/09/2013    Achilles tendon rupture 10/09/2013    Allergy     Anemia 1/17/2024    Anxiety     Arthritis     psoriatric    Degenerative disc disease     Drug-induced lupus erythematosus     Drug-induced lupus erythematosus 03/09/2016    Hyperlipidemia     Inguinal lymphadenopathy 07/21/2015    Kidney stone     Medication monitoring encounter 12/07/2016    Neck pain 07/06/2017    Psoriatic arthritis     Psoriatic arthritis 12/07/2016    Recurrent fever 07/08/2015    Recurrent nephrolithiasis 05/19/2014    On low oxalate diet    Sinusitis 02/25/2016    Stroke     TIA (transient ischemic attack)     Ulcer     high school    Unexplained night sweats 07/13/2015     Past Surgical History:   Procedure Laterality Date    ACHILLES TENDON SURGERY      BACK SURGERY      FUNCTIONAL ENDOSCOPIC SINUS SURGERY (FESS) USING COMPUTER-ASSISTED NAVIGATION Bilateral 9/14/2018    Procedure: FESS, USING COMPUTER-ASSISTED NAVIGATION;  Surgeon: Gerard Manzo MD;  Location: [facility] OR [address] [place] [address];  Service: ENT;  Laterality: Bilateral;    INJECTION OF ANESTHETIC AGENT AROUND NERVE Left 5/13/2022    Procedure: Block, Nerve MEDIAL BRANCH BLOCK LEFT C2,3,4,5;  Surgeon: Kimberly Wilson MD;  Location: [facility];  Service: Pain Management;  Laterality: Left;    INJECTION OF ANESTHETIC AGENT AROUND NERVE Left 5/24/2022    Procedure: BLOCK, NERVE, LEFT C2-C5 MEDIAL BRANCH;  Surgeon: Kimberly Wilson MD;  Location:  [facility] PAIN MGT;  Service: Pain Management;  Laterality: Left;    NASAL SEPTOPLASTY N/A 9/14/2018    Procedure: SEPTOPLASTY, NASAL;  Surgeon: Gerard Manzo MD;  [facility];  Service: ENT;  Laterality: N/A;    NASAL TURBINATE REDUCTION Bilateral 9/14/2018    Procedure: REDUCTION, NASAL TURBINATE;  Surgeon: Gerard Manzo MD;  [facility];  Service: ENT;  Laterality: Bilateral;    RADIOFREQUENCY ABLATION Left 7/12/2022    Procedure: RADIOFREQUENCY ABLATION, LEFT C2-C3, C3-C4, C4-C5;  Surgeon: Kimberly Wilson MD;  Location: [facility] PAIN MGT;  Service: Pain Management;  Laterality: Left;    UPPER ENDOSCOPY W/ ESOPHAGEAL MANOMETRY      URETERAL STENT PLACEMENT         Review of Systems   Constitutional:  Positive for fatigue. Negative for chills, fever and unexpected weight change.   HENT:  Negative for trouble swallowing and voice change.    Eyes:  Negative for photophobia and visual disturbance.        No acute visual changes   Respiratory:  Negative for cough, shortness of breath and wheezing.         STOP bang  Score 3    High risk LU   Cardiovascular:  Negative for chest pain, palpitations and leg swelling.   Gastrointestinal:  Negative for abdominal pain, blood in stool, constipation, diarrhea, nausea and vomiting.        No FLD, Hepatitis, Cirrhosis  No BRB or black tarry stool   Genitourinary:  Negative for difficulty urinating, dysuria, frequency, hematuria and urgency.        Nocturia 2   Musculoskeletal:  Positive for gait problem, neck pain and neck stiffness. Negative for arthralgias and myalgias.   Neurological:  Positive for tremors, weakness and numbness. Negative for dizziness, seizures, syncope, light-headedness and headaches.        Numbness worse in hands and feet   Psychiatric/Behavioral:  Negative for agitation, behavioral problems, confusion, decreased concentration, dysphoric mood, hallucinations, self-injury, sleep disturbance and suicidal ideas. The patient is  "not nervous/anxious and is not hyperactive.         VITALS  Visit Vitals  /64 (BP Location: Left arm, Patient Position: Sitting)   Pulse 65   Temp 97.6 °F (36.4 °C) (Oral)   Ht 5' 8" (1.727 m)   Wt 63 kg (138 lb 14.2 oz)   SpO2 99%   BMI 21.12 kg/m²      Physical Exam  Constitutional:       General: He is not in acute distress.     Appearance: He is well-developed. He is not diaphoretic.   HENT:      Head: Normocephalic.      Right Ear: Hearing normal.      Left Ear: Hearing normal.      Nose: Nose normal.      Mouth/Throat:      Lips: Pink.      Mouth: Mucous membranes are moist.   Eyes:      General: Lids are normal.      Conjunctiva/sclera: Conjunctivae normal.      Pupils: Pupils are equal, round, and reactive to light.   Neck:      Vascular: No carotid bruit.      Trachea: Trachea and phonation normal.   Cardiovascular:      Rate and Rhythm: Normal rate and regular rhythm.      Pulses:           Carotid pulses are 2+ on the right side and 2+ on the left side.       Radial pulses are 2+ on the right side and 2+ on the left side.        Posterior tibial pulses are 2+ on the right side and 2+ on the left side.      Heart sounds: Normal heart sounds. No murmur heard.     No friction rub. No gallop.   Pulmonary:      Effort: Pulmonary effort is normal.      Breath sounds: Normal breath sounds.      Comments: Clear and equal  Anterior and Posterior BBS  Abdominal:      General: Abdomen is protuberant. Bowel sounds are normal. There is no distension.      Palpations: Abdomen is soft.      Tenderness: There is no abdominal tenderness.   Musculoskeletal:         General: No tenderness or deformity. Normal range of motion.      Cervical back: Normal range of motion.      Right lower leg: No edema.      Left lower leg: No edema.   Lymphadenopathy:      Head:      Right side of head: No submental, submandibular, tonsillar, preauricular, posterior auricular or occipital adenopathy.      Left side of head: No " submental, submandibular, tonsillar, preauricular, posterior auricular or occipital adenopathy.      Cervical:      Right cervical: No superficial cervical adenopathy.     Left cervical: No superficial cervical adenopathy.   Skin:     General: Skin is warm and dry.      Capillary Refill: Capillary refill takes 2 to 3 seconds.      Coloration: Skin is not pale.      Findings: No erythema or rash.   Neurological:      Mental Status: He is alert and oriented to person, place, and time.      GCS: GCS eye subscore is 4. GCS verbal subscore is 5. GCS motor subscore is 6.      Motor: No abnormal muscle tone.      Coordination: Coordination normal.   Psychiatric:         Attention and Perception: Attention and perception normal.         Mood and Affect: Mood and affect normal.         Speech: Speech normal.         Behavior: Behavior normal. Behavior is cooperative.         Thought Content: Thought content normal.         Cognition and Memory: Cognition and memory normal.         Judgment: Judgment normal.          Significant Labs:  Lab Results   Component Value Date    WBC 11.36 01/17/2024    HGB 13.5 (L) 01/17/2024    HCT 43.0 01/17/2024     01/17/2024    CHOL 135 10/10/2023    TRIG 135 10/10/2023    HDL 47 10/10/2023    ALT 21 01/17/2024    AST 28 01/17/2024     01/17/2024    K 4.4 01/17/2024     01/17/2024    CREATININE 0.9 01/17/2024    BUN 14 01/17/2024    CO2 28 01/17/2024    TSH 1.82 09/30/2009    INR 0.9 01/17/2024    GLUF 95 04/08/2015    HGBA1C 5.6 04/08/2015       Diagnostic Studies: No relevant studies.    EKG:   Results for orders placed or performed during the hospital encounter of 01/17/24   EKG 12-lead    Collection Time: 01/17/24 12:09 PM    Narrative    Test Reason : Z86.73,J34.2,L40.9,R06.81,    Vent. Rate : 058 BPM     Atrial Rate : 058 BPM     P-R Int : 138 ms          QRS Dur : 080 ms      QT Int : 418 ms       P-R-T Axes : 077 084 071 degrees     QTc Int : 410 ms    Sinus  bradycardia  Otherwise normal ECG  When compared with ECG of 04-APR-2011 18:12,  No significant change was found  Confirmed by Lorena Graf MD ([id number]) on 1/17/2024 1:22:38 PM    Referred By: KARLA TALAVERA           Confirmed By:Lorena Graf MD       2D ECHO:  TTE:  No results found. However, due to the size of the patient record, not all encounters were searched. Please check Results Review for a complete set of results.    JOSE G:  No results found. However, due to the size of the patient record, not all encounters were searched. Please check Results Review for a complete set of results.     Imaging     Active Cardiac Conditions: None      Revised Cardiac Risk Index   High -Risk Surgery  Intraperitoneal; Intrathoracic; suprainguinal vascular Yes- + 1 No- 0   History of Ischemic Heart Disease   (Hx of MI/positive exercise test/current chest pain due to ischemia/use of nitrate therapy/EKG with pathological Q waves) Yes- + 1 No- 0   History of CHF  (Pulmonary edema/bilateral rales or S3 gallop/PND/CXR showing pulmonary vascular redistribution) Yes- + 1 No- 0   History of CVA   (Prior stroke or TIA) Yes- + 1 No- 0   Pre-operative treatment with insulin Yes- + 1 No- 0   Pre-operative creatinine > 2mg/dl Yes- + 1 No- 0   Total:      Risk Status:  Estimated risk of cardiac complications after non-cardiac surgery using the Revised Cardiac Risk Index for Preoperative risk is 6%      ARISCAT (Canet) risk index: Low: 1.6% risk of post-op pulmonary complications.    American Society of Anesthesiologists Physical Status classification (ASA): 3           No further cardiac workup needed prior to surgery.    Outpatient Subjective & Objective

## 2024-01-18 ENCOUNTER — OFFICE VISIT (OUTPATIENT)
Dept: OTOLARYNGOLOGY | Facility: CLINIC | Age: 54
End: 2024-01-18
Payer: COMMERCIAL

## 2024-01-18 VITALS
BODY MASS INDEX: 20.98 KG/M2 | HEART RATE: 73 BPM | WEIGHT: 138 LBS | DIASTOLIC BLOOD PRESSURE: 73 MMHG | SYSTOLIC BLOOD PRESSURE: 130 MMHG

## 2024-01-18 DIAGNOSIS — M47.812 CERVICAL SPONDYLOSIS: Primary | ICD-10-CM

## 2024-01-18 DIAGNOSIS — Z98.1 HX OF FUSION OF CERVICAL SPINE: ICD-10-CM

## 2024-01-18 PROCEDURE — 3008F BODY MASS INDEX DOCD: CPT | Mod: CPTII,S$GLB,, | Performed by: NURSE PRACTITIONER

## 2024-01-18 PROCEDURE — 99203 OFFICE O/P NEW LOW 30 MIN: CPT | Mod: 25,S$GLB,, | Performed by: NURSE PRACTITIONER

## 2024-01-18 PROCEDURE — 3075F SYST BP GE 130 - 139MM HG: CPT | Mod: CPTII,S$GLB,, | Performed by: NURSE PRACTITIONER

## 2024-01-18 PROCEDURE — 3078F DIAST BP <80 MM HG: CPT | Mod: CPTII,S$GLB,, | Performed by: NURSE PRACTITIONER

## 2024-01-18 PROCEDURE — 99999 PR PBB SHADOW E&M-EST. PATIENT-LVL III: CPT | Mod: PBBFAC,,, | Performed by: NURSE PRACTITIONER

## 2024-01-18 PROCEDURE — 31575 DIAGNOSTIC LARYNGOSCOPY: CPT | Mod: S$GLB,,, | Performed by: NURSE PRACTITIONER

## 2024-01-18 PROCEDURE — 1159F MED LIST DOCD IN RCRD: CPT | Mod: CPTII,S$GLB,, | Performed by: NURSE PRACTITIONER

## 2024-01-18 NOTE — ASSESSMENT & PLAN NOTE
Rebel Rodriguez has normal vocal cord function.  No vocal cord abnormalities are seen on flexible laryngoscopy.  We discussed the possibility of injury to the recurrent laryngeal nerve during surgery.  If He has any issues with his voice post op, He should follow up with our laryngologist, Dr. Anthony Ching. He verbalized understanding.  Questions answered. He will RTC prn.

## 2024-01-18 NOTE — PROGRESS NOTES
Subjective     Patient ID: Rebel Rodriguez is a 53 y.o. male.    Chief Complaint: vocal cord evaluation    HPI    Rebel Rodriguez is a 53 y.o. male who has been referred by Dr. Maradiaga for a vocal cord evaluation.  He is planning to undergo ACDF on 1/24. He had previous ACDF on the left.  He does not currently have any voice complaints. His voice is not hoarse. There are not pitch breaks or cracks. There is not vocal fatigue. He  denies dysphagia, odynophagia, throat pain, and otalgia.  There is no hemoptysis or hematemesis.  He is breathing well.       Past Medical History:   Diagnosis Date    Achilles tendon rupture 10/09/2013    Achilles tendon rupture 10/09/2013    Allergy     Anemia 1/17/2024    Anxiety     Arthritis     psoriatric    Degenerative disc disease     Drug-induced lupus erythematosus     Drug-induced lupus erythematosus 03/09/2016    Hyperlipidemia     Inguinal lymphadenopathy 07/21/2015    Kidney stone     Medication monitoring encounter 12/07/2016    Neck pain 07/06/2017    Psoriatic arthritis     Psoriatic arthritis 12/07/2016    Recurrent fever 07/08/2015    Recurrent nephrolithiasis 05/19/2014    On low oxalate diet    Sinusitis 02/25/2016    Stroke     TIA (transient ischemic attack)     Ulcer     high school    Unexplained night sweats 07/13/2015       Past Surgical History:   Procedure Laterality Date    ACHILLES TENDON SURGERY      BACK SURGERY      FUNCTIONAL ENDOSCOPIC SINUS SURGERY (FESS) USING COMPUTER-ASSISTED NAVIGATION Bilateral 9/14/2018    Procedure: FESS, USING COMPUTER-ASSISTED NAVIGATION;  Surgeon: Gerard Manzo MD;  Location: [facility] OR [address] [place] [address];  Service: ENT;  Laterality: Bilateral;    INJECTION OF ANESTHETIC AGENT AROUND NERVE Left 5/13/2022    Procedure: Block, Nerve MEDIAL BRANCH BLOCK LEFT C2,3,4,5;  Surgeon: Kimberly Wilson MD;  Location: [facility];  Service: Pain Management;  Laterality: Left;    INJECTION OF ANESTHETIC AGENT AROUND NERVE Left 5/24/2022     Procedure: BLOCK, NERVE, LEFT C2-C5 MEDIAL BRANCH;  Surgeon: Kimberly Wilson MD;  Location: [facility] PAIN MGT;  Service: Pain Management;  Laterality: Left;    NASAL SEPTOPLASTY N/A 9/14/2018    Procedure: SEPTOPLASTY, NASAL;  Surgeon: Gerard Manzo MD;  Location: [facility] OR 2ND FLR;  Service: ENT;  Laterality: N/A;    NASAL TURBINATE REDUCTION Bilateral 9/14/2018    Procedure: REDUCTION, NASAL TURBINATE;  Surgeon: Gerard Manzo MD;  Location: [facility] OR 2ND FLR;  Service: ENT;  Laterality: Bilateral;    RADIOFREQUENCY ABLATION Left 7/12/2022    Procedure: RADIOFREQUENCY ABLATION, LEFT C2-C3, C3-C4, C4-C5;  Surgeon: Kimberly Wilson MD;  Location: [facility] PAIN MGT;  Service: Pain Management;  Laterality: Left;    UPPER ENDOSCOPY W/ ESOPHAGEAL MANOMETRY      URETERAL STENT PLACEMENT           Current Outpatient Medications:     apremilast (OTEZLA) 30 mg Tab, Take 1 tablet (30 mg total) by mouth 2 (two) times daily., Disp: 180 tablet, Rfl: 1    aspirin (ECOTRIN) 81 MG EC tablet, Take 81 mg by mouth once daily., Disp: , Rfl:     atorvastatin (LIPITOR) 10 MG tablet, Take 1 tablet (10 mg total) by mouth every evening., Disp: 90 tablet, Rfl: 3    cyclobenzaprine (FLEXERIL) 10 MG tablet, Take 1 tablet (10 mg total) by mouth 2 (two) times daily for muscle spasms and pain. Do not take with pain medication, may cause sedation, Disp: 60 tablet, Rfl: 2    FLUoxetine 40 MG capsule, Take 1 capsule (40 mg) by mouth daily, Disp: 90 capsule, Rfl: 3    guselkumab (TREMFYA) 100 mg/mL AtIn, Inject 100 mg into the skin every 8 weeks., Disp: 3 mL, Rfl: 2    mirtazapine (REMERON) 15 MG tablet, Take 1 tablet (15 mg) by mouth daily at bedtime, Disp: 90 tablet, Rfl: 3    mirtazapine (REMERON) 7.5 MG Tab, take one tablet by mouth every night at bedtime, Disp: 90 tablet, Rfl: 3    Review of patient's allergies indicates:   Allergen Reactions    Erythromycin Nausea And Vomiting    Infliximab Other (See Comments)     Lupus with fever and acute  arthritis  Lupus with fever and acute arthritis       Social History     Socioeconomic History    Marital status: Single   Occupational History    Occupation: music production     Employer: self employed   Tobacco Use    Smoking status: Never    Smokeless tobacco: Never   Substance and Sexual Activity    Alcohol use: No     Alcohol/week: 0.0 standard drinks of alcohol     Types: 1 - 2 Standard drinks or equivalent per week     Comment: cocktails    Drug use: No   Social History Narrative    1 flight     Social Determinants of Health     Financial Resource Strain: Low Risk  (12/4/2023)    Overall Financial Resource Strain (CARDIA)     Difficulty of Paying Living Expenses: Not very hard   Food Insecurity: No Food Insecurity (12/4/2023)    Hunger Vital Sign     Worried About Running Out of Food in the Last Year: Never true     Ran Out of Food in the Last Year: Never true   Transportation Needs: Unmet Transportation Needs (12/4/2023)    PRAPARE - Transportation     Lack of Transportation (Medical): Yes     Lack of Transportation (Non-Medical): Yes   Physical Activity: Sufficiently Active (12/4/2023)    Exercise Vital Sign     Days of Exercise per Week: 3 days     Minutes of Exercise per Session: 60 min   Stress: No Stress Concern Present (12/4/2023)    [employer] - Occupational Stress Questionnaire     Feeling of Stress : Only a little   Social Connections: Unknown (12/4/2023)    Social Connection and Isolation Panel [NHANES]     Frequency of Communication with Friends and Family: More than three times a week     Frequency of Social Gatherings with Friends and Family: Three times a week     Active Member of Clubs or Organizations: No     Attends Club or Organization Meetings: Never     Marital Status: Never    Housing Stability: Unknown (12/4/2023)    Housing Stability Vital Sign     Unable to Pay for Housing in the Last Year: No     Unstable Housing in the Last Year: No       Family  History   Problem Relation Age of Onset    Crohn's disease Mother     Pancreatic cancer Father     Ulcerative colitis Father     Cancer Father 61        pancreatic ca    Osteoarthritis Maternal Grandmother     Crohn's disease Maternal Grandmother     Cataracts Maternal Grandmother     Cataracts Maternal Grandfather     Cataracts Paternal Grandmother     Cataracts Paternal Grandfather     Amblyopia Neg Hx     Blindness Neg Hx          Review of Systems   Constitutional:  Negative for appetite change, chills, diaphoresis, fatigue, fever and unexpected weight change.   HENT:  Negative for nasal congestion, dental problem, drooling, ear discharge, ear pain, facial swelling, hearing loss, mouth sores, nosebleeds, postnasal drip, rhinorrhea, sinus pressure/congestion, sneezing, sore throat, tinnitus, trouble swallowing and voice change.    Eyes:  Negative for pain, discharge, redness and itching.   Respiratory:  Negative for cough and shortness of breath.    Cardiovascular:  Negative for chest pain.   Gastrointestinal:  Negative for abdominal distention, abdominal pain, diarrhea, nausea and vomiting.   Endocrine: Negative for cold intolerance and heat intolerance.   Genitourinary:  Negative for difficulty urinating.   Integumentary:  Negative for rash.   Neurological:  Negative for dizziness, weakness and headaches.   Hematological:  Negative for adenopathy.          Objective     Physical Exam  Constitutional:       General: He is not in acute distress.     Appearance: He is not ill-appearing.   HENT:      Head: Normocephalic and atraumatic.      Right Ear: External ear normal.      Left Ear: External ear normal.      Nose: Nose normal.      Mouth/Throat:      Lips: Pink. No lesions.      Mouth: Mucous membranes are moist.      Pharynx: Oropharynx is clear.      Comments: Procedure: Flexible laryngoscopy  In order to fully examine the upper aerodigestive tract, including the larynx, in a patient with a hyperactive gag  reflex, flexible endoscopy is required.  After explaining the procedure and obtaining verbal consent, a timeout was performed with the patient's participation according to the universal protocol. Both nasal cavities were anesthetized with 4% Xylocaine spray mixed with Sunny-Synephrine. The flexible laryngoscope (#[id number]) was inserted into the nasal cavity and advanced to visualize the nasal cavity, nasopharynx, the posterior oropharynx, hypopharynx, and the endolarynx with the above findings noted. The scope was removed and the procedure terminated. The patient tolerated this procedure well without apparent complication.      FINDINGS  Nasopharynx - the torus is clear. There are no lesions of the posterior wall.   Oropharynx - no lesions of the tongue base. There is no obvious fullness or asymmetry.  Hypopharynx - there are no lesions of the pyriform sinuses or postcricoid region   Larynx - there are no lesions of the supraglottic or glottic larynx. Vocal fold mobility is normal with complete closure.     Pulmonary:      Effort: Pulmonary effort is normal. No respiratory distress.      Breath sounds: No stridor.   Neurological:      General: No focal deficit present.      Mental Status: He is alert.   Psychiatric:         Mood and Affect: Mood normal.         Behavior: Behavior normal.            Assessment and Plan     1. Cervical spondylosis  Assessment & Plan:  Rebel Rodriguez has normal vocal cord function.  No vocal cord abnormalities are seen on flexible laryngoscopy.  We discussed the possibility of injury to the recurrent laryngeal nerve during surgery.  If He has any issues with his voice post op, He should follow up with our laryngologist, Dr. Anthony Ching. He verbalized understanding.  Questions answered. He will RTC prn.        2. Hx of fusion of cervical spine  -     Ambulatory referral/consult to ENT          No follow-ups on file.

## 2024-01-25 ENCOUNTER — PATIENT MESSAGE (OUTPATIENT)
Dept: NEUROSURGERY | Facility: CLINIC | Age: 54
End: 2024-01-25
Payer: COMMERCIAL

## 2024-01-29 ENCOUNTER — TELEPHONE (OUTPATIENT)
Dept: NEUROSURGERY | Facility: CLINIC | Age: 54
End: 2024-01-29
Payer: COMMERCIAL

## 2024-01-29 NOTE — TELEPHONE ENCOUNTER
Phone call to patient with pre op instructions.  Pt. Instructed to remain NPO after Midnight tonite. To bathe in hibicleanse or dial antibacterial soap tonite and tomorrow morning before arrival, and to arrive on the 2nd floor [facility] at   5 am. Pt. Verbalized understanding of above instructions. All questions answered about post op care. Also regarding travel. Pt. Verbalized understanding.

## 2024-01-29 NOTE — TELEPHONE ENCOUNTER
----- Message from Juan Miguel Vences sent at 1/29/2024  1:10 PM CST -----  Regarding: Appt  Contact: pt [phone]  Missed Callback    Pt returning callback from missed call. Requesting to speak with somebody in Dr. Maradiaga office. Please call pt @[phone]

## 2024-01-30 ENCOUNTER — ANESTHESIA EVENT (OUTPATIENT)
Dept: SURGERY | Facility: HOSPITAL | Age: 54
DRG: 003 | End: 2024-01-30
Payer: COMMERCIAL

## 2024-01-30 ENCOUNTER — ANESTHESIA (OUTPATIENT)
Dept: SURGERY | Facility: HOSPITAL | Age: 54
DRG: 003 | End: 2024-01-30
Payer: COMMERCIAL

## 2024-01-30 ENCOUNTER — HOSPITAL ENCOUNTER (INPATIENT)
Facility: HOSPITAL | Age: 54
LOS: 15 days | Discharge: NURSING, REHAB OR LONG-TERM CARE | DRG: 003 | End: 2024-02-14
Attending: NEUROLOGICAL SURGERY | Admitting: PSYCHIATRY & NEUROLOGY
Payer: COMMERCIAL

## 2024-01-30 DIAGNOSIS — R13.13 PHARYNGEAL DYSPHAGIA: ICD-10-CM

## 2024-01-30 DIAGNOSIS — G47.00 INSOMNIA, UNSPECIFIED TYPE: ICD-10-CM

## 2024-01-30 DIAGNOSIS — M53.9 SPINE DISORDER: ICD-10-CM

## 2024-01-30 DIAGNOSIS — L40.50 PSA (PSORIATIC ARTHRITIS): ICD-10-CM

## 2024-01-30 DIAGNOSIS — R44.1 VISUAL HALLUCINATIONS: ICD-10-CM

## 2024-01-30 DIAGNOSIS — M19.90 ARTHRITIS: ICD-10-CM

## 2024-01-30 DIAGNOSIS — R94.31 QT PROLONGATION: ICD-10-CM

## 2024-01-30 DIAGNOSIS — G95.9 CERVICAL MYELOPATHY: ICD-10-CM

## 2024-01-30 DIAGNOSIS — F32.A DEPRESSION, UNSPECIFIED DEPRESSION TYPE: ICD-10-CM

## 2024-01-30 DIAGNOSIS — S19.85XA: Primary | ICD-10-CM

## 2024-01-30 PROBLEM — R13.10 DYSPHAGIA: Status: ACTIVE | Noted: 2024-01-30

## 2024-01-30 LAB
ALBUMIN SERPL BCP-MCNC: 3.4 G/DL (ref 3.5–5.2)
ALLENS TEST: ABNORMAL
ALP SERPL-CCNC: 74 U/L (ref 55–135)
ALT SERPL W/O P-5'-P-CCNC: 22 U/L (ref 10–44)
ANION GAP SERPL CALC-SCNC: 9 MMOL/L (ref 8–16)
AST SERPL-CCNC: 31 U/L (ref 10–40)
BASOPHILS # BLD AUTO: 0.02 K/UL (ref 0–0.2)
BASOPHILS NFR BLD: 0.1 % (ref 0–1.9)
BILIRUB SERPL-MCNC: 0.2 MG/DL (ref 0.1–1)
BUN SERPL-MCNC: 11 MG/DL (ref 6–20)
CALCIUM SERPL-MCNC: 8.7 MG/DL (ref 8.7–10.5)
CHLORIDE SERPL-SCNC: 106 MMOL/L (ref 95–110)
CO2 SERPL-SCNC: 21 MMOL/L (ref 23–29)
CREAT SERPL-MCNC: 0.9 MG/DL (ref 0.5–1.4)
DIFFERENTIAL METHOD BLD: ABNORMAL
EOSINOPHIL # BLD AUTO: 0 K/UL (ref 0–0.5)
EOSINOPHIL NFR BLD: 0.1 % (ref 0–8)
ERYTHROCYTE [DISTWIDTH] IN BLOOD BY AUTOMATED COUNT: 14.4 % (ref 11.5–14.5)
EST. GFR  (NO RACE VARIABLE): >60 ML/MIN/1.73 M^2
GLUCOSE SERPL-MCNC: 124 MG/DL (ref 70–110)
HCO3 UR-SCNC: 24.7 MMOL/L (ref 24–28)
HCT VFR BLD AUTO: 35.7 % (ref 40–54)
HGB BLD-MCNC: 11.9 G/DL (ref 14–18)
IMM GRANULOCYTES # BLD AUTO: 0.09 K/UL (ref 0–0.04)
IMM GRANULOCYTES NFR BLD AUTO: 0.6 % (ref 0–0.5)
LYMPHOCYTES # BLD AUTO: 0.6 K/UL (ref 1–4.8)
LYMPHOCYTES NFR BLD: 3.6 % (ref 18–48)
MAGNESIUM SERPL-MCNC: 1.7 MG/DL (ref 1.6–2.6)
MCH RBC QN AUTO: 29 PG (ref 27–31)
MCHC RBC AUTO-ENTMCNC: 33.3 G/DL (ref 32–36)
MCV RBC AUTO: 87 FL (ref 82–98)
MONOCYTES # BLD AUTO: 0.1 K/UL (ref 0.3–1)
MONOCYTES NFR BLD: 0.9 % (ref 4–15)
NEUTROPHILS # BLD AUTO: 15.3 K/UL (ref 1.8–7.7)
NEUTROPHILS NFR BLD: 94.7 % (ref 38–73)
NRBC BLD-RTO: 0 /100 WBC
PCO2 BLDA: 46.4 MMHG (ref 35–45)
PH SMN: 7.33 [PH] (ref 7.35–7.45)
PHOSPHATE SERPL-MCNC: 2.1 MG/DL (ref 2.7–4.5)
PLATELET # BLD AUTO: 257 K/UL (ref 150–450)
PMV BLD AUTO: 9.3 FL (ref 9.2–12.9)
PO2 BLDA: 229 MMHG (ref 80–100)
POC BE: -1 MMOL/L
POC SATURATED O2: 100 % (ref 95–100)
POC TCO2: 26 MMOL/L (ref 23–27)
POTASSIUM SERPL-SCNC: 4 MMOL/L (ref 3.5–5.1)
PROT SERPL-MCNC: 6.1 G/DL (ref 6–8.4)
RBC # BLD AUTO: 4.11 M/UL (ref 4.6–6.2)
SAMPLE: ABNORMAL
SITE: ABNORMAL
SODIUM SERPL-SCNC: 136 MMOL/L (ref 136–145)
TRIGL SERPL-MCNC: 27 MG/DL (ref 30–150)
WBC # BLD AUTO: 16.17 K/UL (ref 3.9–12.7)

## 2024-01-30 PROCEDURE — 22853 INSJ BIOMECHANICAL DEVICE: CPT | Mod: ,,, | Performed by: NEUROLOGICAL SURGERY

## 2024-01-30 PROCEDURE — 27100171 HC OXYGEN HIGH FLOW UP TO 24 HOURS

## 2024-01-30 PROCEDURE — 25000003 PHARM REV CODE 250

## 2024-01-30 PROCEDURE — 84100 ASSAY OF PHOSPHORUS: CPT | Performed by: NURSE PRACTITIONER

## 2024-01-30 PROCEDURE — 22551 ARTHRD ANT NTRBDY CERVICAL: CPT | Mod: ,,, | Performed by: NEUROLOGICAL SURGERY

## 2024-01-30 PROCEDURE — 42900 REPAIR THROAT WOUND: CPT | Mod: ,,, | Performed by: OTOLARYNGOLOGY

## 2024-01-30 PROCEDURE — C1729 CATH, DRAINAGE: HCPCS | Performed by: NEUROLOGICAL SURGERY

## 2024-01-30 PROCEDURE — 00NW0ZZ RELEASE CERVICAL SPINAL CORD, OPEN APPROACH: ICD-10-PCS | Performed by: NEUROLOGICAL SURGERY

## 2024-01-30 PROCEDURE — 25000003 PHARM REV CODE 250: Performed by: ANESTHESIOLOGY

## 2024-01-30 PROCEDURE — 37000008 HC ANESTHESIA 1ST 15 MINUTES: Performed by: OTOLARYNGOLOGY

## 2024-01-30 PROCEDURE — 25000003 PHARM REV CODE 250: Performed by: NURSE ANESTHETIST, CERTIFIED REGISTERED

## 2024-01-30 PROCEDURE — 71000015 HC POSTOP RECOV 1ST HR: Performed by: NEUROLOGICAL SURGERY

## 2024-01-30 PROCEDURE — D9220A PRA ANESTHESIA: Mod: ANES,,, | Performed by: ANESTHESIOLOGY

## 2024-01-30 PROCEDURE — 27201037 HC PRESSURE MONITORING SET UP

## 2024-01-30 PROCEDURE — 0CJS8ZZ INSPECTION OF LARYNX, VIA NATURAL OR ARTIFICIAL OPENING ENDOSCOPIC: ICD-10-PCS | Performed by: OTOLARYNGOLOGY

## 2024-01-30 PROCEDURE — 25000003 PHARM REV CODE 250: Performed by: STUDENT IN AN ORGANIZED HEALTH CARE EDUCATION/TRAINING PROGRAM

## 2024-01-30 PROCEDURE — 27201423 OPTIME MED/SURG SUP & DEVICES STERILE SUPPLY: Performed by: NEUROLOGICAL SURGERY

## 2024-01-30 PROCEDURE — 36000706: Performed by: OTOLARYNGOLOGY

## 2024-01-30 PROCEDURE — 01N10ZZ RELEASE CERVICAL NERVE, OPEN APPROACH: ICD-10-PCS | Performed by: NEUROLOGICAL SURGERY

## 2024-01-30 PROCEDURE — 63600175 PHARM REV CODE 636 W HCPCS: Performed by: PSYCHIATRY & NEUROLOGY

## 2024-01-30 PROCEDURE — 5A1945Z RESPIRATORY VENTILATION, 24-96 CONSECUTIVE HOURS: ICD-10-PCS | Performed by: OTOLARYNGOLOGY

## 2024-01-30 PROCEDURE — 25000003 PHARM REV CODE 250: Performed by: NEUROLOGICAL SURGERY

## 2024-01-30 PROCEDURE — 0CQM0ZZ REPAIR PHARYNX, OPEN APPROACH: ICD-10-PCS | Performed by: OTOLARYNGOLOGY

## 2024-01-30 PROCEDURE — 63600175 PHARM REV CODE 636 W HCPCS: Performed by: STUDENT IN AN ORGANIZED HEALTH CARE EDUCATION/TRAINING PROGRAM

## 2024-01-30 PROCEDURE — D9220A PRA ANESTHESIA: Mod: CRNA,,, | Performed by: NURSE ANESTHETIST, CERTIFIED REGISTERED

## 2024-01-30 PROCEDURE — 63600175 PHARM REV CODE 636 W HCPCS

## 2024-01-30 PROCEDURE — 63600175 PHARM REV CODE 636 W HCPCS: Performed by: ANESTHESIOLOGY

## 2024-01-30 PROCEDURE — 99900035 HC TECH TIME PER 15 MIN (STAT)

## 2024-01-30 PROCEDURE — 85025 COMPLETE CBC W/AUTO DIFF WBC: CPT | Performed by: NURSE PRACTITIONER

## 2024-01-30 PROCEDURE — 99499 UNLISTED E&M SERVICE: CPT | Mod: ,,, | Performed by: NURSE PRACTITIONER

## 2024-01-30 PROCEDURE — 80053 COMPREHEN METABOLIC PANEL: CPT | Performed by: NURSE PRACTITIONER

## 2024-01-30 PROCEDURE — 27800903 OPTIME MED/SURG SUP & DEVICES OTHER IMPLANTS: Performed by: NEUROLOGICAL SURGERY

## 2024-01-30 PROCEDURE — 22845 INSERT SPINE FIXATION DEVICE: CPT | Mod: 59,,, | Performed by: NEUROLOGICAL SURGERY

## 2024-01-30 PROCEDURE — 27201423 OPTIME MED/SURG SUP & DEVICES STERILE SUPPLY: Performed by: OTOLARYNGOLOGY

## 2024-01-30 PROCEDURE — 20930 SP BONE ALGRFT MORSEL ADD-ON: CPT | Mod: ,,, | Performed by: NEUROLOGICAL SURGERY

## 2024-01-30 PROCEDURE — 37000009 HC ANESTHESIA EA ADD 15 MINS: Performed by: OTOLARYNGOLOGY

## 2024-01-30 PROCEDURE — 25000003 PHARM REV CODE 250: Performed by: PSYCHIATRY & NEUROLOGY

## 2024-01-30 PROCEDURE — 0RB30ZZ EXCISION OF CERVICAL VERTEBRAL DISC, OPEN APPROACH: ICD-10-PCS | Performed by: NEUROLOGICAL SURGERY

## 2024-01-30 PROCEDURE — 37000009 HC ANESTHESIA EA ADD 15 MINS: Performed by: NEUROLOGICAL SURGERY

## 2024-01-30 PROCEDURE — 20000000 HC ICU ROOM

## 2024-01-30 PROCEDURE — 84478 ASSAY OF TRIGLYCERIDES: CPT

## 2024-01-30 PROCEDURE — 0RG10A0 FUSION OF CERVICAL VERTEBRAL JOINT WITH INTERBODY FUSION DEVICE, ANTERIOR APPROACH, ANTERIOR COLUMN, OPEN APPROACH: ICD-10-PCS | Performed by: NEUROLOGICAL SURGERY

## 2024-01-30 PROCEDURE — D9220A PRA ANESTHESIA: Mod: 59,ANES,, | Performed by: ANESTHESIOLOGY

## 2024-01-30 PROCEDURE — 99291 CRITICAL CARE FIRST HOUR: CPT | Mod: FS,,, | Performed by: PSYCHIATRY & NEUROLOGY

## 2024-01-30 PROCEDURE — 36000707: Performed by: OTOLARYNGOLOGY

## 2024-01-30 PROCEDURE — 36600 WITHDRAWAL OF ARTERIAL BLOOD: CPT

## 2024-01-30 PROCEDURE — 99900026 HC AIRWAY MAINTENANCE (STAT)

## 2024-01-30 PROCEDURE — C1713 ANCHOR/SCREW BN/BN,TIS/BN: HCPCS | Performed by: NEUROLOGICAL SURGERY

## 2024-01-30 PROCEDURE — 36000710: Performed by: NEUROLOGICAL SURGERY

## 2024-01-30 PROCEDURE — 71000016 HC POSTOP RECOV ADDL HR: Performed by: NEUROLOGICAL SURGERY

## 2024-01-30 PROCEDURE — 82803 BLOOD GASES ANY COMBINATION: CPT

## 2024-01-30 PROCEDURE — 36000711: Performed by: NEUROLOGICAL SURGERY

## 2024-01-30 PROCEDURE — 94761 N-INVAS EAR/PLS OXIMETRY MLT: CPT

## 2024-01-30 PROCEDURE — 83735 ASSAY OF MAGNESIUM: CPT | Performed by: NURSE PRACTITIONER

## 2024-01-30 PROCEDURE — 63600175 PHARM REV CODE 636 W HCPCS: Performed by: NURSE ANESTHETIST, CERTIFIED REGISTERED

## 2024-01-30 PROCEDURE — 37000008 HC ANESTHESIA 1ST 15 MINUTES: Performed by: NEUROLOGICAL SURGERY

## 2024-01-30 PROCEDURE — 94002 VENT MGMT INPAT INIT DAY: CPT

## 2024-01-30 PROCEDURE — 63600175 PHARM REV CODE 636 W HCPCS: Performed by: NEUROLOGICAL SURGERY

## 2024-01-30 PROCEDURE — D9220A PRA ANESTHESIA: Mod: 59,CRNA,, | Performed by: NURSE ANESTHETIST, CERTIFIED REGISTERED

## 2024-01-30 PROCEDURE — 71000033 HC RECOVERY, INTIAL HOUR: Performed by: NEUROLOGICAL SURGERY

## 2024-01-30 DEVICE — PUTTY OSTEOSELECT IN SYR 1CC: Type: IMPLANTABLE DEVICE | Site: SPINE CERVICAL | Status: FUNCTIONAL

## 2024-01-30 DEVICE — IMPLANTABLE DEVICE: Type: IMPLANTABLE DEVICE | Site: SPINE CERVICAL | Status: FUNCTIONAL

## 2024-01-30 DEVICE — SCREW CERV SPINE SD 3.7X16MM: Type: IMPLANTABLE DEVICE | Site: SPINE CERVICAL | Status: FUNCTIONAL

## 2024-01-30 RX ORDER — FENTANYL CITRATE 50 UG/ML
INJECTION, SOLUTION INTRAMUSCULAR; INTRAVENOUS
Status: DISCONTINUED | OUTPATIENT
Start: 2024-01-30 | End: 2024-01-30

## 2024-01-30 RX ORDER — FENTANYL CITRATE-0.9 % NACL/PF 10 MCG/ML
0-200 PLASTIC BAG, INJECTION (ML) INTRAVENOUS CONTINUOUS
Status: DISCONTINUED | OUTPATIENT
Start: 2024-01-30 | End: 2024-02-03

## 2024-01-30 RX ORDER — SODIUM CHLORIDE 9 MG/ML
INJECTION, SOLUTION INTRAVENOUS CONTINUOUS
Status: DISCONTINUED | OUTPATIENT
Start: 2024-01-30 | End: 2024-02-01

## 2024-01-30 RX ORDER — CEFAZOLIN SODIUM 1 G/3ML
INJECTION, POWDER, FOR SOLUTION INTRAMUSCULAR; INTRAVENOUS
Status: DISCONTINUED | OUTPATIENT
Start: 2024-01-30 | End: 2024-01-30

## 2024-01-30 RX ORDER — FENTANYL CITRATE 50 UG/ML
INJECTION, SOLUTION INTRAMUSCULAR; INTRAVENOUS
Status: DISCONTINUED
Start: 2024-01-30 | End: 2024-01-31

## 2024-01-30 RX ORDER — LIDOCAINE HYDROCHLORIDE 20 MG/ML
INJECTION INTRAVENOUS
Status: DISCONTINUED | OUTPATIENT
Start: 2024-01-30 | End: 2024-01-30

## 2024-01-30 RX ORDER — MORPHINE SULFATE 2 MG/ML
2 INJECTION, SOLUTION INTRAMUSCULAR; INTRAVENOUS
Status: DISCONTINUED | OUTPATIENT
Start: 2024-01-30 | End: 2024-01-31

## 2024-01-30 RX ORDER — HYDROMORPHONE HYDROCHLORIDE 1 MG/ML
0.2 INJECTION, SOLUTION INTRAMUSCULAR; INTRAVENOUS; SUBCUTANEOUS EVERY 5 MIN PRN
Status: DISCONTINUED | OUTPATIENT
Start: 2024-01-30 | End: 2024-01-30 | Stop reason: HOSPADM

## 2024-01-30 RX ORDER — DEXAMETHASONE SODIUM PHOSPHATE 4 MG/ML
6 INJECTION, SOLUTION INTRA-ARTICULAR; INTRALESIONAL; INTRAMUSCULAR; INTRAVENOUS; SOFT TISSUE ONCE
Status: COMPLETED | OUTPATIENT
Start: 2024-01-30 | End: 2024-01-30

## 2024-01-30 RX ORDER — LANOLIN ALCOHOL/MO/W.PET/CERES
800 CREAM (GRAM) TOPICAL
Status: DISCONTINUED | OUTPATIENT
Start: 2024-01-30 | End: 2024-01-31

## 2024-01-30 RX ORDER — METRONIDAZOLE 500 MG/100ML
500 INJECTION, SOLUTION INTRAVENOUS
Status: CANCELLED | OUTPATIENT
Start: 2024-01-30

## 2024-01-30 RX ORDER — ONDANSETRON HYDROCHLORIDE 2 MG/ML
INJECTION, SOLUTION INTRAVENOUS
Status: DISCONTINUED | OUTPATIENT
Start: 2024-01-30 | End: 2024-01-30

## 2024-01-30 RX ORDER — DEXAMETHASONE SODIUM PHOSPHATE 4 MG/ML
4 INJECTION, SOLUTION INTRA-ARTICULAR; INTRALESIONAL; INTRAMUSCULAR; INTRAVENOUS; SOFT TISSUE EVERY 6 HOURS
Status: DISCONTINUED | OUTPATIENT
Start: 2024-01-30 | End: 2024-02-01

## 2024-01-30 RX ORDER — ONDANSETRON HYDROCHLORIDE 2 MG/ML
4 INJECTION, SOLUTION INTRAVENOUS ONCE AS NEEDED
Status: DISCONTINUED | OUTPATIENT
Start: 2024-01-30 | End: 2024-01-30 | Stop reason: HOSPADM

## 2024-01-30 RX ORDER — ONDANSETRON HYDROCHLORIDE 2 MG/ML
4 INJECTION, SOLUTION INTRAVENOUS EVERY 6 HOURS PRN
Status: DISCONTINUED | OUTPATIENT
Start: 2024-01-30 | End: 2024-02-14 | Stop reason: HOSPADM

## 2024-01-30 RX ORDER — METHYLPREDNISOLONE ACETATE 40 MG/ML
INJECTION, SUSPENSION INTRA-ARTICULAR; INTRALESIONAL; INTRAMUSCULAR; SOFT TISSUE
Status: DISCONTINUED | OUTPATIENT
Start: 2024-01-30 | End: 2024-01-30 | Stop reason: HOSPADM

## 2024-01-30 RX ORDER — BISACODYL 10 MG/1
10 SUPPOSITORY RECTAL DAILY
Status: DISCONTINUED | OUTPATIENT
Start: 2024-01-30 | End: 2024-02-01

## 2024-01-30 RX ORDER — KETAMINE HCL IN 0.9 % NACL 50 MG/5 ML
SYRINGE (ML) INTRAVENOUS
Status: DISCONTINUED | OUTPATIENT
Start: 2024-01-30 | End: 2024-01-30

## 2024-01-30 RX ORDER — MUPIROCIN 20 MG/G
OINTMENT TOPICAL
Status: DISCONTINUED | OUTPATIENT
Start: 2024-01-30 | End: 2024-01-30 | Stop reason: HOSPADM

## 2024-01-30 RX ORDER — PHENYLEPHRINE HYDROCHLORIDE 10 MG/ML
INJECTION INTRAVENOUS CONTINUOUS PRN
Status: DISCONTINUED | OUTPATIENT
Start: 2024-01-30 | End: 2024-01-30

## 2024-01-30 RX ORDER — MUPIROCIN 20 MG/G
OINTMENT TOPICAL 2 TIMES DAILY
Status: DISPENSED | OUTPATIENT
Start: 2024-01-30 | End: 2024-02-04

## 2024-01-30 RX ORDER — MIRTAZAPINE 7.5 MG/1
15 TABLET, FILM COATED ORAL NIGHTLY
Status: DISCONTINUED | OUTPATIENT
Start: 2024-01-30 | End: 2024-01-31

## 2024-01-30 RX ORDER — SUCCINYLCHOLINE CHLORIDE 20 MG/ML
INJECTION INTRAMUSCULAR; INTRAVENOUS
Status: DISCONTINUED | OUTPATIENT
Start: 2024-01-30 | End: 2024-01-30

## 2024-01-30 RX ORDER — LIDOCAINE HYDROCHLORIDE AND EPINEPHRINE 10; 10 MG/ML; UG/ML
INJECTION, SOLUTION INFILTRATION; PERINEURAL
Status: DISCONTINUED | OUTPATIENT
Start: 2024-01-30 | End: 2024-01-30 | Stop reason: HOSPADM

## 2024-01-30 RX ORDER — ACETAMINOPHEN 325 MG/1
650 TABLET ORAL EVERY 6 HOURS PRN
Status: DISCONTINUED | OUTPATIENT
Start: 2024-01-30 | End: 2024-01-31

## 2024-01-30 RX ORDER — AMOXICILLIN 250 MG
2 CAPSULE ORAL NIGHTLY PRN
Status: DISCONTINUED | OUTPATIENT
Start: 2024-01-30 | End: 2024-01-30

## 2024-01-30 RX ORDER — OXYCODONE HYDROCHLORIDE 5 MG/1
5 TABLET ORAL EVERY 4 HOURS PRN
Status: DISCONTINUED | OUTPATIENT
Start: 2024-01-30 | End: 2024-01-31

## 2024-01-30 RX ORDER — FENTANYL CITRATE 50 UG/ML
100 INJECTION, SOLUTION INTRAMUSCULAR; INTRAVENOUS ONCE
Status: COMPLETED | OUTPATIENT
Start: 2024-01-30 | End: 2024-01-30

## 2024-01-30 RX ORDER — HYDROMORPHONE HYDROCHLORIDE 1 MG/ML
INJECTION, SOLUTION INTRAMUSCULAR; INTRAVENOUS; SUBCUTANEOUS
Status: COMPLETED
Start: 2024-01-30 | End: 2024-01-30

## 2024-01-30 RX ORDER — PROPOFOL 10 MG/ML
VIAL (ML) INTRAVENOUS
Status: DISCONTINUED | OUTPATIENT
Start: 2024-01-30 | End: 2024-01-30

## 2024-01-30 RX ORDER — MIDAZOLAM HYDROCHLORIDE 1 MG/ML
INJECTION, SOLUTION INTRAMUSCULAR; INTRAVENOUS
Status: DISCONTINUED | OUTPATIENT
Start: 2024-01-30 | End: 2024-01-30

## 2024-01-30 RX ORDER — DEXMEDETOMIDINE HYDROCHLORIDE 100 UG/ML
INJECTION, SOLUTION INTRAVENOUS
Status: DISCONTINUED | OUTPATIENT
Start: 2024-01-30 | End: 2024-01-30

## 2024-01-30 RX ORDER — DEXAMETHASONE SODIUM PHOSPHATE 4 MG/ML
INJECTION, SOLUTION INTRA-ARTICULAR; INTRALESIONAL; INTRAMUSCULAR; INTRAVENOUS; SOFT TISSUE
Status: DISCONTINUED | OUTPATIENT
Start: 2024-01-30 | End: 2024-01-30

## 2024-01-30 RX ORDER — SODIUM,POTASSIUM PHOSPHATES 280-250MG
2 POWDER IN PACKET (EA) ORAL
Status: DISCONTINUED | OUTPATIENT
Start: 2024-01-30 | End: 2024-01-31

## 2024-01-30 RX ORDER — LORAZEPAM 2 MG/ML
0.25 INJECTION INTRAMUSCULAR ONCE AS NEEDED
Status: DISCONTINUED | OUTPATIENT
Start: 2024-01-30 | End: 2024-01-30 | Stop reason: HOSPADM

## 2024-01-30 RX ORDER — FLUOXETINE HYDROCHLORIDE 20 MG/1
40 CAPSULE ORAL DAILY
Status: DISCONTINUED | OUTPATIENT
Start: 2024-01-30 | End: 2024-01-31

## 2024-01-30 RX ORDER — SODIUM CHLORIDE 9 MG/ML
INJECTION, SOLUTION INTRAVENOUS CONTINUOUS
Status: DISCONTINUED | OUTPATIENT
Start: 2024-01-30 | End: 2024-01-30

## 2024-01-30 RX ORDER — ONDANSETRON 8 MG/1
8 TABLET, ORALLY DISINTEGRATING ORAL EVERY 6 HOURS PRN
Status: DISCONTINUED | OUTPATIENT
Start: 2024-01-30 | End: 2024-01-30

## 2024-01-30 RX ORDER — AMOXICILLIN 250 MG
1 CAPSULE ORAL 2 TIMES DAILY
Status: DISCONTINUED | OUTPATIENT
Start: 2024-01-30 | End: 2024-01-31

## 2024-01-30 RX ORDER — PROPOFOL 10 MG/ML
0-50 INJECTION, EMULSION INTRAVENOUS CONTINUOUS
Status: DISCONTINUED | OUTPATIENT
Start: 2024-01-30 | End: 2024-01-30

## 2024-01-30 RX ORDER — MUPIROCIN 20 MG/G
1 OINTMENT TOPICAL 2 TIMES DAILY
Status: DISCONTINUED | OUTPATIENT
Start: 2024-01-30 | End: 2024-01-30

## 2024-01-30 RX ORDER — ALUMINUM HYDROXIDE, MAGNESIUM HYDROXIDE, AND SIMETHICONE 1200; 120; 1200 MG/30ML; MG/30ML; MG/30ML
30 SUSPENSION ORAL EVERY 4 HOURS PRN
Status: DISCONTINUED | OUTPATIENT
Start: 2024-01-30 | End: 2024-01-30

## 2024-01-30 RX ORDER — SODIUM CHLORIDE 0.9 % (FLUSH) 0.9 %
3 SYRINGE (ML) INJECTION
Status: DISCONTINUED | OUTPATIENT
Start: 2024-01-30 | End: 2024-01-30

## 2024-01-30 RX ORDER — FAMOTIDINE 20 MG/1
20 TABLET, FILM COATED ORAL 2 TIMES DAILY
Status: DISCONTINUED | OUTPATIENT
Start: 2024-01-30 | End: 2024-01-31

## 2024-01-30 RX ORDER — HALOPERIDOL 5 MG/ML
0.5 INJECTION INTRAMUSCULAR EVERY 10 MIN PRN
Status: DISCONTINUED | OUTPATIENT
Start: 2024-01-30 | End: 2024-01-30

## 2024-01-30 RX ORDER — PROCHLORPERAZINE EDISYLATE 5 MG/ML
5 INJECTION INTRAMUSCULAR; INTRAVENOUS EVERY 6 HOURS PRN
Status: DISCONTINUED | OUTPATIENT
Start: 2024-01-30 | End: 2024-02-14 | Stop reason: HOSPADM

## 2024-01-30 RX ORDER — ROCURONIUM BROMIDE 10 MG/ML
INJECTION, SOLUTION INTRAVENOUS
Status: DISCONTINUED | OUTPATIENT
Start: 2024-01-30 | End: 2024-01-30

## 2024-01-30 RX ADMIN — HYDROMORPHONE HYDROCHLORIDE 0.2 MG: 1 INJECTION, SOLUTION INTRAMUSCULAR; INTRAVENOUS; SUBCUTANEOUS at 12:01

## 2024-01-30 RX ADMIN — MUPIROCIN: 20 OINTMENT TOPICAL at 10:01

## 2024-01-30 RX ADMIN — OXYCODONE 5 MG: 5 TABLET ORAL at 11:01

## 2024-01-30 RX ADMIN — METHOCARBAMOL 500 MG: 100 INJECTION, SOLUTION INTRAMUSCULAR; INTRAVENOUS at 10:01

## 2024-01-30 RX ADMIN — DEXAMETHASONE SODIUM PHOSPHATE 6 MG: 4 INJECTION INTRA-ARTICULAR; INTRALESIONAL; INTRAMUSCULAR; INTRAVENOUS; SOFT TISSUE at 12:01

## 2024-01-30 RX ADMIN — PHENYLEPHRINE HYDROCHLORIDE 0.3 MCG/KG/MIN: 10 INJECTION INTRAVENOUS at 07:01

## 2024-01-30 RX ADMIN — ROCURONIUM BROMIDE 40 MG: 10 INJECTION, SOLUTION INTRAVENOUS at 06:01

## 2024-01-30 RX ADMIN — ONDANSETRON 4 MG: 2 INJECTION INTRAMUSCULAR; INTRAVENOUS at 09:01

## 2024-01-30 RX ADMIN — SODIUM CHLORIDE, SODIUM GLUCONATE, SODIUM ACETATE, POTASSIUM CHLORIDE, MAGNESIUM CHLORIDE, SODIUM PHOSPHATE, DIBASIC, AND POTASSIUM PHOSPHATE: .53; .5; .37; .037; .03; .012; .00082 INJECTION, SOLUTION INTRAVENOUS at 06:01

## 2024-01-30 RX ADMIN — MIDAZOLAM HYDROCHLORIDE 1 MG: 1 INJECTION, SOLUTION INTRAMUSCULAR; INTRAVENOUS at 06:01

## 2024-01-30 RX ADMIN — LIDOCAINE HYDROCHLORIDE 100 MG: 20 INJECTION INTRAVENOUS at 06:01

## 2024-01-30 RX ADMIN — MIDAZOLAM HYDROCHLORIDE 2 MG: 1 INJECTION, SOLUTION INTRAMUSCULAR; INTRAVENOUS at 07:01

## 2024-01-30 RX ADMIN — FENTANYL CITRATE 100 MCG: 50 INJECTION, SOLUTION INTRAMUSCULAR; INTRAVENOUS at 06:01

## 2024-01-30 RX ADMIN — DEXAMETHASONE SODIUM PHOSPHATE 4 MG: 4 INJECTION INTRA-ARTICULAR; INTRALESIONAL; INTRAMUSCULAR; INTRAVENOUS; SOFT TISSUE at 05:01

## 2024-01-30 RX ADMIN — PROPOFOL 30 MCG/KG/MIN: 10 INJECTION, EMULSION INTRAVENOUS at 07:01

## 2024-01-30 RX ADMIN — DEXMEDETOMIDINE 4 MCG: 100 INJECTION, SOLUTION, CONCENTRATE INTRAVENOUS at 10:01

## 2024-01-30 RX ADMIN — SUGAMMADEX 200 MG: 100 INJECTION, SOLUTION INTRAVENOUS at 07:01

## 2024-01-30 RX ADMIN — SODIUM CHLORIDE 0.2 MCG/KG/MIN: 9 INJECTION, SOLUTION INTRAVENOUS at 07:01

## 2024-01-30 RX ADMIN — AMPICILLIN SODIUM, SULBACTAM SODIUM 3 G: 2; 1 INJECTION, POWDER, FOR SOLUTION INTRAMUSCULAR; INTRAVENOUS at 09:01

## 2024-01-30 RX ADMIN — CEFAZOLIN 2 G: 330 INJECTION, POWDER, FOR SOLUTION INTRAMUSCULAR; INTRAVENOUS at 08:01

## 2024-01-30 RX ADMIN — MORPHINE SULFATE 2 MG: 2 INJECTION, SOLUTION INTRAMUSCULAR; INTRAVENOUS at 02:01

## 2024-01-30 RX ADMIN — HYDROMORPHONE HYDROCHLORIDE 0.2 MG: 1 INJECTION, SOLUTION INTRAMUSCULAR; INTRAVENOUS; SUBCUTANEOUS at 10:01

## 2024-01-30 RX ADMIN — HYDROMORPHONE HYDROCHLORIDE 0.2 MG: 1 INJECTION, SOLUTION INTRAMUSCULAR; INTRAVENOUS; SUBCUTANEOUS at 11:01

## 2024-01-30 RX ADMIN — SUCCINYLCHOLINE CHLORIDE 160 MG: 20 INJECTION, SOLUTION INTRAMUSCULAR; INTRAVENOUS at 06:01

## 2024-01-30 RX ADMIN — Medication 30 MG: at 08:01

## 2024-01-30 RX ADMIN — SODIUM CHLORIDE: 9 INJECTION, SOLUTION INTRAVENOUS at 07:01

## 2024-01-30 RX ADMIN — FENTANYL CITRATE 100 MCG: 50 INJECTION INTRAMUSCULAR; INTRAVENOUS at 08:01

## 2024-01-30 RX ADMIN — FENTANYL CITRATE 100 MCG: 50 INJECTION, SOLUTION INTRAMUSCULAR; INTRAVENOUS at 07:01

## 2024-01-30 RX ADMIN — Medication 25 MCG/HR: at 08:01

## 2024-01-30 RX ADMIN — PROPOFOL 160 MG: 10 INJECTION, EMULSION INTRAVENOUS at 07:01

## 2024-01-30 RX ADMIN — SODIUM CHLORIDE: 9 INJECTION, SOLUTION INTRAVENOUS at 04:01

## 2024-01-30 RX ADMIN — MUPIROCIN: 20 OINTMENT TOPICAL at 06:01

## 2024-01-30 RX ADMIN — LIDOCAINE HYDROCHLORIDE 60 MG: 20 INJECTION INTRAVENOUS at 07:01

## 2024-01-30 RX ADMIN — PROPOFOL 40 MG: 10 INJECTION, EMULSION INTRAVENOUS at 08:01

## 2024-01-30 RX ADMIN — DEXMEDETOMIDINE 4 MCG: 100 INJECTION, SOLUTION, CONCENTRATE INTRAVENOUS at 09:01

## 2024-01-30 RX ADMIN — SODIUM CHLORIDE, SODIUM GLUCONATE, SODIUM ACETATE, POTASSIUM CHLORIDE, MAGNESIUM CHLORIDE, SODIUM PHOSPHATE, DIBASIC, AND POTASSIUM PHOSPHATE: .53; .5; .37; .037; .03; .012; .00082 INJECTION, SOLUTION INTRAVENOUS at 07:01

## 2024-01-30 RX ADMIN — SUCCINYLCHOLINE CHLORIDE 60 MG: 20 INJECTION, SOLUTION INTRAMUSCULAR; INTRAVENOUS at 07:01

## 2024-01-30 RX ADMIN — ROCURONIUM BROMIDE 10 MG: 10 INJECTION, SOLUTION INTRAVENOUS at 06:01

## 2024-01-30 RX ADMIN — DEXMEDETOMIDINE 12 MCG: 100 INJECTION, SOLUTION, CONCENTRATE INTRAVENOUS at 07:01

## 2024-01-30 RX ADMIN — CEFAZOLIN 2 G: 2 INJECTION, POWDER, FOR SOLUTION INTRAMUSCULAR; INTRAVENOUS at 04:01

## 2024-01-30 RX ADMIN — AMPICILLIN SODIUM AND SULBACTAM SODIUM 3 G: 2; 1 INJECTION, POWDER, FOR SOLUTION INTRAMUSCULAR; INTRAVENOUS at 06:01

## 2024-01-30 RX ADMIN — VANCOMYCIN HYDROCHLORIDE 1250 MG: 1.25 INJECTION, POWDER, LYOPHILIZED, FOR SOLUTION INTRAVENOUS at 08:01

## 2024-01-30 RX ADMIN — MORPHINE SULFATE 2 MG: 2 INJECTION, SOLUTION INTRAMUSCULAR; INTRAVENOUS at 09:01

## 2024-01-30 RX ADMIN — DEXAMETHASONE SODIUM PHOSPHATE 8 MG: 4 INJECTION, SOLUTION INTRAMUSCULAR; INTRAVENOUS at 08:01

## 2024-01-30 RX ADMIN — ACETAMINOPHEN 650 MG: 325 TABLET ORAL at 11:01

## 2024-01-30 NOTE — BRIEF OP NOTE
[facility] - Surgery ([facility])  Brief Operative Note    SUMMARY     Surgery Date: 1/30/2024     Surgeon(s) and Role:     * Rogerio Maradiaga MD - Primary     * Shyla Davenport MD - Fellow    Assisting Surgeon: None    Pre-op Diagnosis:  DDD (degenerative disc disease), cervical [M50.30]  Cervical spinal stenosis [M48.02]  Myelopathy [G95.9]    Post-op Diagnosis:  Post-Op Diagnosis Codes:     * DDD (degenerative disc disease), cervical [M50.30]     * Cervical spinal stenosis [M48.02]     * Myelopathy [G95.9]    Procedure(s) (LRB):  C3-4 anterior cervical discectomy and fusion (N/A)    Anesthesia: General    Implants:  Implant Name Type Inv. Item Serial No.  Lot No. LRB No. Used Action   PUTTY OSTEOSELECT IN SYR [facility] - [id number]  PUTTY OSTEOSELECT IN SYR [facility] [id number] BACTERIN  N/A 1 Implanted   SPACER ZERO-P LORDOTIC 6MM - [id number]  SPACER ZERO-P LORDOTIC 6MM  [employer] [id number] N/A 1 Implanted   SCREW CERV SPINE SD 3.7X16MM - [id number]  SCREW CERV SPINE SD 3.7X16MM  SYNTHES  N/A 4 Implanted       Operative Findings:   C3-C4 stenosis        Estimated Blood Loss: 20 mL    Specimens:   Specimen (24h ago, onward)      None            [id number]

## 2024-01-30 NOTE — ASSESSMENT & PLAN NOTE
Mr Rodriguez is a 55 yo male who is s/p C3-4 ACDF surgery today with post op dysphagia, odynophagia, neck pain, swelling and crepitus. CT and scope demonstrates DELFIN drain in the hypopharynx. Patient with difficulty with swallowing secretions. No respiratory compromise.    - Class A for neck exploration with repair of pharyngeal injury  - Plan to keep intubated post op overnight, can wean to extubate tomorrow.  - Will place dobhoff, and keep NPO for 5 days post op  - Consented  - NPO  - Rest of care for per primary  - Please page ENT with questions

## 2024-01-30 NOTE — NURSING
Notified SONAL José with NSGY team of neck circumference measuring 413mm (41.3cm) when arrived in [facility], slight crepidus palpated just below incision, and patient c/o difficulty swallowing. Verbal instruction received to measure neck circumference again in 30 minutes and call back, she will come to bedside soon. Will continue to monitor closely.

## 2024-01-30 NOTE — SUBJECTIVE & OBJECTIVE
Past Medical History:   Diagnosis Date    Achilles tendon rupture 10/09/2013    Achilles tendon rupture 10/09/2013    Allergy     Anemia 1/17/2024    Anxiety     Arthritis     psoriatric    Degenerative disc disease     Drug-induced lupus erythematosus     Drug-induced lupus erythematosus 03/09/2016    Hyperlipidemia     Inguinal lymphadenopathy 07/21/2015    Kidney stone     Medication monitoring encounter 12/07/2016    Neck pain 07/06/2017    Psoriatic arthritis     Psoriatic arthritis 12/07/2016    Recurrent fever 07/08/2015    Recurrent nephrolithiasis 05/19/2014    On low oxalate diet    Sinusitis 02/25/2016    Stroke     TIA (transient ischemic attack)     Ulcer     high school    Unexplained night sweats 07/13/2015     Past Surgical History:   Procedure Laterality Date    ACHILLES TENDON SURGERY      BACK SURGERY      FUNCTIONAL ENDOSCOPIC SINUS SURGERY (FESS) USING COMPUTER-ASSISTED NAVIGATION Bilateral 9/14/2018    Procedure: FESS, USING COMPUTER-ASSISTED NAVIGATION;  Surgeon: Gerard Manzo MD;  Location: [facility] OR [address] [place] [address];  Service: ENT;  Laterality: Bilateral;    INJECTION OF ANESTHETIC AGENT AROUND NERVE Left 5/13/2022    Procedure: Block, Nerve MEDIAL BRANCH BLOCK LEFT C2,3,4,5;  Surgeon: Kimberly Wilson MD;  Location: [facility] PAIN MGT;  Service: Pain Management;  Laterality: Left;    INJECTION OF ANESTHETIC AGENT AROUND NERVE Left 5/24/2022    Procedure: BLOCK, NERVE, LEFT C2-C5 MEDIAL BRANCH;  Surgeon: Kimberly Wilson MD;  Location: [facility] PAIN MGT;  Service: Pain Management;  Laterality: Left;    NASAL SEPTOPLASTY N/A 9/14/2018    Procedure: SEPTOPLASTY, NASAL;  Surgeon: Gerard Manzo MD;  [facility];  Service: ENT;  Laterality: N/A;    NASAL TURBINATE REDUCTION Bilateral 9/14/2018    Procedure: REDUCTION, NASAL TURBINATE;  Surgeon: Gerard Manzo MD;  [facility];  Service: ENT;  Laterality: Bilateral;    RADIOFREQUENCY ABLATION Left 7/12/2022    Procedure:  RADIOFREQUENCY ABLATION, LEFT C2-C3, C3-C4, C4-C5;  Surgeon: Kimberly Wilson MD;  Location: [facility];  Service: Pain Management;  Laterality: Left;    UPPER ENDOSCOPY W/ ESOPHAGEAL MANOMETRY      URETERAL STENT PLACEMENT        No current facility-administered medications on file prior to encounter.     Current Outpatient Medications on File Prior to Encounter   Medication Sig Dispense Refill    apremilast (OTEZLA) 30 mg Tab Take 1 tablet (30 mg total) by mouth 2 (two) times daily. 180 tablet 1    aspirin (ECOTRIN) 81 MG EC tablet Take 81 mg by mouth once daily.      FLUoxetine 40 MG capsule Take 1 capsule (40 mg) by mouth daily 90 capsule 3    mirtazapine (REMERON) 15 MG tablet Take 1 tablet (15 mg) by mouth daily at bedtime 90 tablet 3    [DISCONTINUED] cyclobenzaprine (FLEXERIL) 10 MG tablet Take 1 tablet (10 mg total) by mouth 2 (two) times daily for muscle spasms and pain. Do not take with pain medication, may cause sedation 60 tablet 2    [DISCONTINUED] mirtazapine (REMERON) 7.5 MG Tab take one tablet by mouth every night at bedtime 90 tablet 3    guselkumab (TREMFYA) 100 mg/mL AtIn Inject 100 mg into the skin every 8 weeks. 3 mL 2      Allergies: Erythromycin and Infliximab  Family History   Problem Relation Age of Onset    Crohn's disease Mother     Pancreatic cancer Father     Ulcerative colitis Father     Cancer Father 61        pancreatic ca    Osteoarthritis Maternal Grandmother     Crohn's disease Maternal Grandmother     Cataracts Maternal Grandmother     Cataracts Maternal Grandfather     Cataracts Paternal Grandmother     Cataracts Paternal Grandfather     Amblyopia Neg Hx     Blindness Neg Hx      Social History     Tobacco Use    Smoking status: Never    Smokeless tobacco: Never   Substance Use Topics    Alcohol use: No     Alcohol/week: 0.0 standard drinks of alcohol     Types: 1 - 2 Standard drinks or equivalent per week     Comment: cocktails    Drug use: No     Review of  Systems  Objective:     Vitals:    Temp: 97.9 °F (36.6 °C)  Pulse: 62  BP: 106/61  MAP (mmHg): 79  Resp: 12  SpO2: 97 %    Temp  Min: 97.5 °F (36.4 °C)  Max: 98.4 °F (36.9 °C)  Pulse  Min: 62  Max: 87  BP  Min: 106/61  Max: 136/65  MAP (mmHg)  Min: 79  Max: 92  Resp  Min: 12  Max: 21  SpO2  Min: 97 %  Max: 100 %    No intake/output data recorded.            Physical Exam  GA: Alert, comfortable, no acute distress.   HEENT: No scleral icterus or JVD.   Pulmonary: Clear to auscultation A/L.   Cardiac: RRR S1 & S2 w/o rubs/murmurs/gallops.   Abdominal: Bowel sounds present x 4. No appreciable hepatosplenomegaly.  Skin: No jaundice, rashes, or visible lesions.  Neuro:  --GCS: E4 V5 M6  --Mental Status:  awake oriented X4, follows commands, fluent speech  --CN II-XII grossly intact.   --Pupils 2mm, PERRL.   --Corneal reflex, gag, cough intact.  --ANTUNEZ spont and against gravity       Today I personally reviewed pertinent medications, lines/drains/airways, imaging, cardiology results, laboratory results, microbiology results,

## 2024-01-30 NOTE — HPI
Rebel Rodriguez is a 53 y.o. male with hx C4-7 ACDF, cervical myelopathy due to C3-4 severe stenosis now s/p C3-4 ACDF today. ENT consulted due to dysphagia. Post op CT scan showed surgical drain in the hypopharynx. Patient denies difficulty breathing or voice changes. He endorses odynophagia and pain the right neck. Neck circumference is being measured by nursing q1h due to swelling which has slightly increased from to 40.3cm to 41.3cm over 3 hours.

## 2024-01-30 NOTE — SUBJECTIVE & OBJECTIVE
Medications:  Continuous Infusions:   sodium chloride 0.9% 25 mL/hr at 01/30/24 1650     Scheduled Meds:   bisacodyL  10 mg Rectal Daily    ceFAZolin (Ancef) IV (PEDS and ADULTS)  2 g Intravenous Q8H    dexAMETHasone  4 mg Intravenous Q6H    famotidine  20 mg Oral BID    FLUoxetine  40 mg Oral Daily    mirtazapine  15 mg Oral QHS    mupirocin   Nasal BID    senna-docusate 8.6-50 mg  1 tablet Oral BID     PRN Meds:morphine, ondansetron, oxyCODONE, prochlorperazine     No current facility-administered medications on file prior to encounter.     Current Outpatient Medications on File Prior to Encounter   Medication Sig    apremilast (OTEZLA) 30 mg Tab Take 1 tablet (30 mg total) by mouth 2 (two) times daily.    aspirin (ECOTRIN) 81 MG EC tablet Take 81 mg by mouth once daily.    FLUoxetine 40 MG capsule Take 1 capsule (40 mg) by mouth daily    mirtazapine (REMERON) 15 MG tablet Take 1 tablet (15 mg) by mouth daily at bedtime    [DISCONTINUED] cyclobenzaprine (FLEXERIL) 10 MG tablet Take 1 tablet (10 mg total) by mouth 2 (two) times daily for muscle spasms and pain. Do not take with pain medication, may cause sedation    [DISCONTINUED] mirtazapine (REMERON) 7.5 MG Tab take one tablet by mouth every night at bedtime    guselkumab (TREMFYA) 100 mg/mL AtIn Inject 100 mg into the skin every 8 weeks.       Review of patient's allergies indicates:   Allergen Reactions    Erythromycin Nausea And Vomiting    Infliximab Other (See Comments)     Lupus with fever and acute arthritis  Lupus with fever and acute arthritis       Past Medical History:   Diagnosis Date    Achilles tendon rupture 10/09/2013    Achilles tendon rupture 10/09/2013    Allergy     Anemia 1/17/2024    Anxiety     Arthritis     psoriatric    Degenerative disc disease     Drug-induced lupus erythematosus     Drug-induced lupus erythematosus 03/09/2016    Hyperlipidemia     Inguinal lymphadenopathy 07/21/2015    Kidney stone     Medication monitoring encounter  12/07/2016    Neck pain 07/06/2017    Psoriatic arthritis     Psoriatic arthritis 12/07/2016    Recurrent fever 07/08/2015    Recurrent nephrolithiasis 05/19/2014    On low oxalate diet    Sinusitis 02/25/2016    Stroke     TIA (transient ischemic attack)     Ulcer     high school    Unexplained night sweats 07/13/2015     Past Surgical History:   Procedure Laterality Date    ACHILLES TENDON SURGERY      BACK SURGERY      FUNCTIONAL ENDOSCOPIC SINUS SURGERY (FESS) USING COMPUTER-ASSISTED NAVIGATION Bilateral 9/14/2018    Procedure: FESS, USING COMPUTER-ASSISTED NAVIGATION;  Surgeon: Gerard Manzo MD;  Location: [facility] OR 2ND FLR;  Service: ENT;  Laterality: Bilateral;    INJECTION OF ANESTHETIC AGENT AROUND NERVE Left 5/13/2022    Procedure: Block, Nerve MEDIAL BRANCH BLOCK LEFT C2,3,4,5;  Surgeon: Kimberly Wilson MD;  Location: [facility] PAIN [facility]GT;  Service: Pain Management;  Laterality: Left;    INJECTION OF ANESTHETIC AGENT AROUND NERVE Left 5/24/2022    Procedure: BLOCK, NERVE, LEFT C2-C5 MEDIAL BRANCH;  Surgeon: Kimberly Wilson MD;  Location: [facility] PAIN [facility]GT;  Service: Pain Management;  Laterality: Left;    NASAL SEPTOPLASTY N/A 9/14/2018    Procedure: SEPTOPLASTY, NASAL;  Surgeon: Gerard Manzo MD;  Location: [facility] OR 2ND FLR;  Service: ENT;  Laterality: N/A;    NASAL TURBINATE REDUCTION Bilateral 9/14/2018    Procedure: REDUCTION, NASAL TURBINATE;  Surgeon: Gerard Manzo MD;  Location: [facility] OR 2ND FLR;  Service: ENT;  Laterality: Bilateral;    RADIOFREQUENCY ABLATION Left 7/12/2022    Procedure: RADIOFREQUENCY ABLATION, LEFT C2-C3, C3-C4, C4-C5;  Surgeon: Kimberly Wilson MD;  Location: [facility] PAIN MGT;  Service: Pain Management;  Laterality: Left;    UPPER ENDOSCOPY W/ ESOPHAGEAL MANOMETRY      URETERAL STENT PLACEMENT       Family History       Problem Relation (Age of Onset)    Cancer Father (61)    Cataracts Maternal Grandmother, Maternal Grandfather, Paternal Grandmother, Paternal Grandfather     Crohn's disease Mother, Maternal Grandmother    Osteoarthritis Maternal Grandmother    Pancreatic cancer Father    Ulcerative colitis Father          Tobacco Use    Smoking status: Never    Smokeless tobacco: Never   Substance and Sexual Activity    Alcohol use: No     Alcohol/week: 0.0 standard drinks of alcohol     Types: 1 - 2 Standard drinks or equivalent per week     Comment: cocktails    Drug use: No    Sexual activity: Not on file     Review of Systems  Objective:     Vital Signs (Most Recent):  Temp: 98.1 °F (36.7 °C) (01/30/24 1415)  Pulse: 67 (01/30/24 1530)  Resp: 18 (01/30/24 1530)  BP: (!) 112/59 (01/30/24 1530)  SpO2: 96 % (01/30/24 1530) Vital Signs (24h Range):  Temp:  [97.5 °F (36.4 °C)-98.4 °F (36.9 °C)] 98.1 °F (36.7 °C)  Pulse:  [62-87] 67  Resp:  [12-21] 18  SpO2:  [96 %-100 %] 96 %  BP: (106-136)/(54-75) 112/59     Weight: 62.6 kg (138 lb 0.1 oz)  Body mass index is 20.98 kg/m².    Date 01/30/24 0700 - 01/31/24 0659   Shift 5335-3089 5687-1431 6074-0509 24 Hour Total   INTAKE   IV Piggyback 1636   1636   Shift Total(mL/kg) 1636(26.1)   1636(26.1)   OUTPUT   Urine(mL/kg/hr) 275(0.5)   275   Blood 20   20   Shift Total(mL/kg) 295(4.7)   295(4.7)   Weight (kg) 62.6 62.6 62.6 62.6        Physical Exam  NAD  Normal voice per patient  No stridor or respiratory distress  Right anterior neck incision - c/d/I, diffusely swollen with crepitus  Not tolerating secretions      PROCEDURE: Flexible Fiberoptic Laryngoscope Exam  The risks, benefits, and alternatives to the procedure were explained. Patient/family  agreed to procedure; verbal consent obtained. The scope was inserted into the right nasal cavity. Examination of the nasal cavity, nasopharynx, oropharynx, hypopharynx, and larynx was performed; all were closely visualized to confirm presence or absence of erythema, edema, or structural lesions.    Nasal cavity: no masses or lesions, pink mucosa, no purulence  Nasopharynx: no masses or lesions, chloé  wnl  Oropharynx: no masses or lesions, tonsils WNL, BOT WNL  Larynx and Hypopharynx: DELFIN drain in hypopharynx, moderate amount of secretions. Cords mobile bilaterally.     The scope was removed. The patient tolerated the procedure well without complications.         Significant Labs:  CBC:   Recent Labs   Lab 01/30/24  1335   WBC 16.17*   RBC 4.11*   HGB 11.9*   HCT 35.7*      MCV 87   MCH 29.0   MCHC 33.3     CMP:   Recent Labs   Lab 01/30/24  1335   *   CALCIUM 8.7   ALBUMIN 3.4*   PROT 6.1      K 4.0   CO2 21*      BUN 11   CREATININE 0.9   ALKPHOS 74   ALT 22   AST 31   BILITOT 0.2       Significant Diagnostics:  CT: I have reviewed all pertinent results/findings within the past 24 hours and my personal findings are:    post surgical changes from C4-7 ACDF with surgical DELFIN drain penetrating through the hypopharynx, extensive air throughout soft tissue of anterior neck.

## 2024-01-30 NOTE — PROGRESS NOTES
MD advised to measure neck circumference q1hr.  If measurement increases by 1 cm; inform surgical team.  Initial measurement of 403mm (40.3cm).  Measurement taken along incision line just above drain per MD instruction.

## 2024-01-30 NOTE — ANESTHESIA PREPROCEDURE EVALUATION
01/30/2024  Rebel Rodriguez is a 54 y.o., male.      Pre-op Assessment    I have reviewed the Patient Summary Reports.     I have reviewed the Nursing Notes. I have reviewed the NPO Status.   I have reviewed the Medications.     Review of Systems  Anesthesia Hx:  No problems with previous Anesthesia   History of prior surgery of interest to airway management or planning:  Previous anesthesia: General          Denies Personal Hx of Anesthesia complications.                    Cardiovascular:  Cardiovascular Normal                                            Pulmonary:        Sleep Apnea                Renal/:  Chronic Renal Disease                Hepatic/GI:  Hepatic/GI Normal                 Musculoskeletal:  Arthritis               Neurological:  TIA, CVA                                    Endocrine:  Endocrine Normal              Patient Active Problem List   Diagnosis     psoriatric Arthritis    Psoriasis    Hx-TIA (transient ischemic attack)    Hyperlipidemia    Low back pain    Pain of left scapula    Cervical spondylosis    High risk medication use    Hyperreflexia of lower extremity    Nasal septal deviation    Decreased range of motion of neck    Decreased strength of trunk and back    Apnea    LU (obstructive sleep apnea)    Chronic neck pain    Painful cervical ROM    Anemia     Past Medical History:   Diagnosis Date    Achilles tendon rupture 10/09/2013    Achilles tendon rupture 10/09/2013    Allergy     Anemia 1/17/2024    Anxiety     Arthritis     psoriatric    Degenerative disc disease     Drug-induced lupus erythematosus     Drug-induced lupus erythematosus 03/09/2016    Hyperlipidemia     Inguinal lymphadenopathy 07/21/2015    Kidney stone     Medication monitoring encounter 12/07/2016    Neck pain 07/06/2017    Psoriatic arthritis     Psoriatic arthritis 12/07/2016    Recurrent fever  07/08/2015    Recurrent nephrolithiasis 05/19/2014    On low oxalate diet    Sinusitis 02/25/2016    Stroke     TIA (transient ischemic attack)     Ulcer     high school    Unexplained night sweats 07/13/2015     Past Surgical History:   Procedure Laterality Date    ACHILLES TENDON SURGERY      BACK SURGERY      FUNCTIONAL ENDOSCOPIC SINUS SURGERY (FESS) USING COMPUTER-ASSISTED NAVIGATION Bilateral 9/14/2018    Procedure: FESS, USING COMPUTER-ASSISTED NAVIGATION;  Surgeon: Gerard Manzo MD;  [facility];  Service: ENT;  Laterality: Bilateral;    INJECTION OF ANESTHETIC AGENT AROUND NERVE Left 5/13/2022    Procedure: Block, Nerve MEDIAL BRANCH BLOCK LEFT C2,3,4,5;  Surgeon: Kimberly Wilson MD;  Location: [facility]IN [facility];  Service: Pain Management;  Laterality: Left;    INJECTION OF ANESTHETIC AGENT AROUND NERVE Left 5/24/2022    Procedure: BLOCK, NERVE, LEFT C2-C5 MEDIAL BRANCH;  Surgeon: Kimberly Wilson MD;  Location: [facility] PAIN MGT;  Service: Pain Management;  Laterality: Left;    NASAL SEPTOPLASTY N/A 9/14/2018    Procedure: SEPTOPLASTY, NASAL;  Surgeon: Gerard Manzo MD;  [facility];  Service: ENT;  Laterality: N/A;    NASAL TURBINATE REDUCTION Bilateral 9/14/2018    Procedure: REDUCTION, NASAL TURBINATE;  Surgeon: Gerard Manzo MD;  Location: [facility] OR [address] [place] [address];  Service: ENT;  Laterality: Bilateral;    RADIOFREQUENCY ABLATION Left 7/12/2022    Procedure: RADIOFREQUENCY ABLATION, LEFT C2-C3, C3-C4, C4-C5;  Surgeon: Kimberly Wilson MD;  Location: [facility] PAIN MGT;  Service: Pain Management;  Laterality: Left;    UPPER ENDOSCOPY W/ ESOPHAGEAL MANOMETRY      URETERAL STENT PLACEMENT           Physical Exam  General: Well nourished, Cooperative and Alert    Airway:  Mallampati: II   Mouth Opening: Normal  TM Distance: Normal  Tongue: Normal  Neck ROM: Normal ROM    Dental:  Intact    Chest/Lungs:  Normal Respiratory Rate    Heart:  Rate: Normal  Rhythm: Regular  Rhythm      Lab Results   Component Value Date    WBC 11.36 01/17/2024    HGB 13.5 (L) 01/17/2024    HCT 43.0 01/17/2024    MCV 90 01/17/2024     01/17/2024           Anesthesia Plan  Type of Anesthesia, risks & benefits discussed:    Anesthesia Type: Gen Natural Airway, Gen ETT, MAC  Intra-op Monitoring Plan: Standard ASA Monitors  Post Op Pain Control Plan: multimodal analgesia  Induction:  IV  Airway Plan: Direct, Post-Induction  Informed Consent: Informed consent signed with the Patient and all parties understand the risks and agree with anesthesia plan.  All questions answered.   ASA Score: 3  Day of Surgery Review of History & Physical: H&P Update referred to the surgeon/provider.    Ready For Surgery From Anesthesia Perspective.     .

## 2024-01-30 NOTE — H&P
Frantz Weber - Surgery ([facility])  Neurocritical Care  History & Physical    Admit Date: 1/30/2024  Service Date: 01/30/2024  Length of Stay: 0    Subjective:     Chief Complaint: Cervical myelopathy    History of Present Illness: Rebel Rodriguez is a 53 y.o. male with hx of psoriatic arthritis, hx TIA on Aspirin 81 mg, s/p C4-7 ACDF with Dr. Huff 10 years ago admitted to [facility] s/p C3-C4 ACDF. Per chart review, reports rapid functional decline over the last 3 weeks. Endorses hand clumsiness, difficulty typing, gait imbalance, difficulty butoning, dropping items, falls. Difficulty with ambulating also due to LLE weakness. States it feels like he has rubber bands around his wrists. Reports difficulty with overhead mobility and shock-like pains down spine when raising arms overhead. CT neck soft tissue pending, Patient admitted to [facility] for close monitoring and higher level of care.       Past Medical History:   Diagnosis Date    Achilles tendon rupture 10/09/2013    Achilles tendon rupture 10/09/2013    Allergy     Anemia 1/17/2024    Anxiety     Arthritis     psoriatric    Degenerative disc disease     Drug-induced lupus erythematosus     Drug-induced lupus erythematosus 03/09/2016    Hyperlipidemia     Inguinal lymphadenopathy 07/21/2015    Kidney stone     Medication monitoring encounter 12/07/2016    Neck pain 07/06/2017    Psoriatic arthritis     Psoriatic arthritis 12/07/2016    Recurrent fever 07/08/2015    Recurrent nephrolithiasis 05/19/2014    On low oxalate diet    Sinusitis 02/25/2016    Stroke     TIA (transient ischemic attack)     Ulcer     high school    Unexplained night sweats 07/13/2015     Past Surgical History:   Procedure Laterality Date    ACHILLES TENDON SURGERY      BACK SURGERY      FUNCTIONAL ENDOSCOPIC SINUS SURGERY (FESS) USING COMPUTER-ASSISTED NAVIGATION Bilateral 9/14/2018    Procedure: FESS, USING COMPUTER-ASSISTED NAVIGATION;  Surgeon: Gerard Manzo MD;  [facility];   Service: ENT;  Laterality: Bilateral;    INJECTION OF ANESTHETIC AGENT AROUND NERVE Left 5/13/2022    Procedure: Block, Nerve MEDIAL BRANCH BLOCK LEFT C2,3,4,5;  Surgeon: Kimberly Wilson MD;  Location: [facility] PAIN MGT;  Service: Pain Management;  Laterality: Left;    INJECTION OF ANESTHETIC AGENT AROUND NERVE Left 5/24/2022    Procedure: BLOCK, NERVE, LEFT C2-C5 MEDIAL BRANCH;  Surgeon: Kimberly Wilson MD;  Location: [facility] PAIN MGT;  Service: Pain Management;  Laterality: Left;    NASAL SEPTOPLASTY N/A 9/14/2018    Procedure: SEPTOPLASTY, NASAL;  Surgeon: Gerard Manzo MD;  Location: [facility] OR 2ND FLR;  Service: ENT;  Laterality: N/A;    NASAL TURBINATE REDUCTION Bilateral 9/14/2018    Procedure: REDUCTION, NASAL TURBINATE;  Surgeon: Gerard Manzo MD;  Location: [facility] OR 2ND FLR;  Service: ENT;  Laterality: Bilateral;    RADIOFREQUENCY ABLATION Left 7/12/2022    Procedure: RADIOFREQUENCY ABLATION, LEFT C2-C3, C3-C4, C4-C5;  Surgeon: Kimberly Wilson MD;  Location: [facility] PAIN MGT;  Service: Pain Management;  Laterality: Left;    UPPER ENDOSCOPY W/ ESOPHAGEAL MANOMETRY      URETERAL STENT PLACEMENT        No current facility-administered medications on file prior to encounter.     Current Outpatient Medications on File Prior to Encounter   Medication Sig Dispense Refill    apremilast (OTEZLA) 30 mg Tab Take 1 tablet (30 mg total) by mouth 2 (two) times daily. 180 tablet 1    aspirin (ECOTRIN) 81 MG EC tablet Take 81 mg by mouth once daily.      FLUoxetine 40 MG capsule Take 1 capsule (40 mg) by mouth daily 90 capsule 3    mirtazapine (REMERON) 15 MG tablet Take 1 tablet (15 mg) by mouth daily at bedtime 90 tablet 3    [DISCONTINUED] cyclobenzaprine (FLEXERIL) 10 MG tablet Take 1 tablet (10 mg total) by mouth 2 (two) times daily for muscle spasms and pain. Do not take with pain medication, may cause sedation 60 tablet 2    [DISCONTINUED] mirtazapine (REMERON) 7.5 MG Tab take one tablet by mouth every night at  bedtime 90 tablet 3    guselkumab (TREMFYA) 100 mg/mL AtIn Inject 100 mg into the skin every 8 weeks. 3 mL 2      Allergies: Erythromycin and Infliximab  Family History   Problem Relation Age of Onset    Crohn's disease Mother     Pancreatic cancer Father     Ulcerative colitis Father     Cancer Father 61        pancreatic ca    Osteoarthritis Maternal Grandmother     Crohn's disease Maternal Grandmother     Cataracts Maternal Grandmother     Cataracts Maternal Grandfather     Cataracts Paternal Grandmother     Cataracts Paternal Grandfather     Amblyopia Neg Hx     Blindness Neg Hx      Social History     Tobacco Use    Smoking status: Never    Smokeless tobacco: Never   Substance Use Topics    Alcohol use: No     Alcohol/week: 0.0 standard drinks of alcohol     Types: 1 - 2 Standard drinks or equivalent per week     Comment: cocktails    Drug use: No     Review of Systems  Objective:     Vitals:    Temp: 97.9 °F (36.6 °C)  Pulse: 62  BP: 106/61  MAP (mmHg): 79  Resp: 12  SpO2: 97 %    Temp  Min: 97.5 °F (36.4 °C)  Max: 98.4 °F (36.9 °C)  Pulse  Min: 62  Max: 87  BP  Min: 106/61  Max: 136/65  MAP (mmHg)  Min: 79  Max: 92  Resp  Min: 12  Max: 21  SpO2  Min: 97 %  Max: 100 %    No intake/output data recorded.            Physical Exam  GA: Alert, comfortable, no acute distress.   HEENT: No scleral icterus or JVD.   Pulmonary: Clear to auscultation A/L.   Cardiac: RRR S1 & S2 w/o rubs/murmurs/gallops.   Abdominal: Bowel sounds present x 4. No appreciable hepatosplenomegaly.  Skin: No jaundice, rashes, or visible lesions.  Neuro:  --GCS: E4 V5 M6  --Mental Status:  awake oriented X4, follows commands, fluent speech  --CN II-XII grossly intact.   --Pupils 2mm, PERRL.   --Corneal reflex, gag, cough intact.  --ANTUNEZ spont and against gravity       Today I personally reviewed pertinent medications, lines/drains/airways, imaging, cardiology results, laboratory results, microbiology results,   Assessment/Plan:     Neuro  *  Cervical myelopathy  53 y.o. male with cervical myelopathy due to C3-4 severe stenosis with cord signal change, above his prior fusion admitted to [facility] s/p C3-4 anterior cervical discectomy and fusion     -CT neck soft tissue pending  -Hourly neck circumference  -SBP <160  -neuro checks q 1 hr  - dex 4 q 6hr  -Nsgy following  -PT/OT/SLP              The patient is being Prophylaxed for:  Venous Thromboembolism with: Mechanical  Stress Ulcer with: H2B  Ventilator Pneumonia with: not applicable    Activity Orders            Up in chair With meals starting at 01/30 1100    Diet Adult Regular (IDDSI Level 7): Regular starting at 01/30 1059    Elevate HOB starting at 01/30 1054    Ambulate Post Op Day 0 starting at 01/30 1052    Progressive Mobility Protocol (mobilize patient to their highest level of functioning at least twice daily) starting at 01/30 1046    Elevate HOB 30 starting at 01/30 1046    Ambulate With Assistance, Post Op Day 0 If the patient arrives from PACU by 4PM, walk at least once on day of operation if alert and safe to do so. starting at 01/30 1045          Prior    Nancy Salcedo NP  Neurocritical Care     Point of Care Ultrasound Guided Regional Nerve Block

## 2024-01-30 NOTE — HPI
Rebel Rodriguez is a 53 y.o. male with hx of psoriatic arthritis, hx TIA on Aspirin 81 mg, s/p C4-7 ACDF with Dr. Huff 10 years ago admitted to [facility] s/p C3-C4 ACDF. Per chart review, reports rapid functional decline over the last 3 weeks. Endorses hand clumsiness, difficulty typing, gait imbalance, difficulty butoning, dropping items, falls. Difficulty with ambulating also due to LLE weakness. States it feels like he has rubber bands around his wrists. Reports difficulty with overhead mobility and shock-like pains down spine when raising arms overhead. CT neck soft tissue pending, Patient admitted to [facility] for close monitoring and higher level of care.

## 2024-01-30 NOTE — ANESTHESIA PROCEDURE NOTES
Intubation    Date/Time: 1/30/2024 7:43 AM    Performed by: Esau Altamirano CRNA  Authorized by: Roland Mcelroy MD    Intubation:     Induction:  Intravenous    Intubated:  Postinduction    Mask Ventilation:  Easy mask    Attempts:  1    Attempted By:  CRNA    Method of Intubation:  Video laryngoscopy    Blade:  [place] 3    Laryngeal View Grade: Grade I - full view of cords      Difficult Airway Encountered?: No      Complications:  None    Airway Device:  Oral endotracheal tube    Airway Device Size:  7.5    Style/Cuff Inflation:  Cuffed (inflated to minimal occlusive pressure)    Tube secured:  22    Secured at:  The teeth    Placement Verified By:  Capnometry    Complicating Factors:  None    Findings Post-Intubation:  BS equal bilateral and atraumatic/condition of teeth unchanged

## 2024-01-30 NOTE — ANESTHESIA PREPROCEDURE EVALUATION
[facility]  Anesthesia Pre-Operative Evaluation         Patient Name: Rebel Rodriguez  YOB: 1970  MRN: [id number]    SUBJECTIVE:     Pre-operative evaluation for Procedure(s) (LRB):  EXPLORATION, NECK, REPAIR OF PHARYNGEAL INJURY (N/A)     01/30/2024    Rebel Rodriguez is a 54 y.o. male w/ a PMHx of HLD, kidney stones, psoriatic arthritis, hx TIA on Aspirin 81 mg, s/p C4-7 ACDF with Dr. Huff 10 years ago admitted to [facility] s/p C3-C4 ACDF for cervical myelopathy due to severe stenosis of C3-C4. Pt expressed dysphagia, post op CT can scan showed DELFIN drain in the hypopharynx. Extensive air throughout soft tissues of the neck may relate to placement of the surgical drain, which appears to extend into the region of the hypopharynx.     Patient now presents for the above procedure(s).    TTE 7/13/2015:   1 - Normal left ventricular systolic function (EF 60-65%).     2 - Normal left ventricular diastolic function.     3 - Normal right ventricular systolic function .     4 - Doppler if clinically indicated.       LDA: None documented.       Peripheral IV - Single Lumen 01/30/24 0545 20 G Left Forearm (Active)   Site Assessment Clean;Dry;Intact 01/30/24 1415   Extremity Assessment Distal to IV No abnormal discoloration 01/30/24 1415   Line Status Saline locked;Flushed 01/30/24 1415   Dressing Status Clean;Dry;Intact 01/30/24 1415   Dressing Intervention Integrity maintained 01/30/24 1415   Dressing Change Due 02/03/24 01/30/24 1415   Site Change Due 02/03/24 01/30/24 1415   Reason Not Rotated Not due 01/30/24 1415   Number of days: 0            Peripheral IV - Single Lumen 01/30/24 0746 18 G Right Forearm (Active)   Site Assessment Clean;Dry;Intact 01/30/24 1415   Extremity Assessment Distal to IV No abnormal discoloration 01/30/24 1415   Line Status Flushed;Saline locked 01/30/24 1415   Dressing Status Clean;Dry;Intact 01/30/24 1415   Dressing Intervention Integrity maintained 01/30/24 1415    Dressing Change Due 02/03/24 01/30/24 1415   Site Change Due 02/03/24 01/30/24 1415   Reason Not Rotated Not due 01/30/24 1415   Number of days: 0            Closed/Suction Drain 01/30/24 0959 Tube - 1 Right;Anterior Neck Accordion 10 Fr. (Active)   Site Description Healing 01/30/24 1415   Dressing Type Transparent (Tegaderm) 01/30/24 1415   Dressing Status Clean;Dry;Intact 01/30/24 1415   Dressing Intervention Integrity maintained 01/30/24 1415   Drainage Serosanguineous 01/30/24 1415   Status Other (Comment) 01/30/24 1415   Number of days: 0       Prev airway:    Induction:  Intravenous    Intubated:  Postinduction    Mask Ventilation:  Easy mask    Attempts:  1    Attempted By:  CRNA    Method of Intubation:  Video laryngoscopy    Blade:  [place] 3    Laryngeal View Grade: Grade I - full view of cords      Difficult Airway Encountered?: No      Complications:  None    Airway Device:  Oral endotracheal tube    Airway Device Size:  7.5    Style/Cuff Inflation:  Cuffed (inflated to minimal occlusive pressure)    Tube secured:  22    Secured at:  The teeth    Placement Verified By:  Capnometry    Complicating Factors:  None    Findings Post-Intubation:  BS equal bilateral and atraumatic/condition of teeth unchanged    Drips: None documented.   sodium chloride 0.9% 25 mL/hr at 01/30/24 1650       Patient Active Problem List   Diagnosis     psoriatric Arthritis    Psoriasis    Hx-TIA (transient ischemic attack)    Hyperlipidemia    Low back pain    Pain of left scapula    Cervical spondylosis    High risk medication use    Hyperreflexia of lower extremity    Nasal septal deviation    Decreased range of motion of neck    Decreased strength of trunk and back    Apnea    LU (obstructive sleep apnea)    Chronic neck pain    Painful cervical ROM    Anemia    Cervical myelopathy    Dysphagia       Review of patient's allergies indicates:   Allergen Reactions    Erythromycin Nausea And Vomiting    Infliximab Other (See  Comments)     Lupus with fever and acute arthritis  Lupus with fever and acute arthritis       Current Inpatient Medications:   bisacodyL  10 mg Rectal Daily    ceFAZolin (Ancef) IV (PEDS and ADULTS)  2 g Intravenous Q8H    dexAMETHasone  4 mg Intravenous Q6H    famotidine  20 mg Oral BID    FLUoxetine  40 mg Oral Daily    mirtazapine  15 mg Oral QHS    mupirocin   Nasal BID    senna-docusate 8.6-50 mg  1 tablet Oral BID       No current facility-administered medications on file prior to encounter.     Current Outpatient Medications on File Prior to Encounter   Medication Sig Dispense Refill    apremilast (OTEZLA) 30 mg Tab Take 1 tablet (30 mg total) by mouth 2 (two) times daily. 180 tablet 1    aspirin (ECOTRIN) 81 MG EC tablet Take 81 mg by mouth once daily.      FLUoxetine 40 MG capsule Take 1 capsule (40 mg) by mouth daily 90 capsule 3    mirtazapine (REMERON) 15 MG tablet Take 1 tablet (15 mg) by mouth daily at bedtime 90 tablet 3    guselkumab (TREMFYA) 100 mg/mL AtIn Inject 100 mg into the skin every 8 weeks. 3 mL 2       Past Surgical History:   Procedure Laterality Date    ACHILLES TENDON SURGERY      BACK SURGERY      FUNCTIONAL ENDOSCOPIC SINUS SURGERY (FESS) USING COMPUTER-ASSISTED NAVIGATION Bilateral 9/14/2018    Procedure: FESS, USING COMPUTER-ASSISTED NAVIGATION;  Surgeon: Gerard Manzo MD;  Location: [facility] OR [address] [place] [address];  Service: ENT;  Laterality: Bilateral;    INJECTION OF ANESTHETIC AGENT AROUND NERVE Left 5/13/2022    Procedure: Block, Nerve MEDIAL BRANCH BLOCK LEFT C2,3,4,5;  Surgeon: Kimberly Wilson MD;  Location: [facility] PAIN MGT;  Service: Pain Management;  Laterality: Left;    INJECTION OF ANESTHETIC AGENT AROUND NERVE Left 5/24/2022    Procedure: BLOCK, NERVE, LEFT C2-C5 MEDIAL BRANCH;  Surgeon: Kimberly Wilson MD;  Location: [facility] PAIN MGT;  Service: Pain Management;  Laterality: Left;    NASAL SEPTOPLASTY N/A 9/14/2018    Procedure: SEPTOPLASTY, NASAL;  Surgeon: Gerard Manzo MD;   Location: [facility] OR 2ND FLR;  Service: ENT;  Laterality: N/A;    NASAL TURBINATE REDUCTION Bilateral 9/14/2018    Procedure: REDUCTION, NASAL TURBINATE;  Surgeon: Gerard Manzo MD;  Location: [facility] OR 2ND FLR;  Service: ENT;  Laterality: Bilateral;    RADIOFREQUENCY ABLATION Left 7/12/2022    Procedure: RADIOFREQUENCY ABLATION, LEFT C2-C3, C3-C4, C4-C5;  Surgeon: Kimberly Wilson MD;  Location: [facility];  Service: Pain Management;  Laterality: Left;    UPPER ENDOSCOPY W/ ESOPHAGEAL MANOMETRY      URETERAL STENT PLACEMENT         Social History     Socioeconomic History    Marital status: Single   Occupational History    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     Employer: self employed   Tobacco Use    Smoking status: Never    Smokeless tobacco: Never   Substance and Sexual Activity    Alcohol use: No     Alcohol/week: 0.0 standard drinks of alcohol     Types: 1 - 2 Standard drinks or equivalent per week     Comment: cocktails    Drug use: No   Social History Narrative    1 flight     Social Determinants of Health     Financial Resource Strain: Low Risk  (12/4/2023)    Overall Financial Resource Strain (CARDIA)     Difficulty of Paying Living Expenses: Not very hard   Food Insecurity: No Food Insecurity (12/4/2023)    Hunger Vital Sign     Worried About Running Out of Food in the Last Year: Never true     Ran Out of Food in the Last Year: Never true   Transportation Needs: Unmet Transportation Needs (12/4/2023)    PRAPARE - Transportation     Lack of Transportation (Medical): Yes     Lack of Transportation (Non-Medical): Yes   Physical Activity: Sufficiently Active (12/4/2023)    Exercise Vital Sign     Days of Exercise per Week: 3 days     Minutes of Exercise per Session: 60 min   Stress: No Stress Concern Present (12/4/2023)    [place] [place] [employer] - Occupational Stress Questionnaire     Feeling of Stress : Only a little   Social Connections: Unknown (12/4/2023)    Social Connection and Isolation  Panel [NHANES]     Frequency of Communication with Friends and Family: More than three times a week     Frequency of Social Gatherings with Friends and Family: Three times a week     Active Member of Clubs or Organizations: No     Attends Club or Organization Meetings: Never     Marital Status: Never    Housing Stability: Unknown (12/4/2023)    Housing Stability Vital Sign     Unable to Pay for Housing in the Last Year: No     Unstable Housing in the Last Year: No       OBJECTIVE:     Vital Signs Range (Last 24H):  Temp:  [36.4 °C (97.5 °F)-36.9 °C (98.4 °F)]   Pulse:  [62-87]   Resp:  [12-25]   BP: ()/(54-75)   SpO2:  [96 %-100 %]       Significant Labs:  Lab Results   Component Value Date    WBC 16.17 (H) 01/30/2024    HGB 11.9 (L) 01/30/2024    HCT 35.7 (L) 01/30/2024     01/30/2024    CHOL 135 10/10/2023    TRIG 135 10/10/2023    HDL 47 10/10/2023    ALT 22 01/30/2024    AST 31 01/30/2024     01/30/2024    K 4.0 01/30/2024     01/30/2024    CREATININE 0.9 01/30/2024    BUN 11 01/30/2024    CO2 21 (L) 01/30/2024    TSH 1.82 09/30/2009    INR 0.9 01/17/2024    GLUF 95 04/08/2015    HGBA1C 5.6 04/08/2015       Diagnostic Studies: No relevant studies.    EKG:   Results for orders placed or performed during the hospital encounter of 01/17/24   EKG 12-lead    Collection Time: 01/17/24 12:09 PM    Narrative    Test Reason : Z86.73,J34.2,L40.9,R06.81,    Vent. Rate : 058 BPM     Atrial Rate : 058 BPM     P-R Int : 138 ms          QRS Dur : 080 ms      QT Int : 418 ms       P-R-T Axes : 077 084 071 degrees     QTc Int : 410 ms    Sinus bradycardia  Otherwise normal ECG  When compared with ECG of 04-APR-2011 18:12,  No significant change was found  Confirmed by Lorena Graf MD ([id number]) on 1/17/2024 1:22:38 PM    Referred By: KARLA TALAVERA           Confirmed By:Lorena Graf MD       2D ECHO:  TTE:  No results found. However, due to the size of the patient record, not all encounters were  searched. Please check Results Review for a complete set of results.    JOSE G:  No results found. However, due to the size of the patient record, not all encounters were searched. Please check Results Review for a complete set of results.    ASSESSMENT/PLAN:       Pre-op Assessment    I have reviewed the Patient Summary Reports.     I have reviewed the Nursing Notes. I have reviewed the NPO Status.   I have reviewed the Medications.     Review of Systems  Anesthesia Hx:  No problems with previous Anesthesia   History of prior surgery of interest to airway management or planning:  Previous anesthesia: General          Denies Personal Hx of Anesthesia complications.                    Cardiovascular:  Cardiovascular Normal                                            Pulmonary:        Sleep Apnea                Renal/:  Chronic Renal Disease                Hepatic/GI:  Hepatic/GI Normal                 Musculoskeletal:  Arthritis               Neurological:  TIA, CVA                                    Endocrine:  Endocrine Normal                Physical Exam  General: Well nourished, Cooperative and Alert    Airway:  Mallampati: II   Mouth Opening: Normal  TM Distance: Normal  Tongue: Normal  Neck ROM: Normal ROM    Dental:  Intact        Anesthesia Plan  Type of Anesthesia, risks & benefits discussed:    Anesthesia Type: Gen ETT  Intra-op Monitoring Plan: Standard ASA Monitors  Post Op Pain Control Plan: multimodal analgesia  Induction:  IV  Airway Plan: Direct, Post-Induction  Informed Consent: Informed consent signed with the Patient and all parties understand the risks and agree with anesthesia plan.  All questions answered.   ASA Score: 3  Day of Surgery Review of History & Physical: H&P Update referred to the surgeon/provider.    Ready For Surgery From Anesthesia Perspective.     .

## 2024-01-30 NOTE — ASSESSMENT & PLAN NOTE
53 y.o. male with cervical myelopathy due to C3-4 severe stenosis with cord signal change, above his prior fusion admitted to [facility] s/p C3-4 anterior cervical discectomy and fusion     -CT neck soft tissue pending  -Hourly neck circumference  -SBP <160  -neuro checks q 1 hr  - dex 4 q 6hr  -Nsgy following  -PT/OT/SLP

## 2024-01-30 NOTE — INTERVAL H&P NOTE
The patient has been examined and the H&P has been reviewed:    I concur with the findings and no changes have occurred since H&P was written.    Surgery risks, benefits and alternative options discussed and understood by patient/family.      To the OR as planned   Postop orders to follow       MD MORIAH AlexanderGY

## 2024-01-30 NOTE — TRANSFER OF CARE
"Anesthesia Transfer of Care Note    Patient: Rebel Rodriguez    Procedure(s) Performed: Procedure(s) (LRB):  C3-4 anterior cervical discectomy and fusion (N/A)    Patient location: PACU    Anesthesia Type: general    Transport from OR: Transported from OR on 6-10 L/min O2 by face mask with adequate spontaneous ventilation    Post pain: adequate analgesia    Post assessment: no apparent anesthetic complications and tolerated procedure well    Post vital signs: stable    Level of consciousness: awake and alert    Nausea/Vomiting: no nausea/vomiting    Complications: none    Transfer of care protocol was followed      Last vitals: Visit Vitals  /71 (BP Location: Right arm, Patient Position: Lying)   Pulse 87   Temp 36.4 °C (97.5 °F) (Temporal)   Resp 17   Ht 5' 8" (1.727 m)   Wt 62.6 kg (138 lb 0.1 oz)   SpO2 100%   BMI 20.98 kg/m²     "

## 2024-01-30 NOTE — PROGRESS NOTES
Patient arrived to [facility] from [facility] PACU-->[facility] 3842   Report received from: PACU RN,      Type of stroke/diagnosis:  cervical myelopathy s/p anterior ACDF    Current symptoms: swallowing problem reported, otherwise neuro intact    Skin Assessment done: Yes  Wounds noted: anterior neck sx incision, hemovac drain  *If wounds noted, was Wound Care consulted? N/a  *If wounds noted, LDA placed? LDA in place  Skin Assessment Verified by:  MANNY Oakes Completed? pending    Patient Belongings on Admit: none    [facility] notified: HITESH Cruz

## 2024-01-30 NOTE — CONSULTS
[facility]  Otorhinolaryngology-Head & Neck Surgery  Consult Note    Patient Name: Rebel Rodriguez  MRN: [id number]  Code Status: Prior  Admission Date: 1/30/2024  Hospital Length of Stay: 0 days  Attending Physician: Rogerio Maradiaga MD  Primary Care Provider: Nanda Smith MD    Patient information was obtained from patient and primary team.     Inpatient consult to ENT  Consult performed by: Melanie Mullen MD  Consult ordered by: Melanie Torres PA-C        Subjective:     Chief Complaint/Reason for Admission: dysphagia    History of Present Illness: Rebel Rodriguez is a 53 y.o. male with hx C4-7 ACDF, cervical myelopathy due to C3-4 severe stenosis now s/p C3-4 ACDF today. ENT consulted due to dysphagia. Post op CT scan showed surgical drain in the hypopharynx. Patient denies difficulty breathing or voice changes. He endorses odynophagia and pain the right neck. Neck circumference is being measured by nursing q1h due to swelling which has slightly increased from to 40.3cm to 41.3cm over 3 hours.     Medications:  Continuous Infusions:   sodium chloride 0.9% 25 mL/hr at 01/30/24 1650     Scheduled Meds:   bisacodyL  10 mg Rectal Daily    ceFAZolin (Ancef) IV (PEDS and ADULTS)  2 g Intravenous Q8H    dexAMETHasone  4 mg Intravenous Q6H    famotidine  20 mg Oral BID    FLUoxetine  40 mg Oral Daily    mirtazapine  15 mg Oral QHS    mupirocin   Nasal BID    senna-docusate 8.6-50 mg  1 tablet Oral BID     PRN Meds:morphine, ondansetron, oxyCODONE, prochlorperazine     No current facility-administered medications on file prior to encounter.     Current Outpatient Medications on File Prior to Encounter   Medication Sig    apremilast (OTEZLA) 30 mg Tab Take 1 tablet (30 mg total) by mouth 2 (two) times daily.    aspirin (ECOTRIN) 81 MG EC tablet Take 81 mg by mouth once daily.    FLUoxetine 40 MG capsule Take 1 capsule (40 mg) by mouth daily    mirtazapine (REMERON) 15 MG tablet Take 1 tablet (15 mg) by  mouth daily at bedtime    [DISCONTINUED] cyclobenzaprine (FLEXERIL) 10 MG tablet Take 1 tablet (10 mg total) by mouth 2 (two) times daily for muscle spasms and pain. Do not take with pain medication, may cause sedation    [DISCONTINUED] mirtazapine (REMERON) 7.5 MG Tab take one tablet by mouth every night at bedtime    guselkumab (TREMFYA) 100 mg/mL AtIn Inject 100 mg into the skin every 8 weeks.       Review of patient's allergies indicates:   Allergen Reactions    Erythromycin Nausea And Vomiting    Infliximab Other (See Comments)     Lupus with fever and acute arthritis  Lupus with fever and acute arthritis       Past Medical History:   Diagnosis Date    Achilles tendon rupture 10/09/2013    Achilles tendon rupture 10/09/2013    Allergy     Anemia 1/17/2024    Anxiety     Arthritis     psoriatric    Degenerative disc disease     Drug-induced lupus erythematosus     Drug-induced lupus erythematosus 03/09/2016    Hyperlipidemia     Inguinal lymphadenopathy 07/21/2015    Kidney stone     Medication monitoring encounter 12/07/2016    Neck pain 07/06/2017    Psoriatic arthritis     Psoriatic arthritis 12/07/2016    Recurrent fever 07/08/2015    Recurrent nephrolithiasis 05/19/2014    On low oxalate diet    Sinusitis 02/25/2016    Stroke     TIA (transient ischemic attack)     Ulcer     high school    Unexplained night sweats 07/13/2015     Past Surgical History:   Procedure Laterality Date    ACHILLES TENDON SURGERY      BACK SURGERY      FUNCTIONAL ENDOSCOPIC SINUS SURGERY (FESS) USING COMPUTER-ASSISTED NAVIGATION Bilateral 9/14/2018    Procedure: FESS, USING COMPUTER-ASSISTED NAVIGATION;  Surgeon: Gerard Manzo MD;  Location: [facility] OR [address] [place] [address];  Service: ENT;  Laterality: Bilateral;    INJECTION OF ANESTHETIC AGENT AROUND NERVE Left 5/13/2022    Procedure: Block, Nerve MEDIAL BRANCH BLOCK LEFT C2,3,4,5;  Surgeon: Kimberly Wilson MD;  Location: [facility] PAIN T;  Service: Pain Management;  Laterality: Left;     INJECTION OF ANESTHETIC AGENT AROUND NERVE Left 5/24/2022    Procedure: BLOCK, NERVE, LEFT C2-C5 MEDIAL BRANCH;  Surgeon: Kimberly Wilson MD;  Location: [facility] PAIN MGT;  Service: Pain Management;  Laterality: Left;    NASAL SEPTOPLASTY N/A 9/14/2018    Procedure: SEPTOPLASTY, NASAL;  Surgeon: Gerard Manzo MD;  Location: [facility] OR 2ND FLR;  Service: ENT;  Laterality: N/A;    NASAL TURBINATE REDUCTION Bilateral 9/14/2018    Procedure: REDUCTION, NASAL TURBINATE;  Surgeon: Gerard Manzo MD;  Location: [facility] OR 2ND FLR;  Service: ENT;  Laterality: Bilateral;    RADIOFREQUENCY ABLATION Left 7/12/2022    Procedure: RADIOFREQUENCY ABLATION, LEFT C2-C3, C3-C4, C4-C5;  Surgeon: Kimberly Wilson MD;  Location: [facility] PAIN MGT;  Service: Pain Management;  Laterality: Left;    UPPER ENDOSCOPY W/ ESOPHAGEAL MANOMETRY      URETERAL STENT PLACEMENT       Family History       Problem Relation (Age of Onset)    Cancer Father (61)    Cataracts Maternal Grandmother, Maternal Grandfather, Paternal Grandmother, Paternal Grandfather    Crohn's disease Mother, Maternal Grandmother    Osteoarthritis Maternal Grandmother    Pancreatic cancer Father    Ulcerative colitis Father          Tobacco Use    Smoking status: Never    Smokeless tobacco: Never   Substance and Sexual Activity    Alcohol use: No     Alcohol/week: 0.0 standard drinks of alcohol     Types: 1 - 2 Standard drinks or equivalent per week     Comment: cocktails    Drug use: No    Sexual activity: Not on file     Review of Systems  Objective:     Vital Signs (Most Recent):  Temp: 98.1 °F (36.7 °C) (01/30/24 1415)  Pulse: 67 (01/30/24 1530)  Resp: 18 (01/30/24 1530)  BP: (!) 112/59 (01/30/24 1530)  SpO2: 96 % (01/30/24 1530) Vital Signs (24h Range):  Temp:  [97.5 °F (36.4 °C)-98.4 °F (36.9 °C)] 98.1 °F (36.7 °C)  Pulse:  [62-87] 67  Resp:  [12-21] 18  SpO2:  [96 %-100 %] 96 %  BP: (106-136)/(54-75) 112/59     Weight: 62.6 kg (138 lb 0.1 oz)  Body mass index is 20.98  kg/m².    Date 01/30/24 0700 - 01/31/24 0659   Shift 5148-8942 7037-7933 5885-2696 24 Hour Total   INTAKE   IV Piggyback 1636   1636   Shift Total(mL/kg) 1636(26.1)   1636(26.1)   OUTPUT   Urine(mL/kg/hr) 275(0.5)   275   Blood 20   20   Shift Total(mL/kg) 295(4.7)   295(4.7)   Weight (kg) 62.6 62.6 62.6 62.6        Physical Exam  NAD  Normal voice per patient  No stridor or respiratory distress  Right anterior neck incision - c/d/I, diffusely swollen with crepitus  Not tolerating secretions      PROCEDURE: Flexible Fiberoptic Laryngoscope Exam  The risks, benefits, and alternatives to the procedure were explained. Patient/family  agreed to procedure; verbal consent obtained. The scope was inserted into the right nasal cavity. Examination of the nasal cavity, nasopharynx, oropharynx, hypopharynx, and larynx was performed; all were closely visualized to confirm presence or absence of erythema, edema, or structural lesions.    Nasal cavity: no masses or lesions, pink mucosa, no purulence  Nasopharynx: no masses or lesions, chloé wnl  Oropharynx: no masses or lesions, tonsils WNL, BOT WNL  Larynx and Hypopharynx: DELFIN drain in hypopharynx, moderate amount of secretions. Cords mobile bilaterally.     The scope was removed. The patient tolerated the procedure well without complications.         Significant Labs:  CBC:   Recent Labs   Lab 01/30/24  1335   WBC 16.17*   RBC 4.11*   HGB 11.9*   HCT 35.7*      MCV 87   MCH 29.0   MCHC 33.3     CMP:   Recent Labs   Lab 01/30/24  1335   *   CALCIUM 8.7   ALBUMIN 3.4*   PROT 6.1      K 4.0   CO2 21*      BUN 11   CREATININE 0.9   ALKPHOS 74   ALT 22   AST 31   BILITOT 0.2       Significant Diagnostics:  CT: I have reviewed all pertinent results/findings within the past 24 hours and my personal findings are:    post surgical changes from C4-7 ACDF with surgical DELFIN drain penetrating through the hypopharynx, extensive air throughout soft tissue of anterior  neck.     Assessment/Plan:     Dysphagia  Mr Rodriguez is a 55 yo male who is s/p C3-4 ACDF surgery today with post op dysphagia, odynophagia, neck pain, swelling and crepitus. CT and scope demonstrates DELFIN drain in the hypopharynx. Patient with difficulty with swallowing secretions. No respiratory compromise.    - Class A for neck exploration with repair of pharyngeal injury with Dr. Agarwal  - Plan to keep intubated post op overnight, can wean to extubate tomorrow.  - Will place dobhoff intraop, and keep NPO for 5 days post op  - Consented  - NPO  - Rest of care for per primary  - Please page ENT with questions        VTE Risk Mitigation (From admission, onward)           Ordered     Place LAUREN hose  Until discontinued         01/30/24 0513     Place sequential compression device  Until discontinued         01/30/24 0513                    Thank you for your consult. I will follow-up with patient. Please contact us if you have any additional questions.    Melanie Mullen MD  Otorhinolaryngology-Head & Neck Surgery  [facility]

## 2024-01-31 PROBLEM — F41.9 ANXIETY: Status: ACTIVE | Noted: 2024-01-31

## 2024-01-31 PROBLEM — Z99.11 ON MECHANICALLY ASSISTED VENTILATION: Status: ACTIVE | Noted: 2024-01-31

## 2024-01-31 PROBLEM — S19.85XA: Status: ACTIVE | Noted: 2024-01-31

## 2024-01-31 LAB
ALBUMIN SERPL BCP-MCNC: 3.4 G/DL (ref 3.5–5.2)
ALP SERPL-CCNC: 72 U/L (ref 55–135)
ALT SERPL W/O P-5'-P-CCNC: 21 U/L (ref 10–44)
ANION GAP SERPL CALC-SCNC: 8 MMOL/L (ref 8–16)
AST SERPL-CCNC: 32 U/L (ref 10–40)
BASOPHILS # BLD AUTO: 0.03 K/UL (ref 0–0.2)
BASOPHILS NFR BLD: 0.2 % (ref 0–1.9)
BILIRUB SERPL-MCNC: 0.4 MG/DL (ref 0.1–1)
BUN SERPL-MCNC: 12 MG/DL (ref 6–20)
CALCIUM SERPL-MCNC: 8.9 MG/DL (ref 8.7–10.5)
CHLORIDE SERPL-SCNC: 109 MMOL/L (ref 95–110)
CO2 SERPL-SCNC: 21 MMOL/L (ref 23–29)
CREAT SERPL-MCNC: 0.9 MG/DL (ref 0.5–1.4)
DIFFERENTIAL METHOD BLD: ABNORMAL
EOSINOPHIL # BLD AUTO: 0 K/UL (ref 0–0.5)
EOSINOPHIL NFR BLD: 0 % (ref 0–8)
ERYTHROCYTE [DISTWIDTH] IN BLOOD BY AUTOMATED COUNT: 14.5 % (ref 11.5–14.5)
EST. GFR  (NO RACE VARIABLE): >60 ML/MIN/1.73 M^2
GLUCOSE SERPL-MCNC: 143 MG/DL (ref 70–110)
HCT VFR BLD AUTO: 37.4 % (ref 40–54)
HGB BLD-MCNC: 12.1 G/DL (ref 14–18)
IMM GRANULOCYTES # BLD AUTO: 0.1 K/UL (ref 0–0.04)
IMM GRANULOCYTES NFR BLD AUTO: 0.5 % (ref 0–0.5)
LYMPHOCYTES # BLD AUTO: 0.9 K/UL (ref 1–4.8)
LYMPHOCYTES NFR BLD: 4.6 % (ref 18–48)
MAGNESIUM SERPL-MCNC: 1.8 MG/DL (ref 1.6–2.6)
MCH RBC QN AUTO: 28.1 PG (ref 27–31)
MCHC RBC AUTO-ENTMCNC: 32.4 G/DL (ref 32–36)
MCV RBC AUTO: 87 FL (ref 82–98)
MONOCYTES # BLD AUTO: 0.9 K/UL (ref 0.3–1)
MONOCYTES NFR BLD: 4.9 % (ref 4–15)
NEUTROPHILS # BLD AUTO: 17.1 K/UL (ref 1.8–7.7)
NEUTROPHILS NFR BLD: 89.8 % (ref 38–73)
NRBC BLD-RTO: 0 /100 WBC
PHOSPHATE SERPL-MCNC: 3.1 MG/DL (ref 2.7–4.5)
PLATELET # BLD AUTO: 264 K/UL (ref 150–450)
PMV BLD AUTO: 9.1 FL (ref 9.2–12.9)
POTASSIUM SERPL-SCNC: 4.3 MMOL/L (ref 3.5–5.1)
PROT SERPL-MCNC: 6.3 G/DL (ref 6–8.4)
RBC # BLD AUTO: 4.31 M/UL (ref 4.6–6.2)
SODIUM SERPL-SCNC: 138 MMOL/L (ref 136–145)
WBC # BLD AUTO: 19.08 K/UL (ref 3.9–12.7)

## 2024-01-31 PROCEDURE — 63600175 PHARM REV CODE 636 W HCPCS: Performed by: NEUROLOGICAL SURGERY

## 2024-01-31 PROCEDURE — 97530 THERAPEUTIC ACTIVITIES: CPT

## 2024-01-31 PROCEDURE — 99291 CRITICAL CARE FIRST HOUR: CPT | Mod: ,,, | Performed by: PSYCHIATRY & NEUROLOGY

## 2024-01-31 PROCEDURE — 97112 NEUROMUSCULAR REEDUCATION: CPT

## 2024-01-31 PROCEDURE — 20000000 HC ICU ROOM

## 2024-01-31 PROCEDURE — 25000003 PHARM REV CODE 250

## 2024-01-31 PROCEDURE — 83735 ASSAY OF MAGNESIUM: CPT

## 2024-01-31 PROCEDURE — 99900035 HC TECH TIME PER 15 MIN (STAT)

## 2024-01-31 PROCEDURE — 80053 COMPREHEN METABOLIC PANEL: CPT

## 2024-01-31 PROCEDURE — 25000003 PHARM REV CODE 250: Performed by: NEUROLOGICAL SURGERY

## 2024-01-31 PROCEDURE — 25000003 PHARM REV CODE 250: Performed by: STUDENT IN AN ORGANIZED HEALTH CARE EDUCATION/TRAINING PROGRAM

## 2024-01-31 PROCEDURE — 27100171 HC OXYGEN HIGH FLOW UP TO 24 HOURS

## 2024-01-31 PROCEDURE — 63600175 PHARM REV CODE 636 W HCPCS: Performed by: PSYCHIATRY & NEUROLOGY

## 2024-01-31 PROCEDURE — 85025 COMPLETE CBC W/AUTO DIFF WBC: CPT

## 2024-01-31 PROCEDURE — 25000003 PHARM REV CODE 250: Performed by: NURSE PRACTITIONER

## 2024-01-31 PROCEDURE — 99900026 HC AIRWAY MAINTENANCE (STAT)

## 2024-01-31 PROCEDURE — 94003 VENT MGMT INPAT SUBQ DAY: CPT

## 2024-01-31 PROCEDURE — 63600175 PHARM REV CODE 636 W HCPCS: Performed by: STUDENT IN AN ORGANIZED HEALTH CARE EDUCATION/TRAINING PROGRAM

## 2024-01-31 PROCEDURE — 97163 PT EVAL HIGH COMPLEX 45 MIN: CPT

## 2024-01-31 PROCEDURE — 84100 ASSAY OF PHOSPHORUS: CPT

## 2024-01-31 PROCEDURE — 25000003 PHARM REV CODE 250: Performed by: PSYCHIATRY & NEUROLOGY

## 2024-01-31 PROCEDURE — 63600175 PHARM REV CODE 636 W HCPCS

## 2024-01-31 PROCEDURE — 97166 OT EVAL MOD COMPLEX 45 MIN: CPT

## 2024-01-31 PROCEDURE — 94761 N-INVAS EAR/PLS OXIMETRY MLT: CPT

## 2024-01-31 PROCEDURE — 27200966 HC CLOSED SUCTION SYSTEM

## 2024-01-31 RX ORDER — AMOXICILLIN 250 MG
1 CAPSULE ORAL 2 TIMES DAILY
Status: DISCONTINUED | OUTPATIENT
Start: 2024-01-31 | End: 2024-02-01

## 2024-01-31 RX ORDER — SODIUM,POTASSIUM PHOSPHATES 280-250MG
2 POWDER IN PACKET (EA) ORAL
Status: DISCONTINUED | OUTPATIENT
Start: 2024-01-31 | End: 2024-02-06

## 2024-01-31 RX ORDER — OXYCODONE HYDROCHLORIDE 5 MG/1
5 TABLET ORAL EVERY 4 HOURS PRN
Status: DISCONTINUED | OUTPATIENT
Start: 2024-01-31 | End: 2024-01-31

## 2024-01-31 RX ORDER — DEXMEDETOMIDINE HYDROCHLORIDE 4 UG/ML
0-1.4 INJECTION, SOLUTION INTRAVENOUS CONTINUOUS
Status: DISCONTINUED | OUTPATIENT
Start: 2024-01-31 | End: 2024-02-03

## 2024-01-31 RX ORDER — LANOLIN ALCOHOL/MO/W.PET/CERES
800 CREAM (GRAM) TOPICAL
Status: DISCONTINUED | OUTPATIENT
Start: 2024-01-31 | End: 2024-02-06

## 2024-01-31 RX ORDER — ACETAMINOPHEN 325 MG/1
650 TABLET ORAL EVERY 6 HOURS PRN
Status: DISCONTINUED | OUTPATIENT
Start: 2024-01-31 | End: 2024-02-03

## 2024-01-31 RX ORDER — FLUOXETINE HYDROCHLORIDE 20 MG/1
40 CAPSULE ORAL DAILY
Status: DISCONTINUED | OUTPATIENT
Start: 2024-01-31 | End: 2024-02-14 | Stop reason: HOSPADM

## 2024-01-31 RX ORDER — OXYCODONE HYDROCHLORIDE 5 MG/1
5 TABLET ORAL EVERY 4 HOURS PRN
Status: DISCONTINUED | OUTPATIENT
Start: 2024-01-31 | End: 2024-02-03

## 2024-01-31 RX ORDER — MORPHINE SULFATE 2 MG/ML
2 INJECTION, SOLUTION INTRAMUSCULAR; INTRAVENOUS
Status: DISCONTINUED | OUTPATIENT
Start: 2024-01-31 | End: 2024-02-03

## 2024-01-31 RX ORDER — ENOXAPARIN SODIUM 100 MG/ML
40 INJECTION SUBCUTANEOUS EVERY 24 HOURS
Status: DISCONTINUED | OUTPATIENT
Start: 2024-01-31 | End: 2024-02-03

## 2024-01-31 RX ORDER — FAMOTIDINE 20 MG/1
20 TABLET, FILM COATED ORAL 2 TIMES DAILY
Status: DISCONTINUED | OUTPATIENT
Start: 2024-01-31 | End: 2024-02-04

## 2024-01-31 RX ORDER — ATORVASTATIN CALCIUM 10 MG/1
10 TABLET, FILM COATED ORAL DAILY
Status: DISCONTINUED | OUTPATIENT
Start: 2024-01-31 | End: 2024-02-14 | Stop reason: HOSPADM

## 2024-01-31 RX ORDER — MIRTAZAPINE 15 MG/1
15 TABLET, FILM COATED ORAL NIGHTLY
Status: DISCONTINUED | OUTPATIENT
Start: 2024-01-31 | End: 2024-02-09

## 2024-01-31 RX ADMIN — Medication 112.5 MCG/HR: at 07:01

## 2024-01-31 RX ADMIN — DEXAMETHASONE SODIUM PHOSPHATE 4 MG: 4 INJECTION INTRA-ARTICULAR; INTRALESIONAL; INTRAMUSCULAR; INTRAVENOUS; SOFT TISSUE at 05:01

## 2024-01-31 RX ADMIN — METHOCARBAMOL 500 MG: 100 INJECTION, SOLUTION INTRAMUSCULAR; INTRAVENOUS at 06:01

## 2024-01-31 RX ADMIN — AMPICILLIN SODIUM, SULBACTAM SODIUM 3 G: 2; 1 INJECTION, POWDER, FOR SOLUTION INTRAMUSCULAR; INTRAVENOUS at 01:01

## 2024-01-31 RX ADMIN — DEXMEDETOMIDINE HYDROCHLORIDE 0.2 MCG/KG/HR: 4 INJECTION INTRAVENOUS at 10:01

## 2024-01-31 RX ADMIN — AMPICILLIN SODIUM, SULBACTAM SODIUM 3 G: 2; 1 INJECTION, POWDER, FOR SOLUTION INTRAMUSCULAR; INTRAVENOUS at 08:01

## 2024-01-31 RX ADMIN — SENNOSIDES AND DOCUSATE SODIUM 1 TABLET: 50; 8.6 TABLET ORAL at 12:01

## 2024-01-31 RX ADMIN — VANCOMYCIN HYDROCHLORIDE 1000 MG: 1 INJECTION, POWDER, LYOPHILIZED, FOR SOLUTION INTRAVENOUS at 09:01

## 2024-01-31 RX ADMIN — AMPICILLIN SODIUM, SULBACTAM SODIUM 3 G: 2; 1 INJECTION, POWDER, FOR SOLUTION INTRAMUSCULAR; INTRAVENOUS at 07:01

## 2024-01-31 RX ADMIN — ENOXAPARIN SODIUM 40 MG: 40 INJECTION SUBCUTANEOUS at 05:01

## 2024-01-31 RX ADMIN — DEXAMETHASONE SODIUM PHOSPHATE 4 MG: 4 INJECTION INTRA-ARTICULAR; INTRALESIONAL; INTRAMUSCULAR; INTRAVENOUS; SOFT TISSUE at 12:01

## 2024-01-31 RX ADMIN — MUPIROCIN: 20 OINTMENT TOPICAL at 08:01

## 2024-01-31 RX ADMIN — ACETAMINOPHEN 650 MG: 325 TABLET ORAL at 07:01

## 2024-01-31 RX ADMIN — DEXAMETHASONE SODIUM PHOSPHATE 4 MG: 4 INJECTION INTRA-ARTICULAR; INTRALESIONAL; INTRAMUSCULAR; INTRAVENOUS; SOFT TISSUE at 06:01

## 2024-01-31 RX ADMIN — MUPIROCIN: 20 OINTMENT TOPICAL at 09:01

## 2024-01-31 RX ADMIN — FAMOTIDINE 20 MG: 20 TABLET, FILM COATED ORAL at 12:01

## 2024-01-31 RX ADMIN — MIRTAZAPINE 15 MG: 7.5 TABLET, FILM COATED ORAL at 10:01

## 2024-01-31 RX ADMIN — FAMOTIDINE 20 MG: 20 TABLET, FILM COATED ORAL at 08:01

## 2024-01-31 RX ADMIN — ATORVASTATIN CALCIUM 10 MG: 10 TABLET, FILM COATED ORAL at 10:01

## 2024-01-31 RX ADMIN — FLUOXETINE HYDROCHLORIDE 40 MG: 20 CAPSULE ORAL at 08:01

## 2024-01-31 RX ADMIN — METHOCARBAMOL 500 MG: 100 INJECTION, SOLUTION INTRAMUSCULAR; INTRAVENOUS at 01:01

## 2024-01-31 RX ADMIN — OXYCODONE 5 MG: 5 TABLET ORAL at 07:01

## 2024-01-31 RX ADMIN — VANCOMYCIN HYDROCHLORIDE 1000 MG: 1 INJECTION, POWDER, LYOPHILIZED, FOR SOLUTION INTRAVENOUS at 08:01

## 2024-01-31 RX ADMIN — FAMOTIDINE 20 MG: 20 TABLET, FILM COATED ORAL at 09:01

## 2024-01-31 RX ADMIN — SENNOSIDES AND DOCUSATE SODIUM 1 TABLET: 8.6; 5 TABLET ORAL at 09:01

## 2024-01-31 RX ADMIN — MIRTAZAPINE 15 MG: 7.5 TABLET, FILM COATED ORAL at 12:01

## 2024-01-31 RX ADMIN — MORPHINE SULFATE 2 MG: 2 INJECTION, SOLUTION INTRAMUSCULAR; INTRAVENOUS at 07:01

## 2024-01-31 RX ADMIN — AMPICILLIN SODIUM, SULBACTAM SODIUM 3 G: 2; 1 INJECTION, POWDER, FOR SOLUTION INTRAMUSCULAR; INTRAVENOUS at 02:01

## 2024-01-31 RX ADMIN — SENNOSIDES AND DOCUSATE SODIUM 1 TABLET: 8.6; 5 TABLET ORAL at 08:01

## 2024-01-31 NOTE — PLAN OF CARE
[facility] - Neuro Critical Care  Initial Discharge Assessment       Primary Care Provider: Nanda Smith MD    Admission Diagnosis: DDD (degenerative disc disease), cervical [M50.30]  Cervical spinal stenosis [M48.02]  Myelopathy [G95.9]  Cervical myelopathy [G95.9]    Admission Date: 1/30/2024  Expected Discharge Date:          Payor: [employer] CROSS BLUE SHIELD / Plan: BCBS BLUE SAVER PPO - HD / Product Type: PPO /     Extended Emergency Contact Information  Primary Emergency Contact: MichaelInes   [facility]  Home Phone: [phone]  Work Phone: [phone]  Mobile Phone: [phone]  Relation: Mother  Secondary Emergency Contact: Cral Callejas  Address: [address] [place] [address]  Home Phone: [phone]  Mobile Phone: [phone]  Relation: Friend    Discharge Plan A: Home  Discharge Plan B: Home, Skilled Nursing Facility, Home Health (TBD)      [id number] - [place], [place] - [address] AT [facility] [address]  [address]  [facility] [address]  Phone: [phone] Fax: [phone]    [facility]  [address] [facility] [address]  Phone: [phone] Fax: [phone]    [facility]  [address] [facility] [address]  [facility] [address]  Phone: [phone] Fax: [phone]    [facility]  [address] [facility] [address]  [facility] [address]  Phone: [phone] Fax: [phone]      Initial Assessment (most recent)       Adult Discharge Assessment - 01/31/24 1159          Discharge Assessment    Assessment Type Discharge Planning Assessment     Confirmed/corrected address, phone number and insurance Yes     Confirmed Demographics Correct on Facesheet     Source of Information family   Pt sister Ginny at bedside    Communicated JUSTYN with patient/caregiver Date not available/Unable to determine     Reason For Admission Cervical myelopathy      People in Home alone     Do you expect to return to your current living situation? Yes     Do you have help at home or someone to help you manage your care at home? Yes     Who are your caregiver(s) and their phone number(s)? Iván (friend/neighbor). Carl # on facesheet: [phone]     Prior to hospitilization cognitive status: Alert/Oriented     Current cognitive status: --   Pt nonverbal (just answer to yes and no questions by shaking head)    Equipment Currently Used at Home none     Patient currently being followed by outpatient case management? No     Do you currently have service(s) that help you manage your care at home? No     Do you have prescription coverage? Yes     Coverage BLUE CROSS BLUE SHIELD - BCBS BLUE SAVER PPO     Is the patient taking medications as prescribed? yes     Who is going to help you get home at discharge? Neighbors: Iván     How do you get to doctors appointments? family or friend will provide     Are you on dialysis? No     Do you take coumadin? No     Discharge Plan A Home     Discharge Plan B Home;Skilled Nursing Facility;Home Health   TBD    DME Needed Upon Discharge  --   TBD    Discharge Plan discussed with: Sibling     Name(s) and Number(s) Ginny (sister) [phone]        Physical Activity    On average, how many days per week do you engage in moderate to strenuous exercise (like a brisk walk)? 7 days        Housing Stability    In the last 12 months, was there a time when you were not able to pay the mortgage or rent on time? No     In the last 12 months, was there a time when you did not have a steady place to sleep or slept in a shelter (including now)? No        Transportation Needs    In the past 12 months, has lack of transportation kept you from medical appointments or from getting medications? No     In the past 12 months, has lack of transportation kept you from meetings, work, or from getting things needed for daily living? No         Social Connections    In a typical week, how many times do you talk on the phone with family, friends, or neighbors? More than three times a week     How often do you get together with friends or relatives? More than three times a week                   This SW met with patient (non-verbal, pt answer some questions by nodding) and pt sister (Ginny) in room to complete DPA. Questions answered / contact numbers provided.  Use PREFERRED PHARMACY / BEDSIDE DELIVERY for any necessary medications at time of discharge. Per Ginny, pt is independent with all ADLs - does not use DME, In-home equipment, is not on HD, BTs or home oxygen. Pt neighbors (Iván) per pt sister will be assisting with help upon discharge. Pt sister or mother will be providing transportation home. Hospital follow up will be scheduled with PCP. Will continue to follow for course of hospitalization.     Discharge Plan A and Plan B have been determined by review of patient's clinical status, future medical and therapeutic needs, and coverage/benefits for post-acute care in coordination with multidisciplinary team members.     CAM Stauffer   Case Management Dept-[facility]

## 2024-01-31 NOTE — SUBJECTIVE & OBJECTIVE
Interval History: POD1 from neck exploration and repair of pharyngeal injury. Patient had some agitation after arrived to room post op. Mother with questions regarding anesthesia.     Medications:  Continuous Infusions:   sodium chloride 0.9% 50 mL/hr at 01/31/24 1000    fentanyl 100 mcg/hr (01/31/24 1000)     Scheduled Meds:   ampicillin-sulbactam  3 g Intravenous Q6H    bisacodyL  10 mg Rectal Daily    dexAMETHasone  4 mg Intravenous Q6H    famotidine  20 mg Per NG tube BID    FLUoxetine  40 mg Per NG tube Daily    methocarbamol (ROBAXIN) IVPB  500 mg Intravenous Q8H    mirtazapine  15 mg Per NG tube QHS    mupirocin   Nasal BID    senna-docusate 8.6-50 mg  1 tablet Per NG tube BID    vancomycin (VANCOCIN) IV (PEDS and ADULTS)  15 mg/kg Intravenous Q12H     PRN Meds:acetaminophen, fentanyl, magnesium oxide, magnesium oxide, morphine, ondansetron, oxyCODONE, potassium bicarbonate, potassium bicarbonate, potassium bicarbonate, potassium, sodium phosphates, potassium, sodium phosphates, potassium, sodium phosphates, prochlorperazine, Pharmacy to dose Vancomycin consult **AND** vancomycin - pharmacy to dose     Review of patient's allergies indicates:   Allergen Reactions    Erythromycin Nausea And Vomiting    Infliximab Other (See Comments)     Lupus with fever and acute arthritis  Lupus with fever and acute arthritis     Objective:     Vital Signs (24h Range):  Temp:  [97.5 °F (36.4 °C)-98.2 °F (36.8 °C)] 97.7 °F (36.5 °C)  Pulse:  [62-87] 77  Resp:  [12-30] 13  SpO2:  [96 %-100 %] 99 %  BP: ()/(55-75) 137/72     Date 01/31/24 0700 - 02/01/24 0659   Shift 8607-1794 7087-1456 0938-3293 24 Hour Total   INTAKE   I.V.(mL/kg) 898.8(14.4)   898.8(14.4)   NG/GT 60   60   IV Piggyback 828.1   828.1   Shift Total(mL/kg) 1786.9(28.5)   1786.9(28.5)   OUTPUT   Shift Total(mL/kg)       Weight (kg) 62.6 62.6 62.6 62.6     Lines/Drains/Airways       Drain  Duration                  Closed/Suction Drain 01/30/24 1916  Right;Ventral Neck Bulb 15 Fr. <1 day         Trans Pyloric Feeding Tube 01/30/24 1916 Tungsten weighted 12 Fr. Right nostril <1 day    Male External Urinary Catheter 01/30/24 2000 <1 day              Airway  Duration                  Airway - Non-Surgical 01/30/24 1827 <1 day              Peripheral Intravenous Line  Duration                  Peripheral IV - Single Lumen 01/30/24 0545 20 G Left Forearm 1 day         Peripheral IV - Single Lumen 01/30/24 0746 18 G Right Forearm 1 day                     Physical Exam  Intubated, sedated  NG tube in right nare  Right neck incision c/d/I with skin staples, DELFIN drain with s/s output, holding suction  Improved subcutaneous emphysema   + cuff leak    Vent Mode: A/C  Oxygen Concentration (%):  [] 30  Resp Rate Total:  [11 br/min-23 br/min] 11 br/min  Vt Set:  [510 mL] 510 mL  PEEP/CPAP:  [5 cmH20] 5 cmH20  Mean Airway Pressure:  [8.6 cmH20-10 cmH20] 8.6 cmH20       Significant Labs:  CBC:   Recent Labs   Lab 01/31/24  0034   WBC 19.08*   RBC 4.31*   HGB 12.1*   HCT 37.4*      MCV 87   MCH 28.1   MCHC 32.4     CMP:   Recent Labs   Lab 01/31/24  0034   *   CALCIUM 8.9   ALBUMIN 3.4*   PROT 6.3      K 4.3   CO2 21*      BUN 12   CREATININE 0.9   ALKPHOS 72   ALT 21   AST 32   BILITOT 0.4       Significant Diagnostics:  I have reviewed all pertinent imaging results/findings within the past 24 hours.

## 2024-01-31 NOTE — ASSESSMENT & PLAN NOTE
DELFIN drain noted to be in hypopharynx on post-op imaging, s/p emergent surgical repair on 1/30    - NPO x5 days per ENT  - On vanc/unasyn -> clarify duration of abx  - Dex 4q6hrs, taper per ENT  - Wean vent as able

## 2024-01-31 NOTE — PLAN OF CARE
"[facility] Care Plan    POC reviewed with Rebel Rodriguez and family at 1400. Pt and family verbalized understanding. Questions and concerns addressed. No acute events today. Pt progressing toward goals. Will continue to monitor. See below and flowsheets for full assessment and VS info.     Added precedex for anxiety control. Continuing fentanyl drip for pain.  Patient resting comfortably on ventilator.  Worked with PT/OT at bedside.    Is this a stroke patient? no    Neuro:  [place] Coma Scale  Best Eye Response: 4-->(E4) spontaneous  Best Motor Response: 6-->(M6) obeys commands  Best Verbal Response: 1-->(V1) none  [place] Coma Scale Score: 11  Assessment Qualifiers: patient intubated  Pupil PERRLA: yes     24 hr Temp:  [97.7 °F (36.5 °C)-98.2 °F (36.8 °C)]     CV:   Rhythm: normal sinus rhythm  BP goals:   SBP < 160  MAP > 65    Resp:      Vent Mode: Spont  Set Rate: 14 BPM  Oxygen Concentration (%): 30  Vt Set: 510 mL  PEEP/CPAP: 5 cmH20  Pressure Support: 8 cmH20    Plan:  per ENT    GI/:     Diet/Nutrition Received: NPO  Last Bowel Movement: 01/29/24  Voiding Characteristics: external catheter    Intake/Output Summary (Last 24 hours) at 1/31/2024 1409  Last data filed at 1/31/2024 1400  Gross per 24 hour   Intake 3527.58 ml   Output 860 ml   Net 2667.58 ml          Labs/Accuchecks:  Recent Labs   Lab 01/31/24  0034   WBC 19.08*   RBC 4.31*   HGB 12.1*   HCT 37.4*         Recent Labs   Lab 01/31/24  0034      K 4.3   CO2 21*      BUN 12   CREATININE 0.9   ALKPHOS 72   ALT 21   AST 32   BILITOT 0.4    No results for input(s): "PROTIME", "INR", "APTT", "HEPANTIXA" in the last 168 hours. No results for input(s): "CPK", "CPKMB", "TROPONINI", "MB" in the last 168 hours.    Electrolytes: N/A - electrolytes WDL  Accuchecks: none    Gtts:   sodium chloride 0.9% 50 mL/hr at 01/31/24 1400    dexmedeTOMIDine (Precedex) infusion (titrating) 0.2 mcg/kg/hr (01/31/24 1400)    fentanyl 100 mcg/hr (01/31/24 1400) "       LDA/Wounds:    Nurses Note -- 4 Eyes      1/31/2024   2:09 PM      Skin assessed during: Daily Assessment      [x] No Altered Skin Integrity Present    []Prevention Measures Documented      [] Yes- Altered Skin Integrity Present or Discovered   [] LDA Added if Not in Epic (Describe Wound)   [] New Altered Skin Integrity was Present on Admit and Documented in LDA   [] Wound Image Taken    Wound Care Consulted? No    Attending Nurse:  Moncho Person RN/Staff Member:  Jacob

## 2024-01-31 NOTE — ASSESSMENT & PLAN NOTE
2/2 pharyngeal injury    - NPO x5 days post-operatively per ENT  - May require PEG, general surgery following, appreciate their assistance

## 2024-01-31 NOTE — ANESTHESIA POSTPROCEDURE EVALUATION
Anesthesia Post Evaluation    Patient: Rebel Rodriguez    Procedure(s) Performed: Procedure(s) (LRB):  C3-4 anterior cervical discectomy and fusion (N/A)    Final Anesthesia Type: general      Patient location during evaluation: PACU  Patient participation: Yes- Able to Participate  Level of consciousness: awake and alert and oriented  Post-procedure vital signs: reviewed and stable  Pain management: adequate  Airway patency: patent    PONV status at discharge: No PONV  Anesthetic complications: no      Cardiovascular status: hemodynamically stable  Respiratory status: unassisted, spontaneous ventilation and room air  Hydration status: euvolemic  Follow-up not needed.          Vital signs stable, no issues at this time.    Event Time   Out of Recovery 10:45:00

## 2024-01-31 NOTE — PLAN OF CARE
Problem: Occupational Therapy  Goal: Occupational Therapy Goal  Description: Goals to be met by: 2/29/24     Patient will increase functional independence with ADLs by performing:    UE Dressing with [place].  LE Dressing with [place].  Grooming while standing with [place].  Toileting from toilet with [place] for hygiene and clothing management.   Stand pivot transfers with [place].  Step transfer with [place]  Toilet transfer to toilet with [place].    Outcome: Ongoing, Progressing

## 2024-01-31 NOTE — HOSPITAL COURSE
01/31/2024 CT neck concerning for extensive soft tissue edema and air in neck, additionally w/ concern for DELFIN drain misplaced into hypopharynx. Taken class A to OR by ENT for pharyngeal repair, left intubated by ENT in setting of airway edema. On high dose steroids, no cuff leak this AM  02/01/2024 General surgery consulted for open G tube placement, family still in discussion regarding trach. D/c dex to allow for adequate wound healing, ok with NSGY.   2/2/24: possible G-tube placement today  2/3/24: G-tube today  2/4/24: ok to step down to hospital medicine  2/5/24: awaiting step down bed

## 2024-01-31 NOTE — PROGRESS NOTES
[facility]  Otorhinolaryngology-Head & Neck Surgery  Progress Note    Subjective:     Post-Op Info:  Procedure(s) (LRB):  EXPLORATION, NECK, REPAIR OF PHARYNGEAL INJURY (N/A)   1 Day Post-Op  Hospital Day: 2     Interval History: POD1 from neck exploration and repair of pharyngeal injury. Patient had some agitation after arrived to room post op. Mother had questions regarding anesthesia.     Medications:  Continuous Infusions:   sodium chloride 0.9% 50 mL/hr at 01/31/24 1000    fentanyl 100 mcg/hr (01/31/24 1000)     Scheduled Meds:   ampicillin-sulbactam  3 g Intravenous Q6H    bisacodyL  10 mg Rectal Daily    dexAMETHasone  4 mg Intravenous Q6H    famotidine  20 mg Per NG tube BID    FLUoxetine  40 mg Per NG tube Daily    methocarbamol (ROBAXIN) IVPB  500 mg Intravenous Q8H    mirtazapine  15 mg Per NG tube QHS    mupirocin   Nasal BID    senna-docusate 8.6-50 mg  1 tablet Per NG tube BID    vancomycin (VANCOCIN) IV (PEDS and ADULTS)  15 mg/kg Intravenous Q12H     PRN Meds:acetaminophen, fentanyl, magnesium oxide, magnesium oxide, morphine, ondansetron, oxyCODONE, potassium bicarbonate, potassium bicarbonate, potassium bicarbonate, potassium, sodium phosphates, potassium, sodium phosphates, potassium, sodium phosphates, prochlorperazine, Pharmacy to dose Vancomycin consult **AND** vancomycin - pharmacy to dose     Review of patient's allergies indicates:   Allergen Reactions    Erythromycin Nausea And Vomiting    Infliximab Other (See Comments)     Lupus with fever and acute arthritis  Lupus with fever and acute arthritis     Objective:     Vital Signs (24h Range):  Temp:  [97.5 °F (36.4 °C)-98.2 °F (36.8 °C)] 97.7 °F (36.5 °C)  Pulse:  [62-87] 77  Resp:  [12-30] 13  SpO2:  [96 %-100 %] 99 %  BP: ()/(55-75) 137/72     Date 01/31/24 0700 - 02/01/24 0659   Shift 7458-1527 9834-5699 9327-9438 24 Hour Total   INTAKE   I.V.(mL/kg) 898.8(14.4)   898.8(14.4)   NG/GT 60   60   IV Piggyback 828.1    828.1   Shift Total(mL/kg) 1786.9(28.5)   1786.9(28.5)   OUTPUT   Shift Total(mL/kg)       Weight (kg) 62.6 62.6 62.6 62.6     Lines/Drains/Airways       Drain  Duration                  Closed/Suction Drain 01/30/24 1916 Right;Ventral Neck Bulb 15 Fr. <1 day         Trans Pyloric Feeding Tube 01/30/24 1916 Tungsten weighted 12 Fr. Right nostril <1 day    Male External Urinary Catheter 01/30/24 2000 <1 day              Airway  Duration                  Airway - Non-Surgical 01/30/24 1827 <1 day              Peripheral Intravenous Line  Duration                  Peripheral IV - Single Lumen 01/30/24 0545 20 G Left Forearm 1 day         Peripheral IV - Single Lumen 01/30/24 0746 18 G Right Forearm 1 day                     Physical Exam  Intubated, sedated  NG tube in right nare  Right neck incision c/d/I with skin staples, DELFIN drain with s/s output, holding suction  Improved subcutaneous emphysema   + cuff leak    Vent Mode: A/C  Oxygen Concentration (%):  [] 30  Resp Rate Total:  [11 br/min-23 br/min] 11 br/min  Vt Set:  [510 mL] 510 mL  PEEP/CPAP:  [5 cmH20] 5 cmH20  Mean Airway Pressure:  [8.6 cmH20-10 cmH20] 8.6 cmH20       Significant Labs:  CBC:   Recent Labs   Lab 01/31/24 0034   WBC 19.08*   RBC 4.31*   HGB 12.1*   HCT 37.4*      MCV 87   MCH 28.1   MCHC 32.4     CMP:   Recent Labs   Lab 01/31/24 0034   *   CALCIUM 8.9   ALBUMIN 3.4*   PROT 6.3      K 4.3   CO2 21*      BUN 12   CREATININE 0.9   ALKPHOS 72   ALT 21   AST 32   BILITOT 0.4       Significant Diagnostics:  I have reviewed all pertinent imaging results/findings within the past 24 hours.  Assessment/Plan:     Dysphagia  Mr Rodriguez is a 55 yo male who is s/p C3-4 ACDF surgery with post op dysphagia, odynophagia, neck pain, swelling and crepitus. CT and scope demonstrates DELFIN drain in the hypopharynx. Patient with difficulty with swallowing secretions. No respiratory compromise. He is s/p neck exploration and pharyngeal  repair on 1/30.    - Patient will need open G tube and trach. Will coordinate timing with general surgery.   - Keep patient intubated.   - Rest of care for per primary  - Please page ENT with questions or concerns.          Melanie Mullen MD  Otorhinolaryngology-Head & Neck Surgery  [facility]

## 2024-01-31 NOTE — NURSING
Nurse escorted pt to OR via bed for Exploration neck, repair of pharyngeal injury.Mother updated on surgery per Dr Mullen.No acute distress noted.

## 2024-01-31 NOTE — ANESTHESIA PROCEDURE NOTES
Intubation    Date/Time: 1/30/2024 6:27 PM    Performed by: Jordan Shaffer CRNA  Authorized by: Vianney Cagle MD    Intubation:     Induction:  Intravenous    Intubated:  Postinduction    Mask Ventilation:  Not attempted    Attempts:  1    Attempted By:  CRNA    Method of Intubation:  Video laryngoscopy    Blade:  [place] 3    Laryngeal View Grade: Grade I - full view of cords      Difficult Airway Encountered?: No      Complications:  None    Airway Device:  Oral endotracheal tube    Airway Device Size:  7.5    Style/Cuff Inflation:  Cuffed (inflated to minimal occlusive pressure)    Tube secured:  22    Secured at:  The lips    Placement Verified By:  Capnometry and Fiber optic visualization    Complicating Factors:  None    Findings Post-Intubation:  BS equal bilateral and atraumatic/condition of teeth unchanged

## 2024-01-31 NOTE — PROGRESS NOTES
"Pharmacokinetic Initial Assessment: IV Vancomycin    Assessment/Plan:    Initiate intravenous vancomycin with loading dose of 1250 mg once followed by a maintenance dose of vancomycin 1000 mg IV every 12 hours  Desired empiric serum trough concentration is 10 to 20 mcg/mL  Draw vancomycin trough level 60 min prior to fourth dose on 2/1/24 at approximately 0700  Pharmacy will continue to follow and monitor vancomycin.      Please contact pharmacy at extension [phone] with any questions regarding this assessment.     Thank you for the consult,   Roel Villanueva       Patient brief summary:  Rebel Rodriguez is a 54 y.o. male initiated on antimicrobial therapy with IV Vancomycin for treatment of suspected skin & soft tissue infection    Drug Allergies:   Review of patient's allergies indicates:   Allergen Reactions    Erythromycin Nausea And Vomiting    Infliximab Other (See Comments)     Lupus with fever and acute arthritis  Lupus with fever and acute arthritis       Actual Body Weight:   62.6 kg    Renal Function:   Estimated Creatinine Clearance: 83.1 mL/min (based on SCr of 0.9 mg/dL).,     Dialysis Method (if applicable):  N/A    CBC (last 72 hours):  Recent Labs   Lab Result Units 01/30/24  1335   WBC K/uL 16.17*   Hemoglobin g/dL 11.9*   Hematocrit % 35.7*   Platelets K/uL 257   Gran % % 94.7*   Lymph % % 3.6*   Mono % % 0.9*   Eosinophil % % 0.1   Basophil % % 0.1   Differential Method  Automated       Metabolic Panel (last 72 hours):  Recent Labs   Lab Result Units 01/30/24  1335   Sodium mmol/L 136   Potassium mmol/L 4.0   Chloride mmol/L 106   CO2 mmol/L 21*   Glucose mg/dL 124*   BUN mg/dL 11   Creatinine mg/dL 0.9   Albumin g/dL 3.4*   Total Bilirubin mg/dL 0.2   Alkaline Phosphatase U/L 74   AST U/L 31   ALT U/L 22   Magnesium mg/dL 1.7   Phosphorus mg/dL 2.1*       Drug levels (last 3 results):  No results for input(s): "VANCOMYCINRA", "VANCORANDOM", "VANCOMYCINPE", "VANCOPEAK", "VANCOMYCINTR", "VANCOTROUGH" " in the last 72 hours.    Microbiologic Results:  Microbiology Results (last 7 days)       ** No results found for the last 168 hours. **

## 2024-01-31 NOTE — BRIEF OP NOTE
[facility]  Brief Operative Note    SUMMARY     Surgery Date: 1/30/2024     Surgeon(s) and Role:     * Senthil Agarwal MD - Primary    Assisting Surgeon: Melanie Mullen MD    Pre-op Diagnosis:  Injury of pharynx, initial encounter [S19.85XA]    Post-op Diagnosis:  Post-Op Diagnosis Codes:     * Injury of pharynx, initial encounter [S19.85XA]    Procedure(s) (LRB):  EXPLORATION, NECK, REPAIR OF PHARYNGEAL INJURY (N/A)    Anesthesia: General    Implants:  * No implants in log *    Operative Findings:  Pharyngeal injury, repaired. DELFIN drain placed to the right neck. Dobhoff placed.     Estimated Blood Loss: * No values recorded between 1/30/2024  6:43 PM and 1/30/2024  7:45 PM *    Estimated Blood Loss has not been documented. EBL = 10cc.         Specimens:   Specimen (24h ago, onward)      None            [id number]

## 2024-01-31 NOTE — ASSESSMENT & PLAN NOTE
Intubated due to upper airway compromise, s/p surgical pharyngeal repair on 1/30 w/ ENT    - Tolerating PS 8/5 @ 40% this AM, continue as tolerated  - No cuff leak on AM exam, unable to extubate   - C/w high dose steroids, monitor daily for cuff leak, extubate if able -> may require trach/PEG to facilitate healing, defer to ENT  - Daily ABG and CXR while intubated  - Fentanyl/precedex gtt for comfort while intubated

## 2024-01-31 NOTE — ANESTHESIA POSTPROCEDURE EVALUATION
Anesthesia Post Evaluation    Patient: Rebel Rodriguez    Procedure(s) Performed: Procedure(s) (LRB):  EXPLORATION, NECK, REPAIR OF PHARYNGEAL INJURY (N/A)    Final Anesthesia Type: general      Patient location during evaluation: ICU  Patient participation: No - Unable to Participate, Intubation  Level of consciousness: sedated  Pain management: adequate  Airway patency: patent      Anesthetic complications: no      Cardiovascular status: hemodynamically stable  Respiratory status: ETT  Hydration status: euvolemic            Vitals Value Taken Time   /57 01/31/24 0631   Temp 36.5 °C (97.7 °F) 01/31/24 0302   Pulse 64 01/31/24 0649   Resp 16 01/31/24 0649   SpO2 99 % 01/31/24 0649   Vitals shown include unvalidated device data.      No case tracking events are documented in the log.      Pain/Tricia Score: Pain Rating Prior to Med Admin: 8 (1/30/2024 11:22 PM)  Pain Rating Post Med Admin: 0 (1/30/2024  5:00 PM)  Tricia Score: 10 (1/30/2024 11:15 AM)

## 2024-01-31 NOTE — PLAN OF CARE
"[facility] Care Plan    POC reviewed with Rebel Rodriguez and family at 1400. Pt verbalized understanding. Questions and concerns addressed. No acute events today. Pt progressing toward goals. Will continue to monitor. See below and flowsheets for full assessment and VS info.             Is this a stroke patient? no    Neuro:  [place] Coma Scale  Best Eye Response: 4-->(E4) spontaneous  Best Motor Response: 6-->(M6) obeys commands  Best Verbal Response: 5-->(V5) oriented  [place] Coma Scale Score: 15  Pupil PERRLA: yes     24 hr Temp:  [97.5 °F (36.4 °C)-98.4 °F (36.9 °C)]     CV:   Rhythm: normal sinus rhythm  BP goals:   SBP < 160  MAP > 65    Resp:           Plan: N/A    GI/:     Diet/Nutrition Received: NPO  Last Bowel Movement: 01/29/24  Voiding Characteristics: voids spontaneously without difficulty    Intake/Output Summary (Last 24 hours) at 1/30/2024 1834  Last data filed at 1/30/2024 1330  Gross per 24 hour   Intake 1635.98 ml   Output 295 ml   Net 1340.98 ml          Labs/Accuchecks:  Recent Labs   Lab 01/30/24  1335   WBC 16.17*   RBC 4.11*   HGB 11.9*   HCT 35.7*         Recent Labs   Lab 01/30/24  1335      K 4.0   CO2 21*      BUN 11   CREATININE 0.9   ALKPHOS 74   ALT 22   AST 31   BILITOT 0.2    No results for input(s): "PROTIME", "INR", "APTT", "HEPANTIXA" in the last 168 hours. No results for input(s): "CPK", "CPKMB", "TROPONINI", "MB" in the last 168 hours.    Electrolytes: N/A - electrolytes WDL  Accuchecks: none    Gtts:   sodium chloride 0.9% 25 mL/hr at 01/30/24 1650       LDA/Wounds:    Nurses Note -- 4 Eyes      1/30/2024   6:34 PM      Skin assessed during: Daily Assessment      [x] No Altered Skin Integrity Present    []Prevention Measures Documented      [] Yes- Altered Skin Integrity Present or Discovered   [] LDA Added if Not in Epic (Describe Wound)   [] New Altered Skin Integrity was Present on Admit and Documented in LDA   [] Wound Image Taken    Wound Care Consulted? " No    Attending Nurse:  Jo Person RN/Staff Member:         PAVITHRA

## 2024-01-31 NOTE — OP NOTE
DATE OF PROCEDURE: 1/30/2024     PREOPERATIVE DIAGNOSES:   Injury of pharynx, initial encounter [S19.85XA]    POSTOPERATIVE DIAGNOSES:   Injury of pharynx, initial encounter [S19.85XA]    SURGEON:  Surgeon(s) and Role:     * Senthil Agarwal MD - Primary      PROCEDURES PERFORMED:   Right neck exploration with repair of posterior pharyngeal wall laceration and right lateral pharyngotomy    ANESTHESIA: General      INDICATIONS FOR PROCEDURE:   Rebel Rodriguez is a 54 y.o. man status post anterior cervical disc fusion this morning.  Concern was raised postoperatively for a pharyngeal injury.  Consultation was called to our service.  Flexible laryngoscopy was performed which revealed the distal tip of his closed suction drain within the pharynx.  He was noted to have difficulty tolerating secretions and significant subcutaneous air on exam.  Given the above, I recommended that we proceed to the operating room emergently to secure his airway and to repair the perforation of the pharynx.    He was apprised of the risks, benefits and alternatives to surgery.  In spite of the risk inherent to surgery,he provided informed consent for the aforementioned procedures.     PROCEDURE IN DETAIL:  The patient was taken to the operating room and placed on the operating table in the supine position.  General endotracheal anesthesia was induced by the anesthesia team.     The right neck was then prepped and draped in standard sterile fashion.  The incision was opened and the drain was followed down to a large right-sided pharyngotomy at the level of the piriform sinus.  The drain was then removed.  The pharynx was then examined there was noted to be an approximately 1.5 cm full-thickness laceration of the posterior pharyngeal wall.  The hardware within cervical spine was noted through the perforation.  The posterior perforation was then repaired with simple interrupted 3-0 Vicryl sutures.  The lateral pharyngotomy was then  repaired with interrupted 3-0 Vicryl sutures placed as Ondina sutures.  A nasogastric feeding tube was placed and guided in the esophagus before complete closure.  It was secured to the membranous septum on the right side with a silk suture.  The neck was irrigated with copious amounts of normal saline and suctioned dry.  Hemostasis was achieved electrocautery.  The wound was then closed over a 15 Danish closed suction drain which was secured with silk suture.  The incision was closed with 3-0 Vicryl and skin staples.    He was then handed back to anesthesia.  He was transferred to ICU intubated in satisfactory condition.    There were no intraoperative complications.  I was present for and participated in the entire procedure as dictated above.       ESTIMATED BLOOD LOSS: Minimal    SPECIMENS:   Specimen (24h ago, onward)      None

## 2024-01-31 NOTE — ASSESSMENT & PLAN NOTE
Mr Rodriguez is a 53 yo male who is s/p C3-4 ACDF surgery with post op dysphagia, odynophagia, neck pain, swelling and crepitus. CT and scope demonstrates DELFIN drain in the hypopharynx. Patient with difficulty with swallowing secretions. No respiratory compromise. He is s/p neck exploration and pharyngeal repair on 1/30.    - Patient will need open G tube and trach. Will coordinate timing with general surgery.   - Keep patient intubated.   - Rest of care for per primary  - Please page ENT with questions or concerns.

## 2024-01-31 NOTE — CONSULTS
[facility]  General Surgery  Consult Note    Inpatient consult to General Surgery  Consult performed by: Aundrea Mora MD  Consult ordered by: Butch Flanagan MD  Reason for consult: open G tube  Assessment/Recommendations: 53yo M w/PMH C4-7 ACDF, cervical myelopathy due to C3-4 severe stenosis now s/p C3-4 ACDF on 1/30 complicated by hypopharyngeal injury s/p repair of posterior pharyngeal wall laceration and right lateral pharyngotomy per ENT on 1/30 who general surgery is consulted for open G tube    - patient is a candidate for open G tube, patient and his family at bedside indicated that they would like to trial extubation and feeding prior to considering G tube  - will defer discussion regarding need for G tube to primary team and ENT, general surgery will be available for G tube placement if needed  - will continue to follow, please call with questions        Subjective:     Chief Complaint/Reason for Admission: cervical myelopathy    History of Present Illness: Mr. Rodriguez is a 53yo M w/PMH C4-7 ACDF, cervical myelopathy due to C3-4 severe stenosis now s/p C3-4 ACDF on 1/30 complicated by hypopharyngeal injury s/p repair of posterior pharyngeal wall laceration and right lateral pharyngotomy per ENT on 1/30 who general surgery is consulted for open G tube.    Patient underwent C3-4 ACDF on 1/30 complicated by hypopharyngeal injury now s/p repair per ENT. An NG feeding tube was placed at the time of surgery. Patient has been tolerating bolus tube feeds well. Passing gas, having bowel function.     PSH: no prior abdominal surgeries    No current facility-administered medications on file prior to encounter.     Current Outpatient Medications on File Prior to Encounter   Medication Sig    apremilast (OTEZLA) 30 mg Tab Take 1 tablet (30 mg total) by mouth 2 (two) times daily.    aspirin (ECOTRIN) 81 MG EC tablet Take 81 mg by mouth once daily.    FLUoxetine 40 MG capsule Take 1 capsule (40  mg) by mouth daily    mirtazapine (REMERON) 15 MG tablet Take 1 tablet (15 mg) by mouth daily at bedtime    guselkumab (TREMFYA) 100 mg/mL AtIn Inject 100 mg into the skin every 8 weeks.       Review of patient's allergies indicates:   Allergen Reactions    Erythromycin Nausea And Vomiting    Infliximab Other (See Comments)     Lupus with fever and acute arthritis  Lupus with fever and acute arthritis       Past Medical History:   Diagnosis Date    Achilles tendon rupture 10/09/2013    Achilles tendon rupture 10/09/2013    Allergy     Anemia 1/17/2024    Anxiety     Arthritis     psoriatric    Degenerative disc disease     Drug-induced lupus erythematosus     Drug-induced lupus erythematosus 03/09/2016    Hyperlipidemia     Inguinal lymphadenopathy 07/21/2015    Kidney stone     Medication monitoring encounter 12/07/2016    Neck pain 07/06/2017    Psoriatic arthritis     Psoriatic arthritis 12/07/2016    Recurrent fever 07/08/2015    Recurrent nephrolithiasis 05/19/2014    On low oxalate diet    Sinusitis 02/25/2016    Stroke     TIA (transient ischemic attack)     Ulcer     high school    Unexplained night sweats 07/13/2015     Past Surgical History:   Procedure Laterality Date    ACHILLES TENDON SURGERY      BACK SURGERY      FUNCTIONAL ENDOSCOPIC SINUS SURGERY (FESS) USING COMPUTER-ASSISTED NAVIGATION Bilateral 9/14/2018    Procedure: FESS, USING COMPUTER-ASSISTED NAVIGATION;  Surgeon: Gerard Manzo MD;  Location: [facility] OR [address] [place] [address];  Service: ENT;  Laterality: Bilateral;    FUSION, SPINE, CERVICAL, ANTERIOR APPROACH N/A 1/30/2024    Procedure: C3-4 anterior cervical discectomy and fusion;  Surgeon: Rogerio Maradiaga MD;  Location: [facility] OR [address] [place] [address];  Service: Neurosurgery;  Laterality: N/A;  C3-4 anterior cervical discectomy and fusion  anesthesia: general  nerve mon: EMG/SEP/MEP  radiology: C-arm  bed: reg. slider  position: supine  headrest: caspar, SHEREE tongs/hanging weights, surgical pillow    INJECTION OF  ANESTHETIC AGENT AROUND NERVE Left 5/13/2022    Procedure: Block, Nerve MEDIAL BRANCH BLOCK LEFT C2,3,4,5;  Surgeon: Kimberly Wilson MD;  Location: [facility] PAIN MGT;  Service: Pain Management;  Laterality: Left;    INJECTION OF ANESTHETIC AGENT AROUND NERVE Left 5/24/2022    Procedure: BLOCK, NERVE, LEFT C2-C5 MEDIAL BRANCH;  Surgeon: Kimberly Wilson MD;  Location: [facility] PAIN MGT;  Service: Pain Management;  Laterality: Left;    NASAL SEPTOPLASTY N/A 9/14/2018    Procedure: SEPTOPLASTY, NASAL;  Surgeon: Gerard Manzo MD;  Location: [facility] OR [facility] FLR;  Service: ENT;  Laterality: N/A;    NASAL TURBINATE REDUCTION Bilateral 9/14/2018    Procedure: REDUCTION, NASAL TURBINATE;  Surgeon: Gerard Manzo MD;  Location: [facility] OR [facility] FLR;  Service: ENT;  Laterality: Bilateral;    NECK EXPLORATION N/A 1/30/2024    Procedure: EXPLORATION, NECK, REPAIR OF PHARYNGEAL INJURY;  Surgeon: Senthil Agarwal MD;  Location: [facility] OR [facility] FLR;  Service: ENT;  Laterality: N/A;    RADIOFREQUENCY ABLATION Left 7/12/2022    Procedure: RADIOFREQUENCY ABLATION, LEFT C2-C3, C3-C4, C4-C5;  Surgeon: Kimberly Wilson MD;  Location: [facility] PAIN MGT;  Service: Pain Management;  Laterality: Left;    UPPER ENDOSCOPY W/ ESOPHAGEAL MANOMETRY      URETERAL STENT PLACEMENT       Family History       Problem Relation (Age of Onset)    Cancer Father (61)    Cataracts Maternal Grandmother, Maternal Grandfather, Paternal Grandmother, Paternal Grandfather    Crohn's disease Mother, Maternal Grandmother    Osteoarthritis Maternal Grandmother    Pancreatic cancer Father    Ulcerative colitis Father          Tobacco Use    Smoking status: Never    Smokeless tobacco: Never   Substance and Sexual Activity    Alcohol use: No     Alcohol/week: 0.0 standard drinks of alcohol     Types: 1 - 2 Standard drinks or equivalent per week     Comment: cocktails    Drug use: No    Sexual activity: Not on file     Review of Systems   Constitutional:  Negative for chills  and fever.   Respiratory:  Negative for chest tightness.    Cardiovascular:  Negative for chest pain.   Gastrointestinal:  Negative for abdominal pain, constipation, diarrhea, nausea and vomiting.   Genitourinary:  Negative for dysuria and hematuria.     Objective:     Vital Signs (Most Recent):  Temp: 98 °F (36.7 °C) (01/31/24 1100)  Pulse: 65 (01/31/24 1128)  Resp: 14 (01/31/24 1128)  BP: 119/70 (01/31/24 1100)  SpO2: 97 % (01/31/24 1128) Vital Signs (24h Range):  Temp:  [97.7 °F (36.5 °C)-98.2 °F (36.8 °C)] 98 °F (36.7 °C)  Pulse:  [62-83] 65  Resp:  [12-30] 14  SpO2:  [96 %-100 %] 97 %  BP: ()/(55-75) 119/70     Weight: 62.6 kg (138 lb 0.1 oz)  Body mass index is 20.98 kg/m².      Intake/Output Summary (Last 24 hours) at 1/31/2024 1203  Last data filed at 1/31/2024 1100  Gross per 24 hour   Intake 3328.08 ml   Output 1135 ml   Net 2193.08 ml       Physical Exam  HENT:      Head: Normocephalic.   Neck:      Comments: R DELFIN drain in place with serosanguinous output. Incision clean, dry, intact.  Cardiovascular:      Rate and Rhythm: Normal rate.   Pulmonary:      Comments: Intubated but awake  Vent Mode: Spont  Oxygen Concentration (%):  [] 30  Resp Rate Total:  [7.7 br/min-23 br/min] 7.7 br/min  Vt Set:  [510 mL] 510 mL  PEEP/CPAP:  [5 cmH20] 5 cmH20  Pressure Support:  [8 cmH20] 8 cmH20  Mean Airway Pressure:  [7.3 cmH20-10 cmH20] 7.3 cmH20      Abdominal:      General: There is no distension.      Palpations: Abdomen is soft.      Tenderness: There is no abdominal tenderness.      Comments: No surgical scars, no hernias.   Skin:     General: Skin is warm and dry.      Capillary Refill: Capillary refill takes less than 2 seconds.   Neurological:      General: No focal deficit present.      Mental Status: He is alert.         Significant Labs:  BMP:   Recent Labs   Lab 01/31/24  0034   *      K 4.3      CO2 21*   BUN 12   CREATININE 0.9   CALCIUM 8.9   MG 1.8     CBC:   Recent Labs    Lab 01/31/24  0034   WBC 19.08*   RBC 4.31*   HGB 12.1*   HCT 37.4*      MCV 87   MCH 28.1   MCHC 32.4       Significant Diagnostics:  I have reviewed all pertinent imaging results/findings within the past 24 hours.    Assessment/Plan:   55yo M w/PMH C4-7 ACDF, cervical myelopathy due to C3-4 severe stenosis now s/p C3-4 ACDF on 1/30 complicated by hypopharyngeal injury s/p repair of posterior pharyngeal wall laceration and right lateral pharyngotomy per ENT on 1/30 who general surgery is consulted for open G tube    - patient is a candidate for open G tube, patient and his family at bedside indicated that they would like to trial extubation and feeding prior to considering G tube  - will defer discussion regarding need for G tube to primary team and ENT, general surgery will be available for G tube placement if needed  - will continue to follow, please call with questions    Thank you for your consult. I will follow-up with patient. Please contact us if you have any additional questions.    Aundrea Mora MD  General Surgery  [facility]

## 2024-01-31 NOTE — PLAN OF CARE
"[facility] Care Plan    POC reviewed with Rebel Rodriguez and family at 0300. Pt nods appropriately to understanding unable to verbalize understanding. Questions and concerns addressed. No acute events overnight. Pt progressing toward goals. See below and flowsheets for full assessment and VS info.     - fentanyl gtt @ 87.5mcg  - fentanyl boluses given  - PRN oxy and morphine given for pain management  - VSS    Is this a stroke patient? no    Neuro:  Wall Coma Scale  Best Eye Response: 4-->(E4) spontaneous  Best Motor Response: 6-->(M6) obeys commands  Best Verbal Response: 1-->(V1) none  Wall Coma Scale Score: 11  Assessment Qualifiers: patient intubated  Pupil PERRLA: yes     24hr Temp:  [97.5 °F (36.4 °C)-98.4 °F (36.9 °C)]     CV:   Rhythm: normal sinus rhythm  BP goals:   SBP < 140  MAP > 65    Resp:      Vent Mode: A/C  Set Rate: 14 BPM  Oxygen Concentration (%): 30  Vt Set: 510 mL  PEEP/CPAP: 5 cmH20    Plan: wean to extubate    GI/:     Diet/Nutrition Received: NPO  Last Bowel Movement: 01/29/24  Voiding Characteristics: external catheter    Intake/Output Summary (Last 24 hours) at 1/31/2024 0532  Last data filed at 1/30/2024 1921  Gross per 24 hour   Intake 2889.85 ml   Output 695 ml   Net 2194.85 ml          Labs/Accuchecks:  Recent Labs   Lab 01/31/24  0034   WBC 19.08*   RBC 4.31*   HGB 12.1*   HCT 37.4*         Recent Labs   Lab 01/31/24  0034      K 4.3   CO2 21*      BUN 12   CREATININE 0.9   ALKPHOS 72   ALT 21   AST 32   BILITOT 0.4    No results for input(s): "PROTIME", "INR", "APTT", "HEPANTIXA" in the last 168 hours. No results for input(s): "CPK", "CPKMB", "TROPONINI", "MB" in the last 168 hours.    Electrolytes: N/A - electrolytes WDL  Accuchecks: none    Gtts:   sodium chloride 0.9% 50 mL/hr at 01/30/24 2326    fentanyl 25 mcg/hr (01/30/24 2022)       LDA/Wounds:    Nurses Note -- 4 Eyes      1/31/2024   5:32 AM      Skin assessed during: Daily Assessment      [] No Altered " Skin Integrity Present    []Prevention Measures Documented      [] Yes- Altered Skin Integrity Present or Discovered   [] LDA Added if Not in Epic (Describe Wound)   [] New Altered Skin Integrity was Present on Admit and Documented in LDA   [] Wound Image Taken    Wound Care Consulted? No    Attending Nurse:  Marina Person RN/Staff Member:            Problem: Adult Inpatient Plan of Care  Goal: Patient-Specific Goal (Individualized)  Outcome: Ongoing, Progressing     Problem: Restraint, Nonbehavioral (Nonviolent)  Goal: Absence of Harm or Injury  Intervention: Education  Flowsheets (Taken 1/31/2024 0531)  Discontinuation Criteria: Verbalizes not to harm self  Criteria Explained: Yes  Patient's Response: NR  Patient / Family Notification:   Patient   Family (who)  Patient / Family Teaching: Need for restraint     Problem: Restraint, Nonbehavioral (Nonviolent)  Goal: Absence of Harm or Injury  Intervention: Education  Flowsheets (Taken 1/31/2024 0531)  Discontinuation Criteria: Verbalizes not to harm self  Criteria Explained: Yes  Patient's Response: NR  Patient / Family Notification:   Patient   Family (who)  Patient / Family Teaching: Need for restraint

## 2024-01-31 NOTE — SUBJECTIVE & OBJECTIVE
Interval History:  Please see hospital course above for full details     Review of Systems   Unable to perform ROS: Intubated   Respiratory:  Negative for shortness of breath and wheezing.    Musculoskeletal:  Positive for myalgias and neck pain.        Pain controlled on current regimen   Neurological:  Negative for weakness and numbness.   Psychiatric/Behavioral:  Positive for sleep disturbance. Negative for confusion and decreased concentration. The patient is nervous/anxious.      Limited by patient intubated  Objective:     Vitals:  Temp: 98 °F (36.7 °C)  Pulse: 65  Rhythm: normal sinus rhythm  BP: 113/65  MAP (mmHg): 85  Resp: 12  SpO2: 99 %  Oxygen Concentration (%): 30  Vent Mode: Spont  Set Rate: 14 BPM  Vt Set: 510 mL  Pressure Support: 8 cmH20  PEEP/CPAP: 5 cmH20  Peak Airway Pressure: 13 cmH20  Mean Airway Pressure: 7.6 cmH20  Plateau Pressure: 0 cmH20    Temp  Min: 97.7 °F (36.5 °C)  Max: 98.2 °F (36.8 °C)  Pulse  Min: 62  Max: 83  BP  Min: 96/62  Max: 137/72  MAP (mmHg)  Min: 75  Max: 97  Resp  Min: 12  Max: 30  SpO2  Min: 96 %  Max: 100 %  Oxygen Concentration (%)  Min: 30  Max: 100    01/30 0701 - 01/31 0700  In: 4307 [I.V.:713.1]  Out: 1155 [Urine:1125; Drains:10]            Physical Exam  General Appearance: Middle aged gentleman resting in bed, intubated, surgical drain in neck; Not in acute hemodynamic distress. No cuff leak on exam   Mental Status Exam: awake, alert, aware, oriented, nodding yes/no to questions, following commands briskly x4, writing on pad of paper to make needs/questions known  Cranial Nerves: VFF, EOM full, pupils are equal and reactive to light bilaterally, symmetric facial sensory, symmetric facial motor, hearing grossly normal, midline tongue  Motor: no drift in the UE/LE. Power is 5/5 in both UE/LE. Normal tone.  Sensory: Symmetric to LT in all 4 limbs without extinction  Vascular: S1/S2 of normal intensity, no S3/S4 appreciated, no murmurs appreciated  Lungs: CTA  bilaterally without wheezing  Abdomen: Soft, non-distended, non-tender, BS +         Medications:  Continuoussodium chloride 0.9%, Last Rate: 50 mL/hr at 01/31/24 1200  dexmedeTOMIDine (Precedex) infusion (titrating), Last Rate: 0.2 mcg/kg/hr (01/31/24 1200)  fentanyl, Last Rate: 100 mcg/hr (01/31/24 1200)    Scheduledampicillin-sulbactam, 3 g, Q6H  atorvastatin, 10 mg, Daily  bisacodyL, 10 mg, Daily  dexAMETHasone, 4 mg, Q6H  famotidine, 20 mg, BID  FLUoxetine, 40 mg, Daily  methocarbamol (ROBAXIN) IVPB, 500 mg, Q8H  mirtazapine, 15 mg, QHS  mupirocin, , BID  senna-docusate 8.6-50 mg, 1 tablet, BID  vancomycin (VANCOCIN) IV (PEDS and ADULTS), 15 mg/kg, Q12H    PRNacetaminophen, 650 mg, Q6H PRN  fentanyl, 50 mcg, Q1H PRN  magnesium oxide, 800 mg, PRN  magnesium oxide, 800 mg, PRN  morphine, 2 mg, Q1H PRN  ondansetron, 4 mg, Q6H PRN  oxyCODONE, 5 mg, Q4H PRN  potassium bicarbonate, 35 mEq, PRN  potassium bicarbonate, 50 mEq, PRN  potassium bicarbonate, 60 mEq, PRN  potassium, sodium phosphates, 2 packet, PRN  potassium, sodium phosphates, 2 packet, PRN  potassium, sodium phosphates, 2 packet, PRN  prochlorperazine, 5 mg, Q6H PRN  vancomycin - pharmacy to dose, , pharmacy to manage frequency      Today I personally reviewed pertinent imaging, laboratory results, notably: CT neck w/ extensive edema and air, concern for malposition of DELFIN drain. CXR w/ ET tube in appropriate position. CBC w/ leukocytosis to 19.08, Hb/platelets stable. CMP unremarkable.     Diet  Diet NPO  Diet NPO  NPO x5 days per ENT

## 2024-01-31 NOTE — TRANSFER OF CARE
"Anesthesia Transfer of Care Note    Patient: Rebel Rodriguez    Procedure(s) Performed: Procedure(s) (LRB):  EXPLORATION, NECK, REPAIR OF PHARYNGEAL INJURY (N/A)    Patient location: ICU    Anesthesia Type: general    Transport from OR: Continuous ECG monitoring in transport. Continuous SpO2 monitoring in transport. Upon arrival to PACU/ICU, patient attached to ventilator and auscultated to confirm bilateral breath sounds and adequate TV. Transported from OR intubated on 100% O2 by AMBU with adequate controlled ventilation    Post pain: adequate analgesia    Post assessment: no apparent anesthetic complications    Post vital signs: stable    Level of consciousness: sedated    Nausea/Vomiting: no nausea/vomiting    Complications: none    Transfer of care protocol was followedComments: Report to RN @BS, VSS.    Last vitals: Visit Vitals  BP (!) 114/59   Pulse 80   Temp 36.8 °C (98.2 °F) (Oral)   Resp 17   Ht 5' 8" (1.727 m)   Wt 62.6 kg (138 lb 0.1 oz)   SpO2 98%   BMI 20.98 kg/m²     "

## 2024-01-31 NOTE — PT/OT/SLP EVAL
Occupational Therapy  Co- Evaluation    Name: Rebel Rodriguez  MRN: [id number]  Admitting Diagnosis: Cervical myelopathy  Recent Surgery: Procedure(s) (LRB):  EXPLORATION, NECK, REPAIR OF PHARYNGEAL INJURY (N/A) 1 Day Post-Op    Recommendations:     Discharge Recommendations: High Intensity Therapy  Discharge Equipment Recommendations:  to be determined by next level of care  Barriers to discharge:       Assessment:     Rebel Rodriguez is a 54 y.o. male with a medical diagnosis of Cervical myelopathy.  He presents with decrease functional status secondary to medical diagnosis. Performance deficits affecting function: weakness, impaired balance, decreased lower extremity function, impaired endurance, impaired cardiopulmonary response to activity.      Rehab Prognosis: Good; patient would benefit from acute skilled OT services to address these deficits and reach maximum level of function.       Plan:     Patient to be seen 3 x/week to address the above listed problems via self-care/home management, therapeutic activities, therapeutic exercises, neuromuscular re-education  Plan of Care Expires: 02/29/24  Plan of Care Reviewed with: patient, family    Subjective     Chief Complaint: Patient communicating via written notes at this time; patient wanting to mobilize to bathroom; unable due to ventilator   Patient/Family Comments/goals: Increase functional status     Occupational Profile:  Living Environment: Patient lives alone in split level house with 2-3 steps to enter; home is attached to other units with neighbors willing to assist patient. Home is 2 stories with bed/bath on second floor. Patient with tub/shower.   Previous level of function: Independent   Roles and Routines: unknown   Equipment Used at Home: none  Assistance upon Discharge: neighbors/sister    Pain/Comfort:  Pain Rating 1: 0/10    Patients cultural, spiritual, Hindu conflicts given the current situation: no    Objective:     Communicated with: RN  prior to session.  Patient found supine with peripheral IV, NG tube, DELFIN drain, SCD, restraints upon OT entry to room.    General Precautions: Standard, fall  Orthopedic Precautions: N/A  Braces: N/A  Respiratory Status: Ventilator with 5 PEEP, 30% concentration, set rate 14 bpm, spontaneous    Occupational Performance:    Bed Mobility:    Patient completed Supine to Sit with independence  Patient completed Sit to Supine with independence    Functional Mobility/Transfers:  Patient completed Sit <> Stand Transfer with contact guard assistance  with  no assistive device   Functional Mobility: Patient able to complete 3' right sided steps with Mod A for trunk support and weight shifting     Activities of Daily Living:  Upper Body Dressing: maximal assistance to don gown EOB   Lower Body Dressing: maximal assistance to don socks EOB     Cognitive/Visual Perceptual:  Cognitive/Psychosocial Skills:     -       Oriented to: Person, Place, and Time   -       Follows Commands/attention:Follows multistep  commands  -       Communication: patient communicating via written word   Visual/Perceptual:      -Intact      Physical Exam:  Sensation:    -       Intact  Upper Extremity Range of Motion:     -       Right Upper Extremity: WFL  -       Left Upper Extremity: WFL  Upper Extremity Strength:    -       Right Upper Extremity: WFL  -       Left Upper Extremity: WFL   Strength:    -       Right Upper Extremity: WFL  -       Left Upper Extremity: WFL  Fine Motor Coordination:    -       Intact    AMPAC 6 Click ADL:  AMPAC Total Score: 15    Treatment & Education:  Co-evaluation completed due to patient medical instability and to ensure patient safety. Provided education regarding role of OT, POC, & discharge recommendations.  Pt with no further questions/concerns at this time. OT provided education on home recommendations and fall prevention in preparation for D/C.     Patient left supine with all lines intact, call button in  reach, and restraints reapplied at end of session    GOALS:   Multidisciplinary Problems       Occupational Therapy Goals          Problem: Occupational Therapy    Goal Priority Disciplines Outcome Interventions   Occupational Therapy Goal     OT, PT/OT Ongoing, Progressing    Description: Goals to be met by: 2/29/24     Patient will increase functional independence with ADLs by performing:    UE Dressing with Boundary.  LE Dressing with Boundary.  Grooming while standing with Boundary.  Toileting from toilet with Boundary for hygiene and clothing management.   Stand pivot transfers with Boundary.  Step transfer with Boundary  Toilet transfer to toilet with Boundary.                         History:     Past Medical History:   Diagnosis Date    Achilles tendon rupture 10/09/2013    Achilles tendon rupture 10/09/2013    Allergy     Anemia 1/17/2024    Anxiety     Arthritis     psoriatric    Degenerative disc disease     Drug-induced lupus erythematosus     Drug-induced lupus erythematosus 03/09/2016    Hyperlipidemia     Inguinal lymphadenopathy 07/21/2015    Kidney stone     Medication monitoring encounter 12/07/2016    Neck pain 07/06/2017    Psoriatic arthritis     Psoriatic arthritis 12/07/2016    Recurrent fever 07/08/2015    Recurrent nephrolithiasis 05/19/2014    On low oxalate diet    Sinusitis 02/25/2016    Stroke     TIA (transient ischemic attack)     Ulcer     high school    Unexplained night sweats 07/13/2015         Past Surgical History:   Procedure Laterality Date    ACHILLES TENDON SURGERY      BACK SURGERY      FUNCTIONAL ENDOSCOPIC SINUS SURGERY (FESS) USING COMPUTER-ASSISTED NAVIGATION Bilateral 9/14/2018    Procedure: FESS, USING COMPUTER-ASSISTED NAVIGATION;  Surgeon: Gerard Manzo MD;  Location: [facility] OR [address] [place] [address];  Service: ENT;  Laterality: Bilateral;    FUSION, SPINE, CERVICAL, ANTERIOR APPROACH N/A 1/30/2024    Procedure: C3-4 anterior cervical discectomy and  fusion;  Surgeon: Rogerio Maradiaga MD;  Location: [facility] OR [facility] FLR;  Service: Neurosurgery;  Laterality: N/A;  C3-4 anterior cervical discectomy and fusion  anesthesia: general  nerve mon: EMG/SEP/MEP  radiology: C-arm  bed: reg. slider  position: supine  headrest: caspar, GW tongs/hanging weights, surgical pillow    INJECTION OF ANESTHETIC AGENT AROUND NERVE Left 5/13/2022    Procedure: Block, Nerve MEDIAL BRANCH BLOCK LEFT C2,3,4,5;  Surgeon: Kimberly Wilson MD;  Location: [facility] PAIN MGT;  Service: Pain Management;  Laterality: Left;    INJECTION OF ANESTHETIC AGENT AROUND NERVE Left 5/24/2022    Procedure: BLOCK, NERVE, LEFT C2-C5 MEDIAL BRANCH;  Surgeon: Kimberly Wilson MD;  Location: [facility] PAIN MGT;  Service: Pain Management;  Laterality: Left;    NASAL SEPTOPLASTY N/A 9/14/2018    Procedure: SEPTOPLASTY, NASAL;  Surgeon: Gerard Manzo MD;  [facility];  Service: ENT;  Laterality: N/A;    NASAL TURBINATE REDUCTION Bilateral 9/14/2018    Procedure: REDUCTION, NASAL TURBINATE;  Surgeon: Gerard Manzo MD;  [facility];  Service: ENT;  Laterality: Bilateral;    NECK EXPLORATION N/A 1/30/2024    Procedure: EXPLORATION, NECK, REPAIR OF PHARYNGEAL INJURY;  Surgeon: Senthil Agarwal MD;  [facility];  Service: ENT;  Laterality: N/A;    RADIOFREQUENCY ABLATION Left 7/12/2022    Procedure: RADIOFREQUENCY ABLATION, LEFT C2-C3, C3-C4, C4-C5;  Surgeon: Kimberly Wilson MD;  Location: [facility] PAIN MGT;  Service: Pain Management;  Laterality: Left;    UPPER ENDOSCOPY W/ ESOPHAGEAL MANOMETRY      URETERAL STENT PLACEMENT         Time Tracking:     OT Date of Treatment: 01/31/24  OT Start Time: 1300  OT Stop Time: 1327  OT Total Time (min): 27 min    Billable Minutes:Evaluation 10 minutes   Neuromuscular re-ed 17 minutes     1/31/2024

## 2024-01-31 NOTE — ASSESSMENT & PLAN NOTE
53 y.o. male with cervical myelopathy due to C3-4 severe stenosis with cord signal change, above his prior fusion admitted to [facility] s/p C3-4 anterior cervical discectomy and fusion. Operative course c/b pharyngeal injury s/p emergent ENT repair on 1/30     -SBP <160  -neuro checks q4 hr  -Fentanyl gtt for pain, currently well controlled  - dex 4 q 6hr  -Nsgy following  -PT/OT/SLP

## 2024-01-31 NOTE — PROGRESS NOTES
Progress Note  Neurosurgery    01/31/2024  12:06 PM    Admit Date: 1/30/2024  Pt is hospital day  LOS: 1 day     Hospital Day: 1  Post-operative Day: 1 Day Post-Op  POD: 1 Day Post-Op  Hospital Day: 2    Active problems:  Patient Active Problem List   Diagnosis     psoriatric Arthritis    Psoriasis    Hx-TIA (transient ischemic attack)    Hyperlipidemia    Low back pain    Pain of left scapula    Cervical spondylosis    High risk medication use    Hyperreflexia of lower extremity    Nasal septal deviation    Decreased range of motion of neck    Decreased strength of trunk and back    Apnea    LU (obstructive sleep apnea)    Chronic neck pain    Painful cervical ROM    Anemia    Cervical myelopathy    Dysphagia    Injury of pharynx     Active Hospital Problems    Diagnosis  POA    *Cervical myelopathy [G95.9]  Unknown    Injury of pharynx [S19.85XA]  Yes    Dysphagia [R13.10]  Unknown      Resolved Hospital Problems   No resolved problems to display.         SUBJECTIVE:   HPI:     Rebel Rodriguez is a 53 y.o. male with hx of psoriatic arthritis, hx TIA on Aspirin 81 mg, s/p C4-7 ACDF with Dr. Huff 10 years ago who presents for evaluation of gait imbalance. Last seen 1 year ago for neck pain and radicular pain into the left shoulder. He underwent C1-2 KENRICK with moderate relief of pain. Reports rapid functional decline over the last 3 weeks. Endorses hand clumsiness, difficulty typing, gait imbalance, difficulty butoning, dropping items, falls. Difficulty with ambulating also due to LLE weakness. States it feels like he has rubber bands around his wrists. Reports difficulty with overhead mobility and shock-like pains down spine when raising arms overhead. Denies b/b incontinence.      Denies tobacco use    Follow-up For:  Procedure(s) (LRB):  EXPLORATION, NECK, REPAIR OF PHARYNGEAL INJURY (N/A)      Interval HPI:   Postop day 1 status post C3-C4 ACDF.  Complicated by pharyngeal injury which required repair by ENT.  He  kept intubated.  Neurologically improving his strength.      OBJECTIVE:     Vitals:  Vital Signs Range (Last 24H):  Temp:  [97.7 °F (36.5 °C)-98.2 °F (36.8 °C)]   Pulse:  [62-83]   Resp:  [12-30]   BP: ()/(55-75)   SpO2:  [96 %-100 %]     I & O (Last 24H):    Intake/Output Summary (Last 24 hours) at 1/31/2024 1206  Last data filed at 1/31/2024 1200  Gross per 24 hour   Intake 3394.38 ml   Output 1135 ml   Net 2259.38 ml         Physical Exam:  General: appears well-developed and well-nourished, no distress  HEENT: NC/AT, PERRLA, EOMI  Eyes: EOMI, PERRLA,   Cardiovascular: RRR  Pulm:  Intubated  Abdominal: soft, ND, NT, +BS  MSK: moves all extremities spontaneously with good tone.  Neurological:  While awake.  Intubated.  Head for questions bilateral uppers and lowers with full strength except interossei on the left which is 4 to 4+ which also improved from baseline  Incision incision clean  Hemovac in place      Labs:  Recent Results (from the past 24 hour(s))   CBC Auto Differential    Collection Time: 01/30/24  1:35 PM   Result Value Ref Range    WBC 16.17 (H) 3.90 - 12.70 K/uL    RBC 4.11 (L) 4.60 - 6.20 M/uL    Hemoglobin 11.9 (L) 14.0 - 18.0 g/dL    Hematocrit 35.7 (L) 40.0 - 54.0 %    MCV 87 82 - 98 fL    MCH 29.0 27.0 - 31.0 pg    MCHC 33.3 32.0 - 36.0 g/dL    RDW 14.4 11.5 - 14.5 %    Platelets 257 150 - 450 K/uL    MPV 9.3 9.2 - 12.9 fL    Immature Granulocytes 0.6 (H) 0.0 - 0.5 %    Gran # (ANC) 15.3 (H) 1.8 - 7.7 K/uL    Immature Grans (Abs) 0.09 (H) 0.00 - 0.04 K/uL    Lymph # 0.6 (L) 1.0 - 4.8 K/uL    Mono # 0.1 (L) 0.3 - 1.0 K/uL    Eos # 0.0 0.0 - 0.5 K/uL    Baso # 0.02 0.00 - 0.20 K/uL    nRBC 0 0 /100 WBC    Gran % 94.7 (H) 38.0 - 73.0 %    Lymph % 3.6 (L) 18.0 - 48.0 %    Mono % 0.9 (L) 4.0 - 15.0 %    Eosinophil % 0.1 0.0 - 8.0 %    Basophil % 0.1 0.0 - 1.9 %    Differential Method Automated    Comprehensive Metabolic Panel    Collection Time: 01/30/24  1:35 PM   Result Value Ref Range     Sodium 136 136 - 145 mmol/L    Potassium 4.0 3.5 - 5.1 mmol/L    Chloride 106 95 - 110 mmol/L    CO2 21 (L) 23 - 29 mmol/L    Glucose 124 (H) 70 - 110 mg/dL    BUN 11 6 - 20 mg/dL    Creatinine 0.9 0.5 - 1.4 mg/dL    Calcium 8.7 8.7 - 10.5 mg/dL    Total Protein 6.1 6.0 - 8.4 g/dL    Albumin 3.4 (L) 3.5 - 5.2 g/dL    Total Bilirubin 0.2 0.1 - 1.0 mg/dL    Alkaline Phosphatase 74 55 - 135 U/L    AST 31 10 - 40 U/L    ALT 22 10 - 44 U/L    eGFR >60.0 >60 mL/min/1.73 m^2    Anion Gap 9 8 - 16 mmol/L   Magnesium    Collection Time: 01/30/24  1:35 PM   Result Value Ref Range    Magnesium 1.7 1.6 - 2.6 mg/dL   Phosphorus    Collection Time: 01/30/24  1:35 PM   Result Value Ref Range    Phosphorus 2.1 (L) 2.7 - 4.5 mg/dL   Triglycerides    Collection Time: 01/30/24  8:49 PM   Result Value Ref Range    Triglycerides 27 (L) 30 - 150 mg/dL   ISTAT PROCEDURE    Collection Time: 01/30/24  9:40 PM   Result Value Ref Range    POC PH 7.334 (L) 7.35 - 7.45    POC PCO2 46.4 (H) 35 - 45 mmHg    POC PO2 229 (H) 80 - 100 mmHg    POC HCO3 24.7 24 - 28 mmol/L    POC BE -1 -2 to 2 mmol/L    POC SATURATED O2 100 95 - 100 %    POC TCO2 26 23 - 27 mmol/L    Sample ARTERIAL     Site RB     Allens Test Pass    CBC auto differential    Collection Time: 01/31/24 12:34 AM   Result Value Ref Range    WBC 19.08 (H) 3.90 - 12.70 K/uL    RBC 4.31 (L) 4.60 - 6.20 M/uL    Hemoglobin 12.1 (L) 14.0 - 18.0 g/dL    Hematocrit 37.4 (L) 40.0 - 54.0 %    MCV 87 82 - 98 fL    MCH 28.1 27.0 - 31.0 pg    MCHC 32.4 32.0 - 36.0 g/dL    RDW 14.5 11.5 - 14.5 %    Platelets 264 150 - 450 K/uL    MPV 9.1 (L) 9.2 - 12.9 fL    Immature Granulocytes 0.5 0.0 - 0.5 %    Gran # (ANC) 17.1 (H) 1.8 - 7.7 K/uL    Immature Grans (Abs) 0.10 (H) 0.00 - 0.04 K/uL    Lymph # 0.9 (L) 1.0 - 4.8 K/uL    Mono # 0.9 0.3 - 1.0 K/uL    Eos # 0.0 0.0 - 0.5 K/uL    Baso # 0.03 0.00 - 0.20 K/uL    nRBC 0 0 /100 WBC    Gran % 89.8 (H) 38.0 - 73.0 %    Lymph % 4.6 (L) 18.0 - 48.0 %    Mono %  4.9 4.0 - 15.0 %    Eosinophil % 0.0 0.0 - 8.0 %    Basophil % 0.2 0.0 - 1.9 %    Differential Method Automated    Comprehensive metabolic panel    Collection Time: 01/31/24 12:34 AM   Result Value Ref Range    Sodium 138 136 - 145 mmol/L    Potassium 4.3 3.5 - 5.1 mmol/L    Chloride 109 95 - 110 mmol/L    CO2 21 (L) 23 - 29 mmol/L    Glucose 143 (H) 70 - 110 mg/dL    BUN 12 6 - 20 mg/dL    Creatinine 0.9 0.5 - 1.4 mg/dL    Calcium 8.9 8.7 - 10.5 mg/dL    Total Protein 6.3 6.0 - 8.4 g/dL    Albumin 3.4 (L) 3.5 - 5.2 g/dL    Total Bilirubin 0.4 0.1 - 1.0 mg/dL    Alkaline Phosphatase 72 55 - 135 U/L    AST 32 10 - 40 U/L    ALT 21 10 - 44 U/L    eGFR >60.0 >60 mL/min/1.73 m^2    Anion Gap 8 8 - 16 mmol/L   Magnesium    Collection Time: 01/31/24 12:34 AM   Result Value Ref Range    Magnesium 1.8 1.6 - 2.6 mg/dL   Phosphorus    Collection Time: 01/31/24 12:34 AM   Result Value Ref Range    Phosphorus 3.1 2.7 - 4.5 mg/dL         Recent Labs   Lab 01/30/24  1335 01/31/24  0034   WBC 16.17* 19.08*   HGB 11.9* 12.1*   HCT 35.7* 37.4*    264       Recent Labs   Lab 01/30/24  1335 01/31/24  0034    138   K 4.0 4.3    109   CO2 21* 21*   BUN 11 12   CREATININE 0.9 0.9   CALCIUM 8.7 8.9       Microbiology Results (last 7 days)       ** No results found for the last 168 hours. **            Imaging:      Medication:  Scheduled Meds:   ampicillin-sulbactam  3 g Intravenous Q6H    atorvastatin  10 mg Per NG tube Daily    bisacodyL  10 mg Rectal Daily    dexAMETHasone  4 mg Intravenous Q6H    famotidine  20 mg Per NG tube BID    FLUoxetine  40 mg Per NG tube Daily    methocarbamol (ROBAXIN) IVPB  500 mg Intravenous Q8H    mirtazapine  15 mg Per NG tube QHS    mupirocin   Nasal BID    senna-docusate 8.6-50 mg  1 tablet Per NG tube BID    vancomycin (VANCOCIN) IV (PEDS and ADULTS)  15 mg/kg Intravenous Q12H       Infusions:   sodium chloride 0.9% 50 mL/hr at 01/31/24 1200    dexmedeTOMIDine (Precedex) infusion  (titrating) 0.2 mcg/kg/hr (01/31/24 1200)    fentanyl 100 mcg/hr (01/31/24 1200)       PRN Meds:  acetaminophen, fentanyl, magnesium oxide, magnesium oxide, morphine, ondansetron, oxyCODONE, potassium bicarbonate, potassium bicarbonate, potassium bicarbonate, potassium, sodium phosphates, potassium, sodium phosphates, potassium, sodium phosphates, prochlorperazine, Pharmacy to dose Vancomycin consult **AND** vancomycin - pharmacy to dose      Lines/Drains:       Peripheral IV - Single Lumen 01/30/24 0545 20 G Left Forearm (Active)   Site Assessment Clean;Dry;Intact;No redness;No swelling 01/31/24 1100   Extremity Assessment Distal to IV No warmth;No swelling;No redness;No abnormal discoloration 01/31/24 1100   Line Status Flushed 01/31/24 1100   Dressing Status Clean;Dry;Intact 01/31/24 1100   Dressing Intervention Integrity maintained 01/31/24 1100   Dressing Change Due 02/03/24 01/31/24 1100   Site Change Due 02/03/24 01/31/24 0700   Reason Not Rotated Not due 01/31/24 1100   Number of days: 1            Peripheral IV - Single Lumen 01/30/24 0746 18 G Right Forearm (Active)   Site Assessment Clean;Dry;Intact;No redness;No swelling 01/31/24 1100   Extremity Assessment Distal to IV No warmth;No swelling;No redness;No abnormal discoloration 01/31/24 1100   Line Status Flushed 01/31/24 1100   Dressing Status Clean;Dry;Intact 01/31/24 1100   Dressing Intervention Integrity maintained 01/31/24 1100   Dressing Change Due 02/03/24 01/31/24 1100   Site Change Due 02/03/24 01/31/24 0700   Reason Not Rotated Not due 01/31/24 1100   Number of days: 1            Closed/Suction Drain 01/30/24 1916 Right;Ventral Neck Bulb 15 Fr. (Active)   Site Description Healing 01/31/24 1100   Drainage Serosanguineous 01/31/24 1100   Status To bulb suction 01/31/24 1100   Output (mL) 10 mL 01/31/24 0617   Number of days: 0       Male External Urinary Catheter 01/30/24 2000 (Active)   Collection Container Urimeter 01/31/24 1100   Securement  Method secured to top of thigh w/ adhesive device 01/31/24 1100   Skin no redness;no breakdown 01/31/24 1100   Tolerance no signs/symptoms of discomfort 01/31/24 1100   Output (mL) 450 mL 01/31/24 0617   Catheter Change Date 01/30/24 01/31/24 0502   Catheter Change Time 2000 01/31/24 0502   Number of days: 0            Trans Pyloric Feeding Tube 01/30/24 1916 Tungsten weighted 12 Fr. Right nostril (Active)   Tolerance no signs/symptoms of discomfort 01/31/24 1100   Securement secured to nostril center 01/31/24 1100   Clamp Status/Tolerance clamped 01/31/24 0502   Insertion Site Appearance no redness, warmth, tenderness, skin breakdown, drainage 01/31/24 1100   Flush/Irrigation flushed w/;water;no resistance met 01/31/24 1100   Intake (mL) 60 mL 01/31/24 1000   Number of days: 0       ASSESSMENT/PLAN:   Rebel Rodriguez  Is a 54 y.o. male with cervical myelopathy and prior ACDF C4 C7 with adjacent level 3 4 who presented for elective C3-4 ACDF on 01/30, which complicated by retropharyngeal injury required repair by ENT.    Postop day 1      Continue ICU care   Continue pain control   Neuro neuro check Q 1   Cervical drain by ENT   ENT planning for Trach PEG   Kept intubated in the meantime  IV fluids   TF per Neuro ICU   SubQ heparin for DVT prophylaxis      Shyla Davenport MD  01/31/2024  12:06 PM

## 2024-02-01 LAB
ALBUMIN SERPL BCP-MCNC: 3.2 G/DL (ref 3.5–5.2)
ALLENS TEST: ABNORMAL
ALP SERPL-CCNC: 59 U/L (ref 55–135)
ALT SERPL W/O P-5'-P-CCNC: 18 U/L (ref 10–44)
ANION GAP SERPL CALC-SCNC: 7 MMOL/L (ref 8–16)
AST SERPL-CCNC: 29 U/L (ref 10–40)
BASOPHILS # BLD AUTO: 0.02 K/UL (ref 0–0.2)
BASOPHILS NFR BLD: 0.1 % (ref 0–1.9)
BILIRUB SERPL-MCNC: 0.3 MG/DL (ref 0.1–1)
BUN SERPL-MCNC: 20 MG/DL (ref 6–20)
CALCIUM SERPL-MCNC: 8.9 MG/DL (ref 8.7–10.5)
CHLORIDE SERPL-SCNC: 107 MMOL/L (ref 95–110)
CO2 SERPL-SCNC: 23 MMOL/L (ref 23–29)
CREAT SERPL-MCNC: 0.8 MG/DL (ref 0.5–1.4)
DELSYS: ABNORMAL
DIFFERENTIAL METHOD BLD: ABNORMAL
EOSINOPHIL # BLD AUTO: 0 K/UL (ref 0–0.5)
EOSINOPHIL NFR BLD: 0.1 % (ref 0–8)
ERYTHROCYTE [DISTWIDTH] IN BLOOD BY AUTOMATED COUNT: 14.6 % (ref 11.5–14.5)
ERYTHROCYTE [SEDIMENTATION RATE] IN BLOOD BY WESTERGREN METHOD: 14 MM/H
EST. GFR  (NO RACE VARIABLE): >60 ML/MIN/1.73 M^2
FIO2: 30
GLUCOSE SERPL-MCNC: 118 MG/DL (ref 70–110)
HCO3 UR-SCNC: 24.3 MMOL/L (ref 24–28)
HCT VFR BLD AUTO: 34.3 % (ref 40–54)
HGB BLD-MCNC: 11 G/DL (ref 14–18)
IMM GRANULOCYTES # BLD AUTO: 0.07 K/UL (ref 0–0.04)
IMM GRANULOCYTES NFR BLD AUTO: 0.4 % (ref 0–0.5)
LYMPHOCYTES # BLD AUTO: 0.7 K/UL (ref 1–4.8)
LYMPHOCYTES NFR BLD: 4.2 % (ref 18–48)
MAGNESIUM SERPL-MCNC: 1.9 MG/DL (ref 1.6–2.6)
MCH RBC QN AUTO: 29 PG (ref 27–31)
MCHC RBC AUTO-ENTMCNC: 32.1 G/DL (ref 32–36)
MCV RBC AUTO: 91 FL (ref 82–98)
MODE: ABNORMAL
MONOCYTES # BLD AUTO: 0.8 K/UL (ref 0.3–1)
MONOCYTES NFR BLD: 4.8 % (ref 4–15)
NEUTROPHILS # BLD AUTO: 14.1 K/UL (ref 1.8–7.7)
NEUTROPHILS NFR BLD: 90.4 % (ref 38–73)
NRBC BLD-RTO: 0 /100 WBC
PCO2 BLDA: 49.7 MMHG (ref 35–45)
PEEP: 5
PH SMN: 7.3 [PH] (ref 7.35–7.45)
PHOSPHATE SERPL-MCNC: 3.5 MG/DL (ref 2.7–4.5)
PLATELET # BLD AUTO: 243 K/UL (ref 150–450)
PMV BLD AUTO: 9.6 FL (ref 9.2–12.9)
PO2 BLDA: 67 MMHG (ref 80–100)
POC BE: -2 MMOL/L
POC SATURATED O2: 91 % (ref 95–100)
POC TCO2: 26 MMOL/L (ref 23–27)
POTASSIUM SERPL-SCNC: 4.4 MMOL/L (ref 3.5–5.1)
PROT SERPL-MCNC: 6 G/DL (ref 6–8.4)
RBC # BLD AUTO: 3.79 M/UL (ref 4.6–6.2)
SAMPLE: ABNORMAL
SITE: ABNORMAL
SODIUM SERPL-SCNC: 137 MMOL/L (ref 136–145)
SP02: 100
VANCOMYCIN TROUGH SERPL-MCNC: 14.8 UG/ML (ref 10–22)
VT: 510
WBC # BLD AUTO: 15.65 K/UL (ref 3.9–12.7)

## 2024-02-01 PROCEDURE — 25000003 PHARM REV CODE 250: Performed by: PSYCHIATRY & NEUROLOGY

## 2024-02-01 PROCEDURE — 25000003 PHARM REV CODE 250

## 2024-02-01 PROCEDURE — 63600175 PHARM REV CODE 636 W HCPCS: Performed by: STUDENT IN AN ORGANIZED HEALTH CARE EDUCATION/TRAINING PROGRAM

## 2024-02-01 PROCEDURE — 80202 ASSAY OF VANCOMYCIN: CPT | Performed by: NEUROLOGICAL SURGERY

## 2024-02-01 PROCEDURE — 99900026 HC AIRWAY MAINTENANCE (STAT)

## 2024-02-01 PROCEDURE — 99900035 HC TECH TIME PER 15 MIN (STAT)

## 2024-02-01 PROCEDURE — 84100 ASSAY OF PHOSPHORUS: CPT

## 2024-02-01 PROCEDURE — 82803 BLOOD GASES ANY COMBINATION: CPT

## 2024-02-01 PROCEDURE — 83735 ASSAY OF MAGNESIUM: CPT

## 2024-02-01 PROCEDURE — 93010 ELECTROCARDIOGRAM REPORT: CPT | Mod: ,,, | Performed by: INTERNAL MEDICINE

## 2024-02-01 PROCEDURE — 94003 VENT MGMT INPAT SUBQ DAY: CPT

## 2024-02-01 PROCEDURE — 99233 SBSQ HOSP IP/OBS HIGH 50: CPT | Mod: FS,,, | Performed by: PSYCHIATRY & NEUROLOGY

## 2024-02-01 PROCEDURE — 85025 COMPLETE CBC W/AUTO DIFF WBC: CPT

## 2024-02-01 PROCEDURE — 63600175 PHARM REV CODE 636 W HCPCS: Performed by: PSYCHIATRY & NEUROLOGY

## 2024-02-01 PROCEDURE — 63600175 PHARM REV CODE 636 W HCPCS

## 2024-02-01 PROCEDURE — 36600 WITHDRAWAL OF ARTERIAL BLOOD: CPT

## 2024-02-01 PROCEDURE — 63600175 PHARM REV CODE 636 W HCPCS: Performed by: NEUROLOGICAL SURGERY

## 2024-02-01 PROCEDURE — 93005 ELECTROCARDIOGRAM TRACING: CPT

## 2024-02-01 PROCEDURE — 25000003 PHARM REV CODE 250: Performed by: NEUROLOGICAL SURGERY

## 2024-02-01 PROCEDURE — 80053 COMPREHEN METABOLIC PANEL: CPT

## 2024-02-01 PROCEDURE — 27100171 HC OXYGEN HIGH FLOW UP TO 24 HOURS

## 2024-02-01 PROCEDURE — 94761 N-INVAS EAR/PLS OXIMETRY MLT: CPT

## 2024-02-01 PROCEDURE — 99223 1ST HOSP IP/OBS HIGH 75: CPT | Mod: ,,, | Performed by: INTERNAL MEDICINE

## 2024-02-01 PROCEDURE — 20000000 HC ICU ROOM

## 2024-02-01 RX ORDER — QUETIAPINE FUMARATE 25 MG/1
25 TABLET, FILM COATED ORAL EVERY 12 HOURS
Status: DISCONTINUED | OUTPATIENT
Start: 2024-02-01 | End: 2024-02-02

## 2024-02-01 RX ORDER — AMOXICILLIN 250 MG
2 CAPSULE ORAL 2 TIMES DAILY
Status: DISCONTINUED | OUTPATIENT
Start: 2024-02-01 | End: 2024-02-10

## 2024-02-01 RX ADMIN — Medication 113 MCG/HR: at 05:02

## 2024-02-01 RX ADMIN — QUETIAPINE FUMARATE 25 MG: 25 TABLET ORAL at 09:02

## 2024-02-01 RX ADMIN — VANCOMYCIN HYDROCHLORIDE 1000 MG: 1 INJECTION, POWDER, LYOPHILIZED, FOR SOLUTION INTRAVENOUS at 08:02

## 2024-02-01 RX ADMIN — SENNOSIDES AND DOCUSATE SODIUM 2 TABLET: 8.6; 5 TABLET ORAL at 09:02

## 2024-02-01 RX ADMIN — ATORVASTATIN CALCIUM 10 MG: 10 TABLET, FILM COATED ORAL at 09:02

## 2024-02-01 RX ADMIN — DEXAMETHASONE SODIUM PHOSPHATE 4 MG: 4 INJECTION INTRA-ARTICULAR; INTRALESIONAL; INTRAMUSCULAR; INTRAVENOUS; SOFT TISSUE at 05:02

## 2024-02-01 RX ADMIN — AMPICILLIN SODIUM, SULBACTAM SODIUM 3 G: 2; 1 INJECTION, POWDER, FOR SOLUTION INTRAMUSCULAR; INTRAVENOUS at 09:02

## 2024-02-01 RX ADMIN — AMPICILLIN SODIUM, SULBACTAM SODIUM 3 G: 2; 1 INJECTION, POWDER, FOR SOLUTION INTRAMUSCULAR; INTRAVENOUS at 03:02

## 2024-02-01 RX ADMIN — MUPIROCIN: 20 OINTMENT TOPICAL at 09:02

## 2024-02-01 RX ADMIN — AMPICILLIN SODIUM, SULBACTAM SODIUM 3 G: 2; 1 INJECTION, POWDER, FOR SOLUTION INTRAMUSCULAR; INTRAVENOUS at 02:02

## 2024-02-01 RX ADMIN — OXYCODONE 5 MG: 5 TABLET ORAL at 09:02

## 2024-02-01 RX ADMIN — FAMOTIDINE 20 MG: 20 TABLET, FILM COATED ORAL at 09:02

## 2024-02-01 RX ADMIN — VANCOMYCIN HYDROCHLORIDE 1000 MG: 1 INJECTION, POWDER, LYOPHILIZED, FOR SOLUTION INTRAVENOUS at 09:02

## 2024-02-01 RX ADMIN — DEXAMETHASONE SODIUM PHOSPHATE 4 MG: 4 INJECTION INTRA-ARTICULAR; INTRALESIONAL; INTRAMUSCULAR; INTRAVENOUS; SOFT TISSUE at 12:02

## 2024-02-01 RX ADMIN — MIRTAZAPINE 15 MG: 7.5 TABLET, FILM COATED ORAL at 09:02

## 2024-02-01 RX ADMIN — QUETIAPINE FUMARATE 25 MG: 25 TABLET ORAL at 11:02

## 2024-02-01 RX ADMIN — DEXMEDETOMIDINE HYDROCHLORIDE 0.2 MCG/KG/HR: 4 INJECTION INTRAVENOUS at 12:02

## 2024-02-01 RX ADMIN — AMPICILLIN SODIUM, SULBACTAM SODIUM 3 G: 2; 1 INJECTION, POWDER, FOR SOLUTION INTRAMUSCULAR; INTRAVENOUS at 10:02

## 2024-02-01 RX ADMIN — ENOXAPARIN SODIUM 40 MG: 40 INJECTION SUBCUTANEOUS at 05:02

## 2024-02-01 RX ADMIN — FLUOXETINE HYDROCHLORIDE 40 MG: 20 CAPSULE ORAL at 09:02

## 2024-02-01 NOTE — PLAN OF CARE
"[facility] Care Plan    POC reviewed with Rebel Rodriguez and family at 1400. Pt  not able to verbaliz understanding. Questions and concerns addressed. No acute events today. Pt progressing toward goals. Will continue to monitor. See below and flowsheets for full assessment and VS info.             Is this a stroke patient? no    Neuro:  Jeannette Coma Scale  Best Eye Response: 4-->(E4) spontaneous  Best Motor Response: 6-->(M6) obeys commands  Best Verbal Response: 1-->(V1) none  Jeannette Coma Scale Score: 11  Assessment Qualifiers: patient intubated  Pupil PERRLA: yes     24 hr Temp:  [97.2 °F (36.2 °C)-98.3 °F (36.8 °C)]     CV:   Rhythm: normal sinus rhythm  BP goals:   SBP < 160  MAP > 65    Resp:      Vent Mode: A/C  Set Rate: 16 BPM  Oxygen Concentration (%): 40  Vt Set: 510 mL  PEEP/CPAP: 5 cmH20  Pressure Support: 8 cmH20    Plan: N/A    GI/:     Diet/Nutrition Received: NPO  Last Bowel Movement: 02/29/24  Voiding Characteristics: external catheter    Intake/Output Summary (Last 24 hours) at 2/1/2024 1657  Last data filed at 2/1/2024 1200  Gross per 24 hour   Intake 1520.22 ml   Output 375 ml   Net 1145.22 ml     Unmeasured Output  Stool Occurrence: 0    Labs/Accuchecks:  Recent Labs   Lab 02/01/24  0203   WBC 15.65*   RBC 3.79*   HGB 11.0*   HCT 34.3*         Recent Labs   Lab 02/01/24  0203      K 4.4   CO2 23      BUN 20   CREATININE 0.8   ALKPHOS 59   ALT 18   AST 29   BILITOT 0.3    No results for input(s): "PROTIME", "INR", "APTT", "HEPANTIXA" in the last 168 hours. No results for input(s): "CPK", "CPKMB", "TROPONINI", "MB" in the last 168 hours.    Electrolytes: N/A - electrolytes WDL  Accuchecks: none    Gtts:   dexmedeTOMIDine (Precedex) infusion (titrating) 0.2 mcg/kg/hr (02/01/24 1212)    fentanyl 113 mcg/hr (02/01/24 1200)       LDA/Wounds:    Nurses Note -- 4 Eyes      2/1/2024   4:57 PM      Skin assessed during: Daily Assessment    Is there altered skin present? no   [] No Altered " Skin Integrity Present    []Prevention Measures Documented    Attending Nurse:  Jo Person RN/Staff Member:  Moncho ARSHAD

## 2024-02-01 NOTE — ASSESSMENT & PLAN NOTE
DELFIN drain noted to be in hypopharynx on post-op imaging, s/p emergent surgical repair on 1/30    - NPOx5 days per ENT  - On vanc/unasyn -> clarify duration of abx  - Dex 4q6hrs, taper per ENT  - Wean vent as able

## 2024-02-01 NOTE — ASSESSMENT & PLAN NOTE
cristofer Rodriguez  Is a 54 y.o. male with cervical myelopathy and prior ACDF C4 C7 with adjacent level 3 4 who presented for elective C3-4 ACDF on 01/30, which complicated by retropharyngeal injury required repair by ENT.     Postop day 2        Continue ICU care   Continue pain control   Neuro neuro check Q 1   Cervical drain by ENT   ENT planning for Trach PEG   NPO five days  WTE  IV fluids   TF per Neuro ICU   SubQ heparin for DVT prophylaxis

## 2024-02-01 NOTE — PROGRESS NOTES
[facility]  Otorhinolaryngology-Head & Neck Surgery  Progress Note    Subjective:     Post-Op Info:  Procedure(s) (LRB):  EXPLORATION, NECK, REPAIR OF PHARYNGEAL INJURY (N/A)   2 Days Post-Op  Hospital Day: 3     Interval History:   Remained intubated overnight, awake and alert this am. Mother and sister at bedside.    Medications:  Continuous Infusions:   dexmedeTOMIDine (Precedex) infusion (titrating) 0.2 mcg/kg/hr (02/01/24 0605)    fentanyl 112.5 mcg/hr (02/01/24 0605)     Scheduled Meds:   ampicillin-sulbactam  3 g Intravenous Q6H    atorvastatin  10 mg Per NG tube Daily    enoxparin  40 mg Subcutaneous Q24H (prophylaxis, 1700)    famotidine  20 mg Per NG tube BID    FLUoxetine  40 mg Per NG tube Daily    mirtazapine  15 mg Per NG tube QHS    mupirocin   Nasal BID    QUEtiapine  25 mg Per NG tube Q12H    senna-docusate 8.6-50 mg  2 tablet Per NG tube BID    vancomycin (VANCOCIN) IV (PEDS and ADULTS)  15 mg/kg Intravenous Q12H     PRN Meds:acetaminophen, fentanyl, magnesium oxide, magnesium oxide, morphine, ondansetron, oxyCODONE, potassium bicarbonate, potassium bicarbonate, potassium bicarbonate, potassium, sodium phosphates, potassium, sodium phosphates, potassium, sodium phosphates, prochlorperazine, Pharmacy to dose Vancomycin consult **AND** vancomycin - pharmacy to dose     Review of patient's allergies indicates:   Allergen Reactions    Erythromycin Nausea And Vomiting    Infliximab Other (See Comments)     Lupus with fever and acute arthritis  Lupus with fever and acute arthritis     Objective:     Vital Signs (24h Range):  Temp:  [97.2 °F (36.2 °C)-97.9 °F (36.6 °C)] 97.5 °F (36.4 °C)  Pulse:  [59-76] 59  Resp:  [9-22] 16  SpO2:  [97 %-100 %] 99 %  BP: ()/(50-66) 95/50       Lines/Drains/Airways       Drain  Duration                  Closed/Suction Drain 01/30/24 1916 Right;Ventral Neck Bulb 15 Fr. 1 day         Trans Pyloric Feeding Tube 01/30/24 1916 Tungsten weighted 12 Fr.  Right nostril 1 day    Male External Urinary Catheter 01/30/24 2000 1 day              Airway  Duration                  Airway - Non-Surgical 01/30/24 1827 1 day              Peripheral Intravenous Line  Duration                  Peripheral IV - Single Lumen 01/30/24 0545 20 G Left Forearm 2 days         Peripheral IV - Single Lumen 01/30/24 0746 18 G Right Forearm 2 days                     Physical Exam  Awake, alert  NG in right nare   Intubated  Right neck incision c/d/I, closed with staples   R neck DELFIN with serosanguinous output, holding suction   Improved subq emphysema      Significant Labs:  CBC:   Recent Labs   Lab 02/01/24  0203   WBC 15.65*   RBC 3.79*   HGB 11.0*   HCT 34.3*      MCV 91   MCH 29.0   MCHC 32.1     CMP:   Recent Labs   Lab 02/01/24  0203   *   CALCIUM 8.9   ALBUMIN 3.2*   PROT 6.0      K 4.4   CO2 23      BUN 20   CREATININE 0.8   ALKPHOS 59   ALT 18   AST 29   BILITOT 0.3       Significant Diagnostics:  I have reviewed and interpreted all pertinent imaging results/findings within the past 24 hours.  Assessment/Plan:     Injury of pharynx  Mr Rodriguez is a 55 yo male who is s/p C3-4 ACDF surgery with post op dysphagia, odynophagia, neck pain, swelling and crepitus. CT and scope demonstrates DELFIN drain in the hypopharynx. Patient with difficulty with swallowing secretions. No respiratory compromise. He is s/p neck exploration and pharyngeal repair on 1/30. Discussed with patient's mother and sister at bedside the recommendation for open G tube and tracheostomy to allow adequate time for pharynx to heal. Family is on board with G tube, would like some time to think about tracheostomy.     - General surgery consulted, following    - Will plan for G tube, timing pending tracheostomy decision   - ID consulted for abx recommendations in setting of hardware with pharyngeal injury    - Currently on vancomycin and unasyn   - Keep intubated, recommend tracheostomy - family  deciding  - Rest of care for per primary  - Please page ENT with questions or concerns.    Dysphagia  Mr Rodriguez is a 53 yo male who is s/p C3-4 ACDF surgery with post op dysphagia, odynophagia, neck pain, swelling and crepitus. CT and scope demonstrates DELFIN drain in the hypopharynx. Patient with difficulty with swallowing secretions. No respiratory compromise. He is s/p neck exploration and pharyngeal repair on 1/30.    - Patient will need open G tube and trach. Will coordinate timing with general surgery.   - Keep patient intubated.   - Rest of care for per primary  - Please page ENT with questions or concerns.          Bonita Macias MD  Otorhinolaryngology-Head & Neck Surgery  [facility]

## 2024-02-01 NOTE — PROGRESS NOTES
[facility]  Neurosurgery  Progress Note    Subjective:     History of Present Illness: No notes on file    Post-Op Info:  Procedure(s) (LRB):  EXPLORATION, NECK, REPAIR OF PHARYNGEAL INJURY (N/A)   2 Days Post-Op   Interval History: EWAEON. Will keep npo for 5 days.     Medications:  Continuous Infusions:   dexmedeTOMIDine (Precedex) infusion (titrating) 0.2 mcg/kg/hr (02/01/24 0605)    fentanyl 112.5 mcg/hr (02/01/24 0605)     Scheduled Meds:   ampicillin-sulbactam  3 g Intravenous Q6H    atorvastatin  10 mg Per NG tube Daily    enoxparin  40 mg Subcutaneous Q24H (prophylaxis, 1700)    famotidine  20 mg Per NG tube BID    FLUoxetine  40 mg Per NG tube Daily    mirtazapine  15 mg Per NG tube QHS    mupirocin   Nasal BID    senna-docusate 8.6-50 mg  2 tablet Per NG tube BID    vancomycin (VANCOCIN) IV (PEDS and ADULTS)  15 mg/kg Intravenous Q12H     PRN Meds:acetaminophen, fentanyl, magnesium oxide, magnesium oxide, morphine, ondansetron, oxyCODONE, potassium bicarbonate, potassium bicarbonate, potassium bicarbonate, potassium, sodium phosphates, potassium, sodium phosphates, potassium, sodium phosphates, prochlorperazine, Pharmacy to dose Vancomycin consult **AND** vancomycin - pharmacy to dose     Review of Systems  Objective:     Weight: 62.6 kg (138 lb 0.1 oz)  Body mass index is 20.98 kg/m².  Vital Signs (Most Recent):  Temp: 97.5 °F (36.4 °C) (02/01/24 0305)  Pulse: (!) 59 (02/01/24 0736)  Resp: 16 (02/01/24 0736)  BP: (!) 95/50 (02/01/24 0605)  SpO2: 100 % (02/01/24 0736) Vital Signs (24h Range):  Temp:  [97.2 °F (36.2 °C)-98 °F (36.7 °C)] 97.5 °F (36.4 °C)  Pulse:  [59-77] 59  Resp:  [9-22] 16  SpO2:  [97 %-100 %] 100 %  BP: ()/(50-72) 95/50                Vent Mode: A/C  Oxygen Concentration (%):  [30-40] 40  Resp Rate Total:  [7.7 br/min-41 br/min] 16 br/min  Vt Set:  [510 mL] 510 mL  PEEP/CPAP:  [5 cmH20] 5 cmH20  Pressure Support:  [8 cmH20] 8 cmH20  Mean Airway Pressure:  [7.3  "[id number] cmH20] 9.3 cmH20             Closed/Suction Drain 01/30/24 1916 Right;Ventral Neck Bulb 15 Fr. (Active)   Site Description Healing 02/01/24 0505   Drainage Serosanguineous 02/01/24 0505   Status To bulb suction 02/01/24 0505   Output (mL) 10 mL 02/01/24 0505       Male External Urinary Catheter 01/30/24 2000 (Active)   Collection Container Urimeter 02/01/24 0505   Securement Method secured to top of thigh w/ adhesive device 02/01/24 0505   Skin no redness;no breakdown 02/01/24 0505   Tolerance no signs/symptoms of discomfort 02/01/24 0505   Output (mL) 350 mL 02/01/24 0505   Catheter Change Date 01/31/24 02/01/24 0505   Catheter Change Time 1905 02/01/24 0505            Trans Pyloric Feeding Tube 01/30/24 1916 Tungsten weighted 12 Fr. Right nostril (Active)   Tolerance no signs/symptoms of discomfort 02/01/24 0505   Securement other (see comments) 02/01/24 0505   Clamp Status/Tolerance clamped 02/01/24 0505   Insertion Site Appearance no redness, warmth, tenderness, skin breakdown, drainage 02/01/24 0505   Flush/Irrigation flushed w/;water;no resistance met 02/01/24 0505   Intake (mL) 60 mL 01/31/24 2105          Physical Exam         Neurosurgery Physical Exam  E4VTM6, AOx4, L HG/WE 4   Significant Labs:  Recent Labs   Lab 01/30/24  1335 01/31/24  0034 02/01/24  0203   * 143* 118*    138 137   K 4.0 4.3 4.4    109 107   CO2 21* 21* 23   BUN 11 12 20   CREATININE 0.9 0.9 0.8   CALCIUM 8.7 8.9 8.9   MG 1.7 1.8 1.9     Recent Labs   Lab 01/30/24  1335 01/31/24  0034 02/01/24  0203   WBC 16.17* 19.08* 15.65*   HGB 11.9* 12.1* 11.0*   HCT 35.7* 37.4* 34.3*    264 243     No results for input(s): "LABPT", "INR", "APTT" in the last 48 hours.  Microbiology Results (last 7 days)       ** No results found for the last 168 hours. **          All pertinent labs from the last 24 hours have been reviewed.    Significant Diagnostics:  I have reviewed all pertinent imaging results/findings within " the past 24 hours.  Assessment/Plan:     * Cervical myelopathy  cristofer Rodriguez  Is a 54 y.o. male with cervical myelopathy and prior ACDF C4 C7 with adjacent level 3 4 who presented for elective C3-4 ACDF on 01/30, which complicated by retropharyngeal injury required repair by ENT.     Postop day 2        Continue ICU care   Continue pain control   Neuro neuro check Q 1   Cervical drain by ENT   ENT planning for Trach PEG   NPO five days  WTE  IV fluids   TF per Neuro ICU   SubQ heparin for DVT prophylaxis        Tank Berry MD  Neurosurgery  [facility]

## 2024-02-01 NOTE — ASSESSMENT & PLAN NOTE
53 y.o. male with cervical myelopathy due to C3-4 severe stenosis with cord signal change, above his prior fusion admitted to [facility] s/p C3-4 anterior cervical discectomy and fusion. Operative course c/b pharyngeal injury s/p emergent ENT repair on 1/30     -Admit to [facility], NSGY following   -SBP <160  -Neuro checks q4 hr, hourly vital signs  -Daily CBC, CMP, mag, phos  -Fentanyl gtt for pain, currently well controlled  -Dex d/c 2/1, ok per NSGY  -PT/OT/SLP as appropriate

## 2024-02-01 NOTE — SUBJECTIVE & OBJECTIVE
Interval History:  Seroquel initiated for anxiety, check EKG.    Review of Systems   Unable to perform ROS: Intubated   Respiratory:  Negative for shortness of breath and wheezing.    Musculoskeletal:  Positive for myalgias and neck pain.        Pain controlled on current regimen   Neurological:  Negative for weakness and numbness.   Psychiatric/Behavioral:  Positive for sleep disturbance. Negative for confusion and decreased concentration. The patient is nervous/anxious.        Objective:     Vitals:  Temp: 97.5 °F (36.4 °C)  Pulse: (!) 59  Rhythm: normal sinus rhythm  BP: (!) 95/50  MAP (mmHg): 66  Resp: 16  SpO2: 99 %  Oxygen Concentration (%): 40  Vent Mode: A/C  Set Rate: 16 BPM  Vt Set: 510 mL  PEEP/CPAP: 5 cmH20  Peak Airway Pressure: 24 cmH20  Mean Airway Pressure: 9.4 cmH20  Plateau Pressure: 0 cmH20    Temp  Min: 97.2 °F (36.2 °C)  Max: 97.9 °F (36.6 °C)  Pulse  Min: 59  Max: 76  BP  Min: 86/53  Max: 118/56  MAP (mmHg)  Min: 65  Max: 80  Resp  Min: 9  Max: 22  SpO2  Min: 97 %  Max: 100 %  Oxygen Concentration (%)  Min: 30  Max: 40    01/31 0701 - 02/01 0700  In: 2266.8 [I.V.:1148.2]  Out: 825 [Urine:800; Drains:25]   Unmeasured Output  Stool Occurrence: 0        Physical Exam  Constitutional:       Appearance: Normal appearance.      Interventions: He is intubated.   HENT:      Head: Atraumatic.   Cardiovascular:      Rate and Rhythm: Normal rate and regular rhythm.   Pulmonary:      Effort: No respiratory distress. He is intubated.   Abdominal:      Palpations: Abdomen is soft.   Neurological:      Mental Status: He is alert.      Comments: [id number]  Follows all commands, nods appropriately yes/no  PERRLA, EOMI  Pupils brisk  5/5 in milind UE, 4/5 milind LE  SILT   Psychiatric:         Mood and Affect: Mood is anxious.                Medications:  ContinuousdexmedeTOMIDine (Precedex) infusion (titrating), Last Rate: 0.2 mcg/kg/hr (02/01/24 1212)  fentanyl, Last Rate: 113 mcg/hr (02/01/24  1200)    Scheduledampicillin-sulbactam, 3 g, Q6H  atorvastatin, 10 mg, Daily  enoxparin, 40 mg, Q24H (prophylaxis, 1700)  famotidine, 20 mg, BID  FLUoxetine, 40 mg, Daily  mirtazapine, 15 mg, QHS  mupirocin, , BID  QUEtiapine, 25 mg, Q12H  senna-docusate 8.6-50 mg, 2 tablet, BID  vancomycin (VANCOCIN) IV (PEDS and ADULTS), 15 mg/kg, Q12H    PRNacetaminophen, 650 mg, Q6H PRN  fentanyl, 50 mcg, Q1H PRN  magnesium oxide, 800 mg, PRN  magnesium oxide, 800 mg, PRN  morphine, 2 mg, Q1H PRN  ondansetron, 4 mg, Q6H PRN  oxyCODONE, 5 mg, Q4H PRN  potassium bicarbonate, 35 mEq, PRN  potassium bicarbonate, 50 mEq, PRN  potassium bicarbonate, 60 mEq, PRN  potassium, sodium phosphates, 2 packet, PRN  potassium, sodium phosphates, 2 packet, PRN  potassium, sodium phosphates, 2 packet, PRN  prochlorperazine, 5 mg, Q6H PRN  vancomycin - pharmacy to dose, , pharmacy to manage frequency      Today I personally reviewed pertinent medications, lines/drains/airways, imaging, cardiology results, laboratory results, microbiology results, notably:    Diet  Diet NPO  Diet NPO

## 2024-02-01 NOTE — SUBJECTIVE & OBJECTIVE
Interval History: NAEON. Will keep npo for 5 days.     Medications:  Continuous Infusions:   dexmedeTOMIDine (Precedex) infusion (titrating) 0.2 mcg/kg/hr (02/01/24 0605)    fentanyl 112.5 mcg/hr (02/01/24 0605)     Scheduled Meds:   ampicillin-sulbactam  3 g Intravenous Q6H    atorvastatin  10 mg Per NG tube Daily    enoxparin  40 mg Subcutaneous Q24H (prophylaxis, 1700)    famotidine  20 mg Per NG tube BID    FLUoxetine  40 mg Per NG tube Daily    mirtazapine  15 mg Per NG tube QHS    mupirocin   Nasal BID    senna-docusate 8.6-50 mg  2 tablet Per NG tube BID    vancomycin (VANCOCIN) IV (PEDS and ADULTS)  15 mg/kg Intravenous Q12H     PRN Meds:acetaminophen, fentanyl, magnesium oxide, magnesium oxide, morphine, ondansetron, oxyCODONE, potassium bicarbonate, potassium bicarbonate, potassium bicarbonate, potassium, sodium phosphates, potassium, sodium phosphates, potassium, sodium phosphates, prochlorperazine, Pharmacy to dose Vancomycin consult **AND** vancomycin - pharmacy to dose     Review of Systems  Objective:     Weight: 62.6 kg (138 lb 0.1 oz)  Body mass index is 20.98 kg/m².  Vital Signs (Most Recent):  Temp: 97.5 °F (36.4 °C) (02/01/24 0305)  Pulse: (!) 59 (02/01/24 0736)  Resp: 16 (02/01/24 0736)  BP: (!) 95/50 (02/01/24 0605)  SpO2: 100 % (02/01/24 0736) Vital Signs (24h Range):  Temp:  [97.2 °F (36.2 °C)-98 °F (36.7 °C)] 97.5 °F (36.4 °C)  Pulse:  [59-77] 59  Resp:  [9-22] 16  SpO2:  [97 %-100 %] 100 %  BP: ()/(50-72) 95/50                Vent Mode: A/C  Oxygen Concentration (%):  [30-40] 40  Resp Rate Total:  [7.7 br/min-41 br/min] 16 br/min  Vt Set:  [510 mL] 510 mL  PEEP/CPAP:  [5 cmH20] 5 cmH20  Pressure Support:  [8 cmH20] 8 cmH20  Mean Airway Pressure:  [7.3 kgI50-38 cmH20] 9.3 cmH20             Closed/Suction Drain 01/30/24 1916 Right;Ventral Neck Bulb 15 Fr. (Active)   Site Description Healing 02/01/24 0505   Drainage Serosanguineous 02/01/24 0505   Status To bulb suction 02/01/24 0505  "  Output (mL) 10 mL 02/01/24 0505       Male External Urinary Catheter 01/30/24 2000 (Active)   Collection Container Urimeter 02/01/24 0505   Securement Method secured to top of thigh w/ adhesive device 02/01/24 0505   Skin no redness;no breakdown 02/01/24 0505   Tolerance no signs/symptoms of discomfort 02/01/24 0505   Output (mL) 350 mL 02/01/24 0505   Catheter Change Date 01/31/24 02/01/24 0505   Catheter Change Time 1905 02/01/24 0505            Trans Pyloric Feeding Tube 01/30/24 1916 Tungsten weighted 12 Fr. Right nostril (Active)   Tolerance no signs/symptoms of discomfort 02/01/24 0505   Securement other (see comments) 02/01/24 0505   Clamp Status/Tolerance clamped 02/01/24 0505   Insertion Site Appearance no redness, warmth, tenderness, skin breakdown, drainage 02/01/24 0505   Flush/Irrigation flushed w/;water;no resistance met 02/01/24 0505   Intake (mL) 60 mL 01/31/24 2105          Physical Exam         Neurosurgery Physical Exam  E4VTM6, AOx4, L HG/WE 4   Significant Labs:  Recent Labs   Lab 01/30/24  1335 01/31/24  0034 02/01/24  0203   * 143* 118*    138 137   K 4.0 4.3 4.4    109 107   CO2 21* 21* 23   BUN 11 12 20   CREATININE 0.9 0.9 0.8   CALCIUM 8.7 8.9 8.9   MG 1.7 1.8 1.9     Recent Labs   Lab 01/30/24  1335 01/31/24  0034 02/01/24  0203   WBC 16.17* 19.08* 15.65*   HGB 11.9* 12.1* 11.0*   HCT 35.7* 37.4* 34.3*    264 243     No results for input(s): "LABPT", "INR", "APTT" in the last 48 hours.  Microbiology Results (last 7 days)       ** No results found for the last 168 hours. **          All pertinent labs from the last 24 hours have been reviewed.    Significant Diagnostics:  I have reviewed all pertinent imaging results/findings within the past 24 hours.  "

## 2024-02-01 NOTE — ASSESSMENT & PLAN NOTE
Intubated due to upper airway compromise, s/p surgical pharyngeal repair on 1/30 w/ ENT    - Tolerating PS 8/5 @ 40% this AM, continue as tolerated  - No cuff leak on AM exam, unable to extubate   - Keep intubated per ENT-> may require trach/PEG to facilitate healing, defer to ENT  - Daily ABG and CXR while intubated  - Fentanyl gtt for comfort while intubated

## 2024-02-01 NOTE — ASSESSMENT & PLAN NOTE
Mr Rodriguez is a 55 yo male who is s/p C3-4 ACDF surgery with post op dysphagia, odynophagia, neck pain, swelling and crepitus. CT and scope demonstrates DELFIN drain in the hypopharynx. Patient with difficulty with swallowing secretions. No respiratory compromise. He is s/p neck exploration and pharyngeal repair on 1/30. Discussed with patient's mother and sister at bedside the recommendation for open G tube and tracheostomy to allow adequate time for pharynx to heal. Family is on board with G tube, would like some time to think about tracheostomy.     - General surgery consulted, following    - Will plan for G tube, timing pending tracheostomy decision   - ID consulted for abx recommendations in setting of hardware with pharyngeal injury    - Currently on vancomycin and unasyn   - Keep intubated, recommend tracheostomy - family deciding  - Rest of care for per primary  - Please page ENT with questions or concerns.

## 2024-02-01 NOTE — PLAN OF CARE
"[facility] Care Plan    POC reviewed with Rebel Rodriguez and family at 0300. Pt verbalized understanding. Questions and concerns addressed. No acute events overnight. Pt progressing toward goals. Will continue to monitor. See below and flowsheets for full assessment and VS info.     - Pt. Doing well overnight, fentanyl and precedex as charted for comfort on ventilator  - DELFIN to suction, output as charted        Is this a stroke patient? no    Neuro:  Partridge Coma Scale  Best Eye Response: 4-->(E4) spontaneous  Best Motor Response: 6-->(M6) obeys commands  Best Verbal Response: 1-->(V1) none  [place]ridge Coma Scale Score: 11  Assessment Qualifiers: patient intubated, patient chemically sedated or paralyzed  Pupil PERRLA: yes     24hr Temp:  [97.2 °F (36.2 °C)-98 °F (36.7 °C)]     CV:   Rhythm: normal sinus rhythm  BP goals:   SBP < 160  MAP > 65    Resp:      Vent Mode: A/C  Set Rate: 14 BPM  Oxygen Concentration (%): 40  Vt Set: 510 mL  PEEP/CPAP: 5 cmH20  Pressure Support: 8 cmH20    Plan: trach/PEG discussions    GI/:     Diet/Nutrition Received: NPO  Last Bowel Movement: 01/29/24  Voiding Characteristics: external catheter    Intake/Output Summary (Last 24 hours) at 2/1/2024 0633  Last data filed at 2/1/2024 0605  Gross per 24 hour   Intake 3683.98 ml   Output 825 ml   Net 2858.98 ml     Unmeasured Output  Stool Occurrence: 0    Labs/Accuchecks:  Recent Labs   Lab 02/01/24  0203   WBC 15.65*   RBC 3.79*   HGB 11.0*   HCT 34.3*         Recent Labs   Lab 02/01/24  0203      K 4.4   CO2 23      BUN 20   CREATININE 0.8   ALKPHOS 59   ALT 18   AST 29   BILITOT 0.3    No results for input(s): "PROTIME", "INR", "APTT", "HEPANTIXA" in the last 168 hours. No results for input(s): "CPK", "CPKMB", "TROPONINI", "MB" in the last 168 hours.    Electrolytes: N/A - electrolytes WDL  Accuchecks: none    Gtts:   sodium chloride 0.9% 50 mL/hr at 02/01/24 0605    dexmedeTOMIDine (Precedex) infusion (titrating) 0.2 " mcg/kg/hr (02/01/24 0605)    fentanyl 112.5 mcg/hr (02/01/24 0605)       LDA/Wounds:    Nurses Note -- 4 Eyes      2/1/2024   6:33 AM      Skin assessed during: Daily Assessment    Is there altered skin present? no   [x] No Altered Skin Integrity Present    [x]Prevention Measures Documented    Attending Nurse:  Fredrick Person RN/Staff Member:  Angelia ARSHAD

## 2024-02-01 NOTE — SUBJECTIVE & OBJECTIVE
Interval History:   Remained intubated overnight, awake and alert this am. Mother and sister at bedside.    Medications:  Continuous Infusions:   dexmedeTOMIDine (Precedex) infusion (titrating) 0.2 mcg/kg/hr (02/01/24 0605)    fentanyl 112.5 mcg/hr (02/01/24 0605)     Scheduled Meds:   ampicillin-sulbactam  3 g Intravenous Q6H    atorvastatin  10 mg Per NG tube Daily    enoxparin  40 mg Subcutaneous Q24H (prophylaxis, 1700)    famotidine  20 mg Per NG tube BID    FLUoxetine  40 mg Per NG tube Daily    mirtazapine  15 mg Per NG tube QHS    mupirocin   Nasal BID    QUEtiapine  25 mg Per NG tube Q12H    senna-docusate 8.6-50 mg  2 tablet Per NG tube BID    vancomycin (VANCOCIN) IV (PEDS and ADULTS)  15 mg/kg Intravenous Q12H     PRN Meds:acetaminophen, fentanyl, magnesium oxide, magnesium oxide, morphine, ondansetron, oxyCODONE, potassium bicarbonate, potassium bicarbonate, potassium bicarbonate, potassium, sodium phosphates, potassium, sodium phosphates, potassium, sodium phosphates, prochlorperazine, Pharmacy to dose Vancomycin consult **AND** vancomycin - pharmacy to dose     Review of patient's allergies indicates:   Allergen Reactions    Erythromycin Nausea And Vomiting    Infliximab Other (See Comments)     Lupus with fever and acute arthritis  Lupus with fever and acute arthritis     Objective:     Vital Signs (24h Range):  Temp:  [97.2 °F (36.2 °C)-97.9 °F (36.6 °C)] 97.5 °F (36.4 °C)  Pulse:  [59-76] 59  Resp:  [9-22] 16  SpO2:  [97 %-100 %] 99 %  BP: ()/(50-66) 95/50       Lines/Drains/Airways       Drain  Duration                  Closed/Suction Drain 01/30/24 1916 Right;Ventral Neck Bulb 15 Fr. 1 day         Trans Pyloric Feeding Tube 01/30/24 1916 Tungsten weighted 12 Fr. Right nostril 1 day    Male External Urinary Catheter 01/30/24 2000 1 day              Airway  Duration                  Airway - Non-Surgical 01/30/24 1827 1 day              Peripheral Intravenous Line  Duration                   Peripheral IV - Single Lumen 01/30/24 0545 20 G Left Forearm 2 days         Peripheral IV - Single Lumen 01/30/24 0746 18 G Right Forearm 2 days                     Physical Exam  Awake, alert  NG in right nare   Intubated  Right neck incision c/d/I, closed with staples   R neck DELFIN with serosanguinous output, holding suction   Improved subq emphysema      Significant Labs:  CBC:   Recent Labs   Lab 02/01/24  0203   WBC 15.65*   RBC 3.79*   HGB 11.0*   HCT 34.3*      MCV 91   MCH 29.0   MCHC 32.1     CMP:   Recent Labs   Lab 02/01/24  0203   *   CALCIUM 8.9   ALBUMIN 3.2*   PROT 6.0      K 4.4   CO2 23      BUN 20   CREATININE 0.8   ALKPHOS 59   ALT 18   AST 29   BILITOT 0.3       Significant Diagnostics:  I have reviewed and interpreted all pertinent imaging results/findings within the past 24 hours.

## 2024-02-01 NOTE — PROGRESS NOTES
Pharmacokinetic Assessment: IV Vancomycin  Progress Note      Assessment/Plan:  - Trough level is therapeutic at 14.8 mcg/mL (drawn appropriately)  - Goal trough 10-15 mcg/mL  - Renal function stable (Scr 0.8, UOP 0.5 cc/kg/hr)  - Continue current dose of 1000mg Q12H  - Collect trough on 2/3 at 0700, or sooner if clinically indicated      Drug levels (last 3 results):  Recent Labs   Lab Result Units 02/01/24  0817   Vancomycin-Trough ug/mL 14.8       Pharmacy will continue to follow and monitor vancomycin.    Please contact pharmacy at extension [phone] for questions regarding this assessment.      Sendy Faust, PharmD  Neurocritical Care Clinical Pharmacist  Ext. [phone]          Patient brief summary:  Rebel Rodriguez is a 54 y.o. male initiated on antimicrobial therapy with IV Vancomycin for treatment of  pharyngeal repair per ENT      Drug Allergies:   Review of patient's allergies indicates:   Allergen Reactions    Erythromycin Nausea And Vomiting    Infliximab Other (See Comments)     Lupus with fever and acute arthritis  Lupus with fever and acute arthritis         Renal Function:   Estimated Creatinine Clearance: 93.5 mL/min (based on SCr of 0.8 mg/dL).,     Dialysis Method (if applicable):  N/A

## 2024-02-01 NOTE — ASSESSMENT & PLAN NOTE
2/2 pharyngeal injury    - NPOx5 days post-operatively per ENT  - May require PEG, general surgery following, appreciate their assistance

## 2024-02-01 NOTE — PROGRESS NOTES
[facility] - Neuro Critical Care  Neurocritical Care  Progress Note    Admit Date: 1/30/2024  Service Date: 02/01/2024  Length of Stay: 2    Subjective:     Chief Complaint: Cervical myelopathy    History of Present Illness: Rebel Rodriguez is a 53 y.o. male with hx of psoriatic arthritis, hx TIA on Aspirin 81 mg, s/p C4-7 ACDF with Dr. Huff 10 years ago admitted to [facility] s/p C3-C4 ACDF. Per chart review, reports rapid functional decline over the last 3 weeks. Endorses hand clumsiness, difficulty typing, gait imbalance, difficulty butoning, dropping items, falls. Difficulty with ambulating also due to LLE weakness. States it feels like he has rubber bands around his wrists. Reports difficulty with overhead mobility and shock-like pains down spine when raising arms overhead. CT neck soft tissue pending, Patient admitted to [facility] for close monitoring and higher level of care.     Hospital Course: 01/31/2024 CT neck concerning for extensive soft tissue edema and air in neck, additionally w/ concern for DELFIN drain misplaced into hypopharynx. Taken class A to OR by ENT for pharyngeal repair, left intubated by ENT in setting of airway edema. On high dose steroids, no cuff leak this AM  02/01/2024 General surgery consulted for open G tube placement, family still in discussion regarding trach. D/c dex to allow for adequate wound healing, ok with NSGY.       Interval History:  Seroquel initiated for anxiety, check EKG.    Review of Systems   Unable to perform ROS: Intubated   Respiratory:  Negative for shortness of breath and wheezing.    Musculoskeletal:  Positive for myalgias and neck pain.        Pain controlled on current regimen   Neurological:  Negative for weakness and numbness.   Psychiatric/Behavioral:  Positive for sleep disturbance. Negative for confusion and decreased concentration. The patient is nervous/anxious.        Objective:     Vitals:  Temp: 97.5 °F (36.4 °C)  Pulse: (!) 59  Rhythm: normal sinus rhythm  BP: (!)  95/50  MAP (mmHg): 66  Resp: 16  SpO2: 99 %  Oxygen Concentration (%): 40  Vent Mode: A/C  Set Rate: 16 BPM  Vt Set: 510 mL  PEEP/CPAP: 5 cmH20  Peak Airway Pressure: 24 cmH20  Mean Airway Pressure: 9.4 cmH20  Plateau Pressure: 0 cmH20    Temp  Min: 97.2 °F (36.2 °C)  Max: 97.9 °F (36.6 °C)  Pulse  Min: 59  Max: 76  BP  Min: 86/53  Max: 118/56  MAP (mmHg)  Min: 65  Max: 80  Resp  Min: 9  Max: 22  SpO2  Min: 97 %  Max: 100 %  Oxygen Concentration (%)  Min: 30  Max: 40    01/31 0701 - 02/01 0700  In: 2266.8 [I.V.:1148.2]  Out: 825 [Urine:800; Drains:25]   Unmeasured Output  Stool Occurrence: 0        Physical Exam  Constitutional:       Appearance: Normal appearance.      Interventions: He is intubated.   HENT:      Head: Atraumatic.   Cardiovascular:      Rate and Rhythm: Normal rate and regular rhythm.   Pulmonary:      Effort: No respiratory distress. He is intubated.   Abdominal:      Palpations: Abdomen is soft.   Neurological:      Mental Status: He is alert.      Comments: [id number]  Follows all commands, nods appropriately yes/no  PERRLA, EOMI  Pupils brisk  5/5 in milind UE, 4/5 milind LE  SILT   Psychiatric:         Mood and Affect: Mood is anxious.                Medications:  ContinuousdexmedeTOMIDine (Precedex) infusion (titrating), Last Rate: 0.2 mcg/kg/hr (02/01/24 1212)  fentanyl, Last Rate: 113 mcg/hr (02/01/24 1200)    Scheduledampicillin-sulbactam, 3 g, Q6H  atorvastatin, 10 mg, Daily  enoxparin, 40 mg, Q24H (prophylaxis, 1700)  famotidine, 20 mg, BID  FLUoxetine, 40 mg, Daily  mirtazapine, 15 mg, QHS  mupirocin, , BID  QUEtiapine, 25 mg, Q12H  senna-docusate 8.6-50 mg, 2 tablet, BID  vancomycin (VANCOCIN) IV (PEDS and ADULTS), 15 mg/kg, Q12H    PRNacetaminophen, 650 mg, Q6H PRN  fentanyl, 50 mcg, Q1H PRN  magnesium oxide, 800 mg, PRN  magnesium oxide, 800 mg, PRN  morphine, 2 mg, Q1H PRN  ondansetron, 4 mg, Q6H PRN  oxyCODONE, 5 mg, Q4H PRN  potassium bicarbonate, 35 mEq, PRN  potassium bicarbonate, 50  mEq, PRN  potassium bicarbonate, 60 mEq, PRN  potassium, sodium phosphates, 2 packet, PRN  potassium, sodium phosphates, 2 packet, PRN  potassium, sodium phosphates, 2 packet, PRN  prochlorperazine, 5 mg, Q6H PRN  vancomycin - pharmacy to dose, , pharmacy to manage frequency      Today I personally reviewed pertinent medications, lines/drains/airways, imaging, cardiology results, laboratory results, microbiology results, notably:    Diet  Diet NPO  Diet NPO        Assessment/Plan:     Neuro  * Cervical myelopathy  53 y.o. male with cervical myelopathy due to C3-4 severe stenosis with cord signal change, above his prior fusion admitted to [facility] s/p C3-4 anterior cervical discectomy and fusion. Operative course c/b pharyngeal injury s/p emergent ENT repair on 1/30     -Admit to [facility], NSGY following   -SBP <160  -Neuro checks q4 hr, hourly vital signs  -Daily CBC, CMP, mag, phos  -Fentanyl gtt for pain, currently well controlled  -Dex d/c 2/1, ok per NSGY  -PT/OT/SLP as appropriate        Psychiatric  Anxiety  Hx of     - C/w home remeron and fluoxetine  - fentanyl gtt for comfort while intubated    Pulmonary  On mechanically assisted ventilation  Intubated due to upper airway compromise, s/p surgical pharyngeal repair on 1/30 w/ ENT    - Tolerating PS 8/5 @ 40% this AM, continue as tolerated  - No cuff leak on AM exam, unable to extubate   - Keep intubated per ENT-> may require trach/PEG to facilitate healing, defer to ENT  - Daily ABG and CXR while intubated  - Fentanyl gtt for comfort while intubated    Cardiac/Vascular  Hyperlipidemia  Hx of     - C/w home atorvastatin     GI  Dysphagia  2/2 pharyngeal injury    - NPOx5 days post-operatively per ENT  - May require PEG, general surgery following, appreciate their assistance    Orthopedic  Injury of pharynx  DELFIN drain noted to be in hypopharynx on post-op imaging, s/p emergent surgical repair on 1/30    - NPOx5 days per ENT  - On vanc/unasyn -> clarify duration of abx  -  Dex 4q6hrs, taper per ENT  - Wean vent as able           The patient is being Prophylaxed for:  Venous Thromboembolism with: Mechanical or Chemical  Stress Ulcer with: H2B  Ventilator Pneumonia with: chlorhexidine oral care    Activity Orders            Elevate HOB Elevate (30-45 degrees) Elevate HOB to 30 - 45 degrees during feeding unless otherwise stated starting at 02/01 0908    Turn patient every 2 hours starting at 01/31 0000    Diet NPO: NPO starting at 01/30 1712    Up in chair With meals starting at 01/30 1100    Elevate HOB starting at 01/30 1054    Ambulate Post Op Day 0 starting at 01/30 1052    Progressive Mobility Protocol (mobilize patient to their highest level of functioning at least twice daily) starting at 01/30 1046    Elevate HOB 30 starting at 01/30 1046    Ambulate With Assistance, Post Op Day 0 If the patient arrives from PACU by 4PM, walk at least once on day of operation if alert and safe to do so. starting at 01/30 1045          Full Code    Bre Tsai PA-C  Neurocritical Care  [facility] - Neuro Critical Care

## 2024-02-01 NOTE — PT/OT/SLP PROGRESS
Physical Therapy Attempt      Patient Name:  Rebel Rodriguez   MRN:  [id number]    Patient using gestures and nods to communicate. He described having a slight headache and additionally was unwilling to participate due to feeling emotional due to an earlier conversation with staff regarding medical care and needs. RN made PT aware of situation prior to attempted visit. Patient nodded in agreement that he would be participatory upon subsequent attempt.  Will follow-up as appropriate.

## 2024-02-02 ENCOUNTER — ANESTHESIA EVENT (OUTPATIENT)
Dept: SURGERY | Facility: HOSPITAL | Age: 54
DRG: 003 | End: 2024-02-02
Payer: COMMERCIAL

## 2024-02-02 LAB
ALBUMIN SERPL BCP-MCNC: 2.8 G/DL (ref 3.5–5.2)
ALLENS TEST: ABNORMAL
ALP SERPL-CCNC: 47 U/L (ref 55–135)
ALT SERPL W/O P-5'-P-CCNC: 20 U/L (ref 10–44)
ANION GAP SERPL CALC-SCNC: 6 MMOL/L (ref 8–16)
AST SERPL-CCNC: 41 U/L (ref 10–40)
BASOPHILS # BLD AUTO: 0.01 K/UL (ref 0–0.2)
BASOPHILS NFR BLD: 0.1 % (ref 0–1.9)
BILIRUB SERPL-MCNC: 0.3 MG/DL (ref 0.1–1)
BUN SERPL-MCNC: 24 MG/DL (ref 6–20)
CALCIUM SERPL-MCNC: 8.5 MG/DL (ref 8.7–10.5)
CHLORIDE SERPL-SCNC: 106 MMOL/L (ref 95–110)
CO2 SERPL-SCNC: 23 MMOL/L (ref 23–29)
CREAT SERPL-MCNC: 0.8 MG/DL (ref 0.5–1.4)
DIFFERENTIAL METHOD BLD: ABNORMAL
EOSINOPHIL # BLD AUTO: 0 K/UL (ref 0–0.5)
EOSINOPHIL NFR BLD: 0.1 % (ref 0–8)
ERYTHROCYTE [DISTWIDTH] IN BLOOD BY AUTOMATED COUNT: 14.5 % (ref 11.5–14.5)
EST. GFR  (NO RACE VARIABLE): >60 ML/MIN/1.73 M^2
GLUCOSE SERPL-MCNC: 248 MG/DL (ref 70–110)
HCO3 UR-SCNC: 25.2 MMOL/L (ref 24–28)
HCT VFR BLD AUTO: 31.2 % (ref 40–54)
HGB BLD-MCNC: 9.9 G/DL (ref 14–18)
IMM GRANULOCYTES # BLD AUTO: 0.04 K/UL (ref 0–0.04)
IMM GRANULOCYTES NFR BLD AUTO: 0.4 % (ref 0–0.5)
LYMPHOCYTES # BLD AUTO: 2.1 K/UL (ref 1–4.8)
LYMPHOCYTES NFR BLD: 18.7 % (ref 18–48)
MAGNESIUM SERPL-MCNC: 1.9 MG/DL (ref 1.6–2.6)
MCH RBC QN AUTO: 28.5 PG (ref 27–31)
MCHC RBC AUTO-ENTMCNC: 31.7 G/DL (ref 32–36)
MCV RBC AUTO: 90 FL (ref 82–98)
MONOCYTES # BLD AUTO: 1.3 K/UL (ref 0.3–1)
MONOCYTES NFR BLD: 11.2 % (ref 4–15)
NEUTROPHILS # BLD AUTO: 7.8 K/UL (ref 1.8–7.7)
NEUTROPHILS NFR BLD: 69.5 % (ref 38–73)
NRBC BLD-RTO: 0 /100 WBC
PCO2 BLDA: 46.3 MMHG (ref 35–45)
PH SMN: 7.34 [PH] (ref 7.35–7.45)
PHOSPHATE SERPL-MCNC: 1.9 MG/DL (ref 2.7–4.5)
PLATELET # BLD AUTO: 221 K/UL (ref 150–450)
PMV BLD AUTO: 9.7 FL (ref 9.2–12.9)
PO2 BLDA: 174 MMHG (ref 80–100)
POC BE: 0 MMOL/L
POC SATURATED O2: 99 % (ref 95–100)
POC TCO2: 27 MMOL/L (ref 23–27)
POCT GLUCOSE: 99 MG/DL (ref 70–110)
POTASSIUM SERPL-SCNC: 3.6 MMOL/L (ref 3.5–5.1)
PROT SERPL-MCNC: 5.4 G/DL (ref 6–8.4)
RBC # BLD AUTO: 3.47 M/UL (ref 4.6–6.2)
SAMPLE: ABNORMAL
SITE: ABNORMAL
SODIUM SERPL-SCNC: 135 MMOL/L (ref 136–145)
WBC # BLD AUTO: 11.27 K/UL (ref 3.9–12.7)

## 2024-02-02 PROCEDURE — 63600175 PHARM REV CODE 636 W HCPCS: Performed by: PSYCHIATRY & NEUROLOGY

## 2024-02-02 PROCEDURE — 80053 COMPREHEN METABOLIC PANEL: CPT

## 2024-02-02 PROCEDURE — 83735 ASSAY OF MAGNESIUM: CPT

## 2024-02-02 PROCEDURE — 25000003 PHARM REV CODE 250: Performed by: PSYCHIATRY & NEUROLOGY

## 2024-02-02 PROCEDURE — 25000003 PHARM REV CODE 250

## 2024-02-02 PROCEDURE — 99900035 HC TECH TIME PER 15 MIN (STAT)

## 2024-02-02 PROCEDURE — 84100 ASSAY OF PHOSPHORUS: CPT

## 2024-02-02 PROCEDURE — 27100171 HC OXYGEN HIGH FLOW UP TO 24 HOURS

## 2024-02-02 PROCEDURE — 63600175 PHARM REV CODE 636 W HCPCS: Performed by: NEUROLOGICAL SURGERY

## 2024-02-02 PROCEDURE — 20000000 HC ICU ROOM

## 2024-02-02 PROCEDURE — 25000003 PHARM REV CODE 250: Performed by: NEUROLOGICAL SURGERY

## 2024-02-02 PROCEDURE — 25000003 PHARM REV CODE 250: Performed by: STUDENT IN AN ORGANIZED HEALTH CARE EDUCATION/TRAINING PROGRAM

## 2024-02-02 PROCEDURE — 94761 N-INVAS EAR/PLS OXIMETRY MLT: CPT

## 2024-02-02 PROCEDURE — 94003 VENT MGMT INPAT SUBQ DAY: CPT

## 2024-02-02 PROCEDURE — 85025 COMPLETE CBC W/AUTO DIFF WBC: CPT

## 2024-02-02 PROCEDURE — 25000242 PHARM REV CODE 250 ALT 637 W/ HCPCS: Performed by: NURSE PRACTITIONER

## 2024-02-02 PROCEDURE — 99900026 HC AIRWAY MAINTENANCE (STAT)

## 2024-02-02 PROCEDURE — 99291 CRITICAL CARE FIRST HOUR: CPT | Mod: ,,, | Performed by: NURSE PRACTITIONER

## 2024-02-02 PROCEDURE — 94640 AIRWAY INHALATION TREATMENT: CPT

## 2024-02-02 PROCEDURE — 25000003 PHARM REV CODE 250: Performed by: NURSE PRACTITIONER

## 2024-02-02 PROCEDURE — 63600175 PHARM REV CODE 636 W HCPCS

## 2024-02-02 RX ORDER — SODIUM CHLORIDE 9 MG/ML
INJECTION, SOLUTION INTRAVENOUS CONTINUOUS
Status: DISCONTINUED | OUTPATIENT
Start: 2024-02-02 | End: 2024-02-14 | Stop reason: HOSPADM

## 2024-02-02 RX ORDER — GLUCAGON 1 MG
1 KIT INJECTION
Status: DISCONTINUED | OUTPATIENT
Start: 2024-02-02 | End: 2024-02-14 | Stop reason: HOSPADM

## 2024-02-02 RX ORDER — INSULIN ASPART 100 [IU]/ML
0-10 INJECTION, SOLUTION INTRAVENOUS; SUBCUTANEOUS EVERY 4 HOURS PRN
Status: DISCONTINUED | OUTPATIENT
Start: 2024-02-02 | End: 2024-02-14 | Stop reason: HOSPADM

## 2024-02-02 RX ORDER — INSULIN ASPART 100 [IU]/ML
0-5 INJECTION, SOLUTION INTRAVENOUS; SUBCUTANEOUS EVERY 4 HOURS PRN
Status: DISCONTINUED | OUTPATIENT
Start: 2024-02-02 | End: 2024-02-02

## 2024-02-02 RX ORDER — POLYETHYLENE GLYCOL 3350 17 G/17G
17 POWDER, FOR SOLUTION ORAL 2 TIMES DAILY
Status: DISCONTINUED | OUTPATIENT
Start: 2024-02-02 | End: 2024-02-10

## 2024-02-02 RX ORDER — QUETIAPINE FUMARATE 25 MG/1
50 TABLET, FILM COATED ORAL EVERY 12 HOURS
Status: DISCONTINUED | OUTPATIENT
Start: 2024-02-03 | End: 2024-02-08

## 2024-02-02 RX ORDER — HYDROXYZINE HYDROCHLORIDE 25 MG/1
25 TABLET, FILM COATED ORAL 3 TIMES DAILY PRN
Status: DISCONTINUED | OUTPATIENT
Start: 2024-02-02 | End: 2024-02-09

## 2024-02-02 RX ORDER — QUETIAPINE FUMARATE 25 MG/1
25 TABLET, FILM COATED ORAL ONCE
Status: COMPLETED | OUTPATIENT
Start: 2024-02-02 | End: 2024-02-02

## 2024-02-02 RX ORDER — DEXTROSE MONOHYDRATE 100 MG/ML
INJECTION, SOLUTION INTRAVENOUS CONTINUOUS PRN
Status: DISCONTINUED | OUTPATIENT
Start: 2024-02-02 | End: 2024-02-14 | Stop reason: HOSPADM

## 2024-02-02 RX ORDER — IPRATROPIUM BROMIDE AND ALBUTEROL SULFATE 2.5; .5 MG/3ML; MG/3ML
3 SOLUTION RESPIRATORY (INHALATION) EVERY 6 HOURS
Status: DISCONTINUED | OUTPATIENT
Start: 2024-02-02 | End: 2024-02-11

## 2024-02-02 RX ADMIN — POLYETHYLENE GLYCOL 3350 17 GRAM ORAL POWDER PACKET 17 G: at 08:02

## 2024-02-02 RX ADMIN — AMPICILLIN SODIUM, SULBACTAM SODIUM 3 G: 2; 1 INJECTION, POWDER, FOR SOLUTION INTRAMUSCULAR; INTRAVENOUS at 07:02

## 2024-02-02 RX ADMIN — MIRTAZAPINE 15 MG: 7.5 TABLET, FILM COATED ORAL at 08:02

## 2024-02-02 RX ADMIN — FLUOXETINE HYDROCHLORIDE 40 MG: 20 CAPSULE ORAL at 08:02

## 2024-02-02 RX ADMIN — QUETIAPINE FUMARATE 25 MG: 25 TABLET ORAL at 08:02

## 2024-02-02 RX ADMIN — FAMOTIDINE 20 MG: 20 TABLET, FILM COATED ORAL at 08:02

## 2024-02-02 RX ADMIN — ATORVASTATIN CALCIUM 10 MG: 10 TABLET, FILM COATED ORAL at 08:02

## 2024-02-02 RX ADMIN — VANCOMYCIN HYDROCHLORIDE 1000 MG: 1 INJECTION, POWDER, LYOPHILIZED, FOR SOLUTION INTRAVENOUS at 08:02

## 2024-02-02 RX ADMIN — POTASSIUM & SODIUM PHOSPHATES POWDER PACK 280-160-250 MG 2 PACKET: 280-160-250 PACK at 05:02

## 2024-02-02 RX ADMIN — ACETAMINOPHEN 650 MG: 325 TABLET ORAL at 04:02

## 2024-02-02 RX ADMIN — AMPICILLIN SODIUM, SULBACTAM SODIUM 3 G: 2; 1 INJECTION, POWDER, FOR SOLUTION INTRAMUSCULAR; INTRAVENOUS at 02:02

## 2024-02-02 RX ADMIN — VANCOMYCIN HYDROCHLORIDE 1000 MG: 1 INJECTION, POWDER, LYOPHILIZED, FOR SOLUTION INTRAVENOUS at 07:02

## 2024-02-02 RX ADMIN — OXYCODONE 5 MG: 5 TABLET ORAL at 04:02

## 2024-02-02 RX ADMIN — ENOXAPARIN SODIUM 40 MG: 40 INJECTION SUBCUTANEOUS at 05:02

## 2024-02-02 RX ADMIN — SODIUM CHLORIDE: 9 INJECTION, SOLUTION INTRAVENOUS at 07:02

## 2024-02-02 RX ADMIN — POTASSIUM & SODIUM PHOSPHATES POWDER PACK 280-160-250 MG 2 PACKET: 280-160-250 PACK at 02:02

## 2024-02-02 RX ADMIN — POTASSIUM & SODIUM PHOSPHATES POWDER PACK 280-160-250 MG 2 PACKET: 280-160-250 PACK at 10:02

## 2024-02-02 RX ADMIN — AMPICILLIN SODIUM, SULBACTAM SODIUM 3 G: 2; 1 INJECTION, POWDER, FOR SOLUTION INTRAMUSCULAR; INTRAVENOUS at 01:02

## 2024-02-02 RX ADMIN — Medication 112.5 MCG/HR: at 01:02

## 2024-02-02 RX ADMIN — MUPIROCIN: 20 OINTMENT TOPICAL at 08:02

## 2024-02-02 RX ADMIN — QUETIAPINE FUMARATE 25 MG: 25 TABLET ORAL at 09:02

## 2024-02-02 RX ADMIN — DEXMEDETOMIDINE HYDROCHLORIDE 0.3 MCG/KG/HR: 4 INJECTION INTRAVENOUS at 04:02

## 2024-02-02 RX ADMIN — IPRATROPIUM BROMIDE AND ALBUTEROL SULFATE 3 ML: .5; 3 SOLUTION RESPIRATORY (INHALATION) at 07:02

## 2024-02-02 RX ADMIN — POLYETHYLENE GLYCOL 3350 17 GRAM ORAL POWDER PACKET 17 G: at 09:02

## 2024-02-02 RX ADMIN — HYDROXYZINE HYDROCHLORIDE 25 MG: 25 TABLET ORAL at 09:02

## 2024-02-02 RX ADMIN — SENNOSIDES AND DOCUSATE SODIUM 2 TABLET: 8.6; 5 TABLET ORAL at 08:02

## 2024-02-02 RX ADMIN — IPRATROPIUM BROMIDE AND ALBUTEROL SULFATE 3 ML: .5; 3 SOLUTION RESPIRATORY (INHALATION) at 12:02

## 2024-02-02 RX ADMIN — POTASSIUM BICARBONATE 50 MEQ: 978 TABLET, EFFERVESCENT ORAL at 05:02

## 2024-02-02 RX ADMIN — OXYCODONE 5 MG: 5 TABLET ORAL at 08:02

## 2024-02-02 NOTE — SUBJECTIVE & OBJECTIVE
Interval History: Possible g-tube placement today,     Review of Systems   Constitutional: Negative.    HENT: Negative.     Eyes: Negative.    Respiratory: Negative.     Cardiovascular: Negative.    Gastrointestinal: Negative.    Endocrine: Negative.    Genitourinary: Negative.    Musculoskeletal: Negative.    Skin: Negative.    Neurological: Negative.    Hematological: Negative.      2 systems   Objective:     Vitals:  Temp: 98.2 °F (36.8 °C)  Pulse: (!) 56  Rhythm: normal sinus rhythm, sinus bradycardia  BP: (!) 112/58  MAP (mmHg): 82  Resp: 11  SpO2: 100 %  Oxygen Concentration (%): 40  Vent Mode: Spont  Set Rate: 0 BPM  Vt Set: 0 mL  Pressure Support: 5 cmH20  PEEP/CPAP: 5 cmH20  Peak Airway Pressure: 11 cmH20  Mean Airway Pressure: 6.1 cmH20  Plateau Pressure: 0 cmH20    Temp  Min: 97.7 °F (36.5 °C)  Max: 98.3 °F (36.8 °C)  Pulse  Min: 53  Max: 85  BP  Min: 88/54  Max: 117/59  MAP (mmHg)  Min: 66  Max: 84  Resp  Min: 8  Max: 21  SpO2  Min: 96 %  Max: 100 %  Oxygen Concentration (%)  Min: 40  Max: 40    02/01 0701 - 02/02 0700  In: 1610.9 [I.V.:390.1]  Out: 813 [Urine:800; Drains:13]   Unmeasured Output  Stool Occurrence: 0        Physical Exam  Vitals and nursing note reviewed.   Constitutional:       Appearance: Normal appearance.   HENT:      Head: Normocephalic.      Nose: Nose normal.      Mouth/Throat:      Mouth: Mucous membranes are moist.      Pharynx: Oropharynx is clear.   Eyes:      Pupils: Pupils are equal, round, and reactive to light.   Cardiovascular:      Rate and Rhythm: Normal rate and regular rhythm.      Pulses: Normal pulses.      Heart sounds: Normal heart sounds.   Pulmonary:      Effort: Pulmonary effort is normal.      Breath sounds: Normal breath sounds.   Abdominal:      General: Bowel sounds are normal.      Palpations: Abdomen is soft.   Musculoskeletal:         General: Normal range of motion.   Skin:     General: Skin is warm and dry.      Capillary Refill: Capillary refill takes  2 to 3 seconds.   Neurological:      Mental Status: He is alert.      Comments: GCS T 11- on sedation  PERRL  Answers yes/no questions  ANTUNEZ to command  Sensation Intact X4           Unable to test orientation, language, memory, judgment, insight, fund of knowledge, hearing, shoulder shrug, tongue protrusion, coordination, gait due to level of consciousness.       Medications:  Continuoussodium chloride 0.9%, Last Rate: 5 mL/hr at 02/02/24 1100  dexmedeTOMIDine (Precedex) infusion (titrating), Last Rate: 0.6 mcg/kg/hr (02/02/24 1100)  dextrose 10 % in water (D10W)  fentanyl, Last Rate: 112.5 mcg/hr (02/02/24 1100)    Scheduledalbuterol-ipratropium, 3 mL, Q6H  ampicillin-sulbactam, 3 g, Q6H  atorvastatin, 10 mg, Daily  enoxparin, 40 mg, Q24H (prophylaxis, 1700)  famotidine, 20 mg, BID  FLUoxetine, 40 mg, Daily  mirtazapine, 15 mg, QHS  mupirocin, , BID  polyethylene glycol, 17 g, BID  QUEtiapine, 25 mg, Q12H  senna-docusate 8.6-50 mg, 2 tablet, BID  vancomycin (VANCOCIN) IV (PEDS and ADULTS), 15 mg/kg, Q12H    PRNacetaminophen, 650 mg, Q6H PRN  dextrose 10 % in water (D10W), , Continuous PRN  dextrose 10%, 12.5 g, PRN  dextrose 10%, 25 g, PRN  fentanyl, 50 mcg, Q1H PRN  glucagon (human recombinant), 1 mg, PRN  insulin aspart U-100, 0-5 Units, Q4H PRN  magnesium oxide, 800 mg, PRN  magnesium oxide, 800 mg, PRN  morphine, 2 mg, Q1H PRN  ondansetron, 4 mg, Q6H PRN  oxyCODONE, 5 mg, Q4H PRN  potassium bicarbonate, 35 mEq, PRN  potassium bicarbonate, 50 mEq, PRN  potassium bicarbonate, 60 mEq, PRN  potassium, sodium phosphates, 2 packet, PRN  potassium, sodium phosphates, 2 packet, PRN  potassium, sodium phosphates, 2 packet, PRN  prochlorperazine, 5 mg, Q6H PRN  vancomycin - pharmacy to dose, , pharmacy to manage frequency      Today I personally reviewed pertinent medications, lines/drains/airways, imaging, cardiology results, laboratory results, microbiology results, notably:    Diet  Diet NPO  Diet NPO

## 2024-02-02 NOTE — ASSESSMENT & PLAN NOTE
DELFIN drain noted to be in hypopharynx on post-op imaging, s/p emergent surgical repair on 1/30    - NPOx5 days per ENT  - On vanc/unasyn -> clarify duration of abx  - Dex 4q6hrs, taper per ENT  - Wean vent as able   - patient refuse trach

## 2024-02-02 NOTE — PT/OT/SLP PROGRESS
Occupational Therapy      Patient Name:  Rebel Rodriguez   MRN:  [id number]    Patient not seen today secondary to patient politely declining in bed and OOB mobility at this time . Will follow-up as appropriate.    2/2/2024

## 2024-02-02 NOTE — PLAN OF CARE
"[facility] Care Plan    POC reviewed with Rebel Rodriguez and family at 0300. Pt verbalized understanding. Questions and concerns addressed. No acute events overnight. Pt progressing toward goals. Will continue to monitor. See below and flowsheets for full assessment and VS info.     -Fentanyl @ 113  -Precedex @ 0.3  -PRN oxycodone given x2  -PRN tylenol given x1  -TF @ 35 (goal); tolerating well  -DELFIN drain in place; output 13 mL  -U.O. 450 mL  -Pending trach/peg      Is this a stroke patient? no    Neuro:  [place] Coma Scale  Best Eye Response: 4-->(E4) spontaneous  Best Motor Response: 6-->(M6) obeys commands  Best Verbal Response: 1-->(V1) none  [place] Coma Scale Score: 11  Assessment Qualifiers: patient intubated  Pupil PERRLA: yes     24hr Temp:  [97.2 °F (36.2 °C)-98.3 °F (36.8 °C)]     CV:   Rhythm: normal sinus rhythm  BP goals:   SBP < 160  MAP > 65    Resp:      Vent Mode: A/C  Set Rate: 16 BPM  Oxygen Concentration (%): 40  Vt Set: 510 mL  PEEP/CPAP: 5 cmH20  Pressure Support: 8 cmH20    Plan: trach/PEG discussions    GI/:     Diet/Nutrition Received: NPO  Last Bowel Movement: 01/29/24  Voiding Characteristics: external catheter    Intake/Output Summary (Last 24 hours) at 2/2/2024 0551  Last data filed at 2/2/2024 0550  Gross per 24 hour   Intake 1609.25 ml   Output 813 ml   Net 796.25 ml     Unmeasured Output  Stool Occurrence: 0    Labs/Accuchecks:  Recent Labs   Lab 02/02/24  0109   WBC 11.27   RBC 3.47*   HGB 9.9*   HCT 31.2*         Recent Labs   Lab 02/02/24  0109   *   K 3.6   CO2 23      BUN 24*   CREATININE 0.8   ALKPHOS 47*   ALT 20   AST 41*   BILITOT 0.3    No results for input(s): "PROTIME", "INR", "APTT", "HEPANTIXA" in the last 168 hours. No results for input(s): "CPK", "CPKMB", "TROPONINI", "MB" in the last 168 hours.    Electrolytes: Electrolytes replaced  Accuchecks: none    Gtts:   dexmedeTOMIDine (Precedex) infusion (titrating) 0.3 mcg/kg/hr (02/02/24 3023)    fentanyl " 113 mcg/hr (02/02/24 0501)       LDA/Wounds:    Nurses Note -- 4 Eyes      2/2/2024   2:21 AM      Skin assessed during: Q Shift Change    Is there altered skin present? yes     [x] Yes- Altered Skin Integrity Present or Discovered   [x] LDA Added if Not in Epic (Describe Wound)   [] New Altered Skin Integrity was Present on Admit and Documented in LDA   [] Wound Image Taken    Wound Care Consulted? NO    Attending Nurse:  MANNY Romo    Second RN/Staff Member:  MANNY Osorio

## 2024-02-02 NOTE — HPI
A 53-year-old man with psoriatic arthritis, on biologic therapy, TIA on aspirin, status post C4-7 ACDF, and history of drug-induced lupus who was admitted for surgical intervention after developing progressive worsening neurologic symptoms such as hand clumsiness, difficulty typing, gait imbalance, dropping items, and falls.  He was taken to the OR for C3-C4 anterior cervical diskectomy and fusion on 01/30.  Unfortunately, his postoperative course was complicated by an injury of his pharynx requiring further surgical intervention.  He was taken back to the OR by ENT for right neck exploration with the repair of the posterior pharyngeal wall laceration and right lateral pharyngotomy.    Infectious disease is consulted by ENT for prophylaxis for hardware infection

## 2024-02-02 NOTE — ASSESSMENT & PLAN NOTE
I have reviewed hospital notes from  ENT and NCC service and other specialty providers. I have also reviewed CBC, CMP/BMP,  cultures and imaging with my interpretation as documented.     Patient with injury to the pharynx in the immediate postoperative period.  Taken to the OR for further surgical intervention and repair.  Based on review of the operative report there was no purulence encountered.  Patient at risk for mediastinitis.  Okay to continue Unasyn and vancomycin for now as this should provide coverage against known organisms from the gastrointestinal tract.  If no evidence of infection or abscess formation or MRSA in the next 24-48 hours I would favor discontinuing vancomycin.  Can also likely discontinue Unasyn 24-48 hours after his surgical intervention however a 5 day course of therapy can be completed.  Discussed management plan with the staff and/or members from NCC service.

## 2024-02-02 NOTE — PROGRESS NOTES
Progress Note  Neurosurgery    02/02/2024  12:06 PM    Admit Date: 1/30/2024  Pt is hospital day  LOS: 3 days     Hospital Day: 3  Post-operative Day: 3 Days Post-Op  POD: 3 Days Post-Op  Hospital Day: 4    Active problems:  Patient Active Problem List   Diagnosis     psoriatric Arthritis    Psoriasis    Hx-TIA (transient ischemic attack)    Hyperlipidemia    Low back pain    Pain of left scapula    Cervical spondylosis    High risk medication use    Hyperreflexia of lower extremity    Nasal septal deviation    Decreased range of motion of neck    Decreased strength of trunk and back    Apnea    LU (obstructive sleep apnea)    Chronic neck pain    Painful cervical ROM    Anemia    Cervical myelopathy    Dysphagia    Injury of pharynx    On mechanically assisted ventilation    Anxiety     Active Hospital Problems    Diagnosis  POA    *Cervical myelopathy [G95.9]  Yes    Injury of pharynx [S19.85XA]  Yes    On mechanically assisted ventilation [Z99.11]  Not Applicable    Anxiety [F41.9]  Yes    Dysphagia [R13.10]  Yes    Hyperlipidemia [E78.5]  Yes      Resolved Hospital Problems   No resolved problems to display.         SUBJECTIVE:   HPI:     Rebel Rodriguez is a 53 y.o. male with hx of psoriatic arthritis, hx TIA on Aspirin 81 mg, s/p C4-7 ACDF with Dr. Huff 10 years ago who presents for evaluation of gait imbalance. Last seen 1 year ago for neck pain and radicular pain into the left shoulder. He underwent C1-2 KENRICK with moderate relief of pain. Reports rapid functional decline over the last 3 weeks. Endorses hand clumsiness, difficulty typing, gait imbalance, difficulty butoning, dropping items, falls. Difficulty with ambulating also due to LLE weakness. States it feels like he has rubber bands around his wrists. Reports difficulty with overhead mobility and shock-like pains down spine when raising arms overhead. Denies b/b incontinence.      Denies tobacco use    Follow-up For:  Procedure(s) (LRB):  EXPLORATION,  NECK, REPAIR OF PHARYNGEAL INJURY (N/A)      Interval HPI:   Postop day 3 status post C3-C4 ACDF and retropharyngeal repair   NAEON  Neuro stable      OBJECTIVE:     Vitals:  Vital Signs Range (Last 24H):  Temp:  [97.2 °F (36.2 °C)-98.3 °F (36.8 °C)]   Pulse:  [53-85]   Resp:  [8-21]   BP: ()/(50-61)   SpO2:  [96 %-100 %]     I & O (Last 24H):    Intake/Output Summary (Last 24 hours) at 2/2/2024 1016  Last data filed at 2/2/2024 0900  Gross per 24 hour   Intake 1549.18 ml   Output 813 ml   Net 736.18 ml           Physical Exam:  General: appears well-developed and well-nourished, no distress  HEENT: NC/AT, PERRLA, EOMI  Eyes: EOMI, PERRLA,   Cardiovascular: RRR  Pulm:  Intubated  Abdominal: soft, ND, NT, +BS  MSK: moves all extremities spontaneously with good tone.  Neurological:  While awake.  Intubated.  Head for questions bilateral uppers and lowers with full strength except interossei on the left which is 4+ which also improved from baseline  Incision incision clean  Hemovac in place      Labs:  Recent Results (from the past 24 hour(s))   CBC auto differential    Collection Time: 02/02/24  1:09 AM   Result Value Ref Range    WBC 11.27 3.90 - 12.70 K/uL    RBC 3.47 (L) 4.60 - 6.20 M/uL    Hemoglobin 9.9 (L) 14.0 - 18.0 g/dL    Hematocrit 31.2 (L) 40.0 - 54.0 %    MCV 90 82 - 98 fL    MCH 28.5 27.0 - 31.0 pg    MCHC 31.7 (L) 32.0 - 36.0 g/dL    RDW 14.5 11.5 - 14.5 %    Platelets 221 150 - 450 K/uL    MPV 9.7 9.2 - 12.9 fL    Immature Granulocytes 0.4 0.0 - 0.5 %    Gran # (ANC) 7.8 (H) 1.8 - 7.7 K/uL    Immature Grans (Abs) 0.04 0.00 - 0.04 K/uL    Lymph # 2.1 1.0 - 4.8 K/uL    Mono # 1.3 (H) 0.3 - 1.0 K/uL    Eos # 0.0 0.0 - 0.5 K/uL    Baso # 0.01 0.00 - 0.20 K/uL    nRBC 0 0 /100 WBC    Gran % 69.5 38.0 - 73.0 %    Lymph % 18.7 18.0 - 48.0 %    Mono % 11.2 4.0 - 15.0 %    Eosinophil % 0.1 0.0 - 8.0 %    Basophil % 0.1 0.0 - 1.9 %    Differential Method Automated    Comprehensive metabolic panel     Collection Time: 02/02/24  1:09 AM   Result Value Ref Range    Sodium 135 (L) 136 - 145 mmol/L    Potassium 3.6 3.5 - 5.1 mmol/L    Chloride 106 95 - 110 mmol/L    CO2 23 23 - 29 mmol/L    Glucose 248 (H) 70 - 110 mg/dL    BUN 24 (H) 6 - 20 mg/dL    Creatinine 0.8 0.5 - 1.4 mg/dL    Calcium 8.5 (L) 8.7 - 10.5 mg/dL    Total Protein 5.4 (L) 6.0 - 8.4 g/dL    Albumin 2.8 (L) 3.5 - 5.2 g/dL    Total Bilirubin 0.3 0.1 - 1.0 mg/dL    Alkaline Phosphatase 47 (L) 55 - 135 U/L    AST 41 (H) 10 - 40 U/L    ALT 20 10 - 44 U/L    eGFR >60.0 >60 mL/min/1.73 m^2    Anion Gap 6 (L) 8 - 16 mmol/L   Phosphorus    Collection Time: 02/02/24  1:09 AM   Result Value Ref Range    Phosphorus 1.9 (L) 2.7 - 4.5 mg/dL   Magnesium    Collection Time: 02/02/24  1:09 AM   Result Value Ref Range    Magnesium 1.9 1.6 - 2.6 mg/dL   ISTAT PROCEDURE    Collection Time: 02/02/24  4:08 AM   Result Value Ref Range    POC PH 7.344 (L) 7.35 - 7.45    POC PCO2 46.3 (H) 35 - 45 mmHg    POC PO2 174 (H) 80 - 100 mmHg    POC HCO3 25.2 24 - 28 mmol/L    POC BE 0 -2 to 2 mmol/L    POC SATURATED O2 99 95 - 100 %    POC TCO2 27 23 - 27 mmol/L    Sample ARTERIAL     Site RR     Allens Test Pass          Recent Labs   Lab 01/31/24  0034 02/01/24  0203 02/02/24  0109   WBC 19.08* 15.65* 11.27   HGB 12.1* 11.0* 9.9*   HCT 37.4* 34.3* 31.2*    243 221         Recent Labs   Lab 01/31/24  0034 02/01/24  0203 02/02/24  0109    137 135*   K 4.3 4.4 3.6    107 106   CO2 21* 23 23   BUN 12 20 24*   CREATININE 0.9 0.8 0.8   CALCIUM 8.9 8.9 8.5*         Microbiology Results (last 7 days)       ** No results found for the last 168 hours. **            Imaging:      Medication:  Scheduled Meds:   ampicillin-sulbactam  3 g Intravenous Q6H    atorvastatin  10 mg Per NG tube Daily    enoxparin  40 mg Subcutaneous Q24H (prophylaxis, 1700)    famotidine  20 mg Per NG tube BID    FLUoxetine  40 mg Per NG tube Daily    mirtazapine  15 mg Per NG tube QHS     mupirocin   Nasal BID    polyethylene glycol  17 g Per NG tube BID    QUEtiapine  25 mg Per NG tube Q12H    senna-docusate 8.6-50 mg  2 tablet Per NG tube BID    vancomycin (VANCOCIN) IV (PEDS and ADULTS)  15 mg/kg Intravenous Q12H       Infusions:   sodium chloride 0.9% Stopped (02/02/24 0838)    dexmedeTOMIDine (Precedex) infusion (titrating) 0.6 mcg/kg/hr (02/02/24 0900)    dextrose 10 % in water (D10W)      fentanyl 112.5 mcg/hr (02/02/24 0900)       PRN Meds:  acetaminophen, dextrose 10 % in water (D10W), dextrose 10%, dextrose 10%, fentanyl, glucagon (human recombinant), insulin aspart U-100, magnesium oxide, magnesium oxide, morphine, ondansetron, oxyCODONE, potassium bicarbonate, potassium bicarbonate, potassium bicarbonate, potassium, sodium phosphates, potassium, sodium phosphates, potassium, sodium phosphates, prochlorperazine, Pharmacy to dose Vancomycin consult **AND** vancomycin - pharmacy to dose      Lines/Drains:       Peripheral IV - Single Lumen 01/30/24 0545 20 G Left Forearm (Active)   Site Assessment Clean;Dry;Intact;No redness;No swelling 01/31/24 1100   Extremity Assessment Distal to IV No warmth;No swelling;No redness;No abnormal discoloration 01/31/24 1100   Line Status Flushed 01/31/24 1100   Dressing Status Clean;Dry;Intact 01/31/24 1100   Dressing Intervention Integrity maintained 01/31/24 1100   Dressing Change Due 02/03/24 01/31/24 1100   Site Change Due 02/03/24 01/31/24 0700   Reason Not Rotated Not due 01/31/24 1100   Number of days: 1            Peripheral IV - Single Lumen 01/30/24 0746 18 G Right Forearm (Active)   Site Assessment Clean;Dry;Intact;No redness;No swelling 01/31/24 1100   Extremity Assessment Distal to IV No warmth;No swelling;No redness;No abnormal discoloration 01/31/24 1100   Line Status Flushed 01/31/24 1100   Dressing Status Clean;Dry;Intact 01/31/24 1100   Dressing Intervention Integrity maintained 01/31/24 1100   Dressing Change Due 02/03/24 01/31/24 1100    Site Change Due 02/03/24 01/31/24 0700   Reason Not Rotated Not due 01/31/24 1100   Number of days: 1            Closed/Suction Drain 01/30/24 1916 Right;Ventral Neck Bulb 15 Fr. (Active)   Site Description Healing 01/31/24 1100   Drainage Serosanguineous 01/31/24 1100   Status To bulb suction 01/31/24 1100   Output (mL) 10 mL 01/31/24 0617   Number of days: 0       Male External Urinary Catheter 01/30/24 2000 (Active)   Collection Container Urimeter 01/31/24 1100   Securement Method secured to top of thigh w/ adhesive device 01/31/24 1100   Skin no redness;no breakdown 01/31/24 1100   Tolerance no signs/symptoms of discomfort 01/31/24 1100   Output (mL) 450 mL 01/31/24 0617   Catheter Change Date 01/30/24 01/31/24 0502   Catheter Change Time 2000 01/31/24 0502   Number of days: 0            Trans Pyloric Feeding Tube 01/30/24 1916 Tungsten weighted 12 Fr. Right nostril (Active)   Tolerance no signs/symptoms of discomfort 01/31/24 1100   Securement secured to nostril center 01/31/24 1100   Clamp Status/Tolerance clamped 01/31/24 0502   Insertion Site Appearance no redness, warmth, tenderness, skin breakdown, drainage 01/31/24 1100   Flush/Irrigation flushed w/;water;no resistance met 01/31/24 1100   Intake (mL) 60 mL 01/31/24 1000   Number of days: 0       ASSESSMENT/PLAN:   Rebel Rodriguez  Is a 54 y.o. male with cervical myelopathy and prior ACDF C4 C7 with adjacent level 3 4 who presented for elective C3-4 ACDF on 01/30, which complicated by retropharyngeal injury required repair by ENT.    Postop day 2      Continue ICU care   Continue pain control. On sedation   Neuro neuro check Q 1   Cervical drain by ENT   ENT planning for Trach and PEG   Kept intubated in the meantime  On broad spectrum abx  TF per Neuro ICU   SubQ heparin for DVT prophylaxis  PT/OT         Shyla Davenport MD  02/02/2024  12:06 PM

## 2024-02-02 NOTE — ASSESSMENT & PLAN NOTE
53 y.o. male with cervical myelopathy due to C3-4 severe stenosis with cord signal change, above his prior fusion admitted to [facility] s/p C3-4 anterior cervical discectomy and fusion. Operative course c/b pharyngeal injury s/p emergent ENT repair on 1/30     -Admit to [facility], NSGY following   -SBP <160  -Neuro checks q4 hr, hourly vital signs  -Daily CBC, CMP, mag, phos  -Fentanyl gtt for pain, currently well controlled  -Dex d/c 2/1, ok per NSGY  -PT/OT/SLP as appropriate

## 2024-02-02 NOTE — PROGRESS NOTES
[facility] - Neuro Critical Care  Neurocritical Care  Progress Note    Admit Date: 1/30/2024  Service Date: 02/02/2024  Length of Stay: 3    Subjective:     Chief Complaint: Cervical myelopathy    History of Present Illness: Rebel Rodriguez is a 53 y.o. male with hx of psoriatic arthritis, hx TIA on Aspirin 81 mg, s/p C4-7 ACDF with Dr. Huff 10 years ago admitted to [facility] s/p C3-C4 ACDF. Per chart review, reports rapid functional decline over the last 3 weeks. Endorses hand clumsiness, difficulty typing, gait imbalance, difficulty butoning, dropping items, falls. Difficulty with ambulating also due to LLE weakness. States it feels like he has rubber bands around his wrists. Reports difficulty with overhead mobility and shock-like pains down spine when raising arms overhead. CT neck soft tissue pending, Patient admitted to [facility] for close monitoring and higher level of care.     Hospital Course: 01/31/2024 CT neck concerning for extensive soft tissue edema and air in neck, additionally w/ concern for DELFIN drain misplaced into hypopharynx. Taken class A to OR by ENT for pharyngeal repair, left intubated by ENT in setting of airway edema. On high dose steroids, no cuff leak this AM  02/01/2024 General surgery consulted for open G tube placement, family still in discussion regarding trach. D/c dex to allow for adequate wound healing, ok with NSGY.   2/2/24: possible G-tube placement today    Interval History: Possible g-tube placement today,     Review of Systems   Constitutional: Negative.    HENT: Negative.     Eyes: Negative.    Respiratory: Negative.     Cardiovascular: Negative.    Gastrointestinal: Negative.    Endocrine: Negative.    Genitourinary: Negative.    Musculoskeletal: Negative.    Skin: Negative.    Neurological: Negative.    Hematological: Negative.      2 systems   Objective:     Vitals:  Temp: 98.2 °F (36.8 °C)  Pulse: (!) 56  Rhythm: normal sinus rhythm, sinus bradycardia  BP: (!) 112/58  MAP (mmHg):  82  Resp: 11  SpO2: 100 %  Oxygen Concentration (%): 40  Vent Mode: Spont  Set Rate: 0 BPM  Vt Set: 0 mL  Pressure Support: 5 cmH20  PEEP/CPAP: 5 cmH20  Peak Airway Pressure: 11 cmH20  Mean Airway Pressure: 6.1 cmH20  Plateau Pressure: 0 cmH20    Temp  Min: 97.7 °F (36.5 °C)  Max: 98.3 °F (36.8 °C)  Pulse  Min: 53  Max: 85  BP  Min: 88/54  Max: 117/59  MAP (mmHg)  Min: 66  Max: 84  Resp  Min: 8  Max: 21  SpO2  Min: 96 %  Max: 100 %  Oxygen Concentration (%)  Min: 40  Max: 40    02/01 0701 - 02/02 0700  In: 1610.9 [I.V.:390.1]  Out: 813 [Urine:800; Drains:13]   Unmeasured Output  Stool Occurrence: 0        Physical Exam  Vitals and nursing note reviewed.   Constitutional:       Appearance: Normal appearance.   HENT:      Head: Normocephalic.      Nose: Nose normal.      Mouth/Throat:      Mouth: Mucous membranes are moist.      Pharynx: Oropharynx is clear.   Eyes:      Pupils: Pupils are equal, round, and reactive to light.   Cardiovascular:      Rate and Rhythm: Normal rate and regular rhythm.      Pulses: Normal pulses.      Heart sounds: Normal heart sounds.   Pulmonary:      Effort: Pulmonary effort is normal.      Breath sounds: Normal breath sounds.   Abdominal:      General: Bowel sounds are normal.      Palpations: Abdomen is soft.   Musculoskeletal:         General: Normal range of motion.   Skin:     General: Skin is warm and dry.      Capillary Refill: Capillary refill takes 2 to 3 seconds.   Neurological:      Mental Status: He is alert.      Comments: GCS T 11- on sedation  PERRL  Answers yes/no questions  ANTUNEZ to command  Sensation Intact X4           Unable to test orientation, language, memory, judgment, insight, fund of knowledge, hearing, shoulder shrug, tongue protrusion, coordination, gait due to level of consciousness.       Medications:  Continuoussodium chloride 0.9%, Last Rate: 5 mL/hr at 02/02/24 1100  dexmedeTOMIDine (Precedex) infusion (titrating), Last Rate: 0.6 mcg/kg/hr (02/02/24  1100)  dextrose 10 % in water (D10W)  fentanyl, Last Rate: 112.5 mcg/hr (02/02/24 1100)    Scheduledalbuterol-ipratropium, 3 mL, Q6H  ampicillin-sulbactam, 3 g, Q6H  atorvastatin, 10 mg, Daily  enoxparin, 40 mg, Q24H (prophylaxis, 1700)  famotidine, 20 mg, BID  FLUoxetine, 40 mg, Daily  mirtazapine, 15 mg, QHS  mupirocin, , BID  polyethylene glycol, 17 g, BID  QUEtiapine, 25 mg, Q12H  senna-docusate 8.6-50 mg, 2 tablet, BID  vancomycin (VANCOCIN) IV (PEDS and ADULTS), 15 mg/kg, Q12H    PRNacetaminophen, 650 mg, Q6H PRN  dextrose 10 % in water (D10W), , Continuous PRN  dextrose 10%, 12.5 g, PRN  dextrose 10%, 25 g, PRN  fentanyl, 50 mcg, Q1H PRN  glucagon (human recombinant), 1 mg, PRN  insulin aspart U-100, 0-5 Units, Q4H PRN  magnesium oxide, 800 mg, PRN  magnesium oxide, 800 mg, PRN  morphine, 2 mg, Q1H PRN  ondansetron, 4 mg, Q6H PRN  oxyCODONE, 5 mg, Q4H PRN  potassium bicarbonate, 35 mEq, PRN  potassium bicarbonate, 50 mEq, PRN  potassium bicarbonate, 60 mEq, PRN  potassium, sodium phosphates, 2 packet, PRN  potassium, sodium phosphates, 2 packet, PRN  potassium, sodium phosphates, 2 packet, PRN  prochlorperazine, 5 mg, Q6H PRN  vancomycin - pharmacy to dose, , pharmacy to manage frequency      Today I personally reviewed pertinent medications, lines/drains/airways, imaging, cardiology results, laboratory results, microbiology results, notably:    Diet  Diet NPO  Diet NPO        Assessment/Plan:     Neuro  * Cervical myelopathy  53 y.o. male with cervical myelopathy due to C3-4 severe stenosis with cord signal change, above his prior fusion admitted to [facility] s/p C3-4 anterior cervical discectomy and fusion. Operative course c/b pharyngeal injury s/p emergent ENT repair on 1/30     -Admit to [facility], NSGY following   -SBP <160  -Neuro checks q4 hr, hourly vital signs  -Daily CBC, CMP, mag, phos  -Fentanyl gtt for pain, currently well controlled  -Dex d/c 2/1, ok per NSGY  -PT/OT/SLP as appropriate        Pulmonary  On  mechanically assisted ventilation  Intubated due to upper airway compromise, s/p surgical pharyngeal repair on 1/30 w/ ENT    - Tolerating PS 8/5 @ 40% this AM, continue as tolerated  - No cuff leak on AM exam, unable to extubate   - Keep intubated per ENT-> may require trach/PEG to facilitate healing, defer to ENT  - Daily ABG and CXR while intubated  - Fentanyl gtt for comfort while intubated    Cardiac/Vascular  Hyperlipidemia  Hx of     - C/w home atorvastatin     GI  Dysphagia  2/2 pharyngeal injury    - NPOx5 days post-operatively per ENT  - G-tube placement    Orthopedic  Injury of pharynx  DELFIN drain noted to be in hypopharynx on post-op imaging, s/p emergent surgical repair on 1/30    - NPOx5 days per ENT  - On vanc/unasyn -> clarify duration of abx  - Dex 4q6hrs, taper per ENT  - Wean vent as able   - patient refuse trach          The patient is being Prophylaxed for:  Venous Thromboembolism with: Mechanical or Chemical  Stress Ulcer with: H2B  Ventilator Pneumonia with: chlorhexidine oral care    Activity Orders            Elevate HOB Elevate (30-45 degrees) Elevate HOB to 30 - 45 degrees during feeding unless otherwise stated starting at 02/01 0908    Turn patient every 2 hours starting at 01/31 0000    Diet NPO: NPO starting at 01/30 1712    Up in chair With meals starting at 01/30 1100    Elevate HOB starting at 01/30 1054    Ambulate Post Op Day 0 starting at 01/30 1052    Progressive Mobility Protocol (mobilize patient to their highest level of functioning at least twice daily) starting at 01/30 1046    Elevate HOB 30 starting at 01/30 1046    Ambulate With Assistance, Post Op Day 0 If the patient arrives from PACU by 4PM, walk at least once on day of operation if alert and safe to do so. starting at 01/30 1045          Full Code  Critical care time 40 mins  Maria L Washington NP  Neurocritical Care  [facility] - Neuro Critical Care       not applicable

## 2024-02-02 NOTE — PT/OT/SLP PROGRESS
Physical Therapy      Patient Name:  Rebel Rodriguez   MRN:  [id number]    Patient not seen today secondary to pt politely refusing EOB/OOB mobility. Will follow up as appropriate.    2/2/2024

## 2024-02-02 NOTE — CONSULTS
[facility]  Infectious Disease  Consult Note    Patient Name: Rebel Rodriguez  MRN: [id number]  Admission Date: 1/30/2024  Hospital Length of Stay: 2 days  Attending Physician: Marlene Matute MD  Primary Care Provider: Nanda Smith MD     Isolation Status: No active isolations    Patient information was obtained from past medical records and ER records.      Inpatient consult to Infectious Diseases  Consult performed by: Amrita Lima MD  Consult ordered by: Bonita Macias MD        Assessment/Plan:     Orthopedic  Injury of pharynx  I have reviewed hospital notes from  ENT and NCC service and other specialty providers. I have also reviewed CBC, CMP/BMP,  cultures and imaging with my interpretation as documented.     Patient with injury to the pharynx in the immediate postoperative period.  Taken to the OR for further surgical intervention and repair.  Based on review of the operative report there was no purulence encountered.  Patient at risk for mediastinitis.  Okay to continue Unasyn and vancomycin for now as this should provide coverage against known organisms from the gastrointestinal tract.  If no evidence of infection or abscess formation or MRSA in the next 24-48 hours I would favor discontinuing vancomycin.  Can also likely discontinue Unasyn 24-48 hours after his surgical intervention however a 5 day course of therapy can be completed.  Discussed management plan with the staff and/or members from NCC service.          Thank you for your consult. I will sign off. Please contact us if you have any additional questions.    Amrita Alfonso MD  Infectious Disease  [facility]    Subjective:     Principal Problem: Cervical myelopathy    HPI: A 53-year-old man with psoriatic arthritis, on biologic therapy, TIA on aspirin, status post C4-7 ACDF, and history of drug-induced lupus who was admitted for surgical intervention after developing progressive  worsening neurologic symptoms such as hand clumsiness, difficulty typing, gait imbalance, dropping items, and falls.  He was taken to the OR for C3-C4 anterior cervical diskectomy and fusion on 01/30.  Unfortunately, his postoperative course was complicated by an injury of his pharynx requiring further surgical intervention.  He was taken back to the OR by ENT for right neck exploration with the repair of the posterior pharyngeal wall laceration and right lateral pharyngotomy.    Infectious disease is consulted by ENT for prophylaxis for hardware infection        Past Medical History:   Diagnosis Date    Achilles tendon rupture 10/09/2013    Achilles tendon rupture 10/09/2013    Allergy     Anemia 1/17/2024    Anxiety     Arthritis     psoriatric    Degenerative disc disease     Drug-induced lupus erythematosus     Drug-induced lupus erythematosus 03/09/2016    Hyperlipidemia     Inguinal lymphadenopathy 07/21/2015    Kidney stone     Medication monitoring encounter 12/07/2016    Neck pain 07/06/2017    Psoriatic arthritis     Psoriatic arthritis 12/07/2016    Recurrent fever 07/08/2015    Recurrent nephrolithiasis 05/19/2014    On low oxalate diet    Sinusitis 02/25/2016    Stroke     TIA (transient ischemic attack)     Ulcer     high school    Unexplained night sweats 07/13/2015       Past Surgical History:   Procedure Laterality Date    ACHILLES TENDON SURGERY      BACK SURGERY      FUNCTIONAL ENDOSCOPIC SINUS SURGERY (FESS) USING COMPUTER-ASSISTED NAVIGATION Bilateral 9/14/2018    Procedure: FESS, USING COMPUTER-ASSISTED NAVIGATION;  Surgeon: Gerard Manzo MD;  Location: [facility] OR [address] [place] [address];  Service: ENT;  Laterality: Bilateral;    FUSION, SPINE, CERVICAL, ANTERIOR APPROACH N/A 1/30/2024    Procedure: C3-4 anterior cervical discectomy and fusion;  Surgeon: Rogerio Maradiaga MD;  Location: [facility] OR [address] [place] [address];  Service: Neurosurgery;  Laterality: N/A;  C3-4 anterior cervical discectomy and fusion  anesthesia:  general  nerve mon: EMG/SEP/MEP  radiology: C-arm  bed: reg. slider  position: supine  headrest: caspar, GW tongs/hanging weights, surgical pillow    INJECTION OF ANESTHETIC AGENT AROUND NERVE Left 5/13/2022    Procedure: Block, Nerve MEDIAL BRANCH BLOCK LEFT C2,3,4,5;  Surgeon: Kimberly Wilson MD;  Location: [facility], [place], operated by [facility];  Service: Pain Management;  Laterality: Left;    INJECTION OF ANESTHETIC AGENT AROUND NERVE Left 5/24/2022    Procedure: BLOCK, NERVE, LEFT C2-C5 MEDIAL BRANCH;  Surgeon: Kimberly Wilson MD;  Location: [facility], [place], operated by [facility];  Service: Pain Management;  Laterality: Left;    NASAL SEPTOPLASTY N/A 9/14/2018    Procedure: SEPTOPLASTY, NASAL;  Surgeon: Gerard Manzo MD;  Location: [facility] OR 2ND FLR;  Service: ENT;  Laterality: N/A;    NASAL TURBINATE REDUCTION Bilateral 9/14/2018    Procedure: REDUCTION, NASAL TURBINATE;  Surgeon: Gerard Manzo MD;  Location: [facility] OR 2ND FLR;  Service: ENT;  Laterality: Bilateral;    NECK EXPLORATION N/A 1/30/2024    Procedure: EXPLORATION, NECK, REPAIR OF PHARYNGEAL INJURY;  Surgeon: Senthil Agarwal MD;  Location: [facility] OR 2ND FLR;  Service: ENT;  Laterality: N/A;    RADIOFREQUENCY ABLATION Left 7/12/2022    Procedure: RADIOFREQUENCY ABLATION, LEFT C2-C3, C3-C4, C4-C5;  Surgeon: Kimberly Wilson MD;  Location: [facility], [place], operated by [facility];  Service: Pain Management;  Laterality: Left;    UPPER ENDOSCOPY W/ ESOPHAGEAL MANOMETRY      URETERAL STENT PLACEMENT         Review of patient's allergies indicates:   Allergen Reactions    Erythromycin Nausea And Vomiting    Infliximab Other (See Comments)     Lupus with fever and acute arthritis  Lupus with fever and acute arthritis       Medications:  Medications Prior to Admission   Medication Sig    apremilast (OTEZLA) 30 mg Tab Take 1 tablet (30 mg total) by mouth 2 (two) times daily.    aspirin (ECOTRIN) 81 MG EC tablet Take 81 mg by mouth once daily.    atorvastatin (LIPITOR) 10 MG tablet Take 1 tablet (10 mg total) by mouth  every evening.    FLUoxetine 40 MG capsule Take 1 capsule (40 mg) by mouth daily    mirtazapine (REMERON) 15 MG tablet Take 1 tablet (15 mg) by mouth daily at bedtime    guselkumab (TREMFYA) 100 mg/mL AtIn Inject 100 mg into the skin every 8 weeks.     Antibiotics (From admission, onward)      Start     Stop Route Frequency Ordered    01/31/24 0800  vancomycin (VANCOCIN) 1,000 mg in dextrose 5 % (D5W) 250 mL IVPB (Vial-Mate)         -- IV Every 12 hours (non-standard times) 01/30/24 2100    01/30/24 2100  mupirocin 2 % ointment         02/04/24 2059 Nasl 2 times daily 01/30/24 1103    01/30/24 2045  ampicillin-sulbactam (UNASYN) 3 g in sodium chloride 0.9 % 100 mL IVPB (MB+)         -- IV Every 6 hours (non-standard times) 01/30/24 1942    01/30/24 1916  vancomycin - pharmacy to dose  (vancomycin IVPB (PEDS and ADULTS))        See Hyperspace for full Linked Orders Report.    -- IV pharmacy to manage frequency 01/30/24 1816          Antifungals (From admission, onward)      None          Antivirals (From admission, onward)      None             Immunization History   Administered Date(s) Administered    COVID-19, MRNA, LN-S, PF (Pfizer) (Gray Cap) 06/08/2022    COVID-19, MRNA, LN-S, PF (Pfizer) (Purple Cap) 02/25/2021, 03/19/2021, 08/27/2021    COVID-19, mRNA, LNP-S, bivalent booster, PF (PFIZER OMICRON) 10/12/2022    Hepatitis A / Hepatitis B 09/25/2018, 10/26/2018, 03/22/2019    Influenza - Quadrivalent 03/09/2016    Influenza - Quadrivalent - PF *Preferred* (6 months and older) 11/20/2008, 11/12/2013, 10/26/2018, 10/25/2019, 11/05/2020    Influenza - Trivalent (ADULT) 11/20/2008, 11/12/2013    Influenza Split 11/12/2013    Pneumococcal Conjugate - 13 Valent 01/23/2013, 01/23/2013    Pneumococcal Polysaccharide - 23 Valent 02/25/2016, 04/11/2022    Td (ADULT) 09/30/2009    Tdap 09/25/2018    Zoster Recombinant 10/25/2019, 01/14/2020       Family History       Problem Relation (Age of Onset)    Cancer Father (61)     Cataracts Maternal Grandmother, Maternal Grandfather, Paternal Grandmother, Paternal Grandfather    Crohn's disease Mother, Maternal Grandmother    Osteoarthritis Maternal Grandmother    Pancreatic cancer Father    Ulcerative colitis Father          Social History     Socioeconomic History    Marital status: Single   Occupational History    Occupation: music production     Employer: self employed   Tobacco Use    Smoking status: Never    Smokeless tobacco: Never   Substance and Sexual Activity    Alcohol use: No     Alcohol/week: 0.0 standard drinks of alcohol     Types: 1 - 2 Standard drinks or equivalent per week     Comment: cocktails    Drug use: No   Social History Narrative    1 flight     Social Determinants of Health     Financial Resource Strain: Patient Unable To Answer (2/1/2024)    Overall Financial Resource Strain (CARDIA)     Difficulty of Paying Living Expenses: Patient unable to answer   Food Insecurity: Patient Unable To Answer (2/1/2024)    Hunger Vital Sign     Worried About Running Out of Food in the Last Year: Patient unable to answer     Ran Out of Food in the Last Year: Patient unable to answer   Transportation Needs: Patient Unable To Answer (2/1/2024)    PRAPARE - Transportation     Lack of Transportation (Medical): Patient unable to answer     Lack of Transportation (Non-Medical): Patient unable to answer   Recent Concern: Transportation Needs - Unmet Transportation Needs (12/4/2023)    PRAPARE - Transportation     Lack of Transportation (Medical): Yes     Lack of Transportation (Non-Medical): Yes   Physical Activity: Sufficiently Active (1/31/2024)    Exercise Vital Sign     Days of Exercise per Week: 7 days     Minutes of Exercise per Session: 60 min   Stress: Patient Unable To Answer (2/1/2024)    [place] [employer] - Occupational Stress Questionnaire     Feeling of Stress : Patient unable to answer   Social Connections: Unknown (1/31/2024)    Social Connection and  Isolation Panel [NHANES]     Frequency of Communication with Friends and Family: More than three times a week     Frequency of Social Gatherings with Friends and Family: More than three times a week     Active Member of Clubs or Organizations: No     Attends Club or Organization Meetings: Never     Marital Status: Never    Housing Stability: Patient Unable To Answer (2/1/2024)    Housing Stability Vital Sign     Unable to Pay for Housing in the Last Year: Patient unable to answer     Number of Places Lived in the Last Year: 0     Unstable Housing in the Last Year: Patient unable to answer     Review of Systems   Unable to perform ROS: Intubated     Objective:     Vital Signs (Most Recent):  Temp: 97.9 °F (36.6 °C) (02/01/24 1901)  Pulse: 62 (02/01/24 2105)  Resp: 16 (02/01/24 2134)  BP: (!) 102/50 (02/01/24 2101)  SpO2: 98 % (02/01/24 2105) Vital Signs (24h Range):  Temp:  [97.2 °F (36.2 °C)-98.3 °F (36.8 °C)] 97.9 °F (36.6 °C)  Pulse:  [57-77] 62  Resp:  [7-22] 16  SpO2:  [98 %-100 %] 98 %  BP: ()/(50-67) 102/50     Weight: 62.6 kg (138 lb 0.1 oz)  Body mass index is 20.98 kg/m².    Estimated Creatinine Clearance: 93.5 mL/min (based on SCr of 0.8 mg/dL).     Physical Exam  Vitals and nursing note reviewed.   Constitutional:       Appearance: He is ill-appearing.      Interventions: He is intubated.   Neck:     Cardiovascular:      Rate and Rhythm: Normal rate and regular rhythm.      Heart sounds: No murmur heard.  Pulmonary:      Effort: Pulmonary effort is normal. No respiratory distress. He is intubated.      Breath sounds: Normal breath sounds. No wheezing or rhonchi.   Abdominal:      General: Bowel sounds are normal. There is no distension.      Palpations: Abdomen is soft.      Tenderness: There is no abdominal tenderness.   Skin:     General: Skin is warm and dry.   Neurological:      Mental Status: He is alert.          Significant Labs: BMP:   Recent Labs   Lab 02/01/24  0203   *   NA  137   K 4.4      CO2 23   BUN 20   CREATININE 0.8   CALCIUM 8.9   MG 1.9     CBC:   Recent Labs   Lab 01/31/24  0034 02/01/24  0203   WBC 19.08* 15.65*   HGB 12.1* 11.0*   HCT 37.4* 34.3*    243     Microbiology Results (last 7 days)       ** No results found for the last 168 hours. **            Significant Imaging: I have reviewed all pertinent imaging results/findings within the past 24 hours.

## 2024-02-02 NOTE — ASSESSMENT & PLAN NOTE
Mr Rodriguez is a 53 yo male who is s/p C3-4 ACDF surgery with post op dysphagia, odynophagia, neck pain, swelling and crepitus. CT and scope demonstrates DELFIN drain in the hypopharynx. Patient with difficulty with swallowing secretions. No respiratory compromise. He is s/p neck exploration and pharyngeal repair on 1/30. Discussed with patient's mother and sister at bedside the recommendation for open G tube and tracheostomy to allow adequate time for pharynx to heal. Family is on board with G tube, would like some time to think about tracheostomy.     - General surgery consulted, following    - Will plan for G tube   - Patient will be to be NPO for 4-6 weeks minimum  - ID consulted for abx recommendations in setting of hardware with pharyngeal injury    - Currently on vancomycin and unasyn    - Abx per ID recs  - Keep intubated until after G tube   - Large cuff leak on exam this am    - Ok to wean to extubate after G tube   - Rest of care for per primary  - Please page ENT with questions or concerns.

## 2024-02-02 NOTE — SUBJECTIVE & OBJECTIVE
Interval History:   No issues overnight.     Discussed with mother and sister at bedside this am, would like to pursue G tube but defer tracheostomy at this time.     Medications:  Continuous Infusions:   sodium chloride 0.9% Stopped (02/02/24 0838)    dexmedeTOMIDine (Precedex) infusion (titrating) 0.6 mcg/kg/hr (02/02/24 0900)    dextrose 10 % in water (D10W)      fentanyl 112.5 mcg/hr (02/02/24 0900)     Scheduled Meds:   ampicillin-sulbactam  3 g Intravenous Q6H    atorvastatin  10 mg Per NG tube Daily    enoxparin  40 mg Subcutaneous Q24H (prophylaxis, 1700)    famotidine  20 mg Per NG tube BID    FLUoxetine  40 mg Per NG tube Daily    mirtazapine  15 mg Per NG tube QHS    mupirocin   Nasal BID    polyethylene glycol  17 g Per NG tube BID    QUEtiapine  25 mg Per NG tube Q12H    senna-docusate 8.6-50 mg  2 tablet Per NG tube BID    vancomycin (VANCOCIN) IV (PEDS and ADULTS)  15 mg/kg Intravenous Q12H     PRN Meds:acetaminophen, dextrose 10 % in water (D10W), dextrose 10%, dextrose 10%, fentanyl, glucagon (human recombinant), insulin aspart U-100, magnesium oxide, magnesium oxide, morphine, ondansetron, oxyCODONE, potassium bicarbonate, potassium bicarbonate, potassium bicarbonate, potassium, sodium phosphates, potassium, sodium phosphates, potassium, sodium phosphates, prochlorperazine, Pharmacy to dose Vancomycin consult **AND** vancomycin - pharmacy to dose     Review of patient's allergies indicates:   Allergen Reactions    Erythromycin Nausea And Vomiting    Infliximab Other (See Comments)     Lupus with fever and acute arthritis  Lupus with fever and acute arthritis     Objective:     Vital Signs (24h Range):  Temp:  [97.2 °F (36.2 °C)-98.3 °F (36.8 °C)] 98.2 °F (36.8 °C)  Pulse:  [53-85] 85  Resp:  [8-21] 12  SpO2:  [96 %-100 %] 100 %  BP: ()/(50-62) 117/59     Date 02/02/24 0700 - 02/03/24 0659   Shift 8673-9313 6391-5677 1589-5953 24 Hour Total   INTAKE   I.V.(mL/kg) 63.8(0.9)   63.8(0.9)   NG/GT  35   35   IV Piggyback 159   159   Shift Total(mL/kg) 257.8(3.4)   257.8(3.4)   OUTPUT   Shift Total(mL/kg)       Weight (kg) 74.9 74.9 74.9 74.9     Lines/Drains/Airways       Drain  Duration                  Closed/Suction Drain 01/30/24 1916 Right;Ventral Neck Bulb 15 Fr. 2 days         Trans Pyloric Feeding Tube 01/30/24 1916 Tungsten weighted 12 Fr. Right nostril 2 days    Male External Urinary Catheter 01/30/24 2000 2 days              Airway  Duration                  Airway - Non-Surgical 01/30/24 1827 2 days              Peripheral Intravenous Line  Duration                  Peripheral IV - Single Lumen 01/30/24 0545 20 G Left Forearm 3 days         Peripheral IV - Single Lumen 02/01/24 1820 18 G Right Forearm <1 day                     Physical Exam  Awake, alert  NG in right nare   Intubated  Right neck incision c/d/I, closed with staples   R neck DELFIN with serosanguinous output, holding suction   Near total resolution of subq emphysema      Significant Labs:  CBC:   Recent Labs   Lab 02/02/24  0109   WBC 11.27   RBC 3.47*   HGB 9.9*   HCT 31.2*      MCV 90   MCH 28.5   MCHC 31.7*     CMP:   Recent Labs   Lab 02/02/24  0109   *   CALCIUM 8.5*   ALBUMIN 2.8*   PROT 5.4*   *   K 3.6   CO2 23      BUN 24*   CREATININE 0.8   ALKPHOS 47*   ALT 20   AST 41*   BILITOT 0.3       Significant Diagnostics:  I have reviewed and interpreted all pertinent imaging results/findings within the past 24 hours.

## 2024-02-02 NOTE — CONSULTS
"[facility]  Adult Nutrition  Consult Note    SUMMARY     Recommendations    Modify TF formula to Impact Peptide 1.5. As tolerated, increase to goal rate of 45 mL/hr = 1620 kcals, 102 g of protein, 832 mL fluid.  RD to monitor & follow-up.    Goals: Meet % EEN, EPN by RD f/u date  Nutrition Goal Status: new  Communication of RD Recs: reviewed with RN    Assessment and Plan    Nutrition Problem:  Inadequate oral intake    Related to (etiology):   Inability to consume sufficient energy     Signs and Symptoms (as evidenced by):   NPO    Interventions(treatment strategy):  Collaboration of nutrition care w/ other providers  EN    Nutrition Diagnosis Status:   New      Reason for Assessment    Reason For Assessment: consult  Diagnosis: other (see comments) (Cervical myelopathy)  Interdisciplinary Rounds: did not attend    General Information Comments: Intubated, tolerating TFs - PEG placement being discussed. Pt w/ agitation this AM, didnt go in room (per RN suggestion). Per H & P, pt w/ dysphagia PTA. Per chart review, pt w/ UBW of 140#. RD unable to assess for malnutrition.  Nutrition Discharge Planning: Pending clinical course    Nutrition/Diet History    Spiritual, Cultural Beliefs, Sabianism Practices, Values that Affect Care: no  Factors Affecting Nutritional Intake: on mechanical ventilation, NPO    Anthropometrics    Temp: 98.2 °F (36.8 °C)  Height Method: Stated  Height: 5' 8" (172.7 cm)  Height (inches): 68 in  Weight Method: Bed Scale  Weight: 74.9 kg (165 lb 2 oz)  Weight (lb): 165.13 lb  Ideal Body Weight (IBW), Male: 154 lb  % Ideal Body Weight, Male (lb): 107.23 %  BMI (Calculated): 25.1  BMI Grade: 25 - 29.9 - overweight    Lab/Procedures/Meds    Pertinent Labs Reviewed: reviewed  Pertinent Labs Comments: Na 135  Pertinent Medications Reviewed: reviewed  Pertinent Medications Comments: Precedex, Fentanyl    Estimated/Assessed Needs    Weight Used For Calorie Calculations: 75 kg " (165 lb 5.5 oz)    Energy Calorie Requirements (kcal): 1648 kcal/d  Energy Need Method: [facility]    Protein Requirements:  g/d (1.2-1.5 g/kg)  Weight Used For Protein Calculations: 75 kg (165 lb 5.5 oz)    Estimated Fluid Requirement Method: other (see comments) (Per MD)  RDA Method (mL): 1648    Nutrition Prescription Ordered    Current Diet Order: NPO  Current Nutrition Support Formula Ordered: Isosource 1.5  Current Nutrition Support Rate Ordered: 35 mL/hr    Evaluation of Received Nutrient/Fluid Intake    Enteral Calories (kcal): 1260  Enteral Protein (gm): 57  Enteral (Free Water) Fluid (mL): 642    % Kcal Needs: 76%  % Protein Needs: 63%    I/O: +5.6L since admit    Energy Calories Required: not meeting needs  Protein Required: not meeting needs  Fluid Required: other (see comments) (Per MD)    Comments: LBM: 1/29    Tolerance: tolerating    Nutrition Risk    Level of Risk/Frequency of Follow-up:  (1x/week)     Monitor and Evaluation    Food and Nutrient Intake: enteral nutrition intake, food and beverage intake, energy intake  Food and Nutrient Adminstration: diet order, enteral and parenteral nutrition administration  Physical Activity and Function: nutrition-related ADLs and IADLs  Anthropometric Measurements: weight, weight change  Biochemical Data, Medical Tests and Procedures: inflammatory profile, lipid profile, glucose/endocrine profile, gastrointestinal profile, electrolyte and renal panel  Nutrition-Focused Physical Findings: overall appearance     Nutrition Follow-Up    RD Follow-up?: Yes

## 2024-02-02 NOTE — PLAN OF CARE
Recommendations     Modify TF formula to Impact Peptide 1.5. As tolerated, increase to goal rate of 45 mL/hr = 1620 kcals, 102 g of protein, 832 mL fluid.  RD to monitor & follow-up.     Goals: Meet % EEN, EPN by RD f/u date  Nutrition Goal Status: new  Communication of RD Recs: reviewed with RN

## 2024-02-02 NOTE — PLAN OF CARE
Spoke with patient and family in the event patient has respiratory comprise they are ok with intubation but does want a trach tube at all.

## 2024-02-02 NOTE — SUBJECTIVE & OBJECTIVE
Past Medical History:   Diagnosis Date    Achilles tendon rupture 10/09/2013    Achilles tendon rupture 10/09/2013    Allergy     Anemia 1/17/2024    Anxiety     Arthritis     psoriatric    Degenerative disc disease     Drug-induced lupus erythematosus     Drug-induced lupus erythematosus 03/09/2016    Hyperlipidemia     Inguinal lymphadenopathy 07/21/2015    Kidney stone     Medication monitoring encounter 12/07/2016    Neck pain 07/06/2017    Psoriatic arthritis     Psoriatic arthritis 12/07/2016    Recurrent fever 07/08/2015    Recurrent nephrolithiasis 05/19/2014    On low oxalate diet    Sinusitis 02/25/2016    Stroke     TIA (transient ischemic attack)     Ulcer     high school    Unexplained night sweats 07/13/2015       Past Surgical History:   Procedure Laterality Date    ACHILLES TENDON SURGERY      BACK SURGERY      FUNCTIONAL ENDOSCOPIC SINUS SURGERY (FESS) USING COMPUTER-ASSISTED NAVIGATION Bilateral 9/14/2018    Procedure: FESS, USING COMPUTER-ASSISTED NAVIGATION;  Surgeon: Gerard Manzo MD;  Location: [facility] OR [address] [place] [address];  Service: ENT;  Laterality: Bilateral;    FUSION, SPINE, CERVICAL, ANTERIOR APPROACH N/A 1/30/2024    Procedure: C3-4 anterior cervical discectomy and fusion;  Surgeon: Rogerio Maradiaga MD;  Location: [facility] OR [address] [place] [address];  Service: Neurosurgery;  Laterality: N/A;  C3-4 anterior cervical discectomy and fusion  anesthesia: general  nerve mon: EMG/SEP/MEP  radiology: C-arm  bed: reg. slider  position: supine  headrest: casparSHEREE tongs/hanging weights, surgical pillow    INJECTION OF ANESTHETIC AGENT AROUND NERVE Left 5/13/2022    Procedure: Block, Nerve MEDIAL BRANCH BLOCK LEFT C2,3,4,5;  Surgeon: Kimberly Wilson MD;  Location: [facility], [place], operated by [facility];  Service: Pain Management;  Laterality: Left;    INJECTION OF ANESTHETIC AGENT AROUND NERVE Left 5/24/2022    Procedure: BLOCK, NERVE, LEFT C2-C5 MEDIAL BRANCH;  Surgeon: Kimberly Wilson MD;  Location: [facility], [place], operated by [facility];  Service: Pain Management;   Laterality: Left;    NASAL SEPTOPLASTY N/A 9/14/2018    Procedure: SEPTOPLASTY, NASAL;  Surgeon: Gerard Manzo MD;  [facility];  Service: ENT;  Laterality: N/A;    NASAL TURBINATE REDUCTION Bilateral 9/14/2018    Procedure: REDUCTION, NASAL TURBINATE;  Surgeon: Gerard Manzo MD;  [facility];  Service: ENT;  Laterality: Bilateral;    NECK EXPLORATION N/A 1/30/2024    Procedure: EXPLORATION, NECK, REPAIR OF PHARYNGEAL INJURY;  Surgeon: Senthil Agarwal MD;  [facility];  Service: ENT;  Laterality: N/A;    RADIOFREQUENCY ABLATION Left 7/12/2022    Procedure: RADIOFREQUENCY ABLATION, LEFT C2-C3, C3-C4, C4-C5;  Surgeon: Kimberly Wilson MD;  [facility];  Service: Pain Management;  Laterality: Left;    UPPER ENDOSCOPY W/ ESOPHAGEAL MANOMETRY      URETERAL STENT PLACEMENT         Review of patient's allergies indicates:   Allergen Reactions    Erythromycin Nausea And Vomiting    Infliximab Other (See Comments)     Lupus with fever and acute arthritis  Lupus with fever and acute arthritis       Medications:  Medications Prior to Admission   Medication Sig    apremilast (OTEZLA) 30 mg Tab Take 1 tablet (30 mg total) by mouth 2 (two) times daily.    aspirin (ECOTRIN) 81 MG EC tablet Take 81 mg by mouth once daily.    atorvastatin (LIPITOR) 10 MG tablet Take 1 tablet (10 mg total) by mouth every evening.    FLUoxetine 40 MG capsule Take 1 capsule (40 mg) by mouth daily    mirtazapine (REMERON) 15 MG tablet Take 1 tablet (15 mg) by mouth daily at bedtime    guselkumab (TREMFYA) 100 mg/mL AtIn Inject 100 mg into the skin every 8 weeks.     Antibiotics (From admission, onward)      Start     Stop Route Frequency Ordered    01/31/24 0800  vancomycin (VANCOCIN) 1,000 mg in dextrose 5 % (D5W) 250 mL IVPB (Vial-Mate)         -- IV Every 12 hours (non-standard times) 01/30/24 2100    01/30/24 2100  mupirocin 2 % ointment         02/04/24 2059 Nasl 2 times daily  01/30/24 1103    01/30/24 2045  ampicillin-sulbactam (UNASYN) 3 g in sodium chloride 0.9 % 100 mL IVPB (MB+)         -- IV Every 6 hours (non-standard times) 01/30/24 1942 01/30/24 1916  vancomycin - pharmacy to dose  (vancomycin IVPB (PEDS and ADULTS))        See Hyperspace for full Linked Orders Report.    -- IV pharmacy to manage frequency 01/30/24 1816          Antifungals (From admission, onward)      None          Antivirals (From admission, onward)      None             Immunization History   Administered Date(s) Administered    COVID-19, MRNA, LN-S, PF (Pfizer) (Gray Cap) 06/08/2022    COVID-19, MRNA, LN-S, PF (Pfizer) (Purple Cap) 02/25/2021, 03/19/2021, 08/27/2021    COVID-19, mRNA, LNP-S, bivalent booster, PF (PFIZER OMICRON) 10/12/2022    Hepatitis A / Hepatitis B 09/25/2018, 10/26/2018, 03/22/2019    Influenza - Quadrivalent 03/09/2016    Influenza - Quadrivalent - PF *Preferred* (6 months and older) 11/20/2008, 11/12/2013, 10/26/2018, 10/25/2019, 11/05/2020    Influenza - Trivalent (ADULT) 11/20/2008, 11/12/2013    Influenza Split 11/12/2013    Pneumococcal Conjugate - 13 Valent 01/23/2013, 01/23/2013    Pneumococcal Polysaccharide - 23 Valent 02/25/2016, 04/11/2022    Td (ADULT) 09/30/2009    Tdap 09/25/2018    Zoster Recombinant 10/25/2019, 01/14/2020       Family History       Problem Relation (Age of Onset)    Cancer Father (61)    Cataracts Maternal Grandmother, Maternal Grandfather, Paternal Grandmother, Paternal Grandfather    Crohn's disease Mother, Maternal Grandmother    Osteoarthritis Maternal Grandmother    Pancreatic cancer Father    Ulcerative colitis Father          Social History     Socioeconomic History    Marital status: Single   Occupational History    Occupation: music production     Employer: self employed   Tobacco Use    Smoking status: Never    Smokeless tobacco: Never   Substance and Sexual Activity    Alcohol use: No     Alcohol/week: 0.0 standard drinks of alcohol      Types: 1 - 2 Standard drinks or equivalent per week     Comment: cocktails    Drug use: No   Social History Narrative    1 flight     Social Determinants of Health     Financial Resource Strain: Patient Unable To Answer (2/1/2024)    Overall Financial Resource Strain (CARDIA)     Difficulty of Paying Living Expenses: Patient unable to answer   Food Insecurity: Patient Unable To Answer (2/1/2024)    Hunger Vital Sign     Worried About Running Out of Food in the Last Year: Patient unable to answer     Ran Out of Food in the Last Year: Patient unable to answer   Transportation Needs: Patient Unable To Answer (2/1/2024)    PRAPARE - Transportation     Lack of Transportation (Medical): Patient unable to answer     Lack of Transportation (Non-Medical): Patient unable to answer   Recent Concern: Transportation Needs - Unmet Transportation Needs (12/4/2023)    PRAPARE - Transportation     Lack of Transportation (Medical): Yes     Lack of Transportation (Non-Medical): Yes   Physical Activity: Sufficiently Active (1/31/2024)    Exercise Vital Sign     Days of Exercise per Week: 7 days     Minutes of Exercise per Session: 60 min   Stress: Patient Unable To Answer (2/1/2024)    [facility] - Occupational Stress Questionnaire     Feeling of Stress : Patient unable to answer   Social Connections: Unknown (1/31/2024)    Social Connection and Isolation Panel [NHANES]     Frequency of Communication with Friends and Family: More than three times a week     Frequency of Social Gatherings with Friends and Family: More than three times a week     Active Member of Clubs or Organizations: No     Attends Club or Organization Meetings: Never     Marital Status: Never    Housing Stability: Patient Unable To Answer (2/1/2024)    Housing Stability Vital Sign     Unable to Pay for Housing in the Last Year: Patient unable to answer     Number of Places Lived in the Last Year: 0     Unstable Housing in the Last  Year: Patient unable to answer     Review of Systems   Unable to perform ROS: Intubated     Objective:     Vital Signs (Most Recent):  Temp: 97.9 °F (36.6 °C) (02/01/24 1901)  Pulse: 62 (02/01/24 2105)  Resp: 16 (02/01/24 2134)  BP: (!) 102/50 (02/01/24 2101)  SpO2: 98 % (02/01/24 2105) Vital Signs (24h Range):  Temp:  [97.2 °F (36.2 °C)-98.3 °F (36.8 °C)] 97.9 °F (36.6 °C)  Pulse:  [57-77] 62  Resp:  [7-22] 16  SpO2:  [98 %-100 %] 98 %  BP: ()/(50-67) 102/50     Weight: 62.6 kg (138 lb 0.1 oz)  Body mass index is 20.98 kg/m².    Estimated Creatinine Clearance: 93.5 mL/min (based on SCr of 0.8 mg/dL).     Physical Exam  Vitals and nursing note reviewed.   Constitutional:       Appearance: He is ill-appearing.      Interventions: He is intubated.   Neck:     Cardiovascular:      Rate and Rhythm: Normal rate and regular rhythm.      Heart sounds: No murmur heard.  Pulmonary:      Effort: Pulmonary effort is normal. No respiratory distress. He is intubated.      Breath sounds: Normal breath sounds. No wheezing or rhonchi.   Abdominal:      General: Bowel sounds are normal. There is no distension.      Palpations: Abdomen is soft.      Tenderness: There is no abdominal tenderness.   Skin:     General: Skin is warm and dry.   Neurological:      Mental Status: He is alert.          Significant Labs: BMP:   Recent Labs   Lab 02/01/24  0203   *      K 4.4      CO2 23   BUN 20   CREATININE 0.8   CALCIUM 8.9   MG 1.9     CBC:   Recent Labs   Lab 01/31/24  0034 02/01/24  0203   WBC 19.08* 15.65*   HGB 12.1* 11.0*   HCT 37.4* 34.3*    243     Microbiology Results (last 7 days)       ** No results found for the last 168 hours. **            Significant Imaging: I have reviewed all pertinent imaging results/findings within the past 24 hours.

## 2024-02-02 NOTE — PROGRESS NOTES
[facility]  Otorhinolaryngology-Head & Neck Surgery  Progress Note    Subjective:     Post-Op Info:  Procedure(s) (LRB):  EXPLORATION, NECK, REPAIR OF PHARYNGEAL INJURY (N/A)   3 Days Post-Op  Hospital Day: 4     Interval History:   No issues overnight.     Discussed with mother and sister at bedside this am, would like to pursue G tube but defer tracheostomy at this time.     Medications:  Continuous Infusions:   sodium chloride 0.9% Stopped (02/02/24 0838)    dexmedeTOMIDine (Precedex) infusion (titrating) 0.6 mcg/kg/hr (02/02/24 0900)    dextrose 10 % in water (D10W)      fentanyl 112.5 mcg/hr (02/02/24 0900)     Scheduled Meds:   ampicillin-sulbactam  3 g Intravenous Q6H    atorvastatin  10 mg Per NG tube Daily    enoxparin  40 mg Subcutaneous Q24H (prophylaxis, 1700)    famotidine  20 mg Per NG tube BID    FLUoxetine  40 mg Per NG tube Daily    mirtazapine  15 mg Per NG tube QHS    mupirocin   Nasal BID    polyethylene glycol  17 g Per NG tube BID    QUEtiapine  25 mg Per NG tube Q12H    senna-docusate 8.6-50 mg  2 tablet Per NG tube BID    vancomycin (VANCOCIN) IV (PEDS and ADULTS)  15 mg/kg Intravenous Q12H     PRN Meds:acetaminophen, dextrose 10 % in water (D10W), dextrose 10%, dextrose 10%, fentanyl, glucagon (human recombinant), insulin aspart U-100, magnesium oxide, magnesium oxide, morphine, ondansetron, oxyCODONE, potassium bicarbonate, potassium bicarbonate, potassium bicarbonate, potassium, sodium phosphates, potassium, sodium phosphates, potassium, sodium phosphates, prochlorperazine, Pharmacy to dose Vancomycin consult **AND** vancomycin - pharmacy to dose     Review of patient's allergies indicates:   Allergen Reactions    Erythromycin Nausea And Vomiting    Infliximab Other (See Comments)     Lupus with fever and acute arthritis  Lupus with fever and acute arthritis     Objective:     Vital Signs (24h Range):  Temp:  [97.2 °F (36.2 °C)-98.3 °F (36.8 °C)] 98.2 °F (36.8  °C)  Pulse:  [53-85] 85  Resp:  [8-21] 12  SpO2:  [96 %-100 %] 100 %  BP: ()/(50-62) 117/59     Date 02/02/24 0700 - 02/03/24 0659   Shift 0293-1687 0047-3852 5802-6303 24 Hour Total   INTAKE   I.V.(mL/kg) 63.8(0.9)   63.8(0.9)   NG/GT 35   35   IV Piggyback 159   159   Shift Total(mL/kg) 257.8(3.4)   257.8(3.4)   OUTPUT   Shift Total(mL/kg)       Weight (kg) 74.9 74.9 74.9 74.9     Lines/Drains/Airways       Drain  Duration                  Closed/Suction Drain 01/30/24 1916 Right;Ventral Neck Bulb 15 Fr. 2 days         Trans Pyloric Feeding Tube 01/30/24 1916 Tungsten weighted 12 Fr. Right nostril 2 days    Male External Urinary Catheter 01/30/24 2000 2 days              Airway  Duration                  Airway - Non-Surgical 01/30/24 1827 2 days              Peripheral Intravenous Line  Duration                  Peripheral IV - Single Lumen 01/30/24 0545 20 G Left Forearm 3 days         Peripheral IV - Single Lumen 02/01/24 1820 18 G Right Forearm <1 day                     Physical Exam  Awake, alert  NG in right nare   Intubated, with audible cuff leak this am  Right neck incision c/d/I, closed with staples   R neck DELFIN with serosanguinous output, holding suction   Near total resolution of subq emphysema      Significant Labs:  CBC:   Recent Labs   Lab 02/02/24  0109   WBC 11.27   RBC 3.47*   HGB 9.9*   HCT 31.2*      MCV 90   MCH 28.5   MCHC 31.7*     CMP:   Recent Labs   Lab 02/02/24  0109   *   CALCIUM 8.5*   ALBUMIN 2.8*   PROT 5.4*   *   K 3.6   CO2 23      BUN 24*   CREATININE 0.8   ALKPHOS 47*   ALT 20   AST 41*   BILITOT 0.3       Significant Diagnostics:  I have reviewed and interpreted all pertinent imaging results/findings within the past 24 hours.  Assessment/Plan:     Injury of pharynx  Mr Rodriguez is a 53 yo male who is s/p C3-4 ACDF surgery with post op dysphagia, odynophagia, neck pain, swelling and crepitus. CT and scope demonstrates DELFIN drain in the hypopharynx.  Patient with difficulty with swallowing secretions. No respiratory compromise. He is s/p neck exploration and pharyngeal repair on 1/30. Discussed with patient's mother and sister at bedside the recommendation for open G tube and tracheostomy to allow adequate time for pharynx to heal. Family is on board with G tube, would like some time to think about tracheostomy.     - General surgery consulted, following    - Will plan for G tube   - Patient will be to be NPO for 4-6 weeks minimum  - ID consulted for abx recommendations in setting of hardware with pharyngeal injury    - Currently on vancomycin and unasyn    - Abx per ID recs  - Keep intubated until after G tube   - Large cuff leak on exam this am    - Ok to wean to extubate after G tube   - Rest of care for per primary  - Please page ENT with questions or concerns.              Bonita Macias MD  Otorhinolaryngology-Head & Neck Surgery  [facility]

## 2024-02-03 ENCOUNTER — ANESTHESIA (OUTPATIENT)
Dept: SURGERY | Facility: HOSPITAL | Age: 54
DRG: 003 | End: 2024-02-03
Payer: COMMERCIAL

## 2024-02-03 PROBLEM — G95.9 CERVICAL MYELOPATHY: Status: RESOLVED | Noted: 2024-01-30 | Resolved: 2024-02-03

## 2024-02-03 PROBLEM — R13.10 DYSPHAGIA: Status: RESOLVED | Noted: 2024-01-30 | Resolved: 2024-02-03

## 2024-02-03 LAB
ALBUMIN SERPL BCP-MCNC: 2.6 G/DL (ref 3.5–5.2)
ALLENS TEST: ABNORMAL
ALP SERPL-CCNC: 47 U/L (ref 55–135)
ALT SERPL W/O P-5'-P-CCNC: 25 U/L (ref 10–44)
ANION GAP SERPL CALC-SCNC: 9 MMOL/L (ref 8–16)
AST SERPL-CCNC: 62 U/L (ref 10–40)
BASOPHILS # BLD AUTO: 0.02 K/UL (ref 0–0.2)
BASOPHILS NFR BLD: 0.2 % (ref 0–1.9)
BILIRUB SERPL-MCNC: 0.3 MG/DL (ref 0.1–1)
BUN SERPL-MCNC: 18 MG/DL (ref 6–20)
CALCIUM SERPL-MCNC: 8.1 MG/DL (ref 8.7–10.5)
CHLORIDE SERPL-SCNC: 111 MMOL/L (ref 95–110)
CO2 SERPL-SCNC: 23 MMOL/L (ref 23–29)
CREAT SERPL-MCNC: 0.8 MG/DL (ref 0.5–1.4)
DELSYS: ABNORMAL
DIFFERENTIAL METHOD BLD: ABNORMAL
EOSINOPHIL # BLD AUTO: 0 K/UL (ref 0–0.5)
EOSINOPHIL NFR BLD: 0.2 % (ref 0–8)
ERYTHROCYTE [DISTWIDTH] IN BLOOD BY AUTOMATED COUNT: 14.4 % (ref 11.5–14.5)
EST. GFR  (NO RACE VARIABLE): >60 ML/MIN/1.73 M^2
FIO2: 40
GLUCOSE SERPL-MCNC: 83 MG/DL (ref 70–110)
HCO3 UR-SCNC: 29.8 MMOL/L (ref 24–28)
HCT VFR BLD AUTO: 28.4 % (ref 40–54)
HGB BLD-MCNC: 9 G/DL (ref 14–18)
IMM GRANULOCYTES # BLD AUTO: 0.03 K/UL (ref 0–0.04)
IMM GRANULOCYTES NFR BLD AUTO: 0.3 % (ref 0–0.5)
LYMPHOCYTES # BLD AUTO: 1.6 K/UL (ref 1–4.8)
LYMPHOCYTES NFR BLD: 16.8 % (ref 18–48)
MAGNESIUM SERPL-MCNC: 1.6 MG/DL (ref 1.6–2.6)
MCH RBC QN AUTO: 28.7 PG (ref 27–31)
MCHC RBC AUTO-ENTMCNC: 31.7 G/DL (ref 32–36)
MCV RBC AUTO: 90 FL (ref 82–98)
MODE: ABNORMAL
MONOCYTES # BLD AUTO: 1.2 K/UL (ref 0.3–1)
MONOCYTES NFR BLD: 12.3 % (ref 4–15)
NEUTROPHILS # BLD AUTO: 6.6 K/UL (ref 1.8–7.7)
NEUTROPHILS NFR BLD: 70.2 % (ref 38–73)
NRBC BLD-RTO: 0 /100 WBC
PCO2 BLDA: 54.7 MMHG (ref 35–45)
PEEP: 5
PH SMN: 7.34 [PH] (ref 7.35–7.45)
PHOSPHATE SERPL-MCNC: 3.9 MG/DL (ref 2.7–4.5)
PLATELET # BLD AUTO: 200 K/UL (ref 150–450)
PMV BLD AUTO: 9.8 FL (ref 9.2–12.9)
PO2 BLDA: 174 MMHG (ref 80–100)
POC BE: 4 MMOL/L
POC SATURATED O2: 99 % (ref 95–100)
POC TCO2: 31 MMOL/L (ref 23–27)
POTASSIUM SERPL-SCNC: 3.5 MMOL/L (ref 3.5–5.1)
PROT SERPL-MCNC: 5 G/DL (ref 6–8.4)
PS: 5
RBC # BLD AUTO: 3.14 M/UL (ref 4.6–6.2)
SAMPLE: ABNORMAL
SITE: ABNORMAL
SODIUM SERPL-SCNC: 143 MMOL/L (ref 136–145)
VANCOMYCIN TROUGH SERPL-MCNC: 14.9 UG/ML (ref 10–22)
WBC # BLD AUTO: 9.35 K/UL (ref 3.9–12.7)

## 2024-02-03 PROCEDURE — 27201423 OPTIME MED/SURG SUP & DEVICES STERILE SUPPLY: Performed by: STUDENT IN AN ORGANIZED HEALTH CARE EDUCATION/TRAINING PROGRAM

## 2024-02-03 PROCEDURE — 25000003 PHARM REV CODE 250: Performed by: NURSE PRACTITIONER

## 2024-02-03 PROCEDURE — 80202 ASSAY OF VANCOMYCIN: CPT | Performed by: PSYCHIATRY & NEUROLOGY

## 2024-02-03 PROCEDURE — 31600 PLANNED TRACHEOSTOMY: CPT | Mod: 58,,, | Performed by: STUDENT IN AN ORGANIZED HEALTH CARE EDUCATION/TRAINING PROGRAM

## 2024-02-03 PROCEDURE — 63600175 PHARM REV CODE 636 W HCPCS: Performed by: NURSE ANESTHETIST, CERTIFIED REGISTERED

## 2024-02-03 PROCEDURE — 37000009 HC ANESTHESIA EA ADD 15 MINS: Performed by: STUDENT IN AN ORGANIZED HEALTH CARE EDUCATION/TRAINING PROGRAM

## 2024-02-03 PROCEDURE — D9220A PRA ANESTHESIA: Mod: ANES,,, | Performed by: ANESTHESIOLOGY

## 2024-02-03 PROCEDURE — 27100171 HC OXYGEN HIGH FLOW UP TO 24 HOURS

## 2024-02-03 PROCEDURE — 63600175 PHARM REV CODE 636 W HCPCS: Performed by: NEUROLOGICAL SURGERY

## 2024-02-03 PROCEDURE — 31525 DX LARYNGOSCOPY EXCL NB: CPT | Mod: 51,58,, | Performed by: STUDENT IN AN ORGANIZED HEALTH CARE EDUCATION/TRAINING PROGRAM

## 2024-02-03 PROCEDURE — 25000003 PHARM REV CODE 250: Performed by: NURSE ANESTHETIST, CERTIFIED REGISTERED

## 2024-02-03 PROCEDURE — 31720 CLEARANCE OF AIRWAYS: CPT

## 2024-02-03 PROCEDURE — 0DH63UZ INSERTION OF FEEDING DEVICE INTO STOMACH, PERCUTANEOUS APPROACH: ICD-10-PCS | Performed by: STUDENT IN AN ORGANIZED HEALTH CARE EDUCATION/TRAINING PROGRAM

## 2024-02-03 PROCEDURE — D9220A PRA ANESTHESIA: Mod: CRNA,,, | Performed by: NURSE ANESTHETIST, CERTIFIED REGISTERED

## 2024-02-03 PROCEDURE — 36600 WITHDRAWAL OF ARTERIAL BLOOD: CPT

## 2024-02-03 PROCEDURE — 63600175 PHARM REV CODE 636 W HCPCS

## 2024-02-03 PROCEDURE — 37000008 HC ANESTHESIA 1ST 15 MINUTES: Performed by: STUDENT IN AN ORGANIZED HEALTH CARE EDUCATION/TRAINING PROGRAM

## 2024-02-03 PROCEDURE — 25000003 PHARM REV CODE 250

## 2024-02-03 PROCEDURE — 25000003 PHARM REV CODE 250: Performed by: STUDENT IN AN ORGANIZED HEALTH CARE EDUCATION/TRAINING PROGRAM

## 2024-02-03 PROCEDURE — 82803 BLOOD GASES ANY COMBINATION: CPT

## 2024-02-03 PROCEDURE — 0CJS8ZZ INSPECTION OF LARYNX, VIA NATURAL OR ARTIFICIAL OPENING ENDOSCOPIC: ICD-10-PCS | Performed by: STUDENT IN AN ORGANIZED HEALTH CARE EDUCATION/TRAINING PROGRAM

## 2024-02-03 PROCEDURE — 0B110F4 BYPASS TRACHEA TO CUTANEOUS WITH TRACHEOSTOMY DEVICE, OPEN APPROACH: ICD-10-PCS | Performed by: STUDENT IN AN ORGANIZED HEALTH CARE EDUCATION/TRAINING PROGRAM

## 2024-02-03 PROCEDURE — 43830 GSTRST OPEN WO CONSTJ TUBE: CPT | Mod: ,,, | Performed by: STUDENT IN AN ORGANIZED HEALTH CARE EDUCATION/TRAINING PROGRAM

## 2024-02-03 PROCEDURE — 63600175 PHARM REV CODE 636 W HCPCS: Mod: JZ,JG | Performed by: STUDENT IN AN ORGANIZED HEALTH CARE EDUCATION/TRAINING PROGRAM

## 2024-02-03 PROCEDURE — 63600175 PHARM REV CODE 636 W HCPCS: Performed by: PSYCHIATRY & NEUROLOGY

## 2024-02-03 PROCEDURE — 99291 CRITICAL CARE FIRST HOUR: CPT | Mod: ,,, | Performed by: NURSE PRACTITIONER

## 2024-02-03 PROCEDURE — 25000242 PHARM REV CODE 250 ALT 637 W/ HCPCS: Performed by: NURSE PRACTITIONER

## 2024-02-03 PROCEDURE — 25000003 PHARM REV CODE 250: Performed by: PSYCHIATRY & NEUROLOGY

## 2024-02-03 PROCEDURE — 94640 AIRWAY INHALATION TREATMENT: CPT

## 2024-02-03 PROCEDURE — 36000708 HC OR TIME LEV III 1ST 15 MIN: Performed by: STUDENT IN AN ORGANIZED HEALTH CARE EDUCATION/TRAINING PROGRAM

## 2024-02-03 PROCEDURE — 20000000 HC ICU ROOM

## 2024-02-03 PROCEDURE — 83735 ASSAY OF MAGNESIUM: CPT

## 2024-02-03 PROCEDURE — 94761 N-INVAS EAR/PLS OXIMETRY MLT: CPT | Mod: XB

## 2024-02-03 PROCEDURE — 84100 ASSAY OF PHOSPHORUS: CPT

## 2024-02-03 PROCEDURE — 25000003 PHARM REV CODE 250: Performed by: NEUROLOGICAL SURGERY

## 2024-02-03 PROCEDURE — 94003 VENT MGMT INPAT SUBQ DAY: CPT

## 2024-02-03 PROCEDURE — 99900026 HC AIRWAY MAINTENANCE (STAT)

## 2024-02-03 PROCEDURE — 85025 COMPLETE CBC W/AUTO DIFF WBC: CPT

## 2024-02-03 PROCEDURE — 99900035 HC TECH TIME PER 15 MIN (STAT)

## 2024-02-03 PROCEDURE — 63600175 PHARM REV CODE 636 W HCPCS: Performed by: NURSE PRACTITIONER

## 2024-02-03 PROCEDURE — 80053 COMPREHEN METABOLIC PANEL: CPT

## 2024-02-03 PROCEDURE — 36000709 HC OR TIME LEV III EA ADD 15 MIN: Performed by: STUDENT IN AN ORGANIZED HEALTH CARE EDUCATION/TRAINING PROGRAM

## 2024-02-03 RX ORDER — FENTANYL CITRATE 50 UG/ML
25 INJECTION, SOLUTION INTRAMUSCULAR; INTRAVENOUS ONCE
Status: COMPLETED | OUTPATIENT
Start: 2024-02-03 | End: 2024-02-03

## 2024-02-03 RX ORDER — MIDAZOLAM HYDROCHLORIDE 1 MG/ML
INJECTION, SOLUTION INTRAMUSCULAR; INTRAVENOUS
Status: DISCONTINUED | OUTPATIENT
Start: 2024-02-03 | End: 2024-02-03

## 2024-02-03 RX ORDER — HALOPERIDOL 5 MG/ML
INJECTION INTRAMUSCULAR
Status: DISCONTINUED | OUTPATIENT
Start: 2024-02-03 | End: 2024-02-03

## 2024-02-03 RX ORDER — ONDANSETRON HYDROCHLORIDE 2 MG/ML
INJECTION, SOLUTION INTRAVENOUS
Status: DISCONTINUED | OUTPATIENT
Start: 2024-02-03 | End: 2024-02-03

## 2024-02-03 RX ORDER — ROCURONIUM BROMIDE 10 MG/ML
INJECTION, SOLUTION INTRAVENOUS
Status: DISCONTINUED | OUTPATIENT
Start: 2024-02-03 | End: 2024-02-03

## 2024-02-03 RX ORDER — BUPIVACAINE HYDROCHLORIDE 2.5 MG/ML
INJECTION, SOLUTION EPIDURAL; INFILTRATION; INTRACAUDAL
Status: DISCONTINUED | OUTPATIENT
Start: 2024-02-03 | End: 2024-02-03 | Stop reason: HOSPADM

## 2024-02-03 RX ORDER — KETAMINE HCL IN 0.9 % NACL 50 MG/5 ML
SYRINGE (ML) INTRAVENOUS
Status: DISCONTINUED | OUTPATIENT
Start: 2024-02-03 | End: 2024-02-03

## 2024-02-03 RX ORDER — AMPICILLIN AND SULBACTAM 2; 1 G/1; G/1
INJECTION, POWDER, FOR SOLUTION INTRAMUSCULAR; INTRAVENOUS
Status: DISCONTINUED | OUTPATIENT
Start: 2024-02-03 | End: 2024-02-03

## 2024-02-03 RX ORDER — PROPOFOL 10 MG/ML
INJECTION, EMULSION INTRAVENOUS
Status: COMPLETED
Start: 2024-02-03 | End: 2024-02-03

## 2024-02-03 RX ORDER — LIDOCAINE HYDROCHLORIDE AND EPINEPHRINE 10; 10 MG/ML; UG/ML
INJECTION, SOLUTION INFILTRATION; PERINEURAL
Status: DISCONTINUED | OUTPATIENT
Start: 2024-02-03 | End: 2024-02-03 | Stop reason: HOSPADM

## 2024-02-03 RX ORDER — ACETAMINOPHEN 500 MG
1000 TABLET ORAL EVERY 8 HOURS
Status: DISCONTINUED | OUTPATIENT
Start: 2024-02-03 | End: 2024-02-04

## 2024-02-03 RX ORDER — PHENYLEPHRINE HYDROCHLORIDE 10 MG/ML
INJECTION INTRAVENOUS
Status: DISCONTINUED | OUTPATIENT
Start: 2024-02-03 | End: 2024-02-03

## 2024-02-03 RX ORDER — HYDROMORPHONE HYDROCHLORIDE 1 MG/ML
1 INJECTION, SOLUTION INTRAMUSCULAR; INTRAVENOUS; SUBCUTANEOUS EVERY 6 HOURS PRN
Status: DISCONTINUED | OUTPATIENT
Start: 2024-02-03 | End: 2024-02-04

## 2024-02-03 RX ORDER — DEXAMETHASONE SODIUM PHOSPHATE 4 MG/ML
INJECTION, SOLUTION INTRA-ARTICULAR; INTRALESIONAL; INTRAMUSCULAR; INTRAVENOUS; SOFT TISSUE
Status: DISCONTINUED | OUTPATIENT
Start: 2024-02-03 | End: 2024-02-03

## 2024-02-03 RX ORDER — OXYCODONE HYDROCHLORIDE 10 MG/1
10 TABLET ORAL EVERY 4 HOURS PRN
Status: DISCONTINUED | OUTPATIENT
Start: 2024-02-03 | End: 2024-02-14 | Stop reason: HOSPADM

## 2024-02-03 RX ORDER — PROPOFOL 10 MG/ML
0-50 INJECTION, EMULSION INTRAVENOUS CONTINUOUS
Status: DISCONTINUED | OUTPATIENT
Start: 2024-02-03 | End: 2024-02-03

## 2024-02-03 RX ORDER — FENTANYL CITRATE 50 UG/ML
INJECTION, SOLUTION INTRAMUSCULAR; INTRAVENOUS
Status: DISCONTINUED | OUTPATIENT
Start: 2024-02-03 | End: 2024-02-03

## 2024-02-03 RX ADMIN — ROCURONIUM BROMIDE 50 MG: 10 INJECTION, SOLUTION INTRAVENOUS at 08:02

## 2024-02-03 RX ADMIN — DEXAMETHASONE SODIUM PHOSPHATE 4 MG: 4 INJECTION, SOLUTION INTRAMUSCULAR; INTRAVENOUS at 09:02

## 2024-02-03 RX ADMIN — DEXMEDETOMIDINE HYDROCHLORIDE 0.2 MCG/KG/HR: 4 INJECTION INTRAVENOUS at 11:02

## 2024-02-03 RX ADMIN — ACETAMINOPHEN 650 MG: 325 TABLET ORAL at 01:02

## 2024-02-03 RX ADMIN — FAMOTIDINE 20 MG: 20 TABLET, FILM COATED ORAL at 09:02

## 2024-02-03 RX ADMIN — POLYETHYLENE GLYCOL 3350 17 GRAM ORAL POWDER PACKET 17 G: at 09:02

## 2024-02-03 RX ADMIN — VANCOMYCIN HYDROCHLORIDE 1000 MG: 1 INJECTION, POWDER, LYOPHILIZED, FOR SOLUTION INTRAVENOUS at 08:02

## 2024-02-03 RX ADMIN — PHENYLEPHRINE HYDROCHLORIDE 150 MCG: 10 INJECTION INTRAVENOUS at 09:02

## 2024-02-03 RX ADMIN — DEXMEDETOMIDINE HYDROCHLORIDE 0.9 MCG/KG/HR: 4 INJECTION INTRAVENOUS at 01:02

## 2024-02-03 RX ADMIN — ACETAMINOPHEN 1000 MG: 500 TABLET ORAL at 09:02

## 2024-02-03 RX ADMIN — SUGAMMADEX 300 MG: 100 INJECTION, SOLUTION INTRAVENOUS at 10:02

## 2024-02-03 RX ADMIN — Medication 20 MG: at 09:02

## 2024-02-03 RX ADMIN — AMPICILLIN SODIUM, SULBACTAM SODIUM 3 G: 2; 1 INJECTION, POWDER, FOR SOLUTION INTRAMUSCULAR; INTRAVENOUS at 01:02

## 2024-02-03 RX ADMIN — OXYCODONE HYDROCHLORIDE 10 MG: 10 TABLET ORAL at 05:02

## 2024-02-03 RX ADMIN — AMPICILLIN AND SULBACTAM 3 G: 2; 1 INJECTION, POWDER, FOR SOLUTION INTRAVENOUS at 08:02

## 2024-02-03 RX ADMIN — AMPICILLIN SODIUM, SULBACTAM SODIUM 3 G: 2; 1 INJECTION, POWDER, FOR SOLUTION INTRAMUSCULAR; INTRAVENOUS at 06:02

## 2024-02-03 RX ADMIN — PROPOFOL 5 MCG/KG/MIN: 10 INJECTION, EMULSION INTRAVENOUS at 08:02

## 2024-02-03 RX ADMIN — HYDROMORPHONE HYDROCHLORIDE 1 MG: 1 INJECTION, SOLUTION INTRAMUSCULAR; INTRAVENOUS; SUBCUTANEOUS at 02:02

## 2024-02-03 RX ADMIN — MORPHINE SULFATE 2 MG: 2 INJECTION, SOLUTION INTRAMUSCULAR; INTRAVENOUS at 06:02

## 2024-02-03 RX ADMIN — IPRATROPIUM BROMIDE AND ALBUTEROL SULFATE 3 ML: .5; 3 SOLUTION RESPIRATORY (INHALATION) at 01:02

## 2024-02-03 RX ADMIN — FENTANYL CITRATE 25 MCG: 50 INJECTION, SOLUTION INTRAMUSCULAR; INTRAVENOUS at 10:02

## 2024-02-03 RX ADMIN — HYDROMORPHONE HYDROCHLORIDE 1 MG: 1 INJECTION, SOLUTION INTRAMUSCULAR; INTRAVENOUS; SUBCUTANEOUS at 08:02

## 2024-02-03 RX ADMIN — HYDROXYZINE HYDROCHLORIDE 25 MG: 25 TABLET ORAL at 09:02

## 2024-02-03 RX ADMIN — Medication 30 MG: at 09:02

## 2024-02-03 RX ADMIN — SODIUM CHLORIDE, SODIUM ACETATE ANHYDROUS, SODIUM GLUCONATE, POTASSIUM CHLORIDE, AND MAGNESIUM CHLORIDE: 526; 222; 502; 37; 30 INJECTION, SOLUTION INTRAVENOUS at 08:02

## 2024-02-03 RX ADMIN — MUPIROCIN: 20 OINTMENT TOPICAL at 09:02

## 2024-02-03 RX ADMIN — QUETIAPINE FUMARATE 50 MG: 25 TABLET ORAL at 09:02

## 2024-02-03 RX ADMIN — SENNOSIDES AND DOCUSATE SODIUM 2 TABLET: 8.6; 5 TABLET ORAL at 09:02

## 2024-02-03 RX ADMIN — MIRTAZAPINE 15 MG: 7.5 TABLET, FILM COATED ORAL at 09:02

## 2024-02-03 RX ADMIN — Medication 800 MG: at 02:02

## 2024-02-03 RX ADMIN — OXYCODONE HYDROCHLORIDE 10 MG: 10 TABLET ORAL at 09:02

## 2024-02-03 RX ADMIN — FENTANYL CITRATE 25 MCG: 50 INJECTION INTRAMUSCULAR; INTRAVENOUS at 02:02

## 2024-02-03 RX ADMIN — OXYCODONE HYDROCHLORIDE 10 MG: 10 TABLET ORAL at 01:02

## 2024-02-03 RX ADMIN — ONDANSETRON 4 MG: 2 INJECTION INTRAMUSCULAR; INTRAVENOUS at 09:02

## 2024-02-03 RX ADMIN — ROCURONIUM BROMIDE 30 MG: 10 INJECTION, SOLUTION INTRAVENOUS at 09:02

## 2024-02-03 RX ADMIN — MIDAZOLAM HYDROCHLORIDE 2 MG: 1 INJECTION, SOLUTION INTRAMUSCULAR; INTRAVENOUS at 08:02

## 2024-02-03 RX ADMIN — POTASSIUM BICARBONATE 50 MEQ: 978 TABLET, EFFERVESCENT ORAL at 02:02

## 2024-02-03 RX ADMIN — ROCURONIUM BROMIDE 20 MG: 10 INJECTION, SOLUTION INTRAVENOUS at 10:02

## 2024-02-03 RX ADMIN — IPRATROPIUM BROMIDE AND ALBUTEROL SULFATE 3 ML: .5; 3 SOLUTION RESPIRATORY (INHALATION) at 07:02

## 2024-02-03 RX ADMIN — HALOPERIDOL LACTATE 1 MG: 5 INJECTION, SOLUTION INTRAMUSCULAR at 09:02

## 2024-02-03 RX ADMIN — IPRATROPIUM BROMIDE AND ALBUTEROL SULFATE 3 ML: .5; 3 SOLUTION RESPIRATORY (INHALATION) at 12:02

## 2024-02-03 NOTE — PROGRESS NOTES
[facility]  Neurocritical Care  Progress Note    Admit Date: 1/30/2024  Service Date: 02/03/2024  Length of Stay: 4    Subjective:     Chief Complaint: Cervical myelopathy    History of Present Illness: Rebel Rodriguez is a 53 y.o. male with hx of psoriatic arthritis, hx TIA on Aspirin 81 mg, s/p C4-7 ACDF with Dr. Huff 10 years ago admitted to [facility] s/p C3-C4 ACDF. Per chart review, reports rapid functional decline over the last 3 weeks. Endorses hand clumsiness, difficulty typing, gait imbalance, difficulty butoning, dropping items, falls. Difficulty with ambulating also due to LLE weakness. States it feels like he has rubber bands around his wrists. Reports difficulty with overhead mobility and shock-like pains down spine when raising arms overhead. CT neck soft tissue pending, Patient admitted to [facility] for close monitoring and higher level of care.     Hospital Course: 01/31/2024 CT neck concerning for extensive soft tissue edema and air in neck, additionally w/ concern for DELFIN drain misplaced into hypopharynx. Taken class A to OR by ENT for pharyngeal repair, left intubated by ENT in setting of airway edema. On high dose steroids, no cuff leak this AM  02/01/2024 General surgery consulted for open G tube placement, family still in discussion regarding trach. D/c dex to allow for adequate wound healing, ok with NSGY.   2/2/24: possible G-tube placement today  2/3/24: G-tube today    Interval History:  g-tube placement today,     Review of Systems   Constitutional: Negative.    HENT: Negative.     Eyes: Negative.    Respiratory: Negative.     Cardiovascular: Negative.    Gastrointestinal: Negative.    Endocrine: Negative.    Genitourinary: Negative.    Musculoskeletal: Negative.    Skin: Negative.    Neurological: Negative.    Hematological: Negative.      2 systems   Objective:     Vitals:  Temp: 98.4 °F (36.9 °C)  Pulse: 80  Rhythm: normal sinus rhythm  BP: (!) 118/57  MAP (mmHg): 82  Resp:  20  SpO2: 100 %  Oxygen Concentration (%): 40  Vent Mode: A/C  Set Rate: 16 BPM  Vt Set: 510 mL  Pressure Support: 5 cmH20  PEEP/CPAP: 5 cmH20  Peak Airway Pressure: 24 cmH20  Mean Airway Pressure: 7.9 cmH20  Plateau Pressure: 40 cmH20    Temp  Min: 98.2 °F (36.8 °C)  Max: 98.7 °F (37.1 °C)  Pulse  Min: 56  Max: 82  BP  Min: 89/64  Max: 130/62  MAP (mmHg)  Min: 67  Max: 89  Resp  Min: 8  Max: 26  SpO2  Min: 94 %  Max: 100 %  Oxygen Concentration (%)  Min: 40  Max: 40    02/02 0701 - 02/03 0700  In: 1602.6 [I.V.:654.5]  Out: 1500 [Urine:1500]   Unmeasured Output  Stool Occurrence: 0        Physical Exam  Vitals and nursing note reviewed.   Constitutional:       Appearance: Normal appearance.   HENT:      Head: Normocephalic.      Nose: Nose normal.      Mouth/Throat:      Mouth: Mucous membranes are moist.      Pharynx: Oropharynx is clear.   Eyes:      Pupils: Pupils are equal, round, and reactive to light.   Cardiovascular:      Rate and Rhythm: Normal rate and regular rhythm.      Pulses: Normal pulses.      Heart sounds: Normal heart sounds.   Pulmonary:      Effort: Pulmonary effort is normal.      Breath sounds: Normal breath sounds.   Abdominal:      General: Bowel sounds are normal.      Palpations: Abdomen is soft.   Musculoskeletal:         General: Normal range of motion.   Skin:     General: Skin is warm and dry.      Capillary Refill: Capillary refill takes 2 to 3 seconds.   Neurological:      Mental Status: He is alert.      Comments: GCS T 11- on sedation  PERRL  Answers yes/no questions  ANTUNEZ to command  Sensation Intact X4           Unable to test orientation, language, memory, judgment, insight, fund of knowledge, hearing, shoulder shrug, tongue protrusion, coordination, gait due to level of consciousness.       Medications:  Continuoussodium chloride 0.9%, Last Rate: Stopped (02/03/24 0801)  dexmedeTOMIDine (Precedex) infusion (titrating), Last Rate: Stopped (02/03/24 0813)  dextrose 10 % in water  (D10W)  fentanyl, Last Rate: Stopped (02/03/24 0813)  propofoL, Last Rate: Stopped (02/03/24 0850)    Scheduledalbuterol-ipratropium, 3 mL, Q6H  ampicillin-sulbactam, 3 g, Q6H  atorvastatin, 10 mg, Daily  famotidine, 20 mg, BID  FLUoxetine, 40 mg, Daily  mirtazapine, 15 mg, QHS  mupirocin, , BID  polyethylene glycol, 17 g, BID  QUEtiapine, 50 mg, Q12H  senna-docusate 8.6-50 mg, 2 tablet, BID  vancomycin (VANCOCIN) IV (PEDS and ADULTS), 15 mg/kg, Q12H    PRNacetaminophen, 650 mg, Q6H PRN  dextrose 10 % in water (D10W), , Continuous PRN  dextrose 10%, 12.5 g, PRN  dextrose 10%, 25 g, PRN  fentanyl, 50 mcg, Q1H PRN  glucagon (human recombinant), 1 mg, PRN  hydrOXYzine HCL, 25 mg, TID PRN  insulin aspart U-100, 0-10 Units, Q4H PRN  magnesium oxide, 800 mg, PRN  magnesium oxide, 800 mg, PRN  morphine, 2 mg, Q1H PRN  ondansetron, 4 mg, Q6H PRN  oxyCODONE, 5 mg, Q4H PRN  potassium bicarbonate, 35 mEq, PRN  potassium bicarbonate, 50 mEq, PRN  potassium bicarbonate, 60 mEq, PRN  potassium, sodium phosphates, 2 packet, PRN  potassium, sodium phosphates, 2 packet, PRN  potassium, sodium phosphates, 2 packet, PRN  prochlorperazine, 5 mg, Q6H PRN  vancomycin - pharmacy to dose, , pharmacy to manage frequency      Today I personally reviewed pertinent medications, lines/drains/airways, imaging, cardiology results, laboratory results, microbiology results, notably:    Diet  Diet NPO  Diet NPO        Assessment/Plan:     Neuro  * Cervical myelopathy  53 y.o. male with cervical myelopathy due to C3-4 severe stenosis with cord signal change, above his prior fusion admitted to [facility] s/p C3-4 anterior cervical discectomy and fusion. Operative course c/b pharyngeal injury s/p emergent ENT repair on 1/30     -Admit to [facility], NSGY following   -SBP <160  -Neuro checks q4 hr, hourly vital signs  -Daily CBC, CMP, mag, phos  -Fentanyl gtt for pain, currently well controlled  -Dex d/c 2/1, ok per NSGY  -PT/OT/SLP as  appropriate        Psychiatric  Anxiety  Hx of     - C/w home remeron and fluoxetine  - fentanyl gtt for comfort while intubated    Pulmonary  On mechanically assisted ventilation  Intubated due to upper airway compromise, s/p surgical pharyngeal repair on 1/30 w/ ENT    - Tolerating PS 8/5 @ 40% this AM, continue as tolerated  - No cuff leak on AM exam, unable to extubate   - Keep intubated per ENT-> may require trach/PEG to facilitate healing, defer to ENT  - Daily ABG and CXR while intubated  - Fentanyl gtt for comfort while intubated    Cardiac/Vascular  Hyperlipidemia  Hx of     - C/w home atorvastatin     GI  Dysphagia  2/2 pharyngeal injury    - NPOx5 days post-operatively per ENT  - G-tube placement    Orthopedic  Injury of pharynx  DELFIN drain noted to be in hypopharynx on post-op imaging, s/p emergent surgical repair on 1/30    - NPOx5 days per ENT  - On vanc/unasyn -> clarify duration of abx  - Dex 4q6hrs, taper per ENT  - Wean vent as able   - patient refuse trach          The patient is being Prophylaxed for:  Venous Thromboembolism with: Mechanical or Chemical  Stress Ulcer with: H2B  Ventilator Pneumonia with: chlorhexidine oral care    Activity Orders            Elevate HOB Elevate (30-45 degrees) Elevate HOB to 30 - 45 degrees during feeding unless otherwise stated starting at 02/01 0908    Turn patient every 2 hours starting at 01/31 0000    Diet NPO: NPO starting at 01/30 1712    Elevate HOB starting at 01/30 1054    Ambulate Post Op Day 0 starting at 01/30 1052    Progressive Mobility Protocol (mobilize patient to their highest level of functioning at least twice daily) starting at 01/30 1046    Elevate HOB 30 starting at 01/30 1046    Ambulate With Assistance, Post Op Day 0 If the patient arrives from PACU by 4PM, walk at least once on day of operation if alert and safe to do so. starting at 01/30 1045          Full Code  Critical care time 45 mins  Maria L Washington NP  Neurocritical Care  [facility] -  Neuro Critical Care

## 2024-02-03 NOTE — ASSESSMENT & PLAN NOTE
53 y.o. male with cervical myelopathy due to C3-4 severe stenosis with cord signal change, above his prior fusion admitted to [facility] s/p C3-4 anterior cervical discectomy and fusion. Operative course c/b pharyngeal injury s/p emergent ENT repair on 1/30     -Admit to [facility], NSGY following   -SBP <160  -Neuro checks q4 hr, hourly vital signs  -Daily CBC, CMP, mag, phos  -Fentanyl gtt for pain, currently well controlled  -Dex d/c 2/1, ok per NSGY  -PT/OT/SLP as appropriate       Statement Selected

## 2024-02-03 NOTE — NURSING TRANSFER
Anesthesia team transporting patient to OR at this time with portable tele monitor and ambu bag. VSS. Pt on Propofol gtt at 30 mKm and vancomycin infusing. Consents confirmed. Safety Surgery checklist completed. Chart at bedside. Will continue to monitor closely.

## 2024-02-03 NOTE — PROGRESS NOTES
[facility]  General Surgery  Progress Note    Subjective:     History of Present Illness:  No notes on file    Post-Op Info:  Procedure(s) (LRB):  LAPAROTOMY with gastrostomy tube placement (N/A)  LARYNGOSCOPY, DIRECT  TRACHEOTOMY   Day of Surgery     Interval History: Pt seen and examined at bedside with family member present. He had difficulties with sleep overnight. He reported increased difficulties with breathing overnight. They would like a breathing trial but have consented to a tracheostomy if he fails the breathing trial. They wanted to inquire about coordination between team and ENT for feeding tube and the possible trach at the same time.       Medications:  Continuous Infusions:   sodium chloride 0.9% Stopped (02/03/24 0801)    dexmedeTOMIDine (Precedex) infusion (titrating) Stopped (02/03/24 0813)    dextrose 10 % in water (D10W)      fentanyl Stopped (02/03/24 0813)    propofoL Stopped (02/03/24 0850)     Scheduled Meds:   albuterol-ipratropium  3 mL Nebulization Q6H    ampicillin-sulbactam  3 g Intravenous Q6H    atorvastatin  10 mg Per NG tube Daily    famotidine  20 mg Per NG tube BID    FLUoxetine  40 mg Per NG tube Daily    mirtazapine  15 mg Per NG tube QHS    mupirocin   Nasal BID    polyethylene glycol  17 g Per NG tube BID    QUEtiapine  50 mg Per NG tube Q12H    senna-docusate 8.6-50 mg  2 tablet Per NG tube BID    vancomycin (VANCOCIN) IV (PEDS and ADULTS)  15 mg/kg Intravenous Q12H     PRN Meds:acetaminophen, BUPivacaine (PF) 0.25% (2.5 mg/ml), dextrose 10 % in water (D10W), dextrose 10%, dextrose 10%, fentanyl, glucagon (human recombinant), hydrOXYzine HCL, insulin aspart U-100, LIDOcaine-EPINEPHrine 1%-1:100,000, magnesium oxide, magnesium oxide, morphine, ondansetron, oxyCODONE, potassium bicarbonate, potassium bicarbonate, potassium bicarbonate, potassium, sodium phosphates, potassium, sodium phosphates, potassium, sodium phosphates, prochlorperazine, Pharmacy to  dose Vancomycin consult **AND** vancomycin - pharmacy to dose     Review of patient's allergies indicates:   Allergen Reactions    Erythromycin Nausea And Vomiting    Infliximab Other (See Comments)     Lupus with fever and acute arthritis  Lupus with fever and acute arthritis     Objective:     Vital Signs (Most Recent):  Temp: 98.4 °F (36.9 °C) (02/03/24 0730)  Pulse: 80 (02/03/24 0800)  Resp: 20 (02/03/24 0800)  BP: (!) 118/57 (02/03/24 0800)  SpO2: 100 % (02/03/24 0800) Vital Signs (24h Range):  Temp:  [98.2 °F (36.8 °C)-98.6 °F (37 °C)] 98.4 °F (36.9 °C)  Pulse:  [56-82] 80  Resp:  [8-26] 20  SpO2:  [94 %-100 %] 100 %  BP: ()/(50-64) 118/57     Weight: 74.9 kg (165 lb 2 oz)  Body mass index is 25.11 kg/m².    Intake/Output - Last 3 Shifts         02/01 0700  02/02 0659 02/02 0700  02/03 0659 02/03 0700  02/04 0659    I.V. (mL/kg) 390.1 (6.2) 654.5 (8.7) 784.9 (10.5)    NG/ 160     IV Piggyback 885.8 788.1 119.2    Total Intake(mL/kg) 1610.9 (25.7) 1602.6 (21.4) 904.1 (12.1)    Urine (mL/kg/hr) 800 (0.5) 1500 (0.8) 250 (0.8)    Drains 13      Stool  0     Total Output 813 1500 250    Net +797.9 +102.6 +654.1           Stool Occurrence  0 x              Physical Exam  HENT:      Head: Atraumatic.   Cardiovascular:      Rate and Rhythm: Normal rate and regular rhythm.   Pulmonary:      Effort: No respiratory distress.   Abdominal:      General: There is no distension.      Palpations: Abdomen is soft. There is no mass.   Musculoskeletal:      Cervical back: Normal range of motion.   Skin:     General: Skin is warm and dry.   Neurological:      Mental Status: He is alert.          Significant Labs:  I have reviewed all pertinent lab results within the past 24 hours.  CBC:   Recent Labs   Lab 02/03/24  0125   WBC 9.35   RBC 3.14*   HGB 9.0*   HCT 28.4*      MCV 90   MCH 28.7   MCHC 31.7*     BMP:   Recent Labs   Lab 02/03/24  0125   GLU 83      K 3.5   *   CO2 23   BUN 18   CREATININE  0.8   CALCIUM 8.1*   MG 1.6       Significant Diagnostics:  I have reviewed all pertinent imaging results/findings within the past 24 hours.  Assessment/Plan:     Injury of pharynx  55yo M w/PMH C4-7 ACDF, cervical myelopathy due to C3-4 severe stenosis now s/p C3-4 ACDF on 1/30 complicated by hypopharyngeal injury s/p repair of posterior pharyngeal wall laceration and right lateral pharyngotomy per ENT on 1/30 who general surgery is consulted for open G tube. Patient is candidate for g-tube due to dysphagia.    - To OR today for G-tube  - Consented for surgery today  - Will coordinate surgery time with ENT            Jordan Del Rio MD  General Surgery  [facility]

## 2024-02-03 NOTE — ANESTHESIA POSTPROCEDURE EVALUATION
Anesthesia Post Evaluation    Patient: Rebel Rodriguez    Procedure(s) Performed: Procedure(s) (LRB):  LAPAROTOMY with gastrostomy tube placement (N/A)  LARYNGOSCOPY, DIRECT  TRACHEOTOMY    Final Anesthesia Type: general      Patient location during evaluation: ICU  Patient participation: No - Unable to Participate, Intubation  Level of consciousness: sedated  Post-procedure vital signs: reviewed and stable  Pain management: adequate  Airway patency: patent    PONV status at discharge: No PONV  Anesthetic complications: no      Cardiovascular status: hemodynamically stable  Respiratory status: intubated and ventilator  Hydration status: euvolemic  Follow-up not needed.              Vitals Value Taken Time   /55 02/03/24 1302   Temp 36.8 °C (98.3 °F) 02/03/24 1130   Pulse 76 02/03/24 1304   Resp 14 02/03/24 1304   SpO2 100 % 02/03/24 1304   Vitals shown include unvalidated device data.      No case tracking events are documented in the log.      Pain/Tricia Score: Pain Rating Prior to Med Admin: 10 (2/3/2024  6:39 AM)  Pain Rating Post Med Admin: 0 (2/2/2024  1:48 PM)

## 2024-02-03 NOTE — ANESTHESIA PREPROCEDURE EVALUATION
[facility]  Anesthesia Pre-Operative Evaluation     Patient Name: Rebel Rodriguez  YOB: 1970  MRN: [id number]  [facility]: [id number]       Admit Date: 1/30/2024   Admit Team: Networked reference to record PCT   Hospital Day: 4  Date of Procedure: 2/3/2024  Anesthesia: General Procedure: Procedure(s) (LRB):  LAPAROTOMY with gastrostomy tube placement (N/A)  Pre-Operative Diagnosis: Pharyngeal dysphagia [R13.13]  Proceduralist:Surgeon(s) and Role:     * Benigno Perez MD - Primary  Code Status: Full Code   Advanced Directive: <no information>  Isolation Precautions: No active isolations  Capacity: Full capacity       SUBJECTIVE:     Pre-operative evaluation for Procedure(s) (LRB):  LAPAROTOMY with gastrostomy tube placement (N/A)  02/02/2024  Hospital LOS: 3 days  ICU LOS: 3d 7h    Rebel Rodriguez is a 54 y.o. male w/ a significant PMHx of s/p cervical myelopathy s/p C3-4 ACDF surgery with post op dysphagia, odynophagia, neck pain, swelling and crepitus. Found to have DELFIN drain in hypopharynx. He is s/p neck exploration and pharyngeal repair on 1/30.     Also has PMH of psoriatic arthritis, TIA on ASA, and drug induced lupus. Intubated for airway protection. Must be NPO for 4-6 weeks so seeking alternate means of nutrition.    E Consent updated.    Patient now presents for the above procedure(s).    TTE: 2015  1 - Normal left ventricular systolic function (EF 60-65%).     2 - Normal left ventricular diastolic function.     3 - Normal right ventricular systolic function .     4 - Doppler if clinically indicated.     NPO Status: holding TF @ MN    LDA:        Peripheral IV - Single Lumen 01/30/24 0545 20 G Left Forearm (Active)   Site Assessment Clean;Dry;Intact;No redness;No swelling 02/02/24 1700   Extremity Assessment Distal to IV No abnormal discoloration;No redness 02/02/24 1700   Line Status Infusing 02/02/24 1700   Dressing Status Clean;Dry;Intact 02/02/24 1700   Dressing Intervention  Integrity maintained 02/02/24 1700   Dressing Change Due 02/03/24 02/02/24 1700   Site Change Due 02/03/24 02/02/24 1100   Reason Not Rotated Not due 02/02/24 1700   Number of days: 3            Peripheral IV - Single Lumen 02/01/24 1820 18 G Right Forearm (Active)   Site Assessment Clean;Dry;Intact;No redness;No swelling 02/02/24 1700   Extremity Assessment Distal to IV No abnormal discoloration;No redness 02/02/24 1700   Line Status Infusing 02/02/24 1700   Dressing Status Clean;Dry;Intact 02/02/24 1700   Dressing Intervention Integrity maintained 02/02/24 1700   Dressing Change Due 02/05/24 02/02/24 1700   Site Change Due 02/05/24 02/02/24 1100   Reason Not Rotated Not due 02/02/24 1700   Number of days: 1            Closed/Suction Drain 01/30/24 1916 Right;Ventral Neck Bulb 15 Fr. (Active)   Site Description Reddened;Other (Comment) 02/02/24 1700   Drainage Serosanguineous 02/02/24 1700   Status To bulb suction 02/02/24 1700   Output (mL) 13 mL 02/02/24 0550   Number of days: 3       Male External Urinary Catheter 01/30/24 2000 (Active)   Collection Container Urimeter 02/02/24 1700   Securement Method secured to top of thigh w/ adhesive device 02/02/24 1700   Skin no redness;no breakdown 02/02/24 1700   Tolerance no signs/symptoms of discomfort 02/02/24 1700   Output (mL) 400 mL 02/02/24 1750   Catheter Change Date 01/31/24 02/02/24 0501   Catheter Change Time 1905 02/01/24 0505   Number of days: 3            Trans Pyloric Feeding Tube 01/30/24 1916 Tungsten weighted 12 Fr. Right nostril (Active)   Placement Check placement verified by aspirate characteristics;placement verified by distal tube length measurement;placement verified by x-ray 02/02/24 1700   Tolerance no signs/symptoms of discomfort 02/02/24 1700   Securement secured to nostril center 02/02/24 1700   Clamp Status/Tolerance unclamped 02/02/24 1700   Suction Setting/Drainage Method suction at the bedside 02/02/24 1700   Insertion Site Appearance no  redness, warmth, tenderness, skin breakdown, drainage 02/02/24 1700   Drainage None 02/02/24 0501   Flush/Irrigation flushed w/;water;no resistance met 02/02/24 0501   Feeding Type continuous;by pump 02/02/24 1700   Current Rate (mL/hr) 35 mL/hr 02/02/24 0701   Goal Rate (mL/hr) 35 mL/hr 02/02/24 0701   Intake (mL) 60 mL 02/01/24 2201   Intake (mL) - Formula Tube Feeding 0 02/02/24 1000   Number of days: 3       Vent/Oxygen:   Vent Mode: Spont  Oxygen Concentration (%):  [40] 40  Resp Rate Total:  [6.8 br/min-26 br/min] 8.8 br/min  Vt Set:  [0 mL-510 mL] 0 mL  PEEP/CPAP:  [5 cmH20] 5 cmH20  Pressure Support:  [5 cmH20] 5 cmH20  Mean Airway Pressure:  [6.1 pcX16-48 cmH20] 6.4 cmH20        Drips:   sodium chloride 0.9% 5 mL/hr at 02/02/24 2101    dexmedeTOMIDine (Precedex) infusion (titrating) 0.7 mcg/kg/hr (02/02/24 2101)    dextrose 10 % in water (D10W)      fentanyl 125 mcg/hr (02/02/24 2101)       Previous Airway: Intubation:     Induction:  Intravenous    Intubated:  Postinduction    Mask Ventilation:  Easy mask    Attempts:  1    Attempted By:  CRNA    Method of Intubation:  Video laryngoscopy    Blade:  [place] 3    Laryngeal View Grade: Grade I - full view of cords      Difficult Airway Encountered?: No      Complications:  None    Airway Device:  Oral endotracheal tube    Airway Device Size:  7.5    Style/Cuff Inflation:  Cuffed (inflated to minimal occlusive pressure)    Tube secured:  22    Secured at:  The teeth    Placement Verified By:  Capnometry    Complicating Factors:  None    Findings Post-Intubation:  BS equal bilateral and atraumatic/condition of teeth unchanged    Allergies:  Review of patient's allergies indicates:   Allergen Reactions    Erythromycin Nausea And Vomiting    Infliximab Other (See Comments)     Lupus with fever and acute arthritis  Lupus with fever and acute arthritis       Medications:     Current Outpatient Medications   Medication Instructions    aspirin (ECOTRIN) 81 mg, Oral,  Daily,      atorvastatin (LIPITOR) 10 mg, Oral, Nightly    FLUoxetine 40 MG capsule Take 1 capsule (40 mg) by mouth daily    mirtazapine (REMERON) 15 MG tablet Take 1 tablet (15 mg) by mouth daily at bedtime    OTEZLA 30 mg, Oral, 2 times daily    TREMFYA 100 mg, Subcutaneous, Every 8 weeks     Inpatient Medications:   albuterol-ipratropium  3 mL Nebulization Q6H    ampicillin-sulbactam  3 g Intravenous Q6H    atorvastatin  10 mg Per NG tube Daily    enoxparin  40 mg Subcutaneous Q24H (prophylaxis, 1700)    famotidine  20 mg Per NG tube BID    FLUoxetine  40 mg Per NG tube Daily    mirtazapine  15 mg Per NG tube QHS    mupirocin   Nasal BID    polyethylene glycol  17 g Per NG tube BID    [START ON 2/3/2024] QUEtiapine  50 mg Per NG tube Q12H    senna-docusate 8.6-50 mg  2 tablet Per NG tube BID    vancomycin (VANCOCIN) IV (PEDS and ADULTS)  15 mg/kg Intravenous Q12H     Antibiotics (From admission, onward)      Start     Stop Route Frequency Ordered    01/31/24 0800  vancomycin (VANCOCIN) 1,000 mg in dextrose 5 % (D5W) 250 mL IVPB (Vial-Mate)         -- IV Every 12 hours (non-standard times) 01/30/24 2100    01/30/24 2100  mupirocin 2 % ointment         02/04/24 2059 Nasl 2 times daily 01/30/24 1103    01/30/24 2045  ampicillin-sulbactam (UNASYN) 3 g in sodium chloride 0.9 % 100 mL IVPB (MB+)         -- IV Every 6 hours (non-standard times) 01/30/24 1942    01/30/24 1916  vancomycin - pharmacy to dose  (vancomycin IVPB (PEDS and ADULTS))        See Hyperspace for full Linked Orders Report.    -- IV pharmacy to manage frequency 01/30/24 1816          VTE Risk Mitigation (From admission, onward)           Ordered     enoxaparin injection 40 mg  Every 24 hours         01/31/24 1301     Place LAUREN hose  Until discontinued         01/30/24 0513     Place sequential compression device  Until discontinued         01/30/24 0513                    History:     Active Hospital Problems    Diagnosis  POA    *Cervical myelopathy  [G95.9]  Yes    Injury of pharynx [S19.85XA]  Yes    On mechanically assisted ventilation [Z99.11]  Not Applicable    Anxiety [F41.9]  Yes    Dysphagia [R13.10]  Yes    Hyperlipidemia [E78.5]  Yes      Resolved Hospital Problems   No resolved problems to display.     Medical History:  Past Medical History:   Diagnosis Date    Achilles tendon rupture 10/09/2013    Achilles tendon rupture 10/09/2013    Allergy     Anemia 1/17/2024    Anxiety     Arthritis     psoriatric    Degenerative disc disease     Drug-induced lupus erythematosus     Drug-induced lupus erythematosus 03/09/2016    Hyperlipidemia     Inguinal lymphadenopathy 07/21/2015    Kidney stone     Medication monitoring encounter 12/07/2016    Neck pain 07/06/2017    Psoriatic arthritis     Psoriatic arthritis 12/07/2016    Recurrent fever 07/08/2015    Recurrent nephrolithiasis 05/19/2014    On low oxalate diet    Sinusitis 02/25/2016    Stroke     TIA (transient ischemic attack)     Ulcer     high school    Unexplained night sweats 07/13/2015     Surgical History:    has a past surgical history that includes Upper endoscopy w/ esophageal manometry; Ureteral stent placement; Achilles tendon surgery; Back surgery; Nasal septoplasty (N/A, 9/14/2018); Nasal turbinate reduction (Bilateral, 9/14/2018); Functional endoscopic sinus surgery (FESS) using computer-assisted navigation (Bilateral, 9/14/2018); Injection of anesthetic agent around nerve (Left, 5/13/2022); Injection of anesthetic agent around nerve (Left, 5/24/2022); Radiofrequency ablation (Left, 7/12/2022); Neck exploration (N/A, 1/30/2024); and fusion, spine, cervical, anterior approach (N/A, 1/30/2024).   Social History:    has no history on file for sexual activity.  reports that he has never smoked. He has never used smokeless tobacco. He reports that he does not drink alcohol and does not use drugs.    OBJECTIVE:   Vital Signs Range (Last 24H):  Temp:  [36.5 °C (97.7 °F)-37.1 °C (98.7 °F)]   Pulse:   [53-85]   Resp:  [8-17]   BP: ()/(50-61)   SpO2:  [94 %-100 %]   Lab Results   Component Value Date    WBC 11.27 02/02/2024    HGB 9.9 (L) 02/02/2024    HCT 31.2 (L) 02/02/2024     02/02/2024     (L) 02/02/2024    K 3.6 02/02/2024     02/02/2024    CREATININE 0.8 02/02/2024    BUN 24 (H) 02/02/2024    CO2 23 02/02/2024     (H) 02/02/2024    CALCIUM 8.5 (L) 02/02/2024    MG 1.9 02/02/2024    PHOS 1.9 (L) 02/02/2024    ALKPHOS 47 (L) 02/02/2024    ALT 20 02/02/2024    AST 41 (H) 02/02/2024    ALBUMIN 2.8 (L) 02/02/2024    INR 0.9 01/17/2024    GLUF 95 04/08/2015    HGBA1C 5.6 04/08/2015     07/26/2022     Recent Labs     01/31/24  0034 02/01/24  0203 02/02/24  0109   WBC 19.08* 15.65* 11.27   HGB 12.1* 11.0* 9.9*   HCT 37.4* 34.3* 31.2*    243 221    137 135*   K 4.3 4.4 3.6   CREATININE 0.9 0.8 0.8   * 118* 248*     Recent Labs     02/02/24  0408   PH 7.344*   PCO2 46.3*   PO2 174*   HCO3 25.2   POCSATURATED 99   BE 0      No LMP for male patient.    Please see Results Review for additional labs & imaging.     EKG:   Results for orders placed or performed during the hospital encounter of 01/30/24   EKG 12-lead    Collection Time: 02/01/24  1:41 PM    Narrative    Test Reason : M53.9,    Vent. Rate : 060 BPM     Atrial Rate : 060 BPM     P-R Int : 112 ms          QRS Dur : 082 ms      QT Int : 408 ms       P-R-T Axes : 102 084 064 degrees     QTc Int : 408 ms    Normal sinus rhythm  Normal ECG  When compared with ECG of 17-JAN-2024 12:09,  No significant change was found  Confirmed by Connor MURRIETA, Real RDZ ([id number]) on 2/1/2024 3:26:44 PM    Referred By: DANIEL MACIAS           Confirmed By:Real Ryan MD       ECHO:  See subjective, if available.    ASSESSMENT/PLAN:         Pre-op Assessment    I have reviewed the Patient Summary Reports.     I have reviewed the Nursing Notes. I have reviewed the NPO Status.   I have reviewed the Medications.     Review of  Systems  Anesthesia Hx:  No problems with previous Anesthesia   History of prior surgery of interest to airway management or planning:          Denies Family Hx of Anesthesia complications.    Denies Personal Hx of Anesthesia complications.                    Social:  Non-Smoker       Hematology/Oncology:       -- Anemia:                                  Cardiovascular:  Cardiovascular Normal                                            Pulmonary:        Sleep Apnea     Obstructive Sleep Apnea (LU).           Renal/:  Chronic Renal Disease        Kidney Function/Disease             Hepatic/GI:  Hepatic/GI Normal                 Musculoskeletal:  Arthritis        Arthritis          Neurological:  TIA, CVA            Arthritis              CVA - Cerebrovasular Accident     TIA - Transient Ischemic Attack               Endocrine:  Endocrine Normal            Psych:  Psychiatric Normal                    Physical Exam  General: Alert, Anxious and Oriented  Sedated precedex, fentanyl gtt  Airway:  Mallampati: unable to assess   TM Distance: Normal  Tongue: Normal  Pre-Existing Airway: Oral Endotracheal tube    Dental:  Intact        Anesthesia Plan  Type of Anesthesia, risks & benefits discussed:    Anesthesia Type: Gen ETT  Intra-op Monitoring Plan: Standard ASA Monitors and Art Line  Post Op Pain Control Plan: multimodal analgesia and IV/PO Opioids PRN  Induction:  IV  Airway Plan: Direct, Post-Induction  Informed Consent: Informed consent signed with the Patient representative and all parties understand the risks and agree with anesthesia plan.  All questions answered.   ASA Score: 3  Day of Surgery Review of History & Physical: H&P Update referred to the surgeon/provider.    Ready For Surgery From Anesthesia Perspective.     .

## 2024-02-03 NOTE — TRANSFER OF CARE
"Anesthesia Transfer of Care Note    Patient: Rebel Rodriguez    Procedure(s) Performed: Procedure(s) (LRB):  LAPAROTOMY with gastrostomy tube placement (N/A)  LARYNGOSCOPY, DIRECT  TRACHEOTOMY    Patient location: ICU    Anesthesia Type: general    Transport from OR: Transported from OR on 6-10 L/min O2 by face mask with adequate spontaneous ventilation. Continuous SpO2 monitoring in transport. Continuous ECG monitoring in transport    Post pain: adequate analgesia    Post assessment: no apparent anesthetic complications    Post vital signs: stable    Level of consciousness: awake and lethargic    Nausea/Vomiting: no nausea/vomiting    Complications: none    Transfer of care protocol was followed      Last vitals: Visit Vitals  BP (!) 118/57   Pulse 80   Temp 36.9 °C (98.4 °F) (Axillary)   Resp 20   Ht 5' 8" (1.727 m)   Wt 74.9 kg (165 lb 2 oz)   SpO2 100%   BMI 25.11 kg/m²     "

## 2024-02-03 NOTE — SUBJECTIVE & OBJECTIVE
Interval History:   Decided against tracheostomy, planned for open G tube today.     Discussed at length with patient's mother and sister yesterday at bedside with Dr. Agarwal and ultimately at that time patient did not want a tracheostomy. However, after further consideration yesterday evening, decided this am that he would be willing to undergo tracheostomy if ultimately pharyngeal repair had started to break down.     After discussing all the potential options including trial of extubation with potential need for reintubation or tracheostomy, patient and family decided to undergo direct laryngoscopy in OR at same time as G tube. If results show that repair is still intact, will trial extubation. However, if exposed hardware is seen on direct laryngoscopy, patient would like to have a tracheostomy preformed in the OR. Consent was obtained and witnessed by nursing at bedside.     Medications:  Continuous Infusions:   sodium chloride 0.9% 5 mL/hr at 02/03/24 0601    dexmedeTOMIDine (Precedex) infusion (titrating) 0.9 mcg/kg/hr (02/03/24 0601)    dextrose 10 % in water (D10W)      fentanyl 150 mcg/hr (02/03/24 0601)    propofoL Stopped (02/03/24 0850)     Scheduled Meds:   albuterol-ipratropium  3 mL Nebulization Q6H    ampicillin-sulbactam  3 g Intravenous Q6H    atorvastatin  10 mg Per NG tube Daily    famotidine  20 mg Per NG tube BID    FLUoxetine  40 mg Per NG tube Daily    mirtazapine  15 mg Per NG tube QHS    mupirocin   Nasal BID    polyethylene glycol  17 g Per NG tube BID    QUEtiapine  50 mg Per NG tube Q12H    senna-docusate 8.6-50 mg  2 tablet Per NG tube BID    vancomycin (VANCOCIN) IV (PEDS and ADULTS)  15 mg/kg Intravenous Q12H     PRN Meds:acetaminophen, dextrose 10 % in water (D10W), dextrose 10%, dextrose 10%, fentanyl, glucagon (human recombinant), hydrOXYzine HCL, insulin aspart U-100, magnesium oxide, magnesium oxide, morphine, ondansetron, oxyCODONE, potassium bicarbonate, potassium bicarbonate,  potassium bicarbonate, potassium, sodium phosphates, potassium, sodium phosphates, potassium, sodium phosphates, prochlorperazine, Pharmacy to dose Vancomycin consult **AND** vancomycin - pharmacy to dose     Review of patient's allergies indicates:   Allergen Reactions    Erythromycin Nausea And Vomiting    Infliximab Other (See Comments)     Lupus with fever and acute arthritis  Lupus with fever and acute arthritis     Objective:     Vital Signs (24h Range):  Temp:  [98.2 °F (36.8 °C)-98.7 °F (37.1 °C)] 98.6 °F (37 °C)  Pulse:  [56-74] 65  Resp:  [8-26] 18  SpO2:  [94 %-100 %] 100 %  BP: ()/(50-64) 130/62     Date 02/03/24 0700 - 02/04/24 0659   Shift 8368-0136 1889-7854 2641-1591 24 Hour Total   INTAKE   Shift Total(mL/kg)       OUTPUT   Urine(mL/kg/hr) 200   200   Shift Total(mL/kg) 200(2.7)   200(2.7)   Weight (kg) 74.9 74.9 74.9 74.9     Lines/Drains/Airways       Drain  Duration                  Closed/Suction Drain 01/30/24 1916 Right;Ventral Neck Bulb 15 Fr. 3 days         Trans Pyloric Feeding Tube 01/30/24 1916 Tungsten weighted 12 Fr. Right nostril 3 days    Male External Urinary Catheter 01/30/24 2000 3 days              Airway  Duration                  Airway - Non-Surgical 01/30/24 1827 3 days              Peripheral Intravenous Line  Duration                  Peripheral IV - Single Lumen 01/30/24 0545 20 G Left Forearm 4 days         Peripheral IV - Single Lumen 02/01/24 1820 18 G Right Forearm 1 day                     Physical Exam  Awake   Intubated with tube secured tube kauffman   Right neck incision closed with staples  DELFIN drain with minimal SS drainage, holding suction  Resolution of subq emphysema   Symmetric chest rise        Significant Labs:  CBC:   Recent Labs   Lab 02/03/24  0125   WBC 9.35   RBC 3.14*   HGB 9.0*   HCT 28.4*      MCV 90   MCH 28.7   MCHC 31.7*     CMP:   Recent Labs   Lab 02/03/24  0125   GLU 83   CALCIUM 8.1*   ALBUMIN 2.6*   PROT 5.0*      K 3.5   CO2  23   *   BUN 18   CREATININE 0.8   ALKPHOS 47*   ALT 25   AST 62*   BILITOT 0.3       Significant Diagnostics:  I have reviewed and interpreted all pertinent imaging results/findings within the past 24 hours.

## 2024-02-03 NOTE — HPI
Rebel Rodriguez is a 53 y.o. male with hx of psoriatic arthritis, hx TIA on Aspirin 81 mg, s/p C4-7 ACDF with Dr. Huff 10 years ago who presents for evaluation of gait imbalance. Last seen 1 year ago for neck pain and radicular pain into the left shoulder. He underwent C1-2 KENRICK with moderate relief of pain. Reports rapid functional decline over the last 3 weeks. Endorses hand clumsiness, difficulty typing, gait imbalance, difficulty butoning, dropping items, falls. Difficulty with ambulating also due to LLE weakness. States it feels like he has rubber bands around his wrists. Reports difficulty with overhead mobility and shock-like pains down spine when raising arms overhead. Denies b/b incontinence.

## 2024-02-03 NOTE — PT/OT/SLP PROGRESS
Occupational Therapy      Patient Name:  Rebel Rodriguez   MRN:  [id number]    [id number] - Patient not seen today secondary to returning from sx - trach placement . Will follow-up at next scheduled visit.    Faye Angeles OT  2/3/2024

## 2024-02-03 NOTE — PROGRESS NOTES
[facility]  Otorhinolaryngology-Head & Neck Surgery  Progress Note    Subjective:     Post-Op Info:  Procedure(s) (LRB):  LAPAROTOMY with gastrostomy tube placement (N/A)   Day of Surgery  Hospital Day: 5     Interval History:   Decided against tracheostomy, planned for open G tube today.     Discussed at length with patient's mother and sister yesterday at bedside with Dr. Agarwal and ultimately at that time patient did not want a tracheostomy. However, after further consideration yesterday evening, decided this am that he would be willing to undergo tracheostomy if ultimately pharyngeal repair had started to break down.     After discussing all the potential options including trial of extubation with potential need for reintubation or tracheostomy, patient and family decided to undergo direct laryngoscopy in OR at same time as G tube. If results show that repair is still intact, will trial extubation. However, if exposed hardware is seen on direct laryngoscopy, patient would like to have a tracheostomy preformed in the OR. Consent was obtained and witnessed by nursing at bedside.     Medications:  Continuous Infusions:   sodium chloride 0.9% 5 mL/hr at 02/03/24 0601    dexmedeTOMIDine (Precedex) infusion (titrating) 0.9 mcg/kg/hr (02/03/24 0601)    dextrose 10 % in water (D10W)      fentanyl 150 mcg/hr (02/03/24 0601)    propofoL Stopped (02/03/24 0850)     Scheduled Meds:   albuterol-ipratropium  3 mL Nebulization Q6H    ampicillin-sulbactam  3 g Intravenous Q6H    atorvastatin  10 mg Per NG tube Daily    famotidine  20 mg Per NG tube BID    FLUoxetine  40 mg Per NG tube Daily    mirtazapine  15 mg Per NG tube QHS    mupirocin   Nasal BID    polyethylene glycol  17 g Per NG tube BID    QUEtiapine  50 mg Per NG tube Q12H    senna-docusate 8.6-50 mg  2 tablet Per NG tube BID    vancomycin (VANCOCIN) IV (PEDS and ADULTS)  15 mg/kg Intravenous Q12H     PRN Meds:acetaminophen, dextrose 10 % in water  (D10W), dextrose 10%, dextrose 10%, fentanyl, glucagon (human recombinant), hydrOXYzine HCL, insulin aspart U-100, magnesium oxide, magnesium oxide, morphine, ondansetron, oxyCODONE, potassium bicarbonate, potassium bicarbonate, potassium bicarbonate, potassium, sodium phosphates, potassium, sodium phosphates, potassium, sodium phosphates, prochlorperazine, Pharmacy to dose Vancomycin consult **AND** vancomycin - pharmacy to dose     Review of patient's allergies indicates:   Allergen Reactions    Erythromycin Nausea And Vomiting    Infliximab Other (See Comments)     Lupus with fever and acute arthritis  Lupus with fever and acute arthritis     Objective:     Vital Signs (24h Range):  Temp:  [98.2 °F (36.8 °C)-98.7 °F (37.1 °C)] 98.6 °F (37 °C)  Pulse:  [56-74] 65  Resp:  [8-26] 18  SpO2:  [94 %-100 %] 100 %  BP: ()/(50-64) 130/62     Date 02/03/24 0700 - 02/04/24 0659   Shift 2632-9789 9186-8971 7553-2137 24 Hour Total   INTAKE   Shift Total(mL/kg)       OUTPUT   Urine(mL/kg/hr) 200   200   Shift Total(mL/kg) 200(2.7)   200(2.7)   Weight (kg) 74.9 74.9 74.9 74.9     Lines/Drains/Airways       Drain  Duration                  Closed/Suction Drain 01/30/24 1916 Right;Ventral Neck Bulb 15 Fr. 3 days         Trans Pyloric Feeding Tube 01/30/24 1916 Tungsten weighted 12 Fr. Right nostril 3 days    Male External Urinary Catheter 01/30/24 2000 3 days              Airway  Duration                  Airway - Non-Surgical 01/30/24 1827 3 days              Peripheral Intravenous Line  Duration                  Peripheral IV - Single Lumen 01/30/24 0545 20 G Left Forearm 4 days         Peripheral IV - Single Lumen 02/01/24 1820 18 G Right Forearm 1 day                     Physical Exam  Awake   Intubated with tube secured tube kauffman   Right neck incision closed with staples  DELFIN drain with minimal SS drainage, holding suction  Resolution of subq emphysema   Symmetric chest rise        Significant Labs:  CBC:   Recent  Labs   Lab 02/03/24  0125   WBC 9.35   RBC 3.14*   HGB 9.0*   HCT 28.4*      MCV 90   MCH 28.7   MCHC 31.7*     CMP:   Recent Labs   Lab 02/03/24  0125   GLU 83   CALCIUM 8.1*   ALBUMIN 2.6*   PROT 5.0*      K 3.5   CO2 23   *   BUN 18   CREATININE 0.8   ALKPHOS 47*   ALT 25   AST 62*   BILITOT 0.3       Significant Diagnostics:  I have reviewed and interpreted all pertinent imaging results/findings within the past 24 hours.  Assessment/Plan:     Injury of pharynx  Mr Rodriguez is a 53 yo male who is s/p C3-4 ACDF surgery with post op dysphagia, odynophagia, neck pain, swelling and crepitus. CT and scope demonstrates DELFIN drain in the hypopharynx. Patient with difficulty with swallowing secretions. No respiratory compromise. He is s/p neck exploration and pharyngeal repair on 1/30. Discussed with patient's mother and sister at bedside the recommendation for open G tube and tracheostomy to allow adequate time for pharynx to heal.     Please see interval history for in depth detailing about plan for today. Will plan for DL with possible tracheostomy at time of open G tube today. Consent obtained and witnessed by RN.     - General surgery consulted, following    - Will plan for G tube   - Patient will be to be NPO for 4-6 weeks minimum  - ID consulted for abx recommendations in setting of hardware with pharyngeal injury    - Currently on vancomycin and unasyn    - Abx per ID recs  - to OR today for open G tube, DL with possible tracheostomy   - Rest of care for per primary  - Please page ENT with questions or concerns.        Bonita Macias MD  Otorhinolaryngology-Head & Neck Surgery  [facility]

## 2024-02-03 NOTE — BRIEF OP NOTE
[facility]  Brief Operative Note    SUMMARY     Post Op Instructions:  - CUFF TO STAY UP until otherwise instructed by ENT   - Keep strict NPO  - Fresh trach care   - Please have extra 6-0 shiley and 4-0 shiley cuffed at bedside in case of emergency    Surgery Date: 2/3/2024     Surgeon(s) and Role:  Panel 1:     * Benigno Perez MD - Primary     * Bonita Macias MD - Resident - Assisting     * Brigida Hdez MD - Resident - Assisting     * Karen Barron MD - Resident - Chief  Panel 2:     * Jodie Collier MD - Primary        Pre-op Diagnosis:  Pharyngeal dysphagia [R13.13]    Post-op Diagnosis:  Post-Op Diagnosis Codes:     * Pharyngeal dysphagia [R13.13]    Procedure(s) (LRB):  LAPAROTOMY with gastrostomy tube placement (N/A)  LARYNGOSCOPY, DIRECT  TRACHEOTOMY    Anesthesia: General    Implants:  * No implants in log *    Operative Findings: see op note    Estimated Blood Loss: * No values recorded between 2/3/2024  9:09 AM and 2/3/2024 11:08 AM *    Estimated Blood Loss has not been documented. EBL = minimal.         Specimens:   Specimen (24h ago, onward)      None            [id number]

## 2024-02-03 NOTE — BRIEF OP NOTE
[facility]  Brief Operative Note    SUMMARY     Surgery Date: 2/3/2024     Surgeon(s) and Role:  Panel 1:     * Benigno Perez MD - Primary     * Bonita Macias MD - Resident - Assisting     * Brigida Hdez MD - Resident - Assisting     * Karen Barron MD - Resident - Chief  Panel 2:     * Jodie Collier MD - Primary    Pre-op Diagnosis:  Pharyngeal dysphagia [R13.13]    Post-op Diagnosis:  Post-Op Diagnosis Codes:     * Pharyngeal dysphagia [R13.13]    Procedure(s) (LRB):  LAPAROTOMY with gastrostomy tube placement (N/A)  LARYNGOSCOPY, DIRECT  TRACHEOTOMY    Anesthesia: General    Implants:  * No implants in log *    Operative Findings: Open Whitley gastrostomy. 18 F G tube.    Estimated Blood Loss: * No values recorded between 2/3/2024  9:09 AM and 2/3/2024 11:05 AM *    Estimated Blood Loss has been documented.         Specimens:   Specimen (24h ago, onward)      None            [id number]    Karen Barron MD  General Surgery PGY5

## 2024-02-03 NOTE — CARE UPDATE
Care update: Post-Gastrostomy Tube Placement Instructions    Can leave g-tube to gravity via ragland bag after surgery.   Can keep abdominal binder in place around the clock to help prevent accidental dislodgement.   Ok to use g-tube immediately for medications; prefer elixirs and liquids however well-crushed medications are acceptable. Clamp tube for 30 mins after medication administration.  Tube feeds are ok to start 4hrs after gastrostomy tube placement if no apparent issues.  Please call for any bleeding or malfunction.    Benigno Perez MD, FACS  Acute Care Surgery and Surgical Critical Care  [facility]  2/3/2024

## 2024-02-03 NOTE — PLAN OF CARE
"[facility] Care Plan    POC reviewed with Rebel Rodriguez and family at 1400. Patient nods head to understanding today; family verbalized understanding. Questions and concerns addressed. No acute events today. Pt progressing toward goals. Will continue to monitor. See below and flowsheets for full assessment and VS info.     -Fentanyl and Precedex infusion remains  -Patient on spontaneous today - tolerating well  -Refused bath and PT today   -Gen surgery consulted for G tube  -Tube feeds stopped today; patient to remain NPO tonight pending OR       Is this a stroke patient? no    Neuro:  Kempton Coma Scale  Best Eye Response: 4-->(E4) spontaneous  Best Motor Response: 6-->(M6) obeys commands  Best Verbal Response: 1-->(V1) none  Jeannette Coma Scale Score: 11  Assessment Qualifiers: patient intubated  Pupil PERRLA: yes     24 hr Temp:  [97.7 °F (36.5 °C)-98.7 °F (37.1 °C)]     CV:   Rhythm: normal sinus rhythm, sinus bradycardia  BP goals:   SBP < 160  MAP > 65    Resp:      Vent Mode: Spont  Set Rate: 0 BPM  Oxygen Concentration (%): 40  Vt Set: 0 mL  PEEP/CPAP: 5 cmH20  Pressure Support: 5 cmH20    Plan: wean to extubate    GI/:     Diet/Nutrition Received: NPO, tube feeding  Last Bowel Movement: 01/29/24  Voiding Characteristics: external catheter    Intake/Output Summary (Last 24 hours) at 2/2/2024 1859  Last data filed at 2/2/2024 1800  Gross per 24 hour   Intake 1620.82 ml   Output 1263 ml   Net 357.82 ml     Unmeasured Output  Stool Occurrence: 0    Labs/Accuchecks:  Recent Labs   Lab 02/02/24  0109   WBC 11.27   RBC 3.47*   HGB 9.9*   HCT 31.2*         Recent Labs   Lab 02/02/24  0109   *   K 3.6   CO2 23      BUN 24*   CREATININE 0.8   ALKPHOS 47*   ALT 20   AST 41*   BILITOT 0.3    No results for input(s): "PROTIME", "INR", "APTT", "HEPANTIXA" in the last 168 hours. No results for input(s): "CPK", "CPKMB", "TROPONINI", "MB" in the last 168 hours.    Electrolytes: Electrolytes " replaced  Accuchecks: Q6H    Gtts:   sodium chloride 0.9% 5 mL/hr at 02/02/24 1800    dexmedeTOMIDine (Precedex) infusion (titrating) 0.7 mcg/kg/hr (02/02/24 1800)    dextrose 10 % in water (D10W)      fentanyl 125 mcg/hr (02/02/24 1800)       LDA/Wounds:    Nurses Note -- 4 Eyes      2/2/2024   6:59 PM

## 2024-02-03 NOTE — PROGRESS NOTES
Pharmacokinetic Assessment Follow Up: IV Vancomycin    Vancomycin serum concentration assessment/plan:  Trough resulted as 14.9 mcg/mL; Goal 10-20 mcg/mL,  pharyngeal repair per ENT  Renal function stable  Continue Vancomycin 1000 mg IV q12h  Next level to be drawn on 2/5/24 at 0700 or sooner if clinically indicated    Drug levels (last 3 results):  Recent Labs   Lab Result Units 02/01/24  0817 02/03/24  0818   Vancomycin-Trough ug/mL 14.8 14.9       Pharmacy will continue to follow and monitor vancomycin.    Please contact pharmacy at extension [phone] for questions regarding this assessment.    Thank you for the consult,   Lisha Campos       Patient brief summary:  Rebel Rodriguez is a 54 y.o. male initiated on antimicrobial therapy with IV Vancomycin for treatment of  pharyngeal repair per ENT    The patient's current regimen is 1000 mg Q12H    Drug Allergies:   Review of patient's allergies indicates:   Allergen Reactions    Erythromycin Nausea And Vomiting    Infliximab Other (See Comments)     Lupus with fever and acute arthritis  Lupus with fever and acute arthritis       Actual Body Weight:   74.9 kg     Renal Function:   Estimated Creatinine Clearance: 102.1 mL/min (based on SCr of 0.8 mg/dL).,     Dialysis Method (if applicable):  N/A    CBC (last 72 hours):  Recent Labs   Lab Result Units 02/01/24  0203 02/02/24  0109 02/03/24  0125   WBC K/uL 15.65* 11.27 9.35   Hemoglobin g/dL 11.0* 9.9* 9.0*   Hematocrit % 34.3* 31.2* 28.4*   Platelets K/uL 243 221 200   Gran % % 90.4* 69.5 70.2   Lymph % % 4.2* 18.7 16.8*   Mono % % 4.8 11.2 12.3   Eosinophil % % 0.1 0.1 0.2   Basophil % % 0.1 0.1 0.2   Differential Method  Automated Automated Automated       Metabolic Panel (last 72 hours):  Recent Labs   Lab Result Units 02/01/24  0203 02/02/24  0109 02/03/24  0125   Sodium mmol/L 137 135* 143   Potassium mmol/L 4.4 3.6 3.5   Chloride mmol/L 107 106 111*   CO2 mmol/L 23 23 23   Glucose mg/dL 118* 248* 83   BUN  mg/dL 20 24* 18   Creatinine mg/dL 0.8 0.8 0.8   Albumin g/dL 3.2* 2.8* 2.6*   Total Bilirubin mg/dL 0.3 0.3 0.3   Alkaline Phosphatase U/L 59 47* 47*   AST U/L 29 41* 62*   ALT U/L 18 20 25   Magnesium mg/dL 1.9 1.9 1.6   Phosphorus mg/dL 3.5 1.9* 3.9       Vancomycin Administrations:  vancomycin given in the last 96 hours                     vancomycin (VANCOCIN) 1,000 mg in dextrose 5 % (D5W) 250 mL IVPB (Vial-Mate) (mg) 1,000 mg New Bag 02/03/24 0819     1,000 mg New Bag 02/02/24 1929     1,000 mg New Bag  0838     1,000 mg New Bag 02/01/24 2141     1,000 mg New Bag  0835     1,000 mg New Bag 01/31/24 2045     1,000 mg New Bag  0931    vancomycin 1,250 mg in dextrose 5 % (D5W) 250 mL IVPB (Vial-Mate) (mg) 1,250 mg New Bag 01/30/24 2005                    Microbiologic Results:  Microbiology Results (last 7 days)       ** No results found for the last 168 hours. **

## 2024-02-03 NOTE — PROGRESS NOTES
This note summarizes conversations that I had with Mr. Rodriguez's mother and sister on February 1st and 2nd, 2024.    Mr. Rodriguez was seen on rounds on Thursday, February 1, 2024.  He was noted to be intubated.  His neck incision was clean, dry and intact and his drain w output was serosanguineous and character. He was seen again on Feb 2 with similarl findings.      On both of these days, I explained my intraoperative findings to his mother and sister.  I explained to them that he was found to have an injury of the right lateral pharyngeal wall/piriform sinus and a 2nd injury of the posterior pharyngeal wall through which the hardware in his cervical spine was exposed to the lumen of the pharynx..  I repaired these injuries and placed a nasogastric feeding tube.  I recommended that we proceed to the operating room for an open gastrostomy tube since I would like to hold off on allowing him to eat and drink for least 3-4 weeks in order to allow this repair to heal and he will need reliable enteral access. I recommended open gastrostomy since I would like to avoid trauma to the pharynx that may occur with an endoscopic procedure.      I am particularly concerned about the healing of the posterior pharyngeal wound since there is hardware immediately deep to it.  I recommended that he consider tracheostomy placement since he may require additional anesthesia/surgery and intubation.  Again, I would like to avoid instrumenting his pharynx so as not to disrupt our repair.  I explained to him that tracheostomy is a judgment call on my part but I feel that it is most prudent course of action givenn the above and is a fully reversible intervention.  Over the course of February 1st and 2nd, they considered tracheostomy and Mr. Rodriguez indicated that he wished to avoid it.  I explained to him and his family that we could extubate him if he met criteria otherwise but that we run the risk of subsequent re-intubation and trauma to our  pharyngeal repair.  They expressed understanding.      General surgery has been consulted for gastrostomy tube placement.  They will arrange this procedure in the coming days.  Our service will be available for direct laryngoscopy to assess the integrity of his posterior pharyngeal repair at the time of gastrostomy tube placement.  We will continue to follow closely.

## 2024-02-03 NOTE — PROGRESS NOTES
[facility]  Neurosurgery  Progress Note    Subjective:     History of Present Illness: Rebel Rodriguez is a 53 y.o. male with hx of psoriatic arthritis, hx TIA on Aspirin 81 mg, s/p C4-7 ACDF with Dr. Huff 10 years ago who presents for evaluation of gait imbalance. Last seen 1 year ago for neck pain and radicular pain into the left shoulder. He underwent C1-2 KENRICK with moderate relief of pain. Reports rapid functional decline over the last 3 weeks. Endorses hand clumsiness, difficulty typing, gait imbalance, difficulty butoning, dropping items, falls. Difficulty with ambulating also due to LLE weakness. States it feels like he has rubber bands around his wrists. Reports difficulty with overhead mobility and shock-like pains down spine when raising arms overhead. Denies b/b incontinence.     Post-Op Info:  Procedure(s) (LRB):  EXPLORATION, NECK, REPAIR OF PHARYNGEAL INJURY (N/A)   4 Days Post-Op   Interval History: eugene, plan for peg/trach today    Medications:  Continuous Infusions:   sodium chloride 0.9% 5 mL/hr at 02/03/24 0601    dexmedeTOMIDine (Precedex) infusion (titrating) 0.9 mcg/kg/hr (02/03/24 0601)    dextrose 10 % in water (D10W)      fentanyl 150 mcg/hr (02/03/24 0601)     Scheduled Meds:   albuterol-ipratropium  3 mL Nebulization Q6H    ampicillin-sulbactam  3 g Intravenous Q6H    atorvastatin  10 mg Per NG tube Daily    enoxparin  40 mg Subcutaneous Q24H (prophylaxis, 1700)    famotidine  20 mg Per NG tube BID    FLUoxetine  40 mg Per NG tube Daily    mirtazapine  15 mg Per NG tube QHS    mupirocin   Nasal BID    polyethylene glycol  17 g Per NG tube BID    QUEtiapine  50 mg Per NG tube Q12H    senna-docusate 8.6-50 mg  2 tablet Per NG tube BID    vancomycin (VANCOCIN) IV (PEDS and ADULTS)  15 mg/kg Intravenous Q12H     PRN Meds:acetaminophen, dextrose 10 % in water (D10W), dextrose 10%, dextrose 10%, fentanyl, glucagon (human recombinant), hydrOXYzine HCL, insulin aspart U-100,  magnesium oxide, magnesium oxide, morphine, ondansetron, oxyCODONE, potassium bicarbonate, potassium bicarbonate, potassium bicarbonate, potassium, sodium phosphates, potassium, sodium phosphates, potassium, sodium phosphates, prochlorperazine, Pharmacy to dose Vancomycin consult **AND** vancomycin - pharmacy to dose     Review of Systems  Objective:     Weight: 74.9 kg (165 lb 2 oz)  Body mass index is 25.11 kg/m².  Vital Signs (Most Recent):  Temp: 98.6 °F (37 °C) (02/03/24 0301)  Pulse: 65 (02/03/24 0704)  Resp: 18 (02/03/24 0704)  BP: 130/62 (02/03/24 0704)  SpO2: 100 % (02/03/24 0704) Vital Signs (24h Range):  Temp:  [98.2 °F (36.8 °C)-98.7 °F (37.1 °C)] 98.6 °F (37 °C)  Pulse:  [56-85] 65  Resp:  [8-26] 18  SpO2:  [94 %-100 %] 100 %  BP: ()/(50-64) 130/62                Vent Mode: A/C  Oxygen Concentration (%):  [40] 40  Resp Rate Total:  [6.8 br/min-28 br/min] 23 br/min  Vt Set:  [0 mL-510 mL] 510 mL  PEEP/CPAP:  [5 cmH20] 5 cmH20  Pressure Support:  [5 cmH20] 5 cmH20  Mean Airway Pressure:  [6.1 ozV41-91 cmH20] 7.9 cmH20             Closed/Suction Drain 01/30/24 1916 Right;Ventral Neck Bulb 15 Fr. (Active)   Site Description Healing 02/03/24 0501   Drainage Serosanguineous 02/03/24 0501   Status To bulb suction 02/03/24 0501   Output (mL) 13 mL 02/02/24 0550       Male External Urinary Catheter 01/30/24 2000 (Active)   Collection Container Urimeter 02/03/24 0501   Securement Method secured to top of thigh w/ adhesive device 02/03/24 0501   Skin no redness;no breakdown 02/03/24 0501   Tolerance no signs/symptoms of discomfort 02/03/24 0501   Output (mL) 500 mL 02/03/24 0501   Catheter Change Date 01/31/24 02/03/24 0501   Catheter Change Time 1905 02/01/24 0505            Trans Pyloric Feeding Tube 01/30/24 1916 Tungsten weighted 12 Fr. Right nostril (Active)   Placement Check placement verified by x-ray 02/03/24 0501   Tolerance no signs/symptoms of discomfort 02/03/24 0501   Securement secured to  "nostril center w/ adhesive device 02/03/24 0501   Clamp Status/Tolerance unclamped 02/03/24 0501   Suction Setting/Drainage Method suction at the bedside 02/03/24 0501   Insertion Site Appearance no redness, warmth, tenderness, skin breakdown, drainage 02/03/24 0501   Drainage None 02/03/24 0501   Flush/Irrigation flushed w/;water;no resistance met 02/03/24 0501   Feeding Type continuous;by pump 02/03/24 0501   Current Rate (mL/hr) 35 mL/hr 02/02/24 0701   Goal Rate (mL/hr) 35 mL/hr 02/02/24 0701   Intake (mL) 30 mL 02/02/24 2101   Intake (mL) - Formula Tube Feeding 0 02/02/24 1000          Physical Exam         Neurosurgery Physical Exam  [id number],   AOx4,   L HG/WE 4/5 ow intact      Significant Labs:  Recent Labs   Lab 02/02/24  0109 02/03/24  0125   * 83   * 143   K 3.6 3.5    111*   CO2 23 23   BUN 24* 18   CREATININE 0.8 0.8   CALCIUM 8.5* 8.1*   MG 1.9 1.6     Recent Labs   Lab 02/02/24  0109 02/03/24  0125   WBC 11.27 9.35   HGB 9.9* 9.0*   HCT 31.2* 28.4*    200     No results for input(s): "LABPT", "INR", "APTT" in the last 48 hours.  Microbiology Results (last 7 days)       ** No results found for the last 168 hours. **          All pertinent labs from the last 24 hours have been reviewed.    Significant Diagnostics:  I have reviewed all pertinent imaging results/findings within the past 24 hours.  I have reviewed and interpreted all pertinent imaging results/findings within the past 24 hours.  Assessment/Plan:     * Cervical myelopathy  cristofer Rodriguez  Is a 54 y.o. male with cervical myelopathy and prior ACDF C4 C7 with adjacent level 3 4 who presented for elective C3-4 ACDF on 01/30, which complicated by retropharyngeal injury required repair by ENT.        Continue ICU care   Continue pain control   Neuro neuro check Q 1   Cervical drain by ENT   ENT planning for Trach PEG today  IV fluids   TF per Neuro ICU   SubQ heparin for DVT prophylaxis        Christopher Alonso, " MD  Neurosurgery  [facility]

## 2024-02-03 NOTE — SUBJECTIVE & OBJECTIVE
Interval History: Pt seen and examined at bedside with family member present. He had difficulties with sleep overnight. He reported increased difficulties with breathing overnight. They would like a breathing trial but have consented to a tracheostomy if he fails the breathing trial. They wanted to inquire about coordination between team and ENT for feeding tube and the possible trach at the same time.       Medications:  Continuous Infusions:   sodium chloride 0.9% Stopped (02/03/24 0801)    dexmedeTOMIDine (Precedex) infusion (titrating) Stopped (02/03/24 0813)    dextrose 10 % in water (D10W)      fentanyl Stopped (02/03/24 0813)    propofoL Stopped (02/03/24 0850)     Scheduled Meds:   albuterol-ipratropium  3 mL Nebulization Q6H    ampicillin-sulbactam  3 g Intravenous Q6H    atorvastatin  10 mg Per NG tube Daily    famotidine  20 mg Per NG tube BID    FLUoxetine  40 mg Per NG tube Daily    mirtazapine  15 mg Per NG tube QHS    mupirocin   Nasal BID    polyethylene glycol  17 g Per NG tube BID    QUEtiapine  50 mg Per NG tube Q12H    senna-docusate 8.6-50 mg  2 tablet Per NG tube BID    vancomycin (VANCOCIN) IV (PEDS and ADULTS)  15 mg/kg Intravenous Q12H     PRN Meds:acetaminophen, BUPivacaine (PF) 0.25% (2.5 mg/ml), dextrose 10 % in water (D10W), dextrose 10%, dextrose 10%, fentanyl, glucagon (human recombinant), hydrOXYzine HCL, insulin aspart U-100, LIDOcaine-EPINEPHrine 1%-1:100,000, magnesium oxide, magnesium oxide, morphine, ondansetron, oxyCODONE, potassium bicarbonate, potassium bicarbonate, potassium bicarbonate, potassium, sodium phosphates, potassium, sodium phosphates, potassium, sodium phosphates, prochlorperazine, Pharmacy to dose Vancomycin consult **AND** vancomycin - pharmacy to dose     Review of patient's allergies indicates:   Allergen Reactions    Erythromycin Nausea And Vomiting    Infliximab Other (See Comments)     Lupus with fever and acute arthritis  Lupus with fever and acute  arthritis     Objective:     Vital Signs (Most Recent):  Temp: 98.4 °F (36.9 °C) (02/03/24 0730)  Pulse: 80 (02/03/24 0800)  Resp: 20 (02/03/24 0800)  BP: (!) 118/57 (02/03/24 0800)  SpO2: 100 % (02/03/24 0800) Vital Signs (24h Range):  Temp:  [98.2 °F (36.8 °C)-98.6 °F (37 °C)] 98.4 °F (36.9 °C)  Pulse:  [56-82] 80  Resp:  [8-26] 20  SpO2:  [94 %-100 %] 100 %  BP: ()/(50-64) 118/57     Weight: 74.9 kg (165 lb 2 oz)  Body mass index is 25.11 kg/m².    Intake/Output - Last 3 Shifts         02/01 0700 02/02 0659 02/02 0700 02/03 0659 02/03 0700 02/04 0659    I.V. (mL/kg) 390.1 (6.2) 654.5 (8.7) 784.9 (10.5)    NG/ 160     IV Piggyback 885.8 788.1 119.2    Total Intake(mL/kg) 1610.9 (25.7) 1602.6 (21.4) 904.1 (12.1)    Urine (mL/kg/hr) 800 (0.5) 1500 (0.8) 250 (0.8)    Drains 13      Stool  0     Total Output 813 1500 250    Net +797.9 +102.6 +654.1           Stool Occurrence  0 x              Physical Exam  HENT:      Head: Atraumatic.   Cardiovascular:      Rate and Rhythm: Normal rate and regular rhythm.   Pulmonary:      Effort: No respiratory distress.   Abdominal:      General: There is no distension.      Palpations: Abdomen is soft. There is no mass.   Musculoskeletal:      Cervical back: Normal range of motion.   Skin:     General: Skin is warm and dry.   Neurological:      Mental Status: He is alert.          Significant Labs:  I have reviewed all pertinent lab results within the past 24 hours.  CBC:   Recent Labs   Lab 02/03/24  0125   WBC 9.35   RBC 3.14*   HGB 9.0*   HCT 28.4*      MCV 90   MCH 28.7   MCHC 31.7*     BMP:   Recent Labs   Lab 02/03/24  0125   GLU 83      K 3.5   *   CO2 23   BUN 18   CREATININE 0.8   CALCIUM 8.1*   MG 1.6       Significant Diagnostics:  I have reviewed all pertinent imaging results/findings within the past 24 hours.

## 2024-02-03 NOTE — SUBJECTIVE & OBJECTIVE
Interval History:  g-tube placement today,     Review of Systems   Constitutional: Negative.    HENT: Negative.     Eyes: Negative.    Respiratory: Negative.     Cardiovascular: Negative.    Gastrointestinal: Negative.    Endocrine: Negative.    Genitourinary: Negative.    Musculoskeletal: Negative.    Skin: Negative.    Neurological: Negative.    Hematological: Negative.      2 systems   Objective:     Vitals:  Temp: 98.4 °F (36.9 °C)  Pulse: 80  Rhythm: normal sinus rhythm  BP: (!) 118/57  MAP (mmHg): 82  Resp: 20  SpO2: 100 %  Oxygen Concentration (%): 40  Vent Mode: A/C  Set Rate: 16 BPM  Vt Set: 510 mL  Pressure Support: 5 cmH20  PEEP/CPAP: 5 cmH20  Peak Airway Pressure: 24 cmH20  Mean Airway Pressure: 7.9 cmH20  Plateau Pressure: 40 cmH20    Temp  Min: 98.2 °F (36.8 °C)  Max: 98.7 °F (37.1 °C)  Pulse  Min: 56  Max: 82  BP  Min: 89/64  Max: 130/62  MAP (mmHg)  Min: 67  Max: 89  Resp  Min: 8  Max: 26  SpO2  Min: 94 %  Max: 100 %  Oxygen Concentration (%)  Min: 40  Max: 40    02/02 0701 - 02/03 0700  In: 1602.6 [I.V.:654.5]  Out: 1500 [Urine:1500]   Unmeasured Output  Stool Occurrence: 0        Physical Exam  Vitals and nursing note reviewed.   Constitutional:       Appearance: Normal appearance.   HENT:      Head: Normocephalic.      Nose: Nose normal.      Mouth/Throat:      Mouth: Mucous membranes are moist.      Pharynx: Oropharynx is clear.   Eyes:      Pupils: Pupils are equal, round, and reactive to light.   Cardiovascular:      Rate and Rhythm: Normal rate and regular rhythm.      Pulses: Normal pulses.      Heart sounds: Normal heart sounds.   Pulmonary:      Effort: Pulmonary effort is normal.      Breath sounds: Normal breath sounds.   Abdominal:      General: Bowel sounds are normal.      Palpations: Abdomen is soft.   Musculoskeletal:         General: Normal range of motion.   Skin:     General: Skin is warm and dry.      Capillary Refill: Capillary refill takes 2 to 3 seconds.   Neurological:       Mental Status: He is alert.      Comments: GCS T 11- on sedation  PERRL  Answers yes/no questions  ANTUNEZ to command  Sensation Intact X4           Unable to test orientation, language, memory, judgment, insight, fund of knowledge, hearing, shoulder shrug, tongue protrusion, coordination, gait due to level of consciousness.       Medications:  Continuoussodium chloride 0.9%, Last Rate: Stopped (02/03/24 0801)  dexmedeTOMIDine (Precedex) infusion (titrating), Last Rate: Stopped (02/03/24 0813)  dextrose 10 % in water (D10W)  fentanyl, Last Rate: Stopped (02/03/24 0813)  propofoL, Last Rate: Stopped (02/03/24 0850)    Scheduledalbuterol-ipratropium, 3 mL, Q6H  ampicillin-sulbactam, 3 g, Q6H  atorvastatin, 10 mg, Daily  famotidine, 20 mg, BID  FLUoxetine, 40 mg, Daily  mirtazapine, 15 mg, QHS  mupirocin, , BID  polyethylene glycol, 17 g, BID  QUEtiapine, 50 mg, Q12H  senna-docusate 8.6-50 mg, 2 tablet, BID  vancomycin (VANCOCIN) IV (PEDS and ADULTS), 15 mg/kg, Q12H    PRNacetaminophen, 650 mg, Q6H PRN  dextrose 10 % in water (D10W), , Continuous PRN  dextrose 10%, 12.5 g, PRN  dextrose 10%, 25 g, PRN  fentanyl, 50 mcg, Q1H PRN  glucagon (human recombinant), 1 mg, PRN  hydrOXYzine HCL, 25 mg, TID PRN  insulin aspart U-100, 0-10 Units, Q4H PRN  magnesium oxide, 800 mg, PRN  magnesium oxide, 800 mg, PRN  morphine, 2 mg, Q1H PRN  ondansetron, 4 mg, Q6H PRN  oxyCODONE, 5 mg, Q4H PRN  potassium bicarbonate, 35 mEq, PRN  potassium bicarbonate, 50 mEq, PRN  potassium bicarbonate, 60 mEq, PRN  potassium, sodium phosphates, 2 packet, PRN  potassium, sodium phosphates, 2 packet, PRN  potassium, sodium phosphates, 2 packet, PRN  prochlorperazine, 5 mg, Q6H PRN  vancomycin - pharmacy to dose, , pharmacy to manage frequency      Today I personally reviewed pertinent medications, lines/drains/airways, imaging, cardiology results, laboratory results, microbiology results, notably:    Diet  Diet NPO  Diet NPO

## 2024-02-03 NOTE — PLAN OF CARE
"[facility] Care Plan    POC reviewed with Rebel Rodriguez and family at 0300. Pt verbalized understanding. Questions and concerns addressed. No acute events overnight. Pt progressing toward goals. Will continue to monitor. See below and flowsheets for full assessment and VS info.     -Pt remained on spontaneous vent settings till approx. 0400; apnea alarm triggered multiple times; provider notified; ABG obtained; switched to ACVC.     -Fentanyl @ 162.5  -Precedex @ 0.9  -PRN oxycodone given x1  -PRN hydroxyzine given x1  -PRN morphine given x1  -Additional 25 mg Seroquel given with PM meds  -NPO @ midnight for OR this AM; G-tube placement    Is this a stroke patient? no    Neuro:  [place]ann[place] Coma Scale  Best Eye Response: 4-->(E4) spontaneous  Best Motor Response: 6-->(M6) obeys commands  Best Verbal Response: 1-->(V1) none  [place] Coma Scale Score: 11  Assessment Qualifiers: patient intubated  Pupil PERRLA: yes     24hr Temp:  [98.2 °F (36.8 °C)-98.7 °F (37.1 °C)]     CV:   Rhythm: normal sinus rhythm  BP goals:   SBP < 160  MAP > 65    Resp:      Vent Mode: Spont  Set Rate: 0 BPM  Oxygen Concentration (%): 40  Vt Set: 0 mL  PEEP/CPAP: 5 cmH20  Pressure Support: 5 cmH20    Plan: wean to extubate    GI/:     Diet/Nutrition Received: NPO  Last Bowel Movement: 01/29/24  Voiding Characteristics: external catheter    Intake/Output Summary (Last 24 hours) at 2/3/2024 0356  Last data filed at 2/3/2024 0301  Gross per 24 hour   Intake 1614.23 ml   Output 1073 ml   Net 541.23 ml     Unmeasured Output  Stool Occurrence: 0    Labs/Accuchecks:  Recent Labs   Lab 02/03/24  0125   WBC 9.35   RBC 3.14*   HGB 9.0*   HCT 28.4*         Recent Labs   Lab 02/03/24  0125      K 3.5   CO2 23   *   BUN 18   CREATININE 0.8   ALKPHOS 47*   ALT 25   AST 62*   BILITOT 0.3    No results for input(s): "PROTIME", "INR", "APTT", "HEPANTIXA" in the last 168 hours. No results for input(s): "CPK", "CPKMB", "TROPONINI", "MB" in the last " 168 hours.    Electrolytes: Electrolytes replaced  Accuchecks: Q4H    Gtts:   sodium chloride 0.9% 5 mL/hr at 02/03/24 0301    dexmedeTOMIDine (Precedex) infusion (titrating) 0.9 mcg/kg/hr (02/03/24 0301)    dextrose 10 % in water (D10W)      fentanyl 150 mcg/hr (02/03/24 0301)       LDA/Wounds:    Nurses Note -- 4 Eyes      2/3/2024   3:56 AM      Skin assessed during: Q Shift Change    Is there altered skin present? yes     [x] Yes- Altered Skin Integrity Present or Discovered   [x] LDA Added if Not in Epic (Describe Wound)   [] New Altered Skin Integrity was Present on Admit and Documented in LDA   [] Wound Image Taken    Wound Care Consulted? NO    Attending Nurse:  MANNY Romo    Second RN/Staff Member:  MANNY Montanez

## 2024-02-03 NOTE — PROGRESS NOTES
ENT Attending Note    I met with Mr. Rodriguez, his mother, and his sister this morning and explained that I am the covering ENT attending for the weekend. The majority of my conversation was with his mother and sister, given that the patient was intubated with propofol infusion, although I did briefly discuss the plan with him as well. I discussed this case with Dr. Agarwal yesterday, who explained to me the location of the pharyngeal/esophageal tear. I apologized that I can not answer all of their questions as I was not present during the initial surgery or the initial repair with Dr. Agarwal.    I discussed the location of the injury using anatomic illustrations, and that watertight closures in this area are difficult even in ideal scenarios. I discussed that the recommendation would be to proceed with both tracheostomy and g-tube, given that the area in question is at least partially above the full separation of the airway and digestive tracts.    They agree to g-tube placement. They are understandably hesitant to proceed with a tracheostomy. At the time of the g-tube placement, I will perform a direct laryngoscopy to view the repair performed by Dr. Agarwal. If there is any hardware exposed or concern about the integrity of the repair, we will proceed with tracheostomy. I explained that this would be due to the status of the tissue involved and the healing process, not the quality of Dr. Agarwal's closure, and that attempting further repair at this time would be unlikely to improve the outcome. If the repair appears intact, we will proceed to trial extubation. If there is concern for a leak due to inability of the drain to hold suction, or crepitus, or other signs of a leak, the patient will require reintubation and tracheostomy at that time.    Please see ENT resident note for further details.    Jodie Collier MD  Otolaryngology - Head & Neck Surgery

## 2024-02-03 NOTE — ASSESSMENT & PLAN NOTE
55yo M w/PMH C4-7 ACDF, cervical myelopathy due to C3-4 severe stenosis now s/p C3-4 ACDF on 1/30 complicated by hypopharyngeal injury s/p repair of posterior pharyngeal wall laceration and right lateral pharyngotomy per ENT on 1/30 who general surgery is consulted for open G tube. Patient is candidate for g-tube due to dysphagia.    - To OR today for G-tube  - Consented for surgery today  - Will coordinate surgery time with ENT

## 2024-02-03 NOTE — ASSESSMENT & PLAN NOTE
cristofer Rodriguez  Is a 54 y.o. male with cervical myelopathy and prior ACDF C4 C7 with adjacent level 3 4 who presented for elective C3-4 ACDF on 01/30, which complicated by retropharyngeal injury required repair by ENT.        Continue ICU care   Continue pain control   Neuro neuro check Q 1   Cervical drain by ENT   ENT planning for Trach PEG today  IV fluids   TF per Neuro ICU   SubQ heparin for DVT prophylaxis

## 2024-02-03 NOTE — ASSESSMENT & PLAN NOTE
Mr Rodriguez is a 55 yo male who is s/p C3-4 ACDF surgery with post op dysphagia, odynophagia, neck pain, swelling and crepitus. CT and scope demonstrates DELFIN drain in the hypopharynx. Patient with difficulty with swallowing secretions. No respiratory compromise. He is s/p neck exploration and pharyngeal repair on 1/30. Discussed with patient's mother and sister at bedside the recommendation for open G tube and tracheostomy to allow adequate time for pharynx to heal.     Please see interval history for in depth detailing about plan for today. Will plan for DL with possible tracheostomy at time of open G tube today. Consent obtained and witnessed by RN.     - General surgery consulted, following    - Will plan for G tube   - Patient will be to be NPO for 4-6 weeks minimum  - ID consulted for abx recommendations in setting of hardware with pharyngeal injury    - Currently on vancomycin and unasyn    - Abx per ID recs  - to OR today for open G tube, DL with possible tracheostomy   - Rest of care for per primary  - Please page ENT with questions or concerns.

## 2024-02-03 NOTE — SUBJECTIVE & OBJECTIVE
Interval History: naeon, plan for peg/trach today    Medications:  Continuous Infusions:   sodium chloride 0.9% 5 mL/hr at 02/03/24 0601    dexmedeTOMIDine (Precedex) infusion (titrating) 0.9 mcg/kg/hr (02/03/24 0601)    dextrose 10 % in water (D10W)      fentanyl 150 mcg/hr (02/03/24 0601)     Scheduled Meds:   albuterol-ipratropium  3 mL Nebulization Q6H    ampicillin-sulbactam  3 g Intravenous Q6H    atorvastatin  10 mg Per NG tube Daily    enoxparin  40 mg Subcutaneous Q24H (prophylaxis, 1700)    famotidine  20 mg Per NG tube BID    FLUoxetine  40 mg Per NG tube Daily    mirtazapine  15 mg Per NG tube QHS    mupirocin   Nasal BID    polyethylene glycol  17 g Per NG tube BID    QUEtiapine  50 mg Per NG tube Q12H    senna-docusate 8.6-50 mg  2 tablet Per NG tube BID    vancomycin (VANCOCIN) IV (PEDS and ADULTS)  15 mg/kg Intravenous Q12H     PRN Meds:acetaminophen, dextrose 10 % in water (D10W), dextrose 10%, dextrose 10%, fentanyl, glucagon (human recombinant), hydrOXYzine HCL, insulin aspart U-100, magnesium oxide, magnesium oxide, morphine, ondansetron, oxyCODONE, potassium bicarbonate, potassium bicarbonate, potassium bicarbonate, potassium, sodium phosphates, potassium, sodium phosphates, potassium, sodium phosphates, prochlorperazine, Pharmacy to dose Vancomycin consult **AND** vancomycin - pharmacy to dose     Review of Systems  Objective:     Weight: 74.9 kg (165 lb 2 oz)  Body mass index is 25.11 kg/m².  Vital Signs (Most Recent):  Temp: 98.6 °F (37 °C) (02/03/24 0301)  Pulse: 65 (02/03/24 0704)  Resp: 18 (02/03/24 0704)  BP: 130/62 (02/03/24 0704)  SpO2: 100 % (02/03/24 0704) Vital Signs (24h Range):  Temp:  [98.2 °F (36.8 °C)-98.7 °F (37.1 °C)] 98.6 °F (37 °C)  Pulse:  [56-85] 65  Resp:  [8-26] 18  SpO2:  [94 %-100 %] 100 %  BP: ()/(50-64) 130/62                Vent Mode: A/C  Oxygen Concentration (%):  [40] 40  Resp Rate Total:  [6.8 br/min-28 br/min] 23 br/min  Vt Set:  [0 mL-510 mL] 510  mL  PEEP/CPAP:  [5 cmH20] 5 cmH20  Pressure Support:  [5 cmH20] 5 cmH20  Mean Airway Pressure:  [6.1 xfT28-58 cmH20] 7.9 cmH20             Closed/Suction Drain 01/30/24 1916 Right;Ventral Neck Bulb 15 Fr. (Active)   Site Description Healing 02/03/24 0501   Drainage Serosanguineous 02/03/24 0501   Status To bulb suction 02/03/24 0501   Output (mL) 13 mL 02/02/24 0550       Male External Urinary Catheter 01/30/24 2000 (Active)   Collection Container Urimeter 02/03/24 0501   Securement Method secured to top of thigh w/ adhesive device 02/03/24 0501   Skin no redness;no breakdown 02/03/24 0501   Tolerance no signs/symptoms of discomfort 02/03/24 0501   Output (mL) 500 mL 02/03/24 0501   Catheter Change Date 01/31/24 02/03/24 0501   Catheter Change Time 1905 02/01/24 0505            Trans Pyloric Feeding Tube 01/30/24 1916 Tungsten weighted 12 Fr. Right nostril (Active)   Placement Check placement verified by x-ray 02/03/24 0501   Tolerance no signs/symptoms of discomfort 02/03/24 0501   Securement secured to nostril center w/ adhesive device 02/03/24 0501   Clamp Status/Tolerance unclamped 02/03/24 0501   Suction Setting/Drainage Method suction at the bedside 02/03/24 0501   Insertion Site Appearance no redness, warmth, tenderness, skin breakdown, drainage 02/03/24 0501   Drainage None 02/03/24 0501   Flush/Irrigation flushed w/;water;no resistance met 02/03/24 0501   Feeding Type continuous;by pump 02/03/24 0501   Current Rate (mL/hr) 35 mL/hr 02/02/24 0701   Goal Rate (mL/hr) 35 mL/hr 02/02/24 0701   Intake (mL) 30 mL 02/02/24 2101   Intake (mL) - Formula Tube Feeding 0 02/02/24 1000          Physical Exam         Neurosurgery Physical Exam  [id number],   AOx4,   L HG/WE 4/5 ow intact      Significant Labs:  Recent Labs   Lab 02/02/24  0109 02/03/24  0125   * 83   * 143   K 3.6 3.5    111*   CO2 23 23   BUN 24* 18   CREATININE 0.8 0.8   CALCIUM 8.5* 8.1*   MG 1.9 1.6     Recent Labs   Lab 02/02/24  0109  "02/03/24  0125   WBC 11.27 9.35   HGB 9.9* 9.0*   HCT 31.2* 28.4*    200     No results for input(s): "LABPT", "INR", "APTT" in the last 48 hours.  Microbiology Results (last 7 days)       ** No results found for the last 168 hours. **          All pertinent labs from the last 24 hours have been reviewed.    Significant Diagnostics:  I have reviewed all pertinent imaging results/findings within the past 24 hours.  I have reviewed and interpreted all pertinent imaging results/findings within the past 24 hours.  "

## 2024-02-03 NOTE — NURSING
Pt arrived back to room following OR     Time of anesthesia transfer of care (and end time for our post-op monitoring): 1130    Surgical incision care orders in?  See ENT & gen surg notes for trach & peg care     Drain care orders in? yes drain to gravity - keep peg to gravity     HOB orders: HOB>30 degrees    NCC provider notified: HITESH Washington        Pt was escorted by RN and CRNA on cardiac monitoring, O2, ambu bag, and IV pole.        Patient placed back on bedside monitor with alarms audible, bed in low position with bed alarm on, call light within reach. WCTM.

## 2024-02-04 PROBLEM — Z93.1 GASTROSTOMY STATUS: Status: ACTIVE | Noted: 2024-02-04

## 2024-02-04 PROBLEM — R53.81 PHYSICAL DECONDITIONING: Status: ACTIVE | Noted: 2024-02-04

## 2024-02-04 LAB
ALBUMIN SERPL BCP-MCNC: 2.7 G/DL (ref 3.5–5.2)
ALP SERPL-CCNC: 52 U/L (ref 55–135)
ALT SERPL W/O P-5'-P-CCNC: 39 U/L (ref 10–44)
ANION GAP SERPL CALC-SCNC: 10 MMOL/L (ref 8–16)
AST SERPL-CCNC: 70 U/L (ref 10–40)
BASOPHILS # BLD AUTO: 0.01 K/UL (ref 0–0.2)
BASOPHILS NFR BLD: 0.1 % (ref 0–1.9)
BILIRUB SERPL-MCNC: 0.6 MG/DL (ref 0.1–1)
BUN SERPL-MCNC: 16 MG/DL (ref 6–20)
CALCIUM SERPL-MCNC: 8.9 MG/DL (ref 8.7–10.5)
CHLORIDE SERPL-SCNC: 108 MMOL/L (ref 95–110)
CO2 SERPL-SCNC: 25 MMOL/L (ref 23–29)
CREAT SERPL-MCNC: 0.8 MG/DL (ref 0.5–1.4)
DIFFERENTIAL METHOD BLD: ABNORMAL
EOSINOPHIL # BLD AUTO: 0 K/UL (ref 0–0.5)
EOSINOPHIL NFR BLD: 0.2 % (ref 0–8)
ERYTHROCYTE [DISTWIDTH] IN BLOOD BY AUTOMATED COUNT: 14.3 % (ref 11.5–14.5)
EST. GFR  (NO RACE VARIABLE): >60 ML/MIN/1.73 M^2
GLUCOSE SERPL-MCNC: 87 MG/DL (ref 70–110)
HCT VFR BLD AUTO: 29.4 % (ref 40–54)
HGB BLD-MCNC: 9.5 G/DL (ref 14–18)
IMM GRANULOCYTES # BLD AUTO: 0.05 K/UL (ref 0–0.04)
IMM GRANULOCYTES NFR BLD AUTO: 0.4 % (ref 0–0.5)
LYMPHOCYTES # BLD AUTO: 1.6 K/UL (ref 1–4.8)
LYMPHOCYTES NFR BLD: 12.7 % (ref 18–48)
MAGNESIUM SERPL-MCNC: 2 MG/DL (ref 1.6–2.6)
MCH RBC QN AUTO: 28.5 PG (ref 27–31)
MCHC RBC AUTO-ENTMCNC: 32.3 G/DL (ref 32–36)
MCV RBC AUTO: 88 FL (ref 82–98)
MONOCYTES # BLD AUTO: 1.2 K/UL (ref 0.3–1)
MONOCYTES NFR BLD: 10 % (ref 4–15)
NEUTROPHILS # BLD AUTO: 9.5 K/UL (ref 1.8–7.7)
NEUTROPHILS NFR BLD: 76.6 % (ref 38–73)
NRBC BLD-RTO: 0 /100 WBC
PHOSPHATE SERPL-MCNC: 2.9 MG/DL (ref 2.7–4.5)
PLATELET # BLD AUTO: 237 K/UL (ref 150–450)
PMV BLD AUTO: 9.7 FL (ref 9.2–12.9)
POTASSIUM SERPL-SCNC: 4.2 MMOL/L (ref 3.5–5.1)
PROT SERPL-MCNC: 5.6 G/DL (ref 6–8.4)
RBC # BLD AUTO: 3.33 M/UL (ref 4.6–6.2)
SODIUM SERPL-SCNC: 143 MMOL/L (ref 136–145)
WBC # BLD AUTO: 12.33 K/UL (ref 3.9–12.7)

## 2024-02-04 PROCEDURE — 31720 CLEARANCE OF AIRWAYS: CPT

## 2024-02-04 PROCEDURE — 99233 SBSQ HOSP IP/OBS HIGH 50: CPT | Mod: ,,, | Performed by: NURSE PRACTITIONER

## 2024-02-04 PROCEDURE — 85025 COMPLETE CBC W/AUTO DIFF WBC: CPT

## 2024-02-04 PROCEDURE — 25000003 PHARM REV CODE 250: Performed by: NURSE PRACTITIONER

## 2024-02-04 PROCEDURE — 20600001 HC STEP DOWN PRIVATE ROOM

## 2024-02-04 PROCEDURE — 94761 N-INVAS EAR/PLS OXIMETRY MLT: CPT

## 2024-02-04 PROCEDURE — 84100 ASSAY OF PHOSPHORUS: CPT

## 2024-02-04 PROCEDURE — 99900035 HC TECH TIME PER 15 MIN (STAT)

## 2024-02-04 PROCEDURE — 83735 ASSAY OF MAGNESIUM: CPT

## 2024-02-04 PROCEDURE — 25000003 PHARM REV CODE 250

## 2024-02-04 PROCEDURE — 25000242 PHARM REV CODE 250 ALT 637 W/ HCPCS: Performed by: NURSE PRACTITIONER

## 2024-02-04 PROCEDURE — 27000221 HC OXYGEN, UP TO 24 HOURS

## 2024-02-04 PROCEDURE — 80053 COMPREHEN METABOLIC PANEL: CPT

## 2024-02-04 PROCEDURE — 25000003 PHARM REV CODE 250: Performed by: PSYCHIATRY & NEUROLOGY

## 2024-02-04 PROCEDURE — 63600175 PHARM REV CODE 636 W HCPCS: Performed by: NURSE PRACTITIONER

## 2024-02-04 PROCEDURE — 94640 AIRWAY INHALATION TREATMENT: CPT

## 2024-02-04 RX ORDER — ACETAMINOPHEN 500 MG
1000 TABLET ORAL EVERY 8 HOURS PRN
Status: DISCONTINUED | OUTPATIENT
Start: 2024-02-04 | End: 2024-02-14 | Stop reason: HOSPADM

## 2024-02-04 RX ORDER — HYDROMORPHONE HYDROCHLORIDE 1 MG/ML
1 INJECTION, SOLUTION INTRAMUSCULAR; INTRAVENOUS; SUBCUTANEOUS EVERY 4 HOURS PRN
Status: DISCONTINUED | OUTPATIENT
Start: 2024-02-04 | End: 2024-02-14 | Stop reason: HOSPADM

## 2024-02-04 RX ORDER — GLYCERIN 1 G/1
1 SUPPOSITORY RECTAL ONCE
Status: COMPLETED | OUTPATIENT
Start: 2024-02-04 | End: 2024-02-04

## 2024-02-04 RX ADMIN — IPRATROPIUM BROMIDE AND ALBUTEROL SULFATE 3 ML: .5; 3 SOLUTION RESPIRATORY (INHALATION) at 01:02

## 2024-02-04 RX ADMIN — OXYCODONE HYDROCHLORIDE 10 MG: 10 TABLET ORAL at 01:02

## 2024-02-04 RX ADMIN — IPRATROPIUM BROMIDE AND ALBUTEROL SULFATE 3 ML: .5; 3 SOLUTION RESPIRATORY (INHALATION) at 08:02

## 2024-02-04 RX ADMIN — HYDROMORPHONE HYDROCHLORIDE 1 MG: 1 INJECTION, SOLUTION INTRAMUSCULAR; INTRAVENOUS; SUBCUTANEOUS at 01:02

## 2024-02-04 RX ADMIN — FLUOXETINE HYDROCHLORIDE 40 MG: 20 CAPSULE ORAL at 09:02

## 2024-02-04 RX ADMIN — QUETIAPINE FUMARATE 50 MG: 25 TABLET ORAL at 09:02

## 2024-02-04 RX ADMIN — HYDROMORPHONE HYDROCHLORIDE 1 MG: 1 INJECTION, SOLUTION INTRAMUSCULAR; INTRAVENOUS; SUBCUTANEOUS at 06:02

## 2024-02-04 RX ADMIN — IPRATROPIUM BROMIDE AND ALBUTEROL SULFATE 3 ML: .5; 3 SOLUTION RESPIRATORY (INHALATION) at 07:02

## 2024-02-04 RX ADMIN — ATORVASTATIN CALCIUM 10 MG: 10 TABLET, FILM COATED ORAL at 09:02

## 2024-02-04 RX ADMIN — FAMOTIDINE 20 MG: 20 TABLET, FILM COATED ORAL at 09:02

## 2024-02-04 RX ADMIN — GLYCERIN 1 SUPPOSITORY: 2 SUPPOSITORY RECTAL at 03:02

## 2024-02-04 RX ADMIN — MIRTAZAPINE 15 MG: 7.5 TABLET, FILM COATED ORAL at 08:02

## 2024-02-04 RX ADMIN — QUETIAPINE FUMARATE 50 MG: 25 TABLET ORAL at 08:02

## 2024-02-04 RX ADMIN — HYDROMORPHONE HYDROCHLORIDE 1 MG: 1 INJECTION, SOLUTION INTRAMUSCULAR; INTRAVENOUS; SUBCUTANEOUS at 05:02

## 2024-02-04 RX ADMIN — MUPIROCIN: 20 OINTMENT TOPICAL at 09:02

## 2024-02-04 RX ADMIN — IPRATROPIUM BROMIDE AND ALBUTEROL SULFATE 3 ML: .5; 3 SOLUTION RESPIRATORY (INHALATION) at 12:02

## 2024-02-04 RX ADMIN — AMPICILLIN SODIUM, SULBACTAM SODIUM 3 G: 2; 1 INJECTION, POWDER, FOR SOLUTION INTRAMUSCULAR; INTRAVENOUS at 12:02

## 2024-02-04 RX ADMIN — HYDROMORPHONE HYDROCHLORIDE 1 MG: 1 INJECTION, SOLUTION INTRAMUSCULAR; INTRAVENOUS; SUBCUTANEOUS at 11:02

## 2024-02-04 NOTE — ASSESSMENT & PLAN NOTE
DELFIN drain noted to be in hypopharynx on post-op imaging, s/p emergent surgical repair on 1/30  - Dex 4q6hrs, discontinued  ENT recs: Now s/p trach and open G tube on 2/3/24.     - Strict NPO, NPO for 4-6 weeks minimum  - ID consulted for abx recommendations in setting of hardware with pharyngeal injury          - Currently off all abx, s/p vanc and unasyn   - s/p tracheostomy, with 6-0 cuffed shiley in place          - CUFF TO REMAIN UP - until otherwise directed by ENT          - Tentative plan to deflate on Tuesday  - R neck drain to stay in place until at least Tuesday   - needs time for wound healing

## 2024-02-04 NOTE — PROGRESS NOTES
[facility]  [facility]  IMN Transfer Acceptance Note    Patient Name: Rebel Rodriguez  MRN: [id number]  Patient Class: IP- Inpatient   Admission Date: 1/30/2024  Length of Stay: 5 days  Attending Physician: Marlene Matute MD  Primary Care Provider: Nanda Smith MD        Subjective:     Principal Problem:Cervical myelopathy    HPI:  53 y.o. male with hx of psoriatic arthritis, hx TIA on Aspirin 81 mg, s/p C4-7 ACDF with Dr. Huff 10 years ago admitted to [facility] s/p C3-C4 ACDF. Per chart review, reports rapid functional decline over the last 3 weeks. Endorses hand clumsiness, difficulty typing, gait imbalance, difficulty butoning, dropping items, falls. Difficulty with ambulating also due to LLE weakness. States it feels like he has rubber bands around his wrists. Reports difficulty with overhead mobility and shock-like pains down spine when raising arms overhead. CT neck soft tissue pending, Patient admitted to [facility] for close monitoring and higher level of care.      Hospital Course: 01/31/2024 CT neck concerning for extensive soft tissue edema and air in neck, additionally w/ concern for DELFIN drain misplaced into hypopharynx. Taken class A to OR by ENT for pharyngeal repair, left intubated by ENT in setting of airway edema. On high dose steroids, no cuff leak this AM  02/01/2024 General surgery consulted for open G tube placement, family still in discussion regarding trach. D/c dex to allow for adequate wound healing, ok with NSGY.   2/2/24: possible G-tube placement today  2/3/24: G-tube today  2/4/24: ok to step down to hospital medicine     Interval History: trach collar and g-tube in place, ok to step down to Hospital medicine    ENT recs: Now s/p trach and open G tube on 2/3/24.     - Strict NPO, NPO for 4-6 weeks minimum  - ID consulted for abx recommendations in setting of hardware with pharyngeal injury          - Currently off all abx, s/p vanc and unasyn   - s/p tracheostomy, with  6-0 cuffed shiley in place          - CUFF TO REMAIN UP - until otherwise directed by ENT          - Tentative plan to deflate on Tuesday  - R neck drain to stay in place until at least Tuesday       Overview/Hospital Course:  No notes on file    Interval History: see above    Review of Systems  Objective:     Vital Signs (Most Recent):  Temp: 99.3 °F (37.4 °C) (02/04/24 1501)  Pulse: 76 (02/04/24 1501)  Resp: 15 (02/04/24 1501)  BP: (!) 119/59 (02/04/24 1501)  SpO2: 100 % (02/04/24 1501) Vital Signs (24h Range):  Temp:  [98.6 °F (37 °C)-99.3 °F (37.4 °C)] 99.3 °F (37.4 °C)  Pulse:  [] 76  Resp:  [13-34] 15  SpO2:  [93 %-100 %] 100 %  BP: (105-125)/(51-67) 119/59     Weight: 74.9 kg (165 lb 2 oz)  Body mass index is 25.11 kg/m².    Intake/Output Summary (Last 24 hours) at 2/4/2024 1540  Last data filed at 2/4/2024 0610  Gross per 24 hour   Intake 396.17 ml   Output 963 ml   Net -566.83 ml         Physical Exam        Significant Labs: All pertinent labs within the past 24 hours have been reviewed.  CBC:   Recent Labs   Lab 02/03/24  0125 02/04/24  0033   WBC 9.35 12.33   HGB 9.0* 9.5*   HCT 28.4* 29.4*    237     CMP:   Recent Labs   Lab 02/03/24  0125 02/04/24  0033    143   K 3.5 4.2   * 108   CO2 23 25   GLU 83 87   BUN 18 16   CREATININE 0.8 0.8   CALCIUM 8.1* 8.9   PROT 5.0* 5.6*   ALBUMIN 2.6* 2.7*   BILITOT 0.3 0.6   ALKPHOS 47* 52*   AST 62* 70*   ALT 25 39   ANIONGAP 9 10       Significant Imaging: I have reviewed all pertinent imaging results/findings within the past 24 hours.    Assessment/Plan:      Injury of pharynx  DELFIN drain noted to be in hypopharynx on post-op imaging, s/p emergent surgical repair on 1/30  - Dex 4q6hrs, discontinued  ENT recs: Now s/p trach and open G tube on 2/3/24.     - Strict NPO, NPO for 4-6 weeks minimum  - ID consulted for abx recommendations in setting of hardware with pharyngeal injury          - Currently off all abx, s/p vanc and unasyn   - s/p  tracheostomy, with 6-0 cuffed shiley in place          - CUFF TO REMAIN UP - until otherwise directed by ENT          - Tentative plan to deflate on Tuesday  - R neck drain to stay in place until at least Tuesday   - needs time for wound healing      Gastrostomy status  Patient noted to have a percutaneous endoscopic gastrostomy tube in place. I have personally inspected the tube.Tube was placed on this admission, with date of procedure- 2/2  There are no signs of drainage or infection around the site. The tube is patent. Medications have not converted to liquid form if available.  Routine care to be done by wound care and nursing staff.      Isossource @ 35 cc/ h  Peptomen 1.5 w bio 45cc/ h  Water flushes        Physical deconditioning  Has trache and G tube,   PT/OT  Wean oxygen  Teach trache and G-tube care      Anxiety  Hx of    - C/w home remeron and fluoxetine  - seroquel BID    Hyperlipidemia  Hx of    - C/w home atorvastatin         VTE Risk Mitigation (From admission, onward)           Ordered     Place LAUREN hose  Until discontinued         01/30/24 0513     Place sequential compression device  Until discontinued         01/30/24 0513                    Discharge Planning   JUSTYN: 2/11/2024     Code Status: Full Code   Is the patient medically ready for discharge?:     Reason for patient still in hospital (select all that apply): Patient trending condition  Discharge Plan A: Rehab          Elyse Dobbins MD  Department of Hospital Medicine   [facility]

## 2024-02-04 NOTE — PROGRESS NOTES
[facility]  Neurocritical Care  Progress Note    Admit Date: 1/30/2024  Service Date: 02/04/2024  Length of Stay: 5    Subjective:     Chief Complaint: Cervical myelopathy    History of Present Illness: Rebel Rodriguez is a 53 y.o. male with hx of psoriatic arthritis, hx TIA on Aspirin 81 mg, s/p C4-7 ACDF with Dr. Huff 10 years ago admitted to [facility] s/p C3-C4 ACDF. Per chart review, reports rapid functional decline over the last 3 weeks. Endorses hand clumsiness, difficulty typing, gait imbalance, difficulty butoning, dropping items, falls. Difficulty with ambulating also due to LLE weakness. States it feels like he has rubber bands around his wrists. Reports difficulty with overhead mobility and shock-like pains down spine when raising arms overhead. CT neck soft tissue pending, Patient admitted to [facility] for close monitoring and higher level of care.     Hospital Course: 01/31/2024 CT neck concerning for extensive soft tissue edema and air in neck, additionally w/ concern for DELFIN drain misplaced into hypopharynx. Taken class A to OR by ENT for pharyngeal repair, left intubated by ENT in setting of airway edema. On high dose steroids, no cuff leak this AM  02/01/2024 General surgery consulted for open G tube placement, family still in discussion regarding trach. D/c dex to allow for adequate wound healing, ok with NSGY.   2/2/24: possible G-tube placement today  2/3/24: G-tube today  2/4/24: ok to step down to hospital medicine    Interval History: trach collar and g-tube in place, ok to step down to Hospital medicine  Review of Systems   Constitutional: Negative.    HENT: Negative.     Eyes: Negative.    Respiratory: Negative.     Cardiovascular: Negative.    Gastrointestinal: Negative.    Endocrine: Negative.    Genitourinary: Negative.    Musculoskeletal: Negative.    Skin: Negative.    Neurological: Negative.    Hematological: Negative.      2 systems   Objective:     Vitals:  Temp: 98.6 °F (37  °C)  Pulse: 78  Rhythm: paced rhythm  BP: 124/61  MAP (mmHg): 86  Resp: 19  SpO2: 100 %  Oxygen Concentration (%): 28    Temp  Min: 98.3 °F (36.8 °C)  Max: 99 °F (37.2 °C)  Pulse  Min: 71  Max: 101  BP  Min: 102/55  Max: 124/61  MAP (mmHg)  Min: 72  Max: 86  Resp  Min: 10  Max: 34  SpO2  Min: 93 %  Max: 100 %  Oxygen Concentration (%)  Min: 28  Max: 40    02/03 0701 - 02/04 0700  In: 1522.8 [I.V.:885.1]  Out: 1213 [Urine:900; Drains:313]   Unmeasured Output  Stool Occurrence: 0        Physical Exam  Vitals and nursing note reviewed.   Constitutional:       Appearance: Normal appearance.   HENT:      Head: Normocephalic.      Nose: Nose normal.      Mouth/Throat:      Mouth: Mucous membranes are moist.      Pharynx: Oropharynx is clear.   Eyes:      Pupils: Pupils are equal, round, and reactive to light.   Cardiovascular:      Rate and Rhythm: Normal rate and regular rhythm.      Pulses: Normal pulses.      Heart sounds: Normal heart sounds.   Pulmonary:      Effort: Pulmonary effort is normal.      Breath sounds: Normal breath sounds.   Abdominal:      General: Bowel sounds are normal.      Palpations: Abdomen is soft.   Musculoskeletal:         General: Normal range of motion.   Skin:     General: Skin is warm and dry.      Capillary Refill: Capillary refill takes 2 to 3 seconds.   Neurological:      Mental Status: He is alert.      Comments: GCS T 11PERRL  Answers yes/no questions  ANTUNEZ to command  Sensation Intact X4           Unable to test orientation, language, memory, judgment, insight, fund of knowledge, hearing, shoulder shrug, tongue protrusion, coordination, gait due to level of consciousness.       Medications:  Continuoussodium chloride 0.9%, Last Rate: Stopped (02/03/24 0801)  dextrose 10 % in water (D10W)    Scheduledalbuterol-ipratropium, 3 mL, Q6H  atorvastatin, 10 mg, Daily  famotidine, 20 mg, BID  FLUoxetine, 40 mg, Daily  mirtazapine, 15 mg, QHS  mupirocin, , BID  polyethylene glycol, 17 g,  BID  QUEtiapine, 50 mg, Q12H  senna-docusate 8.6-50 mg, 2 tablet, BID    PRNacetaminophen, 1,000 mg, Q8H PRN  dextrose 10 % in water (D10W), , Continuous PRN  dextrose 10%, 12.5 g, PRN  dextrose 10%, 25 g, PRN  glucagon (human recombinant), 1 mg, PRN  HYDROmorphone, 1 mg, Q4H PRN  hydrOXYzine HCL, 25 mg, TID PRN  insulin aspart U-100, 0-10 Units, Q4H PRN  magnesium oxide, 800 mg, PRN  magnesium oxide, 800 mg, PRN  ondansetron, 4 mg, Q6H PRN  oxyCODONE, 10 mg, Q4H PRN  potassium bicarbonate, 35 mEq, PRN  potassium bicarbonate, 50 mEq, PRN  potassium bicarbonate, 60 mEq, PRN  potassium, sodium phosphates, 2 packet, PRN  potassium, sodium phosphates, 2 packet, PRN  potassium, sodium phosphates, 2 packet, PRN  prochlorperazine, 5 mg, Q6H PRN      Today I personally reviewed pertinent medications, lines/drains/airways, imaging, cardiology results, laboratory results, microbiology results, notably:    Diet  Diet NPO  Diet NPO        Assessment/Plan:     Psychiatric  Anxiety  Hx of     - C/w home remeron and fluoxetine  - seroquel BID      Cardiac/Vascular  Hyperlipidemia  Hx of     - C/w home atorvastatin     Orthopedic  Injury of pharynx  DELFIN drain noted to be in hypopharynx on post-op imaging, s/p emergent surgical repair on 1/30      - Dex 4q6hrs, taper per ENT  - trach in place and g-tube in place  - ok to step down to floor          The patient is being Prophylaxed for:  Venous Thromboembolism with: Mechanical or Chemical  Stress Ulcer with: H2B  Ventilator Pneumonia with: not applicable    Activity Orders            Elevate HOB Elevate (30-45 degrees) Elevate HOB to 30 - 45 degrees during feeding unless otherwise stated starting at 02/04 0915    Elevate HOB Elevate (30-45 degrees) Elevate HOB to 30 - 45 degrees during feeding unless otherwise stated starting at 02/01 0908    Turn patient every 2 hours starting at 01/31 0000    Diet NPO: NPO starting at 01/30 1712    Elevate HOB starting at 01/30 1054    Ambulate Post  Op Day 0 starting at 01/30 1052    Progressive Mobility Protocol (mobilize patient to their highest level of functioning at least twice daily) starting at 01/30 1046    Elevate HOB 30 starting at 01/30 1046    Ambulate With Assistance, Post Op Day 0 If the patient arrives from PACU by 4PM, walk at least once on day of operation if alert and safe to do so. starting at 01/30 1045          Full Code  Level 3  Maria L Washington NP  Neurocritical Care  [facility]

## 2024-02-04 NOTE — ASSESSMENT & PLAN NOTE
DELFIN drain noted to be in hypopharynx on post-op imaging, s/p emergent surgical repair on 1/30      - Dex 4q6hrs, taper per ENT  - trach in place and g-tube in place  - ok to step down to floor

## 2024-02-04 NOTE — PROGRESS NOTES
[facility]  General Surgery  Progress Note    Subjective:     History of Present Illness:  No notes on file    Post-Op Info:  Procedure(s) (LRB):  LAPAROTOMY with gastrostomy tube placement (N/A)  LARYNGOSCOPY, DIRECT  TRACHEOTOMY   1 Day Post-Op     Interval History: POD1 s/p feeding tube. Pt seen and examined at bedside with family member present. NIKI. VSS      Medications:  Continuous Infusions:   sodium chloride 0.9% Stopped (02/03/24 0801)    dextrose 10 % in water (D10W)       Scheduled Meds:   albuterol-ipratropium  3 mL Nebulization Q6H    atorvastatin  10 mg Per NG tube Daily    famotidine  20 mg Per NG tube BID    FLUoxetine  40 mg Per NG tube Daily    mirtazapine  15 mg Per NG tube QHS    mupirocin   Nasal BID    polyethylene glycol  17 g Per NG tube BID    QUEtiapine  50 mg Per NG tube Q12H    senna-docusate 8.6-50 mg  2 tablet Per NG tube BID     PRN Meds:acetaminophen, dextrose 10 % in water (D10W), dextrose 10%, dextrose 10%, glucagon (human recombinant), HYDROmorphone, hydrOXYzine HCL, insulin aspart U-100, magnesium oxide, magnesium oxide, ondansetron, oxyCODONE, potassium bicarbonate, potassium bicarbonate, potassium bicarbonate, potassium, sodium phosphates, potassium, sodium phosphates, potassium, sodium phosphates, prochlorperazine     Review of patient's allergies indicates:   Allergen Reactions    Erythromycin Nausea And Vomiting    Infliximab Other (See Comments)     Lupus with fever and acute arthritis  Lupus with fever and acute arthritis     Objective:     Vital Signs (Most Recent):  Temp: 98.6 °F (37 °C) (02/04/24 0701)  Pulse: 74 (02/04/24 0821)  Resp: 16 (02/04/24 0821)  BP: (!) 123/58 (02/04/24 0701)  SpO2: 99 % (02/04/24 0821) Vital Signs (24h Range):  Temp:  [98.3 °F (36.8 °C)-99 °F (37.2 °C)] 98.6 °F (37 °C)  Pulse:  [] 74  Resp:  [10-34] 16  SpO2:  [93 %-100 %] 99 %  BP: (102-123)/(51-67) 123/58     Weight: 74.9 kg (165 lb 2 oz)  Body mass index is 25.11  kg/m².    Intake/Output - Last 3 Shifts         02/02 0700  02/03 0659 02/03 0700  02/04 0659 02/04 0700  02/05 0659    I.V. (mL/kg) 654.5 (8.7) 885.1 (11.8)     NG/ 100     IV Piggyback 788.1 537.7     Total Intake(mL/kg) 1602.6 (21.4) 1522.8 (20.3)     Urine (mL/kg/hr) 1500 (0.8) 900 (0.5)     Drains  313     Stool 0      Total Output 1500 1213     Net +102.6 +309.8            Stool Occurrence 0 x              Physical Exam  HENT:      Head: Atraumatic.   Cardiovascular:      Rate and Rhythm: Normal rate and regular rhythm.   Pulmonary:      Effort: No respiratory distress.   Abdominal:      General: There is no distension.      Palpations: Abdomen is soft. There is no mass.      Comments: Tube site clean, dry and intact. No swelling or erythema noted.   Musculoskeletal:      Cervical back: Normal range of motion.   Skin:     General: Skin is warm and dry.   Neurological:      Mental Status: He is alert.          Significant Labs:  I have reviewed all pertinent lab results within the past 24 hours.  CBC:   Recent Labs   Lab 02/04/24  0033   WBC 12.33   RBC 3.33*   HGB 9.5*   HCT 29.4*      MCV 88   MCH 28.5   MCHC 32.3       BMP:   Recent Labs   Lab 02/04/24  0033   GLU 87      K 4.2      CO2 25   BUN 16   CREATININE 0.8   CALCIUM 8.9   MG 2.0         Significant Diagnostics:  I have reviewed all pertinent imaging results/findings within the past 24 hours.  Assessment/Plan:     Injury of pharynx  53yo M w/PMH C4-7 ACDF, cervical myelopathy due to C3-4 severe stenosis now s/p C3-4 ACDF on 1/30 complicated by hypopharyngeal injury s/p repair of posterior pharyngeal wall laceration and right lateral pharyngotomy per ENT on 1/30 who general surgery is consulted for open G tube. Patient is candidate for g-tube due to dysphagia.    - Ok to use tube for feeds and medication  - Rest of care per primary  - Please reach out if you have further treatment questions.                Jordan Del Rio  MD  General Surgery  [facility]

## 2024-02-04 NOTE — SUBJECTIVE & OBJECTIVE
Interval History: trach collar and g-tube in place, ok to step down to Hospital medicine  Review of Systems   Constitutional: Negative.    HENT: Negative.     Eyes: Negative.    Respiratory: Negative.     Cardiovascular: Negative.    Gastrointestinal: Negative.    Endocrine: Negative.    Genitourinary: Negative.    Musculoskeletal: Negative.    Skin: Negative.    Neurological: Negative.    Hematological: Negative.      2 systems   Objective:     Vitals:  Temp: 98.6 °F (37 °C)  Pulse: 78  Rhythm: paced rhythm  BP: 124/61  MAP (mmHg): 86  Resp: 19  SpO2: 100 %  Oxygen Concentration (%): 28    Temp  Min: 98.3 °F (36.8 °C)  Max: 99 °F (37.2 °C)  Pulse  Min: 71  Max: 101  BP  Min: 102/55  Max: 124/61  MAP (mmHg)  Min: 72  Max: 86  Resp  Min: 10  Max: 34  SpO2  Min: 93 %  Max: 100 %  Oxygen Concentration (%)  Min: 28  Max: 40    02/03 0701 - 02/04 0700  In: 1522.8 [I.V.:885.1]  Out: 1213 [Urine:900; Drains:313]   Unmeasured Output  Stool Occurrence: 0        Physical Exam  Vitals and nursing note reviewed.   Constitutional:       Appearance: Normal appearance.   HENT:      Head: Normocephalic.      Nose: Nose normal.      Mouth/Throat:      Mouth: Mucous membranes are moist.      Pharynx: Oropharynx is clear.   Eyes:      Pupils: Pupils are equal, round, and reactive to light.   Cardiovascular:      Rate and Rhythm: Normal rate and regular rhythm.      Pulses: Normal pulses.      Heart sounds: Normal heart sounds.   Pulmonary:      Effort: Pulmonary effort is normal.      Breath sounds: Normal breath sounds.   Abdominal:      General: Bowel sounds are normal.      Palpations: Abdomen is soft.   Musculoskeletal:         General: Normal range of motion.   Skin:     General: Skin is warm and dry.      Capillary Refill: Capillary refill takes 2 to 3 seconds.   Neurological:      Mental Status: He is alert.      Comments: GCS T 11PERRL  Answers yes/no questions  ANTUNEZ to command  Sensation Intact X4           Unable to test  orientation, language, memory, judgment, insight, fund of knowledge, hearing, shoulder shrug, tongue protrusion, coordination, gait due to level of consciousness.       Medications:  Continuoussodium chloride 0.9%, Last Rate: Stopped (02/03/24 0801)  dextrose 10 % in water (D10W)    Scheduledalbuterol-ipratropium, 3 mL, Q6H  atorvastatin, 10 mg, Daily  famotidine, 20 mg, BID  FLUoxetine, 40 mg, Daily  mirtazapine, 15 mg, QHS  mupirocin, , BID  polyethylene glycol, 17 g, BID  QUEtiapine, 50 mg, Q12H  senna-docusate 8.6-50 mg, 2 tablet, BID    PRNacetaminophen, 1,000 mg, Q8H PRN  dextrose 10 % in water (D10W), , Continuous PRN  dextrose 10%, 12.5 g, PRN  dextrose 10%, 25 g, PRN  glucagon (human recombinant), 1 mg, PRN  HYDROmorphone, 1 mg, Q4H PRN  hydrOXYzine HCL, 25 mg, TID PRN  insulin aspart U-100, 0-10 Units, Q4H PRN  magnesium oxide, 800 mg, PRN  magnesium oxide, 800 mg, PRN  ondansetron, 4 mg, Q6H PRN  oxyCODONE, 10 mg, Q4H PRN  potassium bicarbonate, 35 mEq, PRN  potassium bicarbonate, 50 mEq, PRN  potassium bicarbonate, 60 mEq, PRN  potassium, sodium phosphates, 2 packet, PRN  potassium, sodium phosphates, 2 packet, PRN  potassium, sodium phosphates, 2 packet, PRN  prochlorperazine, 5 mg, Q6H PRN      Today I personally reviewed pertinent medications, lines/drains/airways, imaging, cardiology results, laboratory results, microbiology results, notably:    Diet  Diet NPO  Diet NPO

## 2024-02-04 NOTE — PLAN OF CARE
"[facility] Care Plan    POC reviewed with Rebel Rodriguez and family at 1400. Pt verbalized understanding. Questions and concerns addressed. No acute events today. Pt progressing toward goals. Will continue to monitor. See below and flowsheets for full assessment and VS info.     -trach and peg done today in OR   -per MD orders do NOT deflate cuff unless instructed otherwise by ENT, see ENT note for details   -peg to gravity, ok to use, clamp for 30 minutes after meds. See gen surg note for details.   -pain mgmt with PRN meds  -pt tolerating trach collar well   -fentanyl, precedex and propofol gtt dc'd   -drain in place, output 7cc serosanguineous drainage  -K and Mg replaced         Is this a stroke patient? no    Neuro:  Jeannette Coma Scale  Best Eye Response: 4-->(E4) spontaneous  Best Motor Response: 6-->(M6) obeys commands  Best Verbal Response: 1-->(V1) none  [place] Coma Scale Score: 11  Assessment Qualifiers: patient intubated  Pupil PERRLA: yes     24 hr Temp:  [98.2 °F (36.8 °C)-99 °F (37.2 °C)]     CV:   Rhythm: normal sinus rhythm  BP goals:   SBP < 160  MAP > 65    Resp:      Vent Mode: A/C  Set Rate: 16 BPM  Oxygen Concentration (%): 28  Vt Set: 510 mL  PEEP/CPAP: 5 cmH20  Pressure Support: 5 cmH20    Plan: trach in place    GI/:     Diet/Nutrition Received: NPO  Last Bowel Movement: 01/29/24  Voiding Characteristics: external catheter    Intake/Output Summary (Last 24 hours) at 2/3/2024 1818  Last data filed at 2/3/2024 1705  Gross per 24 hour   Intake 2222.57 ml   Output 1357 ml   Net 865.57 ml     Unmeasured Output  Stool Occurrence: 0    Labs/Accuchecks:  Recent Labs   Lab 02/03/24  0125   WBC 9.35   RBC 3.14*   HGB 9.0*   HCT 28.4*         Recent Labs   Lab 02/03/24  0125      K 3.5   CO2 23   *   BUN 18   CREATININE 0.8   ALKPHOS 47*   ALT 25   AST 62*   BILITOT 0.3    No results for input(s): "PROTIME", "INR", "APTT", "HEPANTIXA" in the last 168 hours. No results for input(s): " ""CPK", "CPKMB", "TROPONINI", "MB" in the last 168 hours.    Electrolytes: Electrolytes replaced  Accuchecks: Q6H    Gtts:   sodium chloride 0.9% Stopped (02/03/24 0801)    dextrose 10 % in water (D10W)         LDA/Wounds:    Nurses Note -- 4 Eyes      2/3/2024   6:18 PM      Skin assessed during: Daily Assessment    Is there altered skin present? no   [x] No Altered Skin Integrity Present    [x]Prevention Measures Documented    Attending Nurse:  Srinath Person RN/Staff Member:  Violette"

## 2024-02-04 NOTE — HPI
53 y.o. male with hx of psoriatic arthritis, hx TIA on Aspirin 81 mg, s/p C4-7 ACDF with Dr. Huff 10 years ago admitted to [facility] s/p C3-C4 ACDF. Per chart review, reports rapid functional decline over the last 3 weeks. Endorses hand clumsiness, difficulty typing, gait imbalance, difficulty butoning, dropping items, falls. Difficulty with ambulating also due to LLE weakness. States it feels like he has rubber bands around his wrists. Reports difficulty with overhead mobility and shock-like pains down spine when raising arms overhead. CT neck soft tissue pending, Patient admitted to [facility] for close monitoring and higher level of care.      [facility] Hospital Course: 01/31/2024 CT neck concerning for extensive soft tissue edema and air in neck, additionally w/ concern for DELFIN drain misplaced into hypopharynx. Taken class A to OR by ENT for pharyngeal repair, left intubated by ENT in setting of airway edema. On high dose steroids, no cuff leak this AM  02/01/2024 General surgery consulted for open G tube placement, family still in discussion regarding trach. D/c dex to allow for adequate wound healing, ok with NSGY.   2/2/24: possible G-tube placement today  2/3/24: G-tube today  2/4/24: ok to step down to hospital medicine     Stepped down to Hospital medicine with G-tube and trach in place. ENT and NSGY following. Nutrition consulted for tube feeding recommendations. PTOT consulted for therapy recommendations. Completed course of antibiotics per ID recommendations.

## 2024-02-04 NOTE — ASSESSMENT & PLAN NOTE
Hx of prior ACDF C4 C7 with adjacent level 3 4 who presented for elective C3-4 ACDF on 01/30, complicated by retropharyngeal injury required repair by ENT.   - s/p NCC, intubation and ventilation, trache and G tube.

## 2024-02-04 NOTE — PLAN OF CARE
"[facility] Care Plan    POC reviewed with Rebel Rodriguez and family at 0300. Pt nodded understanding. Questions and concerns addressed. No acute events overnight. Pt progressing toward goals. Will continue to monitor. See below and flowsheets for full assessment and VS info.     No acute changes overnight. Q4hr neuro assessments in place  PRN pain medication utilized, see MAR  PRN hydroxyzine administered for anxiety  Tylenol d/c d/t AST bumped  300 cc brown output from PEG total since surgery        Is this a stroke patient? no    Neuro:  Jeannette Coma Scale  Best Eye Response: 4-->(E4) spontaneous  Best Motor Response: 6-->(M6) obeys commands  Best Verbal Response: 1-->(V1) none  [place] Coma Scale Score: 11  Assessment Qualifiers: patient intubated  Pupil PERRLA: yes     24hr Temp:  [98.3 °F (36.8 °C)-99 °F (37.2 °C)]     CV:   Rhythm: normal sinus rhythm  BP goals:   SBP < 160  MAP > 65    Resp:      Vent Mode: A/C  Set Rate: 16 BPM  Oxygen Concentration (%): 28  Vt Set: 510 mL  PEEP/CPAP: 5 cmH20  Pressure Support: 5 cmH20    Plan: trach in place    GI/:     Diet/Nutrition Received: NPO  Last Bowel Movement: 01/29/24  Voiding Characteristics: external catheter    Intake/Output Summary (Last 24 hours) at 2/4/2024 0411  Last data filed at 2/3/2024 1705  Gross per 24 hour   Intake 1393.94 ml   Output 1157 ml   Net 236.94 ml     Unmeasured Output  Stool Occurrence: 0    Labs/Accuchecks:  Recent Labs   Lab 02/04/24  0033   WBC 12.33   RBC 3.33*   HGB 9.5*   HCT 29.4*         Recent Labs   Lab 02/04/24  0033      K 4.2   CO2 25      BUN 16   CREATININE 0.8   ALKPHOS 52*   ALT 39   AST 70*   BILITOT 0.6    No results for input(s): "PROTIME", "INR", "APTT", "HEPANTIXA" in the last 168 hours. No results for input(s): "CPK", "CPKMB", "TROPONINI", "MB" in the last 168 hours.    Electrolytes: N/A - electrolytes WDL  Accuchecks: none    Gtts:   sodium chloride 0.9% Stopped (02/03/24 0801)    dextrose 10 % in " water (D10W)         LDA/Wounds:    Nurses Note -- 4 Eyes      2/4/2024   4:11 AM      Skin assessed during: Daily Assessment    Is there altered skin present? no   [] No Altered Skin Integrity Present    []Prevention Measures Documented    Attending Nurse:      Second RN/Staff Member:      PAVITHRA

## 2024-02-04 NOTE — ASSESSMENT & PLAN NOTE
Patient noted to have a percutaneous endoscopic gastrostomy tube in place. I have personally inspected the tube.Tube was placed on this admission, with date of procedure- 2/2  There are no signs of drainage or infection around the site. The tube is patent. Medications have not converted to liquid form if available.  Routine care to be done by wound care and nursing staff.      Isossource @ 35 cc/ h  Peptomen 1.5 w bio 45cc/ h  Water flushes

## 2024-02-04 NOTE — ASSESSMENT & PLAN NOTE
55yo M w/PMH C4-7 ACDF, cervical myelopathy due to C3-4 severe stenosis now s/p C3-4 ACDF on 1/30 complicated by hypopharyngeal injury s/p repair of posterior pharyngeal wall laceration and right lateral pharyngotomy per ENT on 1/30 who general surgery is consulted for open G tube. Patient is candidate for g-tube due to dysphagia.    - Ok to use tube for feeds and medication  - Rest of care per primary  - Please reach out if you have further treatment questions.

## 2024-02-04 NOTE — SUBJECTIVE & OBJECTIVE
Interval History: see above    Review of Systems  Objective:     Vital Signs (Most Recent):  Temp: 99.3 °F (37.4 °C) (02/04/24 1501)  Pulse: 76 (02/04/24 1501)  Resp: 15 (02/04/24 1501)  BP: (!) 119/59 (02/04/24 1501)  SpO2: 100 % (02/04/24 1501) Vital Signs (24h Range):  Temp:  [98.6 °F (37 °C)-99.3 °F (37.4 °C)] 99.3 °F (37.4 °C)  Pulse:  [] 76  Resp:  [13-34] 15  SpO2:  [93 %-100 %] 100 %  BP: (105-125)/(51-67) 119/59     Weight: 74.9 kg (165 lb 2 oz)  Body mass index is 25.11 kg/m².    Intake/Output Summary (Last 24 hours) at 2/4/2024 1540  Last data filed at 2/4/2024 0610  Gross per 24 hour   Intake 396.17 ml   Output 963 ml   Net -566.83 ml         Physical Exam        Significant Labs: All pertinent labs within the past 24 hours have been reviewed.  CBC:   Recent Labs   Lab 02/03/24  0125 02/04/24  0033   WBC 9.35 12.33   HGB 9.0* 9.5*   HCT 28.4* 29.4*    237     CMP:   Recent Labs   Lab 02/03/24  0125 02/04/24  0033    143   K 3.5 4.2   * 108   CO2 23 25   GLU 83 87   BUN 18 16   CREATININE 0.8 0.8   CALCIUM 8.1* 8.9   PROT 5.0* 5.6*   ALBUMIN 2.6* 2.7*   BILITOT 0.3 0.6   ALKPHOS 47* 52*   AST 62* 70*   ALT 25 39   ANIONGAP 9 10       Significant Imaging: I have reviewed all pertinent imaging results/findings within the past 24 hours.

## 2024-02-04 NOTE — SUBJECTIVE & OBJECTIVE
Interval History:   Doing well this morning. On trach collar, more comfortable. No significant oozing of bleeding around trach.     Medications:  Continuous Infusions:   sodium chloride 0.9% Stopped (02/03/24 0801)    dextrose 10 % in water (D10W)       Scheduled Meds:   albuterol-ipratropium  3 mL Nebulization Q6H    atorvastatin  10 mg Per NG tube Daily    FLUoxetine  40 mg Per NG tube Daily    mirtazapine  15 mg Per NG tube QHS    mupirocin   Nasal BID    polyethylene glycol  17 g Per NG tube BID    QUEtiapine  50 mg Per NG tube Q12H    senna-docusate 8.6-50 mg  2 tablet Per NG tube BID     PRN Meds:acetaminophen, dextrose 10 % in water (D10W), dextrose 10%, dextrose 10%, glucagon (human recombinant), HYDROmorphone, hydrOXYzine HCL, insulin aspart U-100, magnesium oxide, magnesium oxide, ondansetron, oxyCODONE, potassium bicarbonate, potassium bicarbonate, potassium bicarbonate, potassium, sodium phosphates, potassium, sodium phosphates, potassium, sodium phosphates, prochlorperazine     Review of patient's allergies indicates:   Allergen Reactions    Erythromycin Nausea And Vomiting    Infliximab Other (See Comments)     Lupus with fever and acute arthritis  Lupus with fever and acute arthritis     Objective:     Vital Signs (24h Range):  Temp:  [98.3 °F (36.8 °C)-99 °F (37.2 °C)] 98.6 °F (37 °C)  Pulse:  [] 78  Resp:  [10-34] 19  SpO2:  [93 %-100 %] 100 %  BP: (102-124)/(51-67) 124/61       Lines/Drains/Airways       Drain  Duration                  Closed/Suction Drain 01/30/24 1916 Right;Ventral Neck Bulb 15 Fr. 4 days    Male External Urinary Catheter 01/30/24 2000 4 days         Gastrostomy/Enterostomy 02/03/24 0934 Gastrostomy tube w/ balloon LUQ 1 day              Airway  Duration             Adult Surgical Airway 02/03/24 1055 Shiley Cuffed 6.0/ 75mm <1 day              Peripheral Intravenous Line  Duration                  Peripheral IV - Single Lumen 01/30/24 0545 20 G Left Forearm 5 days          Peripheral IV - Single Lumen 02/01/24 1820 18 G Right Forearm 2 days         Peripheral IV - Single Lumen 02/04/24 0529 20 G Posterior;Right Hand <1 day                     Physical Exam  Sitting up in bed   NAD   6-0 cuffed trach in place, CUFF UP and to remain inflated   Trach secured with sutures and soft collar   R neck incision c/d/i, no fluctuance or erythema   DELFIN x1 with minimal SS output, holding suction  Interval resolution of crepitus      Significant Labs:  CBC:   Recent Labs   Lab 02/04/24  0033   WBC 12.33   RBC 3.33*   HGB 9.5*   HCT 29.4*      MCV 88   MCH 28.5   MCHC 32.3     CMP:   Recent Labs   Lab 02/04/24  0033   GLU 87   CALCIUM 8.9   ALBUMIN 2.7*   PROT 5.6*      K 4.2   CO2 25      BUN 16   CREATININE 0.8   ALKPHOS 52*   ALT 39   AST 70*   BILITOT 0.6       Significant Diagnostics:  I have reviewed and interpreted all pertinent imaging results/findings within the past 24 hours.

## 2024-02-04 NOTE — SUBJECTIVE & OBJECTIVE
Interval History: POD1 s/p feeding tube. Pt seen and examined at bedside with family member present. NIKI. VSS      Medications:  Continuous Infusions:   sodium chloride 0.9% Stopped (02/03/24 0801)    dextrose 10 % in water (D10W)       Scheduled Meds:   albuterol-ipratropium  3 mL Nebulization Q6H    atorvastatin  10 mg Per NG tube Daily    famotidine  20 mg Per NG tube BID    FLUoxetine  40 mg Per NG tube Daily    mirtazapine  15 mg Per NG tube QHS    mupirocin   Nasal BID    polyethylene glycol  17 g Per NG tube BID    QUEtiapine  50 mg Per NG tube Q12H    senna-docusate 8.6-50 mg  2 tablet Per NG tube BID     PRN Meds:acetaminophen, dextrose 10 % in water (D10W), dextrose 10%, dextrose 10%, glucagon (human recombinant), HYDROmorphone, hydrOXYzine HCL, insulin aspart U-100, magnesium oxide, magnesium oxide, ondansetron, oxyCODONE, potassium bicarbonate, potassium bicarbonate, potassium bicarbonate, potassium, sodium phosphates, potassium, sodium phosphates, potassium, sodium phosphates, prochlorperazine     Review of patient's allergies indicates:   Allergen Reactions    Erythromycin Nausea And Vomiting    Infliximab Other (See Comments)     Lupus with fever and acute arthritis  Lupus with fever and acute arthritis     Objective:     Vital Signs (Most Recent):  Temp: 98.6 °F (37 °C) (02/04/24 0701)  Pulse: 74 (02/04/24 0821)  Resp: 16 (02/04/24 0821)  BP: (!) 123/58 (02/04/24 0701)  SpO2: 99 % (02/04/24 0821) Vital Signs (24h Range):  Temp:  [98.3 °F (36.8 °C)-99 °F (37.2 °C)] 98.6 °F (37 °C)  Pulse:  [] 74  Resp:  [10-34] 16  SpO2:  [93 %-100 %] 99 %  BP: (102-123)/(51-67) 123/58     Weight: 74.9 kg (165 lb 2 oz)  Body mass index is 25.11 kg/m².    Intake/Output - Last 3 Shifts         02/02 0700 02/03 0659 02/03 0700 02/04 0659 02/04 0700 02/05 0659    I.V. (mL/kg) 654.5 (8.7) 885.1 (11.8)     NG/ 100     IV Piggyback 788.1 537.7     Total Intake(mL/kg) 1602.6 (21.4) 1522.8 (20.3)     Urine  (mL/kg/hr) 1500 (0.8) 900 (0.5)     Drains  313     Stool 0      Total Output 1500 1213     Net +102.6 +309.8            Stool Occurrence 0 x               Physical Exam  HENT:      Head: Atraumatic.   Cardiovascular:      Rate and Rhythm: Normal rate and regular rhythm.   Pulmonary:      Effort: No respiratory distress.   Abdominal:      General: There is no distension.      Palpations: Abdomen is soft. There is no mass.      Comments: Tube site clean, dry and intact. No swelling or erythema noted.   Musculoskeletal:      Cervical back: Normal range of motion.   Skin:     General: Skin is warm and dry.   Neurological:      Mental Status: He is alert.          Significant Labs:  I have reviewed all pertinent lab results within the past 24 hours.  CBC:   Recent Labs   Lab 02/04/24  0033   WBC 12.33   RBC 3.33*   HGB 9.5*   HCT 29.4*      MCV 88   MCH 28.5   MCHC 32.3       BMP:   Recent Labs   Lab 02/04/24  0033   GLU 87      K 4.2      CO2 25   BUN 16   CREATININE 0.8   CALCIUM 8.9   MG 2.0         Significant Diagnostics:  I have reviewed all pertinent imaging results/findings within the past 24 hours.

## 2024-02-04 NOTE — ASSESSMENT & PLAN NOTE
Has trache and G tube,   PT/OT  Wean oxygen  Teach trache and G-tube care     Complex Repair And Skin Substitute Graft Text: The defect edges were debeveled with a #15 scalpel blade.  The primary defect was closed partially with a complex linear closure.  Given the location of the remaining defect, shape of the defect and the proximity to free margins a skin substitute graft was deemed most appropriate to repair the remaining defect.  The graft was trimmed to fit the size of the remaining defect.  The graft was then placed in the primary defect, oriented appropriately, and sutured into place.

## 2024-02-04 NOTE — PROGRESS NOTES
[facility]  Otorhinolaryngology-Head & Neck Surgery  Progress Note    Subjective:     Post-Op Info:  Procedure(s) (LRB):  LAPAROTOMY with gastrostomy tube placement (N/A)  LARYNGOSCOPY, DIRECT  TRACHEOTOMY   1 Day Post-Op  Hospital Day: 6     Interval History:   Doing well this morning. On trach collar, more comfortable. No significant oozing of bleeding around trach.     Medications:  Continuous Infusions:   sodium chloride 0.9% Stopped (02/03/24 0801)    dextrose 10 % in water (D10W)       Scheduled Meds:   albuterol-ipratropium  3 mL Nebulization Q6H    atorvastatin  10 mg Per NG tube Daily    FLUoxetine  40 mg Per NG tube Daily    mirtazapine  15 mg Per NG tube QHS    mupirocin   Nasal BID    polyethylene glycol  17 g Per NG tube BID    QUEtiapine  50 mg Per NG tube Q12H    senna-docusate 8.6-50 mg  2 tablet Per NG tube BID     PRN Meds:acetaminophen, dextrose 10 % in water (D10W), dextrose 10%, dextrose 10%, glucagon (human recombinant), HYDROmorphone, hydrOXYzine HCL, insulin aspart U-100, magnesium oxide, magnesium oxide, ondansetron, oxyCODONE, potassium bicarbonate, potassium bicarbonate, potassium bicarbonate, potassium, sodium phosphates, potassium, sodium phosphates, potassium, sodium phosphates, prochlorperazine     Review of patient's allergies indicates:   Allergen Reactions    Erythromycin Nausea And Vomiting    Infliximab Other (See Comments)     Lupus with fever and acute arthritis  Lupus with fever and acute arthritis     Objective:     Vital Signs (24h Range):  Temp:  [98.3 °F (36.8 °C)-99 °F (37.2 °C)] 98.6 °F (37 °C)  Pulse:  [] 78  Resp:  [10-34] 19  SpO2:  [93 %-100 %] 100 %  BP: (102-124)/(51-67) 124/61       Lines/Drains/Airways       Drain  Duration                  Closed/Suction Drain 01/30/24 1916 Right;Ventral Neck Bulb 15 Fr. 4 days    Male External Urinary Catheter 01/30/24 2000 4 days         Gastrostomy/Enterostomy 02/03/24 0934 Gastrostomy tube w/ balloon  LUQ 1 day              Airway  Duration             Adult Surgical Airway 02/03/24 1055 Shiley Cuffed 6.0/ 75mm <1 day              Peripheral Intravenous Line  Duration                  Peripheral IV - Single Lumen 01/30/24 0545 20 G Left Forearm 5 days         Peripheral IV - Single Lumen 02/01/24 1820 18 G Right Forearm 2 days         Peripheral IV - Single Lumen 02/04/24 0529 20 G Posterior;Right Hand <1 day                     Physical Exam  Sitting up in bed   NAD   6-0 cuffed trach in place, CUFF UP and to remain inflated   Trach secured with sutures and soft collar   R neck incision c/d/i, no fluctuance or erythema   DELFIN x1 with minimal SS output, holding suction  Interval resolution of crepitus      Significant Labs:  CBC:   Recent Labs   Lab 02/04/24  0033   WBC 12.33   RBC 3.33*   HGB 9.5*   HCT 29.4*      MCV 88   MCH 28.5   MCHC 32.3     CMP:   Recent Labs   Lab 02/04/24  0033   GLU 87   CALCIUM 8.9   ALBUMIN 2.7*   PROT 5.6*      K 4.2   CO2 25      BUN 16   CREATININE 0.8   ALKPHOS 52*   ALT 39   AST 70*   BILITOT 0.6       Significant Diagnostics:  I have reviewed and interpreted all pertinent imaging results/findings within the past 24 hours.  Assessment/Plan:     Injury of pharynx  Mr Rodriguez is a 55 yo male who is s/p C3-4 ACDF surgery with post op dysphagia, odynophagia, neck pain, swelling and crepitus. CT and scope demonstrates DELFIN drain in the hypopharynx. Patient with difficulty with swallowing secretions. No respiratory compromise. He is s/p neck exploration and pharyngeal repair on 1/30. Discussed with patient's mother and sister at bedside the recommendation for open G tube and tracheostomy to allow adequate time for pharynx to heal.     Now s/p trach and open G tube on 2/3/24.    - Strict NPO, NPO for 4-6 weeks minimum  - ID consulted for abx recommendations in setting of hardware with pharyngeal injury    - Currently off all abx, s/p vanc and unasyn   - s/p tracheostomy,  with 6-0 cuffed shiley in place    - CUFF TO REMAIN UP - until otherwise directed by ENT    - Tentative plan to deflate on Tuesday  - R neck drain to stay in place until at least Tuesday   - Rest of care for per primary  - Please page ENT with questions or concerns.        Bonita Macias MD  Otorhinolaryngology-Head & Neck Surgery  [facility]

## 2024-02-04 NOTE — PLAN OF CARE
"[facility] Care Plan    POC reviewed with Rebel Rodirguez and family at 1400. Questions and concerns addressed. No acute events today. Pt progressing toward goals. Will continue to monitor. See below and flowsheets for full assessment and VS info.     - PRN hydromorphone given x1  - Pending transfer orders          Is this a stroke patient? no    Neuro:  [place] Coma Scale  Best Eye Response: 4-->(E4) spontaneous  Best Motor Response: 6-->(M6) obeys commands  Best Verbal Response: 1-->(V1) none (trach in place)  [place] Coma Scale Score: 11  Assessment Qualifiers: patient intubated, no eye obstruction present  Pupil PERRLA: yes     24 hr Temp:  [98.6 °F (37 °C)-99 °F (37.2 °C)]     CV:   Rhythm: paced rhythm  BP goals:   SBP < 160  MAP > 65    Resp:      Vent Mode: A/C  Set Rate: 16 BPM  Oxygen Concentration (%): 28  Vt Set: 510 mL  PEEP/CPAP: 5 cmH20  Pressure Support: 5 cmH20    Plan:  5L 28% on trach collar    GI/:     Diet/Nutrition Received: NPO  Last Bowel Movement: 01/29/24  Voiding Characteristics: external catheter    Intake/Output Summary (Last 24 hours) at 2/4/2024 1500  Last data filed at 2/4/2024 0610  Gross per 24 hour   Intake 396.17 ml   Output 963 ml   Net -566.83 ml     Unmeasured Output  Stool Occurrence: 0    Labs/Accuchecks:  Recent Labs   Lab 02/04/24  0033   WBC 12.33   RBC 3.33*   HGB 9.5*   HCT 29.4*         Recent Labs   Lab 02/04/24  0033      K 4.2   CO2 25      BUN 16   CREATININE 0.8   ALKPHOS 52*   ALT 39   AST 70*   BILITOT 0.6    No results for input(s): "PROTIME", "INR", "APTT", "HEPANTIXA" in the last 168 hours. No results for input(s): "CPK", "CPKMB", "TROPONINI", "MB" in the last 168 hours.    Electrolytes: N/A - electrolytes WDL  Accuchecks: none    Gtts:   sodium chloride 0.9% Stopped (02/03/24 0801)    dextrose 10 % in water (D10W)         LDA/Wounds:    Nurses Note -- 4 Eyes      2/4/2024   3:00 PM      Skin assessed during: Q Shift Change    Is there altered " skin present? no   [x] No Altered Skin Integrity Present    [x]Prevention Measures Documented

## 2024-02-04 NOTE — OP NOTE
[facility]  Surgery Department  Operative Note    SUMMARY     Patient: Rebel Rodriguez  Date: 2/4/2024  MRN: [id number]    Date of Procedure: 2/3/2024     Procedure: Procedure(s) (LRB):  LAPAROTOMY with gastrostomy tube placement     Surgeon(s) and Role:     * Benigno Perez MD - Primary     * Brigida Hdez MD - Resident - Assisting     * Karen Barron MD - Resident - Chief    Pre-Operative Diagnosis: Pharyngeal dysphagia [R13.13]    Post-Operative Diagnosis: Post-Op Diagnosis Codes:     * Pharyngeal dysphagia [R13.13]    Anesthesia: General    Indications for Procedure:   Rebel Rodriguez is a 54 y.o. male who is s/p C4-7 ACDF 1/30 complicated by hypopharyngeal injury s/p repair of posterior pharyngeal wall laceration and right lateral pharyngotomy per ENT on 1/30. General surgery consulted for open G tube. Procedure discussed with patient and family at bedside who agreed to proceed. Informed consent was obtained.    Procedure in Detail:   After consent was verified, the patient was taken to the operating room and general anesthesia was initiated successfully. The patient was positioned supine. The abdomen was prepped and draped in the normal sterile fashion. Pre-operative antibiotics were administered. Time out was performed and all present were in agreement. We began the procedure by making an upper midline incision. Electrocautery was used to dissect through the subcutaneous tissue and open the fascia. The stomach was easily accessible and an appropriate area was chosen for the gastrostomy and a purse string was placed with 2-0 silk suture. A skin incision was made in the LUQ with electrocautery and a tonsil was used to bring the gastrostomy tube through the abdominal wall. A gastrotomy was created and the tube was placed into the stomach and the purse string suture was tied down. The g tube balloon was inflated and saline was flushed through the tube without resistance. The stomach was stammed to the  abdominal wall with 2-0 silk suture. The g tube was secured to the skin with nylon suture. The fascia was closed in a running fashion with two 0-PDS suture. The skin was closed with a 4-0 monocryl in a running subcuticular fashion. Dermabond was applied. The completed the procedure. The NGT was removed. All counts were reported as correct.    At this time the cases was taken over by the ENT team. Please see their operative note for details.     Drains: Gastrostomy tube    Estimated Blood Loss (EBL): * No values recorded between 2/3/2024  9:09 AM and 2/3/2024 11:20 AM *    Total IV Fluids: see anesthesia log    Complications: No    Specimens:   Specimen (24h ago, onward)      None            Condition: Good    Disposition: Returned to the neuro ICU - hemodynamically stable.    Attestation: Dr. Perez was present for the critical portions of the procedure      Karen Barron MD  General Surgery PGY5  2/4/2024 11:15 AM

## 2024-02-04 NOTE — ASSESSMENT & PLAN NOTE
Mr Rodriguez is a 53 yo male who is s/p C3-4 ACDF surgery with post op dysphagia, odynophagia, neck pain, swelling and crepitus. CT and scope demonstrates DELFIN drain in the hypopharynx. Patient with difficulty with swallowing secretions. No respiratory compromise. He is s/p neck exploration and pharyngeal repair on 1/30. Discussed with patient's mother and sister at bedside the recommendation for open G tube and tracheostomy to allow adequate time for pharynx to heal.     Now s/p trach and open G tube on 2/3/24.    - Strict NPO, NPO for 4-6 weeks minimum  - ID consulted for abx recommendations in setting of hardware with pharyngeal injury    - Currently off all abx, s/p vanc and unasyn   - s/p tracheostomy, with 6-0 cuffed shiley in place    - CUFF TO REMAIN UP - until otherwise directed by ENT    - Tentative plan to deflate on Tuesday  - R neck drain to stay in place until at least Tuesday   - Rest of care for per primary  - Please page ENT with questions or concerns.

## 2024-02-05 LAB
ALBUMIN SERPL BCP-MCNC: 2.7 G/DL (ref 3.5–5.2)
ALP SERPL-CCNC: 55 U/L (ref 55–135)
ALT SERPL W/O P-5'-P-CCNC: 33 U/L (ref 10–44)
ANION GAP SERPL CALC-SCNC: 12 MMOL/L (ref 8–16)
AST SERPL-CCNC: 44 U/L (ref 10–40)
BASOPHILS # BLD AUTO: 0.03 K/UL (ref 0–0.2)
BASOPHILS NFR BLD: 0.3 % (ref 0–1.9)
BILIRUB SERPL-MCNC: 0.4 MG/DL (ref 0.1–1)
BUN SERPL-MCNC: 15 MG/DL (ref 6–20)
CALCIUM SERPL-MCNC: 9.2 MG/DL (ref 8.7–10.5)
CHLORIDE SERPL-SCNC: 104 MMOL/L (ref 95–110)
CO2 SERPL-SCNC: 25 MMOL/L (ref 23–29)
CREAT SERPL-MCNC: 0.8 MG/DL (ref 0.5–1.4)
DIFFERENTIAL METHOD BLD: ABNORMAL
EOSINOPHIL # BLD AUTO: 0.1 K/UL (ref 0–0.5)
EOSINOPHIL NFR BLD: 0.7 % (ref 0–8)
ERYTHROCYTE [DISTWIDTH] IN BLOOD BY AUTOMATED COUNT: 14.3 % (ref 11.5–14.5)
EST. GFR  (NO RACE VARIABLE): >60 ML/MIN/1.73 M^2
GLUCOSE SERPL-MCNC: 85 MG/DL (ref 70–110)
HCT VFR BLD AUTO: 31.1 % (ref 40–54)
HGB BLD-MCNC: 9.9 G/DL (ref 14–18)
IMM GRANULOCYTES # BLD AUTO: 0.06 K/UL (ref 0–0.04)
IMM GRANULOCYTES NFR BLD AUTO: 0.5 % (ref 0–0.5)
LYMPHOCYTES # BLD AUTO: 1.5 K/UL (ref 1–4.8)
LYMPHOCYTES NFR BLD: 13.8 % (ref 18–48)
MAGNESIUM SERPL-MCNC: 1.7 MG/DL (ref 1.6–2.6)
MCH RBC QN AUTO: 28 PG (ref 27–31)
MCHC RBC AUTO-ENTMCNC: 31.8 G/DL (ref 32–36)
MCV RBC AUTO: 88 FL (ref 82–98)
MONOCYTES # BLD AUTO: 1.2 K/UL (ref 0.3–1)
MONOCYTES NFR BLD: 11 % (ref 4–15)
NEUTROPHILS # BLD AUTO: 8.3 K/UL (ref 1.8–7.7)
NEUTROPHILS NFR BLD: 73.7 % (ref 38–73)
NRBC BLD-RTO: 0 /100 WBC
PHOSPHATE SERPL-MCNC: 3.2 MG/DL (ref 2.7–4.5)
PLATELET # BLD AUTO: 243 K/UL (ref 150–450)
PMV BLD AUTO: 9.6 FL (ref 9.2–12.9)
POTASSIUM SERPL-SCNC: 3.8 MMOL/L (ref 3.5–5.1)
PROT SERPL-MCNC: 5.9 G/DL (ref 6–8.4)
RBC # BLD AUTO: 3.53 M/UL (ref 4.6–6.2)
SODIUM SERPL-SCNC: 141 MMOL/L (ref 136–145)
WBC # BLD AUTO: 11.2 K/UL (ref 3.9–12.7)

## 2024-02-05 PROCEDURE — 84100 ASSAY OF PHOSPHORUS: CPT

## 2024-02-05 PROCEDURE — 83735 ASSAY OF MAGNESIUM: CPT

## 2024-02-05 PROCEDURE — 25000003 PHARM REV CODE 250

## 2024-02-05 PROCEDURE — 94640 AIRWAY INHALATION TREATMENT: CPT

## 2024-02-05 PROCEDURE — 99900035 HC TECH TIME PER 15 MIN (STAT)

## 2024-02-05 PROCEDURE — 99233 SBSQ HOSP IP/OBS HIGH 50: CPT | Mod: ,,, | Performed by: NURSE PRACTITIONER

## 2024-02-05 PROCEDURE — 11000001 HC ACUTE MED/SURG PRIVATE ROOM

## 2024-02-05 PROCEDURE — 63600175 PHARM REV CODE 636 W HCPCS: Performed by: NURSE PRACTITIONER

## 2024-02-05 PROCEDURE — 27200966 HC CLOSED SUCTION SYSTEM

## 2024-02-05 PROCEDURE — 25000003 PHARM REV CODE 250: Performed by: NURSE PRACTITIONER

## 2024-02-05 PROCEDURE — 80053 COMPREHEN METABOLIC PANEL: CPT

## 2024-02-05 PROCEDURE — 25000003 PHARM REV CODE 250: Performed by: PSYCHIATRY & NEUROLOGY

## 2024-02-05 PROCEDURE — 97116 GAIT TRAINING THERAPY: CPT

## 2024-02-05 PROCEDURE — 94761 N-INVAS EAR/PLS OXIMETRY MLT: CPT

## 2024-02-05 PROCEDURE — 99499 UNLISTED E&M SERVICE: CPT | Mod: ,,, | Performed by: PSYCHIATRY & NEUROLOGY

## 2024-02-05 PROCEDURE — 97530 THERAPEUTIC ACTIVITIES: CPT

## 2024-02-05 PROCEDURE — 85025 COMPLETE CBC W/AUTO DIFF WBC: CPT

## 2024-02-05 PROCEDURE — 97168 OT RE-EVAL EST PLAN CARE: CPT

## 2024-02-05 PROCEDURE — 27000221 HC OXYGEN, UP TO 24 HOURS

## 2024-02-05 PROCEDURE — 20600001 HC STEP DOWN PRIVATE ROOM

## 2024-02-05 PROCEDURE — 25000242 PHARM REV CODE 250 ALT 637 W/ HCPCS: Performed by: NURSE PRACTITIONER

## 2024-02-05 RX ORDER — HEPARIN SODIUM 5000 [USP'U]/ML
5000 INJECTION, SOLUTION INTRAVENOUS; SUBCUTANEOUS EVERY 8 HOURS
Status: DISCONTINUED | OUTPATIENT
Start: 2024-02-05 | End: 2024-02-14 | Stop reason: HOSPADM

## 2024-02-05 RX ADMIN — HEPARIN SODIUM 5000 UNITS: 5000 INJECTION INTRAVENOUS; SUBCUTANEOUS at 02:02

## 2024-02-05 RX ADMIN — ATORVASTATIN CALCIUM 10 MG: 10 TABLET, FILM COATED ORAL at 10:02

## 2024-02-05 RX ADMIN — OXYCODONE HYDROCHLORIDE 10 MG: 10 TABLET ORAL at 07:02

## 2024-02-05 RX ADMIN — IPRATROPIUM BROMIDE AND ALBUTEROL SULFATE 3 ML: .5; 3 SOLUTION RESPIRATORY (INHALATION) at 08:02

## 2024-02-05 RX ADMIN — IPRATROPIUM BROMIDE AND ALBUTEROL SULFATE 3 ML: .5; 3 SOLUTION RESPIRATORY (INHALATION) at 12:02

## 2024-02-05 RX ADMIN — HEPARIN SODIUM 5000 UNITS: 5000 INJECTION INTRAVENOUS; SUBCUTANEOUS at 09:02

## 2024-02-05 RX ADMIN — IPRATROPIUM BROMIDE AND ALBUTEROL SULFATE 3 ML: .5; 3 SOLUTION RESPIRATORY (INHALATION) at 01:02

## 2024-02-05 RX ADMIN — POLYETHYLENE GLYCOL 3350 17 GRAM ORAL POWDER PACKET 17 G: at 09:02

## 2024-02-05 RX ADMIN — MIRTAZAPINE 15 MG: 7.5 TABLET, FILM COATED ORAL at 08:02

## 2024-02-05 RX ADMIN — HYDROMORPHONE HYDROCHLORIDE 1 MG: 1 INJECTION, SOLUTION INTRAMUSCULAR; INTRAVENOUS; SUBCUTANEOUS at 10:02

## 2024-02-05 RX ADMIN — QUETIAPINE FUMARATE 50 MG: 25 TABLET ORAL at 08:02

## 2024-02-05 RX ADMIN — IPRATROPIUM BROMIDE AND ALBUTEROL SULFATE 3 ML: .5; 3 SOLUTION RESPIRATORY (INHALATION) at 07:02

## 2024-02-05 RX ADMIN — HYDROMORPHONE HYDROCHLORIDE 1 MG: 1 INJECTION, SOLUTION INTRAMUSCULAR; INTRAVENOUS; SUBCUTANEOUS at 04:02

## 2024-02-05 RX ADMIN — QUETIAPINE FUMARATE 50 MG: 25 TABLET ORAL at 10:02

## 2024-02-05 RX ADMIN — SENNOSIDES AND DOCUSATE SODIUM 2 TABLET: 8.6; 5 TABLET ORAL at 09:02

## 2024-02-05 RX ADMIN — FLUOXETINE HYDROCHLORIDE 40 MG: 20 CAPSULE ORAL at 10:02

## 2024-02-05 NOTE — ASSESSMENT & PLAN NOTE
DELFIN drain noted to be in hypopharynx on post-op imaging, s/p emergent surgical repair on 1/30      - Dex 4q6hrs, taper per ENT  - trach in place and g-tube in place ( do not deflate cuff)  - ok to step down to floor

## 2024-02-05 NOTE — SUBJECTIVE & OBJECTIVE
Interval History: underwent trach/g tube with ENT and gen surg 2/3, no complaints this AM    Medications:  Continuous Infusions:   sodium chloride 0.9% Stopped (02/03/24 0801)    dextrose 10 % in water (D10W)       Scheduled Meds:   albuterol-ipratropium  3 mL Nebulization Q6H    atorvastatin  10 mg Per NG tube Daily    FLUoxetine  40 mg Per NG tube Daily    mirtazapine  15 mg Per NG tube QHS    polyethylene glycol  17 g Per NG tube BID    QUEtiapine  50 mg Per NG tube Q12H    senna-docusate 8.6-50 mg  2 tablet Per NG tube BID     PRN Meds:acetaminophen, dextrose 10 % in water (D10W), dextrose 10%, dextrose 10%, glucagon (human recombinant), HYDROmorphone, hydrOXYzine HCL, insulin aspart U-100, magnesium oxide, magnesium oxide, ondansetron, oxyCODONE, potassium bicarbonate, potassium bicarbonate, potassium bicarbonate, potassium, sodium phosphates, potassium, sodium phosphates, potassium, sodium phosphates, prochlorperazine     Review of Systems  Objective:     Weight: 74.9 kg (165 lb 2 oz)  Body mass index is 25.11 kg/m².  Vital Signs (Most Recent):  Temp: 99.4 °F (37.4 °C) (02/04/24 1901)  Pulse: 84 (02/04/24 2101)  Resp: 16 (02/04/24 2101)  BP: (!) 119/56 (02/04/24 2101)  SpO2: 98 % (02/04/24 2101) Vital Signs (24h Range):  Temp:  [98.6 °F (37 °C)-99.4 °F (37.4 °C)] 99.4 °F (37.4 °C)  Pulse:  [70-84] 84  Resp:  [13-30] 16  SpO2:  [93 %-100 %] 98 %  BP: (105-132)/(51-67) 119/56     Date 02/04/24 0700 - 02/05/24 0659   Shift 6052-5979 0824-0451 1710-3736 24 Hour Total   INTAKE   NG/GT  65  65   Shift Total(mL/kg)  65(0.9)  65(0.9)   OUTPUT   Urine(mL/kg/hr)  450  450   Drains  3  3   Shift Total(mL/kg)  453(6)  453(6)   Weight (kg) 74.9 74.9 74.9 74.9              Oxygen Concentration (%):  [28] 28             Closed/Suction Drain 01/30/24 1916 Right;Ventral Neck Bulb 15 Fr. (Active)   Site Description Healing 02/04/24 2101   Dressing Type Transparent (Tegaderm) 02/04/24 2101   Dressing Status Clean;Dry;Intact  "02/04/24 2101   Dressing Intervention Integrity maintained 02/04/24 2101   Drainage Serosanguineous 02/04/24 2101   Status To bulb suction 02/04/24 2101   Output (mL) 3 mL 02/04/24 1801            Gastrostomy/Enterostomy 02/03/24 0934 Gastrostomy tube w/ balloon LUQ (Active)   Securement secured to abdomen 02/04/24 2101   Suction Setting/Drainage Method dependent drainage 02/04/24 2101   Drainage brown 02/04/24 1701   Clamp Status/Tolerance unclamped 02/04/24 2101   Feeding Action feeding continued 02/04/24 2101   Dressing no dressing 02/04/24 1701   Insertion Site no swelling;dry 02/04/24 2101   Intake (mL) 20 mL 02/04/24 1901   Tube Output(mL)(Include Discarded Residual) 300 mL 02/04/24 0542       Male External Urinary Catheter 01/30/24 2000 (Active)   Collection Container Urimeter 02/04/24 2101   Securement Method secured to top of thigh w/ adhesive device 02/04/24 2101   Skin no redness;no breakdown 02/04/24 2101   Tolerance no signs/symptoms of discomfort 02/04/24 2101   Output (mL) 450 mL 02/04/24 1801   Catheter Change Date 02/03/24 02/04/24 0502   Catheter Change Time 1941 02/04/24 0502          Physical Exam         Neurosurgery Physical Exam    Neurosurgery Physical Exam  [id number]  S/p trach  AOx4,   LUE T/HG 4+ ow motor intact  SILT    Significant Labs:  Recent Labs   Lab 02/03/24  0125 02/04/24  0033   GLU 83 87    143   K 3.5 4.2   * 108   CO2 23 25   BUN 18 16   CREATININE 0.8 0.8   CALCIUM 8.1* 8.9   MG 1.6 2.0     Recent Labs   Lab 02/03/24  0125 02/04/24  0033   WBC 9.35 12.33   HGB 9.0* 9.5*   HCT 28.4* 29.4*    237     No results for input(s): "LABPT", "INR", "APTT" in the last 48 hours.  Microbiology Results (last 7 days)       ** No results found for the last 168 hours. **          All pertinent labs from the last 24 hours have been reviewed.    Significant Diagnostics:  CT: No results found in the last 24 hours.  MRI: No results found in the last 24 hours.  "

## 2024-02-05 NOTE — PROGRESS NOTES
[facility]  Otorhinolaryngology-Head & Neck Surgery  Progress Note    Subjective:     Post-Op Info:  Procedure(s) (LRB):  LAPAROTOMY with gastrostomy tube placement (N/A)  LARYNGOSCOPY, DIRECT  TRACHEOTOMY   2 Days Post-Op  Hospital Day: 7     Interval History:   Doing well this am, on trach collar.     Medications:  Continuous Infusions:   sodium chloride 0.9% Stopped (02/03/24 0801)    dextrose 10 % in water (D10W)       Scheduled Meds:   albuterol-ipratropium  3 mL Nebulization Q6H    atorvastatin  10 mg Per NG tube Daily    FLUoxetine  40 mg Per NG tube Daily    heparin (porcine)  5,000 Units Subcutaneous Q8H    mirtazapine  15 mg Per NG tube QHS    polyethylene glycol  17 g Per NG tube BID    QUEtiapine  50 mg Per NG tube Q12H    senna-docusate 8.6-50 mg  2 tablet Per NG tube BID     PRN Meds:acetaminophen, dextrose 10 % in water (D10W), dextrose 10%, dextrose 10%, glucagon (human recombinant), HYDROmorphone, hydrOXYzine HCL, insulin aspart U-100, magnesium oxide, magnesium oxide, ondansetron, oxyCODONE, potassium bicarbonate, potassium bicarbonate, potassium bicarbonate, potassium, sodium phosphates, potassium, sodium phosphates, potassium, sodium phosphates, prochlorperazine     Review of patient's allergies indicates:   Allergen Reactions    Erythromycin Nausea And Vomiting    Infliximab Other (See Comments)     Lupus with fever and acute arthritis  Lupus with fever and acute arthritis     Objective:     Vital Signs (24h Range):  Temp:  [99 °F (37.2 °C)-99.4 °F (37.4 °C)] 99 °F (37.2 °C)  Pulse:  [70-88] 88  Resp:  [12-22] 22  SpO2:  [95 %-100 %] 98 %  BP: (116-132)/(56-80) 116/67       Lines/Drains/Airways       Drain  Duration                  Closed/Suction Drain 01/30/24 1916 Right;Ventral Neck Bulb 15 Fr. 5 days    Male External Urinary Catheter 01/30/24 2000 5 days         Gastrostomy/Enterostomy 02/03/24 0934 Gastrostomy tube w/ balloon LUQ 2 days              Airway  Duration              Adult Surgical Airway 02/03/24 1055 Shiley Cuffed 6.0/ 75mm 2 days              Peripheral Intravenous Line  Duration                  Peripheral IV - Single Lumen 01/30/24 0545 20 G Left Forearm 6 days         Peripheral IV - Single Lumen 02/01/24 1820 18 G Right Forearm 3 days         Peripheral IV - Single Lumen 02/04/24 0529 20 G Posterior;Right Hand 1 day                     Physical Exam  Sitting up in bed   NAD   6-0 cuffed trach in place, CUFF UP and to remain inflated   Trach secured with sutures and soft collar   R neck incision c/d/i, no fluctuance or erythema   DELFIN x1 with minimal SS output, holding suction  Interval resolution of crepitus      Significant Labs:  CBC:   Recent Labs   Lab 02/05/24 0049   WBC 11.20   RBC 3.53*   HGB 9.9*   HCT 31.1*      MCV 88   MCH 28.0   MCHC 31.8*     CMP:   Recent Labs   Lab 02/05/24 0049   GLU 85   CALCIUM 9.2   ALBUMIN 2.7*   PROT 5.9*      K 3.8   CO2 25      BUN 15   CREATININE 0.8   ALKPHOS 55   ALT 33   AST 44*   BILITOT 0.4       Significant Diagnostics:  None  Assessment/Plan:     Injury of pharynx  Mr Rodriguez is a 55 yo male who is s/p C3-4 ACDF surgery with post op dysphagia, odynophagia, neck pain, swelling and crepitus. CT and scope demonstrates DELFIN drain in the hypopharynx. Patient with difficulty with swallowing secretions. No respiratory compromise. He is s/p neck exploration and pharyngeal repair on 1/30. Discussed with patient's mother and sister at bedside the recommendation for open G tube and tracheostomy to allow adequate time for pharynx to heal.     Now s/p trach and open G tube on 2/3/24.    - Strict NPO, NPO for 4-6 weeks minimum  - ID consulted for abx recommendations in setting of hardware with pharyngeal injury    - Currently off all abx, s/p vanc and unasyn   - s/p tracheostomy, with 6-0 cuffed shiley in place    - CUFF TO REMAIN UP - until otherwise directed by ENT    - Tentative plan to deflate on Tuesday  - R  neck drain to stay in place until removed by ENT   - Rest of care for per primary  - Please page ENT with questions or concerns.        Bonita Macias MD  Otorhinolaryngology-Head & Neck Surgery  [facility]

## 2024-02-05 NOTE — ASSESSMENT & PLAN NOTE
Rebel Rodriguez  Is a 54 y.o. male with cervical myelopathy and prior ACDF C4 C7 with adjacent level 3 4 who presented for elective C3-4 ACDF on 01/30, which complicated by retropharyngeal injury required repair by ENT. Now s/p trach/G tube on 2/3.      Stable for TTF, q4h neuro checks  Continue multimodal pain control   S/p dex x3d  ID consulted for abx recs given pharyngeal injury, s/p course of unasyn  Cervical drain by ENT   S/p trach/g tube - npo 4-6 wks minimum per ENT. TF per primary team/nutrition  SubQ heparin for DVT prophylaxis  F/u C sp XR    Dispo: TTF under HM    Plan d/w Dr. Maradiaga.

## 2024-02-05 NOTE — PLAN OF CARE
Problem: Occupational Therapy  Goal: Occupational Therapy Goal  Description: Goals to be met by: 2/29/24     Patient will increase functional independence with ADLs by performing:    UE Dressing with [place].  LE Dressing with [place].  Grooming while standing with [place].  Toileting from toilet with [place] for hygiene and clothing management.   Stand pivot transfers with [place].  Step transfer with [place]  Toilet transfer to toilet with [place].    Outcome: Ongoing, Progressing     Goals remain appropriate at re-evaluation; patient to be seen 4x a week for progression with therapy goals

## 2024-02-05 NOTE — SUBJECTIVE & OBJECTIVE
Interval History: trach collar and g-tube in place, ok to step down to Hospital medicine  Review of Systems   Constitutional: Negative.    HENT: Negative.     Eyes: Negative.    Respiratory: Negative.     Cardiovascular: Negative.    Gastrointestinal: Negative.    Endocrine: Negative.    Genitourinary: Negative.    Musculoskeletal: Negative.    Skin: Negative.    Neurological: Negative.    Hematological: Negative.      2 systems   Objective:     Vitals:  Temp: 98.6 °F (37 °C)  Pulse: 82  Rhythm: normal sinus rhythm  BP: 127/67  MAP (mmHg): 91  Resp: 20  SpO2: 99 %  Oxygen Concentration (%): 28    Temp  Min: 98.6 °F (37 °C)  Max: 99.4 °F (37.4 °C)  Pulse  Min: 68  Max: 88  BP  Min: 111/65  Max: 132/62  MAP (mmHg)  Min: 80  Max: 91  Resp  Min: 12  Max: 24  SpO2  Min: 95 %  Max: 100 %  Oxygen Concentration (%)  Min: 28  Max: 28    02/04 0701 - 02/05 0700  In: 500   Out: 1203 [Urine:1200; Drains:3]   Unmeasured Output  Stool Occurrence: 0        Physical Exam  Vitals and nursing note reviewed.   Constitutional:       Appearance: Normal appearance.   HENT:      Head: Normocephalic.      Nose: Nose normal.      Mouth/Throat:      Mouth: Mucous membranes are moist.      Pharynx: Oropharynx is clear.   Eyes:      Pupils: Pupils are equal, round, and reactive to light.   Cardiovascular:      Rate and Rhythm: Normal rate and regular rhythm.      Pulses: Normal pulses.      Heart sounds: Normal heart sounds.   Pulmonary:      Effort: Pulmonary effort is normal.      Breath sounds: Normal breath sounds.   Abdominal:      General: Bowel sounds are normal.      Palpations: Abdomen is soft.   Musculoskeletal:         General: Normal range of motion.   Skin:     General: Skin is warm and dry.      Capillary Refill: Capillary refill takes 2 to 3 seconds.   Neurological:      Mental Status: He is alert.      Comments: GCS T 11PERRL  Answers yes/no questions  ANTUNEZ to command  Sensation Intact X4           Unable to test orientation,  language, memory, judgment, insight, fund of knowledge, hearing, shoulder shrug, tongue protrusion, coordination, gait due to level of consciousness.       Medications:  Continuoussodium chloride 0.9%, Last Rate: Stopped (02/03/24 0801)  dextrose 10 % in water (D10W)    Scheduledalbuterol-ipratropium, 3 mL, Q6H  atorvastatin, 10 mg, Daily  FLUoxetine, 40 mg, Daily  heparin (porcine), 5,000 Units, Q8H  mirtazapine, 15 mg, QHS  polyethylene glycol, 17 g, BID  QUEtiapine, 50 mg, Q12H  senna-docusate 8.6-50 mg, 2 tablet, BID    PRNacetaminophen, 1,000 mg, Q8H PRN  dextrose 10 % in water (D10W), , Continuous PRN  dextrose 10%, 12.5 g, PRN  dextrose 10%, 25 g, PRN  glucagon (human recombinant), 1 mg, PRN  HYDROmorphone, 1 mg, Q4H PRN  hydrOXYzine HCL, 25 mg, TID PRN  insulin aspart U-100, 0-10 Units, Q4H PRN  magnesium oxide, 800 mg, PRN  magnesium oxide, 800 mg, PRN  ondansetron, 4 mg, Q6H PRN  oxyCODONE, 10 mg, Q4H PRN  potassium bicarbonate, 35 mEq, PRN  potassium bicarbonate, 50 mEq, PRN  potassium bicarbonate, 60 mEq, PRN  potassium, sodium phosphates, 2 packet, PRN  potassium, sodium phosphates, 2 packet, PRN  potassium, sodium phosphates, 2 packet, PRN  prochlorperazine, 5 mg, Q6H PRN      Today I personally reviewed pertinent medications, lines/drains/airways, imaging, cardiology results, laboratory results, microbiology results, notably:    Diet  Diet NPO  Diet NPO

## 2024-02-05 NOTE — PROGRESS NOTES
[facility]  Neurosurgery  Progress Note    Subjective:     History of Present Illness: Rebel Rodriguez is a 53 y.o. male with hx of psoriatic arthritis, hx TIA on Aspirin 81 mg, s/p C4-7 ACDF with Dr. Huff 10 years ago who presents for evaluation of gait imbalance. Last seen 1 year ago for neck pain and radicular pain into the left shoulder. He underwent C1-2 KENRICK with moderate relief of pain. Reports rapid functional decline over the last 3 weeks. Endorses hand clumsiness, difficulty typing, gait imbalance, difficulty butoning, dropping items, falls. Difficulty with ambulating also due to LLE weakness. States it feels like he has rubber bands around his wrists. Reports difficulty with overhead mobility and shock-like pains down spine when raising arms overhead. Denies b/b incontinence.     Post-Op Info:  Procedure(s) (LRB):  LAPAROTOMY with gastrostomy tube placement (N/A)  LARYNGOSCOPY, DIRECT  TRACHEOTOMY   2 Days Post-Op   Interval History: 02/05/2024: NAEON. AFVSS. Exam stable. Trach/PEG in place. Dex x3d completed. Pending TTF to .    Medications:  Continuous Infusions:   sodium chloride 0.9% Stopped (02/03/24 0801)    dextrose 10 % in water (D10W)       Scheduled Meds:   albuterol-ipratropium  3 mL Nebulization Q6H    atorvastatin  10 mg Per NG tube Daily    FLUoxetine  40 mg Per NG tube Daily    heparin (porcine)  5,000 Units Subcutaneous Q8H    mirtazapine  15 mg Per NG tube QHS    polyethylene glycol  17 g Per NG tube BID    QUEtiapine  50 mg Per NG tube Q12H    senna-docusate 8.6-50 mg  2 tablet Per NG tube BID     PRN Meds:acetaminophen, dextrose 10 % in water (D10W), dextrose 10%, dextrose 10%, glucagon (human recombinant), HYDROmorphone, hydrOXYzine HCL, insulin aspart U-100, magnesium oxide, magnesium oxide, ondansetron, oxyCODONE, potassium bicarbonate, potassium bicarbonate, potassium bicarbonate, potassium, sodium phosphates, potassium, sodium phosphates, potassium, sodium  phosphates, prochlorperazine     Review of Systems  Objective:     Weight: 74.9 kg (165 lb 2 oz)  Body mass index is 25.11 kg/m².  Vital Signs (Most Recent):  Temp: 99 °F (37.2 °C) (02/05/24 0301)  Pulse: 88 (02/05/24 0820)  Resp: 16 (02/05/24 0820)  BP: 116/67 (02/05/24 0501)  SpO2: 98 % (02/05/24 0820) Vital Signs (24h Range):  Temp:  [98.7 °F (37.1 °C)-99.4 °F (37.4 °C)] 99 °F (37.2 °C)  Pulse:  [70-88] 88  Resp:  [12-20] 16  SpO2:  [95 %-100 %] 98 %  BP: (116-132)/(56-80) 116/67                  Oxygen Concentration (%):  [28] 28             Closed/Suction Drain 01/30/24 1916 Right;Ventral Neck Bulb 15 Fr. (Active)   Site Description Healing 02/04/24 2101   Dressing Type Transparent (Tegaderm) 02/04/24 2101   Dressing Status Clean;Dry;Intact 02/04/24 2101   Dressing Intervention Integrity maintained 02/04/24 2101   Drainage Serosanguineous 02/04/24 2101   Status To bulb suction 02/04/24 2101   Output (mL) 3 mL 02/04/24 1801            Gastrostomy/Enterostomy 02/03/24 0934 Gastrostomy tube w/ balloon LUQ (Active)   Securement secured to abdomen 02/04/24 2101   Suction Setting/Drainage Method dependent drainage 02/04/24 2101   Drainage brown 02/04/24 1701   Clamp Status/Tolerance unclamped 02/04/24 2101   Feeding Action feeding continued 02/04/24 2101   Dressing no dressing 02/04/24 1701   Insertion Site no swelling;dry 02/04/24 2101   Intake (mL) 20 mL 02/04/24 1901   Tube Output(mL)(Include Discarded Residual) 300 mL 02/04/24 0542       Male External Urinary Catheter 01/30/24 2000 (Active)   Collection Container Urimeter 02/04/24 2101   Securement Method secured to top of thigh w/ adhesive device 02/04/24 2101   Skin no redness;no breakdown 02/04/24 2101   Tolerance no signs/symptoms of discomfort 02/04/24 2101   Output (mL) 450 mL 02/04/24 1801   Catheter Change Date 02/03/24 02/04/24 0502   Catheter Change Time 1941 02/04/24 0502         Physical Exam         Neurosurgery Physical Exam    Neurosurgery Physical  "Exam  [id number]  AOx4,   LUE T/HG 4+ ow motor intact  SILT    DELFIN per ENT    Significant Labs:  Recent Labs   Lab 02/04/24  0033 02/05/24  0049   GLU 87 85    141   K 4.2 3.8    104   CO2 25 25   BUN 16 15   CREATININE 0.8 0.8   CALCIUM 8.9 9.2   MG 2.0 1.7       Recent Labs   Lab 02/04/24  0033 02/05/24  0049   WBC 12.33 11.20   HGB 9.5* 9.9*   HCT 29.4* 31.1*    243       No results for input(s): "LABPT", "INR", "APTT" in the last 48 hours.  Microbiology Results (last 7 days)       ** No results found for the last 168 hours. **          All pertinent labs from the last 24 hours have been reviewed.    Significant Diagnostics:  X-Ray Chest AP Portable    Result Date: 2/5/2024  No significant interval changes. Electronically signed by: Dale Navas Date:    02/05/2024 Time:    07:15   Assessment/Plan:     * Cervical myelopathy  Rebel Rodriguez  Is a 54 y.o. male with cervical myelopathy and prior ACDF C4 C7 with adjacent level 3 4 who presented for elective C3-4 ACDF on 01/30, which complicated by retropharyngeal injury required repair by ENT. Now s/p trach/G tube on 2/3.      Stable for TTF, q4h neuro checks  Continue multimodal pain control   S/p dex x3d  ID consulted for abx recs given pharyngeal injury, s/p course of unasyn  Cervical drain by ENT   S/p trach/g tube - npo 4-6 wks minimum per ENT. TF per primary team/nutrition  SubQ heparin for DVT prophylaxis  F/u C sp XR    Dispo: TTF under HM    Plan d/w Dr. Maradiaga.        Kvng Demarco MD  Neurosurgery  [facility] - Neuro Critical Care  "

## 2024-02-05 NOTE — ASSESSMENT & PLAN NOTE
53 y.o. male with cervical myelopathy due to C3-4 severe stenosis with cord signal change, above his prior fusion admitted to [facility] s/p C3-4 anterior cervical discectomy and fusion. Operative course c/b pharyngeal injury s/p emergent ENT repair on 1/30     -Admit to [facility], NSGY following   -SBP <160  -Neuro checks q4 hr, hourly vital signs  -Daily CBC, CMP, mag, phos  -Fentanyl gtt for pain, currently well controlled  -Dex d/c 2/1, ok per NSGY  -PT/OT/SLP as appropriate

## 2024-02-05 NOTE — PROGRESS NOTES
[facility] - Neuro Critical Care  Neurocritical Care  Progress Note    Admit Date: 1/30/2024  Service Date: 02/05/2024  Length of Stay: 6    Subjective:     Chief Complaint: Cervical myelopathy    History of Present Illness: Rebel Rodriguez is a 53 y.o. male with hx of psoriatic arthritis, hx TIA on Aspirin 81 mg, s/p C4-7 ACDF with Dr. Huff 10 years ago admitted to [facility] s/p C3-C4 ACDF. Per chart review, reports rapid functional decline over the last 3 weeks. Endorses hand clumsiness, difficulty typing, gait imbalance, difficulty butoning, dropping items, falls. Difficulty with ambulating also due to LLE weakness. States it feels like he has rubber bands around his wrists. Reports difficulty with overhead mobility and shock-like pains down spine when raising arms overhead. CT neck soft tissue pending, Patient admitted to [facility] for close monitoring and higher level of care.     Hospital Course: 01/31/2024 CT neck concerning for extensive soft tissue edema and air in neck, additionally w/ concern for DELFIN drain misplaced into hypopharynx. Taken class A to OR by ENT for pharyngeal repair, left intubated by ENT in setting of airway edema. On high dose steroids, no cuff leak this AM  02/01/2024 General surgery consulted for open G tube placement, family still in discussion regarding trach. D/c dex to allow for adequate wound healing, ok with NSGY.   2/2/24: possible G-tube placement today  2/3/24: G-tube today  2/4/24: ok to step down to hospital medicine  2/5/24: awaiting step down bed    Interval History: trach collar and g-tube in place, ok to step down to Hospital medicine  Review of Systems   Constitutional: Negative.    HENT: Negative.     Eyes: Negative.    Respiratory: Negative.     Cardiovascular: Negative.    Gastrointestinal: Negative.    Endocrine: Negative.    Genitourinary: Negative.    Musculoskeletal: Negative.    Skin: Negative.    Neurological: Negative.    Hematological: Negative.      2 systems   Objective:      Vitals:  Temp: 98.6 °F (37 °C)  Pulse: 82  Rhythm: normal sinus rhythm  BP: 127/67  MAP (mmHg): 91  Resp: 20  SpO2: 99 %  Oxygen Concentration (%): 28    Temp  Min: 98.6 °F (37 °C)  Max: 99.4 °F (37.4 °C)  Pulse  Min: 68  Max: 88  BP  Min: 111/65  Max: 132/62  MAP (mmHg)  Min: 80  Max: 91  Resp  Min: 12  Max: 24  SpO2  Min: 95 %  Max: 100 %  Oxygen Concentration (%)  Min: 28  Max: 28    02/04 0701 - 02/05 0700  In: 500   Out: 1203 [Urine:1200; Drains:3]   Unmeasured Output  Stool Occurrence: 0        Physical Exam  Vitals and nursing note reviewed.   Constitutional:       Appearance: Normal appearance.   HENT:      Head: Normocephalic.      Nose: Nose normal.      Mouth/Throat:      Mouth: Mucous membranes are moist.      Pharynx: Oropharynx is clear.   Eyes:      Pupils: Pupils are equal, round, and reactive to light.   Cardiovascular:      Rate and Rhythm: Normal rate and regular rhythm.      Pulses: Normal pulses.      Heart sounds: Normal heart sounds.   Pulmonary:      Effort: Pulmonary effort is normal.      Breath sounds: Normal breath sounds.   Abdominal:      General: Bowel sounds are normal.      Palpations: Abdomen is soft.   Musculoskeletal:         General: Normal range of motion.   Skin:     General: Skin is warm and dry.      Capillary Refill: Capillary refill takes 2 to 3 seconds.   Neurological:      Mental Status: He is alert.      Comments: GCS T 11PERRL  Answers yes/no questions  ANTUNEZ to command  Sensation Intact X4           Unable to test orientation, language, memory, judgment, insight, fund of knowledge, hearing, shoulder shrug, tongue protrusion, coordination, gait due to level of consciousness.       Medications:  Continuoussodium chloride 0.9%, Last Rate: Stopped (02/03/24 0801)  dextrose 10 % in water (D10W)    Scheduledalbuterol-ipratropium, 3 mL, Q6H  atorvastatin, 10 mg, Daily  FLUoxetine, 40 mg, Daily  heparin (porcine), 5,000 Units, Q8H  mirtazapine, 15 mg, QHS  polyethylene  glycol, 17 g, BID  QUEtiapine, 50 mg, Q12H  senna-docusate 8.6-50 mg, 2 tablet, BID    PRNacetaminophen, 1,000 mg, Q8H PRN  dextrose 10 % in water (D10W), , Continuous PRN  dextrose 10%, 12.5 g, PRN  dextrose 10%, 25 g, PRN  glucagon (human recombinant), 1 mg, PRN  HYDROmorphone, 1 mg, Q4H PRN  hydrOXYzine HCL, 25 mg, TID PRN  insulin aspart U-100, 0-10 Units, Q4H PRN  magnesium oxide, 800 mg, PRN  magnesium oxide, 800 mg, PRN  ondansetron, 4 mg, Q6H PRN  oxyCODONE, 10 mg, Q4H PRN  potassium bicarbonate, 35 mEq, PRN  potassium bicarbonate, 50 mEq, PRN  potassium bicarbonate, 60 mEq, PRN  potassium, sodium phosphates, 2 packet, PRN  potassium, sodium phosphates, 2 packet, PRN  potassium, sodium phosphates, 2 packet, PRN  prochlorperazine, 5 mg, Q6H PRN      Today I personally reviewed pertinent medications, lines/drains/airways, imaging, cardiology results, laboratory results, microbiology results, notably:    Diet  Diet NPO  Diet NPO        Assessment/Plan:     Neuro  * Cervical myelopathy  53 y.o. male with cervical myelopathy due to C3-4 severe stenosis with cord signal change, above his prior fusion admitted to [facility] s/p C3-4 anterior cervical discectomy and fusion. Operative course c/b pharyngeal injury s/p emergent ENT repair on 1/30     -Admit to [facility], NSGY following   -SBP <160  -Neuro checks q4 hr, hourly vital signs  -Daily CBC, CMP, mag, phos  -Fentanyl gtt for pain, currently well controlled  -Dex d/c 2/1, ok per NSGY  -PT/OT/SLP as appropriate        Psychiatric  Anxiety  Hx of     - C/w home remeron and fluoxetine  - seroquel BID      Cardiac/Vascular  Hyperlipidemia  Hx of     - C/w home atorvastatin     Orthopedic  Injury of pharynx  DELFIN drain noted to be in hypopharynx on post-op imaging, s/p emergent surgical repair on 1/30      - Dex 4q6hrs, taper per ENT  - trach in place and g-tube in place ( do not deflate cuff)  - ok to step down to floor          The patient is being Prophylaxed for:  Venous  Thromboembolism with: Mechanical or Chemical  Stress Ulcer with: Not Applicable   Ventilator Pneumonia with: not applicable    Activity Orders            Diet NPO: NPO starting at 02/05 1222    Elevate HOB Elevate (30-45 degrees) Elevate HOB to 30 - 45 degrees during feeding unless otherwise stated starting at 02/04 0915    Elevate HOB Elevate (30-45 degrees) Elevate HOB to 30 - 45 degrees during feeding unless otherwise stated starting at 02/01 0908    Turn patient every 2 hours starting at 01/31 0000    Elevate HOB starting at 01/30 1054    Ambulate Post Op Day 0 starting at 01/30 1052    Progressive Mobility Protocol (mobilize patient to their highest level of functioning at least twice daily) starting at 01/30 1046    Elevate HOB 30 starting at 01/30 1046    Ambulate With Assistance, Post Op Day 0 If the patient arrives from PACU by 4PM, walk at least once on day of operation if alert and safe to do so. starting at 01/30 1045          Full Code  Level 3  Marai L Washington NP  Neurocritical Care  [facility] - Neuro Critical Care

## 2024-02-05 NOTE — PLAN OF CARE
"[facility] Care Plan    POC reviewed with Rebel Rodriguez and family at 0300. Pt verbalized understanding. Questions and concerns addressed. No acute events overnight. Pt progressing toward goals. Will continue to monitor. See below and flowsheets for full assessment and VS info.     Dilaudid x 3  Thorough trach care and management of secretions  Bath complete, linen changed        Is this a stroke patient? no    Neuro:  [place] Coma Scale  Best Eye Response: 4-->(E4) spontaneous  Best Motor Response: 6-->(M6) obeys commands  Best Verbal Response: 1-->(V1) none  [place]ann[place] Coma Scale Score: 11  Assessment Qualifiers: other (see comments) (trach)  Pupil PERRLA: yes     24hr Temp:  [98.6 °F (37 °C)-99.4 °F (37.4 °C)]     CV:   Rhythm: normal sinus rhythm  BP goals:   SBP < 160  MAP > 65    Resp:      Vent Mode: A/C  Set Rate: 16 BPM  Oxygen Concentration (%): 28  Vt Set: 510 mL  PEEP/CPAP: 5 cmH20  Pressure Support: 5 cmH20    Plan: trach/PEG discussions and trach in place    GI/:     Diet/Nutrition Received: NPO  Last Bowel Movement: 01/29/24  Voiding Characteristics: external catheter    Intake/Output Summary (Last 24 hours) at 2/5/2024 0532  Last data filed at 2/4/2024 1901  Gross per 24 hour   Intake 262.09 ml   Output 1009 ml   Net -746.91 ml     Unmeasured Output  Stool Occurrence: 0    Labs/Accuchecks:  Recent Labs   Lab 02/05/24  0049   WBC 11.20   RBC 3.53*   HGB 9.9*   HCT 31.1*         Recent Labs   Lab 02/05/24  0049      K 3.8   CO2 25      BUN 15   CREATININE 0.8   ALKPHOS 55   ALT 33   AST 44*   BILITOT 0.4    No results for input(s): "PROTIME", "INR", "APTT", "HEPANTIXA" in the last 168 hours. No results for input(s): "CPK", "CPKMB", "TROPONINI", "MB" in the last 168 hours.    Electrolytes: N/A - electrolytes WDL  Accuchecks: none    Gtts:   sodium chloride 0.9% Stopped (02/03/24 0801)    dextrose 10 % in water (D10W)         LDA/Wounds:    Nurses Note -- 4 Eyes      2/5/2024   5:32 " AM      Skin assessed during: Daily Assessment    Is there altered skin present? no   [] No Altered Skin Integrity Present    []Prevention Measures Documented    Attending Nurse:      Second RN/Staff Member:      PAVITHRA     Problem: Adult Inpatient Plan of Care  Goal: Plan of Care Review  Outcome: Ongoing, Progressing  Goal: Patient-Specific Goal (Individualized)  Outcome: Ongoing, Progressing  Goal: Absence of Hospital-Acquired Illness or Injury  Outcome: Ongoing, Progressing  Goal: Optimal Comfort and Wellbeing  Outcome: Ongoing, Progressing

## 2024-02-05 NOTE — SUBJECTIVE & OBJECTIVE
Interval History: 02/05/2024: NAEON. AFVSS. Exam stable. Trach/PEG in place. Dex x3d completed. Pending TTF to .    Medications:  Continuous Infusions:   sodium chloride 0.9% Stopped (02/03/24 0801)    dextrose 10 % in water (D10W)       Scheduled Meds:   albuterol-ipratropium  3 mL Nebulization Q6H    atorvastatin  10 mg Per NG tube Daily    FLUoxetine  40 mg Per NG tube Daily    heparin (porcine)  5,000 Units Subcutaneous Q8H    mirtazapine  15 mg Per NG tube QHS    polyethylene glycol  17 g Per NG tube BID    QUEtiapine  50 mg Per NG tube Q12H    senna-docusate 8.6-50 mg  2 tablet Per NG tube BID     PRN Meds:acetaminophen, dextrose 10 % in water (D10W), dextrose 10%, dextrose 10%, glucagon (human recombinant), HYDROmorphone, hydrOXYzine HCL, insulin aspart U-100, magnesium oxide, magnesium oxide, ondansetron, oxyCODONE, potassium bicarbonate, potassium bicarbonate, potassium bicarbonate, potassium, sodium phosphates, potassium, sodium phosphates, potassium, sodium phosphates, prochlorperazine     Review of Systems  Objective:     Weight: 74.9 kg (165 lb 2 oz)  Body mass index is 25.11 kg/m².  Vital Signs (Most Recent):  Temp: 99 °F (37.2 °C) (02/05/24 0301)  Pulse: 88 (02/05/24 0820)  Resp: 16 (02/05/24 0820)  BP: 116/67 (02/05/24 0501)  SpO2: 98 % (02/05/24 0820) Vital Signs (24h Range):  Temp:  [98.7 °F (37.1 °C)-99.4 °F (37.4 °C)] 99 °F (37.2 °C)  Pulse:  [70-88] 88  Resp:  [12-20] 16  SpO2:  [95 %-100 %] 98 %  BP: (116-132)/(56-80) 116/67                  Oxygen Concentration (%):  [28] 28             Closed/Suction Drain 01/30/24 1916 Right;Ventral Neck Bulb 15 Fr. (Active)   Site Description Healing 02/04/24 2101   Dressing Type Transparent (Tegaderm) 02/04/24 2101   Dressing Status Clean;Dry;Intact 02/04/24 2101   Dressing Intervention Integrity maintained 02/04/24 2101   Drainage Serosanguineous 02/04/24 2101   Status To bulb suction 02/04/24 2101   Output (mL) 3 mL 02/04/24 1801             "Gastrostomy/Enterostomy 02/03/24 0934 Gastrostomy tube w/ balloon LUQ (Active)   Securement secured to abdomen 02/04/24 2101   Suction Setting/Drainage Method dependent drainage 02/04/24 2101   Drainage brown 02/04/24 1701   Clamp Status/Tolerance unclamped 02/04/24 2101   Feeding Action feeding continued 02/04/24 2101   Dressing no dressing 02/04/24 1701   Insertion Site no swelling;dry 02/04/24 2101   Intake (mL) 20 mL 02/04/24 1901   Tube Output(mL)(Include Discarded Residual) 300 mL 02/04/24 0542       Male External Urinary Catheter 01/30/24 2000 (Active)   Collection Container Urimeter 02/04/24 2101   Securement Method secured to top of thigh w/ adhesive device 02/04/24 2101   Skin no redness;no breakdown 02/04/24 2101   Tolerance no signs/symptoms of discomfort 02/04/24 2101   Output (mL) 450 mL 02/04/24 1801   Catheter Change Date 02/03/24 02/04/24 0502   Catheter Change Time 1941 02/04/24 0502          Physical Exam         Neurosurgery Physical Exam    Neurosurgery Physical Exam  [id number]  AOx4,   LUE T/HG 4+ ow motor intact  SILT    DELFIN per ENT    Significant Labs:  Recent Labs   Lab 02/04/24  0033 02/05/24  0049   GLU 87 85    141   K 4.2 3.8    104   CO2 25 25   BUN 16 15   CREATININE 0.8 0.8   CALCIUM 8.9 9.2   MG 2.0 1.7       Recent Labs   Lab 02/04/24  0033 02/05/24  0049   WBC 12.33 11.20   HGB 9.5* 9.9*   HCT 29.4* 31.1*    243       No results for input(s): "LABPT", "INR", "APTT" in the last 48 hours.  Microbiology Results (last 7 days)       ** No results found for the last 168 hours. **          All pertinent labs from the last 24 hours have been reviewed.    Significant Diagnostics:  X-Ray Chest AP Portable    Result Date: 2/5/2024  No significant interval changes. Electronically signed by: Dale Navas Date:    02/05/2024 Time:    07:15   "

## 2024-02-05 NOTE — HOSPITAL COURSE
02/05/2024: NIKI. AFVSS. Exam stable. Trach/PEG in place. Dex x3d completed. Pending TTF to .  2/7: NAEON. Lying in bed, no acute distress. Exam stable.   2/8: NAEON. Sitting up in chair, no acute distress. Exam stable. C/o intermittent hallucinations, seroquel and oxycodone held today per patient request.    2/9: Patient with profound delirium overnight. Remains altered this AM. Sedating medications held. Afebrile but WBC 15.44 this AM. Infectious work up pending. Psych consulted for assistance.   2/10: Improved mental status this AM. Oriented, following commands. Infectious workup negative to date. CTH negative  2/11: NIKI. ABHIJITVSS. Difficult day mentally yesterday, discussed with patient option of palliative care and their support. Patient and sister both expressed agreement they would like to be seen by palliative. Exam stable, oriented and following commands

## 2024-02-05 NOTE — PT/OT/SLP RE-EVAL
Occupational Therapy   Re-evaluation  Co- treatment with PT     Name: Rebel Rodriguez  MRN: [id number]  Admitting Diagnosis:  Cervical myelopathy  Recent Surgery: Procedure(s) (LRB):  LAPAROTOMY with gastrostomy tube placement (N/A)  LARYNGOSCOPY, DIRECT  TRACHEOTOMY 2 Days Post-Op    Recommendations:     Discharge Recommendations: High Intensity Therapy  Discharge Equipment Recommendations: to be determined by next level of care  Barriers to discharge:  None    Assessment:     Rebel Rodriguez is a 54 y.o. male with a medical diagnosis of Cervical myelopathy.  He presents with decrease functional status secondary to medical diagnosis.  Performance deficits affecting function are weakness, impaired balance, decreased lower extremity function, impaired cardiopulmonary response to activity, gait instability, impaired functional mobility.  Patient with good tolerance to OT session; limited by activity tolerance at this time, as patient needing increased assist compared to evaluation prior due to deconditioning. Patient remains appropriate candidate for acute OT services.       Rehab Prognosis:  Good; patient would benefit from acute skilled OT services to address these deficits and reach maximum level of function.       Plan:     Patient to be seen 4 x/week to address the above listed problems via self-care/home management, therapeutic activities, therapeutic exercises, neuromuscular re-education  Plan of Care Expires: 02/29/24  Plan of Care Reviewed with: patient    Subjective     Chief Complaint: Fatigue   Patient/Family stated goals: DC  Communicated with: RN prior to session.  Pain/Comfort:  Pain Rating 1: 0/10    Objective:     Communicated with: RN prior to session.  Patient found supine with: peripheral IV, NG tube, DELFIN drain, SCD, restraints upon OT entry to room.    General Precautions: Standard, fall  Orthopedic Precautions: N/A  Braces: N/A  Respiratory Status: High flow, flow 6 L/min, concentration  28%    Occupational Performance:    Bed Mobility:    Patient completed Supine to Sit with moderate assistance  Patient completed Sit to Supine with moderate assistance    Functional Mobility/Transfers:  Patient completed Sit <> Stand Transfer with contact guard assistance  with  rolling walker   Functional Mobility: Patient able to ambualte ~15 feet with mod A using RW     Activities of Daily Living:  Grooming: supervision / with set up patient able to complete facial grooming EOB  Lower Body Dressing: maximal assistance to don socks in  bed     Cognitive/Visual Perceptual:  Cognitive/Psychosocial Skills:     -       Oriented to: Person, Place, Time, and Situation   -       Follows Commands/attention:Follows multistep  commands  -       Safety awareness/insight to disability: intact   Visual/Perceptual:      -Intact      Physical Exam:  Sensation:    -       Intact  Upper Extremity Range of Motion:     -       Right Upper Extremity: WFL  -       Left Upper Extremity: WFL  Upper Extremity Strength:    -       Right Upper Extremity: WFL  -       Left Upper Extremity: WFL   Strength:    -       Right Upper Extremity: WFL  -       Left Upper Extremity: WFL  Fine Motor Coordination:    -       Intact    AMPAC 6 Click:  AMPAC Total Score: 15    Treatment & Education:  Co-evaluation completed due to patient medical instability and to ensure patient safety. OT provided education on home recommendations and fall prevention in preparation for D/C.     Patient left HOB elevated with all lines intact and call button in reach    GOALS:   Multidisciplinary Problems       Occupational Therapy Goals          Problem: Occupational Therapy    Goal Priority Disciplines Outcome Interventions   Occupational Therapy Goal     OT, PT/OT Ongoing, Progressing    Description: Goals to be met by: 2/29/24     Patient will increase functional independence with ADLs by performing:    UE Dressing with [place].  LE Dressing with  [place].  Grooming while standing with [place].  Toileting from toilet with [place] for hygiene and clothing management.   Stand pivot transfers with [place].  Step transfer with [place]  Toilet transfer to toilet with [place].                         History:     Past Medical History:   Diagnosis Date    Achilles tendon rupture 10/09/2013    Achilles tendon rupture 10/09/2013    Allergy     Anemia 1/17/2024    Anxiety     Arthritis     psoriatric    Degenerative disc disease     Drug-induced lupus erythematosus     Drug-induced lupus erythematosus 03/09/2016    Hyperlipidemia     Inguinal lymphadenopathy 07/21/2015    Kidney stone     Medication monitoring encounter 12/07/2016    Neck pain 07/06/2017    Psoriatic arthritis     Psoriatic arthritis 12/07/2016    Recurrent fever 07/08/2015    Recurrent nephrolithiasis 05/19/2014    On low oxalate diet    Sinusitis 02/25/2016    Stroke     TIA (transient ischemic attack)     Ulcer     high school    Unexplained night sweats 07/13/2015         Past Surgical History:   Procedure Laterality Date    ACHILLES TENDON SURGERY      BACK SURGERY      DIRECT LARYNGOSCOPY  2/3/2024    Procedure: LARYNGOSCOPY, DIRECT;  Surgeon: Jodie Collier MD;  Location: [facility] OR [address] [place] [address];  Service: ENT;;    FUNCTIONAL ENDOSCOPIC SINUS SURGERY (FESS) USING COMPUTER-ASSISTED NAVIGATION Bilateral 9/14/2018    Procedure: FESS, USING COMPUTER-ASSISTED NAVIGATION;  Surgeon: Gerard Manzo MD;  Location: [facility] OR [address] [place] [address];  Service: ENT;  Laterality: Bilateral;    FUSION, SPINE, CERVICAL, ANTERIOR APPROACH N/A 1/30/2024    Procedure: C3-4 anterior cervical discectomy and fusion;  Surgeon: Rogerio Maradiaga MD;  Location: [address];  Service: Neurosurgery;  Laterality: N/A;  C3-4 anterior cervical discectomy and fusion  anesthesia: general  nerve mon: EMG/SEP/MEP  radiology: C-arm  bed: reg. slider  position: supine  headrest: SHEREE miner tongs/hanging weights, surgical  pillow    INJECTION OF ANESTHETIC AGENT AROUND NERVE Left 5/13/2022    Procedure: Block, Nerve MEDIAL BRANCH BLOCK LEFT C2,3,4,5;  Surgeon: Kimberly Wilson MD;  Location: [facility] PAIN MGT;  Service: Pain Management;  Laterality: Left;    INJECTION OF ANESTHETIC AGENT AROUND NERVE Left 5/24/2022    Procedure: BLOCK, NERVE, LEFT C2-C5 MEDIAL BRANCH;  Surgeon: Kimberly Wilson MD;  Location: [facility] PAIN MGT;  Service: Pain Management;  Laterality: Left;    LAPAROTOMY N/A 2/3/2024    Procedure: LAPAROTOMY with gastrostomy tube placement;  Surgeon: Benigno Perez MD;  Location: [facility] OR [facility] FLR;  Service: General;  Laterality: N/A;    NASAL SEPTOPLASTY N/A 9/14/2018    Procedure: SEPTOPLASTY, NASAL;  Surgeon: Gerard Manzo MD;  [facility];  Service: ENT;  Laterality: N/A;    NASAL TURBINATE REDUCTION Bilateral 9/14/2018    Procedure: REDUCTION, NASAL TURBINATE;  Surgeon: Gerard Manzo MD;  [facility];  Service: ENT;  Laterality: Bilateral;    NECK EXPLORATION N/A 1/30/2024    Procedure: EXPLORATION, NECK, REPAIR OF PHARYNGEAL INJURY;  Surgeon: Senthil Agarwal MD;  [facility];  Service: ENT;  Laterality: N/A;    RADIOFREQUENCY ABLATION Left 7/12/2022    Procedure: RADIOFREQUENCY ABLATION, LEFT C2-C3, C3-C4, C4-C5;  Surgeon: Kimberly Wilson MD;  Location: [facility] PAIN MGT;  Service: Pain Management;  Laterality: Left;    TRACHEOTOMY  2/3/2024    Procedure: TRACHEOTOMY;  Surgeon: Jodie Collier MD;  [facility];  Service: ENT;;    UPPER ENDOSCOPY W/ ESOPHAGEAL MANOMETRY      URETERAL STENT PLACEMENT         Time Tracking:     OT Date of Treatment: 02/05/24  OT Start Time: 1420  OT Stop Time: 1450  OT Total Time (min): 30 min    Billable Minutes:Re-eval 15 min  Therapeutic Activity 15 min     2/5/2024

## 2024-02-05 NOTE — PLAN OF CARE
02/05/24 1145   Post-Acute Status   Post-Acute Authorization Placement   Post-Acute Placement Status Patient List Provided   Coverage [employer]- [employer] Blue Saver PPO   Discharge Delays None known at this time   Discharge Plan   Discharge Plan A Rehab     Met with pt/mother to review discharge recommendation of [id number] and is agreeable to plan    Patient/family provided list of facilities in-network with patient's payor plan. Providers that are owned, operated, or affiliated with [facility] are included on the list.     SW will follow-up on selection once pt/mother review.      Kita Wynne LMSW  PRN - [facility]  [id number]

## 2024-02-05 NOTE — PROGRESS NOTES
[facility] - Neuro Critical Care  Neurosurgery  Progress Note    Subjective:     History of Present Illness: Rebel Rodriguez is a 53 y.o. male with hx of psoriatic arthritis, hx TIA on Aspirin 81 mg, s/p C4-7 ACDF with Dr. Huff 10 years ago who presents for evaluation of gait imbalance. Last seen 1 year ago for neck pain and radicular pain into the left shoulder. He underwent C1-2 KENRICK with moderate relief of pain. Reports rapid functional decline over the last 3 weeks. Endorses hand clumsiness, difficulty typing, gait imbalance, difficulty butoning, dropping items, falls. Difficulty with ambulating also due to LLE weakness. States it feels like he has rubber bands around his wrists. Reports difficulty with overhead mobility and shock-like pains down spine when raising arms overhead. Denies b/b incontinence.     Post-Op Info:  Procedure(s) (LRB):  LAPAROTOMY with gastrostomy tube placement (N/A)  LARYNGOSCOPY, DIRECT  TRACHEOTOMY   1 Day Post-Op   Interval History: underwent trach/g tube with ENT and gen surg 2/3, no complaints this AM    Medications:  Continuous Infusions:   sodium chloride 0.9% Stopped (02/03/24 0801)    dextrose 10 % in water (D10W)       Scheduled Meds:   albuterol-ipratropium  3 mL Nebulization Q6H    atorvastatin  10 mg Per NG tube Daily    FLUoxetine  40 mg Per NG tube Daily    mirtazapine  15 mg Per NG tube QHS    polyethylene glycol  17 g Per NG tube BID    QUEtiapine  50 mg Per NG tube Q12H    senna-docusate 8.6-50 mg  2 tablet Per NG tube BID     PRN Meds:acetaminophen, dextrose 10 % in water (D10W), dextrose 10%, dextrose 10%, glucagon (human recombinant), HYDROmorphone, hydrOXYzine HCL, insulin aspart U-100, magnesium oxide, magnesium oxide, ondansetron, oxyCODONE, potassium bicarbonate, potassium bicarbonate, potassium bicarbonate, potassium, sodium phosphates, potassium, sodium phosphates, potassium, sodium phosphates, prochlorperazine     Review of Systems  Objective:     Weight: 74.9  kg (165 lb 2 oz)  Body mass index is 25.11 kg/m².  Vital Signs (Most Recent):  Temp: 99.4 °F (37.4 °C) (02/04/24 1901)  Pulse: 84 (02/04/24 2101)  Resp: 16 (02/04/24 2101)  BP: (!) 119/56 (02/04/24 2101)  SpO2: 98 % (02/04/24 2101) Vital Signs (24h Range):  Temp:  [98.6 °F (37 °C)-99.4 °F (37.4 °C)] 99.4 °F (37.4 °C)  Pulse:  [70-84] 84  Resp:  [13-30] 16  SpO2:  [93 %-100 %] 98 %  BP: (105-132)/(51-67) 119/56     Date 02/04/24 0700 - 02/05/24 0659   Shift 5224-0293 8466-7310 0249-9662 24 Hour Total   INTAKE   NG/GT  65  65   Shift Total(mL/kg)  65(0.9)  65(0.9)   OUTPUT   Urine(mL/kg/hr)  450  450   Drains  3  3   Shift Total(mL/kg)  453(6)  453(6)   Weight (kg) 74.9 74.9 74.9 74.9              Oxygen Concentration (%):  [28] 28             Closed/Suction Drain 01/30/24 1916 Right;Ventral Neck Bulb 15 Fr. (Active)   Site Description Healing 02/04/24 2101   Dressing Type Transparent (Tegaderm) 02/04/24 2101   Dressing Status Clean;Dry;Intact 02/04/24 2101   Dressing Intervention Integrity maintained 02/04/24 2101   Drainage Serosanguineous 02/04/24 2101   Status To bulb suction 02/04/24 2101   Output (mL) 3 mL 02/04/24 1801            Gastrostomy/Enterostomy 02/03/24 0934 Gastrostomy tube w/ balloon LUQ (Active)   Securement secured to abdomen 02/04/24 2101   Suction Setting/Drainage Method dependent drainage 02/04/24 2101   Drainage brown 02/04/24 1701   Clamp Status/Tolerance unclamped 02/04/24 2101   Feeding Action feeding continued 02/04/24 2101   Dressing no dressing 02/04/24 1701   Insertion Site no swelling;dry 02/04/24 2101   Intake (mL) 20 mL 02/04/24 1901   Tube Output(mL)(Include Discarded Residual) 300 mL 02/04/24 0542       Male External Urinary Catheter 01/30/24 2000 (Active)   Collection Container Urimeter 02/04/24 2101   Securement Method secured to top of thigh w/ adhesive device 02/04/24 2101   Skin no redness;no breakdown 02/04/24 2101   Tolerance no signs/symptoms of discomfort 02/04/24 2101  "  Output (mL) 450 mL 02/04/24 1801   Catheter Change Date 02/03/24 02/04/24 0502   Catheter Change Time 1941 02/04/24 0502          Physical Exam         Neurosurgery Physical Exam    Neurosurgery Physical Exam  [id number]  S/p trach  AOx4,   LUE T/HG 4+ ow motor intact  SILT    Significant Labs:  Recent Labs   Lab 02/03/24  0125 02/04/24  0033   GLU 83 87    143   K 3.5 4.2   * 108   CO2 23 25   BUN 18 16   CREATININE 0.8 0.8   CALCIUM 8.1* 8.9   MG 1.6 2.0     Recent Labs   Lab 02/03/24  0125 02/04/24  0033   WBC 9.35 12.33   HGB 9.0* 9.5*   HCT 28.4* 29.4*    237     No results for input(s): "LABPT", "INR", "APTT" in the last 48 hours.  Microbiology Results (last 7 days)       ** No results found for the last 168 hours. **          All pertinent labs from the last 24 hours have been reviewed.    Significant Diagnostics:  CT: No results found in the last 24 hours.  MRI: No results found in the last 24 hours.  Assessment/Plan:     * Cervical myelopathy  Rebel Rodriguez  Is a 54 y.o. male with cervical myelopathy and prior ACDF C4 C7 with adjacent level 3 4 who presented for elective C3-4 ACDF on 01/30, which complicated by retropharyngeal injury required repair by ENT. Now s/p trach/G tube on 2/3.      Stable for TTF, q4h neuro checks  Continue multimodal pain control   S/p dex x3d  ID consulted for abx recs given pharyngeal injury, s/p course of unasyn  Cervical drain by ENT   S/p trach/g tube - npo 4-6 wks minimum per ENT. TF per primary team/nutrition  SubQ heparin for DVT prophylaxis  F/u C sp XR        Buffy Carrera MD  Neurosurgery  [facility] - Neuro Critical Care  "

## 2024-02-05 NOTE — SUBJECTIVE & OBJECTIVE
Interval History:   Doing well this am, on trach collar.     Medications:  Continuous Infusions:   sodium chloride 0.9% Stopped (02/03/24 0801)    dextrose 10 % in water (D10W)       Scheduled Meds:   albuterol-ipratropium  3 mL Nebulization Q6H    atorvastatin  10 mg Per NG tube Daily    FLUoxetine  40 mg Per NG tube Daily    heparin (porcine)  5,000 Units Subcutaneous Q8H    mirtazapine  15 mg Per NG tube QHS    polyethylene glycol  17 g Per NG tube BID    QUEtiapine  50 mg Per NG tube Q12H    senna-docusate 8.6-50 mg  2 tablet Per NG tube BID     PRN Meds:acetaminophen, dextrose 10 % in water (D10W), dextrose 10%, dextrose 10%, glucagon (human recombinant), HYDROmorphone, hydrOXYzine HCL, insulin aspart U-100, magnesium oxide, magnesium oxide, ondansetron, oxyCODONE, potassium bicarbonate, potassium bicarbonate, potassium bicarbonate, potassium, sodium phosphates, potassium, sodium phosphates, potassium, sodium phosphates, prochlorperazine     Review of patient's allergies indicates:   Allergen Reactions    Erythromycin Nausea And Vomiting    Infliximab Other (See Comments)     Lupus with fever and acute arthritis  Lupus with fever and acute arthritis     Objective:     Vital Signs (24h Range):  Temp:  [99 °F (37.2 °C)-99.4 °F (37.4 °C)] 99 °F (37.2 °C)  Pulse:  [70-88] 88  Resp:  [12-22] 22  SpO2:  [95 %-100 %] 98 %  BP: (116-132)/(56-80) 116/67       Lines/Drains/Airways       Drain  Duration                  Closed/Suction Drain 01/30/24 1916 Right;Ventral Neck Bulb 15 Fr. 5 days    Male External Urinary Catheter 01/30/24 2000 5 days         Gastrostomy/Enterostomy 02/03/24 0934 Gastrostomy tube w/ balloon LUQ 2 days              Airway  Duration             Adult Surgical Airway 02/03/24 1055 Shiley Cuffed 6.0/ 75mm 2 days              Peripheral Intravenous Line  Duration                  Peripheral IV - Single Lumen 01/30/24 0545 20 G Left Forearm 6 days         Peripheral IV - Single Lumen 02/01/24 1820 18  G Right Forearm 3 days         Peripheral IV - Single Lumen 02/04/24 0529 20 G Posterior;Right Hand 1 day                     Physical Exam  Sitting up in bed   NAD   6-0 cuffed trach in place, CUFF UP and to remain inflated   Trach secured with sutures and soft collar   R neck incision c/d/i, no fluctuance or erythema   DELFIN x1 with minimal SS output, holding suction  Interval resolution of crepitus      Significant Labs:  CBC:   Recent Labs   Lab 02/05/24 0049   WBC 11.20   RBC 3.53*   HGB 9.9*   HCT 31.1*      MCV 88   MCH 28.0   MCHC 31.8*     CMP:   Recent Labs   Lab 02/05/24 0049   GLU 85   CALCIUM 9.2   ALBUMIN 2.7*   PROT 5.9*      K 3.8   CO2 25      BUN 15   CREATININE 0.8   ALKPHOS 55   ALT 33   AST 44*   BILITOT 0.4       Significant Diagnostics:  None

## 2024-02-05 NOTE — ASSESSMENT & PLAN NOTE
Mr Rodriguez is a 55 yo male who is s/p C3-4 ACDF surgery with post op dysphagia, odynophagia, neck pain, swelling and crepitus. CT and scope demonstrates DELFIN drain in the hypopharynx. Patient with difficulty with swallowing secretions. No respiratory compromise. He is s/p neck exploration and pharyngeal repair on 1/30. Discussed with patient's mother and sister at bedside the recommendation for open G tube and tracheostomy to allow adequate time for pharynx to heal.     Now s/p trach and open G tube on 2/3/24.    - Strict NPO, NPO for 4-6 weeks minimum  - ID consulted for abx recommendations in setting of hardware with pharyngeal injury    - Currently off all abx, s/p vanc and unasyn   - s/p tracheostomy, with 6-0 cuffed shiley in place    - CUFF TO REMAIN UP - until otherwise directed by ENT    - Tentative plan to deflate on Tuesday  - R neck drain to stay in place until removed by ENT   - Rest of care for per primary  - Please page ENT with questions or concerns.

## 2024-02-05 NOTE — ASSESSMENT & PLAN NOTE
Rebel Rodriguez  Is a 54 y.o. male with cervical myelopathy and prior ACDF C4 C7 with adjacent level 3 4 who presented for elective C3-4 ACDF on 01/30, which complicated by retropharyngeal injury required repair by ENT. Now s/p trach/G tube on 2/3.      Stable for TTF, q4h neuro checks  Continue multimodal pain control   S/p dex x3d  ID consulted for abx recs given pharyngeal injury, s/p course of unasyn  Cervical drain by ENT   S/p trach/g tube - npo 4-6 wks minimum per ENT. TF per primary team/nutrition  SubQ heparin for DVT prophylaxis  F/u C sp XR

## 2024-02-05 NOTE — PT/OT/SLP PROGRESS
"Physical Therapy Treatment    Patient Name:  Rebel Rodriguez   MRN:  [id number]    Recommendations:     Discharge Recommendations: High Intensity Therapy (May progress depending on ETT removal)  Discharge Equipment Recommendations: to be determined by next level of care, none  Barriers to discharge: Decreased caregiver support    Assessment:     Rebel Rodriguez is a 54 y.o. male admitted with a medical diagnosis of Cervical myelopathy.  He presents with the following impairments/functional limitations: weakness, impaired balance, decreased lower extremity function, impaired endurance.   Agreeable to ambulate. Highest level of mobility: 15' Amb in room two person assist for mobility with multiple lines, drains and tubes    Co treatment with OT due to higher level of complexity requiring two skilled hands to maximize patient progression and improve patient potential.      Rehab Prognosis: Good; patient would benefit from acute skilled PT services to address these deficits and reach maximum level of function.    Recent Surgery: Procedure(s) (LRB):  LAPAROTOMY with gastrostomy tube placement (N/A)  LARYNGOSCOPY, DIRECT  TRACHEOTOMY 2 Days Post-Op    Plan:     During this hospitalization, patient to be seen 4 x/week to address the identified rehab impairments via gait training, therapeutic activities, therapeutic exercises, neuromuscular re-education and progress toward the following goals:    Plan of Care Expires:  02/28/24    Subjective     Chief Complaint: "I get tired really fast"  Patient/Family Comments/goals: To get stronger  Pain/Comfort:  Pain Rating 1: 0/10 (Patient was gaurding and grabbing the left side of his abdomen following ambulation and needed more assistance transitioning back into bed as a result)  Pain Addressed 1: Pre-medicate for activity      Objective:     Communicated with CRT prior to session.  Patient found HOB elevated with PEG Tube, Condom Catheter, peripheral IV, DELFIN drain, SCD, telemetry, oxygen, " pulse ox (continuous) upon PT entry to room.     General Precautions: Standard, fall  Orthopedic Precautions: N/A  Braces: N/A  Respiratory Status: High flow, flow 6 L/min, concentration 28%     Functional Mobility:  Bed Mobility:     Supine to Sit: moderate assistance  Sit to Supine: moderate assistance  Transfers:     Sit to Stand:  contact guard assistance with rolling walker  Gait: 15' mod A with FWW      AM-PAC 6 CLICK MOBILITY  Turning over in bed (including adjusting bedclothes, sheets and blankets)?: 3  Sitting down on and standing up from a chair with arms (e.g., wheelchair, bedside commode, etc.): 3  Moving from lying on back to sitting on the side of the bed?: 3  Moving to and from a bed to a chair (including a wheelchair)?: 3  Need to walk in hospital room?: 3  Climbing 3-5 steps with a railing?: 1  Basic Mobility Total Score: 16       Treatment & Education:  Patient noted to need more physical assistance than previously for supine to sit (significant assistance needed with trunk control this date).   Extended time provided in between each movement to allow patient to acclimate to position.   HR increased from 70 to 100 throughout mobility, however oxygen levels remained within normal limits throughout.     Amb 15' around bed with mod A for trunk control with walker, int physical assistance from therapist to progress the LLE forwards and increased weight-bearing observed through arms with L SLS.     Second bout of ambulation side-stepping 5' towards HOB with walker management.   Sit to supine patient needed physical assistance with BLEs returning to bed.       Patient left HOB elevated with all lines intact, call button in reach, and RN notified..    GOALS:   Multidisciplinary Problems       Physical Therapy Goals          Problem: Physical Therapy    Goal Priority Disciplines Outcome Goal Variances Interventions   Physical Therapy Goal     PT, PT/OT Ongoing, Progressing                         Time  Tracking:     PT Received On: 02/05/24  PT Start Time: 1415     PT Stop Time: 1450  PT Total Time (min): 35 min     Billable Minutes: Gait Training 15 min and Therapeutic Activity 20 min    Treatment Type: Treatment  PT/PTA: PT     Number of PTA visits since last PT visit: 0     02/05/2024

## 2024-02-05 NOTE — OP NOTE
[facility] Neuro Critical Care  Head & Neck Surgery Department  Operative Note    SUMMARY     Date of Procedure: 2/3/2024    Procedure:   1) LARYNGOSCOPY, DIRECT  2) TRACHEOTOMY     Pre-Operative Diagnosis: Injury of pharynx, subsequent encounter    Post-Operative Diagnosis: Injury of pharynx, subsequent encounter    Attending Surgeon(s): Jodie Collier MD       Assistant(s): Bonita Macias MD     Anesthesia: General    Description of the Findings of the Procedure: Right pharynx/hypopharynx repair visualized and intact with new eschar, tracheostomy performed    Significant Surgical Tasks Conducted by the Assistant(s), if Applicable: N/A    Indications for procedure:  Rebel Rodriguez is a 54 y.o. man statuspost anterior cervical surgery on 1/31/24.  Concern was raised postoperatively for a pharyngeal injury.  Consultation was called to the ENT service, and Dr. Agarwal took the patient to the operating room for reintubation and repair. He has remained intubated during that time. Given the risk to the repair if extubated or allowed to take PO, it was recommended to the patient and the family that he undergo gtube placement. I discussed direct laryngoscopy with the family, with tracheostomy to follow if the area was not yet completely healed. Risks, benefits and alternatives to surgery were discussed, and his mother provided written consent. His sister and the patient provided verbal consent.     Laryngoscope: Sherif  Positioning: supine    Detailed Account of Procedure Performed: The patient was taken directly to the operating room from the ICU given that he was intubated. An adequate level of general anesthesia was achieved.  The general surgery team proceeded with g-tube - please see Dr. Perez's dictation for details. Once this procedure was completed, we proceeding with direct laryngoscopy.  The head of the patient's bed remained in line. His eyes were protected, and a maxillary tooth guard was placed. The  patient was draped in the standard fashion for laryngoscopy, and a timeout was performed according to the Universal Protocol.    The Sherif laryngoscope was inserted in the patient's right lingual gutter and advanced to visualize the posterior oropharynx, hypopharynx, and endolarynx. There was noted to be yellow eschar in the right hypopharynx as well as on the posterior pharyngeal wall. No visible plate or obvious defect was noted, although the sutures were clearly visualized in the right pharynx.  Photos were taken. While my resident remained with the patient in the operating room, I brought the photos from the operating room to discuss proceeding to tracheostomy with the family, given that the area still appeared delicate from the repair. I explained that if we were to trial extubation, reintubation for any reason would likely cause injury to the repair. His mother and sister again agreed to proceed to tracheostomy.              The thyroid notch, sternal notch, and cricoid were marked, and a suitable crease about 2 fingerbreaths above the sternal notch marked for incision. Care was taken to ensure the tracheostomy was separate from the prior surgical bed. This was then injected with 1% lidocaine with 1:100,000 epinephrine. The patient was prepped and draped in usual fashion. The #15 was used to make a horizontal incision as marked. The bovie cautery was used dissect through subcutaneous tissues. The median raphe of the strap muscles was identified and incised. The straps were retracted laterally with [place] retractors. The cricoid cartilage was identified. A hemostat was used to form a plane between the trachea and the thyroid isthmus, which was then divided with electrocautery. The cricoid was retracted superiorly with a hook. After communication with anesthesia, the tracheotomy was performed between the 2nd and 3rd rings. No Marisol flap or window was created. A 6 cuffed tracheotomy tube was inserted  through the defect after backing out the ETT. Once adequate placement was confirmed by  CO2 and bilateral chest rise with normal ventilation, the cricoid hook was removed. The tracheotomy tube was secured with 2-0 silk suture and a velcro tie.     The patient was then returned to the care of anesthesia for transport back to the ICU.    Complications: No    Estimated Blood Loss (EBL): 10cc           Implants: * No implants in log *    Specimens:   Specimen (24h ago, onward)      None                    Condition: Good    Disposition: ICU - extubated and stable.    Attestation: I performed the procedure as stated above.

## 2024-02-06 PROBLEM — R52 PAIN: Status: ACTIVE | Noted: 2024-02-06

## 2024-02-06 PROBLEM — R53.81 DEBILITY: Status: ACTIVE | Noted: 2024-02-06

## 2024-02-06 PROBLEM — N20.0 KIDNEY STONES: Status: ACTIVE | Noted: 2024-02-06

## 2024-02-06 PROBLEM — E87.1 HYPONATREMIA: Status: ACTIVE | Noted: 2024-02-06

## 2024-02-06 PROBLEM — R10.9 ABDOMINAL CRAMPING: Status: ACTIVE | Noted: 2024-02-06

## 2024-02-06 PROBLEM — R53.1 WEAKNESS: Status: ACTIVE | Noted: 2024-02-06

## 2024-02-06 LAB
ALBUMIN SERPL BCP-MCNC: 2.8 G/DL (ref 3.5–5.2)
ALP SERPL-CCNC: 77 U/L (ref 55–135)
ALT SERPL W/O P-5'-P-CCNC: 28 U/L (ref 10–44)
ANION GAP SERPL CALC-SCNC: 12 MMOL/L (ref 8–16)
AST SERPL-CCNC: 28 U/L (ref 10–40)
BASOPHILS # BLD AUTO: 0.03 K/UL (ref 0–0.2)
BASOPHILS NFR BLD: 0.2 % (ref 0–1.9)
BILIRUB SERPL-MCNC: 0.8 MG/DL (ref 0.1–1)
BUN SERPL-MCNC: 15 MG/DL (ref 6–20)
CALCIUM SERPL-MCNC: 9.4 MG/DL (ref 8.7–10.5)
CHLORIDE SERPL-SCNC: 101 MMOL/L (ref 95–110)
CO2 SERPL-SCNC: 27 MMOL/L (ref 23–29)
CREAT SERPL-MCNC: 0.8 MG/DL (ref 0.5–1.4)
DIFFERENTIAL METHOD BLD: ABNORMAL
EOSINOPHIL # BLD AUTO: 0 K/UL (ref 0–0.5)
EOSINOPHIL NFR BLD: 0.2 % (ref 0–8)
ERYTHROCYTE [DISTWIDTH] IN BLOOD BY AUTOMATED COUNT: 14 % (ref 11.5–14.5)
EST. GFR  (NO RACE VARIABLE): >60 ML/MIN/1.73 M^2
GLUCOSE SERPL-MCNC: 89 MG/DL (ref 70–110)
HCT VFR BLD AUTO: 36.4 % (ref 40–54)
HGB BLD-MCNC: 11.8 G/DL (ref 14–18)
IMM GRANULOCYTES # BLD AUTO: 0.1 K/UL (ref 0–0.04)
IMM GRANULOCYTES NFR BLD AUTO: 0.6 % (ref 0–0.5)
LYMPHOCYTES # BLD AUTO: 1.5 K/UL (ref 1–4.8)
LYMPHOCYTES NFR BLD: 8.9 % (ref 18–48)
MAGNESIUM SERPL-MCNC: 1.7 MG/DL (ref 1.6–2.6)
MCH RBC QN AUTO: 28.3 PG (ref 27–31)
MCHC RBC AUTO-ENTMCNC: 32.4 G/DL (ref 32–36)
MCV RBC AUTO: 87 FL (ref 82–98)
MONOCYTES # BLD AUTO: 1.5 K/UL (ref 0.3–1)
MONOCYTES NFR BLD: 8.8 % (ref 4–15)
NEUTROPHILS # BLD AUTO: 13.5 K/UL (ref 1.8–7.7)
NEUTROPHILS NFR BLD: 81.3 % (ref 38–73)
NRBC BLD-RTO: 0 /100 WBC
PHOSPHATE SERPL-MCNC: 3.9 MG/DL (ref 2.7–4.5)
PLATELET # BLD AUTO: 256 K/UL (ref 150–450)
PMV BLD AUTO: 9.6 FL (ref 9.2–12.9)
POCT GLUCOSE: 113 MG/DL (ref 70–110)
POCT GLUCOSE: 114 MG/DL (ref 70–110)
POTASSIUM SERPL-SCNC: 3.7 MMOL/L (ref 3.5–5.1)
PROT SERPL-MCNC: 6.3 G/DL (ref 6–8.4)
RBC # BLD AUTO: 4.17 M/UL (ref 4.6–6.2)
SODIUM SERPL-SCNC: 140 MMOL/L (ref 136–145)
WBC # BLD AUTO: 16.57 K/UL (ref 3.9–12.7)

## 2024-02-06 PROCEDURE — 63600175 PHARM REV CODE 636 W HCPCS

## 2024-02-06 PROCEDURE — 25000003 PHARM REV CODE 250: Performed by: PSYCHIATRY & NEUROLOGY

## 2024-02-06 PROCEDURE — 97116 GAIT TRAINING THERAPY: CPT

## 2024-02-06 PROCEDURE — 99900035 HC TECH TIME PER 15 MIN (STAT)

## 2024-02-06 PROCEDURE — 27200966 HC CLOSED SUCTION SYSTEM

## 2024-02-06 PROCEDURE — 36415 COLL VENOUS BLD VENIPUNCTURE: CPT

## 2024-02-06 PROCEDURE — 99024 POSTOP FOLLOW-UP VISIT: CPT | Mod: ,,, | Performed by: PHYSICIAN ASSISTANT

## 2024-02-06 PROCEDURE — 25000003 PHARM REV CODE 250

## 2024-02-06 PROCEDURE — 20600001 HC STEP DOWN PRIVATE ROOM

## 2024-02-06 PROCEDURE — 94640 AIRWAY INHALATION TREATMENT: CPT

## 2024-02-06 PROCEDURE — 27000221 HC OXYGEN, UP TO 24 HOURS

## 2024-02-06 PROCEDURE — 97530 THERAPEUTIC ACTIVITIES: CPT

## 2024-02-06 PROCEDURE — 83735 ASSAY OF MAGNESIUM: CPT

## 2024-02-06 PROCEDURE — 94761 N-INVAS EAR/PLS OXIMETRY MLT: CPT

## 2024-02-06 PROCEDURE — 85025 COMPLETE CBC W/AUTO DIFF WBC: CPT

## 2024-02-06 PROCEDURE — 63600175 PHARM REV CODE 636 W HCPCS: Performed by: HOSPITALIST

## 2024-02-06 PROCEDURE — 63600175 PHARM REV CODE 636 W HCPCS: Performed by: NURSE PRACTITIONER

## 2024-02-06 PROCEDURE — 84100 ASSAY OF PHOSPHORUS: CPT

## 2024-02-06 PROCEDURE — 97535 SELF CARE MNGMENT TRAINING: CPT

## 2024-02-06 PROCEDURE — 25000003 PHARM REV CODE 250: Performed by: HOSPITALIST

## 2024-02-06 PROCEDURE — 99900026 HC AIRWAY MAINTENANCE (STAT)

## 2024-02-06 PROCEDURE — 25000003 PHARM REV CODE 250: Performed by: NURSE PRACTITIONER

## 2024-02-06 PROCEDURE — 80053 COMPREHEN METABOLIC PANEL: CPT

## 2024-02-06 PROCEDURE — 25000242 PHARM REV CODE 250 ALT 637 W/ HCPCS: Performed by: NURSE PRACTITIONER

## 2024-02-06 RX ORDER — BISACODYL 10 MG/1
10 SUPPOSITORY RECTAL DAILY PRN
Status: DISCONTINUED | OUTPATIENT
Start: 2024-02-06 | End: 2024-02-14 | Stop reason: HOSPADM

## 2024-02-06 RX ORDER — SODIUM CHLORIDE, SODIUM LACTATE, POTASSIUM CHLORIDE, CALCIUM CHLORIDE 600; 310; 30; 20 MG/100ML; MG/100ML; MG/100ML; MG/100ML
INJECTION, SOLUTION INTRAVENOUS ONCE
Status: COMPLETED | OUTPATIENT
Start: 2024-02-06 | End: 2024-02-06

## 2024-02-06 RX ADMIN — SODIUM CHLORIDE, POTASSIUM CHLORIDE, SODIUM LACTATE AND CALCIUM CHLORIDE: 600; 310; 30; 20 INJECTION, SOLUTION INTRAVENOUS at 01:02

## 2024-02-06 RX ADMIN — POLYETHYLENE GLYCOL 3350 17 GRAM ORAL POWDER PACKET 17 G: at 09:02

## 2024-02-06 RX ADMIN — POLYETHYLENE GLYCOL 3350 17 GRAM ORAL POWDER PACKET 17 G: at 08:02

## 2024-02-06 RX ADMIN — OXYCODONE HYDROCHLORIDE 10 MG: 10 TABLET ORAL at 09:02

## 2024-02-06 RX ADMIN — HYDROXYZINE HYDROCHLORIDE 25 MG: 25 TABLET ORAL at 12:02

## 2024-02-06 RX ADMIN — IPRATROPIUM BROMIDE AND ALBUTEROL SULFATE 3 ML: .5; 3 SOLUTION RESPIRATORY (INHALATION) at 07:02

## 2024-02-06 RX ADMIN — HEPARIN SODIUM 5000 UNITS: 5000 INJECTION INTRAVENOUS; SUBCUTANEOUS at 09:02

## 2024-02-06 RX ADMIN — QUETIAPINE FUMARATE 50 MG: 25 TABLET ORAL at 08:02

## 2024-02-06 RX ADMIN — ACETAMINOPHEN 1000 MG: 500 TABLET ORAL at 08:02

## 2024-02-06 RX ADMIN — MIRTAZAPINE 15 MG: 7.5 TABLET, FILM COATED ORAL at 09:02

## 2024-02-06 RX ADMIN — QUETIAPINE FUMARATE 50 MG: 25 TABLET ORAL at 09:02

## 2024-02-06 RX ADMIN — HYDROXYZINE HYDROCHLORIDE 25 MG: 25 TABLET ORAL at 10:02

## 2024-02-06 RX ADMIN — SENNOSIDES AND DOCUSATE SODIUM 2 TABLET: 8.6; 5 TABLET ORAL at 08:02

## 2024-02-06 RX ADMIN — FLUOXETINE HYDROCHLORIDE 40 MG: 20 CAPSULE ORAL at 08:02

## 2024-02-06 RX ADMIN — HYDROMORPHONE HYDROCHLORIDE 1 MG: 1 INJECTION, SOLUTION INTRAMUSCULAR; INTRAVENOUS; SUBCUTANEOUS at 12:02

## 2024-02-06 RX ADMIN — HEPARIN SODIUM 5000 UNITS: 5000 INJECTION INTRAVENOUS; SUBCUTANEOUS at 05:02

## 2024-02-06 RX ADMIN — OXYCODONE HYDROCHLORIDE 10 MG: 10 TABLET ORAL at 03:02

## 2024-02-06 RX ADMIN — SENNOSIDES AND DOCUSATE SODIUM 2 TABLET: 8.6; 5 TABLET ORAL at 09:02

## 2024-02-06 RX ADMIN — IPRATROPIUM BROMIDE AND ALBUTEROL SULFATE 3 ML: .5; 3 SOLUTION RESPIRATORY (INHALATION) at 01:02

## 2024-02-06 RX ADMIN — ATORVASTATIN CALCIUM 10 MG: 10 TABLET, FILM COATED ORAL at 08:02

## 2024-02-06 RX ADMIN — HEPARIN SODIUM 5000 UNITS: 5000 INJECTION INTRAVENOUS; SUBCUTANEOUS at 03:02

## 2024-02-06 RX ADMIN — ONDANSETRON 4 MG: 2 INJECTION INTRAMUSCULAR; INTRAVENOUS at 12:02

## 2024-02-06 RX ADMIN — BISACODYL 10 MG: 10 SUPPOSITORY RECTAL at 06:02

## 2024-02-06 NOTE — ASSESSMENT & PLAN NOTE
Rebel Rodriguez  Is a 54 y.o. male with cervical myelopathy and prior ACDF C4 C7 with adjacent level 3 4 who presented for elective C3-4 ACDF on 01/30, which complicated by retropharyngeal injury required repair by ENT. Now s/p trach/G tube on 2/3.      - Neuro exam stable, q4h neuro checks  - Continue multimodal pain control   - S/p dex x3d  - ID consulted for abx recs given pharyngeal injury, s/p course of unasyn  - Cervical drain by ENT   - S/p trach/g tube - npo 4-6 wks minimum per ENT. TF per primary team/nutrition  - SubQ heparin for DVT prophylaxis  - Postop XR with satisfactory placement of hardware  - Notify with acute changes in exam. Will continue to follow for post-op needs. Discussed care plan with patient and mother at bedside, all questions answered.  - Discussed with Dr. Maradiaga

## 2024-02-06 NOTE — PROGRESS NOTES
[facility] - Neurosurgery ([facility])  [facility] Medicine  Progress Note    Patient Name: Rebel Rodriguez  MRN: [id number]  Patient Class: IP- Inpatient   Admission Date: 1/30/2024  Length of Stay: 7 days  Attending Physician: Elyse Dobbins MD  Primary Care Provider: Nanda Smith MD        Subjective:     Principal Problem:Cervical myelopathy    HPI:  53 y.o. male with hx of psoriatic arthritis, hx TIA on Aspirin 81 mg, s/p C4-7 ACDF with Dr. Huff 10 years ago admitted to [facility] s/p C3-C4 ACDF. Per chart review, reports rapid functional decline over the last 3 weeks. Endorses hand clumsiness, difficulty typing, gait imbalance, difficulty butoning, dropping items, falls. Difficulty with ambulating also due to LLE weakness. States it feels like he has rubber bands around his wrists. Reports difficulty with overhead mobility and shock-like pains down spine when raising arms overhead. CT neck soft tissue pending, Patient admitted to [facility] for close monitoring and higher level of care.      Hospital Course: 01/31/2024 CT neck concerning for extensive soft tissue edema and air in neck, additionally w/ concern for DELFIN drain misplaced into hypopharynx. Taken class A to OR by ENT for pharyngeal repair, left intubated by ENT in setting of airway edema. On high dose steroids, no cuff leak this AM  02/01/2024 General surgery consulted for open G tube placement, family still in discussion regarding trach. D/c dex to allow for adequate wound healing, ok with NSGY.   2/2/24: possible G-tube placement today  2/3/24: G-tube today  2/4/24: ok to step down to hospital medicine     Interval History: trach collar and g-tube in place, ok to step down to Hospital medicine    ENT recs: Now s/p trach and open G tube on 2/3/24.     - Strict NPO, NPO for 4-6 weeks minimum  - ID consulted for abx recommendations in setting of hardware with pharyngeal injury          - Currently off all abx, s/p vanc and unasyn   - s/p tracheostomy, with 6-0  cuffed shiley in place          - CUFF TO REMAIN UP - until otherwise directed by ENT          - Tentative plan to deflate on Tuesday  - R neck drain to stay in place until at least Tuesday       Overview/Hospital Course:  2/6- Transferred to [facility] med [facility]. Has trach and g-tube. Rehab is planned. BP low received , TF not at goal due to abd cramping. Sg give suppository. Rounded with ENT service. Plan is to remove tach first, remove drain last. Pt communicating fairly well and able to make request and advocate for self. He is sitting up in achair. NPO for six weeks.    Interval History: see above    Review of Systems   Constitutional:  Positive for activity change. Negative for appetite change and fever.   HENT:  Positive for trouble swallowing.         Trache    Respiratory:  Negative for shortness of breath.    Cardiovascular:  Negative for chest pain.   Gastrointestinal:  Negative for abdominal pain, diarrhea, nausea and vomiting.   Genitourinary:  Negative for difficulty urinating.   Musculoskeletal:  Negative for back pain, gait problem and neck stiffness.   Neurological:  Negative for headaches.   Psychiatric/Behavioral:  Negative for agitation, behavioral problems and confusion.      Objective:     Vital Signs (Most Recent):  Temp: 99.7 °F (37.6 °C) (02/06/24 0816)  Pulse: 84 (02/06/24 0816)  Resp: 19 (02/06/24 0816)  BP: (!) 102/50 (02/06/24 0816)  SpO2: 99 % (02/06/24 0816) Vital Signs (24h Range):  Temp:  [98.2 °F (36.8 °C)-99.8 °F (37.7 °C)] 99.7 °F (37.6 °C)  Pulse:  [74-88] 84  Resp:  [16-24] 19  SpO2:  [90 %-100 %] 99 %  BP: (102-132)/(50-73) 102/50     Weight: 74.9 kg (165 lb 2 oz)  Body mass index is 25.11 kg/m².    Intake/Output Summary (Last 24 hours) at 2/6/2024 0849  Last data filed at 2/5/2024 2102  Gross per 24 hour   Intake 555 ml   Output 800 ml   Net -245 ml         Physical Exam  Constitutional:       General: He is not in acute distress.     Appearance: Normal appearance. He is normal  weight. He is not ill-appearing, toxic-appearing or diaphoretic.   HENT:      Head: Normocephalic and atraumatic.      Comments: Trache present     Right Ear: External ear normal.      Left Ear: External ear normal.      Nose: Nose normal. No congestion or rhinorrhea.      Mouth/Throat:      Mouth: Mucous membranes are dry.      Pharynx: Oropharynx is clear.   Eyes:      General: No scleral icterus.     Extraocular Movements: Extraocular movements intact.      Conjunctiva/sclera: Conjunctivae normal.      Pupils: Pupils are equal, round, and reactive to light.   Neck:      Vascular: No carotid bruit.   Cardiovascular:      Rate and Rhythm: Normal rate and regular rhythm.      Pulses: Normal pulses.      Heart sounds: Normal heart sounds. No murmur heard.     No friction rub. No gallop.   Pulmonary:      Effort: Pulmonary effort is normal. No respiratory distress.      Breath sounds: Normal breath sounds. No stridor. No wheezing, rhonchi or rales.   Chest:      Chest wall: No tenderness.   Abdominal:      General: Abdomen is flat. Bowel sounds are normal. There is no distension.      Palpations: Abdomen is soft. There is no mass.      Tenderness: There is no abdominal tenderness. There is no right CVA tenderness, left CVA tenderness, guarding or rebound.      Hernia: No hernia is present.      Comments: G tube clean and dry   Musculoskeletal:         General: No swelling, tenderness, deformity or signs of injury. Normal range of motion.      Cervical back: Normal range of motion and neck supple. No rigidity or tenderness.      Right lower leg: No edema.      Left lower leg: No edema.   Lymphadenopathy:      Cervical: No cervical adenopathy.   Skin:     General: Skin is warm and dry.      Coloration: Skin is not jaundiced or pale.      Findings: No bruising, erythema, lesion or rash.   Neurological:      General: No focal deficit present.      Mental Status: He is alert and oriented to person, place, and time. Mental  status is at baseline.      Motor: No weakness.   Psychiatric:         Mood and Affect: Mood normal.         Behavior: Behavior normal.         Thought Content: Thought content normal.         Judgment: Judgment normal.             Significant Labs: All pertinent labs within the past 24 hours have been reviewed.  CBC:   Recent Labs   Lab 02/05/24 0049 02/06/24 0425   WBC 11.20 16.57*   HGB 9.9* 11.8*   HCT 31.1* 36.4*    256     CMP:   Recent Labs   Lab 02/05/24 0049 02/06/24 0425    140   K 3.8 3.7    101   CO2 25 27   GLU 85 89   BUN 15 15   CREATININE 0.8 0.8   CALCIUM 9.2 9.4   PROT 5.9* 6.3   ALBUMIN 2.7* 2.8*   BILITOT 0.4 0.8   ALKPHOS 55 77   AST 44* 28   ALT 33 28   ANIONGAP 12 12       Significant Imaging: I have reviewed all pertinent imaging results/findings within the past 24 hours.    Assessment/Plan:      * Cervical myelopathy  Hx of prior ACDF C4 C7 with adjacent level 3 4 who presented for elective C3-4 ACDF on 01/30, complicated by retropharyngeal injury required repair by ENT.   - s/p NCC, intubation and ventilation, trache and G tube.  - will not keep 6 weeks, NPO for 6 weeks, f/u ENT  - has right neck drain    Injury of pharynx  DELFIN drain noted to be in hypopharynx on post-op imaging, s/p emergent surgical repair on 1/30  - Dex 4q6hrs, discontinued  ENT recs: Now s/p trach and open G tube on 2/3/24.     - Strict NPO, NPO for 4-6 weeks minimum  - ID consulted for abx recommendations in setting of hardware with pharyngeal injury          - Currently off all abx, s/p vanc and unasyn   - s/p tracheostomy, with 6-0 cuffed shiley in place          - CUFF TO REMAIN UP - until otherwise directed by ENT          - Tentative plan to deflate on Tuesday  - R neck drain to stay in place until at least Tuesday   - needs time for wound healing    2/6- prepare for discharge, trach may be removed by ENT.       Gastrostomy status  Patient noted to have a percutaneous endoscopic gastrostomy  tube in place. I have personally inspected the tube.Tube was placed on this admission, with date of procedure- 2/2  There are no signs of drainage or infection around the site. The tube is patent. Medications have not converted to liquid form if available.  Routine care to be done by wound care and nursing staff.      Isossource @ 35 cc/ h  Peptomen 1.5 w bio 45cc/ h  Water flushes        Physical deconditioning  Has trache and G tube,   PT/OT  Wean oxygen  Teach trache and G-tube care      Anxiety  Hx of    - C/w home remeron and fluoxetine  - seroquel BID    Abdominal cramping  Suspect CONSTIPATION  Ducolox suppistory      On mechanically assisted ventilation  S/p ventilation in NCC  Now on room air, has trache for six weeks      Hyperlipidemia  Hx of    - C/w home atorvastatin         VTE Risk Mitigation (From admission, onward)           Ordered     heparin (porcine) injection 5,000 Units  Every 8 hours         02/05/24 0919     Place LAUREN hose  Until discontinued         01/30/24 0513     Place sequential compression device  Until discontinued         01/30/24 0513                    Discharge Planning   JUSTYN: 2/14/2024     Code Status: Full Code   Is the patient medically ready for discharge?: No    Reason for patient still in hospital (select all that apply): Patient trending condition  Discharge Plan A: Rehab   Discharge Delays: None known at this time      Elyse Dobbins MD  Department of Hospital Medicine   [facility] - Neurosurgery ([facility])

## 2024-02-06 NOTE — PROGRESS NOTES
[facility] - Neurosurgery ([facility])  Otorhinolaryngology-Head & Neck Surgery  Progress Note    Subjective:     Post-Op Info:  Procedure(s) (LRB):  LAPAROTOMY with gastrostomy tube placement (N/A)  LARYNGOSCOPY, DIRECT  TRACHEOTOMY   3 Days Post-Op  Hospital Day: 8     Interval History:   Stepped down overnight. Initially seen this morning and tracheostomy cuff was deflated.     Patient then re-evaluated about 6 hours later. Drain noted to be holding suction well and there was again no crepitus/subq emphysema on exam.    Medications:  Continuous Infusions:   sodium chloride 0.9% Stopped (02/03/24 0801)    dextrose 10 % in water (D10W)       Scheduled Meds:   albuterol-ipratropium  3 mL Nebulization Q6H    atorvastatin  10 mg Per NG tube Daily    FLUoxetine  40 mg Per NG tube Daily    heparin (porcine)  5,000 Units Subcutaneous Q8H    mirtazapine  15 mg Per NG tube QHS    polyethylene glycol  17 g Per NG tube BID    QUEtiapine  50 mg Per NG tube Q12H    senna-docusate 8.6-50 mg  2 tablet Per NG tube BID     PRN Meds:acetaminophen, dextrose 10 % in water (D10W), dextrose 10%, dextrose 10%, glucagon (human recombinant), HYDROmorphone, hydrOXYzine HCL, insulin aspart U-100, magnesium oxide, magnesium oxide, ondansetron, oxyCODONE, potassium bicarbonate, potassium bicarbonate, potassium bicarbonate, potassium, sodium phosphates, potassium, sodium phosphates, potassium, sodium phosphates, prochlorperazine     Review of patient's allergies indicates:   Allergen Reactions    Erythromycin Nausea And Vomiting    Infliximab Other (See Comments)     Lupus with fever and acute arthritis  Lupus with fever and acute arthritis     Objective:     Vital Signs (24h Range):  Temp:  [98.2 °F (36.8 °C)-99.8 °F (37.7 °C)] 98.9 °F (37.2 °C)  Pulse:  [75-85] 79  Resp:  [16-23] 18  SpO2:  [90 %-100 %] 97 %  BP: ()/(50-73) 94/51       Lines/Drains/Airways       Drain  Duration                  Closed/Suction Drain 01/30/24 0376  Right;Ventral Neck Bulb 15 Fr. 6 days    Male External Urinary Catheter 01/30/24 2000 6 days         Gastrostomy/Enterostomy 02/03/24 0934 Gastrostomy tube w/ balloon LUQ 3 days              Airway  Duration             Adult Surgical Airway 02/03/24 1055 Shiley Cuffed 6.0/ 75mm 3 days              Peripheral Intravenous Line  Duration                  Peripheral IV - Single Lumen 02/05/24 1915 18 G Anterior;Right Forearm <1 day                     Physical Exam  Sitting up in chair  NAD   6-0 cuffed trach in place, CUFF DEFLATED THIS AM   Trach secured with sutures and soft collar   R neck incision c/d/i, no fluctuance or erythema   DELFIN x1 with minimal SS output, holding suction   No subq emphysema      Significant Labs:  CBC:   Recent Labs   Lab 02/06/24  0425   WBC 16.57*   RBC 4.17*   HGB 11.8*   HCT 36.4*      MCV 87   MCH 28.3   MCHC 32.4     CMP:   Recent Labs   Lab 02/06/24  0425   GLU 89   CALCIUM 9.4   ALBUMIN 2.8*   PROT 6.3      K 3.7   CO2 27      BUN 15   CREATININE 0.8   ALKPHOS 77   ALT 28   AST 28   BILITOT 0.8       Significant Diagnostics:  I have reviewed and interpreted all pertinent imaging results/findings within the past 24 hours.  Assessment/Plan:     Injury of pharynx  Mr Rodriguez is a 55 yo male who is s/p C3-4 ACDF surgery with post op dysphagia, odynophagia, neck pain, swelling and crepitus. CT and scope demonstrates DELFIN drain in the hypopharynx. Patient with difficulty with swallowing secretions. No respiratory compromise. He is s/p neck exploration and pharyngeal repair on 1/30. Discussed with patient's family at bedside the recommendation for open G tube and tracheostomy to allow adequate time for pharynx to heal.     Now s/p trach and open G tube on 2/3/24.    - Strict NPO, NPO for 4-6 weeks minimum  - ID consulted for abx recommendations in setting of hardware with pharyngeal injury    - Currently off all abx, s/p vanc and unasyn   - s/p tracheostomy, with 6-0 cuffed  freddy in place    - cuff deflated this am   - R neck drain to stay in place until removed by ENT   - Rest of care for per primary  - Please page ENT with questions or concerns.        Bonita Macias MD  Otorhinolaryngology-Head & Neck Surgery  [facility] - Neurosurgery ([facility])

## 2024-02-06 NOTE — PT/OT/SLP PROGRESS
Occupational Therapy   Co-Treatment    Name: Rebel Rodriguez  MRN: [id number]  Admitting Diagnosis:  Cervical myelopathy  3 Days Post-Op    Recommendations:     Discharge Recommendations: High Intensity Therapy  Discharge Equipment Recommendations:  to be determined by next level of care  Barriers to discharge:       Assessment:     Reble Rodriguez is a 54 y.o. male with a medical diagnosis of Cervical myelopathy.  He presents with performance deficits affecting function are weakness, impaired endurance, impaired self care skills, impaired functional mobility, gait instability, impaired balance, impaired cardiopulmonary response to activity.   Pt tolerated tx well, with good effort and motivation throughout. Pt was able to walk to the BR with Min A and RW to perform ADLs standing at the sink. Pt will continue to benefit from skilled OT services to address impairments listed above to maximize independence with ADLs and functional mobility to ensure safe return to PLOF.     Rehab Prognosis:  Good; patient would benefit from acute skilled OT services to address these deficits and reach maximum level of function.       Plan:     Patient to be seen 3 x/week to address the above listed problems via self-care/home management, therapeutic activities, therapeutic exercises, neuromuscular re-education  Plan of Care Expires: 02/29/24  Plan of Care Reviewed with: patient, family    Subjective     Chief Complaint: none  Patient/Family Comments/goals: to walk  Pain/Comfort:  Pain Rating 1: 0/10  Pain Rating Post-Intervention 1: 0/10    Objective:     Communicated with: RN prior to session.  Patient found HOB elevated with peripheral IV, NG tube, DELFIN drain, SCD, Tracheostomy, pulse ox (continuous) upon OT entry to room.    General Precautions: Standard, fall, NPO    Orthopedic Precautions:N/A  Braces: N/A  Respiratory Status: High flow, flow 6 L/min, concentration 28%     Occupational Performance:     Bed Mobility:    Patient completed  Rolling/Turning to Left with  contact guard assistance  Patient completed Scooting/Bridging with contact guard assistance  Patient completed Supine to Sit with minimum assistance     Functional Mobility/Transfers:  Patient completed Sit <> Stand Transfer with minimum assistance  with  rolling walker   Patient completed Bed <> Chair Transfer using Step Transfer technique with minimum assistance with rolling walker  Functional Mobility: Pt ambulated to the BR with Min A from PT and RW to simulate home distances and maximize functional endurance required for community mobility. X3 LOB noted throughout whenever he would go to cough and cover his mouth. OT CGA for safety with O2 tank in tow    Activities of Daily Living:  Grooming: minimum assistance for balance standing at the sink during facial hygiene and oral care  Upper Body Dressing: minimum assistance donning back of gown  Lower Body Dressing: moderate assistance donning B socks with HOB elevated      AMPAC 6 Click ADL: 15    Treatment & Education:  Pt educated on   Role of OT and OT POC  Safe transfer techniques and proper body mechanics for fall prevention and improved independence with functional transfers  Importance of OOB activities to increase endurance and tolerance for increased participation in daily ADLs    Utilizing the call bell to request for assistance with all functional mobility to ensure safety during hospital stay.    Pt verbalized understanding and all questions were addressed within the scope of OT.       Patient left up in chair with all lines intact, call button in reach, chair alarm on, and RN notified    GOALS:   Multidisciplinary Problems       Occupational Therapy Goals          Problem: Occupational Therapy    Goal Priority Disciplines Outcome Interventions   Occupational Therapy Goal     OT, PT/OT Ongoing, Progressing    Description: Goals to be met by: 2/29/24     Patient will increase functional independence with ADLs by  performing:    UE Dressing with [place].  LE Dressing with [place].  Grooming while standing with [place].  Toileting from toilet with [place] for hygiene and clothing management.   Stand pivot transfers with [place].  Step transfer with [place]  Toilet transfer to toilet with [place].                         Time Tracking:     OT Date of Treatment: 02/06/24  OT Start Time: 0958  OT Stop Time: 1038  OT Total Time (min): 40 min    Billable Minutes:Self Care/Home Management 25  Therapeutic Activity 15    OT/LUCY: OT          2/6/2024

## 2024-02-06 NOTE — NURSING
Four eyes completed with Ebony Mckoy , noted abdominal incision , right neck DELFIN drain with stitches , peg tube to left upper quadrant ,

## 2024-02-06 NOTE — SUBJECTIVE & OBJECTIVE
Interval History:   Stepped down overnight. Initially seen this morning and tracheostomy cuff was deflated.     Patient then re-evaluated about 6 hours later. Drain noted to be holding suction well and there was again no crepitus/subq emphysema on exam.    Medications:  Continuous Infusions:   sodium chloride 0.9% Stopped (02/03/24 0801)    dextrose 10 % in water (D10W)       Scheduled Meds:   albuterol-ipratropium  3 mL Nebulization Q6H    atorvastatin  10 mg Per NG tube Daily    FLUoxetine  40 mg Per NG tube Daily    heparin (porcine)  5,000 Units Subcutaneous Q8H    mirtazapine  15 mg Per NG tube QHS    polyethylene glycol  17 g Per NG tube BID    QUEtiapine  50 mg Per NG tube Q12H    senna-docusate 8.6-50 mg  2 tablet Per NG tube BID     PRN Meds:acetaminophen, dextrose 10 % in water (D10W), dextrose 10%, dextrose 10%, glucagon (human recombinant), HYDROmorphone, hydrOXYzine HCL, insulin aspart U-100, magnesium oxide, magnesium oxide, ondansetron, oxyCODONE, potassium bicarbonate, potassium bicarbonate, potassium bicarbonate, potassium, sodium phosphates, potassium, sodium phosphates, potassium, sodium phosphates, prochlorperazine     Review of patient's allergies indicates:   Allergen Reactions    Erythromycin Nausea And Vomiting    Infliximab Other (See Comments)     Lupus with fever and acute arthritis  Lupus with fever and acute arthritis     Objective:     Vital Signs (24h Range):  Temp:  [98.2 °F (36.8 °C)-99.8 °F (37.7 °C)] 98.9 °F (37.2 °C)  Pulse:  [75-85] 79  Resp:  [16-23] 18  SpO2:  [90 %-100 %] 97 %  BP: ()/(50-73) 94/51       Lines/Drains/Airways       Drain  Duration                  Closed/Suction Drain 01/30/24 1916 Right;Ventral Neck Bulb 15 Fr. 6 days    Male External Urinary Catheter 01/30/24 2000 6 days         Gastrostomy/Enterostomy 02/03/24 0934 Gastrostomy tube w/ balloon LUQ 3 days              Airway  Duration             Adult Surgical Airway 02/03/24 1055 Shiley Cuffed 6.0/  75mm 3 days              Peripheral Intravenous Line  Duration                  Peripheral IV - Single Lumen 02/05/24 1915 18 G Anterior;Right Forearm <1 day                     Physical Exam  Sitting up in chair  NAD   6-0 cuffed trach in place, CUFF DEFLATED THIS AM   Trach secured with sutures and soft collar   R neck incision c/d/i, no fluctuance or erythema   DELFIN x1 with minimal SS output, holding suction   No subq emphysema      Significant Labs:  CBC:   Recent Labs   Lab 02/06/24  0425   WBC 16.57*   RBC 4.17*   HGB 11.8*   HCT 36.4*      MCV 87   MCH 28.3   MCHC 32.4     CMP:   Recent Labs   Lab 02/06/24  0425   GLU 89   CALCIUM 9.4   ALBUMIN 2.8*   PROT 6.3      K 3.7   CO2 27      BUN 15   CREATININE 0.8   ALKPHOS 77   ALT 28   AST 28   BILITOT 0.8       Significant Diagnostics:  I have reviewed and interpreted all pertinent imaging results/findings within the past 24 hours.

## 2024-02-06 NOTE — ASSESSMENT & PLAN NOTE
Hx of prior ACDF C4 C7 with adjacent level 3 4 who presented for elective C3-4 ACDF on 01/30, complicated by retropharyngeal injury required repair by ENT.   - s/p NCC, intubation and ventilation, trache and G tube.  - will not keep 6 weeks, NPO for 6 weeks, f/u ENT  - has right neck drain

## 2024-02-06 NOTE — ASSESSMENT & PLAN NOTE
DELFIN drain noted to be in hypopharynx on post-op imaging, s/p emergent surgical repair on 1/30  - Dex 4q6hrs, discontinued  ENT recs: Now s/p trach and open G tube on 2/3/24.     - Strict NPO, NPO for 4-6 weeks minimum  - ID consulted for abx recommendations in setting of hardware with pharyngeal injury          - Currently off all abx, s/p vanc and unasyn   - s/p tracheostomy, with 6-0 cuffed shiley in place          - CUFF TO REMAIN UP - until otherwise directed by ENT          - Tentative plan to deflate on Tuesday  - R neck drain to stay in place until at least Tuesday   - needs time for wound healing    2/6- prepare for discharge, trach may be removed by ENT.

## 2024-02-06 NOTE — HOSPITAL COURSE
2/6 Transferred to [facility] med, IMN. Per ENT Strict NPO, NPO for 4-6 weeks minimum.  ID consulted in ICU- completed course of antibiotics and no further need. R neck drain to stay in place until removed by ENT. Overnight patient with development of delirium and Psych consulted for recommendations. Repeat infectious workup negative. Pt reported poor sleep over the last several days. Was started on seroquel and home remeron increased with improvement in both sleep and mentation. 2/8 ENT trach changed for 6-0 cuffless. Neck Drain removed by ENT. Apremilast resumed. Pt reported worsening of joint pains and rheumatology consulted for recommendations; goal to treat arthritis while limiting risk of infection and allowing for proper wound healing given recent surgeries. Patient improved and was accepted to [facility]. Patient deemed ready for discharge. Plan discussed with pt, who was agreeable and amenable; medications were discussed and reviewed, outpatient follow-up arranged, ER precautions were given, all questions were answered to the pt's satisfaction, and Rebel Rodriguez  was subsequently discharged.

## 2024-02-06 NOTE — SUBJECTIVE & OBJECTIVE
Interval History: NAEON. TTF. Reports mild discomfort around G tube site. Sitting up in chair. Neuro exam stable.     Medications:  Continuous Infusions:   sodium chloride 0.9% Stopped (02/03/24 0801)    dextrose 10 % in water (D10W)       Scheduled Meds:   albuterol-ipratropium  3 mL Nebulization Q6H    atorvastatin  10 mg Per NG tube Daily    FLUoxetine  40 mg Per NG tube Daily    heparin (porcine)  5,000 Units Subcutaneous Q8H    lactated ringers   Intravenous Once    mirtazapine  15 mg Per NG tube QHS    polyethylene glycol  17 g Per NG tube BID    QUEtiapine  50 mg Per NG tube Q12H    senna-docusate 8.6-50 mg  2 tablet Per NG tube BID     PRN Meds:acetaminophen, dextrose 10 % in water (D10W), dextrose 10%, dextrose 10%, glucagon (human recombinant), HYDROmorphone, hydrOXYzine HCL, insulin aspart U-100, magnesium oxide, magnesium oxide, ondansetron, oxyCODONE, potassium bicarbonate, potassium bicarbonate, potassium bicarbonate, potassium, sodium phosphates, potassium, sodium phosphates, potassium, sodium phosphates, prochlorperazine     Review of Systems  Objective:     Weight: 74.9 kg (165 lb 2 oz)  Body mass index is 25.11 kg/m².  Vital Signs (Most Recent):  Temp: 98.9 °F (37.2 °C) (02/06/24 1128)  Pulse: 79 (02/06/24 1206)  Resp: 18 (02/06/24 1206)  BP: (!) 94/51 (02/06/24 1128)  SpO2: 97 % (02/06/24 1206) Vital Signs (24h Range):  Temp:  [98.2 °F (36.8 °C)-99.8 °F (37.7 °C)] 98.9 °F (37.2 °C)  Pulse:  [75-85] 79  Resp:  [16-23] 18  SpO2:  [90 %-100 %] 97 %  BP: ()/(50-73) 94/51                Oxygen Concentration (%):  [28] 28             Closed/Suction Drain 01/30/24 1916 Right;Ventral Neck Bulb 15 Fr. (Active)   Site Description Healing 02/05/24 2300   Dressing Type Transparent (Tegaderm) 02/05/24 2300   Dressing Status Clean;Dry;Intact 02/05/24 2300   Dressing Intervention Integrity maintained 02/05/24 2300   Drainage Serosanguineous 02/05/24 1902   Status To bulb suction 02/05/24 2300   Output (mL)  0 mL 02/05/24 1805            Gastrostomy/Enterostomy 02/03/24 0934 Gastrostomy tube w/ balloon LUQ (Active)   Securement secured to abdomen 02/05/24 1902   Interventions Prior to Feeding patency checked 02/05/24 1902   Suction Setting/Drainage Method dependent drainage 02/05/24 0501   Drainage brown 02/04/24 1701   Feeding Type continuous;by pump 02/05/24 1902   Clamp Status/Tolerance unclamped 02/05/24 1902   Feeding Action feeding continued 02/05/24 1902   Dressing no dressing 02/05/24 1505   Insertion Site no swelling;dry 02/05/24 0501   Intake (mL) 45 mL 02/05/24 0601   Tube Output(mL)(Include Discarded Residual) 300 mL 02/04/24 0542   Tube Feeding Intake (mL) 45 02/05/24 2102       Male External Urinary Catheter 01/30/24 2000 (Active)   Collection Container Urimeter 02/05/24 1902   Securement Method secured to top of thigh w/ adhesive device 02/05/24 1902   Skin no redness;no breakdown 02/05/24 1902   Tolerance no signs/symptoms of discomfort 02/05/24 1902   Output (mL) 800 mL 02/05/24 1805   Catheter Change Date 02/03/24 02/04/24 0502   Catheter Change Time 1941 02/04/24 0502          Physical Exam         Neurosurgery Physical Exam    General: well developed, well nourished, no distress.   Head: normocephalic, atraumatic  Neurologic: Alert and oriented. Thought content appropriate.  GCS: Motor: 6/Verbal: 5/Eyes: 4 GCS Total: 15  Mental Status: Awake, Alert, Oriented x 4  Cranial nerves: face symmetric, tongue midline, CN II-XII grossly intact, EOMI.   Sensory: intact to light touch throughout  Motor Strength: Moves all extremities spontaneously with good tone.  Full strength upper and lower extremities. No abnormal movements seen.     Strength  Deltoids Triceps Biceps Wrist Extension Wrist Flexion Hand    Upper: R 5/5 5/5 5/5 5/5 5/5 5/5    L 5/5 5/5 5/5 5/5 5/5 5/5     Iliopsoas Quadriceps Knee  Flexion Tibialis  anterior Gastro- cnemius EHL   Lower: R 5/5 5/5 5/5 5/5 5/5 5/5    L 5/5 5/5 5/5 5/5 5/5  "5/5     Skin: Skin is warm, dry and intact.  Incision c/d/I with skin edges well approximated with staples.     Significant Labs:  Recent Labs   Lab 02/05/24  0049 02/06/24  0425   GLU 85 89    140   K 3.8 3.7    101   CO2 25 27   BUN 15 15   CREATININE 0.8 0.8   CALCIUM 9.2 9.4   MG 1.7 1.7     Recent Labs   Lab 02/05/24 0049 02/06/24 0425   WBC 11.20 16.57*   HGB 9.9* 11.8*   HCT 31.1* 36.4*    256     No results for input(s): "LABPT", "INR", "APTT" in the last 48 hours.  Microbiology Results (last 7 days)       ** No results found for the last 168 hours. **          Recent Lab Results         02/06/24 0818 02/06/24 0425        Albumin   2.8       ALP   77       ALT   28       Anion Gap   12       AST   28       Baso #   0.03       Basophil %   0.2       BILIRUBIN TOTAL   0.8  Comment: For infants and newborns, interpretation of results should be based  on gestational age, weight and in agreement with clinical  observations.    Premature Infant recommended reference ranges:  Up to 24 hours.............<8.0 mg/dL  Up to 48 hours............<12.0 mg/dL  3-5 days..................<15.0 mg/dL  6-29 days.................<15.0 mg/dL         BUN   15       Calcium   9.4       Chloride   101       CO2   27       Creatinine   0.8       Differential Method   Automated       eGFR   >60.0       Eos #   0.0       Eos %   0.2       Glucose   89       Gran # (ANC)   13.5       Gran %   81.3       Hematocrit   36.4       Hemoglobin   11.8       Immature Grans (Abs)   0.10  Comment: Mild elevation in immature granulocytes is non specific and   can be seen in a variety of conditions including stress response,   acute inflammation, trauma and pregnancy. Correlation with other   laboratory and clinical findings is essential.         Immature Granulocytes   0.6       Lymph #   1.5       Lymph %   8.9       Magnesium    1.7       MCH   28.3       MCHC   32.4       MCV   87       Mono #   1.5       Mono %   " 8.8       MPV   9.6       nRBC   0       Phosphorus Level   3.9       Platelet Count   256       POCT Glucose 113         Potassium   3.7       PROTEIN TOTAL   6.3       RBC   4.17       RDW   14.0       Sodium   140       WBC   16.57             All pertinent labs from the last 24 hours have been reviewed.    Significant Diagnostics:  I have reviewed all pertinent imaging results/findings within the past 24 hours.

## 2024-02-06 NOTE — PLAN OF CARE
Problem: Fall Injury Risk  Goal: Absence of Fall and Fall-Related Injury  Outcome: Ongoing, Progressing     Problem: Skin Injury Risk Increased  Goal: Skin Health and Integrity  Outcome: Ongoing, Progressing     Problem: Adult Inpatient Plan of Care  Goal: Plan of Care Review  Outcome: Ongoing, Progressing  Goal: Patient-Specific Goal (Individualized)  Outcome: Ongoing, Progressing  Goal: Absence of Hospital-Acquired Illness or Injury  Outcome: Ongoing, Progressing  Goal: Optimal Comfort and Wellbeing  Outcome: Ongoing, Progressing  Goal: Readiness for Transition of Care  Outcome: Ongoing, Progressing   Patient with c/o abdominal pain / decreased feeding to 25 cc/hr then 10 cc/hr, no residual noted, c/o of nausea, patient able to use urinal , mucus from trach copious, thick and white, will continue to assess, will slowly increase feeding to reach goal of 45 cc/hr

## 2024-02-06 NOTE — OP NOTE
DATE OF SURGERY: 1/30/24    PREOPERATIVE DIAGNOSIS:  1. Degenerative cervical myelopathy with stenosis, C3-4  2. Prior C4-7 ACD    POSTOPERATIVE DIAGNOSIS:  1. Same    PROCEDURE PERFORMED:  1. Anterior cervical discectomy and fusion, C3-4  2. Application of PEEK interbody cage, C3-4 (Depuy)  3. Application of anterior fixation with integrated screws, C3-4 (Depuy)  4. Use of intraoperative microscope for microdissection  5. Use of neuromonitoring with MEPs  6. Use of intraoperative fluoroscopy  7. DBM bone grafting    SURGEON: Rogerio Maradiaga M.D.    ASSISTANT: Shyla Davenport M.D.    ANESTHESIA: GETA    ESTIMATED BLOOD LOSS: Minimal    COMPLICATIONS: None    DRAINS: Deep Hemovac    SPECIMENS SENT: None    INDICATIONS:    This is a 54M with a history of a C4-7 ACD 10 years ago from a left sided approach who presented with signs and symptoms of progressive degenerative cervical myelopathy marked by hand weakness, hand clumsiness, BLE weakness, gait imbalance and L'hermitte's phenomenon. On exam he had bilateral hand weakness, pathologic reflexes and ataxic gait. Imaging showed severe stenosis at C3-4 with cord compression and myelomalacia. Conservative measures were not successful. As such, I recommended a C3-4 anterior cervical discectomy and fusion. Risks, benefits, alternatives, indications and methods were reviewed in detail and the patient wished to proceed. Special emphasis was given to the risks associated with prior ACD surgery, including scar tissue deforming normal anatomic planes putting neck structures at risk such as the trachea, esophagus and major vessels of the neck. I also recommended a right sided approach, opposite his prior surgery, so that fresher anatomic planes could be encountered. I had him evaluated preoperatively by ENT for vocal cord motility to ensure that this right sided approach was safe. All questions were answered. No guarantees about the results of the procedure were  made.    PROCEDURE:    The patient was brought into the operating room where he was intubated and placed under general anesthesia without difficulty. All lines were placed. The patient was positioned supine onto the operating table with appropriate padding of all pressure points. The head and neck were placed in slight extension, resting on a surgical pillow. GW tongs were placed at the head for cervical hanging weight traction. Lateral x-rays were taken for localization and to assess cervical lordosis. Baseline MEPs were run and found to be present and stable in all extremities. The right neck was marked, prepped and draped in the usual sterile fashion. A timeout was performed prior to the procedure. Ten mL of Lidocaine with epinephrine were injected into the skin.    A transverse linear skin incision was made at the right neck and Weitlaner retractors were used to widen the incision. The platysma was divided sharply with Metzenbaum scissors. A sub-plastysmal dissection was carried out with Bovie electrocautery. A Rodriguez Mandujano approach to the the anterior cervical spine was performed in the usual fashion. The carotid artery was palpated lateral to the working corridor. The prevertebral soft tissues were bluntly dissected with Kitner dissectors. They were noted to be densely scarred from prior surgery. Blunt dissection revealed the top of the prior ACD plate and the presumed C3-4 disc space. A spinal needle was placed into the presumed C3-4 disc space, and was confirmed on lateral x-ray. Subperiosteal dissection was carried out at C3 and C4 vertebral bodies with Bovie electrocautery. The Trimline retractor system was put into place.     An annulotomy at C3-4 was performed with the 15 blade. Pituitary rongeurs and curettes were used to remove disc material. Anterior osteophytes were removed with the rongeur and Kerrison punches. The end plates were prepared with straight curettes. The microscope was then brought  into the field for microdissection. Posterior osteophytes were removed with the high speed drill and Kerrison punches. The posterior longitudinal ligament was opened and resected with curettes and kerrison punches. Adequate central and neuroforaminal decompression was achieved at C3-4 and confirmed with a nerve hook.     An intervertebral trial spacer was placed, and a PEEK cage was packed with DBM for anterior arthrodesis at C3-4 . The cage was then countersunk into the C3-4 disc space. Anterior fixation was placed using integrated screws. MEPs were run after this was completed and found to be consistent with baseline. The microscope was taken out of the field. All hardware was found to be in adequate position on AP and lateral xrays. The locking screw was tightened.    The wound was copiously irrigated with normal saline and hemostasis was achieved with bipolar electrocautery. Depomedrol was placed over the esophagus and the retropharyngeal space. A deep Hemovac was placed.One gram of Vancomycin powder was placed into the wound. The platysma was reapproximated with interrupted inverted 3.0 Vicryl sutures and a subcuticular stitch was placed with 4.0 Monocryl. Dermabond was placed at the skin.    The patient tolerated the procedure well from a hemodynamic and neuromonitoring standpoint. MEPs were present and stable throughout the case. I was present for all critical portions of the case, and at the end of the case all counts were correct. The patient was repositioned supine onto the hospital bed where he was extubated and allowed to emerge from anesthesia without difficult. The patient was sent to the PACU in stable condition for recovery.

## 2024-02-06 NOTE — SUBJECTIVE & OBJECTIVE
Interval History: see above    Review of Systems   Constitutional:  Positive for activity change. Negative for appetite change (G tube x 4-6 weeks) and fever.   HENT:  Positive for trouble swallowing.         Trache    Respiratory:  Negative for shortness of breath.    Cardiovascular:  Negative for chest pain.   Gastrointestinal:  Negative for abdominal pain, constipation, diarrhea, nausea and vomiting.   Genitourinary:  Negative for difficulty urinating.   Musculoskeletal:  Negative for back pain, gait problem and neck stiffness.   Skin:  Negative for rash.   Neurological:  Positive for speech difficulty (has trache). Negative for dizziness and headaches.   Psychiatric/Behavioral:  Negative for agitation, behavioral problems and confusion.      Objective:     Vital Signs (Most Recent):  Temp: 98.6 °F (37 °C) (02/07/24 0725)  Pulse: 61 (02/07/24 0813)  Resp: 17 (02/07/24 0845)  BP: (!) 99/54 (02/07/24 0725)  SpO2: 96 % (02/07/24 0813) Vital Signs (24h Range):  Temp:  [98.5 °F (36.9 °C)-98.9 °F (37.2 °C)] 98.6 °F (37 °C)  Pulse:  [61-86] 61  Resp:  [15-19] 17  SpO2:  [96 %-99 %] 96 %  BP: ()/(51-59) 99/54     Weight: 74.9 kg (165 lb 2 oz)  Body mass index is 25.11 kg/m².    Intake/Output Summary (Last 24 hours) at 2/7/2024 0938  Last data filed at 2/7/2024 0800  Gross per 24 hour   Intake --   Output 755 ml   Net -755 ml           Physical Exam  Constitutional:       General: He is not in acute distress.     Appearance: Normal appearance. He is normal weight. He is not ill-appearing, toxic-appearing or diaphoretic.   HENT:      Head: Normocephalic and atraumatic.      Comments: Trache present     Right Ear: External ear normal.      Left Ear: External ear normal.      Nose: Nose normal. No congestion or rhinorrhea.      Mouth/Throat:      Mouth: Mucous membranes are dry.      Pharynx: Oropharynx is clear.   Eyes:      General: No scleral icterus.     Extraocular Movements: Extraocular movements intact.       Conjunctiva/sclera: Conjunctivae normal.      Pupils: Pupils are equal, round, and reactive to light.   Neck:      Vascular: No carotid bruit.   Cardiovascular:      Rate and Rhythm: Normal rate and regular rhythm.      Pulses: Normal pulses.      Heart sounds: Normal heart sounds. No murmur heard.     No friction rub. No gallop.   Pulmonary:      Effort: Pulmonary effort is normal. No respiratory distress.      Breath sounds: Normal breath sounds. No stridor. No wheezing, rhonchi or rales.   Chest:      Chest wall: No tenderness.   Abdominal:      General: Abdomen is flat. Bowel sounds are normal. There is no distension.      Palpations: Abdomen is soft. There is no mass.      Tenderness: There is no abdominal tenderness. There is no right CVA tenderness, left CVA tenderness, guarding or rebound.      Hernia: No hernia is present.      Comments: G tube clean and dry   Musculoskeletal:         General: No swelling, tenderness, deformity or signs of injury. Normal range of motion.      Cervical back: Normal range of motion and neck supple. No rigidity or tenderness.      Right lower leg: No edema.      Left lower leg: No edema.   Lymphadenopathy:      Cervical: No cervical adenopathy.   Skin:     General: Skin is warm and dry.      Coloration: Skin is not jaundiced or pale.      Findings: No bruising, erythema, lesion or rash.   Neurological:      General: No focal deficit present.      Mental Status: He is alert and oriented to person, place, and time. Mental status is at baseline.      Sensory: No sensory deficit.      Motor: No weakness.      Coordination: Coordination normal.      Gait: Gait normal.   Psychiatric:         Mood and Affect: Mood normal.         Behavior: Behavior normal.         Thought Content: Thought content normal.         Judgment: Judgment normal.             Significant Labs: All pertinent labs within the past 24 hours have been reviewed.  CBC:   Recent Labs   Lab 02/06/24  3998  02/07/24  0300   WBC 16.57* 14.58*   HGB 11.8* 9.9*   HCT 36.4* 30.1*    257       CMP:   Recent Labs   Lab 02/06/24  0425 02/07/24  0300    138   K 3.7 3.9    103   CO2 27 24   GLU 89 89   BUN 15 17   CREATININE 0.8 0.8   CALCIUM 9.4 9.4   PROT 6.3 5.9*   ALBUMIN 2.8* 2.5*   BILITOT 0.8 0.6   ALKPHOS 77 66   AST 28 24   ALT 28 25   ANIONGAP 12 11         Significant Imaging: I have reviewed all pertinent imaging results/findings within the past 24 hours.

## 2024-02-06 NOTE — NURSING TRANSFER
Nursing Transfer Note      2/5/2024   10:12 PM    Nurse giving handoff:MANNY Beck  Nurse receiving handoff:MANNY Moore    Reason patient is being transferred: Stepdown    Transfer [id number] to [id number]    Transfer via bed    Transfer with cardiac monitoring    Transported by RN x2    Transfer Vital Signs:  Blood Pressure:121/63  Heart Rate:76  O2:  Temperature:98.9  Respirations:98    Telemetry: Box Number [id number]  Order for Tele Monitor? Yes    Additional Lines: Oxygen    4eyes on Skin: yes    Medicines sent: n/a    Any special needs or follow-up needed:     Patient belongings transferred with patient: Yes    Chart send with patient: Yes    Notified: Mother and sister    Patient reassessed at: 2/5/24 1849  1  Upon arrival to floor: cardiac monitor applied, patient oriented to room, call bell in reach, and bed in lowest position

## 2024-02-06 NOTE — PT/OT/SLP PROGRESS
Physical Therapy Co-Treatment  Co-treatment performed due to patient's multiple deficits requiring two skilled therapists to appropriately and safely assess patient's strength and endurance while facilitating functional tasks in addition to accommodating for patient's activity tolerance.       Patient Name:  Rebel Rodriguez   MRN:  [id number]    Recommendations:     Discharge Recommendations: High Intensity Therapy  Discharge Equipment Recommendations: walker, rolling, bedside commode, bath bench  Barriers to discharge: Inaccessible home and Decreased caregiver support    Assessment:     Rebel Rodriguez is a 54 y.o. male admitted with a medical diagnosis of Cervical myelopathy.  He presents with the following impairments/functional limitations: weakness, impaired endurance, impaired self care skills, impaired functional mobility, gait instability, impaired balance, decreased upper extremity function, decreased lower extremity function, decreased safety awareness. Pt would benefit from high intensity/frequency therapy for: Dynamic/static standing/sitting balance through skilled balance training, strengthening with the use of skilled therapeutic exercises interventions, mobility and safety training to ensure safe discharge home through skilled patient and caregiver education, and mobility through adaptive equipment training. Pt highly motivated to return to independent PLOF and can tolerate 3+hours of therapy. Pt continues to benefit from a collaborative multidisciplinary program to improve quality of life and focus on recovery of impairments      Rehab Prognosis: Good; patient would benefit from acute skilled PT services to address these deficits and reach maximum level of function.    Recent Surgery: Procedure(s) (LRB):  LAPAROTOMY with gastrostomy tube placement (N/A)  LARYNGOSCOPY, DIRECT  TRACHEOTOMY 3 Days Post-Op    Plan:     During this hospitalization, patient to be seen 4 x/week to address the identified rehab  impairments via therapeutic activities, gait training, therapeutic exercises, neuromuscular re-education and progress toward the following goals:    Plan of Care Expires:  02/28/24    Subjective     Chief Complaint: pt reported some pain, but did not specify where  Patient/Family Comments/goals: pt eager to get up and walk  Pain/Comfort:  Pain Rating 1:  (not rated)  Location 1:  (not localized)  Pain Addressed 1: Pre-medicate for activity, Reposition, Distraction  Pain Rating Post-Intervention 1: other (see comments) (not rated)      Objective:     Communicated with RN prior to session.  Patient found HOB elevated with peripheral IV, NG tube, DELFIN drain, SCD, telemetry, Tracheostomy, pulse ox (continuous) upon PT entry to room.     General Precautions: Standard, fall, NPO  Orthopedic Precautions: N/A  Braces: N/A  Respiratory Status:  trach collar     Functional Mobility:  Bed Mobility:     Scooting: minimum assistance  Supine to Sit: minimum assistance  Transfers:     Sit to Stand:  minimum assistance with rolling walker, 1 minor LOB  Gait: 13 ft x2, Allegra, RW; decreased gait speed and step length, 3 LOBs left  Balance:   Static Sitting: Supervision  Dynamic Sitting: Supervision  Static Standing: CGA-lAlegra with RW, LOB with no UE support while performing ADLs  Dynamic Standing: Allegra with RW      AM-PAC 6 CLICK MOBILITY  Turning over in bed (including adjusting bedclothes, sheets and blankets)?: 3  Sitting down on and standing up from a chair with arms (e.g., wheelchair, bedside commode, etc.): 3  Moving from lying on back to sitting on the side of the bed?: 3  Moving to and from a bed to a chair (including a wheelchair)?: 3  Need to walk in hospital room?: 3  Climbing 3-5 steps with a railing?: 2  Basic Mobility Total Score: 17       Treatment & Education:  Patient educated on role of therapy, goals of session, and benefits of mobilizing.   Discussed PT plan of care during hospitalization.   Patient educated on  calling for assistance.   Patient educated on how their diagnosis impacts their mobility within PT scope of practice.   All questions answered within PT scope of practice.    Patient left up in chair with all lines intact, call button in reach, chair alarm on, and pt's mother present.    GOALS:   Multidisciplinary Problems       Physical Therapy Goals          Problem: Physical Therapy    Goal Priority Disciplines Outcome Goal Variances Interventions   Physical Therapy Goal     PT, PT/OT Ongoing, Progressing                         Time Tracking:     PT Received On: 02/06/24  PT Start Time: 0958     PT Stop Time: 1038  PT Total Time (min): 40 min     Billable Minutes: Gait Training 25 and Therapeutic Activity 15    Treatment Type: Treatment  PT/PTA: PT     Number of PTA visits since last PT visit: 0     02/06/2024

## 2024-02-06 NOTE — SUBJECTIVE & OBJECTIVE
Interval History: see above    Review of Systems   Constitutional:  Positive for activity change. Negative for appetite change and fever.   HENT:  Positive for trouble swallowing.         Trache    Respiratory:  Negative for shortness of breath.    Cardiovascular:  Negative for chest pain.   Gastrointestinal:  Negative for abdominal pain, diarrhea, nausea and vomiting.   Genitourinary:  Negative for difficulty urinating.   Musculoskeletal:  Negative for back pain, gait problem and neck stiffness.   Neurological:  Negative for headaches.   Psychiatric/Behavioral:  Negative for agitation, behavioral problems and confusion.      Objective:     Vital Signs (Most Recent):  Temp: 99.7 °F (37.6 °C) (02/06/24 0816)  Pulse: 84 (02/06/24 0816)  Resp: 19 (02/06/24 0816)  BP: (!) 102/50 (02/06/24 0816)  SpO2: 99 % (02/06/24 0816) Vital Signs (24h Range):  Temp:  [98.2 °F (36.8 °C)-99.8 °F (37.7 °C)] 99.7 °F (37.6 °C)  Pulse:  [74-88] 84  Resp:  [16-24] 19  SpO2:  [90 %-100 %] 99 %  BP: (102-132)/(50-73) 102/50     Weight: 74.9 kg (165 lb 2 oz)  Body mass index is 25.11 kg/m².    Intake/Output Summary (Last 24 hours) at 2/6/2024 0849  Last data filed at 2/5/2024 2102  Gross per 24 hour   Intake 555 ml   Output 800 ml   Net -245 ml         Physical Exam  Constitutional:       General: He is not in acute distress.     Appearance: Normal appearance. He is normal weight. He is not ill-appearing, toxic-appearing or diaphoretic.   HENT:      Head: Normocephalic and atraumatic.      Comments: Trache present     Right Ear: External ear normal.      Left Ear: External ear normal.      Nose: Nose normal. No congestion or rhinorrhea.      Mouth/Throat:      Mouth: Mucous membranes are dry.      Pharynx: Oropharynx is clear.   Eyes:      General: No scleral icterus.     Extraocular Movements: Extraocular movements intact.      Conjunctiva/sclera: Conjunctivae normal.      Pupils: Pupils are equal, round, and reactive to light.   Neck:       Vascular: No carotid bruit.   Cardiovascular:      Rate and Rhythm: Normal rate and regular rhythm.      Pulses: Normal pulses.      Heart sounds: Normal heart sounds. No murmur heard.     No friction rub. No gallop.   Pulmonary:      Effort: Pulmonary effort is normal. No respiratory distress.      Breath sounds: Normal breath sounds. No stridor. No wheezing, rhonchi or rales.   Chest:      Chest wall: No tenderness.   Abdominal:      General: Abdomen is flat. Bowel sounds are normal. There is no distension.      Palpations: Abdomen is soft. There is no mass.      Tenderness: There is no abdominal tenderness. There is no right CVA tenderness, left CVA tenderness, guarding or rebound.      Hernia: No hernia is present.      Comments: G tube clean and dry   Musculoskeletal:         General: No swelling, tenderness, deformity or signs of injury. Normal range of motion.      Cervical back: Normal range of motion and neck supple. No rigidity or tenderness.      Right lower leg: No edema.      Left lower leg: No edema.   Lymphadenopathy:      Cervical: No cervical adenopathy.   Skin:     General: Skin is warm and dry.      Coloration: Skin is not jaundiced or pale.      Findings: No bruising, erythema, lesion or rash.   Neurological:      General: No focal deficit present.      Mental Status: He is alert and oriented to person, place, and time. Mental status is at baseline.      Motor: No weakness.   Psychiatric:         Mood and Affect: Mood normal.         Behavior: Behavior normal.         Thought Content: Thought content normal.         Judgment: Judgment normal.             Significant Labs: All pertinent labs within the past 24 hours have been reviewed.  CBC:   Recent Labs   Lab 02/05/24 0049 02/06/24  0425   WBC 11.20 16.57*   HGB 9.9* 11.8*   HCT 31.1* 36.4*    256     CMP:   Recent Labs   Lab 02/05/24 0049 02/06/24  0425    140   K 3.8 3.7    101   CO2 25 27   GLU 85 89   BUN 15 15    CREATININE 0.8 0.8   CALCIUM 9.2 9.4   PROT 5.9* 6.3   ALBUMIN 2.7* 2.8*   BILITOT 0.4 0.8   ALKPHOS 55 77   AST 44* 28   ALT 33 28   ANIONGAP 12 12       Significant Imaging: I have reviewed all pertinent imaging results/findings within the past 24 hours.

## 2024-02-06 NOTE — RESPIRATORY THERAPY
Patient resting in no distress@ this time.Care giver ask not to wake him.Pt has all supplies at bedside and no suction needed.

## 2024-02-06 NOTE — PROGRESS NOTES
[facility] - Neurosurgery ([facility])  Neurosurgery  Progress Note    Subjective:     History of Present Illness: Rebel Rodriguez is a 53 y.o. male with hx of psoriatic arthritis, hx TIA on Aspirin 81 mg, s/p C4-7 ACDF with Dr. Huff 10 years ago who presents for evaluation of gait imbalance. Last seen 1 year ago for neck pain and radicular pain into the left shoulder. He underwent C1-2 KENRICK with moderate relief of pain. Reports rapid functional decline over the last 3 weeks. Endorses hand clumsiness, difficulty typing, gait imbalance, difficulty butoning, dropping items, falls. Difficulty with ambulating also due to LLE weakness. States it feels like he has rubber bands around his wrists. Reports difficulty with overhead mobility and shock-like pains down spine when raising arms overhead. Denies b/b incontinence.     Post-Op Info:  Procedure(s) (LRB):  LAPAROTOMY with gastrostomy tube placement (N/A)  LARYNGOSCOPY, DIRECT  TRACHEOTOMY   3 Days Post-Op   Interval History: NAEON. TTF. Reports mild discomfort around G tube site. Sitting up in chair. Neuro exam stable.     Medications:  Continuous Infusions:   sodium chloride 0.9% Stopped (02/03/24 0801)    dextrose 10 % in water (D10W)       Scheduled Meds:   albuterol-ipratropium  3 mL Nebulization Q6H    atorvastatin  10 mg Per NG tube Daily    FLUoxetine  40 mg Per NG tube Daily    heparin (porcine)  5,000 Units Subcutaneous Q8H    lactated ringers   Intravenous Once    mirtazapine  15 mg Per NG tube QHS    polyethylene glycol  17 g Per NG tube BID    QUEtiapine  50 mg Per NG tube Q12H    senna-docusate 8.6-50 mg  2 tablet Per NG tube BID     PRN Meds:acetaminophen, dextrose 10 % in water (D10W), dextrose 10%, dextrose 10%, glucagon (human recombinant), HYDROmorphone, hydrOXYzine HCL, insulin aspart U-100, magnesium oxide, magnesium oxide, ondansetron, oxyCODONE, potassium bicarbonate, potassium bicarbonate, potassium bicarbonate, potassium, sodium phosphates,  potassium, sodium phosphates, potassium, sodium phosphates, prochlorperazine     Review of Systems  Objective:     Weight: 74.9 kg (165 lb 2 oz)  Body mass index is 25.11 kg/m².  Vital Signs (Most Recent):  Temp: 98.9 °F (37.2 °C) (02/06/24 1128)  Pulse: 79 (02/06/24 1206)  Resp: 18 (02/06/24 1206)  BP: (!) 94/51 (02/06/24 1128)  SpO2: 97 % (02/06/24 1206) Vital Signs (24h Range):  Temp:  [98.2 °F (36.8 °C)-99.8 °F (37.7 °C)] 98.9 °F (37.2 °C)  Pulse:  [75-85] 79  Resp:  [16-23] 18  SpO2:  [90 %-100 %] 97 %  BP: ()/(50-73) 94/51                Oxygen Concentration (%):  [28] 28             Closed/Suction Drain 01/30/24 1916 Right;Ventral Neck Bulb 15 Fr. (Active)   Site Description Healing 02/05/24 2300   Dressing Type Transparent (Tegaderm) 02/05/24 2300   Dressing Status Clean;Dry;Intact 02/05/24 2300   Dressing Intervention Integrity maintained 02/05/24 2300   Drainage Serosanguineous 02/05/24 1902   Status To bulb suction 02/05/24 2300   Output (mL) 0 mL 02/05/24 1805            Gastrostomy/Enterostomy 02/03/24 0934 Gastrostomy tube w/ balloon LUQ (Active)   Securement secured to abdomen 02/05/24 1902   Interventions Prior to Feeding patency checked 02/05/24 1902   Suction Setting/Drainage Method dependent drainage 02/05/24 0501   Drainage brown 02/04/24 1701   Feeding Type continuous;by pump 02/05/24 1902   Clamp Status/Tolerance unclamped 02/05/24 1902   Feeding Action feeding continued 02/05/24 1902   Dressing no dressing 02/05/24 1505   Insertion Site no swelling;dry 02/05/24 0501   Intake (mL) 45 mL 02/05/24 0601   Tube Output(mL)(Include Discarded Residual) 300 mL 02/04/24 0542   Tube Feeding Intake (mL) 45 02/05/24 2102       Male External Urinary Catheter 01/30/24 2000 (Active)   Collection Container Urimeter 02/05/24 1902   Securement Method secured to top of thigh w/ adhesive device 02/05/24 1902   Skin no redness;no breakdown 02/05/24 1902   Tolerance no signs/symptoms of discomfort 02/05/24  "1902   Output (mL) 800 mL 02/05/24 1805   Catheter Change Date 02/03/24 02/04/24 0502   Catheter Change Time 1941 02/04/24 0502          Physical Exam         Neurosurgery Physical Exam    General: well developed, well nourished, no distress.   Head: normocephalic, atraumatic  Neurologic: Alert and oriented. Thought content appropriate.  GCS: Motor: 6/Verbal: 5/Eyes: 4 GCS Total: 15  Mental Status: Awake, Alert, Oriented x 4  Cranial nerves: face symmetric, tongue midline, CN II-XII grossly intact, EOMI.   Sensory: intact to light touch throughout  Motor Strength: Moves all extremities spontaneously with good tone.  Full strength upper and lower extremities. No abnormal movements seen.     Strength  Deltoids Triceps Biceps Wrist Extension Wrist Flexion Hand    Upper: R 5/5 5/5 5/5 5/5 5/5 5/5    L 5/5 5/5 5/5 5/5 5/5 5/5     Iliopsoas Quadriceps Knee  Flexion Tibialis  anterior Gastro- cnemius EHL   Lower: R 5/5 5/5 5/5 5/5 5/5 5/5    L 5/5 5/5 5/5 5/5 5/5 5/5     Skin: Skin is warm, dry and intact.  Incision c/d/I with skin edges well approximated with staples.     Significant Labs:  Recent Labs   Lab 02/05/24  0049 02/06/24  0425   GLU 85 89    140   K 3.8 3.7    101   CO2 25 27   BUN 15 15   CREATININE 0.8 0.8   CALCIUM 9.2 9.4   MG 1.7 1.7     Recent Labs   Lab 02/05/24  0049 02/06/24  0425   WBC 11.20 16.57*   HGB 9.9* 11.8*   HCT 31.1* 36.4*    256     No results for input(s): "LABPT", "INR", "APTT" in the last 48 hours.  Microbiology Results (last 7 days)       ** No results found for the last 168 hours. **          Recent Lab Results         02/06/24  0818   02/06/24  0425        Albumin   2.8       ALP   77       ALT   28       Anion Gap   12       AST   28       Baso #   0.03       Basophil %   0.2       BILIRUBIN TOTAL   0.8  Comment: For infants and newborns, interpretation of results should be based  on gestational age, weight and in agreement with " clinical  observations.    Premature Infant recommended reference ranges:  Up to 24 hours.............<8.0 mg/dL  Up to 48 hours............<12.0 mg/dL  3-5 days..................<15.0 mg/dL  6-29 days.................<15.0 mg/dL         BUN   15       Calcium   9.4       Chloride   101       CO2   27       Creatinine   0.8       Differential Method   Automated       eGFR   >60.0       Eos #   0.0       Eos %   0.2       Glucose   89       Gran # (ANC)   13.5       Gran %   81.3       Hematocrit   36.4       Hemoglobin   11.8       Immature Grans (Abs)   0.10  Comment: Mild elevation in immature granulocytes is non specific and   can be seen in a variety of conditions including stress response,   acute inflammation, trauma and pregnancy. Correlation with other   laboratory and clinical findings is essential.         Immature Granulocytes   0.6       Lymph #   1.5       Lymph %   8.9       Magnesium    1.7       MCH   28.3       MCHC   32.4       MCV   87       Mono #   1.5       Mono %   8.8       MPV   9.6       nRBC   0       Phosphorus Level   3.9       Platelet Count   256       POCT Glucose 113         Potassium   3.7       PROTEIN TOTAL   6.3       RBC   4.17       RDW   14.0       Sodium   140       WBC   16.57             All pertinent labs from the last 24 hours have been reviewed.    Significant Diagnostics:  I have reviewed all pertinent imaging results/findings within the past 24 hours.  Assessment/Plan:     * Cervical myelopathy  Rebel Rodriguez  Is a 54 y.o. male with cervical myelopathy and prior ACDF C4 C7 with adjacent level 3 4 who presented for elective C3-4 ACDF on 01/30, which complicated by retropharyngeal injury required repair by ENT. Now s/p trach/G tube on 2/3.      - Neuro exam stable, q4h neuro checks  - Continue multimodal pain control   - S/p dex x3d  - ID consulted for abx recs given pharyngeal injury, s/p course of unasyn  - Cervical drain by ENT   - S/p trach/g tube - npo 4-6 wks  minimum per ENT. TF per primary team/nutrition  - SubQ heparin for DVT prophylaxis  - Postop XR with satisfactory placement of hardware  - Notify with acute changes in exam. Will continue to follow for post-op needs. Discussed care plan with patient and mother at bedside, all questions answered.  - Discussed with Dr. Ashwini Torres, PA-C  Neurosurgery  [facility] - Neurosurgery ([facility])

## 2024-02-06 NOTE — CARE UPDATE
I have reviewed the chart of Rebel Rodriguez and participated in the care of the patient who is hospitalized for the following:    Active Hospital Problems    Diagnosis    *Cervical myelopathy    Abdominal cramping    Pain     neck pain and radicular pain   S/p acdf  Prn tylenol      Weakness     Difficulty with ambulating also due to LLE weakness   S/p acdf  Therapy to evaluate and treat      Debility     Therapy recc high intensive therapy       Kidney stones     As seen on xray on 1/30      Hyponatremia     Noted on labs  Monitored with cmp  improved      Gastrostomy status    Physical deconditioning    Injury of pharynx    On mechanically assisted ventilation    Anxiety    Hyperlipidemia          I have reviewed the Rebel Rodriguez with the multidisciplinary team during rounds.      Shaina Khan NP  [facility]

## 2024-02-06 NOTE — ASSESSMENT & PLAN NOTE
Mr Rodriguez is a 55 yo male who is s/p C3-4 ACDF surgery with post op dysphagia, odynophagia, neck pain, swelling and crepitus. CT and scope demonstrates DELFIN drain in the hypopharynx. Patient with difficulty with swallowing secretions. No respiratory compromise. He is s/p neck exploration and pharyngeal repair on 1/30. Discussed with patient's mother and sister at bedside the recommendation for open G tube and tracheostomy to allow adequate time for pharynx to heal.     Now s/p trach and open G tube on 2/3/24.    - Strict NPO, NPO for 4-6 weeks minimum  - ID consulted for abx recommendations in setting of hardware with pharyngeal injury    - Currently off all abx, s/p vanc and unasyn   - s/p tracheostomy, with 6-0 cuffed shiley in place    - cuff deflated this am   - R neck drain to stay in place until removed by ENT   - Rest of care for per primary  - Please page ENT with questions or concerns.

## 2024-02-07 PROBLEM — I95.81 POSTPROCEDURAL HYPOTENSION: Status: ACTIVE | Noted: 2024-02-07

## 2024-02-07 LAB
ALBUMIN SERPL BCP-MCNC: 2.5 G/DL (ref 3.5–5.2)
ALP SERPL-CCNC: 66 U/L (ref 55–135)
ALT SERPL W/O P-5'-P-CCNC: 25 U/L (ref 10–44)
ANION GAP SERPL CALC-SCNC: 11 MMOL/L (ref 8–16)
AST SERPL-CCNC: 24 U/L (ref 10–40)
BASOPHILS # BLD AUTO: 0.04 K/UL (ref 0–0.2)
BASOPHILS NFR BLD: 0.3 % (ref 0–1.9)
BILIRUB SERPL-MCNC: 0.6 MG/DL (ref 0.1–1)
BUN SERPL-MCNC: 17 MG/DL (ref 6–20)
CALCIUM SERPL-MCNC: 9.4 MG/DL (ref 8.7–10.5)
CHLORIDE SERPL-SCNC: 103 MMOL/L (ref 95–110)
CO2 SERPL-SCNC: 24 MMOL/L (ref 23–29)
CREAT SERPL-MCNC: 0.8 MG/DL (ref 0.5–1.4)
DIFFERENTIAL METHOD BLD: ABNORMAL
EOSINOPHIL # BLD AUTO: 0.2 K/UL (ref 0–0.5)
EOSINOPHIL NFR BLD: 1.2 % (ref 0–8)
ERYTHROCYTE [DISTWIDTH] IN BLOOD BY AUTOMATED COUNT: 14 % (ref 11.5–14.5)
EST. GFR  (NO RACE VARIABLE): >60 ML/MIN/1.73 M^2
GLUCOSE SERPL-MCNC: 89 MG/DL (ref 70–110)
HCT VFR BLD AUTO: 30.1 % (ref 40–54)
HGB BLD-MCNC: 9.9 G/DL (ref 14–18)
IMM GRANULOCYTES # BLD AUTO: 0.08 K/UL (ref 0–0.04)
IMM GRANULOCYTES NFR BLD AUTO: 0.5 % (ref 0–0.5)
LYMPHOCYTES # BLD AUTO: 1.8 K/UL (ref 1–4.8)
LYMPHOCYTES NFR BLD: 12.6 % (ref 18–48)
MAGNESIUM SERPL-MCNC: 1.7 MG/DL (ref 1.6–2.6)
MCH RBC QN AUTO: 28.6 PG (ref 27–31)
MCHC RBC AUTO-ENTMCNC: 32.9 G/DL (ref 32–36)
MCV RBC AUTO: 87 FL (ref 82–98)
MONOCYTES # BLD AUTO: 1.4 K/UL (ref 0.3–1)
MONOCYTES NFR BLD: 9.5 % (ref 4–15)
NEUTROPHILS # BLD AUTO: 11.1 K/UL (ref 1.8–7.7)
NEUTROPHILS NFR BLD: 75.9 % (ref 38–73)
NRBC BLD-RTO: 0 /100 WBC
PHOSPHATE SERPL-MCNC: 3.5 MG/DL (ref 2.7–4.5)
PLATELET # BLD AUTO: 257 K/UL (ref 150–450)
PMV BLD AUTO: 10.4 FL (ref 9.2–12.9)
POCT GLUCOSE: 122 MG/DL (ref 70–110)
POCT GLUCOSE: 96 MG/DL (ref 70–110)
POCT GLUCOSE: 96 MG/DL (ref 70–110)
POTASSIUM SERPL-SCNC: 3.9 MMOL/L (ref 3.5–5.1)
PROT SERPL-MCNC: 5.9 G/DL (ref 6–8.4)
RBC # BLD AUTO: 3.46 M/UL (ref 4.6–6.2)
SODIUM SERPL-SCNC: 138 MMOL/L (ref 136–145)
WBC # BLD AUTO: 14.58 K/UL (ref 3.9–12.7)

## 2024-02-07 PROCEDURE — 80053 COMPREHEN METABOLIC PANEL: CPT

## 2024-02-07 PROCEDURE — 99900026 HC AIRWAY MAINTENANCE (STAT)

## 2024-02-07 PROCEDURE — 25000003 PHARM REV CODE 250

## 2024-02-07 PROCEDURE — 97535 SELF CARE MNGMENT TRAINING: CPT

## 2024-02-07 PROCEDURE — 94640 AIRWAY INHALATION TREATMENT: CPT

## 2024-02-07 PROCEDURE — 94761 N-INVAS EAR/PLS OXIMETRY MLT: CPT

## 2024-02-07 PROCEDURE — 20600001 HC STEP DOWN PRIVATE ROOM

## 2024-02-07 PROCEDURE — 97116 GAIT TRAINING THERAPY: CPT

## 2024-02-07 PROCEDURE — 25000003 PHARM REV CODE 250: Performed by: NURSE PRACTITIONER

## 2024-02-07 PROCEDURE — 36415 COLL VENOUS BLD VENIPUNCTURE: CPT

## 2024-02-07 PROCEDURE — 25000242 PHARM REV CODE 250 ALT 637 W/ HCPCS: Performed by: NURSE PRACTITIONER

## 2024-02-07 PROCEDURE — 63600175 PHARM REV CODE 636 W HCPCS: Performed by: NURSE PRACTITIONER

## 2024-02-07 PROCEDURE — 83735 ASSAY OF MAGNESIUM: CPT

## 2024-02-07 PROCEDURE — 27200966 HC CLOSED SUCTION SYSTEM

## 2024-02-07 PROCEDURE — 27000221 HC OXYGEN, UP TO 24 HOURS

## 2024-02-07 PROCEDURE — 84100 ASSAY OF PHOSPHORUS: CPT

## 2024-02-07 PROCEDURE — 85025 COMPLETE CBC W/AUTO DIFF WBC: CPT

## 2024-02-07 PROCEDURE — 99024 POSTOP FOLLOW-UP VISIT: CPT | Mod: ,,, | Performed by: PHYSICIAN ASSISTANT

## 2024-02-07 PROCEDURE — 97530 THERAPEUTIC ACTIVITIES: CPT

## 2024-02-07 PROCEDURE — 99900035 HC TECH TIME PER 15 MIN (STAT)

## 2024-02-07 PROCEDURE — 25000003 PHARM REV CODE 250: Performed by: PSYCHIATRY & NEUROLOGY

## 2024-02-07 PROCEDURE — 63600175 PHARM REV CODE 636 W HCPCS: Performed by: HOSPITALIST

## 2024-02-07 RX ADMIN — ATORVASTATIN CALCIUM 10 MG: 10 TABLET, FILM COATED ORAL at 08:02

## 2024-02-07 RX ADMIN — SENNOSIDES AND DOCUSATE SODIUM 2 TABLET: 8.6; 5 TABLET ORAL at 09:02

## 2024-02-07 RX ADMIN — MIRTAZAPINE 15 MG: 7.5 TABLET, FILM COATED ORAL at 09:02

## 2024-02-07 RX ADMIN — OXYCODONE HYDROCHLORIDE 10 MG: 10 TABLET ORAL at 08:02

## 2024-02-07 RX ADMIN — SENNOSIDES AND DOCUSATE SODIUM 2 TABLET: 8.6; 5 TABLET ORAL at 08:02

## 2024-02-07 RX ADMIN — HYDROXYZINE HYDROCHLORIDE 25 MG: 25 TABLET ORAL at 09:02

## 2024-02-07 RX ADMIN — IPRATROPIUM BROMIDE AND ALBUTEROL SULFATE 3 ML: .5; 3 SOLUTION RESPIRATORY (INHALATION) at 12:02

## 2024-02-07 RX ADMIN — HEPARIN SODIUM 5000 UNITS: 5000 INJECTION INTRAVENOUS; SUBCUTANEOUS at 02:02

## 2024-02-07 RX ADMIN — FLUOXETINE HYDROCHLORIDE 40 MG: 20 CAPSULE ORAL at 08:02

## 2024-02-07 RX ADMIN — POLYETHYLENE GLYCOL 3350 17 GRAM ORAL POWDER PACKET 17 G: at 09:02

## 2024-02-07 RX ADMIN — OXYCODONE HYDROCHLORIDE 10 MG: 10 TABLET ORAL at 09:02

## 2024-02-07 RX ADMIN — IPRATROPIUM BROMIDE AND ALBUTEROL SULFATE 3 ML: .5; 3 SOLUTION RESPIRATORY (INHALATION) at 07:02

## 2024-02-07 RX ADMIN — IPRATROPIUM BROMIDE AND ALBUTEROL SULFATE 3 ML: .5; 3 SOLUTION RESPIRATORY (INHALATION) at 08:02

## 2024-02-07 RX ADMIN — HYDROXYZINE HYDROCHLORIDE 25 MG: 25 TABLET ORAL at 08:02

## 2024-02-07 RX ADMIN — HEPARIN SODIUM 5000 UNITS: 5000 INJECTION INTRAVENOUS; SUBCUTANEOUS at 09:02

## 2024-02-07 RX ADMIN — SODIUM CHLORIDE, POTASSIUM CHLORIDE, SODIUM LACTATE AND CALCIUM CHLORIDE 500 ML: 600; 310; 30; 20 INJECTION, SOLUTION INTRAVENOUS at 11:02

## 2024-02-07 RX ADMIN — QUETIAPINE FUMARATE 50 MG: 25 TABLET ORAL at 08:02

## 2024-02-07 RX ADMIN — HEPARIN SODIUM 5000 UNITS: 5000 INJECTION INTRAVENOUS; SUBCUTANEOUS at 06:02

## 2024-02-07 RX ADMIN — OXYCODONE HYDROCHLORIDE 10 MG: 10 TABLET ORAL at 02:02

## 2024-02-07 RX ADMIN — POLYETHYLENE GLYCOL 3350 17 GRAM ORAL POWDER PACKET 17 G: at 08:02

## 2024-02-07 NOTE — PLAN OF CARE
Problem: Adult Inpatient Plan of Care  Goal: Plan of Care Review  Outcome: Ongoing, Progressing  Goal: Patient-Specific Goal (Individualized)  Outcome: Ongoing, Progressing  Goal: Absence of Hospital-Acquired Illness or Injury  Outcome: Ongoing, Progressing  Goal: Optimal Comfort and Wellbeing  Outcome: Ongoing, Progressing  Goal: Readiness for Transition of Care  Outcome: Ongoing, Progressing     Problem: Fall Injury Risk  Goal: Absence of Fall and Fall-Related Injury  Outcome: Ongoing, Progressing     Problem: Skin Injury Risk Increased  Goal: Skin Health and Integrity  Outcome: Ongoing, Progressing  Intervention: Optimize Skin Protection  Flowsheets (Taken 2/7/2024 6101)  Pressure Reduction Techniques: frequent weight shift encouraged  Pressure Reduction Devices: positioning supports utilized  Skin Protection: adhesive use limited  Head of Bed (HOB) Positioning: HOB elevated     POC reviewed with the patient and they verbalized understanding. All comments and concerns addressed. Bed locked in lowest position with bed alarm set, call light within reach. Safety precautions maintained. VSS, see flowsheets. No events this shift. Will continue to monitor for changes to POC and clinical condition.

## 2024-02-07 NOTE — ASSESSMENT & PLAN NOTE
DELFIN drain noted to be in hypopharynx on post-op imaging, s/p emergent surgical repair on 1/30  - Dex 4q6hrs, discontinued  ENT recs: Now s/p trach and open G tube on 2/3/24.  - Strict NPO, NPO for 4-6 weeks minimum  - ID consulted for abx recommendations in setting of hardware with pharyngeal injury          - Currently off all abx, s/p vanc and unasyn   - s/p tracheostomy, with 6-0 cuffed shiley in place          - CUFF TO REMAIN UP - until otherwise directed by ENT          - Tentative plan to deflate on Tuesday  - R neck drain to stay in place until at least Tuesday   - needs time for wound healing    2/7-trache care q 4 hours.  trach exchange planned 2/8 by ENT. Planning to remove it this admission before discharge. Drain to be removed later this admission per ENT.

## 2024-02-07 NOTE — PROGRESS NOTES
[facility]z Weber - Neurosurgery ([facility])  Neurosurgery  Progress Note    Subjective:     History of Present Illness: Rebel Rodriguez is a 53 y.o. male with hx of psoriatic arthritis, hx TIA on Aspirin 81 mg, s/p C4-7 ACDF with Dr. Huff 10 years ago who presents for evaluation of gait imbalance. Last seen 1 year ago for neck pain and radicular pain into the left shoulder. He underwent C1-2 KENRICK with moderate relief of pain. Reports rapid functional decline over the last 3 weeks. Endorses hand clumsiness, difficulty typing, gait imbalance, difficulty butoning, dropping items, falls. Difficulty with ambulating also due to LLE weakness. States it feels like he has rubber bands around his wrists. Reports difficulty with overhead mobility and shock-like pains down spine when raising arms overhead. Denies b/b incontinence.     Post-Op Info:  Procedure(s) (LRB):  LAPAROTOMY with gastrostomy tube placement (N/A)  LARYNGOSCOPY, DIRECT  TRACHEOTOMY   4 Days Post-Op   Interval History: NAEON. Lying in bed, no acute distress. Exam stable.     Medications:  Continuous Infusions:   sodium chloride 0.9% Stopped (02/03/24 0801)    dextrose 10 % in water (D10W)       Scheduled Meds:   albuterol-ipratropium  3 mL Nebulization Q6H    atorvastatin  10 mg Per NG tube Daily    FLUoxetine  40 mg Per NG tube Daily    heparin (porcine)  5,000 Units Subcutaneous Q8H    mirtazapine  15 mg Per NG tube QHS    polyethylene glycol  17 g Per NG tube BID    QUEtiapine  50 mg Per NG tube Q12H    senna-docusate 8.6-50 mg  2 tablet Per NG tube BID     PRN Meds:acetaminophen, bisacodyL, dextrose 10 % in water (D10W), dextrose 10%, dextrose 10%, glucagon (human recombinant), HYDROmorphone, hydrOXYzine HCL, insulin aspart U-100, ondansetron, oxyCODONE, prochlorperazine     Review of Systems  Objective:     Weight: 74.9 kg (165 lb 2 oz)  Body mass index is 25.11 kg/m².  Vital Signs (Most Recent):  Temp: 99.1 °F (37.3 °C) (02/07/24 1636)  Pulse: 74 (02/07/24  1636)  Resp: 15 (02/07/24 1636)  BP: (!) 104/55 (02/07/24 1636)  SpO2: 100 % (02/07/24 1636) Vital Signs (24h Range):  Temp:  [98.5 °F (36.9 °C)-99.1 °F (37.3 °C)] 99.1 °F (37.3 °C)  Pulse:  [61-86] 74  Resp:  [15-19] 15  SpO2:  [95 %-100 %] 100 %  BP: ()/(53-56) 104/55     Date 02/07/24 0700 - 02/08/24 0659   Shift 6535-9474 1841-1533 6289-7207 24 Hour Total   INTAKE   Shift Total(mL/kg)       OUTPUT   Drains 0   0   Shift Total(mL/kg) 0(0)   0(0)   Weight (kg) 74.9 74.9 74.9 74.9              Oxygen Concentration (%):  [28] 28             Closed/Suction Drain 01/30/24 1916 Right;Ventral Neck Bulb 15 Fr. (Active)   Site Description Healing 02/05/24 2300   Dressing Type Transparent (Tegaderm) 02/05/24 2300   Dressing Status Clean;Dry;Intact 02/05/24 2300   Dressing Intervention Integrity maintained 02/05/24 2300   Drainage Serosanguineous 02/05/24 1902   Status To bulb suction 02/05/24 2300   Output (mL) 0 mL 02/05/24 1805            Gastrostomy/Enterostomy 02/03/24 0934 Gastrostomy tube w/ balloon LUQ (Active)   Securement secured to abdomen 02/05/24 1902   Interventions Prior to Feeding patency checked 02/05/24 1902   Suction Setting/Drainage Method dependent drainage 02/05/24 0501   Drainage brown 02/04/24 1701   Feeding Type continuous;by pump 02/05/24 1902   Clamp Status/Tolerance unclamped 02/05/24 1902   Feeding Action feeding continued 02/05/24 1902   Dressing no dressing 02/05/24 1505   Insertion Site no swelling;dry 02/05/24 0501   Intake (mL) 45 mL 02/05/24 0601   Tube Output(mL)(Include Discarded Residual) 300 mL 02/04/24 0542   Tube Feeding Intake (mL) 45 02/05/24 2102       Male External Urinary Catheter 01/30/24 2000 (Active)   Collection Container Urimeter 02/05/24 1902   Securement Method secured to top of thigh w/ adhesive device 02/05/24 1902   Skin no redness;no breakdown 02/05/24 1902   Tolerance no signs/symptoms of discomfort 02/05/24 1902   Output (mL) 800 mL 02/05/24 1805   Catheter  "Change Date 02/03/24 02/04/24 0502   Catheter Change Time 1941 02/04/24 0502         Physical Exam         Neurosurgery Physical Exam    General: well developed, well nourished, no distress.   Head: normocephalic, atraumatic  Neurologic: Alert and oriented. Thought content appropriate.  GCS: Motor: 6/Verbal: 5/Eyes: 4 GCS Total: 15  Mental Status: Awake, Alert, Oriented x 4  Cranial nerves: face symmetric, tongue midline, CN II-XII grossly intact, EOMI.   Sensory: intact to light touch throughout  Motor Strength: Moves all extremities spontaneously with good tone.  Full strength upper and lower extremities. No abnormal movements seen.     Strength  Deltoids Triceps Biceps Wrist Extension Wrist Flexion Hand    Upper: R 5/5 5/5 5/5 5/5 5/5 5/5    L 5/5 5/5 5/5 5/5 5/5 5/5     Iliopsoas Quadriceps Knee  Flexion Tibialis  anterior Gastro- cnemius EHL   Lower: R 5/5 5/5 5/5 5/5 5/5 5/5    L 5/5 5/5 5/5 5/5 5/5 5/5     Skin: Skin is warm, dry and intact.  Incision c/d/I with skin edges well approximated with staples.     Significant Labs:  Recent Labs   Lab 02/06/24  0425 02/07/24  0300   GLU 89 89    138   K 3.7 3.9    103   CO2 27 24   BUN 15 17   CREATININE 0.8 0.8   CALCIUM 9.4 9.4   MG 1.7 1.7       Recent Labs   Lab 02/06/24  0425 02/07/24  0300   WBC 16.57* 14.58*   HGB 11.8* 9.9*   HCT 36.4* 30.1*    257       No results for input(s): "LABPT", "INR", "APTT" in the last 48 hours.  Microbiology Results (last 7 days)       ** No results found for the last 168 hours. **          Recent Lab Results         02/07/24  1229   02/07/24  0728   02/07/24  0300        Albumin     2.5       ALP     66       ALT     25       Anion Gap     11       AST     24       Baso #     0.04       Basophil %     0.3       BILIRUBIN TOTAL     0.6  Comment: For infants and newborns, interpretation of results should be based  on gestational age, weight and in agreement with clinical  observations.    Premature Infant " recommended reference ranges:  Up to 24 hours.............<8.0 mg/dL  Up to 48 hours............<12.0 mg/dL  3-5 days..................<15.0 mg/dL  6-29 days.................<15.0 mg/dL         BUN     17       Calcium     9.4       Chloride     103       CO2     24       Creatinine     0.8       Differential Method     Automated       eGFR     >60.0       Eos #     0.2       Eos %     1.2       Glucose     89       Gran # (ANC)     11.1       Gran %     75.9       Hematocrit     30.1       Hemoglobin     9.9       Immature Grans (Abs)     0.08  Comment: Mild elevation in immature granulocytes is non specific and   can be seen in a variety of conditions including stress response,   acute inflammation, trauma and pregnancy. Correlation with other   laboratory and clinical findings is essential.         Immature Granulocytes     0.5       Lymph #     1.8       Lymph %     12.6       Magnesium      1.7       MCH     28.6       MCHC     32.9       MCV     87       Mono #     1.4       Mono %     9.5       MPV     10.4       nRBC     0       Phosphorus Level     3.5       Platelet Count     257       POCT Glucose 96   122         Potassium     3.9       PROTEIN TOTAL     5.9       RBC     3.46       RDW     14.0       Sodium     138       WBC     14.58             All pertinent labs from the last 24 hours have been reviewed.    Significant Diagnostics:  I have reviewed all pertinent imaging results/findings within the past 24 hours.  Assessment/Plan:     * Cervical myelopathy  Rebel Rodriguez  Is a 54 y.o. male with cervical myelopathy and prior ACDF C4 C7 with adjacent level 3 4 who presented for elective C3-4 ACDF on 01/30, which complicated by retropharyngeal injury required repair by ENT. Now s/p trach/G tube on 2/3.      - Neuro exam stable, q4h neuro checks  - Continue multimodal pain control   - S/p dex x3d  - ID consulted for abx recs given pharyngeal injury, s/p course of unasyn  - Cervical drain by ENT   - S/p  trach/g tube - npo 4-6 wks minimum per ENT. TF per primary team/nutrition  - SubQ heparin for DVT prophylaxis  - Postop XR with satisfactory placement of hardware  - Notify with acute changes in exam. Will continue to follow for post-op needs. Discussed care plan with patient and mother at bedside, all questions answered.  - Discussed with Dr. Ashwini Torres, PAKalpeshC  Neurosurgery  Frantz Weber - Neurosurgery ([facility])

## 2024-02-07 NOTE — PROGRESS NOTES
Frantz Weber - Neurosurgery ([facility])  Otorhinolaryngology-Head & Neck Surgery  Progress Note    Subjective:     Post-Op Info:  Procedure(s) (LRB):  LAPAROTOMY with gastrostomy tube placement (N/A)  LARYNGOSCOPY, DIRECT  TRACHEOTOMY   4 Days Post-Op  Hospital Day: 9     Interval History:   Doing well this am, sitting up in bed.     Significant amount of secretions.     Medications:  Continuous Infusions:   sodium chloride 0.9% Stopped (02/03/24 0801)    dextrose 10 % in water (D10W)       Scheduled Meds:   albuterol-ipratropium  3 mL Nebulization Q6H    atorvastatin  10 mg Per NG tube Daily    FLUoxetine  40 mg Per NG tube Daily    heparin (porcine)  5,000 Units Subcutaneous Q8H    mirtazapine  15 mg Per NG tube QHS    polyethylene glycol  17 g Per NG tube BID    QUEtiapine  50 mg Per NG tube Q12H    senna-docusate 8.6-50 mg  2 tablet Per NG tube BID     PRN Meds:acetaminophen, bisacodyL, dextrose 10 % in water (D10W), dextrose 10%, dextrose 10%, glucagon (human recombinant), HYDROmorphone, hydrOXYzine HCL, insulin aspart U-100, ondansetron, oxyCODONE, prochlorperazine     Review of patient's allergies indicates:   Allergen Reactions    Erythromycin Nausea And Vomiting    Infliximab Other (See Comments)     Lupus with fever and acute arthritis  Lupus with fever and acute arthritis     Objective:     Vital Signs (24h Range):  Temp:  [98.5 °F (36.9 °C)-98.9 °F (37.2 °C)] 98.6 °F (37 °C)  Pulse:  [61-86] 61  Resp:  [15-19] 17  SpO2:  [96 %-99 %] 96 %  BP: ()/(51-59) 99/54       Lines/Drains/Airways       Drain  Duration                  Closed/Suction Drain 01/30/24 1916 Right;Ventral Neck Bulb 15 Fr. 7 days    Male External Urinary Catheter 01/30/24 2000 7 days         Gastrostomy/Enterostomy 02/03/24 0934 Gastrostomy tube w/ balloon LUQ 3 days              Airway  Duration             Adult Surgical Airway 02/03/24 1055 Shiley Cuffed 6.0/ 75mm 3 days              Peripheral Intravenous Line  Duration                   Peripheral IV - Single Lumen 02/05/24 1915 18 G Anterior;Right Forearm 1 day                     Physical Exam  Sitting up in bed   NAD   6-0 cuffed trach in place, cuff deflated  Copious thick secretions  Trach secured with sutures and soft collar   R neck incision c/d/i, no fluctuance or erythema   DELFIN x1 with minimal SS output, holding suction   No subq emphysema      Significant Labs:  CBC:   Recent Labs   Lab 02/07/24  0300   WBC 14.58*   RBC 3.46*   HGB 9.9*   HCT 30.1*      MCV 87   MCH 28.6   MCHC 32.9       Significant Diagnostics:  None  Assessment/Plan:     Injury of pharynx  Mr Rodriguez is a 55 yo male who is s/p C3-4 ACDF surgery with post op dysphagia, odynophagia, neck pain, swelling and crepitus. CT and scope demonstrates DELFIN drain in the hypopharynx. Patient with difficulty with swallowing secretions. No respiratory compromise. He is s/p neck exploration and pharyngeal repair on 1/30. Discussed with patient's mother and sister at bedside the recommendation for open G tube and tracheostomy to allow adequate time for pharynx to heal.     Now s/p trach and open G tube on 2/3/24.    - Strict NPO, NPO for 4-6 weeks minimum  - ID consulted for abx recommendations in setting of hardware with pharyngeal injury    - Currently off all abx, s/p vanc and unasyn   - s/p tracheostomy, with 6-0 cuffed shiley in place    - plan for trach change tomorrow   - R neck drain to stay in place until removed by ENT   - Rest of care for per primary  - Please page ENT with questions or concerns.        Bonita Macias MD  Otorhinolaryngology-Head & Neck Surgery  [facility] - Neurosurgery ([facility])

## 2024-02-07 NOTE — SUBJECTIVE & OBJECTIVE
Interval History:   Doing well this am, sitting up in bed.     Significant amount of secretions.     Medications:  Continuous Infusions:   sodium chloride 0.9% Stopped (02/03/24 0801)    dextrose 10 % in water (D10W)       Scheduled Meds:   albuterol-ipratropium  3 mL Nebulization Q6H    atorvastatin  10 mg Per NG tube Daily    FLUoxetine  40 mg Per NG tube Daily    heparin (porcine)  5,000 Units Subcutaneous Q8H    mirtazapine  15 mg Per NG tube QHS    polyethylene glycol  17 g Per NG tube BID    QUEtiapine  50 mg Per NG tube Q12H    senna-docusate 8.6-50 mg  2 tablet Per NG tube BID     PRN Meds:acetaminophen, bisacodyL, dextrose 10 % in water (D10W), dextrose 10%, dextrose 10%, glucagon (human recombinant), HYDROmorphone, hydrOXYzine HCL, insulin aspart U-100, ondansetron, oxyCODONE, prochlorperazine     Review of patient's allergies indicates:   Allergen Reactions    Erythromycin Nausea And Vomiting    Infliximab Other (See Comments)     Lupus with fever and acute arthritis  Lupus with fever and acute arthritis     Objective:     Vital Signs (24h Range):  Temp:  [98.5 °F (36.9 °C)-98.9 °F (37.2 °C)] 98.6 °F (37 °C)  Pulse:  [61-86] 61  Resp:  [15-19] 17  SpO2:  [96 %-99 %] 96 %  BP: ()/(51-59) 99/54       Lines/Drains/Airways       Drain  Duration                  Closed/Suction Drain 01/30/24 1916 Right;Ventral Neck Bulb 15 Fr. 7 days    Male External Urinary Catheter 01/30/24 2000 7 days         Gastrostomy/Enterostomy 02/03/24 0934 Gastrostomy tube w/ balloon LUQ 3 days              Airway  Duration             Adult Surgical Airway 02/03/24 1055 Shiley Cuffed 6.0/ 75mm 3 days              Peripheral Intravenous Line  Duration                  Peripheral IV - Single Lumen 02/05/24 1915 18 G Anterior;Right Forearm 1 day                     Physical Exam  Sitting up in bed   NAD   6-0 cuffed trach in place, cuff deflated  Copious thick secretions  Trach secured with sutures and soft collar   R neck  incision c/d/i, no fluctuance or erythema   DELFIN x1 with minimal SS output, holding suction   No subq emphysema      Significant Labs:  CBC:   Recent Labs   Lab 02/07/24  0300   WBC 14.58*   RBC 3.46*   HGB 9.9*   HCT 30.1*      MCV 87   MCH 28.6   MCHC 32.9       Significant Diagnostics:  None

## 2024-02-07 NOTE — RESPIRATORY THERAPY
"RAPID RESPONSE RESPIRATORY THERAPY PROACTIVE NOTE           Time of visit: 1015     Code Status: Full Code   : 1970  Bed: 930/930 A:   MRN: [id number]  Time spent at the bedside: < 15 min    SITUATION    Evaluated patient for: LDA Check     BACKGROUND    Patient has a past medical history of Achilles tendon rupture, Achilles tendon rupture, Allergy, Anemia, Anxiety, Arthritis, Degenerative disc disease, Drug-induced lupus erythematosus, Drug-induced lupus erythematosus, Hyperlipidemia, Inguinal lymphadenopathy, Kidney stone, Medication monitoring encounter, Neck pain, Psoriatic arthritis, Psoriatic arthritis, Recurrent fever, Recurrent nephrolithiasis, Sinusitis, Stroke, TIA (transient ischemic attack), Ulcer, and Unexplained night sweats.  Clinically Significant Surgical Hx: tracheostomy    24 Hours Vitals Range:  Temp:  [98.5 °F (36.9 °C)-99.7 °F (37.6 °C)]   Pulse:  [72-86]   Resp:  [16-19]   BP: ()/(50-59)   SpO2:  [95 %-99 %]     Labs:    Recent Labs     24  0049 24  0425 24  0300    140 138   K 3.8 3.7 3.9    101 103   CO2 25 27 24   BUN 15 15 17   CREATININE 0.8 0.8 0.8   GLU 85 89 89   PHOS 3.2 3.9 3.5   MG 1.7 1.7 1.7        No results for input(s): "PH", "PCO2", "PO2", "HCO3", "POCSATURATED", "BE" in the last 72 hours.    ASSESSMENT/INTERVENTIONS  All supplies at bedside. Pt sitting up in bed with PT/OT at bedside waiting to work with him. No other respiratory needs at this time. Reassured to call if there was something we could assist with  after therapy       Last VS   Temp: 98.5 °F (36.9 °C) (1943)  Pulse: 72 (430)  Resp: 17 (430)  BP: 99/53 ( 0028)  SpO2: 97 % (430)      Extra trachs at bedside: 6  Level of Consciousness: Level of Consciousness (AVPU): alert  Respiratory Effort: Respiratory Effort: Unlabored, Normal Expansion/Accessory Muscle Usage: Expansion/Accessory Muscles/Retractions: expansion symmetric, no retractions, no " use of accessory muscles  All Lung Field Breath Sounds: All Lung Fields Breath Sounds: Anterior:, Lateral:, coarse  O2 Device/Concentration: 5l 28%  Surgical airway: Yes, Type: Shiley Size: 6, cuffed  Ambu at bedside:       Active Orders   Respiratory Care    Inhalation Treatment Q6H     Frequency: Q6H     Number of Occurrences: Until Specified    Oxygen Continuous     Frequency: Continuous     Number of Occurrences: Until Specified     Order Questions:      Device type: Low flow      Device: Trach Collar      Titrate O2 per Oxygen Titration Protocol: Yes      To maintain SpO2 goal of: >= 90%      Notify MD of: Inability to achieve desired SpO2; Sudden change in patient status and requires 20% increase in FiO2; Patient requires >60% FiO2    Pulse Oximetry Q4H     Frequency: Q4H     Number of Occurrences: Until Specified    Routine tracheostomy care     Frequency: Q4H     Number of Occurrences: Until Specified       RECOMMENDATIONS    We recommend: RRT Recs: Continue POC per primary team.      FOLLOW-UP    Please call back the Rapid Response RT, Mone Miller RRT at x [phone] for any questions or concerns.

## 2024-02-07 NOTE — PLAN OF CARE
02/07/24 1235   Post-Acute Status   Post-Acute Authorization Placement   Post-Acute Placement Status Referrals Sent   Coverage [facility]   Patient choice form signed by patient/caregiver List with quality metrics by geographic area provided   Discharge Delays (!) Change in Medical Condition   Discharge Plan   Discharge Plan A Rehab   Discharge Plan B Long-term acute care facility (LTAC)     Met with Pt and family to review discharge recommendation of inpatient rehab (LTAC is possible depending on Pt's progress) and they are agreeable to plan    Patient/family provided list of facilities in-network with patient's payor plan. Providers that are owned, operated, or affiliated with [facility] are included on the list.     Notified that referral sent to below listed facilities from in-network list based on proximity to home/family support: HOWEVER, [facility], Pt's insurance, does not have their website updated. Therefore, multiple referrals sent, in case Pt has more in network facilities.  [facility]  2. [facility]  3. [facility]  4. [facility]    Patient/family instructed to identify preference.    Preferred Facility: (if more than 1, listed in order of descending preference)  [facility], if in network    If an additional preferred facility not listed above is identified, additional referral to be sent. If above facilities unable to accept, will send additional referrals to in-network providers.     Social work will continue to follow this patient as to medical/PT and OT needs.     CHRISTINA Rogers, LUIS CARLOS  [facility]  [id number]

## 2024-02-07 NOTE — PT/OT/SLP PROGRESS
Physical Therapy Treatment    Patient Name:  Rebel Rodriguez   MRN:  [id number]    Recommendations:     Discharge Recommendations: High Intensity Therapy  Discharge Equipment Recommendations: bedside commode, bath bench, walker, rolling  Barriers to discharge: Inaccessible home and Decreased caregiver support    Assessment:     Rebel Rodriguez is a 54 y.o. male admitted with a medical diagnosis of Cervical myelopathy.  He presents with the following impairments/functional limitations: weakness, impaired endurance, impaired self care skills, impaired functional mobility, gait instability, impaired balance, decreased upper extremity function, decreased lower extremity function, decreased safety awareness. Pt progressed to ambulating in hallway. Unsteadiness present during ambulation with a RW. Pt would benefit from high intensity/frequency therapy for: Dynamic/static standing/sitting balance through skilled balance training, strengthening with the use of skilled therapeutic exercises interventions, mobility and safety training to ensure safe discharge home through skilled patient and caregiver education, and mobility through adaptive equipment training. Pt highly motivated to return to independent PLOF and can tolerate 3+hours of therapy. Pt continues to benefit from a collaborative multidisciplinary program to improve quality of life and focus on recovery of impairments.    Rehab Prognosis: Good; patient would benefit from acute skilled PT services to address these deficits and reach maximum level of function.    Recent Surgery: Procedure(s) (LRB):  LAPAROTOMY with gastrostomy tube placement (N/A)  LARYNGOSCOPY, DIRECT  TRACHEOTOMY 4 Days Post-Op    Plan:     During this hospitalization, patient to be seen 4 x/week to address the identified rehab impairments via gait training, therapeutic activities, therapeutic exercises, neuromuscular re-education and progress toward the following goals:    Plan of Care Expires:   02/28/24    Subjective     Chief Complaint: none verbalized  Patient/Family Comments/goals: pt reports sitting up in chair yesterday went great  Pain/Comfort:  Pain Rating 1:  (none reported)  Pain Rating Post-Intervention 1:  (none reported)      Objective:     Communicated with RN prior to session.  Patient found HOB elevated with peripheral IV, DELFIN drain, SCD, telemetry, Tracheostomy, PEG Tube upon PT entry to room.     General Precautions: Standard, fall, NPO  Orthopedic Precautions: N/A  Braces: N/A  Respiratory Status:  trach collar, 5 L/min, 28%     Functional Mobility:  Bed Mobility:     Scooting: stand by assistance  Supine to Sit: minimum assistance  Transfers:     Sit to Stand:  contact guard assistance with rolling walker  Sit to Stand:  minimum assistance with no AD and hand-held assist  Gait:   16 ft, CGA-Allegra, RW, minor unsteadiness, 1 major LOB with therapist assistance to recover, decreased gait speed and step length  50 ft, CGA-[place]legra, RW, minor unsteadiness, decreased gait speed and step length, Vcing for upright and maintaining appropriate distance from walker  Balance:   Static Sitting: SBA  Dynamic Sitting: SBA  Static Standing: CGA-Allegra  Dynamic Standing: CGA-Allegra      AM-PAC 6 CLICK MOBILITY  Turning over in bed (including adjusting bedclothes, sheets and blankets)?: 3  Sitting down on and standing up from a chair with arms (e.g., wheelchair, bedside commode, etc.): 3  Moving from lying on back to sitting on the side of the bed?: 3  Moving to and from a bed to a chair (including a wheelchair)?: 3  Need to walk in hospital room?: 3  Climbing 3-5 steps with a railing?: 2  Basic Mobility Total Score: 17       Treatment & Education:  Patient also educated and instructed on therapeutic exercises. Therapeutic exercises included the following: Long Arc Quads, Seated marches (Single leg), Bilateral Heel raises, Bilateral Toe Raises. Pt performed 1x10 on BLEs.  Patient also educated, instructed, and  given handout for HEP. HEP included the following exercises: Hip ABD, Heel slides, Straight Leg Raises, Ankle pumps, Long Arc Quads, Seated marches (Single leg), Bilateral Heel raises, Bilateral Toe Raises.  Patient educated on role of therapy, goals of session, and benefits of mobilizing.   Discussed PT plan of care during hospitalization.   Patient educated on calling for assistance.   Patient educated on how their diagnosis impacts their mobility within PT scope of practice.   All questions answered within PT scope of practice.    Patient left up in chair with all lines intact, call button in reach, RN notified, and guest present.    GOALS:   Multidisciplinary Problems       Physical Therapy Goals          Problem: Physical Therapy    Goal Priority Disciplines Outcome Goal Variances Interventions   Physical Therapy Goal     PT, PT/OT Ongoing, Progressing                         Time Tracking:     PT Received On: 02/07/24  PT Start Time: 1028     PT Stop Time: 1101  PT Total Time (min): 33 min +7 mins = 40 mins  PT returned to room from [id number]    Billable Minutes: Gait Training 25 and Therapeutic Activity 15    Treatment Type: Treatment  PT/PTA: PT     Number of PTA visits since last PT visit: 0     02/07/2024

## 2024-02-07 NOTE — ASSESSMENT & PLAN NOTE
Hx of prior ACDF C4 C7 with adjacent level 3-4 who presented for elective C3-4 ACDF on 01/30, complicated by retropharyngeal injury required repair by ENT.   - s/p NCC, intubation and ventilation, trache and G tube.  - will not keep trach for 6 weeks, but NPO with G tube for 6 weeks, f/u ENT  - has right neck drain to be removed by ENT

## 2024-02-07 NOTE — ASSESSMENT & PLAN NOTE
S/p ventilation in [facility]  Now on room air, has trache for six weeks  Sitting up in chair

## 2024-02-07 NOTE — PROGRESS NOTES
[facility] - Neurosurgery ([facility])  [facility] Medicine  Progress Note    Patient Name: Rebel Rodriguez  MRN: [id number]  Patient Class: IP- Inpatient   Admission Date: 1/30/2024  Length of Stay: 8 days  Attending Physician: Elyse Dobbins MD  Primary Care Provider: Nanda Smith MD        Subjective:     Principal Problem:Cervical myelopathy        HPI:  53 y.o. male with hx of psoriatic arthritis, hx TIA on Aspirin 81 mg, s/p C4-7 ACDF with Dr. Huff 10 years ago admitted to [facility] s/p C3-C4 ACDF. Per chart review, reports rapid functional decline over the last 3 weeks. Endorses hand clumsiness, difficulty typing, gait imbalance, difficulty butoning, dropping items, falls. Difficulty with ambulating also due to LLE weakness. States it feels like he has rubber bands around his wrists. Reports difficulty with overhead mobility and shock-like pains down spine when raising arms overhead. CT neck soft tissue pending, Patient admitted to [facility] for close monitoring and higher level of care.      Hospital Course: 01/31/2024 CT neck concerning for extensive soft tissue edema and air in neck, additionally w/ concern for DELFIN drain misplaced into hypopharynx. Taken class A to OR by ENT for pharyngeal repair, left intubated by ENT in setting of airway edema. On high dose steroids, no cuff leak this AM  02/01/2024 General surgery consulted for open G tube placement, family still in discussion regarding trach. D/c dex to allow for adequate wound healing, ok with NSGY.   2/2/24: possible G-tube placement today  2/3/24: G-tube today  2/4/24: ok to step down to hospital medicine     Interval History: trach collar and g-tube in place, ok to step down to Hospital medicine    ENT recs: Now s/p trach and open G tube on 2/3/24.     - Strict NPO, NPO for 4-6 weeks minimum  - ID consulted for abx recommendations in setting of hardware with pharyngeal injury          - Currently off all abx, s/p vanc and unasyn   - s/p tracheostomy, with 6-0  cuffed shiley in place          - CUFF TO REMAIN UP - until otherwise directed by ENT          - Tentative plan to deflate on Tuesday  - R neck drain to stay in place until at least Tuesday       Overview/Hospital Course:  2/6- Transferred to [facility] med, IMN. Has trach and g-tube. Rehab is planned. BP low received , TF not at goal due to abd cramping. Will give suppository. Rounded with ENT service. Plan is to remove trache first, remove drain last. Pt communicating fairly well and able to make request and advocate for self. He is sitting up in a chair. NPO with G-tube for six weeks. Trach care q 4 hours.     2/7- trach change planned tomorrow. Strict NPO, NPO for 4-6 weeks minimum.  ID consulted in ICU- no abx needed.  Currently off all abx, s/p vanc and unasyn - R neck drain to stay in place until removed by ENT. 75 % of TF goal. Had one BM after suppository.  Wbc 16-> 14.  No cultures. 99/54 and other VSS. AF. Will bolus  cc for low BP.    Interval History: see above    Review of Systems   Constitutional:  Positive for activity change. Negative for appetite change (G tube x 4-6 weeks) and fever.   HENT:  Positive for trouble swallowing.         Trache    Respiratory:  Negative for shortness of breath.    Cardiovascular:  Negative for chest pain.   Gastrointestinal:  Negative for abdominal pain, constipation, diarrhea, nausea and vomiting.   Genitourinary:  Negative for difficulty urinating.   Musculoskeletal:  Negative for back pain, gait problem and neck stiffness.   Skin:  Negative for rash.   Neurological:  Positive for speech difficulty (has trache). Negative for dizziness and headaches.   Psychiatric/Behavioral:  Negative for agitation, behavioral problems and confusion.      Objective:     Vital Signs (Most Recent):  Temp: 98.6 °F (37 °C) (02/07/24 0725)  Pulse: 61 (02/07/24 0813)  Resp: 17 (02/07/24 0845)  BP: (!) 99/54 (02/07/24 0725)  SpO2: 96 % (02/07/24 0813) Vital Signs (24h Range):  Temp:   [98.5 °F (36.9 °C)-98.9 °F (37.2 °C)] 98.6 °F (37 °C)  Pulse:  [61-86] 61  Resp:  [15-19] 17  SpO2:  [96 %-99 %] 96 %  BP: ()/(51-59) 99/54     Weight: 74.9 kg (165 lb 2 oz)  Body mass index is 25.11 kg/m².    Intake/Output Summary (Last 24 hours) at 2/7/2024 0937  Last data filed at 2/7/2024 0800  Gross per 24 hour   Intake --   Output 755 ml   Net -755 ml           Physical Exam  Constitutional:       General: He is not in acute distress.     Appearance: Normal appearance. He is normal weight. He is not ill-appearing, toxic-appearing or diaphoretic.   HENT:      Head: Normocephalic and atraumatic.      Comments: Trache present     Right Ear: External ear normal.      Left Ear: External ear normal.      Nose: Nose normal. No congestion or rhinorrhea.      Mouth/Throat:      Mouth: Mucous membranes are dry.      Pharynx: Oropharynx is clear.   Eyes:      General: No scleral icterus.     Extraocular Movements: Extraocular movements intact.      Conjunctiva/sclera: Conjunctivae normal.      Pupils: Pupils are equal, round, and reactive to light.   Neck:      Vascular: No carotid bruit.   Cardiovascular:      Rate and Rhythm: Normal rate and regular rhythm.      Pulses: Normal pulses.      Heart sounds: Normal heart sounds. No murmur heard.     No friction rub. No gallop.   Pulmonary:      Effort: Pulmonary effort is normal. No respiratory distress.      Breath sounds: Normal breath sounds. No stridor. No wheezing, rhonchi or rales.   Chest:      Chest wall: No tenderness.   Abdominal:      General: Abdomen is flat. Bowel sounds are normal. There is no distension.      Palpations: Abdomen is soft. There is no mass.      Tenderness: There is no abdominal tenderness. There is no right CVA tenderness, left CVA tenderness, guarding or rebound.      Hernia: No hernia is present.      Comments: G tube clean and dry   Musculoskeletal:         General: No swelling, tenderness, deformity or signs of injury. Normal range  of motion.      Cervical back: Normal range of motion and neck supple. No rigidity or tenderness.      Right lower leg: No edema.      Left lower leg: No edema.   Lymphadenopathy:      Cervical: No cervical adenopathy.   Skin:     General: Skin is warm and dry.      Coloration: Skin is not jaundiced or pale.      Findings: No bruising, erythema, lesion or rash.   Neurological:      General: No focal deficit present.      Mental Status: He is alert and oriented to person, place, and time. Mental status is at baseline.      Sensory: No sensory deficit.      Motor: No weakness.      Coordination: Coordination normal.      Gait: Gait normal.   Psychiatric:         Mood and Affect: Mood normal.         Behavior: Behavior normal.         Thought Content: Thought content normal.         Judgment: Judgment normal.             Significant Labs: All pertinent labs within the past 24 hours have been reviewed.  CBC:   Recent Labs   Lab 02/06/24 0425 02/07/24  0300   WBC 16.57* 14.58*   HGB 11.8* 9.9*   HCT 36.4* 30.1*    257       CMP:   Recent Labs   Lab 02/06/24 0425 02/07/24  0300    138   K 3.7 3.9    103   CO2 27 24   GLU 89 89   BUN 15 17   CREATININE 0.8 0.8   CALCIUM 9.4 9.4   PROT 6.3 5.9*   ALBUMIN 2.8* 2.5*   BILITOT 0.8 0.6   ALKPHOS 77 66   AST 28 24   ALT 28 25   ANIONGAP 12 11         Significant Imaging: I have reviewed all pertinent imaging results/findings within the past 24 hours.    Assessment/Plan:      * Cervical myelopathy  Hx of prior ACDF C4 C7 with adjacent level 3-4 who presented for elective C3-4 ACDF on 01/30, complicated by retropharyngeal injury required repair by ENT.   - s/p NCC, intubation and ventilation, trache and G tube.  - will not keep trach for 6 weeks, but NPO with G tube for 6 weeks, f/u ENT  - has right neck drain to be removed by ENT    Injury of pharynx  DELFIN drain noted to be in hypopharynx on post-op imaging, s/p emergent surgical repair on 1/30  - Dex 4q6hrs,  discontinued  ENT recs: Now s/p trach and open G tube on 2/3/24.  - Strict NPO, NPO for 4-6 weeks minimum  - ID consulted for abx recommendations in setting of hardware with pharyngeal injury          - Currently off all abx, s/p vanc and unasyn   - s/p tracheostomy, with 6-0 cuffed shiley in place          - CUFF TO REMAIN UP - until otherwise directed by ENT          - Tentative plan to deflate on Tuesday  - R neck drain to stay in place until at least Tuesday   - needs time for wound healing    2/7-trache care q 4 hours.  trach exchange planned 2/8 by ENT. Planning to remove it this admission before discharge. Drain to be removed later this admission per ENT.       Postprocedural hypotension  2/6- RL 500cc  2/7  cc IV.      Gastrostomy status  Patient noted to have a percutaneous endoscopic gastrostomy tube in place. I have personally inspected the tube.Tube was placed on this admission, with date of procedure- 2/2  There are no signs of drainage or infection around the site. The tube is patent. Medications have not converted to liquid form if available.  Routine care to be done by wound care and nursing staff.      Isossource @ 35 cc/ h  Peptomen 1.5 w bio 45cc/ h  Water flushes    2/7 advance TF as tolerated to goal        Physical deconditioning  Has trache and G tube,   PT/OT  Wean oxygen  Teach trache and G-tube care      Anxiety  Hx of    - C/w home remeron and fluoxetine  - seroquel BID    Abdominal cramping  Suspect CONSTIPATION  Ducolox suppository  2/7- one BM      On mechanically assisted ventilation  S/p ventilation in [facility]  Now on room air, has trache for six weeks  Sitting up in chair      Hyperlipidemia  Hx of    - C/w home atorvastatin         VTE Risk Mitigation (From admission, onward)           Ordered     heparin (porcine) injection 5,000 Units  Every 8 hours         02/05/24 0919     Place LAUREN hose  Until discontinued         01/30/24 0513     Place sequential compression device  Until  discontinued         01/30/24 0513                    Discharge Planning   JUSTYN: 2/14/2024     Code Status: Full Code   Is the patient medically ready for discharge?: No    Reason for patient still in hospital (select all that apply): Patient trending condition  Discharge Plan A: Rehab   Discharge Delays: None known at this time      Elyse Dobbins MD  Department of Hospital Medicine   [facility] - Neurosurgery ([facility])

## 2024-02-07 NOTE — ASSESSMENT & PLAN NOTE
Suspect CONSTIPATION  Ducolox suppository  2/7- one BM     O-L Flap Text: The defect edges were debeveled with a #15 scalpel blade.  Given the location of the defect, shape of the defect and the proximity to free margins an O-L flap was deemed most appropriate.  Using a sterile surgical marker, an appropriate advancement flap was drawn incorporating the defect and placing the expected incisions within the relaxed skin tension lines where possible.    The area thus outlined was incised deep to adipose tissue with a #15 scalpel blade.  The skin margins were undermined to an appropriate distance in all directions utilizing iris scissors.

## 2024-02-07 NOTE — PT/OT/SLP PROGRESS
Occupational Therapy   Co-Treatment    Name: Rebel Rodriguez  MRN: [id number]  Admitting Diagnosis:  Cervical myelopathy  4 Days Post-Op    Recommendations:     Discharge Recommendations: High Intensity Therapy  Discharge Equipment Recommendations:  bedside commode, bath bench, walker, rolling  Barriers to discharge:  None    Assessment:     Rebel Rodriguez is a 54 y.o. male with a medical diagnosis of Cervical myelopathy.  Performance deficits affecting function are weakness, impaired endurance, impaired self care skills, impaired functional mobility, gait instability, impaired balance, decreased upper extremity function, decreased lower extremity function, decreased safety awareness. Pt agreeable to therapy and tolerated well. Pt remains highly motivated for therapy and with notable gains in functional mobility and activity tolerance at this date. After performing toileting on standard toilet and grooming in stance at sink, pt noted with increased fatigue. O2 monitored with pt in chair and O2 at 100%. VSS and monitored throughout session. Pt noted with increased secretions throughout session. RN notified. Pt remains limited in ADLs, functional mobility, and functional transfers. Patient has demonstrated sufficient progression to warrant high intensity therapy evidenced by objectives noted below.      Co-treat performed due to acuity and complexity of pt's medical status with the expectation 2 skilled disciplines needed to optimize pts occupational performance and  decreased activity tolerance.     Rehab Prognosis:  Good; patient would benefit from acute skilled OT services to address these deficits and reach maximum level of function.       Plan:     Patient to be seen 4 x/week to address the above listed problems via self-care/home management, therapeutic exercises, therapeutic activities, neuromuscular re-education  Plan of Care Expires: 02/29/24  Plan of Care Reviewed with: patient, spouse    Subjective     Chief  Complaint: Coughing and secretions  Patient/Family Comments/goals: Pt unable to verbalize but able to follow commands and respond with gestures and mouthing words  Pain/Comfort:  Pain Rating 1:  (none reported)    Objective:     Communicated with: RN prior to session.  Patient found HOB elevated with peripheral IV, DELFIN drain, SCD, telemetry, Tracheostomy, PEG Tube, oxygen upon OT entry to room.    General Precautions: Standard, fall, NPO    Orthopedic Precautions:N/A  Braces: N/A  Respiratory Status: Tracheostomy Collar, flow 5 L/min, concentration 28%     Occupational Performance:     Bed Mobility:    Patient completed Supine to Sit with minimum assistance HOB elevated. Assist at trunk    Functional Mobility/Transfers:  Patient completed Sit <> Stand Transfer with contact guard assistance and minimum assistance  with  rolling walker   1st performed CGA w/ RW from bed. 2nd performed from standard toilet with rails CGA RW. 3rd performed bedside chair Min A no AD   Patient completed Bed <> Chair Transfer using Step Transfer technique with minimum assistance with rolling walker  Patient completed Toilet Transfer from standard toilet Step Transfer technique with contact guard assistance with  rolling walker  Functional Mobility: Pt engaged in functional mobility throughout hospital room, bathroom, and hallway with chair follow with RW and  CGA-Min A to maximize functional endurance and standing balance required for home/community mobility and occupational engagement.   After performing toilet transfer, pt noted with 1 instance of LOB using RW and required Min A to regain balance. Pt reported fatigue after performing oral care at sink and required rest break sitting in chair     Activities of Daily Living:  Grooming: contact guard assistance oral hygiene in stance at sink in RW  Toileting: supervision Pt urinated sitting on standard toilet. Perineal care performed sitting on toilet       [facility] 6 Click ADL: 16    Treatment  & Education:  -Education on energy conservation and task modification to maximize safety and (I) during ADLs and mobility  -Education on importance of OOB activity to improve overall activity tolerance and promote recovery  -Pt educated to call for assistance and to transfer with hospital staff only  -Provided education regarding role of OT, POC, & discharge recommendations with pt and pt's wife verbalizing understanding.  Pt had no further questions & when asked whether there were any concerns pt reported none.      Patient left up in chair with all lines intact, call button in reach, chair alarm on, RN notified, and wife present    GOALS:   Multidisciplinary Problems       Occupational Therapy Goals          Problem: Occupational Therapy    Goal Priority Disciplines Outcome Interventions   Occupational Therapy Goal     OT, PT/OT Ongoing, Progressing    Description: Goals to be met by: 2/29/24     Patient will increase functional independence with ADLs by performing:    UE Dressing with [place].  LE Dressing with [place].  Grooming while standing with [place].  Toileting from toilet with [place] for hygiene and clothing management.   Stand pivot transfers with [place].  Step transfer with [place]  Toilet transfer to toilet with [place].                         Time Tracking:     OT Date of Treatment: 02/07/24  OT Start Time: 1027  OT Stop Time: 1101  OT Total Time (min): 34 min    Billable Minutes:Self Care/Home Management 34 min    OT/LUCY: OT          2/7/2024

## 2024-02-07 NOTE — PLAN OF CARE
Problem: Adult Inpatient Plan of Care  Goal: Plan of Care Review  Outcome: Ongoing, Progressing  Goal: Patient-Specific Goal (Individualized)  Outcome: Ongoing, Progressing  Goal: Absence of Hospital-Acquired Illness or Injury  Outcome: Ongoing, Progressing  Goal: Optimal Comfort and Wellbeing  Outcome: Ongoing, Progressing  Goal: Readiness for Transition of Care  Outcome: Ongoing, Progressing     POC reviewed with the patient and they verbalized understanding. All comments and concerns addressed. Bed locked in lowest position with bed alarm set, call light within reach. Safety precautions maintained. VSS, see flowsheets. No events this shift. Will continue to monitor for changes to POC and clinical condition.

## 2024-02-07 NOTE — ASSESSMENT & PLAN NOTE
Mr Rodriguez is a 55 yo male who is s/p C3-4 ACDF surgery with post op dysphagia, odynophagia, neck pain, swelling and crepitus. CT and scope demonstrates DELFIN drain in the hypopharynx. Patient with difficulty with swallowing secretions. No respiratory compromise. He is s/p neck exploration and pharyngeal repair on 1/30. Discussed with patient's mother and sister at bedside the recommendation for open G tube and tracheostomy to allow adequate time for pharynx to heal.     Now s/p trach and open G tube on 2/3/24.    - Strict NPO, NPO for 4-6 weeks minimum  - ID consulted for abx recommendations in setting of hardware with pharyngeal injury    - Currently off all abx, s/p vanc and unasyn   - s/p tracheostomy, with 6-0 cuffed shiley in place    - plan for trach change tomorrow   - R neck drain to stay in place until removed by ENT   - Rest of care for per primary  - Please page ENT with questions or concerns.

## 2024-02-07 NOTE — RESPIRATORY THERAPY
"RAPID RESPONSE RESPIRATORY THERAPY PROACTIVE NOTE           Time of visit: 0850     Code Status: Full Code   : 1970  Bed: 930/930 A:   MRN: [id number]  Time spent at the bedside: < 15 min    SITUATION    Evaluated patient for: LDA Check     BACKGROUND    Patient has a past medical history of Achilles tendon rupture, Achilles tendon rupture, Allergy, Anemia, Anxiety, Arthritis, Degenerative disc disease, Drug-induced lupus erythematosus, Drug-induced lupus erythematosus, Hyperlipidemia, Inguinal lymphadenopathy, Kidney stone, Medication monitoring encounter, Neck pain, Psoriatic arthritis, Psoriatic arthritis, Recurrent fever, Recurrent nephrolithiasis, Sinusitis, Stroke, TIA (transient ischemic attack), Ulcer, and Unexplained night sweats.  Clinically Significant Surgical Hx: tracheostomy    24 Hours Vitals Range:  Temp:  [98.5 °F (36.9 °C)-98.9 °F (37.2 °C)]   Pulse:  [61-86]   Resp:  [15-19]   BP: ()/(51-59)   SpO2:  [96 %-99 %]     Labs:    Recent Labs     24  0049 24  0425 24  0300    140 138   K 3.8 3.7 3.9    101 103   CO2 25 27 24   BUN 15 15 17   CREATININE 0.8 0.8 0.8   GLU 85 89 89   PHOS 3.2 3.9 3.5   MG 1.7 1.7 1.7        No results for input(s): "PH", "PCO2", "PO2", "HCO3", "POCSATURATED", "BE" in the last 72 hours.    ASSESSMENT/INTERVENTIONS  Pt resting in bed with family at the bedside. All supplies at the bedside. No concerns at this time      Last VS   Temp: 98.6 °F (37 °C) (725)  Pulse: 61 (813)  Resp: 17 (845)  BP: 99/54 (725)  SpO2: 96 % (813)      Extra trachs at bedside: 46  Level of Consciousness: Level of Consciousness (AVPU): alert  Respiratory Effort: Respiratory Effort: Unlabored Expansion/Accessory Muscle Usage: Expansion/Accessory Muscles/Retractions: expansion symmetric, no retractions, no use of accessory muscles  All Lung Field Breath Sounds: All Lung Fields Breath Sounds: Anterior:, Lateral:, coarse  O2 " Device/Concentration: 5l 28%  Surgical airway: Yes, Type: Shiley Size: 6, cuffed  Ambu at bedside:       Active Orders   Respiratory Care    Inhalation Treatment Q6H     Frequency: Q6H     Number of Occurrences: Until Specified    Oxygen Continuous     Frequency: Continuous     Number of Occurrences: Until Specified     Order Questions:      Device type: Low flow      Device: Trach Collar      Titrate O2 per Oxygen Titration Protocol: Yes      To maintain SpO2 goal of: >= 90%      Notify MD of: Inability to achieve desired SpO2; Sudden change in patient status and requires 20% increase in FiO2; Patient requires >60% FiO2    Pulse Oximetry Q4H     Frequency: Q4H     Number of Occurrences: Until Specified    Routine tracheostomy care     Frequency: Q4H     Number of Occurrences: Until Specified       RECOMMENDATIONS    We recommend: RRT Recs: Continue POC per primary team.      FOLLOW-UP    Please call back the Rapid Response RT, Mone Miller, RRT at x [phone] for any questions or concerns.

## 2024-02-07 NOTE — SUBJECTIVE & OBJECTIVE
Interval History: NAEON. Lying in bed, no acute distress. Exam stable.     Medications:  Continuous Infusions:   sodium chloride 0.9% Stopped (02/03/24 0801)    dextrose 10 % in water (D10W)       Scheduled Meds:   albuterol-ipratropium  3 mL Nebulization Q6H    atorvastatin  10 mg Per NG tube Daily    FLUoxetine  40 mg Per NG tube Daily    heparin (porcine)  5,000 Units Subcutaneous Q8H    mirtazapine  15 mg Per NG tube QHS    polyethylene glycol  17 g Per NG tube BID    QUEtiapine  50 mg Per NG tube Q12H    senna-docusate 8.6-50 mg  2 tablet Per NG tube BID     PRN Meds:acetaminophen, bisacodyL, dextrose 10 % in water (D10W), dextrose 10%, dextrose 10%, glucagon (human recombinant), HYDROmorphone, hydrOXYzine HCL, insulin aspart U-100, ondansetron, oxyCODONE, prochlorperazine     Review of Systems  Objective:     Weight: 74.9 kg (165 lb 2 oz)  Body mass index is 25.11 kg/m².  Vital Signs (Most Recent):  Temp: 99.1 °F (37.3 °C) (02/07/24 1636)  Pulse: 74 (02/07/24 1636)  Resp: 15 (02/07/24 1636)  BP: (!) 104/55 (02/07/24 1636)  SpO2: 100 % (02/07/24 1636) Vital Signs (24h Range):  Temp:  [98.5 °F (36.9 °C)-99.1 °F (37.3 °C)] 99.1 °F (37.3 °C)  Pulse:  [61-86] 74  Resp:  [15-19] 15  SpO2:  [95 %-100 %] 100 %  BP: ()/(53-56) 104/55     Date 02/07/24 0700 - 02/08/24 0659   Shift 1538-4763 2072-3304 3644-5525 24 Hour Total   INTAKE   Shift Total(mL/kg)       OUTPUT   Drains 0   0   Shift Total(mL/kg) 0(0)   0(0)   Weight (kg) 74.9 74.9 74.9 74.9              Oxygen Concentration (%):  [28] 28             Closed/Suction Drain 01/30/24 1916 Right;Ventral Neck Bulb 15 Fr. (Active)   Site Description Healing 02/05/24 2300   Dressing Type Transparent (Tegaderm) 02/05/24 2300   Dressing Status Clean;Dry;Intact 02/05/24 2300   Dressing Intervention Integrity maintained 02/05/24 2300   Drainage Serosanguineous 02/05/24 1902   Status To bulb suction 02/05/24 2300   Output (mL) 0 mL 02/05/24 1805             Gastrostomy/Enterostomy 02/03/24 0934 Gastrostomy tube w/ balloon LUQ (Active)   Securement secured to abdomen 02/05/24 1902   Interventions Prior to Feeding patency checked 02/05/24 1902   Suction Setting/Drainage Method dependent drainage 02/05/24 0501   Drainage brown 02/04/24 1701   Feeding Type continuous;by pump 02/05/24 1902   Clamp Status/Tolerance unclamped 02/05/24 1902   Feeding Action feeding continued 02/05/24 1902   Dressing no dressing 02/05/24 1505   Insertion Site no swelling;dry 02/05/24 0501   Intake (mL) 45 mL 02/05/24 0601   Tube Output(mL)(Include Discarded Residual) 300 mL 02/04/24 0542   Tube Feeding Intake (mL) 45 02/05/24 2102       Male External Urinary Catheter 01/30/24 2000 (Active)   Collection Container Urimeter 02/05/24 1902   Securement Method secured to top of thigh w/ adhesive device 02/05/24 1902   Skin no redness;no breakdown 02/05/24 1902   Tolerance no signs/symptoms of discomfort 02/05/24 1902   Output (mL) 800 mL 02/05/24 1805   Catheter Change Date 02/03/24 02/04/24 0502   Catheter Change Time 1941 02/04/24 0502          Physical Exam         Neurosurgery Physical Exam    General: well developed, well nourished, no distress.   Head: normocephalic, atraumatic  Neurologic: Alert and oriented. Thought content appropriate.  GCS: Motor: 6/Verbal: 5/Eyes: 4 GCS Total: 15  Mental Status: Awake, Alert, Oriented x 4  Cranial nerves: face symmetric, tongue midline, CN II-XII grossly intact, EOMI.   Sensory: intact to light touch throughout  Motor Strength: Moves all extremities spontaneously with good tone.  Full strength upper and lower extremities. No abnormal movements seen.     Strength  Deltoids Triceps Biceps Wrist Extension Wrist Flexion Hand    Upper: R 5/5 5/5 5/5 5/5 5/5 5/5    L 5/5 5/5 5/5 5/5 5/5 5/5     Iliopsoas Quadriceps Knee  Flexion Tibialis  anterior Gastro- cnemius EHL   Lower: R 5/5 5/5 5/5 5/5 5/5 5/5    L 5/5 5/5 5/5 5/5 5/5 5/5     Skin: Skin is warm, dry  "and intact.  Incision c/d/I with skin edges well approximated with staples.     Significant Labs:  Recent Labs   Lab 02/06/24  0425 02/07/24  0300   GLU 89 89    138   K 3.7 3.9    103   CO2 27 24   BUN 15 17   CREATININE 0.8 0.8   CALCIUM 9.4 9.4   MG 1.7 1.7       Recent Labs   Lab 02/06/24  0425 02/07/24  0300   WBC 16.57* 14.58*   HGB 11.8* 9.9*   HCT 36.4* 30.1*    257       No results for input(s): "LABPT", "INR", "APTT" in the last 48 hours.  Microbiology Results (last 7 days)       ** No results found for the last 168 hours. **          Recent Lab Results         02/07/24  1229   02/07/24  0728   02/07/24  0300        Albumin     2.5       ALP     66       ALT     25       Anion Gap     11       AST     24       Baso #     0.04       Basophil %     0.3       BILIRUBIN TOTAL     0.6  Comment: For infants and newborns, interpretation of results should be based  on gestational age, weight and in agreement with clinical  observations.    Premature Infant recommended reference ranges:  Up to 24 hours.............<8.0 mg/dL  Up to 48 hours............<12.0 mg/dL  3-5 days..................<15.0 mg/dL  6-29 days.................<15.0 mg/dL         BUN     17       Calcium     9.4       Chloride     103       CO2     24       Creatinine     0.8       Differential Method     Automated       eGFR     >60.0       Eos #     0.2       Eos %     1.2       Glucose     89       Gran # (ANC)     11.1       Gran %     75.9       Hematocrit     30.1       Hemoglobin     9.9       Immature Grans (Abs)     0.08  Comment: Mild elevation in immature granulocytes is non specific and   can be seen in a variety of conditions including stress response,   acute inflammation, trauma and pregnancy. Correlation with other   laboratory and clinical findings is essential.         Immature Granulocytes     0.5       Lymph #     1.8       Lymph %     12.6       Magnesium      1.7       MCH     28.6       MCHC     32.9       " MCV     87       Mono #     1.4       Mono %     9.5       MPV     10.4       nRBC     0       Phosphorus Level     3.5       Platelet Count     257       POCT Glucose 96   122         Potassium     3.9       PROTEIN TOTAL     5.9       RBC     3.46       RDW     14.0       Sodium     138       WBC     14.58             All pertinent labs from the last 24 hours have been reviewed.    Significant Diagnostics:  I have reviewed all pertinent imaging results/findings within the past 24 hours.

## 2024-02-07 NOTE — ASSESSMENT & PLAN NOTE
Patient noted to have a percutaneous endoscopic gastrostomy tube in place. I have personally inspected the tube.Tube was placed on this admission, with date of procedure- 2/2  There are no signs of drainage or infection around the site. The tube is patent. Medications have not converted to liquid form if available.  Routine care to be done by wound care and nursing staff.      Isossource @ 35 cc/ h  Peptomen 1.5 w bio 45cc/ h  Water flushes    2/7 advance TF as tolerated to goal

## 2024-02-08 LAB
ALBUMIN SERPL BCP-MCNC: 2.7 G/DL (ref 3.5–5.2)
ALP SERPL-CCNC: 72 U/L (ref 55–135)
ALT SERPL W/O P-5'-P-CCNC: 23 U/L (ref 10–44)
ANION GAP SERPL CALC-SCNC: 11 MMOL/L (ref 8–16)
AST SERPL-CCNC: 23 U/L (ref 10–40)
BASOPHILS # BLD AUTO: 0.05 K/UL (ref 0–0.2)
BASOPHILS NFR BLD: 0.4 % (ref 0–1.9)
BILIRUB SERPL-MCNC: 0.5 MG/DL (ref 0.1–1)
BUN SERPL-MCNC: 17 MG/DL (ref 6–20)
CALCIUM SERPL-MCNC: 9.5 MG/DL (ref 8.7–10.5)
CHLORIDE SERPL-SCNC: 103 MMOL/L (ref 95–110)
CO2 SERPL-SCNC: 24 MMOL/L (ref 23–29)
CREAT SERPL-MCNC: 0.8 MG/DL (ref 0.5–1.4)
DIFFERENTIAL METHOD BLD: ABNORMAL
EOSINOPHIL # BLD AUTO: 0.2 K/UL (ref 0–0.5)
EOSINOPHIL NFR BLD: 1.3 % (ref 0–8)
ERYTHROCYTE [DISTWIDTH] IN BLOOD BY AUTOMATED COUNT: 13.8 % (ref 11.5–14.5)
EST. GFR  (NO RACE VARIABLE): >60 ML/MIN/1.73 M^2
GLUCOSE SERPL-MCNC: 89 MG/DL (ref 70–110)
HCT VFR BLD AUTO: 31.2 % (ref 40–54)
HGB BLD-MCNC: 10.1 G/DL (ref 14–18)
IMM GRANULOCYTES # BLD AUTO: 0.09 K/UL (ref 0–0.04)
IMM GRANULOCYTES NFR BLD AUTO: 0.7 % (ref 0–0.5)
LYMPHOCYTES # BLD AUTO: 1.6 K/UL (ref 1–4.8)
LYMPHOCYTES NFR BLD: 12.2 % (ref 18–48)
MAGNESIUM SERPL-MCNC: 1.9 MG/DL (ref 1.6–2.6)
MCH RBC QN AUTO: 28 PG (ref 27–31)
MCHC RBC AUTO-ENTMCNC: 32.4 G/DL (ref 32–36)
MCV RBC AUTO: 86 FL (ref 82–98)
MONOCYTES # BLD AUTO: 1.2 K/UL (ref 0.3–1)
MONOCYTES NFR BLD: 9.1 % (ref 4–15)
NEUTROPHILS # BLD AUTO: 10.2 K/UL (ref 1.8–7.7)
NEUTROPHILS NFR BLD: 76.3 % (ref 38–73)
NRBC BLD-RTO: 0 /100 WBC
PHOSPHATE SERPL-MCNC: 3.7 MG/DL (ref 2.7–4.5)
PLATELET # BLD AUTO: 283 K/UL (ref 150–450)
PMV BLD AUTO: 10.1 FL (ref 9.2–12.9)
POTASSIUM SERPL-SCNC: 4.1 MMOL/L (ref 3.5–5.1)
PROT SERPL-MCNC: 6.3 G/DL (ref 6–8.4)
RBC # BLD AUTO: 3.61 M/UL (ref 4.6–6.2)
SODIUM SERPL-SCNC: 138 MMOL/L (ref 136–145)
WBC # BLD AUTO: 13.37 K/UL (ref 3.9–12.7)

## 2024-02-08 PROCEDURE — 99900035 HC TECH TIME PER 15 MIN (STAT)

## 2024-02-08 PROCEDURE — 20600001 HC STEP DOWN PRIVATE ROOM

## 2024-02-08 PROCEDURE — 25000003 PHARM REV CODE 250: Performed by: PSYCHIATRY & NEUROLOGY

## 2024-02-08 PROCEDURE — 99024 POSTOP FOLLOW-UP VISIT: CPT | Mod: ,,, | Performed by: PHYSICIAN ASSISTANT

## 2024-02-08 PROCEDURE — 63600175 PHARM REV CODE 636 W HCPCS: Performed by: NURSE PRACTITIONER

## 2024-02-08 PROCEDURE — 83735 ASSAY OF MAGNESIUM: CPT

## 2024-02-08 PROCEDURE — 94640 AIRWAY INHALATION TREATMENT: CPT

## 2024-02-08 PROCEDURE — 0B21XFZ CHANGE TRACHEOSTOMY DEVICE IN TRACHEA, EXTERNAL APPROACH: ICD-10-PCS | Performed by: OTOLARYNGOLOGY

## 2024-02-08 PROCEDURE — 36415 COLL VENOUS BLD VENIPUNCTURE: CPT

## 2024-02-08 PROCEDURE — 97112 NEUROMUSCULAR REEDUCATION: CPT

## 2024-02-08 PROCEDURE — 94761 N-INVAS EAR/PLS OXIMETRY MLT: CPT

## 2024-02-08 PROCEDURE — 27000221 HC OXYGEN, UP TO 24 HOURS

## 2024-02-08 PROCEDURE — 25000003 PHARM REV CODE 250: Performed by: NURSE PRACTITIONER

## 2024-02-08 PROCEDURE — 25000242 PHARM REV CODE 250 ALT 637 W/ HCPCS: Performed by: NURSE PRACTITIONER

## 2024-02-08 PROCEDURE — 99900026 HC AIRWAY MAINTENANCE (STAT)

## 2024-02-08 PROCEDURE — 80053 COMPREHEN METABOLIC PANEL: CPT

## 2024-02-08 PROCEDURE — 84100 ASSAY OF PHOSPHORUS: CPT

## 2024-02-08 PROCEDURE — 85025 COMPLETE CBC W/AUTO DIFF WBC: CPT

## 2024-02-08 PROCEDURE — 25000003 PHARM REV CODE 250

## 2024-02-08 RX ADMIN — FLUOXETINE HYDROCHLORIDE 40 MG: 20 CAPSULE ORAL at 08:02

## 2024-02-08 RX ADMIN — IPRATROPIUM BROMIDE AND ALBUTEROL SULFATE 3 ML: .5; 3 SOLUTION RESPIRATORY (INHALATION) at 12:02

## 2024-02-08 RX ADMIN — IPRATROPIUM BROMIDE AND ALBUTEROL SULFATE 3 ML: .5; 3 SOLUTION RESPIRATORY (INHALATION) at 01:02

## 2024-02-08 RX ADMIN — ATORVASTATIN CALCIUM 10 MG: 10 TABLET, FILM COATED ORAL at 08:02

## 2024-02-08 RX ADMIN — IPRATROPIUM BROMIDE AND ALBUTEROL SULFATE 3 ML: .5; 3 SOLUTION RESPIRATORY (INHALATION) at 08:02

## 2024-02-08 RX ADMIN — HEPARIN SODIUM 5000 UNITS: 5000 INJECTION INTRAVENOUS; SUBCUTANEOUS at 03:02

## 2024-02-08 RX ADMIN — MIRTAZAPINE 15 MG: 7.5 TABLET, FILM COATED ORAL at 08:02

## 2024-02-08 RX ADMIN — SENNOSIDES AND DOCUSATE SODIUM 2 TABLET: 8.6; 5 TABLET ORAL at 08:02

## 2024-02-08 RX ADMIN — HEPARIN SODIUM 5000 UNITS: 5000 INJECTION INTRAVENOUS; SUBCUTANEOUS at 09:02

## 2024-02-08 RX ADMIN — HEPARIN SODIUM 5000 UNITS: 5000 INJECTION INTRAVENOUS; SUBCUTANEOUS at 06:02

## 2024-02-08 RX ADMIN — POLYETHYLENE GLYCOL 3350 17 GRAM ORAL POWDER PACKET 17 G: at 08:02

## 2024-02-08 RX ADMIN — IPRATROPIUM BROMIDE AND ALBUTEROL SULFATE 3 ML: .5; 3 SOLUTION RESPIRATORY (INHALATION) at 07:02

## 2024-02-08 NOTE — PROGRESS NOTES
[facility] - Neurosurgery ([facility]  Neurosurgery  Progress Note    Subjective:     History of Present Illness: Rebel Rodriguez is a 53 y.o. male with hx of psoriatic arthritis, hx TIA on Aspirin 81 mg, s/p C4-7 ACDF with Dr. Huff 10 years ago who presents for evaluation of gait imbalance. Last seen 1 year ago for neck pain and radicular pain into the left shoulder. He underwent C1-2 KENRICK with moderate relief of pain. Reports rapid functional decline over the last 3 weeks. Endorses hand clumsiness, difficulty typing, gait imbalance, difficulty butoning, dropping items, falls. Difficulty with ambulating also due to LLE weakness. States it feels like he has rubber bands around his wrists. Reports difficulty with overhead mobility and shock-like pains down spine when raising arms overhead. Denies b/b incontinence.     Post-Op Info:  Procedure(s) (LRB):  LAPAROTOMY with gastrostomy tube placement (N/A)  LARYNGOSCOPY, DIRECT  TRACHEOTOMY   5 Days Post-Op   Interval History: NAEON. Sitting up in chair, no acute distress. Exam stable. C/o intermittent hallucinations, seroquel and oxycodone held today per patient request.     Medications:  Continuous Infusions:   sodium chloride 0.9% Stopped (02/03/24 0801)    dextrose 10 % in water (D10W)       Scheduled Meds:   albuterol-ipratropium  3 mL Nebulization Q6H    atorvastatin  10 mg Per NG tube Daily    FLUoxetine  40 mg Per NG tube Daily    heparin (porcine)  5,000 Units Subcutaneous Q8H    mirtazapine  15 mg Per NG tube QHS    polyethylene glycol  17 g Per NG tube BID    QUEtiapine  50 mg Per NG tube Q12H    senna-docusate 8.6-50 mg  2 tablet Per NG tube BID     PRN Meds:acetaminophen, bisacodyL, dextrose 10 % in water (D10W), dextrose 10%, dextrose 10%, glucagon (human recombinant), HYDROmorphone, hydrOXYzine HCL, insulin aspart U-100, ondansetron, oxyCODONE, prochlorperazine     Review of Systems  Objective:     Weight: 74.9 kg (165 lb 2 oz)  Body mass index is 25.11  kg/m².  Vital Signs (Most Recent):  Temp: 99.5 °F (37.5 °C) (02/08/24 1600)  Pulse: 82 (02/08/24 1600)  Resp: 18 (02/08/24 1600)  BP: (!) 91/56 (02/08/24 1600)  SpO2: 99 % (02/08/24 1600) Vital Signs (24h Range):  Temp:  [97.9 °F (36.6 °C)-99.5 °F (37.5 °C)] 99.5 °F (37.5 °C)  Pulse:  [69-89] 82  Resp:  [15-20] 18  SpO2:  [96 %-100 %] 99 %  BP: ()/(51-59) 91/56     Date 02/08/24 0700 - 02/09/24 0659   Shift 7470-9695 6534-6338 3665-9791 24 Hour Total   INTAKE   NG/   300   Shift Total(mL/kg) 300(4)   300(4)   OUTPUT   Shift Total(mL/kg)       Weight (kg) 74.9 74.9 74.9 74.9                Oxygen Concentration (%):  [28] 28             Closed/Suction Drain 01/30/24 1916 Right;Ventral Neck Bulb 15 Fr. (Active)   Site Description Healing 02/05/24 2300   Dressing Type Transparent (Tegaderm) 02/05/24 2300   Dressing Status Clean;Dry;Intact 02/05/24 2300   Dressing Intervention Integrity maintained 02/05/24 2300   Drainage Serosanguineous 02/05/24 1902   Status To bulb suction 02/05/24 2300   Output (mL) 0 mL 02/05/24 1805            Gastrostomy/Enterostomy 02/03/24 0934 Gastrostomy tube w/ balloon LUQ (Active)   Securement secured to abdomen 02/05/24 1902   Interventions Prior to Feeding patency checked 02/05/24 1902   Suction Setting/Drainage Method dependent drainage 02/05/24 0501   Drainage brown 02/04/24 1701   Feeding Type continuous;by pump 02/05/24 1902   Clamp Status/Tolerance unclamped 02/05/24 1902   Feeding Action feeding continued 02/05/24 1902   Dressing no dressing 02/05/24 1505   Insertion Site no swelling;dry 02/05/24 0501   Intake (mL) 45 mL 02/05/24 0601   Tube Output(mL)(Include Discarded Residual) 300 mL 02/04/24 0542   Tube Feeding Intake (mL) 45 02/05/24 2102       Male External Urinary Catheter 01/30/24 2000 (Active)   Collection Container Urimeter 02/05/24 1902   Securement Method secured to top of thigh w/ adhesive device 02/05/24 1902   Skin no redness;no breakdown 02/05/24 1902  "  Tolerance no signs/symptoms of discomfort 02/05/24 1902   Output (mL) 800 mL 02/05/24 1805   Catheter Change Date 02/03/24 02/04/24 0502   Catheter Change Time 1941 02/04/24 0502         Physical Exam         Neurosurgery Physical Exam    General: well developed, well nourished, no distress.   Head: normocephalic, atraumatic  Neurologic: Alert and oriented. Thought content appropriate.  GCS: Motor: 6/Verbal: 5/Eyes: 4 GCS Total: 15  Mental Status: Awake, Alert, Oriented x 4  Cranial nerves: face symmetric, tongue midline, CN II-XII grossly intact, EOMI.   Sensory: intact to light touch throughout  Motor Strength: Moves all extremities spontaneously with good tone.  Full strength upper and lower extremities. No abnormal movements seen.     Strength  Deltoids Triceps Biceps Wrist Extension Wrist Flexion Hand    Upper: R 5/5 5/5 5/5 5/5 5/5 5/5    L 5/5 5/5 5/5 5/5 5/5 5/5     Iliopsoas Quadriceps Knee  Flexion Tibialis  anterior Gastro- cnemius EHL   Lower: R 5/5 5/5 5/5 5/5 5/5 5/5    L 5/5 5/5 5/5 5/5 5/5 5/5     Skin: Skin is warm, dry and intact.  Incision c/d/I with skin edges well approximated.    Significant Labs:  Recent Labs   Lab 02/07/24  0300 02/08/24  0415   GLU 89 89    138   K 3.9 4.1    103   CO2 24 24   BUN 17 17   CREATININE 0.8 0.8   CALCIUM 9.4 9.5   MG 1.7 1.9       Recent Labs   Lab 02/07/24  0300 02/08/24  0415   WBC 14.58* 13.37*   HGB 9.9* 10.1*   HCT 30.1* 31.2*    283       No results for input(s): "LABPT", "INR", "APTT" in the last 48 hours.  Microbiology Results (last 7 days)       ** No results found for the last 168 hours. **          Recent Lab Results         02/08/24  0415        Albumin 2.7       ALP 72       ALT 23       Anion Gap 11       AST 23       Baso # 0.05       Basophil % 0.4       BILIRUBIN TOTAL 0.5  Comment: For infants and newborns, interpretation of results should be based  on gestational age, weight and in agreement with " clinical  observations.    Premature Infant recommended reference ranges:  Up to 24 hours.............<8.0 mg/dL  Up to 48 hours............<12.0 mg/dL  3-5 days..................<15.0 mg/dL  6-29 days.................<15.0 mg/dL         BUN 17       Calcium 9.5       Chloride 103       CO2 24       Creatinine 0.8       Differential Method Automated       eGFR >60.0       Eos # 0.2       Eos % 1.3       Glucose 89       Gran # (ANC) 10.2       Gran % 76.3       Hematocrit 31.2       Hemoglobin 10.1       Immature Grans (Abs) 0.09  Comment: Mild elevation in immature granulocytes is non specific and   can be seen in a variety of conditions including stress response,   acute inflammation, trauma and pregnancy. Correlation with other   laboratory and clinical findings is essential.         Immature Granulocytes 0.7       Lymph # 1.6       Lymph % 12.2       Magnesium  1.9       MCH 28.0       MCHC 32.4       MCV 86       Mono # 1.2       Mono % 9.1       MPV 10.1       nRBC 0       Phosphorus Level 3.7       Platelet Count 283       Potassium 4.1       PROTEIN TOTAL 6.3       RBC 3.61       RDW 13.8       Sodium 138       WBC 13.37             All pertinent labs from the last 24 hours have been reviewed.    Significant Diagnostics:  I have reviewed all pertinent imaging results/findings within the past 24 hours.  Assessment/Plan:     * Cervical myelopathy  Rebel Rodriguez  Is a 54 y.o. male with cervical myelopathy and prior ACDF C4 C7 with adjacent level 3 4 who presented for elective C3-4 ACDF on 01/30, which complicated by retropharyngeal injury required repair by ENT. Now s/p trach/G tube on 2/3.      - Neuro exam stable, q4h neuro checks  - Continue multimodal pain control   - S/p dex x3d  - ID consulted for abx recs given pharyngeal injury, s/p course of unasyn  - Cervical drain by ENT   - S/p trach/g tube - npo 4-6 wks minimum per ENT. TF per primary team/nutrition  - SubQ heparin for DVT prophylaxis  - Postop XR  with satisfactory placement of hardware  - Notify with acute changes in exam. Will continue to follow for post-op needs. Discussed care plan with patient and family at bedside, all questions answered.  - Discussed with Dr. Ashwini Torres PA-C  Neurosurgery  [facility] - Neurosurgery ([facility])

## 2024-02-08 NOTE — ASSESSMENT & PLAN NOTE
Rebel Rodriguez  Is a 54 y.o. male with cervical myelopathy and prior ACDF C4 C7 with adjacent level 3 4 who presented for elective C3-4 ACDF on 01/30, which complicated by retropharyngeal injury required repair by ENT. Now s/p trach/G tube on 2/3.      - Neuro exam stable, q4h neuro checks  - Continue multimodal pain control   - S/p dex x3d  - ID consulted for abx recs given pharyngeal injury, s/p course of unasyn  - Cervical drain by ENT   - S/p trach/g tube - npo 4-6 wks minimum per ENT. TF per primary team/nutrition  - SubQ heparin for DVT prophylaxis  - Postop XR with satisfactory placement of hardware  - Notify with acute changes in exam. Will continue to follow for post-op needs. Discussed care plan with patient and family at bedside, all questions answered.  - Discussed with Dr. Maradiaga

## 2024-02-08 NOTE — PLAN OF CARE
Recommendations    Continue TF with Peptamen 1. 5 w/ prebio @ 45ml/hr to provide 1620 kcal, 73g protein, 832ml FW.   Rec'd additional 200ml FWF QID or per MD  If bolus TF warranted: Peptamen 1.5 w/ prebio x 4.5 cartons per day to provide 1687 kcal, 77 g protein, 864 ml FW    RD to monitor and follow    Goals: Meet % EEN, EPN by RD f/u date  Nutrition Goal Status: goal met  Communication of RD Recs:  (POC)

## 2024-02-08 NOTE — SUBJECTIVE & OBJECTIVE
Interval History:   Sister at bedside reporting patient has been having hallucinations and is somewhat delirious.     Sister also reports that ideally they would like him to go to an IPR, but they did not like any of the options that were reportedly in the patient's network.     Medications:  Continuous Infusions:   sodium chloride 0.9% Stopped (02/03/24 0801)    dextrose 10 % in water (D10W)       Scheduled Meds:   albuterol-ipratropium  3 mL Nebulization Q6H    atorvastatin  10 mg Per NG tube Daily    FLUoxetine  40 mg Per NG tube Daily    heparin (porcine)  5,000 Units Subcutaneous Q8H    mirtazapine  15 mg Per NG tube QHS    polyethylene glycol  17 g Per NG tube BID    QUEtiapine  50 mg Per NG tube Q12H    senna-docusate 8.6-50 mg  2 tablet Per NG tube BID     PRN Meds:acetaminophen, bisacodyL, dextrose 10 % in water (D10W), dextrose 10%, dextrose 10%, glucagon (human recombinant), HYDROmorphone, hydrOXYzine HCL, insulin aspart U-100, ondansetron, oxyCODONE, prochlorperazine     Review of patient's allergies indicates:   Allergen Reactions    Erythromycin Nausea And Vomiting    Infliximab Other (See Comments)     Lupus with fever and acute arthritis  Lupus with fever and acute arthritis     Objective:     Vital Signs (24h Range):  Temp:  [97.9 °F (36.6 °C)-99.1 °F (37.3 °C)] 98.5 °F (36.9 °C)  Pulse:  [69-78] 69  Resp:  [15-18] 16  SpO2:  [95 %-100 %] 96 %  BP: ()/(51-57) 112/57     Date 02/08/24 0700 - 02/09/24 0659   Shift 4786-9667 2458-7318 0408-0260 24 Hour Total   INTAKE   NG/   300   Shift Total(mL/kg) 300(4)   300(4)   OUTPUT   Shift Total(mL/kg)       Weight (kg) 74.9 74.9 74.9 74.9     Lines/Drains/Airways       Drain  Duration                  Closed/Suction Drain 01/30/24 1916 Right;Ventral Neck Bulb 15 Fr. 8 days    Male External Urinary Catheter 01/30/24 2000 8 days         Gastrostomy/Enterostomy 02/03/24 0934 Gastrostomy tube w/ balloon LUQ 5 days              Airway  Duration              Adult Surgical Airway 02/03/24 1055 Shiley Uncuffed 6.0/ 75mm 5 days              Peripheral Intravenous Line  Duration                  Peripheral IV - Single Lumen 02/05/24 1915 18 G Anterior;Right Forearm 2 days                     Physical Exam  Resting in bed   6-0 cuffed trach, cuffed deflated   Sutures cut this am, secured with soft collar  Neck incision c/d/I  Drain holding suction   Tracheostomy exchanged for 6-0 cuffless      Procedure: Tracheostomy tube exchange  A routine tracheostomy tube change was performed at the bedside. Verbal consent was obtained from the patient and/or family prior to proceeding. Universal protocol was followed throughout. The patient was placed in the supine position with the neck extended. The original tracheostomy tube was examined, and the stoma appeared to be healing well with no signs of infection, bleeding, irritation, or wound breakdown. There were no signs of granulation tissue or airway irritation.     A suction catheter was used to clear the tracheostomy tube and trachea or secretions. The sutures securing the tracheostomy tube to the skin were cut, and the trach ties were undone. The balloon on the existing trach was confirmed to be deflated. The existing trach was gently removed. The tract was examined, and appeared to be healing appropriately. A new 6-0 cuffless Shiley tracheostomy tube was placed through the stoma. The tube appeared to be in good position with patient observed to be breathing comfortably. The tube was secured with Velcro ties. The patient tolerated the procedure well with no desaturations or complications. Please do not hesitate to page ENT on call with any additional questions or concerns.        Significant Labs:  CBC:   Recent Labs   Lab 02/08/24  0415   WBC 13.37*   RBC 3.61*   HGB 10.1*   HCT 31.2*      MCV 86   MCH 28.0   MCHC 32.4       Significant Diagnostics:  None

## 2024-02-08 NOTE — PROGRESS NOTES
Frantz Weber - Neurosurgery ([facility])  Otorhinolaryngology-Head & Neck Surgery  Progress Note    Subjective:     Post-Op Info:  Procedure(s) (LRB):  LAPAROTOMY with gastrostomy tube placement (N/A)  LARYNGOSCOPY, DIRECT  TRACHEOTOMY   5 Days Post-Op  Hospital Day: 10     Interval History:   Sister at bedside reporting patient has been having hallucinations and is somewhat delirious.     Sister also reports that ideally they would like him to go to an IPR, but they did not like any of the options that were reportedly in the patient's network.     Medications:  Continuous Infusions:   sodium chloride 0.9% Stopped (02/03/24 0801)    dextrose 10 % in water (D10W)       Scheduled Meds:   albuterol-ipratropium  3 mL Nebulization Q6H    atorvastatin  10 mg Per NG tube Daily    FLUoxetine  40 mg Per NG tube Daily    heparin (porcine)  5,000 Units Subcutaneous Q8H    mirtazapine  15 mg Per NG tube QHS    polyethylene glycol  17 g Per NG tube BID    QUEtiapine  50 mg Per NG tube Q12H    senna-docusate 8.6-50 mg  2 tablet Per NG tube BID     PRN Meds:acetaminophen, bisacodyL, dextrose 10 % in water (D10W), dextrose 10%, dextrose 10%, glucagon (human recombinant), HYDROmorphone, hydrOXYzine HCL, insulin aspart U-100, ondansetron, oxyCODONE, prochlorperazine     Review of patient's allergies indicates:   Allergen Reactions    Erythromycin Nausea And Vomiting    Infliximab Other (See Comments)     Lupus with fever and acute arthritis  Lupus with fever and acute arthritis     Objective:     Vital Signs (24h Range):  Temp:  [97.9 °F (36.6 °C)-99.1 °F (37.3 °C)] 98.5 °F (36.9 °C)  Pulse:  [69-78] 69  Resp:  [15-18] 16  SpO2:  [95 %-100 %] 96 %  BP: ()/(51-57) 112/57     Date 02/08/24 0700 - 02/09/24 0659   Shift 0878-9187 9862-8465 7529-6814 24 Hour Total   INTAKE   NG/   300   Shift Total(mL/kg) 300(4)   300(4)   OUTPUT   Shift Total(mL/kg)       Weight (kg) 74.9 74.9 74.9 74.9     Lines/Drains/Airways       Drain   Duration                  Closed/Suction Drain 01/30/24 1916 Right;Ventral Neck Bulb 15 Fr. 8 days    Male External Urinary Catheter 01/30/24 2000 8 days         Gastrostomy/Enterostomy 02/03/24 0934 Gastrostomy tube w/ balloon LUQ 5 days              Airway  Duration             Adult Surgical Airway 02/03/24 1055 Shiley Uncuffed 6.0/ 75mm 5 days              Peripheral Intravenous Line  Duration                  Peripheral IV - Single Lumen 02/05/24 1915 18 G Anterior;Right Forearm 2 days                     Physical Exam  Resting in bed   6-0 cuffed trach, cuffed deflated   Sutures cut this am, secured with soft collar  Neck incision c/d/I  Drain holding suction   Tracheostomy exchanged for 6-0 cuffless      Procedure: Tracheostomy tube exchange  A routine tracheostomy tube change was performed at the bedside. Verbal consent was obtained from the patient and/or family prior to proceeding. Universal protocol was followed throughout. The patient was placed in the supine position with the neck extended. The original tracheostomy tube was examined, and the stoma appeared to be healing well with no signs of infection, bleeding, irritation, or wound breakdown. There were no signs of granulation tissue or airway irritation.     A suction catheter was used to clear the tracheostomy tube and trachea or secretions. The sutures securing the tracheostomy tube to the skin were cut, and the trach ties were undone. The balloon on the existing trach was confirmed to be deflated. The existing trach was gently removed. The tract was examined, and appeared to be healing appropriately. A new 6-0 cuffless Shiley tracheostomy tube was placed through the stoma. The tube appeared to be in good position with patient observed to be breathing comfortably. The tube was secured with Velcro ties. The patient tolerated the procedure well with no desaturations or complications. Please do not hesitate to page ENT on call with any additional  questions or concerns.        Significant Labs:  CBC:   Recent Labs   Lab 02/08/24  0415   WBC 13.37*   RBC 3.61*   HGB 10.1*   HCT 31.2*      MCV 86   MCH 28.0   MCHC 32.4       Significant Diagnostics:  None  Assessment/Plan:     Injury of pharynx  Mr Rodriguze is a 53 yo male who is s/p C3-4 ACDF surgery with post op dysphagia, odynophagia, neck pain, swelling and crepitus. CT and scope demonstrates DELFIN drain in the hypopharynx. Patient with difficulty with swallowing secretions. No respiratory compromise. He is s/p neck exploration and pharyngeal repair on 1/30. Discussed with patient's mother and sister at bedside the recommendation for open G tube and tracheostomy to allow adequate time for pharynx to heal.     Now s/p trach and open G tube on 2/3/24.    - Strict NPO, NPO for 4-6 weeks minimum  - ID consulted for abx recommendations in setting of hardware with pharyngeal injury    - Currently off all abx, s/p vanc and unasyn   - s/p tracheostomy, with 6-0 cuffed shiley in place    - trach changed for 6-0 cuffless today   - plan for bedside laryngoscopy tomorrow   - R neck drain to stay in place until removed by ENT   - Rest of care for per primary  - Please page ENT with questions or concerns.        Bonita Macias MD  Otorhinolaryngology-Head & Neck Surgery  Frantz Weber - Neurosurgery ([facility])

## 2024-02-08 NOTE — SUBJECTIVE & OBJECTIVE
Interval History: NAEON. Sitting up in chair, no acute distress. Exam stable. C/o intermittent hallucinations, seroquel and oxycodone held today per patient request.     Medications:  Continuous Infusions:   sodium chloride 0.9% Stopped (02/03/24 0801)    dextrose 10 % in water (D10W)       Scheduled Meds:   albuterol-ipratropium  3 mL Nebulization Q6H    atorvastatin  10 mg Per NG tube Daily    FLUoxetine  40 mg Per NG tube Daily    heparin (porcine)  5,000 Units Subcutaneous Q8H    mirtazapine  15 mg Per NG tube QHS    polyethylene glycol  17 g Per NG tube BID    QUEtiapine  50 mg Per NG tube Q12H    senna-docusate 8.6-50 mg  2 tablet Per NG tube BID     PRN Meds:acetaminophen, bisacodyL, dextrose 10 % in water (D10W), dextrose 10%, dextrose 10%, glucagon (human recombinant), HYDROmorphone, hydrOXYzine HCL, insulin aspart U-100, ondansetron, oxyCODONE, prochlorperazine     Review of Systems  Objective:     Weight: 74.9 kg (165 lb 2 oz)  Body mass index is 25.11 kg/m².  Vital Signs (Most Recent):  Temp: 99.5 °F (37.5 °C) (02/08/24 1600)  Pulse: 82 (02/08/24 1600)  Resp: 18 (02/08/24 1600)  BP: (!) 91/56 (02/08/24 1600)  SpO2: 99 % (02/08/24 1600) Vital Signs (24h Range):  Temp:  [97.9 °F (36.6 °C)-99.5 °F (37.5 °C)] 99.5 °F (37.5 °C)  Pulse:  [69-89] 82  Resp:  [15-20] 18  SpO2:  [96 %-100 %] 99 %  BP: ()/(51-59) 91/56     Date 02/08/24 0700 - 02/09/24 0659   Shift 8427-0174 1799-9914 1884-0251 24 Hour Total   INTAKE   NG/   300   Shift Total(mL/kg) 300(4)   300(4)   OUTPUT   Shift Total(mL/kg)       Weight (kg) 74.9 74.9 74.9 74.9                Oxygen Concentration (%):  [28] 28             Closed/Suction Drain 01/30/24 1916 Right;Ventral Neck Bulb 15 Fr. (Active)   Site Description Healing 02/05/24 2300   Dressing Type Transparent (Tegaderm) 02/05/24 2300   Dressing Status Clean;Dry;Intact 02/05/24 2300   Dressing Intervention Integrity maintained 02/05/24 2300   Drainage Serosanguineous 02/05/24  1902   Status To bulb suction 02/05/24 2300   Output (mL) 0 mL 02/05/24 1805            Gastrostomy/Enterostomy 02/03/24 0934 Gastrostomy tube w/ balloon LUQ (Active)   Securement secured to abdomen 02/05/24 1902   Interventions Prior to Feeding patency checked 02/05/24 1902   Suction Setting/Drainage Method dependent drainage 02/05/24 0501   Drainage brown 02/04/24 1701   Feeding Type continuous;by pump 02/05/24 1902   Clamp Status/Tolerance unclamped 02/05/24 1902   Feeding Action feeding continued 02/05/24 1902   Dressing no dressing 02/05/24 1505   Insertion Site no swelling;dry 02/05/24 0501   Intake (mL) 45 mL 02/05/24 0601   Tube Output(mL)(Include Discarded Residual) 300 mL 02/04/24 0542   Tube Feeding Intake (mL) 45 02/05/24 2102       Male External Urinary Catheter 01/30/24 2000 (Active)   Collection Container Urimeter 02/05/24 1902   Securement Method secured to top of thigh w/ adhesive device 02/05/24 1902   Skin no redness;no breakdown 02/05/24 1902   Tolerance no signs/symptoms of discomfort 02/05/24 1902   Output (mL) 800 mL 02/05/24 1805   Catheter Change Date 02/03/24 02/04/24 0502   Catheter Change Time 1941 02/04/24 0502          Physical Exam         Neurosurgery Physical Exam    General: well developed, well nourished, no distress.   Head: normocephalic, atraumatic  Neurologic: Alert and oriented. Thought content appropriate.  GCS: Motor: 6/Verbal: 5/Eyes: 4 GCS Total: 15  Mental Status: Awake, Alert, Oriented x 4  Cranial nerves: face symmetric, tongue midline, CN II-XII grossly intact, EOMI.   Sensory: intact to light touch throughout  Motor Strength: Moves all extremities spontaneously with good tone.  Full strength upper and lower extremities. No abnormal movements seen.     Strength  Deltoids Triceps Biceps Wrist Extension Wrist Flexion Hand    Upper: R 5/5 5/5 5/5 5/5 5/5 5/5    L 5/5 5/5 5/5 5/5 5/5 5/5     Iliopsoas Quadriceps Knee  Flexion Tibialis  anterior Gastro- cnemius EHL  "  Lower: R 5/5 5/5 5/5 5/5 5/5 5/5    L 5/5 5/5 5/5 5/5 5/5 5/5     Skin: Skin is warm, dry and intact.  Incision c/d/I with skin edges well approximated.    Significant Labs:  Recent Labs   Lab 02/07/24  0300 02/08/24  0415   GLU 89 89    138   K 3.9 4.1    103   CO2 24 24   BUN 17 17   CREATININE 0.8 0.8   CALCIUM 9.4 9.5   MG 1.7 1.9       Recent Labs   Lab 02/07/24  0300 02/08/24  0415   WBC 14.58* 13.37*   HGB 9.9* 10.1*   HCT 30.1* 31.2*    283       No results for input(s): "LABPT", "INR", "APTT" in the last 48 hours.  Microbiology Results (last 7 days)       ** No results found for the last 168 hours. **          Recent Lab Results         02/08/24 0415        Albumin 2.7       ALP 72       ALT 23       Anion Gap 11       AST 23       Baso # 0.05       Basophil % 0.4       BILIRUBIN TOTAL 0.5  Comment: For infants and newborns, interpretation of results should be based  on gestational age, weight and in agreement with clinical  observations.    Premature Infant recommended reference ranges:  Up to 24 hours.............<8.0 mg/dL  Up to 48 hours............<12.0 mg/dL  3-5 days..................<15.0 mg/dL  6-29 days.................<15.0 mg/dL         BUN 17       Calcium 9.5       Chloride 103       CO2 24       Creatinine 0.8       Differential Method Automated       eGFR >60.0       Eos # 0.2       Eos % 1.3       Glucose 89       Gran # (ANC) 10.2       Gran % 76.3       Hematocrit 31.2       Hemoglobin 10.1       Immature Grans (Abs) 0.09  Comment: Mild elevation in immature granulocytes is non specific and   can be seen in a variety of conditions including stress response,   acute inflammation, trauma and pregnancy. Correlation with other   laboratory and clinical findings is essential.         Immature Granulocytes 0.7       Lymph # 1.6       Lymph % 12.2       Magnesium  1.9       MCH 28.0       MCHC 32.4       MCV 86       Mono # 1.2       Mono % 9.1       MPV 10.1       nRBC 0   "     Phosphorus Level 3.7       Platelet Count 283       Potassium 4.1       PROTEIN TOTAL 6.3       RBC 3.61       RDW 13.8       Sodium 138       WBC 13.37             All pertinent labs from the last 24 hours have been reviewed.    Significant Diagnostics:  I have reviewed all pertinent imaging results/findings within the past 24 hours.

## 2024-02-08 NOTE — RESPIRATORY THERAPY
"RAPID RESPONSE RESPIRATORY THERAPY PROACTIVE NOTE           Time of visit: 910     Code Status: Full Code   : 1970  Bed: 930/930 A:   MRN: [id number]  Time spent at the bedside: < 15 min    SITUATION    Evaluated patient for: LDA Check     BACKGROUND    Patient has a past medical history of Achilles tendon rupture, Achilles tendon rupture, Allergy, Anemia, Anxiety, Arthritis, Degenerative disc disease, Drug-induced lupus erythematosus, Drug-induced lupus erythematosus, Hyperlipidemia, Inguinal lymphadenopathy, Kidney stone, Medication monitoring encounter, Neck pain, Psoriatic arthritis, Psoriatic arthritis, Recurrent fever, Recurrent nephrolithiasis, Sinusitis, Stroke, TIA (transient ischemic attack), Ulcer, and Unexplained night sweats.  Clinically Significant Surgical Hx: tracheostomy    24 Hours Vitals Range:  Temp:  [97.9 °F (36.6 °C)-99.1 °F (37.3 °C)]   Pulse:  [69-78]   Resp:  [15-18]   BP: ()/(51-57)   SpO2:  [95 %-100 %]     Labs:    Recent Labs     24  0425 24  0300 24  0415    138 138   K 3.7 3.9 4.1    103 103   CO2 27 24 24   BUN 15 17 17   CREATININE 0.8 0.8 0.8   GLU 89 89 89   PHOS 3.9 3.5 3.7   MG 1.7 1.7 1.9        No results for input(s): "PH", "PCO2", "PO2", "HCO3", "POCSATURATED", "BE" in the last 72 hours.    ASSESSMENT/INTERVENTIONS  Pt resting comfortably during visit with no respiratory concern. Surgical airway clean and intact, all supplies at bedside. LDA in chart updated from 6.0 Shiley cuffed to 6.0 Shiley uncuffed to match what is in pt's stoma.      Last VS   Temp: 98.5 °F (36.9 °C) (728)  Pulse: 69 (815)  Resp: 16 (815)  BP: 112/57 (728)  SpO2: 96 % (815)      Extra trachs at bedside: 6.0 & 4.0 Shiley cuffed.  Level of Consciousness: Level of Consciousness (AVPU): alert  Respiratory Effort: Respiratory Effort: Unlabored Expansion/Accessory Muscle Usage: Expansion/Accessory Muscles/Retractions: expansion " symmetric, no retractions  All Lung Field Breath Sounds: All Lung Fields Breath Sounds: Anterior:, Lateral:, coarse, diminished  O2 Device/Concentration: trach collar 5L/28%  Surgical airway: Yes, Type: Shiley Size: 6, uncuffed  Ambu at bedside:       Active Orders   Respiratory Care    Inhalation Treatment Q6H     Frequency: Q6H     Number of Occurrences: Until Specified    Oxygen Continuous     Frequency: Continuous     Number of Occurrences: Until Specified     Order Questions:      Device type: Low flow      Device: Trach Collar      Titrate O2 per Oxygen Titration Protocol: Yes      To maintain SpO2 goal of: >= 90%      Notify MD of: Inability to achieve desired SpO2; Sudden change in patient status and requires 20% increase in FiO2; Patient requires >60% FiO2    Pulse Oximetry Q4H     Frequency: Q4H     Number of Occurrences: Until Specified    Routine tracheostomy care     Frequency: Q4H     Number of Occurrences: Until Specified       RECOMMENDATIONS    We recommend: RRT Recs: Continue POC per primary team.      FOLLOW-UP    Please call back the Rapid Response RT, Karri Soto RRT at x [phone] for any questions or concerns.

## 2024-02-08 NOTE — PT/OT/SLP PROGRESS
Speech Language Pathology      Rebel Rodriguez  MRN: [id number]    Patient not seen today secondary to not appropriate at this time. Per chart review, ENT reports pt should be npo for 4-6 weeks and cuff cannot be deflated at this time. Nursing reports pt able to communicate via writing or mouthing. Please reconsult when/if appropriate.  .

## 2024-02-08 NOTE — PLAN OF CARE
LUIS CARLOS is pursuing IPR placement for this Pt, he has accepting facilities, however, the family's #1 choice, [facility], has not contacted [facility] despite SW leaving detailed call back messges. [facility] to evaluate and review.    Dr. Dobbins notified [facility] that the family may want home health at the [facility] (attached to [facility]) upon discharge. SW will  help facilitate if that is the family's plan closer to discharge.     CHRISTINA Rogers, CHAD  [facility]  [phone]

## 2024-02-08 NOTE — PROGRESS NOTES
During morning assessment and trach care pt noted to have a 6.0 UNCUFFED Shiley. Airway was secure and patent. Obturator @HOB, spare trachs (4 & 6) at bedside, pt is not in distress though remains to have copious secretions. ENT contacted for LDA update in flowsheet.

## 2024-02-08 NOTE — SUBJECTIVE & OBJECTIVE
Interval History: No acute events  Pt family at bedside reports patient has had some anxiety and some episodes of confusion  Pt c/o constipation  Per ENT trach changed for 6-0 cuffless today. Plan for bedside laryngoscopy tomorrow   Afebrile    Review of Systems  Objective:     Vital Signs (Most Recent):  Temp: 98.3 °F (36.8 °C) (02/08/24 1142)  Pulse: 76 (02/08/24 1330)  Resp: 16 (02/08/24 1330)  BP: (!) 109/59 (02/08/24 1142)  SpO2: 99 % (02/08/24 1330) Vital Signs (24h Range):  Temp:  [97.9 °F (36.6 °C)-99.1 °F (37.3 °C)] 98.3 °F (36.8 °C)  Pulse:  [69-83] 76  Resp:  [15-20] 16  SpO2:  [95 %-100 %] 99 %  BP: ()/(51-59) 109/59     Weight: 74.9 kg (165 lb 2 oz)  Body mass index is 25.11 kg/m².    Intake/Output Summary (Last 24 hours) at 2/8/2024 1358  Last data filed at 2/8/2024 0815  Gross per 24 hour   Intake 1040 ml   Output 700 ml   Net 340 ml         Physical Exam  Constitutional:       General: He is not in acute distress.     Appearance: Normal appearance. He is normal weight. He is not ill-appearing, toxic-appearing or diaphoretic.   HENT:      Head:      Comments: Trache present  Eyes:      Extraocular Movements: Extraocular movements intact.      Conjunctiva/sclera: Conjunctivae normal.      Pupils: Pupils are equal, round, and reactive to light.   Neck:      Vascular: No carotid bruit.   Cardiovascular:      Rate and Rhythm: Normal rate and regular rhythm.      Pulses: Normal pulses.      Heart sounds: Normal heart sounds. No murmur heard.     No friction rub. No gallop.   Pulmonary:      Effort: Pulmonary effort is normal. No respiratory distress.      Breath sounds: Normal breath sounds.   Abdominal:      General: Abdomen is flat. Bowel sounds are normal. There is no distension.      Palpations: Abdomen is soft.      Tenderness: There is no abdominal tenderness. There is no guarding or rebound.      Comments: G tube clean and dry   Musculoskeletal:         General: No swelling. Normal range of  motion.      Cervical back: Normal range of motion and neck supple. No rigidity or tenderness.      Right lower leg: No edema.      Left lower leg: No edema.   Lymphadenopathy:      Cervical: No cervical adenopathy.   Skin:     General: Skin is warm and dry.      Coloration: Skin is not jaundiced or pale.   Neurological:      General: No focal deficit present.      Mental Status: He is alert and oriented to person, place, and time. Mental status is at baseline.   Psychiatric:         Mood and Affect: Mood normal.         Behavior: Behavior normal.             Significant Labs: All pertinent labs within the past 24 hours have been reviewed.    Significant Imaging: I have reviewed all pertinent imaging results/findings within the past 24 hours.

## 2024-02-08 NOTE — PROGRESS NOTES
Frantz Weber - Neurosurgery ([facility])  Adult Nutrition  Progress Note    SUMMARY       Recommendations    Continue TF with Peptamen 1. 5 w/ prebio @ 45ml/hr to provide 1620 kcal, 73g protein, 832ml FW.   Rec'd additional 200ml FWF QID or per MD  If bolus TF warranted: Peptamen 1.5 w/ prebio x 4.5 cartons per day to provide 1687 kcal, 77 g protein, 864 ml FW    RD to monitor and follow    Goals: Meet % EEN, EPN by RD f/u date  Nutrition Goal Status: goal met  Communication of RD Recs:  (POC)    Assessment and Plan    Nutrition Problem:  Inadequate oral intake     Related to (etiology):   Inability to consume sufficient energy      Signs and Symptoms (as evidenced by):   NPO     Interventions(treatment strategy):  Collaboration of nutrition care w/ other providers  EN     Nutrition Diagnosis Status:   Continue    Malnutrition Assessment     Skin (Micronutrient): none  Nails (Micronutrient): none  Hair/Scalp (Micronutrient): none  Eyes (Micronutrient): none  Extraoral (Micronutrient): none  Gums (Micronutrient): none  Lips/Mucous Membranes (Micronutrient): none  Teeth (Micronutrient): none  Tongue (Micronutrient): none  Neck/Chest (Micronutrient): none  Musculoskeletal/Lower Extremities: muscle wasting, subcutaneous fat loss       Subcutaneous Fat (Malnutrition): moderate depletion  Muscle Mass (Malnutrition): moderate depletion   Orbital Region (Subcutaneous Fat Loss): moderate depletion  Upper Arm Region (Subcutaneous Fat Loss): moderate depletion   Tibbie Region (Muscle Loss): moderate depletion  Clavicle Bone Region (Muscle Loss): moderate depletion  Clavicle and Acromion Bone Region (Muscle Loss): moderate depletion  Dorsal Hand (Muscle Loss): moderate depletion  Patellar Region (Muscle Loss): moderate depletion  Anterior Thigh Region (Muscle Loss): moderate depletion  Posterior Calf Region (Muscle Loss): moderate depletion                 Reason for Assessment    Reason For Assessment: RD follow-up  Diagnosis:  "other (see comments) (Cervical myelopathy)  Interdisciplinary Rounds: did not attend    General Information Comments:  RD f/u. S/p pharyngeal repair (1/31). Pt tolerating TF at goal. Trach/ G-tube placed on 2/3. Formula changed from isosource 1.5 to peptamen 1.5 w/ prebio on 2/4. NPO at this time, plan to stay NPO for 4-6 weeks per MD's note. Pt with AMS, spoke with families outside the room. Families with concern regarding the next step of TF, bolus TF was discussed. Pt with constipation, had 1x BM after suppository. Pt with UBW of 140 lb per chart. NFPE completed today, noted moderate fat and muscle depletion. Does not meet criteria for malnutrition.     Nutrition Discharge Planning: Pending clinical course    Nutrition Risk Screen    Nutrition Risk Screen: no indicators present    Nutrition/Diet History    Spiritual, Cultural Beliefs, Anabaptism Practices, Values that Affect Care: no  Factors Affecting Nutritional Intake: on mechanical ventilation, NPO    Anthropometrics    Temp: 99.5 °F (37.5 °C)  Height Method: Stated  Height: 5' 8" (172.7 cm)  Height (inches): 68 in  Weight Method: Bed Scale  Weight: 74.9 kg (165 lb 2 oz)  Weight (lb): 165.13 lb  Ideal Body Weight (IBW), Male: 154 lb  % Ideal Body Weight, Male (lb): 107.23 %  BMI (Calculated): 25.1  BMI Grade: 25 - 29.9 - overweight       Lab/Procedures/Meds    Pertinent Labs Reviewed: reviewed  Pertinent Labs Comments: albumin 2.7, H/H 10.1/31.2  Pertinent Medications Reviewed: reviewed  Pertinent Medications Comments: heparin, senna-docusate, polyethylene glycol, mirtazapine, atorvastatin    Physical Findings/Assessment         Estimated/Assessed Needs    Weight Used For Calorie Calculations: 63.5 kg (140 lb) (UBW)  Energy Calorie Requirements (kcal): 5175-4640  Energy Need Method: Kcal/kg (25-30)  Protein Requirements: 64-76g (1-1.2g/kg UBW)  Weight Used For Protein Calculations: 63.5 kg (140 lb) (IBW)  Fluid Requirements (mL): 1ml/kcal or per MD  Estimated " Fluid Requirement Method: RDA Method  RDA Method (mL): 1587         Nutrition Prescription Ordered    Current Diet Order: NPO  Current Nutrition Support Formula Ordered: Peptamen 1.5 w/Prebio  Current Nutrition Support Rate Ordered: 45 (ml)  Current Nutrition Support Frequency Ordered: mL/hr    Evaluation of Received Nutrient/Fluid Intake    Enteral Calories (kcal): 1620  Enteral Protein (gm): 73  Enteral (Free Water) Fluid (mL): 832  % Kcal Needs: 100  % Protein Needs: 100  I/O: -  Energy Calories Required: meeting needs  Protein Required: meeting needs  Fluid Required: other (see comments) (Per MD)  Comments: LBM 2/7  Tolerance: tolerating    Nutrition Risk    Level of Risk/Frequency of Follow-up:  (1x/week)     Monitor and Evaluation    Food and Nutrient Intake: enteral nutrition intake, food and beverage intake, energy intake  Food and Nutrient Adminstration: diet order, enteral and parenteral nutrition administration  Physical Activity and Function: nutrition-related ADLs and IADLs  Anthropometric Measurements: weight, weight change  Biochemical Data, Medical Tests and Procedures: inflammatory profile, lipid profile, glucose/endocrine profile, gastrointestinal profile, electrolyte and renal panel  Nutrition-Focused Physical Findings: overall appearance     Nutrition Follow-Up    RD Follow-up?: Yes

## 2024-02-08 NOTE — ASSESSMENT & PLAN NOTE
Mr Rodriguez is a 53 yo male who is s/p C3-4 ACDF surgery with post op dysphagia, odynophagia, neck pain, swelling and crepitus. CT and scope demonstrates DELFIN drain in the hypopharynx. Patient with difficulty with swallowing secretions. No respiratory compromise. He is s/p neck exploration and pharyngeal repair on 1/30. Discussed with patient's mother and sister at bedside the recommendation for open G tube and tracheostomy to allow adequate time for pharynx to heal.     Now s/p trach and open G tube on 2/3/24.    - Strict NPO, NPO for 4-6 weeks minimum  - ID consulted for abx recommendations in setting of hardware with pharyngeal injury    - Currently off all abx, s/p vanc and unasyn   - s/p tracheostomy, with 6-0 cuffed shiley in place    - trach changed for 6-0 cuffless today   - plan for bedside laryngoscopy tomorrow   - R neck drain to stay in place until removed by ENT   - Rest of care for per primary  - Please page ENT with questions or concerns.

## 2024-02-08 NOTE — PLAN OF CARE
Problem: Adult Inpatient Plan of Care  Goal: Plan of Care Review  Outcome: Ongoing, Progressing  Goal: Patient-Specific Goal (Individualized)  Outcome: Ongoing, Progressing  Goal: Absence of Hospital-Acquired Illness or Injury  Outcome: Ongoing, Progressing  Goal: Optimal Comfort and Wellbeing  Outcome: Ongoing, Progressing  Goal: Readiness for Transition of Care  Outcome: Ongoing, Progressing     Problem: Skin Injury Risk Increased  Goal: Skin Health and Integrity  Outcome: Ongoing, Progressing   Patient stable throughout the night,refused seroquel  no longer wanted to take med after having lucid dreams, plans are to downgrade trach today

## 2024-02-08 NOTE — PROGRESS NOTES
[facility] - Neurosurgery ([facility])  Hospital Medicine  Progress Note    Patient Name: Rebel Rodriguez  MRN: [id number]  Patient Class: IP- Inpatient   Admission Date: 1/30/2024  Length of Stay: 9 days  Attending Physician: Ty Munoz*  Primary Care Provider: Nanda Simth MD        Subjective:     Principal Problem:Cervical myelopathy        HPI:  53 y.o. male with hx of psoriatic arthritis, hx TIA on Aspirin 81 mg, s/p C4-7 ACDF with Dr. Huff 10 years ago admitted to [facility] s/p C3-C4 ACDF. Per chart review, reports rapid functional decline over the last 3 weeks. Endorses hand clumsiness, difficulty typing, gait imbalance, difficulty butoning, dropping items, falls. Difficulty with ambulating also due to LLE weakness. States it feels like he has rubber bands around his wrists. Reports difficulty with overhead mobility and shock-like pains down spine when raising arms overhead. CT neck soft tissue pending, Patient admitted to [facility] for close monitoring and higher level of care.      Hospital Course: 01/31/2024 CT neck concerning for extensive soft tissue edema and air in neck, additionally w/ concern for DELFIN drain misplaced into hypopharynx. Taken class A to OR by ENT for pharyngeal repair, left intubated by ENT in setting of airway edema. On high dose steroids, no cuff leak this AM  02/01/2024 General surgery consulted for open G tube placement, family still in discussion regarding trach. D/c dex to allow for adequate wound healing, ok with NSGY.   2/2/24: possible G-tube placement today  2/3/24: G-tube today  2/4/24: ok to step down to hospital medicine     Interval History: trach collar and g-tube in place, ok to step down to Hospital medicine    ENT recs: Now s/p trach and open G tube on 2/3/24.     - Strict NPO, NPO for 4-6 weeks minimum  - ID consulted for abx recommendations in setting of hardware with pharyngeal injury          - Currently off all abx, s/p vanc and unasyn   - s/p tracheostomy, with  6-0 cuffed shiley in place          - CUFF TO REMAIN UP - until otherwise directed by ENT          - Tentative plan to deflate on Tuesday  - R neck drain to stay in place until at least Tuesday       Overview/Hospital Course:  2/6- Transferred to [facility] med, IMN. Has trach and g-tube. Rehab is planned. BP low received , TF not at goal due to abd cramping. Will give suppository. Rounded with ENT service. Plan is to remove trache first, remove drain last. Pt communicating fairly well and able to make request and advocate for self. He is sitting up in a chair. NPO with G-tube for six weeks. Trach care q 4 hours.     2/7- trach change planned tomorrow  Daily evaluating.  Strict NPO, NPO for 4-6 weeks minimum.  ID consulted in ICU- no abx needed.  Currently off all abx, s/p vanc and unasyn - R neck drain to stay in place until removed by ENT. 75 % of TF goal. Had one BM after suppository.  Wbc 16-> 14.  No cultures. 99/54 and other VSS. AF. Will bolus  cc for low BP.  CM plan: Pt and family may want to go to the [facility] ([facility]) with HH at discharge.  - ENT may dc pt home w trache.    Interval History: No acute events  Pt family at bedside reports patient has had some anxiety and some episodes of confusion  Pt c/o constipation  Per ENT trach changed for 6-0 cuffless today. Plan for bedside laryngoscopy tomorrow   Afebrile    Review of Systems  Objective:     Vital Signs (Most Recent):  Temp: 98.3 °F (36.8 °C) (02/08/24 1142)  Pulse: 76 (02/08/24 1330)  Resp: 16 (02/08/24 1330)  BP: (!) 109/59 (02/08/24 1142)  SpO2: 99 % (02/08/24 1330) Vital Signs (24h Range):  Temp:  [97.9 °F (36.6 °C)-99.1 °F (37.3 °C)] 98.3 °F (36.8 °C)  Pulse:  [69-83] 76  Resp:  [15-20] 16  SpO2:  [95 %-100 %] 99 %  BP: ()/(51-59) 109/59     Weight: 74.9 kg (165 lb 2 oz)  Body mass index is 25.11 kg/m².    Intake/Output Summary (Last 24 hours) at 2/8/2024 1357  Last data filed at 2/8/2024 0815  Gross per 24 hour    Intake 1040 ml   Output 700 ml   Net 340 ml         Physical Exam  Constitutional:       General: He is not in acute distress.     Appearance: Normal appearance. He is normal weight. He is not ill-appearing, toxic-appearing or diaphoretic.   HENT:      Head:      Comments: Trache present  Eyes:      Extraocular Movements: Extraocular movements intact.      Conjunctiva/sclera: Conjunctivae normal.      Pupils: Pupils are equal, round, and reactive to light.   Neck:      Vascular: No carotid bruit.   Cardiovascular:      Rate and Rhythm: Normal rate and regular rhythm.      Pulses: Normal pulses.      Heart sounds: Normal heart sounds. No murmur heard.     No friction rub. No gallop.   Pulmonary:      Effort: Pulmonary effort is normal. No respiratory distress.      Breath sounds: Normal breath sounds.   Abdominal:      General: Abdomen is flat. Bowel sounds are normal. There is no distension.      Palpations: Abdomen is soft.      Tenderness: There is no abdominal tenderness. There is no guarding or rebound.      Comments: G tube clean and dry   Musculoskeletal:         General: No swelling. Normal range of motion.      Cervical back: Normal range of motion and neck supple. No rigidity or tenderness.      Right lower leg: No edema.      Left lower leg: No edema.   Lymphadenopathy:      Cervical: No cervical adenopathy.   Skin:     General: Skin is warm and dry.      Coloration: Skin is not jaundiced or pale.   Neurological:      General: No focal deficit present.      Mental Status: He is alert and oriented to person, place, and time. Mental status is at baseline.   Psychiatric:         Mood and Affect: Mood normal.         Behavior: Behavior normal.             Significant Labs: All pertinent labs within the past 24 hours have been reviewed.    Significant Imaging: I have reviewed all pertinent imaging results/findings within the past 24 hours.    Assessment/Plan:      * Cervical myelopathy  Hx of prior ACDF C4 C7  with adjacent level 3-4 who presented for elective C3-4 ACDF on 01/30, complicated by retropharyngeal injury required repair by ENT.   - s/p NCC, intubation and ventilation, trache and G tube.  - will not keep trach for 6 weeks, but NPO with G tube for 6 weeks, f/u ENT  - has right neck drain to be removed by ENT    Postprocedural hypotension  2/6- RL 500cc  2/7  cc IV.      Abdominal cramping  Suspect CONSTIPATION  Ducolox suppository  2/7- one BM      Physical deconditioning  Has trache and G tube,   PT/OT  Wean oxygen  Teach trache and G-tube care      Gastrostomy status  Patient noted to have a percutaneous endoscopic gastrostomy tube in place. I have personally inspected the tube.Tube was placed on this admission, with date of procedure- 2/2  There are no signs of drainage or infection around the site. The tube is patent. Medications have not converted to liquid form if available.  Routine care to be done by wound care and nursing staff.      Isossource @ 35 cc/ h  Peptomen 1.5 w bio 45cc/ h  Water flushes    2/7 advance TF as tolerated to goal        Anxiety  Hx of    - C/w home remeron and fluoxetine  - seroquel BID    On mechanically assisted ventilation  S/p ventilation in NCC  Now on room air, has trache for six weeks  Sitting up in chair      Injury of pharynx  DELFIN drain noted to be in hypopharynx on post-op imaging, s/p emergent surgical repair on 1/30  - Dex 4q6hrs, discontinued  ENT recs: Now s/p trach and open G tube on 2/3/24.  - Strict NPO, NPO for 4-6 weeks minimum  - ID consulted for abx recommendations in setting of hardware with pharyngeal injury          - Currently off all abx, s/p vanc and unasyn   - s/p tracheostomy, with 6-0 cuffed shiley in place          - CUFF TO REMAIN UP - until otherwise directed by ENT          - Tentative plan to deflate on Tuesday  - R neck drain to stay in place until at least Tuesday   - needs time for wound healing    2/7-trache care q 4 hours.  trach  exchange planned 2/8 by ENT. Planning to remove it this admission before discharge. Drain to be removed later this admission per ENT.       Hyperlipidemia  Hx of    - C/w home atorvastatin         VTE Risk Mitigation (From admission, onward)           Ordered     heparin (porcine) injection 5,000 Units  Every 8 hours         02/05/24 0919     Place LAUREN hose  Until discontinued         01/30/24 0513     Place sequential compression device  Until discontinued         01/30/24 0513                    Discharge Planning   JUSTYN: 2/14/2024     Code Status: Full Code   Is the patient medically ready for discharge?: No    Reason for patient still in hospital (select all that apply): Patient trending condition, Treatment, and Consult recommendations  Discharge Plan A: Rehab   Discharge Delays: (!) Change in Medical Condition      Ty Munoz MD  Department of Hospital Medicine   [facility] ([facility])

## 2024-02-09 LAB
ALBUMIN SERPL BCP-MCNC: 2.9 G/DL (ref 3.5–5.2)
ALP SERPL-CCNC: 86 U/L (ref 55–135)
ALT SERPL W/O P-5'-P-CCNC: 29 U/L (ref 10–44)
ANION GAP SERPL CALC-SCNC: 11 MMOL/L (ref 8–16)
AST SERPL-CCNC: 31 U/L (ref 10–40)
BASOPHILS # BLD AUTO: 0.04 K/UL (ref 0–0.2)
BASOPHILS NFR BLD: 0.3 % (ref 0–1.9)
BILIRUB SERPL-MCNC: 0.6 MG/DL (ref 0.1–1)
BILIRUB UR QL STRIP: NEGATIVE
BUN SERPL-MCNC: 23 MG/DL (ref 6–20)
CALCIUM SERPL-MCNC: 9.8 MG/DL (ref 8.7–10.5)
CHLORIDE SERPL-SCNC: 103 MMOL/L (ref 95–110)
CLARITY UR REFRACT.AUTO: CLEAR
CO2 SERPL-SCNC: 24 MMOL/L (ref 23–29)
COLOR UR AUTO: YELLOW
CREAT SERPL-MCNC: 0.8 MG/DL (ref 0.5–1.4)
DIFFERENTIAL METHOD BLD: ABNORMAL
EOSINOPHIL # BLD AUTO: 0.1 K/UL (ref 0–0.5)
EOSINOPHIL NFR BLD: 0.5 % (ref 0–8)
ERYTHROCYTE [DISTWIDTH] IN BLOOD BY AUTOMATED COUNT: 14 % (ref 11.5–14.5)
EST. GFR  (NO RACE VARIABLE): >60 ML/MIN/1.73 M^2
GLUCOSE SERPL-MCNC: 104 MG/DL (ref 70–110)
GLUCOSE UR QL STRIP: NEGATIVE
HCT VFR BLD AUTO: 33.4 % (ref 40–54)
HGB BLD-MCNC: 10.9 G/DL (ref 14–18)
HGB UR QL STRIP: NEGATIVE
IMM GRANULOCYTES # BLD AUTO: 0.16 K/UL (ref 0–0.04)
IMM GRANULOCYTES NFR BLD AUTO: 1 % (ref 0–0.5)
KETONES UR QL STRIP: ABNORMAL
LEUKOCYTE ESTERASE UR QL STRIP: NEGATIVE
LYMPHOCYTES # BLD AUTO: 1.7 K/UL (ref 1–4.8)
LYMPHOCYTES NFR BLD: 11.2 % (ref 18–48)
MAGNESIUM SERPL-MCNC: 2 MG/DL (ref 1.6–2.6)
MCH RBC QN AUTO: 28.2 PG (ref 27–31)
MCHC RBC AUTO-ENTMCNC: 32.6 G/DL (ref 32–36)
MCV RBC AUTO: 86 FL (ref 82–98)
MONOCYTES # BLD AUTO: 1.2 K/UL (ref 0.3–1)
MONOCYTES NFR BLD: 7.8 % (ref 4–15)
NEUTROPHILS # BLD AUTO: 12.2 K/UL (ref 1.8–7.7)
NEUTROPHILS NFR BLD: 79.2 % (ref 38–73)
NITRITE UR QL STRIP: NEGATIVE
NRBC BLD-RTO: 0 /100 WBC
OHS QRS DURATION: 74 MS
OHS QTC CALCULATION: 438 MS
PH UR STRIP: 6 [PH] (ref 5–8)
PHOSPHATE SERPL-MCNC: 3.5 MG/DL (ref 2.7–4.5)
PLATELET # BLD AUTO: 357 K/UL (ref 150–450)
PMV BLD AUTO: 10.7 FL (ref 9.2–12.9)
POTASSIUM SERPL-SCNC: 4.1 MMOL/L (ref 3.5–5.1)
PROT SERPL-MCNC: 6.7 G/DL (ref 6–8.4)
PROT UR QL STRIP: NEGATIVE
RBC # BLD AUTO: 3.87 M/UL (ref 4.6–6.2)
SODIUM SERPL-SCNC: 138 MMOL/L (ref 136–145)
SP GR UR STRIP: 1.02 (ref 1–1.03)
URN SPEC COLLECT METH UR: ABNORMAL
WBC # BLD AUTO: 15.44 K/UL (ref 3.9–12.7)

## 2024-02-09 PROCEDURE — 92597 ORAL SPEECH DEVICE EVAL: CPT

## 2024-02-09 PROCEDURE — 20600001 HC STEP DOWN PRIVATE ROOM

## 2024-02-09 PROCEDURE — 63600175 PHARM REV CODE 636 W HCPCS: Performed by: NURSE PRACTITIONER

## 2024-02-09 PROCEDURE — 81003 URINALYSIS AUTO W/O SCOPE: CPT

## 2024-02-09 PROCEDURE — 99900035 HC TECH TIME PER 15 MIN (STAT)

## 2024-02-09 PROCEDURE — 83735 ASSAY OF MAGNESIUM: CPT

## 2024-02-09 PROCEDURE — 97110 THERAPEUTIC EXERCISES: CPT

## 2024-02-09 PROCEDURE — 25000003 PHARM REV CODE 250

## 2024-02-09 PROCEDURE — 90792 PSYCH DIAG EVAL W/MED SRVCS: CPT | Mod: ,,, | Performed by: PSYCHIATRY & NEUROLOGY

## 2024-02-09 PROCEDURE — 97530 THERAPEUTIC ACTIVITIES: CPT

## 2024-02-09 PROCEDURE — 99900026 HC AIRWAY MAINTENANCE (STAT)

## 2024-02-09 PROCEDURE — 25000003 PHARM REV CODE 250: Performed by: NURSE PRACTITIONER

## 2024-02-09 PROCEDURE — 36415 COLL VENOUS BLD VENIPUNCTURE: CPT

## 2024-02-09 PROCEDURE — 80053 COMPREHEN METABOLIC PANEL: CPT

## 2024-02-09 PROCEDURE — 85025 COMPLETE CBC W/AUTO DIFF WBC: CPT

## 2024-02-09 PROCEDURE — 25000242 PHARM REV CODE 250 ALT 637 W/ HCPCS: Performed by: NURSE PRACTITIONER

## 2024-02-09 PROCEDURE — 97535 SELF CARE MNGMENT TRAINING: CPT

## 2024-02-09 PROCEDURE — 93005 ELECTROCARDIOGRAM TRACING: CPT

## 2024-02-09 PROCEDURE — 93010 ELECTROCARDIOGRAM REPORT: CPT | Mod: ,,, | Performed by: INTERNAL MEDICINE

## 2024-02-09 PROCEDURE — 84100 ASSAY OF PHOSPHORUS: CPT

## 2024-02-09 PROCEDURE — 27000221 HC OXYGEN, UP TO 24 HOURS

## 2024-02-09 PROCEDURE — 63600175 PHARM REV CODE 636 W HCPCS

## 2024-02-09 PROCEDURE — 97112 NEUROMUSCULAR REEDUCATION: CPT

## 2024-02-09 PROCEDURE — 25000003 PHARM REV CODE 250: Performed by: PSYCHIATRY & NEUROLOGY

## 2024-02-09 PROCEDURE — 94640 AIRWAY INHALATION TREATMENT: CPT

## 2024-02-09 PROCEDURE — 25000003 PHARM REV CODE 250: Performed by: STUDENT IN AN ORGANIZED HEALTH CARE EDUCATION/TRAINING PROGRAM

## 2024-02-09 PROCEDURE — 94761 N-INVAS EAR/PLS OXIMETRY MLT: CPT

## 2024-02-09 RX ORDER — HALOPERIDOL 5 MG/ML
2 INJECTION INTRAMUSCULAR ONCE
Status: COMPLETED | OUTPATIENT
Start: 2024-02-09 | End: 2024-02-09

## 2024-02-09 RX ORDER — QUETIAPINE FUMARATE 25 MG/1
50 TABLET, FILM COATED ORAL NIGHTLY
Status: DISCONTINUED | OUTPATIENT
Start: 2024-02-09 | End: 2024-02-14 | Stop reason: HOSPADM

## 2024-02-09 RX ORDER — MIRTAZAPINE 30 MG/1
30 TABLET, FILM COATED ORAL NIGHTLY
Status: DISCONTINUED | OUTPATIENT
Start: 2024-02-09 | End: 2024-02-14 | Stop reason: HOSPADM

## 2024-02-09 RX ADMIN — HYDROXYZINE HYDROCHLORIDE 25 MG: 25 TABLET ORAL at 08:02

## 2024-02-09 RX ADMIN — HEPARIN SODIUM 5000 UNITS: 5000 INJECTION INTRAVENOUS; SUBCUTANEOUS at 05:02

## 2024-02-09 RX ADMIN — IPRATROPIUM BROMIDE AND ALBUTEROL SULFATE 3 ML: .5; 3 SOLUTION RESPIRATORY (INHALATION) at 07:02

## 2024-02-09 RX ADMIN — POLYETHYLENE GLYCOL 3350 17 GRAM ORAL POWDER PACKET 17 G: at 08:02

## 2024-02-09 RX ADMIN — HEPARIN SODIUM 5000 UNITS: 5000 INJECTION INTRAVENOUS; SUBCUTANEOUS at 02:02

## 2024-02-09 RX ADMIN — SENNOSIDES AND DOCUSATE SODIUM 2 TABLET: 8.6; 5 TABLET ORAL at 08:02

## 2024-02-09 RX ADMIN — HALOPERIDOL LACTATE 2 MG: 5 INJECTION, SOLUTION INTRAMUSCULAR at 08:02

## 2024-02-09 RX ADMIN — IPRATROPIUM BROMIDE AND ALBUTEROL SULFATE 3 ML: .5; 3 SOLUTION RESPIRATORY (INHALATION) at 12:02

## 2024-02-09 RX ADMIN — MIRTAZAPINE 30 MG: 30 TABLET, FILM COATED ORAL at 08:02

## 2024-02-09 RX ADMIN — FLUOXETINE HYDROCHLORIDE 40 MG: 20 CAPSULE ORAL at 08:02

## 2024-02-09 RX ADMIN — IPRATROPIUM BROMIDE AND ALBUTEROL SULFATE 3 ML: .5; 3 SOLUTION RESPIRATORY (INHALATION) at 02:02

## 2024-02-09 RX ADMIN — IPRATROPIUM BROMIDE AND ALBUTEROL SULFATE 3 ML: .5; 3 SOLUTION RESPIRATORY (INHALATION) at 08:02

## 2024-02-09 RX ADMIN — HEPARIN SODIUM 5000 UNITS: 5000 INJECTION INTRAVENOUS; SUBCUTANEOUS at 09:02

## 2024-02-09 RX ADMIN — QUETIAPINE FUMARATE 50 MG: 25 TABLET ORAL at 08:02

## 2024-02-09 RX ADMIN — ATORVASTATIN CALCIUM 10 MG: 10 TABLET, FILM COATED ORAL at 08:02

## 2024-02-09 NOTE — SUBJECTIVE & OBJECTIVE
Interval History: Patient with more anxiety and asking to go home, stating he feels exposed at [facility] and wishes for a few hours to shower and wear normal clothing.     Medications:  Continuous Infusions:   sodium chloride 0.9% Stopped (02/03/24 0801)    dextrose 10 % in water (D10W)       Scheduled Meds:   albuterol-ipratropium  3 mL Nebulization Q6H    atorvastatin  10 mg Per NG tube Daily    FLUoxetine  40 mg Per NG tube Daily    heparin (porcine)  5,000 Units Subcutaneous Q8H    mirtazapine  15 mg Per NG tube QHS    polyethylene glycol  17 g Per NG tube BID    senna-docusate 8.6-50 mg  2 tablet Per NG tube BID     PRN Meds:acetaminophen, bisacodyL, dextrose 10 % in water (D10W), dextrose 10%, dextrose 10%, glucagon (human recombinant), HYDROmorphone, insulin aspart U-100, ondansetron, oxyCODONE, prochlorperazine     Review of patient's allergies indicates:   Allergen Reactions    Erythromycin Nausea And Vomiting    Infliximab Other (See Comments)     Lupus with fever and acute arthritis  Lupus with fever and acute arthritis     Objective:     Vital Signs (24h Range):  Temp:  [98.2 °F (36.8 °C)-99.5 °F (37.5 °C)] 98.8 °F (37.1 °C)  Pulse:  [76-94] 77  Resp:  [16-22] 18  SpO2:  [95 %-100 %] 98 %  BP: ()/(53-78) 103/55     Lines/Drains/Airways       Drain  Duration                  Closed/Suction Drain 01/30/24 1916 Right;Ventral Neck Bulb 15 Fr. 9 days         Gastrostomy/Enterostomy 02/03/24 0934 Gastrostomy tube w/ balloon LUQ 6 days              Airway  Duration             Adult Surgical Airway 02/03/24 1055 Juventino Uncuffed 6.0/ 75mm 6 days              Peripheral Intravenous Line  Duration                  Peripheral IV - Single Lumen 02/05/24 1915 18 G Anterior;Right Forearm 3 days                   Physical Exam  Resting in bed   6-0 cuffless tracheostomy tube in good position, secured with soft collar, neck is soft and palpable  Neck incision c/d/i  Drain holding suction     Significant Labs:  BMP:    Recent Labs   Lab 02/09/24  0418         CO2 24   BUN 23*   CREATININE 0.8   CALCIUM 9.8   MG 2.0     CBC:   Recent Labs   Lab 02/09/24  0418   WBC 15.44*   RBC 3.87*   HGB 10.9*   HCT 33.4*      MCV 86   MCH 28.2   MCHC 32.6     Significant Diagnostics:  I have reviewed all pertinent imaging results/findings within the past 24 hours.    Flexible Laryngoscopy   Verbal consent obtained. Combination of 4% lidocaine and oxymetazoline mixture was sprayed into the right naris with atomizer for topical anesthetics.     - Normal nasal mucosa, inferior turbinates and septum. No evidence of septal deviation or perforation. There are no visible lesions. Aimee within normal limits.  - Pharyngeal wall edema noted as expected from postoperative ACDF. Mild base of tongue fullness causing epiglottis to be retroflexed. Larynx incompletely visualized secondary to edema, secretions, and retroflexed epiglottis.

## 2024-02-09 NOTE — SUBJECTIVE & OBJECTIVE
Interval History: Overnight pt with confusion and concern for hallucinations, see documentation.   CTH negative  UA, CXR pending  Evaluated at bedside this am with pt sister present.   Still has not been sleeping well.   Psych consulted and will increase remeron to 30 mg and seroquel 50 mg qhs    Review of Systems  Objective:     Vital Signs (Most Recent):  Temp: 98.8 °F (37.1 °C) (02/09/24 1125)  Pulse: 78 (02/09/24 1419)  Resp: 16 (02/09/24 1419)  BP: (!) 103/55 (02/09/24 1125)  SpO2: 98 % (02/09/24 1419) Vital Signs (24h Range):  Temp:  [98.2 °F (36.8 °C)-99.5 °F (37.5 °C)] 98.8 °F (37.1 °C)  Pulse:  [76-94] 78  Resp:  [16-22] 16  SpO2:  [95 %-100 %] 98 %  BP: ()/(53-78) 103/55     Weight: 74.9 kg (165 lb 2 oz)  Body mass index is 25.11 kg/m².    Intake/Output Summary (Last 24 hours) at 2/9/2024 1428  Last data filed at 2/9/2024 0515  Gross per 24 hour   Intake 1040 ml   Output 5 ml   Net 1035 ml         Physical Exam  Constitutional:       General: He is not in acute distress.     Appearance: Normal appearance. He is normal weight. He is not ill-appearing, toxic-appearing or diaphoretic.   HENT:      Head:      Comments: Trache present  Eyes:      Extraocular Movements: Extraocular movements intact.      Conjunctiva/sclera: Conjunctivae normal.      Pupils: Pupils are equal, round, and reactive to light.   Neck:      Vascular: No carotid bruit.   Cardiovascular:      Rate and Rhythm: Normal rate and regular rhythm.      Pulses: Normal pulses.      Heart sounds: Normal heart sounds. No murmur heard.     No friction rub. No gallop.   Pulmonary:      Effort: Pulmonary effort is normal. No respiratory distress.      Breath sounds: Normal breath sounds.   Abdominal:      General: Abdomen is flat. Bowel sounds are normal. There is no distension.      Palpations: Abdomen is soft.      Tenderness: There is no abdominal tenderness. There is no guarding or rebound.      Comments: G tube clean and dry    Musculoskeletal:         General: No swelling. Normal range of motion.      Cervical back: Normal range of motion and neck supple. No rigidity or tenderness.      Right lower leg: No edema.      Left lower leg: No edema.   Lymphadenopathy:      Cervical: No cervical adenopathy.   Skin:     General: Skin is warm and dry.      Coloration: Skin is not jaundiced or pale.   Neurological:      General: No focal deficit present.      Mental Status: He is alert and oriented to person, place, and time.             Significant Labs: All pertinent labs within the past 24 hours have been reviewed.    Significant Imaging: I have reviewed all pertinent imaging results/findings within the past 24 hours.

## 2024-02-09 NOTE — SUBJECTIVE & OBJECTIVE
Interval History: 2/9: Patient with profound delirium overnight. Remains altered this AM. Sedating medications held. Afebrile but WBC 15.44 this AM. Infectious work up pending. Psych consulted for assistance.     Medications:  Continuous Infusions:   sodium chloride 0.9% Stopped (02/03/24 0801)    dextrose 10 % in water (D10W)       Scheduled Meds:   albuterol-ipratropium  3 mL Nebulization Q6H    atorvastatin  10 mg Per NG tube Daily    FLUoxetine  40 mg Per NG tube Daily    heparin (porcine)  5,000 Units Subcutaneous Q8H    mirtazapine  15 mg Per NG tube QHS    polyethylene glycol  17 g Per NG tube BID    senna-docusate 8.6-50 mg  2 tablet Per NG tube BID     PRN Meds:acetaminophen, bisacodyL, dextrose 10 % in water (D10W), dextrose 10%, dextrose 10%, glucagon (human recombinant), HYDROmorphone, insulin aspart U-100, ondansetron, oxyCODONE, prochlorperazine     Review of Systems  Objective:     Weight: 74.9 kg (165 lb 2 oz)  Body mass index is 25.11 kg/m².  Vital Signs (Most Recent):  Temp: 98.8 °F (37.1 °C) (02/09/24 1125)  Pulse: 77 (02/09/24 1202)  Resp: 18 (02/09/24 1125)  BP: (!) 103/55 (02/09/24 1125)  SpO2: 98 % (02/09/24 1125) Vital Signs (24h Range):  Temp:  [98.2 °F (36.8 °C)-99.5 °F (37.5 °C)] 98.8 °F (37.1 °C)  Pulse:  [76-94] 77  Resp:  [18-22] 18  SpO2:  [95 %-100 %] 98 %  BP: ()/(53-78) 103/55          Oxygen Concentration (%):  [28] 28         Closed/Suction Drain 01/30/24 1916 Right;Ventral Neck Bulb 15 Fr. (Active)   Site Description Healing 02/08/24 1915   Dressing Type Transparent (Tegaderm) 02/08/24 1915   Dressing Status Clean;Dry;Intact 02/08/24 1915   Dressing Intervention Integrity maintained 02/08/24 1915   Drainage Serous 02/08/24 1915   Status To bulb suction 02/08/24 1915   Output (mL) 5 mL 02/09/24 0515            Gastrostomy/Enterostomy 02/03/24 0934 Gastrostomy tube w/ balloon LUQ (Active)   Securement secured to abdomen 02/08/24 1915   Interventions Prior to Feeding patency  checked;residual checked 02/08/24 1915   Suction Setting/Drainage Method dependent drainage 02/08/24 0728   Drainage brown 02/04/24 1701   Feeding Type continuous;by pump 02/08/24 1915   Clamp Status/Tolerance unclamped;no restlessness;no residual;no nausea;no emesis;no abdominal distention;no abdominal discomfort 02/08/24 1915   Feeding Action feeding continued 02/08/24 1915   Dressing no dressing 02/08/24 1915   Insertion Site no redness;no warmth;no drainage;no tenderness;no swelling 02/08/24 1915   Current Rate (mL/hr) 45 mL/hr 02/08/24 1915   Goal Rate (mL/hr) 45 mL/hr 02/08/24 1915   Intake (mL) 25 mL 02/06/24 0800   Water Bolus (mL) 240 mL 02/08/24 2105   Tube Output(mL)(Include Discarded Residual) 300 mL 02/04/24 0542   Formula Name Peptamen 02/08/24 1915   Tube Feeding Intake (mL) 540 02/08/24 1848   Residual Amount (ml) 0 ml 02/08/24 1915      Physical Exam  General: well developed, well nourished, no distress.   Head: normocephalic, atraumatic  Neurologic: Alert and oriented. Higher order confusion noted.   Cranial nerves: face symmetric, CN II-XII grossly intact, EOMI.   Sensory: intact to light touch throughout  Motor Strength: Moves all extremities spontaneously with good tone.  Full strength upper and lower extremities. No abnormal movements seen.      Strength   Deltoids Triceps Biceps Wrist Extension Wrist Flexion Hand    Upper: R 5/5 5/5 5/5 5/5 5/5 5/5     L 5/5 5/5 5/5 5/5 5/5 5/5       Iliopsoas Quadriceps Knee  Flexion Tibialis  anterior Gastro- cnemius EHL   Lower: R 5/5 5/5 5/5 5/5 5/5 5/5     L 5/5 5/5 5/5 5/5 5/5 5/5      Skin: Skin is warm, dry and intact.  Incision c/d/I with skin edges well approximated.     Significant Labs:  Recent Labs   Lab 02/08/24 0415 02/09/24 0418   GLU 89 104    138   K 4.1 4.1    103   CO2 24 24   BUN 17 23*   CREATININE 0.8 0.8   CALCIUM 9.5 9.8   MG 1.9 2.0     Recent Labs   Lab 02/08/24  0415 02/09/24  0418   WBC 13.37* 15.44*   HGB 10.1*  "10.9*   HCT 31.2* 33.4*    357     No results for input(s): "LABPT", "INR", "APTT" in the last 48 hours.  Microbiology Results (last 7 days)       ** No results found for the last 168 hours. **          All pertinent labs from the last 24 hours have been reviewed.    Significant Diagnostics:  I have reviewed and interpreted all pertinent imaging results/findings within the past 24 hours.  "

## 2024-02-09 NOTE — PT/OT/SLP EVAL
Speech Language Pathology Evaluation  One Way Speaking Valve Evaluation    Patient Name:  Rebel Rodriguez   MRN:  [id number]  Admitting Diagnosis: Cervical myelopathy    IMPORTANT  CAUTION: CUFF MUST ALWAYS BE DEFLATED WHEN VALVE IN USE    Recommendations:                 General Recommendations:  One Way Speaking Valve  Diet recommendations:  NPO, NPO   Aspiration Precautions: Continue alternate means of nutrition, Frequent oral care, and Strict aspiration precautions   General Precautions: Standard, aspiration, fall, NPO, respiratory  Communication strategies:  One way speaking valve - PMSV should only be worn when awake; remove PMSV if secretions become excessive and bothersome or pt exhibits any s/s of respiratory distress; PMSV should be wash daily with mild soap and water then allowed to air dry.    Assessment:     Rebel Rodriguez is a 54 y.o. male with an SLP diagnosis of S/P Trach and One Way Speaking Valve Training.      History:     Past Medical History:   Diagnosis Date    Achilles tendon rupture 10/09/2013    Achilles tendon rupture 10/09/2013    Allergy     Anemia 1/17/2024    Anxiety     Arthritis     psoriatric    Degenerative disc disease     Drug-induced lupus erythematosus     Drug-induced lupus erythematosus 03/09/2016    Hyperlipidemia     Inguinal lymphadenopathy 07/21/2015    Kidney stone     Medication monitoring encounter 12/07/2016    Neck pain 07/06/2017    Psoriatic arthritis     Psoriatic arthritis 12/07/2016    Recurrent fever 07/08/2015    Recurrent nephrolithiasis 05/19/2014    On low oxalate diet    Sinusitis 02/25/2016    Stroke     TIA (transient ischemic attack)     Ulcer     high school    Unexplained night sweats 07/13/2015       Past Surgical History:   Procedure Laterality Date    ACHILLES TENDON SURGERY      BACK SURGERY      DIRECT LARYNGOSCOPY  2/3/2024    Procedure: LARYNGOSCOPY, DIRECT;  Surgeon: Jodie Collier MD;  Location: [facility] OR [address] [place] [address];  Service: ENT;;    FUNCTIONAL  ENDOSCOPIC SINUS SURGERY (FESS) USING COMPUTER-ASSISTED NAVIGATION Bilateral 9/14/2018    Procedure: FESS, USING COMPUTER-ASSISTED NAVIGATION;  Surgeon: Gerard Manzo MD;  Location: [facility] OR 2ND FLR;  Service: ENT;  Laterality: Bilateral;    FUSION, SPINE, CERVICAL, ANTERIOR APPROACH N/A 1/30/2024    Procedure: C3-4 anterior cervical discectomy and fusion;  Surgeon: Rogerio Maradiaga MD;  Location: [facility] OR 2ND FLR;  Service: Neurosurgery;  Laterality: N/A;  C3-4 anterior cervical discectomy and fusion  anesthesia: general  nerve mon: EMG/SEP/MEP  radiology: C-arm  bed: reg. slider  position: supine  headrest: caspar, GW tongs/hanging weights, surgical pillow    INJECTION OF ANESTHETIC AGENT AROUND NERVE Left 5/13/2022    Procedure: Block, Nerve MEDIAL BRANCH BLOCK LEFT C2,3,4,5;  Surgeon: Kimberly Wilson MD;  Location: [facility] PAIN MGT;  Service: Pain Management;  Laterality: Left;    INJECTION OF ANESTHETIC AGENT AROUND NERVE Left 5/24/2022    Procedure: BLOCK, NERVE, LEFT C2-C5 MEDIAL BRANCH;  Surgeon: Kimberly Wilson MD;  Location: [facility] PAIN MGT;  Service: Pain Management;  Laterality: Left;    LAPAROTOMY N/A 2/3/2024    Procedure: LAPAROTOMY with gastrostomy tube placement;  Surgeon: Benigno Perez MD;  Location: [facility] OR 2ND FLR;  Service: General;  Laterality: N/A;    NASAL SEPTOPLASTY N/A 9/14/2018    Procedure: SEPTOPLASTY, NASAL;  Surgeon: Gerard Manzo MD;  Location: [facility] OR [facility] FLR;  Service: ENT;  Laterality: N/A;    NASAL TURBINATE REDUCTION Bilateral 9/14/2018    Procedure: REDUCTION, NASAL TURBINATE;  Surgeon: Gerard Manzo MD;  Location: [facility] OR [facility] FLR;  Service: ENT;  Laterality: Bilateral;    NECK EXPLORATION N/A 1/30/2024    Procedure: EXPLORATION, NECK, REPAIR OF PHARYNGEAL INJURY;  Surgeon: Senthil Agarwal MD;  Location: [facility] OR 2ND FLR;  Service: ENT;  Laterality: N/A;    RADIOFREQUENCY ABLATION Left 7/12/2022    Procedure: RADIOFREQUENCY ABLATION, LEFT C2-C3, C3-C4, C4-C5;   Surgeon: Kimberly Wilson MD;  Location: [facility] PAIN MGT;  Service: Pain Management;  Laterality: Left;    TRACHEOTOMY  2/3/2024    Procedure: TRACHEOTOMY;  Surgeon: Jodie Collier MD;  Location: [facility] OR [address] [place] [address];  Service: ENT;;    UPPER ENDOSCOPY W/ ESOPHAGEAL MANOMETRY      URETERAL STENT PLACEMENT         History of Present Illness: Rebel Rodriguez is a 53 y.o. male with hx of psoriatic arthritis, hx TIA on Aspirin 81 mg, s/p C4-7 ACDF with Dr. Huff 10 years ago who presents for evaluation of gait imbalance. Last seen 1 year ago for neck pain and radicular pain into the left shoulder. He underwent C1-2 KENRICK with moderate relief of pain. Reports rapid functional decline over the last 3 weeks. Endorses hand clumsiness, difficulty typing, gait imbalance, difficulty butoning, dropping items, falls. Difficulty with ambulating also due to LLE weakness. States it feels like he has rubber bands around his wrists. Reports difficulty with overhead mobility and shock-like pains down spine when raising arms overhead. Denies b/b incontinence.      Post-Op Info:  Procedure(s) (LRB):  LAPAROTOMY with gastrostomy tube placement (N/A)  LARYNGOSCOPY, DIRECT  TRACHEOTOMY   6 Days Post-Op   Interval History: 2/9: Patient with profound delirium overnight. Remains altered this AM. Sedating medications held. Afebrile but WBC 15.44 this AM. Infectious work up pending. Psych consulted for assistance.        Prior Intubation HX:  1/30 - 2/3    Modified Barium Swallow none on file    Chest X-Rays: 2/9/24: COMPARISON:02/05/2024    FINDINGS:  Tracheostomy tube remain.  Postsurgical in the cervical spine as before.  Heart size normal.  The lungs are clear.  No pleural effusion.  Linear metallic density overlying the right lung base probably artifact noted       Subjective     SLP services reconsulted for PMSV evaluation by ENT after trach was changed out to a Shiley 6 cuffless.  Pt noted to be able to phonate over trach without PMSV.     From  "ENT note 2/9/24:   "Flexible Laryngoscopy   Verbal consent obtained. Combination of 4% lidocaine and oxymetazoline mixture was sprayed into the right naris with atomizer for topical anesthetics.      - Normal nasal mucosa, inferior turbinates and septum. No evidence of septal deviation or perforation. There are no visible lesions. Aimee within normal limits.  - Pharyngeal wall edema noted as expected from postoperative ACDF. Mild base of tongue fullness causing epiglottis to be retroflexed. Larynx incompletely visualized secondary to edema, secretions, and retroflexed epiglottis.   Assessment/Plan:      Injury of pharynx  Mr Rodriguez is a 53 yo male who is s/p C3-4 ACDF surgery with post op dysphagia, odynophagia, neck pain, swelling and crepitus. CT and scope demonstrates DELFIN drain in the hypopharynx. Patient with difficulty with swallowing secretions. No respiratory compromise. He is s/p neck exploration and pharyngeal repair on 1/30. Discussed with patient's mother and sister at bedside the recommendation for open G tube and tracheostomy to allow adequate time for pharynx to heal. Now s/p trach and open G tube on 2/3/24.     2/9/24: Discussed that there is persistent pharyngeal edema, which indicates that maintaining the tracheostomy tube for the meantime would be the best course of action. Discussed that we can allow the patient to sponge bath and wear normal clothing for comfort, floor nurse aware. Will obtain PMSV from SLP and transition to bolus tube feeding for more independence and comfort. Patient and mom expressed understanding.     - Strict NPO, NPO for 4-6 weeks minimum  - ID consulted for abx recommendations in setting of hardware with pharyngeal injury          - Currently off all abx, s/p vanc and unasyn   - s/p tracheostomy, with 6-0 cuffless shiley in place   - R neck drain to stay in place until removed by ENT   - Will transition to bolus tube feeding  - SLP consult for PMSV  - Rest of care for per " "primary  - Please page ENT with questions or concerns"      Objective:     Level of Alertness:  Alert  Cooperative    Secretion Management:   Patient was suctioned prior to placement of Passy Andi Valve (PMV)    Pain/Comfort:  Pain Rating 1: 0/10    Respiratory Status:  trach collar    Barriers to Learning: none evident at this time - mental status appeared to be WFL    Oral Musculature Evaluation  Oral Musculature: WFL  Dentition: present and adequate    Trach/Vent:  Tracheostomy Type  Shiley  Uncuffed  Tracheostomy Size: 6.0    Pre Treatment Measures  Trach Collar    O2 spo2 96%  Heartrate 71    One Way Speaking Valve Placement:  Type of speaking valve: One Way Speaking Valve  Clear    Sp02 before trial: 96%  Sp02 during trial: 96%  Time on valve: >10 minutes, removed to demonstrate and allow pt to practice donning/doffing. PMSV replaced and left on at end of session.   Phonation on exhalation: yes  Achieved phonation on 100% of words  Overall speech intelligibility: excellent    Patient reaction: Pleased    Laryngeal function:    Voluntary Cough  WNL    Voluntary Throat Clear:   WNL    Voice Quality:  Vocal Intensity within Functional Limits; vocal quality was slightly wet when speaking over the trach without the PMSV. Vocal quality became clearer as pt continue to phonate with PSMV in place.     Education provided: to pt and family regarding role of SLP, one way speaking valve technology, PMSV benefits and purpose, PMSV precautions and care instructions, recommendations to wear PMSV during waking hours as tolerated, recommendations to remove PMSV if secretions become too bothersome, donning/doffing PMSV, and SLP treatment plan and POC.  Pt videoed SLP donning and doffing PMSV then demonstrated donning/doffing using "selfie" function on smart phone as mirror with min assist.  Good understanding of education was expressed.     Post Treatment Measures:  Trach Collar    O2 96%  Heartrate 71    Goals: "   Multidisciplinary Problems       SLP Goals          Problem: SLP    Goal Priority Disciplines Outcome   SLP Goal     SLP    Description: Speech Language Pathology Goals  Goals expected to be met by 2/16:  1. Pt will tolerated wearing PMSV for waking hours while maintaining SPO2 >94% and no signs of respiratory distress.   2. Pt will demonstrate consistent ability to don/doff PMSV independently.                                Plan:     Patient to be seen:  4 x/week   Plan of Care expires:  03/10/24  Plan of Care reviewed with:  patient, family   SLP Follow-Up:  Yes       Discharge recommendations:  High Intensity Therapy     Time Tracking:     SLP Treatment Date:   02/09/24  Speech Start Time:  1444  Speech Stop Time:  1514     Speech Total Time (min):  30 min    Billable Minutes: Evaluation Use/Fit Voiced Prosthetic 15 and Self Care/Home Management Training 15    02/09/2024

## 2024-02-09 NOTE — PROGRESS NOTES
[facility] - Neurosurgery ([facility])  Hospital Medicine  Progress Note    Patient Name: Rebel Rodriguez  MRN: [id number]  Patient Class: IP- Inpatient   Admission Date: 1/30/2024  Length of Stay: 10 days  Attending Physician: Ty Munoz*  Primary Care Provider: Nanda Smith MD        Subjective:     Principal Problem:Cervical myelopathy        HPI:  53 y.o. male with hx of psoriatic arthritis, hx TIA on Aspirin 81 mg, s/p C4-7 ACDF with Dr. Huff 10 years ago admitted to [facility] s/p C3-C4 ACDF. Per chart review, reports rapid functional decline over the last 3 weeks. Endorses hand clumsiness, difficulty typing, gait imbalance, difficulty butoning, dropping items, falls. Difficulty with ambulating also due to LLE weakness. States it feels like he has rubber bands around his wrists. Reports difficulty with overhead mobility and shock-like pains down spine when raising arms overhead. CT neck soft tissue pending, Patient admitted to [facility] for close monitoring and higher level of care.      Hospital Course: 01/31/2024 CT neck concerning for extensive soft tissue edema and air in neck, additionally w/ concern for DELFIN drain misplaced into hypopharynx. Taken class A to OR by ENT for pharyngeal repair, left intubated by ENT in setting of airway edema. On high dose steroids, no cuff leak this AM  02/01/2024 General surgery consulted for open G tube placement, family still in discussion regarding trach. D/c dex to allow for adequate wound healing, ok with NSGY.   2/2/24: possible G-tube placement today  2/3/24: G-tube today  2/4/24: ok to step down to hospital medicine     Interval History: trach collar and g-tube in place, ok to step down to Hospital medicine    ENT recs: Now s/p trach and open G tube on 2/3/24.     - Strict NPO, NPO for 4-6 weeks minimum  - ID consulted for abx recommendations in setting of hardware with pharyngeal injury          - Currently off all abx, s/p vanc and unasyn   - s/p tracheostomy, with  6-0 cuffed shiley in place          - CUFF TO REMAIN UP - until otherwise directed by ENT          - Tentative plan to deflate on Tuesday  - R neck drain to stay in place until at least Tuesday       Overview/Hospital Course:  2/6- Transferred to [facility] med, IMN. Has trach and g-tube. Rehab is planned. BP low received , TF not at goal due to abd cramping. Will give suppository. Rounded with ENT service. Plan is to remove trache first, remove drain last. Pt communicating fairly well and able to make request and advocate for self. He is sitting up in a chair. NPO with G-tube for six weeks. Trach care q 4 hours.     2/7- trach change planned tomorrow  Daily evaluating.  Strict NPO, NPO for 4-6 weeks minimum.  ID consulted in ICU- no abx needed.  Currently off all abx, s/p vanc and unasyn - R neck drain to stay in place until removed by ENT. 75 % of TF goal. Had one BM after suppository.  Wbc 16-> 14.  No cultures. 99/54 and other VSS. AF. Will bolus  cc for low BP.  CM plan: Pt and family may want to go to the [facility] ([facility]) with HH at discharge.  - ENT may dc pt home w trache.  2/8 ENT trach changed for 6-0 cuffless     Interval History: Overnight pt with confusion and concern for hallucinations, see documentation.   CTH negative  UA, CXR pending  Evaluated at bedside this am with pt sister present.   Still has not been sleeping well.   Psych consulted and will increase remeron to 30 mg and seroquel 50 mg qhs    Review of Systems  Objective:     Vital Signs (Most Recent):  Temp: 98.8 °F (37.1 °C) (02/09/24 1125)  Pulse: 78 (02/09/24 1419)  Resp: 16 (02/09/24 1419)  BP: (!) 103/55 (02/09/24 1125)  SpO2: 98 % (02/09/24 1419) Vital Signs (24h Range):  Temp:  [98.2 °F (36.8 °C)-99.5 °F (37.5 °C)] 98.8 °F (37.1 °C)  Pulse:  [76-94] 78  Resp:  [16-22] 16  SpO2:  [95 %-100 %] 98 %  BP: ()/(53-78) 103/55     Weight: 74.9 kg (165 lb 2 oz)  Body mass index is 25.11 kg/m².    Intake/Output  Summary (Last 24 hours) at 2/9/2024 1428  Last data filed at 2/9/2024 0515  Gross per 24 hour   Intake 1040 ml   Output 5 ml   Net 1035 ml         Physical Exam  Constitutional:       General: He is not in acute distress.     Appearance: Normal appearance. He is normal weight. He is not ill-appearing, toxic-appearing or diaphoretic.   HENT:      Head:      Comments: Trache present  Eyes:      Extraocular Movements: Extraocular movements intact.      Conjunctiva/sclera: Conjunctivae normal.      Pupils: Pupils are equal, round, and reactive to light.   Neck:      Vascular: No carotid bruit.   Cardiovascular:      Rate and Rhythm: Normal rate and regular rhythm.      Pulses: Normal pulses.      Heart sounds: Normal heart sounds. No murmur heard.     No friction rub. No gallop.   Pulmonary:      Effort: Pulmonary effort is normal. No respiratory distress.      Breath sounds: Normal breath sounds.   Abdominal:      General: Abdomen is flat. Bowel sounds are normal. There is no distension.      Palpations: Abdomen is soft.      Tenderness: There is no abdominal tenderness. There is no guarding or rebound.      Comments: G tube clean and dry   Musculoskeletal:         General: No swelling. Normal range of motion.      Cervical back: Normal range of motion and neck supple. No rigidity or tenderness.      Right lower leg: No edema.      Left lower leg: No edema.   Lymphadenopathy:      Cervical: No cervical adenopathy.   Skin:     General: Skin is warm and dry.      Coloration: Skin is not jaundiced or pale.   Neurological:      General: No focal deficit present.      Mental Status: He is alert and oriented to person, place, and time.             Significant Labs: All pertinent labs within the past 24 hours have been reviewed.    Significant Imaging: I have reviewed all pertinent imaging results/findings within the past 24 hours.    Assessment/Plan:      * Cervical myelopathy  Hx of prior ACDF C4 C7 with adjacent level 3-4  who presented for elective C3-4 ACDF on 01/30, complicated by retropharyngeal injury required repair by ENT.   - s/p NCC, intubation and ventilation, trache and G tube.  - will not keep trach for 6 weeks, but NPO with G tube for 6 weeks, f/u ENT  - has right neck drain to be removed by ENT    Postprocedural hypotension  2/6- RL 500cc  2/7  cc IV.      Abdominal cramping  Suspect CONSTIPATION  Ducolox suppository  2/7- one BM      Physical deconditioning  Has trache and G tube,   PT/OT  Wean oxygen  Teach trache and G-tube care      Gastrostomy status  Patient noted to have a percutaneous endoscopic gastrostomy tube in place. I have personally inspected the tube.Tube was placed on this admission, with date of procedure- 2/2  There are no signs of drainage or infection around the site. The tube is patent. Medications have not converted to liquid form if available.  Routine care to be done by wound care and nursing staff.      Isossource @ 35 cc/ h  Peptomen 1.5 w bio 45cc/ h  Water flushes    2/7 advance TF as tolerated to goal        Anxiety  Hx of    - C/w home remeron and fluoxetine  - seroquel BID    On mechanically assisted ventilation  S/p ventilation in NCC  Now on room air, has trache for six weeks  Sitting up in chair      Injury of pharynx  DELFIN drain noted to be in hypopharynx on post-op imaging, s/p emergent surgical repair on 1/30  - Dex 4q6hrs, discontinued  ENT recs: Now s/p trach and open G tube on 2/3/24.  - Strict NPO, NPO for 4-6 weeks minimum  - ID consulted for abx recommendations in setting of hardware with pharyngeal injury          - Currently off all abx, s/p vanc and unasyn   - s/p tracheostomy, with 6-0 cuffed shiley in place          - CUFF TO REMAIN UP - until otherwise directed by ENT          - Tentative plan to deflate on Tuesday  - R neck drain to stay in place until at least Tuesday   - needs time for wound healing    2/7-trache care q 4 hours.  trach exchange planned 2/8 by ENT.  Planning to remove it this admission before discharge. Drain to be removed later this admission per ENT.       Hyperlipidemia  Hx of    - C/w home atorvastatin         VTE Risk Mitigation (From admission, onward)           Ordered     heparin (porcine) injection 5,000 Units  Every 8 hours         02/05/24 0919     Place LAUREN hose  Until discontinued         01/30/24 0513     Place sequential compression device  Until discontinued         01/30/24 0513                    Discharge Planning   JUSTYN: 2/14/2024     Code Status: Full Code   Is the patient medically ready for discharge?: No    Reason for patient still in hospital (select all that apply): Patient trending condition, Treatment, and Consult recommendations  Discharge Plan A: Rehab   Discharge Delays: (!) Change in Medical Condition      Ty Munoz MD  Department of Hospital Medicine   [facility] - Neurosurgery ([facility])

## 2024-02-09 NOTE — CONSULTS
CONSULTATION LIAISON PSYCHIATRY INITIAL EVALUATION    Patient Name: Rebel Rodriguez  MRN: [id number]  Patient Class: IP- Inpatient  Admission Date: 1/30/2024  Attending Physician: Ty Munoz*      HPI:   Rebel Rodriguez is a 54 y.o. male with past psychiatric history of depression, grief, decrease appetite & past pertinent medical history of cervical myelopathy, s/p C3-4 ACDF surgery with post op dysphagia, odynophagia, neck pain, swelling and crepitus. CT and scope demonstrates DELFIN drain in the hypopharynx. Patient with difficulty with swallowing secretions.He is s/p neck exploration and pharyngeal repair on 1/30. Now s/p trach and open G tube on 2/3/24    Psychiatry consulted for hallucinations    On psych exam, Mr. Rodriguez is alert and oriented. He expresses frustration with current situation. He denies current hallucinations and notes that symptoms worsen in the evening. He is open to trial of increase remeron at night to help with sleep and restarting seroquel at higher dose to help with hallucinations, confusion, and sleep.       Collateral with patient's permission:   Spoke with Mr. Rodriguez's sister and mother. They explain that he is having trouble sleeping and is experiencing and endorsing visual hallucinations of people holding him down, worse at night and over the last few days.  Discussed medication options with family and patient, all questions and concerns addressed. They agree to restart seroquel as symptoms worsening over the last two days after seroquel was discontinued.  Also spoke with member of day team who explains that pt has been experiencing worsening hallucinations and paranoia over the last two days, work at [employer].    Medical Review of Systems:  Pertinent items are noted in HPI.    Psychiatric Review of Systems (is patient experiencing or having changes in):  sleep: yes, decrease  appetite: yes, decrease  weight: no  energy/anergy: yes, decrease  interest/pleasure/anhedonia: yes,  "decrease  somatic symptoms: no  libido: not discussed  anxiety/panic: yes, increase  guilty/hopelessness: no  concentration: no  Latanya:no  Psychosis: no  Trauma: no  S.I.B.s/risky behavior: no    Past Psychiatric History:  Previous Medication Trials: yes, prozac, remeron are current home medications  Previous Psychiatric Hospitalizations:no   Previous Suicide Attempts: no  History of Violence: no  Outpatient Psychiatrist: no  Family Psychiatric History: not discussed    Substance Abuse History (with emphasis over the last 12 months):  Recreational Drugs:  denies  Use of Alcohol: occasional, social use  Tobacco Use:no  Rehab History:no    Social History:  Marital Status: not   Children: 0  Employment Status/Info: currently employed  :no  Education:  not discussed  Special Ed: no  Housing Status: alone  Access to gun: no  Psychosocial Stressors: health  Functioning Relationships: good support system    Legal History:  Past Charges/Incarcerations: not discussed  Pending charges:no    Mental Status Exam:  General Appearance: appears stated age, well developed and nourished, adequately groomed and appropriately dressed, in no acute distress  Behavior: normal; cooperative; reasonably friendly, pleasant, and polite; appropriate eye-contact; under good behavioral control  Involuntary Movements and Motor Activity: no abnormal involuntary movements noted; no tics, no tremors, no akathisia, no dystonia, no evidence of tardive dyskinesia; no psychomotor agitation or retardation  Gait and Station: unable to assess - patient lying down or seated  Speech and Language: normal rate, soft  Mood: "frustrated"  Affect: mood-congruent  Thought Process and Associations: intact; linear, goal-directed, organized, and logical; no loosening of associations noted  Thought Content and Perceptions:: no suicidal ideation, endorses visual hallucinations, episodic over the last several days  Sensorium and Orientation: intact; alert " with clear sensorium; oriented fully to person, place, time and situation  Recent and Remote Memory: grossly intact, able to recall relevant and salient information from the recent and remote past  Attention and Concentration: grossly intact, attentive to the conversation and not readily distractible  Fund of Knowledge: grossly intact, used appropriate vocabulary and demonstrated an awareness of current events, consistent with educational level achieved  Insight: demonstrates awareness of illness, demonstrates awareness of situation  Judgment: behavior is adequate/appropriate to circumstances    CAM ICU positive? no      ASSESSMENT & RECOMMENDATIONS       MDD mild/moderate/severe, BIPOLAR I/II, Unspecified mood etc  PSYCH MEDICATIONS  Scheduled- ok to continue prozac 40 mg daily    Insomnia  PSYCH MEDICATIONS  Scheduled- recommend increasing remeron to 30 mg at night    Delirium  Continue medical workup for delirium  Please obtain repeat EKG  PSYCH MEDICATIONS  Scheduled- Recommend seroquel 50 mg at night if Qtc is not prolonged  PRN- seroquel 50 mg q6 hours as needed for agitation/hallucinations    DELIRIUM  DELIRIUM BEHAVIOR MANAGEMENT  PLEASE utilize CHEMICAL restraints with PRN meds first for agitation. Minimize use of PHYSICAL restraints OR have periods of being out of physical restraints if possible.  Keep window shades open and room lit during day and room dim at night in order to promote normal sleep-wake cycles  Encourage family at bedside. Colon patient often to situation, location, date.  Continue to Limit or Discontinue use of Narcotics, Benzos and Anti-cholinergic medications as they may worsen delirium.  Continue medical workup for causative etiology of Delirium.       FOLLOW UP  Will follow up while in house    DISPOSITION - once medically cleared:    Defer to medical team    Please contact ON CALL psychiatry service (24/7) for any acute issues that may arise.    Dr. Jaclyn Danielle  CL  Psychiatry  [facility]  2/9/2024 1:12 PM        --------------------------------------------------------------------------------------------------------------------------------------------------------------------------------------------------------------------------------------    CONTINUED HISTORY & OBJECTIVE clinical data & findings reviewed and noted for above decision making    Past Medical/Surgical History:   Past Medical History:   Diagnosis Date    Achilles tendon rupture 10/09/2013    Achilles tendon rupture 10/09/2013    Allergy     Anemia 1/17/2024    Anxiety     Arthritis     psoriatric    Degenerative disc disease     Drug-induced lupus erythematosus     Drug-induced lupus erythematosus 03/09/2016    Hyperlipidemia     Inguinal lymphadenopathy 07/21/2015    Kidney stone     Medication monitoring encounter 12/07/2016    Neck pain 07/06/2017    Psoriatic arthritis     Psoriatic arthritis 12/07/2016    Recurrent fever 07/08/2015    Recurrent nephrolithiasis 05/19/2014    On low oxalate diet    Sinusitis 02/25/2016    Stroke     TIA (transient ischemic attack)     Ulcer     high school    Unexplained night sweats 07/13/2015     Past Surgical History:   Procedure Laterality Date    ACHILLES TENDON SURGERY      BACK SURGERY      DIRECT LARYNGOSCOPY  2/3/2024    Procedure: LARYNGOSCOPY, DIRECT;  Surgeon: Jodie Collier MD;  Location: [address];  Service: ENT;;    FUNCTIONAL ENDOSCOPIC SINUS SURGERY (FESS) USING COMPUTER-ASSISTED NAVIGATION Bilateral 9/14/2018    Procedure: FESS, USING COMPUTER-ASSISTED NAVIGATION;  Surgeon: Gerard Manzo MD;  Location: [facility] OR 2ND FLR;  Service: ENT;  Laterality: Bilateral;    FUSION, SPINE, CERVICAL, ANTERIOR APPROACH N/A 1/30/2024    Procedure: C3-4 anterior cervical discectomy and fusion;  Surgeon: Rogerio Maradiaga MD;  Location: [facility] OR [address] [place] [address];  Service: Neurosurgery;  Laterality: N/A;  C3-4 anterior cervical discectomy and fusion  anesthesia:  general  nerve mon: EMG/SEP/MEP  radiology: C-arm  bed: reg. slider  position: supine  headrest: caspar, GW tongs/hanging weights, surgical pillow    INJECTION OF ANESTHETIC AGENT AROUND NERVE Left 5/13/2022    Procedure: Block, Nerve MEDIAL BRANCH BLOCK LEFT C2,3,4,5;  Surgeon: Kimberly Wilson MD;  Location: [facility] PAIN [facility]GT;  Service: Pain Management;  Laterality: Left;    INJECTION OF ANESTHETIC AGENT AROUND NERVE Left 5/24/2022    Procedure: BLOCK, NERVE, LEFT C2-C5 MEDIAL BRANCH;  Surgeon: Kimberly Wilson MD;  Location: [facility];  Service: Pain Management;  Laterality: Left;    LAPAROTOMY N/A 2/3/2024    Procedure: LAPAROTOMY with gastrostomy tube placement;  Surgeon: Benigno Perez MD;  [facility];  Service: General;  Laterality: N/A;    NASAL SEPTOPLASTY N/A 9/14/2018    Procedure: SEPTOPLASTY, NASAL;  Surgeon: Gerard Manzo MD;  [facility];  Service: ENT;  Laterality: N/A;    NASAL TURBINATE REDUCTION Bilateral 9/14/2018    Procedure: REDUCTION, NASAL TURBINATE;  Surgeon: Gerard Manzo MD;  [facility];  Service: ENT;  Laterality: Bilateral;    NECK EXPLORATION N/A 1/30/2024    Procedure: EXPLORATION, NECK, REPAIR OF PHARYNGEAL INJURY;  Surgeon: Senthil Agarwal MD;  [facility];  Service: ENT;  Laterality: N/A;    RADIOFREQUENCY ABLATION Left 7/12/2022    Procedure: RADIOFREQUENCY ABLATION, LEFT C2-C3, C3-C4, C4-C5;  Surgeon: Kimberly Wilson MD;  Location: [facility];  Service: Pain Management;  Laterality: Left;    TRACHEOTOMY  2/3/2024    Procedure: TRACHEOTOMY;  Surgeon: Jodie Collier MD;  [facility];  Service: ENT;;    UPPER ENDOSCOPY W/ ESOPHAGEAL MANOMETRY      URETERAL STENT PLACEMENT         Current Medications:   Scheduled Meds:    albuterol-ipratropium  3 mL Nebulization Q6H    atorvastatin  10 mg Per NG tube Daily    FLUoxetine  40 mg Per NG tube Daily    heparin (porcine)  5,000 Units Subcutaneous  Q8H    mirtazapine  15 mg Per NG tube QHS    polyethylene glycol  17 g Per NG tube BID    senna-docusate 8.6-50 mg  2 tablet Per NG tube BID     PRN Meds: acetaminophen, bisacodyL, dextrose 10 % in water (D10W), dextrose 10%, dextrose 10%, glucagon (human recombinant), HYDROmorphone, insulin aspart U-100, ondansetron, oxyCODONE, prochlorperazine    Allergies:   Review of patient's allergies indicates:   Allergen Reactions    Erythromycin Nausea And Vomiting    Infliximab Other (See Comments)     Lupus with fever and acute arthritis  Lupus with fever and acute arthritis       Vitals  Vitals:    02/09/24 1202   BP:    Pulse: 77   Resp:    Temp:        Labs/Imaging/Studies:  Recent Results (from the past 24 hour(s))   CBC auto differential    Collection Time: 02/09/24  4:18 AM   Result Value Ref Range    WBC 15.44 (H) 3.90 - 12.70 K/uL    RBC 3.87 (L) 4.60 - 6.20 M/uL    Hemoglobin 10.9 (L) 14.0 - 18.0 g/dL    Hematocrit 33.4 (L) 40.0 - 54.0 %    MCV 86 82 - 98 fL    MCH 28.2 27.0 - 31.0 pg    MCHC 32.6 32.0 - 36.0 g/dL    RDW 14.0 11.5 - 14.5 %    Platelets 357 150 - 450 K/uL    MPV 10.7 9.2 - 12.9 fL    Immature Granulocytes 1.0 (H) 0.0 - 0.5 %    Gran # (ANC) 12.2 (H) 1.8 - 7.7 K/uL    Immature Grans (Abs) 0.16 (H) 0.00 - 0.04 K/uL    Lymph # 1.7 1.0 - 4.8 K/uL    Mono # 1.2 (H) 0.3 - 1.0 K/uL    Eos # 0.1 0.0 - 0.5 K/uL    Baso # 0.04 0.00 - 0.20 K/uL    nRBC 0 0 /100 WBC    Gran % 79.2 (H) 38.0 - 73.0 %    Lymph % 11.2 (L) 18.0 - 48.0 %    Mono % 7.8 4.0 - 15.0 %    Eosinophil % 0.5 0.0 - 8.0 %    Basophil % 0.3 0.0 - 1.9 %    Differential Method Automated    Comprehensive metabolic panel    Collection Time: 02/09/24  4:18 AM   Result Value Ref Range    Sodium 138 136 - 145 mmol/L    Potassium 4.1 3.5 - 5.1 mmol/L    Chloride 103 95 - 110 mmol/L    CO2 24 23 - 29 mmol/L    Glucose 104 70 - 110 mg/dL    BUN 23 (H) 6 - 20 mg/dL    Creatinine 0.8 0.5 - 1.4 mg/dL    Calcium 9.8 8.7 - 10.5 mg/dL    Total Protein 6.7  6.0 - 8.4 g/dL    Albumin 2.9 (L) 3.5 - 5.2 g/dL    Total Bilirubin 0.6 0.1 - 1.0 mg/dL    Alkaline Phosphatase 86 55 - 135 U/L    AST 31 10 - 40 U/L    ALT 29 10 - 44 U/L    eGFR >60.0 >60 mL/min/1.73 m^2    Anion Gap 11 8 - 16 mmol/L   Magnesium    Collection Time: 02/09/24  4:18 AM   Result Value Ref Range    Magnesium 2.0 1.6 - 2.6 mg/dL   Phosphorus    Collection Time: 02/09/24  4:18 AM   Result Value Ref Range    Phosphorus 3.5 2.7 - 4.5 mg/dL

## 2024-02-09 NOTE — NURSING
"Called to patient's room by the family member at the bedside multiple times this shift. Patient's family member expressed concerns about changes in patient's cognition/mental status. Per the family member, patient is requesting help with tasks that do not make sense such as helping him scoot to the bathroom on his bottom with no need to actually use the bathroom at this time. Per the family member, patient is angry about complications that have occurred with this hospitalization. Neuro assessment performed and patient is Aox4, able to answer questions appropriately, and able to follow commands. Seroquel 50mg Q12 previously ordered but had been refused by patient/family due to belief that medication was contributing to the changes in cognition. Seroquel was then discontinued. PRN Hydroxyzine 25mg also being refused by patient/family as well as they believe this medication may also be contributing to the changes in cognition. Patient has not slept this shift and per the family, patient has not had a good restful night's sleep in days despite delirium precautions being ordered. Hospital medicine provider on call notified of situation.         Called to patient's room by family at approximately 0400. Upon arrival to the bedside, patient lying in bed, on his cell phone with 911, and appears to be  upset. When asking if there was anything the patient needed assistance with, patient responded that people have come into his room to "party" in the "kitchen." Attempted to reassure the patient that the only people in the room at this time were the patient, his mother, and bedside nurse. Patient became more upset and agitated. Neuro assessment performed and patient remains Aox4, able to answer questions appropriately, and able to follow commands. Patient then accused nursing staff of being one sided and not wanting to help him. Patient then requested that security come to the floor to show him video footage that would prove " people did come in and out of his room. Patient grew increasingly agitated after informing him that patient care areas are not filmed. Asked the patient if he recalled his last time he was able to get a good night's rest, patient stated that he has not gotten a decent night's rest since his first night of hospitalization.  Bedside nurse requested the charge nurse's assistance to diffuse the situation but attempts to verbally redirect patient were unsuccessful. Notified [facility] provider on call about situation. Provider asked if patient would allow for labs and medications to help with this but patient refused. Updated the charge nurse and family on provider's decision and POC. Provider also stated CT head , UA, and inpatient consult to psych would be placed. No further orders received at this time. Patient and family updated on POC.     No acute signs of distress noted at this time.

## 2024-02-09 NOTE — CONSULTS
Nutrition consult received regarding bolus TF recommendations.  RD following, please see note from 2/8 for full assessment.     Recommendations     Continue TF with Peptamen 1. 5 w/ prebio @ 45ml/hr to provide 1620 kcal, 73g protein, 832ml FW.   Rec'd additional 200ml FWF QID or per MD  If bolus TF warranted: Peptamen 1.5 w/ prebio x 4.5 cartons per day to provide 1687 kcal, 77 g protein, 864 ml FW     RD to monitor and follow     Goals: Meet % EEN, EPN by RD f/u date  Nutrition Goal Status: goal met  Communication of RD Recs:  (POC)    Thanks!  Jaclyn, MS, RD, LDN

## 2024-02-09 NOTE — PLAN OF CARE
Problem: Adult Inpatient Plan of Care  Goal: Plan of Care Review  Outcome: Ongoing, Progressing  Goal: Patient-Specific Goal (Individualized)  Outcome: Ongoing, Progressing  Goal: Absence of Hospital-Acquired Illness or Injury  Outcome: Ongoing, Progressing  Goal: Optimal Comfort and Wellbeing  Outcome: Ongoing, Progressing  Goal: Readiness for Transition of Care  Outcome: Ongoing, Progressing     Problem: Fall Injury Risk  Goal: Absence of Fall and Fall-Related Injury  Outcome: Ongoing, Progressing     Problem: Skin Injury Risk Increased  Goal: Skin Health and Integrity  Outcome: Ongoing, Progressing     Problem: Sleep Disturbance (Delirium)  Goal: Improved Sleep  Outcome: Ongoing, Not Progressing               POC reviewed and updated with the patient/family at the bedside. Questions regarding POC were encouraged and addressed. VSS, see flowsheets. Tele maintained per order.   Patient is AOx 4 at this time. Fall and safety precautions maintained, no signs of injury noted during shift. 1 DELFIN drain to full suction and  in place, see flowsheets for output. Trach present, suction at bedside as needed. Tube feed continued as ordered via PEG, see flowsheets for details.  Pain management utilizing PRN pain medications effective, see MAR for administration details. Patient repositioned independently/ with assistance for comfort with bed locked in low position, side rails up x 3, bed alarm set, with call light within reach. Instructed patient to call staff for mobility, verbalized understanding. No acute signs of distress noted at this time.

## 2024-02-09 NOTE — PLAN OF CARE
LUIS CARLOS contacted admissions for [facility], family's first choice, and spoke with Nury [phone]. They are now reviewing Pt's case. [facility] is evaluating. [facility] has accepted.   LUIS CARLOS will follow up with Marthao later today.    SW spoke with Pt's sister, Ginny and answered questions regarding PT/OT with home health, outpatient rehab, etc. Family is exploring having private sitters to assist with patient care once he is able to discharge home, SW provided resources.    SW will follow and update family accordingly.    CHRISTINA Rogers, CHAD  [facility]  [id number]

## 2024-02-09 NOTE — PT/OT/SLP PROGRESS
Physical Therapy Treatment    Patient Name:  Rebel Rodriguez   MRN:  [id number]    Recommendations:     Discharge Recommendations: High Intensity Therapy  Discharge Equipment Recommendations: walker, rolling, bath bench, bedside commode  Barriers to discharge: Inaccessible home and Decreased caregiver support    Assessment:     Rebel Rodriguez is a 54 y.o. male admitted with a medical diagnosis of Cervical myelopathy.  He presents with the following impairments/functional limitations: weakness, impaired endurance, impaired self care skills, impaired functional mobility, gait instability, impaired balance, decreased upper extremity function, decreased lower extremity function, decreased safety awareness. Pt would benefit from high intensity/frequency therapy for: Dynamic/static standing/sitting balance through skilled balance training, strengthening with the use of skilled therapeutic exercises interventions, mobility and safety training to ensure safe discharge home through skilled patient and caregiver education, and mobility through adaptive equipment training. Pt highly motivated to return to independent PLOF and can tolerate 3+hours of therapy. Pt continues to benefit from a collaborative multidisciplinary program to improve quality of life and focus on recovery of impairments.    Rehab Prognosis: Good; patient would benefit from acute skilled PT services to address these deficits and reach maximum level of function.    Recent Surgery: Procedure(s) (LRB):  LAPAROTOMY with gastrostomy tube placement (N/A)  LARYNGOSCOPY, DIRECT  TRACHEOTOMY 5 Days Post-Op    Plan:     During this hospitalization, patient to be seen 4 x/week to address the identified rehab impairments via gait training, therapeutic activities, therapeutic exercises, neuromuscular re-education and progress toward the following goals:    Plan of Care Expires:  02/28/24    Subjective     Chief Complaint: none verbalized this date  Patient/Family  Comments/goals: return to PLOF  Pain/Comfort:  Pain Rating 1: 0/10  Pain Rating Post-Intervention 1: 0/10      Objective:     Communicated with RN prior to session.  Patient found up in chair with peripheral IV, telemetry, Tracheostomy, DELFIN drain, PEG Tube, oxygen upon PT entry to room.     General Precautions: Standard, fall, NPO  Orthopedic Precautions: N/A  Braces: N/A  Respiratory Status:  trach collar     Functional Mobility:  Transfers:     Sit to Stand:  minimum assistance with no AD  Gait: 5 ft area near chair, steps being taken in area for balance assessment  Balance:   Static Sitting: Supervision  Dynamic Sitting: Supervision  Static Standing:   Romberg/NBOS: x30 seconds no UE support  R SLS: 3x20 seconds, BUE support as needed  L SLS: 3x20 seconds, BUE support as needed  L semi-tandem: 1 minute, BUE support as needed; easier than R  R semi-tandem: 1 minute, BUE support as needed  L tandem: 1 minute, BUE support as needed; easier than R  R tandem: 1 minute, BUE support as needed  Dynamic Standing: N/T this date      AM-PAC 6 CLICK MOBILITY  Turning over in bed (including adjusting bedclothes, sheets and blankets)?: 3  Sitting down on and standing up from a chair with arms (e.g., wheelchair, bedside commode, etc.): 3  Moving from lying on back to sitting on the side of the bed?: 3  Moving to and from a bed to a chair (including a wheelchair)?: 3  Need to walk in hospital room?: 3  Climbing 3-5 steps with a railing?: 2  Basic Mobility Total Score: 17       Treatment & Education:  Patient educated on role of therapy, goals of session, and benefits of mobilizing.   Discussed PT plan of care during hospitalization.   Patient educated on calling for assistance.   Patient educated on how their diagnosis impacts their mobility within PT scope of practice.   All questions answered within PT scope of practice.    Patient left up in chair with all lines intact, call button in reach, RN notified, and spouse  present..    GOALS:   Multidisciplinary Problems       Physical Therapy Goals          Problem: Physical Therapy    Goal Priority Disciplines Outcome Goal Variances Interventions   Physical Therapy Goal     PT, PT/OT Ongoing, Progressing                         Time Tracking:     PT Received On: 02/08/24  PT Start Time: 1700     PT Stop Time: 1717  PT Total Time (min): 17 min     Billable Minutes: Neuromuscular Re-education 17    Treatment Type: Treatment  PT/PTA: PT     Number of PTA visits since last PT visit: 0     02/08/2024

## 2024-02-09 NOTE — PROGRESS NOTES
I have seen the patient, reviewed the resident physician's history and physical, assessment, and plan. I have personally interviewed and examined the patient at bedside and agree with the findings.     - Unable to evaluate area of pharyngeal injury secondary to postoperative swelling. Unclear if predominantly related to hardware or injury  - Counseled patient and family that for safety reasons, it would not be a good idea to remove the trach at this time secondary to inability to intubate from above should an emergency arise      Jodie Collier MD  Otolaryngology - Head & Neck Surgery     ___________________________________________________________________    [address] - Neurosurgery ([facility])  Otorhinolaryngology-Head & Neck Surgery  Progress Note    Subjective:     Post-Op Info:  Procedure(s) (LRB):  LAPAROTOMY with gastrostomy tube placement (N/A)  LARYNGOSCOPY, DIRECT  TRACHEOTOMY   6 Days Post-Op  Hospital Day: 11     Interval History: Patient with more anxiety and asking to go home, stating he feels exposed at [facility] and wishes for a few hours to shower and wear normal clothing.     Medications:  Continuous Infusions:   sodium chloride 0.9% Stopped (02/03/24 0801)    dextrose 10 % in water (D10W)       Scheduled Meds:   albuterol-ipratropium  3 mL Nebulization Q6H    atorvastatin  10 mg Per NG tube Daily    FLUoxetine  40 mg Per NG tube Daily    heparin (porcine)  5,000 Units Subcutaneous Q8H    mirtazapine  15 mg Per NG tube QHS    polyethylene glycol  17 g Per NG tube BID    senna-docusate 8.6-50 mg  2 tablet Per NG tube BID     PRN Meds:acetaminophen, bisacodyL, dextrose 10 % in water (D10W), dextrose 10%, dextrose 10%, glucagon (human recombinant), HYDROmorphone, insulin aspart U-100, ondansetron, oxyCODONE, prochlorperazine     Review of patient's allergies indicates:   Allergen Reactions    Erythromycin Nausea And Vomiting    Infliximab Other (See Comments)     Lupus with fever and acute arthritis  Lupus  with fever and acute arthritis     Objective:     Vital Signs (24h Range):  Temp:  [98.2 °F (36.8 °C)-99.5 °F (37.5 °C)] 98.8 °F (37.1 °C)  Pulse:  [76-94] 77  Resp:  [16-22] 18  SpO2:  [95 %-100 %] 98 %  BP: ()/(53-78) 103/55     Lines/Drains/Airways       Drain  Duration                  Closed/Suction Drain 01/30/24 1916 Right;Ventral Neck Bulb 15 Fr. 9 days         Gastrostomy/Enterostomy 02/03/24 0934 Gastrostomy tube w/ balloon LUQ 6 days              Airway  Duration             Adult Surgical Airway 02/03/24 1055 Shiley Uncuffed 6.0/ 75mm 6 days              Peripheral Intravenous Line  Duration                  Peripheral IV - Single Lumen 02/05/24 1915 18 G Anterior;Right Forearm 3 days                   Physical Exam  Resting in bed   6-0 cuffless tracheostomy tube in good position, secured with soft collar, neck is soft and palpable  Neck incision c/d/i  Drain holding suction     Significant Labs:  BMP:   Recent Labs   Lab 02/09/24  0418         CO2 24   BUN 23*   CREATININE 0.8   CALCIUM 9.8   MG 2.0     CBC:   Recent Labs   Lab 02/09/24  0418   WBC 15.44*   RBC 3.87*   HGB 10.9*   HCT 33.4*      MCV 86   MCH 28.2   MCHC 32.6     Significant Diagnostics:  I have reviewed all pertinent imaging results/findings within the past 24 hours.    Flexible Laryngoscopy   Verbal consent obtained. Combination of 4% lidocaine and oxymetazoline mixture was sprayed into the right naris with atomizer for topical anesthetics.     - Normal nasal mucosa, inferior turbinates and septum. No evidence of septal deviation or perforation. There are no visible lesions. Aimee within normal limits.  - Pharyngeal wall edema noted as expected from postoperative ACDF. Mild base of tongue fullness causing epiglottis to be retroflexed. Larynx incompletely visualized secondary to edema, secretions, and retroflexed epiglottis.   Assessment/Plan:     Injury of pharynx  Mr Rodriguez is a 53 yo male who is s/p C3-4 ACDF  surgery with post op dysphagia, odynophagia, neck pain, swelling and crepitus. CT and scope demonstrates DELFIN drain in the hypopharynx. Patient with difficulty with swallowing secretions. No respiratory compromise. He is s/p neck exploration and pharyngeal repair on 1/30. Discussed with patient's mother and sister at bedside the recommendation for open G tube and tracheostomy to allow adequate time for pharynx to heal. Now s/p trach and open G tube on 2/3/24.    2/9/24: Discussed that there is persistent pharyngeal edema, which indicates that maintaining the tracheostomy tube for the meantime would be the best course of action. Discussed that we can allow the patient to sponge bath and wear normal clothing for comfort, floor nurse aware. Will obtain PMSV from SLP and transition to bolus tube feeding for more independence and comfort. Patient and mom expressed understanding.    - Strict NPO, NPO for 4-6 weeks minimum  - ID consulted for abx recommendations in setting of hardware with pharyngeal injury    - Currently off all abx, s/p vanc and unasyn   - s/p tracheostomy, with 6-0 cuffless shiley in place   - R neck drain to stay in place until removed by ENT   - Will transition to bolus tube feeding  - SLP consult for PMSV  - Rest of care for per primary  - Please page ENT with questions or concerns        Butch Flanagan MD  Otorhinolaryngology-Head & Neck Surgery  [address] [address] - Neurosurgery ([facility])

## 2024-02-09 NOTE — PROGRESS NOTES
[facility] [address] - Neurosurgery ([facility])  Neurosurgery  Progress Note    Subjective:     History of Present Illness: Rebel Rodriguez is a 53 y.o. male with hx of psoriatic arthritis, hx TIA on Aspirin 81 mg, s/p C4-7 ACDF with Dr. Huff 10 years ago who presents for evaluation of gait imbalance. Last seen 1 year ago for neck pain and radicular pain into the left shoulder. He underwent C1-2 KENRICK with moderate relief of pain. Reports rapid functional decline over the last 3 weeks. Endorses hand clumsiness, difficulty typing, gait imbalance, difficulty butoning, dropping items, falls. Difficulty with ambulating also due to LLE weakness. States it feels like he has rubber bands around his wrists. Reports difficulty with overhead mobility and shock-like pains down spine when raising arms overhead. Denies b/b incontinence.     Post-Op Info:  Procedure(s) (LRB):  LAPAROTOMY with gastrostomy tube placement (N/A)  LARYNGOSCOPY, DIRECT  TRACHEOTOMY   6 Days Post-Op   Interval History: 2/9: Patient with profound delirium overnight. Remains altered this AM. Sedating medications held. Afebrile but WBC 15.44 this AM. Infectious work up pending. Psych consulted for assistance.     Medications:  Continuous Infusions:   sodium chloride 0.9% Stopped (02/03/24 0801)    dextrose 10 % in water (D10W)       Scheduled Meds:   albuterol-ipratropium  3 mL Nebulization Q6H    atorvastatin  10 mg Per NG tube Daily    FLUoxetine  40 mg Per NG tube Daily    heparin (porcine)  5,000 Units Subcutaneous Q8H    mirtazapine  15 mg Per NG tube QHS    polyethylene glycol  17 g Per NG tube BID    senna-docusate 8.6-50 mg  2 tablet Per NG tube BID     PRN Meds:acetaminophen, bisacodyL, dextrose 10 % in water (D10W), dextrose 10%, dextrose 10%, glucagon (human recombinant), HYDROmorphone, insulin aspart U-100, ondansetron, oxyCODONE, prochlorperazine     Review of Systems  Objective:     Weight: 74.9 kg (165 lb 2 oz)  Body mass index is 25.11 kg/m².  Vital  Signs (Most Recent):  Temp: 98.8 °F (37.1 °C) (02/09/24 1125)  Pulse: 77 (02/09/24 1202)  Resp: 18 (02/09/24 1125)  BP: (!) 103/55 (02/09/24 1125)  SpO2: 98 % (02/09/24 1125) Vital Signs (24h Range):  Temp:  [98.2 °F (36.8 °C)-99.5 °F (37.5 °C)] 98.8 °F (37.1 °C)  Pulse:  [76-94] 77  Resp:  [18-22] 18  SpO2:  [95 %-100 %] 98 %  BP: ()/(53-78) 103/55          Oxygen Concentration (%):  [28] 28         Closed/Suction Drain 01/30/24 1916 Right;Ventral Neck Bulb 15 Fr. (Active)   Site Description Healing 02/08/24 1915   Dressing Type Transparent (Tegaderm) 02/08/24 1915   Dressing Status Clean;Dry;Intact 02/08/24 1915   Dressing Intervention Integrity maintained 02/08/24 1915   Drainage Serous 02/08/24 1915   Status To bulb suction 02/08/24 1915   Output (mL) 5 mL 02/09/24 0515            Gastrostomy/Enterostomy 02/03/24 0934 Gastrostomy tube w/ balloon LUQ (Active)   Securement secured to abdomen 02/08/24 1915   Interventions Prior to Feeding patency checked;residual checked 02/08/24 1915   Suction Setting/Drainage Method dependent drainage 02/08/24 0728   Drainage brown 02/04/24 1701   Feeding Type continuous;by pump 02/08/24 1915   Clamp Status/Tolerance unclamped;no restlessness;no residual;no nausea;no emesis;no abdominal distention;no abdominal discomfort 02/08/24 1915   Feeding Action feeding continued 02/08/24 1915   Dressing no dressing 02/08/24 1915   Insertion Site no redness;no warmth;no drainage;no tenderness;no swelling 02/08/24 1915   Current Rate (mL/hr) 45 mL/hr 02/08/24 1915   Goal Rate (mL/hr) 45 mL/hr 02/08/24 1915   Intake (mL) 25 mL 02/06/24 0800   Water Bolus (mL) 240 mL 02/08/24 2105   Tube Output(mL)(Include Discarded Residual) 300 mL 02/04/24 0542   Formula Name Peptamen 02/08/24 1915   Tube Feeding Intake (mL) 540 02/08/24 1848   Residual Amount (ml) 0 ml 02/08/24 1915      Physical Exam  General: well developed, well nourished, no distress.   Head: normocephalic, atraumatic  Neurologic:  "Alert and oriented. Higher order confusion noted.   Cranial nerves: face symmetric, CN II-XII grossly intact, EOMI.   Sensory: intact to light touch throughout  Motor Strength: Moves all extremities spontaneously with good tone.  Full strength upper and lower extremities. No abnormal movements seen.      Strength   Deltoids Triceps Biceps Wrist Extension Wrist Flexion Hand    Upper: R 5/5 5/5 5/5 5/5 5/5 5/5     L 5/5 5/5 5/5 5/5 5/5 5/5       Iliopsoas Quadriceps Knee  Flexion Tibialis  anterior Gastro- cnemius EHL   Lower: R 5/5 5/5 5/5 5/5 5/5 5/5     L 5/5 5/5 5/5 5/5 5/5 5/5      Skin: Skin is warm, dry and intact.  Incision c/d/I with skin edges well approximated.     Significant Labs:  Recent Labs   Lab 02/08/24  0415 02/09/24  0418   GLU 89 104    138   K 4.1 4.1    103   CO2 24 24   BUN 17 23*   CREATININE 0.8 0.8   CALCIUM 9.5 9.8   MG 1.9 2.0     Recent Labs   Lab 02/08/24  0415 02/09/24  0418   WBC 13.37* 15.44*   HGB 10.1* 10.9*   HCT 31.2* 33.4*    357     No results for input(s): "LABPT", "INR", "APTT" in the last 48 hours.  Microbiology Results (last 7 days)       ** No results found for the last 168 hours. **          All pertinent labs from the last 24 hours have been reviewed.    Significant Diagnostics:  I have reviewed and interpreted all pertinent imaging results/findings within the past 24 hours.  Assessment/Plan:     * Cervical myelopathy  Rebel Rodriguez  Is a 54 y.o. male with cervical myelopathy and prior ACDF C4 C7 with adjacent level 3 4 who presented for elective C3-4 ACDF on 01/30, which complicated by retropharyngeal injury required repair by ENT. Now s/p trach/G tube on 2/3.      - Neuro exam stable, q4h neuro checks  - Continue multimodal pain control   - S/p dex x3d  - ID consulted for abx recs given pharyngeal injury, s/p course of unasyn  - Cervical drain by ENT   - S/p trach/g tube - npo 4-6 wks minimum per ENT. TF per primary team/nutrition  - SubQ heparin " for DVT prophylaxis  - Postop XR with satisfactory placement of hardware  - Notify with acute changes in exam. Will continue to follow for post-op needs. Discussed care plan with patient and family at bedside, all questions answered.  - Discussed with STELLA SheridanC  Neurosurgery  [facility] - Neurosurgery ([facility])

## 2024-02-09 NOTE — PT/OT/SLP PROGRESS
Physical Therapy Treatment    Patient Name:  Rebel Rodriguez   MRN:  [id number]    Recommendations:     Discharge Recommendations: High Intensity Therapy  Discharge Equipment Recommendations: walker, rolling, bath bench  Barriers to discharge: Inaccessible home and Decreased caregiver support    Assessment:     Rebel Rodriguez is a 54 y.o. male admitted with a medical diagnosis of Cervical myelopathy.  He presents with the following impairments/functional limitations: weakness, impaired endurance, impaired self care skills, impaired functional mobility, gait instability, impaired balance, decreased lower extremity function, decreased coordination. Pt progressing well, but remains unsteady when walking and is a high fall risk. Pt would benefit from high intensity/frequency therapy for: Dynamic/static standing/sitting balance through skilled balance training, strengthening with the use of skilled therapeutic exercises interventions, mobility and safety training to ensure safe discharge home through skilled patient and caregiver education, and mobility through adaptive equipment training. Pt highly motivated to return to independent PLOF and can tolerate 3+hours of therapy. Pt continues to benefit from a collaborative multidisciplinary program to improve quality of life and focus on recovery of impairments      Rehab Prognosis: Good; patient would benefit from acute skilled PT services to address these deficits and reach maximum level of function.    Recent Surgery: Procedure(s) (LRB):  LAPAROTOMY with gastrostomy tube placement (N/A)  LARYNGOSCOPY, DIRECT  TRACHEOTOMY 6 Days Post-Op    Plan:     During this hospitalization, patient to be seen 4 x/week to address the identified rehab impairments via gait training, therapeutic activities, therapeutic exercises, neuromuscular re-education and progress toward the following goals:    Plan of Care Expires:  02/28/24    Subjective     Chief Complaint: none  verbalized  Patient/Family Comments/goals: pt very happy to have gotten a shower today  Pain/Comfort:  Pain Rating 1:  (none reported)  Pain Rating Post-Intervention 1:  (none reported)      Objective:     Communicated with RN prior to session.  Patient found up in chair with DELFIN drain, PEG Tube, Tracheostomy, oxygen, peripheral IV upon PT entry to room.     General Precautions: Standard, aspiration, fall, respiratory, NPO  Orthopedic Precautions: N/A  Braces: N/A  Respiratory Status:  trach collar     Functional Mobility:  Transfers:     Sit to Stand:  minimum assistance with hand-held assist  Gait: 24 ft, Allegra, HHA; unsteady, multiple LOBs, NBOS, Vcing for decreasing gait speed  Balance:   Static Sitting: supervision  Dynamic Sitting: supervision  Static Standing: Allegra with HHA  Dynamic Standing: Allegra with HHA      AM-PAC 6 CLICK MOBILITY  Turning over in bed (including adjusting bedclothes, sheets and blankets)?: 4  Sitting down on and standing up from a chair with arms (e.g., wheelchair, bedside commode, etc.): 3  Moving from lying on back to sitting on the side of the bed?: 3  Moving to and from a bed to a chair (including a wheelchair)?: 3  Need to walk in hospital room?: 3  Climbing 3-5 steps with a railing?: 3  Basic Mobility Total Score: 19       Treatment & Education:  Spoke with Dr. Flanagan from ENT via secure chat who cleared patient to use alcohol free mouth wash with swabs.  Patient also educated and instructed on therapeutic exercises. Therapeutic exercises included the following: Long Arc Quads, Seated marches (Single leg), Bilateral Heel raises, Bilateral Toe Raises. Pt performed x20 on BLEs.  Patient educated on role of therapy, goals of session, and benefits of mobilizing.   Discussed PT plan of care during hospitalization.   Patient educated on calling for assistance.   Patient educated on how their diagnosis impacts their mobility within PT scope of practice.   All questions answered within PT scope  of practice.    Patient left up in chair with all lines intact, call button in reach, and RN notified.    GOALS:   Multidisciplinary Problems       Physical Therapy Goals          Problem: Physical Therapy    Goal Priority Disciplines Outcome Goal Variances Interventions   Physical Therapy Goal     PT, PT/OT Ongoing, Progressing                         Time Tracking:     PT Received On: 02/09/24  PT Start Time: 1628     PT Stop Time: 1655  PT Total Time (min): 27 min     Billable Minutes: Therapeutic Activity 12 and Therapeutic Exercise 15    Treatment Type: Treatment  PT/PTA: PT     Number of PTA visits since last PT visit: 0     02/09/2024

## 2024-02-09 NOTE — ASSESSMENT & PLAN NOTE
Mr Rodriguez is a 53 yo male who is s/p C3-4 ACDF surgery with post op dysphagia, odynophagia, neck pain, swelling and crepitus. CT and scope demonstrates DELFIN drain in the hypopharynx. Patient with difficulty with swallowing secretions. No respiratory compromise. He is s/p neck exploration and pharyngeal repair on 1/30. Discussed with patient's mother and sister at bedside the recommendation for open G tube and tracheostomy to allow adequate time for pharynx to heal. Now s/p trach and open G tube on 2/3/24.    2/9/24: Discussed that there is persistent pharyngeal edema, which indicates that maintaining the tracheostomy tube for the meantime would be the best course of action. Discussed that we can allow the patient to sponge bath and wear normal clothing for comfort, floor nurse aware. Will obtain PMSV from SLP and transition to bolus tube feeding for more independence and comfort. Patient and mom expressed understanding.    - Strict NPO, NPO for 4-6 weeks minimum  - ID consulted for abx recommendations in setting of hardware with pharyngeal injury    - Currently off all abx, s/p vanc and unasyn   - s/p tracheostomy, with 6-0 cuffless shiley in place   - R neck drain to stay in place until removed by ENT   - Will transition to bolus tube feeding  - SLP consult for PMSV  - Rest of care for per primary  - Please page ENT with questions or concerns

## 2024-02-09 NOTE — PT/OT/SLP PROGRESS
"Occupational Therapy   Treatment    Name: Rebel Rodriguez  MRN: [id number]  Admitting Diagnosis:  Cervical myelopathy  6 Days Post-Op    Recommendations:     Discharge Recommendations: High Intensity Therapy  Discharge Equipment Recommendations:  bedside commode, bath bench, walker, rolling  Barriers to discharge:  Other (Comment) (increased assistance required with ADLs and mobility)    Assessment:     Rebel Rodriguez is a 54 y.o. male with a medical diagnosis of Cervical myelopathy.  Pt was agreeable to performing hand/digit strengthening exercises at bed level due to fatigue from having already been up out of the bed a few times today.    He presents with the following.  Performance deficits affecting function are weakness, impaired endurance, impaired self care skills, impaired functional mobility, gait instability, impaired balance, decreased upper extremity function, decreased lower extremity function.     Rehab Prognosis:  Good; patient would benefit from acute skilled OT services to address these deficits and reach maximum level of function.       Plan:     Patient to be seen 4 x/week to address the above listed problems via self-care/home management, therapeutic exercises, therapeutic activities, neuromuscular re-education  Plan of Care Expires: 02/29/24  Plan of Care Reviewed with: patient, family    Subjective     Chief Complaint: sore throat  Patient/Family Comments/goals: "I need to strengthen my hands.  I've been having trouble writing."  Pain/Comfort:  Pain Rating 1: 0/10 ("sore throat")  Pain Rating Post-Intervention 1: 0/10    Objective:     Communicated with: nurse prior to session.  Patient found HOB elevated with oxygen, DELFIN drain, PEG Tube, Tracheostomy, peripheral IV, telemetry with his family present upon OT entry to room.    General Precautions: Standard, aspiration, fall, respiratory, NPO    Orthopedic Precautions:N/A  Braces: N/A  Respiratory Status:  trach collar 5 L 28%     Occupational " Performance:     Bed Mobility:    Pt declined to perform.  He was able to reposition himself independently.     Functional Mobility/Transfers:  Pt declined to perform    Activities of Daily Living:  Pt declined to perform.      [facility] [address] 6 Click ADL: 16    Treatment & Education:  - Yellow Theraputty provided to pt.  Therapist demonstrated and pt performed the following at bed level: composite digital flexion/ext, isolated digital flex/ext, lateral pinch, and digital abd/add.  Exercises performed to increase functional strength in order increase independence when performing self care tasks and handwriting.    Pt and his family edu on role of OT, POC, safety when performing self care tasks, benefit of performing OOB activity, and safety when performing functional transfers and mobility.    - Self care tasks completed-- as noted above        Patient left HOB elevated with all lines intact, call button in reach, nursing notified, and his family present    GOALS:   Multidisciplinary Problems       Occupational Therapy Goals          Problem: Occupational Therapy    Goal Priority Disciplines Outcome Interventions   Occupational Therapy Goal     OT, PT/OT Ongoing, Progressing    Description: Goals to be met by: 2/29/24     Patient will increase functional independence with ADLs by performing:    UE Dressing with [place].  LE Dressing with [place].  Grooming while standing with [place].  Toileting from toilet with [place] for hygiene and clothing management.   Stand pivot transfers with [place].  Step transfer with [place]  Toilet transfer to toilet with [place].                         Time Tracking:     OT Date of Treatment: 02/09/24  OT Start Time: 1526  OT Stop Time: 1552  OT Total Time (min): 26 min    Billable Minutes:Therapeutic Activity 13 min  Therapeutic Exercise 13 min    OT/LUCY: OT          2/9/2024

## 2024-02-10 LAB
ALBUMIN SERPL BCP-MCNC: 3 G/DL (ref 3.5–5.2)
ALP SERPL-CCNC: 79 U/L (ref 55–135)
ALT SERPL W/O P-5'-P-CCNC: 40 U/L (ref 10–44)
ANION GAP SERPL CALC-SCNC: 12 MMOL/L (ref 8–16)
AST SERPL-CCNC: 44 U/L (ref 10–40)
BASOPHILS # BLD AUTO: 0.07 K/UL (ref 0–0.2)
BASOPHILS NFR BLD: 0.5 % (ref 0–1.9)
BILIRUB SERPL-MCNC: 0.7 MG/DL (ref 0.1–1)
BUN SERPL-MCNC: 25 MG/DL (ref 6–20)
CALCIUM SERPL-MCNC: 9.6 MG/DL (ref 8.7–10.5)
CHLORIDE SERPL-SCNC: 103 MMOL/L (ref 95–110)
CO2 SERPL-SCNC: 23 MMOL/L (ref 23–29)
CREAT SERPL-MCNC: 0.8 MG/DL (ref 0.5–1.4)
DIFFERENTIAL METHOD BLD: ABNORMAL
EOSINOPHIL # BLD AUTO: 0.1 K/UL (ref 0–0.5)
EOSINOPHIL NFR BLD: 0.7 % (ref 0–8)
ERYTHROCYTE [DISTWIDTH] IN BLOOD BY AUTOMATED COUNT: 14 % (ref 11.5–14.5)
EST. GFR  (NO RACE VARIABLE): >60 ML/MIN/1.73 M^2
GLUCOSE SERPL-MCNC: 85 MG/DL (ref 70–110)
HCT VFR BLD AUTO: 33.6 % (ref 40–54)
HGB BLD-MCNC: 10.9 G/DL (ref 14–18)
IMM GRANULOCYTES # BLD AUTO: 0.17 K/UL (ref 0–0.04)
IMM GRANULOCYTES NFR BLD AUTO: 1.2 % (ref 0–0.5)
LYMPHOCYTES # BLD AUTO: 1.6 K/UL (ref 1–4.8)
LYMPHOCYTES NFR BLD: 11.1 % (ref 18–48)
MAGNESIUM SERPL-MCNC: 2 MG/DL (ref 1.6–2.6)
MCH RBC QN AUTO: 28.3 PG (ref 27–31)
MCHC RBC AUTO-ENTMCNC: 32.4 G/DL (ref 32–36)
MCV RBC AUTO: 87 FL (ref 82–98)
MONOCYTES # BLD AUTO: 1.2 K/UL (ref 0.3–1)
MONOCYTES NFR BLD: 8.3 % (ref 4–15)
NEUTROPHILS # BLD AUTO: 11.5 K/UL (ref 1.8–7.7)
NEUTROPHILS NFR BLD: 78.2 % (ref 38–73)
NRBC BLD-RTO: 0 /100 WBC
PHOSPHATE SERPL-MCNC: 3.8 MG/DL (ref 2.7–4.5)
PLATELET # BLD AUTO: 366 K/UL (ref 150–450)
PMV BLD AUTO: 10 FL (ref 9.2–12.9)
POTASSIUM SERPL-SCNC: 3.9 MMOL/L (ref 3.5–5.1)
PROT SERPL-MCNC: 6.7 G/DL (ref 6–8.4)
RBC # BLD AUTO: 3.85 M/UL (ref 4.6–6.2)
SODIUM SERPL-SCNC: 138 MMOL/L (ref 136–145)
WBC # BLD AUTO: 14.66 K/UL (ref 3.9–12.7)

## 2024-02-10 PROCEDURE — 94761 N-INVAS EAR/PLS OXIMETRY MLT: CPT

## 2024-02-10 PROCEDURE — 85025 COMPLETE CBC W/AUTO DIFF WBC: CPT

## 2024-02-10 PROCEDURE — 99232 SBSQ HOSP IP/OBS MODERATE 35: CPT | Mod: ,,, | Performed by: PSYCHIATRY & NEUROLOGY

## 2024-02-10 PROCEDURE — 97116 GAIT TRAINING THERAPY: CPT | Mod: CQ

## 2024-02-10 PROCEDURE — 94640 AIRWAY INHALATION TREATMENT: CPT

## 2024-02-10 PROCEDURE — 80053 COMPREHEN METABOLIC PANEL: CPT

## 2024-02-10 PROCEDURE — 97112 NEUROMUSCULAR REEDUCATION: CPT | Mod: CQ

## 2024-02-10 PROCEDURE — 63600175 PHARM REV CODE 636 W HCPCS: Performed by: NURSE PRACTITIONER

## 2024-02-10 PROCEDURE — 99900031 HC PATIENT EDUCATION (STAT)

## 2024-02-10 PROCEDURE — 83735 ASSAY OF MAGNESIUM: CPT

## 2024-02-10 PROCEDURE — 20600001 HC STEP DOWN PRIVATE ROOM

## 2024-02-10 PROCEDURE — 99900035 HC TECH TIME PER 15 MIN (STAT)

## 2024-02-10 PROCEDURE — 25000003 PHARM REV CODE 250: Performed by: STUDENT IN AN ORGANIZED HEALTH CARE EDUCATION/TRAINING PROGRAM

## 2024-02-10 PROCEDURE — 25000003 PHARM REV CODE 250

## 2024-02-10 PROCEDURE — 99900026 HC AIRWAY MAINTENANCE (STAT)

## 2024-02-10 PROCEDURE — 25000003 PHARM REV CODE 250: Performed by: NURSE PRACTITIONER

## 2024-02-10 PROCEDURE — 25000003 PHARM REV CODE 250: Performed by: PSYCHIATRY & NEUROLOGY

## 2024-02-10 PROCEDURE — 84100 ASSAY OF PHOSPHORUS: CPT

## 2024-02-10 PROCEDURE — 25000242 PHARM REV CODE 250 ALT 637 W/ HCPCS: Performed by: NURSE PRACTITIONER

## 2024-02-10 PROCEDURE — 36415 COLL VENOUS BLD VENIPUNCTURE: CPT

## 2024-02-10 PROCEDURE — 27000221 HC OXYGEN, UP TO 24 HOURS

## 2024-02-10 RX ADMIN — IPRATROPIUM BROMIDE AND ALBUTEROL SULFATE 3 ML: .5; 3 SOLUTION RESPIRATORY (INHALATION) at 08:02

## 2024-02-10 RX ADMIN — HEPARIN SODIUM 5000 UNITS: 5000 INJECTION INTRAVENOUS; SUBCUTANEOUS at 02:02

## 2024-02-10 RX ADMIN — QUETIAPINE FUMARATE 50 MG: 25 TABLET ORAL at 09:02

## 2024-02-10 RX ADMIN — POLYETHYLENE GLYCOL 3350 17 GRAM ORAL POWDER PACKET 17 G: at 09:02

## 2024-02-10 RX ADMIN — SENNOSIDES AND DOCUSATE SODIUM 2 TABLET: 8.6; 5 TABLET ORAL at 09:02

## 2024-02-10 RX ADMIN — IPRATROPIUM BROMIDE AND ALBUTEROL SULFATE 3 ML: .5; 3 SOLUTION RESPIRATORY (INHALATION) at 07:02

## 2024-02-10 RX ADMIN — ATORVASTATIN CALCIUM 10 MG: 10 TABLET, FILM COATED ORAL at 09:02

## 2024-02-10 RX ADMIN — IPRATROPIUM BROMIDE AND ALBUTEROL SULFATE 3 ML: .5; 3 SOLUTION RESPIRATORY (INHALATION) at 01:02

## 2024-02-10 RX ADMIN — MIRTAZAPINE 30 MG: 30 TABLET, FILM COATED ORAL at 09:02

## 2024-02-10 RX ADMIN — FLUOXETINE HYDROCHLORIDE 40 MG: 20 CAPSULE ORAL at 09:02

## 2024-02-10 RX ADMIN — HEPARIN SODIUM 5000 UNITS: 5000 INJECTION INTRAVENOUS; SUBCUTANEOUS at 05:02

## 2024-02-10 RX ADMIN — HEPARIN SODIUM 5000 UNITS: 5000 INJECTION INTRAVENOUS; SUBCUTANEOUS at 09:02

## 2024-02-10 NOTE — ASSESSMENT & PLAN NOTE
Rebel Rodriguez  Is a 54 y.o. male with cervical myelopathy and prior ACDF C4 C7 with adjacent level 3 4 who presented for elective C3-4 ACDF on 01/30, which complicated by retropharyngeal injury required repair by ENT. Now s/p trach/G tube on 2/3.      - Neuro exam stable, q4h neuro checks  - Continue multimodal pain control   - S/p dex x3d  - ID consulted for abx recs given pharyngeal injury, s/p course of unasyn  - Cervical drain by ENT   - S/p trach/g tube - npo 4-6 wks minimum per ENT. TF per primary team/nutrition  - SubQ heparin for DVT prophylaxis  - infectious workup / CTH negative to date for delirium experienced 2/9  - Postop XR with satisfactory placement of hardware  - Notify with acute changes in exam. Will continue to follow for post-op needs. Discussed care plan with patient and family at bedside, all questions answered.  - Discussed with Dr. Maradiaga

## 2024-02-10 NOTE — ASSESSMENT & PLAN NOTE
DELFIN drain noted to be in hypopharynx on post-op imaging, s/p emergent surgical repair on 1/30  - Dex 4q6hrs, discontinued  ENT recs: Now s/p trach and open G tube on 2/3/24.  - Strict NPO, NPO for 4-6 weeks minimum  - ID consulted for abx recommendations in setting of hardware with pharyngeal injury          - Currently off all abx, s/p vanc and unasyn   - s/p tracheostomy    2/7-trache care q 4 hours.  trach exchange planned 2/8 by ENT. Drain to be removed per ENT.

## 2024-02-10 NOTE — CONSULTS
Inpatient consult to Physical Medicine Rehab  Consult performed by: Alley Stern NP  Consult ordered by: Ty Munoz MD  Reason for consult: Rehab      Rehab team following.     FUNMILAYO Bates, FNP-C  Physical Medicine & Rehabilitation   02/10/2024

## 2024-02-10 NOTE — PLAN OF CARE
Problem: Physical Therapy  Goal: Physical Therapy Goal  Description: Goals to be met by: 2024     Patient will increase functional independence with mobility by performin. Supine to sit with [place]  2. Sit to supine with [place]  3. Sit to stand transfer with Supervision with no AD  4. Bed to chair transfer with Supervision using No Assistive Device  5. Gait  x 200 feet with Supervision using No Assistive Device.   6. Ascend/descend 5 stair with left or right Handrails Stand-by Assistance using No Assistive Device.   8. Lower extremity exercise program x10 reps per handout, with independence      Outcome: Ongoing, Progressing

## 2024-02-10 NOTE — SUBJECTIVE & OBJECTIVE
Interval History: 2/10: Improved mental status this AM. Oriented, following commands. Infectious workup negative to date. CTH negative    Medications:  Continuous Infusions:   sodium chloride 0.9% Stopped (02/03/24 0801)    dextrose 10 % in water (D10W)       Scheduled Meds:   albuterol-ipratropium  3 mL Nebulization Q6H    atorvastatin  10 mg Per NG tube Daily    FLUoxetine  40 mg Per NG tube Daily    heparin (porcine)  5,000 Units Subcutaneous Q8H    mirtazapine  30 mg Per NG tube QHS    polyethylene glycol  17 g Per NG tube BID    QUEtiapine  50 mg Per G Tube QHS    senna-docusate 8.6-50 mg  2 tablet Per NG tube BID     PRN Meds:acetaminophen, bisacodyL, dextrose 10 % in water (D10W), dextrose 10%, dextrose 10%, glucagon (human recombinant), HYDROmorphone, insulin aspart U-100, ondansetron, oxyCODONE, prochlorperazine     Review of Systems  Objective:     Weight: 74.9 kg (165 lb 2 oz)  Body mass index is 25.11 kg/m².  Vital Signs (Most Recent):  Temp: 98 °F (36.7 °C) (02/10/24 1134)  Pulse: 79 (02/10/24 1134)  Resp: 18 (02/10/24 1134)  BP: 121/60 (02/10/24 1134)  SpO2: 97 % (02/10/24 1134) Vital Signs (24h Range):  Temp:  [98 °F (36.7 °C)-98.9 °F (37.2 °C)] 98 °F (36.7 °C)  Pulse:  [73-98] 79  Resp:  [16-18] 18  SpO2:  [95 %-99 %] 97 %  BP: ()/(51-60) 121/60          Oxygen Concentration (%):  [28] 28         Closed/Suction Drain 01/30/24 1916 Right;Ventral Neck Bulb 15 Fr. (Active)   Site Description Healing 02/08/24 1915   Dressing Type Transparent (Tegaderm) 02/08/24 1915   Dressing Status Clean;Dry;Intact 02/08/24 1915   Dressing Intervention Integrity maintained 02/08/24 1915   Drainage Serous 02/08/24 1915   Status To bulb suction 02/08/24 1915   Output (mL) 5 mL 02/09/24 0515            Gastrostomy/Enterostomy 02/03/24 0934 Gastrostomy tube w/ balloon LUQ (Active)   Securement secured to abdomen 02/08/24 1915   Interventions Prior to Feeding patency checked;residual checked 02/08/24 1915   Suction  Setting/Drainage Method dependent drainage 02/08/24 0728   Drainage brown 02/04/24 1701   Feeding Type continuous;by pump 02/08/24 1915   Clamp Status/Tolerance unclamped;no restlessness;no residual;no nausea;no emesis;no abdominal distention;no abdominal discomfort 02/08/24 1915   Feeding Action feeding continued 02/08/24 1915   Dressing no dressing 02/08/24 1915   Insertion Site no redness;no warmth;no drainage;no tenderness;no swelling 02/08/24 1915   Current Rate (mL/hr) 45 mL/hr 02/08/24 1915   Goal Rate (mL/hr) 45 mL/hr 02/08/24 1915   Intake (mL) 25 mL 02/06/24 0800   Water Bolus (mL) 240 mL 02/08/24 2105   Tube Output(mL)(Include Discarded Residual) 300 mL 02/04/24 0542   Formula Name Peptamen 02/08/24 1915   Tube Feeding Intake (mL) 540 02/08/24 1848   Residual Amount (ml) 0 ml 02/08/24 1915      Physical Exam  General: well developed, well nourished, no distress.   Head: normocephalic, atraumatic  Neurologic: Alert and oriented. Higher order confusion noted.   Cranial nerves: face symmetric, CN II-XII grossly intact, EOMI.   Sensory: intact to light touch throughout  Motor Strength: Moves all extremities spontaneously with good tone.  Full strength upper and lower extremities. No abnormal movements seen.      Strength   Deltoids Triceps Biceps Wrist Extension Wrist Flexion Hand    Upper: R 5/5 5/5 5/5 5/5 5/5 5/5     L 5/5 5/5 5/5 5/5 5/5 5/5       Iliopsoas Quadriceps Knee  Flexion Tibialis  anterior Gastro- cnemius EHL   Lower: R 5/5 5/5 5/5 5/5 5/5 5/5     L 5/5 5/5 5/5 5/5 5/5 5/5      Skin: Skin is warm, dry and intact.  Incision c/d/I with skin edges well approximated.     Significant Labs:  Recent Labs   Lab 02/09/24  0418 02/10/24  0403    85    138   K 4.1 3.9    103   CO2 24 23   BUN 23* 25*   CREATININE 0.8 0.8   CALCIUM 9.8 9.6   MG 2.0 2.0       Recent Labs   Lab 02/09/24  0418 02/10/24  0403   WBC 15.44* 14.66*   HGB 10.9* 10.9*   HCT 33.4* 33.6*    366       No  "results for input(s): "LABPT", "INR", "APTT" in the last 48 hours.  Microbiology Results (last 7 days)       ** No results found for the last 168 hours. **          All pertinent labs from the last 24 hours have been reviewed.    Significant Diagnostics:  I have reviewed and interpreted all pertinent imaging results/findings within the past 24 hours.  US Lower Extremity Veins Bilateral    Result Date: 2/10/2024  No evidence of deep venous thrombosis in either lower extremity. Electronically signed by: Noe Davalos Date:    02/10/2024 Time:    01:08    X-Ray Chest AP Portable    Result Date: 2/9/2024  See above Electronically signed by: Jerman Carter MD Date:    02/09/2024 Time:    14:54     "

## 2024-02-10 NOTE — PLAN OF CARE
Problem: Adult Inpatient Plan of Care  Goal: Plan of Care Review  Outcome: Ongoing, Progressing  Goal: Patient-Specific Goal (Individualized)  Outcome: Ongoing, Progressing  Goal: Absence of Hospital-Acquired Illness or Injury  Outcome: Ongoing, Progressing  Intervention: Identify and Manage Fall Risk  Flowsheets (Taken 2/10/2024 0358)  Safety Promotion/Fall Prevention:   assistive device/personal item within reach   supervised activity   /camera at bedside   side rails raised x 2   Fall Risk signage in place   Fall Risk reviewed with patient/family  Goal: Optimal Comfort and Wellbeing  Outcome: Ongoing, Progressing  Intervention: Monitor Pain and Promote Comfort  Flowsheets (Taken 2/10/2024 0358)  Pain Management Interventions:   position adjusted   quiet environment facilitated   pillow support provided     Problem: Adjustment to Illness (Delirium)  Goal: Optimal Coping  Outcome: Ongoing, Progressing  Intervention: Optimize Psychosocial Adjustment to Delirium  Flowsheets (Taken 2/10/2024 0358)  Supportive Measures:   active listening utilized   problem-solving facilitated   relaxation techniques promoted   self-care encouraged     Problem: Sleep Disturbance (Delirium)  Goal: Improved Sleep  Outcome: Ongoing, Progressing  Intervention: Promote Sleep  Flowsheets (Taken 2/10/2024 0358)  Sleep/Rest Enhancement:   awakenings minimized   consistent schedule promoted   noise level reduced   room darkened   relaxation techniques promoted

## 2024-02-10 NOTE — ASSESSMENT & PLAN NOTE
Mr Rodriguez is a 53 yo male who is s/p C3-4 ACDF surgery with post op dysphagia, odynophagia, neck pain, swelling and crepitus. CT and scope demonstrates DELFIN drain in the hypopharynx. Patient with difficulty with swallowing secretions. No respiratory compromise. He is s/p neck exploration and pharyngeal repair on 1/30. Discussed with patient's mother and sister at bedside the recommendation for open G tube and tracheostomy to allow adequate time for pharynx to heal. Now s/p trach and open G tube on 2/3/24.    2/10/24: Patient was able to shower yesterday and received PMV. Appears to be doing much better this morning and is in good spirits.     - Strict NPO, NPO for 4-6 weeks minimum  - ID consulted for abx recommendations in setting of hardware with pharyngeal injury    - Currently off all abx, s/p vanc and unasyn   - s/p tracheostomy, with 6-0 cuffless shiley in place   - R neck drain to stay in place until removed by ENT   - Will transition to bolus tube feeding  - Rest of care for per primary  - Please page ENT with questions or concerns

## 2024-02-10 NOTE — SUBJECTIVE & OBJECTIVE
Interval History: NAEON. Patient doing well this morning, no complaints. PMV in place, speaking and breathing well on RA    Medications:  Continuous Infusions:   sodium chloride 0.9% Stopped (02/03/24 0801)    dextrose 10 % in water (D10W)       Scheduled Meds:   albuterol-ipratropium  3 mL Nebulization Q6H    atorvastatin  10 mg Per NG tube Daily    FLUoxetine  40 mg Per NG tube Daily    heparin (porcine)  5,000 Units Subcutaneous Q8H    mirtazapine  30 mg Per NG tube QHS    polyethylene glycol  17 g Per NG tube BID    QUEtiapine  50 mg Per G Tube QHS    senna-docusate 8.6-50 mg  2 tablet Per NG tube BID     PRN Meds:acetaminophen, bisacodyL, dextrose 10 % in water (D10W), dextrose 10%, dextrose 10%, glucagon (human recombinant), HYDROmorphone, insulin aspart U-100, ondansetron, oxyCODONE, prochlorperazine     Review of patient's allergies indicates:   Allergen Reactions    Erythromycin Nausea And Vomiting    Infliximab Other (See Comments)     Lupus with fever and acute arthritis  Lupus with fever and acute arthritis     Objective:     Vital Signs (24h Range):  Temp:  [98.2 °F (36.8 °C)-98.9 °F (37.2 °C)] 98.8 °F (37.1 °C)  Pulse:  [73-94] 73  Resp:  [16-22] 18  SpO2:  [95 %-99 %] 98 %  BP: ()/(51-74) 102/55       Lines/Drains/Airways       Drain  Duration                  Closed/Suction Drain 01/30/24 1916 Right;Ventral Neck Bulb 15 Fr. 10 days         Gastrostomy/Enterostomy 02/03/24 0934 Gastrostomy tube w/ balloon LUQ 6 days              Airway  Duration             Adult Surgical Airway 02/03/24 1055 [place] Uncuffed 6.0/ 75mm 6 days              Peripheral Intravenous Line  Duration                  Peripheral IV - Single Lumen 02/05/24 1915 18 G Anterior;Right Forearm 4 days                     Physical Exam   Resting in bed   6-0 cuffless tracheostomy tube in good position, secured with soft collar, neck is soft and palpable  Neck incision c/d/i  Drain holding suction     Significant Labs:  CBC:    Recent Labs   Lab 02/10/24  0403   WBC 14.66*   RBC 3.85*   HGB 10.9*   HCT 33.6*      MCV 87   MCH 28.3   MCHC 32.4     CMP:   Recent Labs   Lab 02/10/24  0403   GLU 85   CALCIUM 9.6   ALBUMIN 3.0*   PROT 6.7      K 3.9   CO2 23      BUN 25*   CREATININE 0.8   ALKPHOS 79   ALT 40   AST 44*   BILITOT 0.7       Significant Diagnostics:  None

## 2024-02-10 NOTE — ASSESSMENT & PLAN NOTE
Patient noted to have a percutaneous endoscopic gastrostomy tube in place. I have personally inspected the tube.Tube was placed on this admission, with date of procedure- 2/2  There are no signs of drainage or infection around the site. The tube is patent. Medications have not converted to liquid form if available.  Routine care to be done by wound care and nursing staff.      Isossource @ 35 cc/ h  Peptomen 1.5 w bio 45cc/ h  Water flushes    2/7 advance TF as tolerated to goal  Changed to bolus feeds

## 2024-02-10 NOTE — PT/OT/SLP PROGRESS
"Physical Therapy Treatment    Patient Name:  Rebel Rodriguez   MRN:  [id number]    Recommendations:     Discharge Recommendations: High Intensity Therapy  Discharge Equipment Recommendations: bath bench, walker, rolling  Barriers to discharge: Inaccessible home and Decreased caregiver support    Assessment:     Rebel Rodriguez is a 54 y.o. male admitted with a medical diagnosis of Cervical myelopathy.  He presents with the following impairments/functional limitations: weakness, impaired endurance, impaired functional mobility, gait instability, impaired balance, decreased coordination, decreased lower extremity function, decreased safety awareness, impaired coordination, impaired fine motor, impaired cardiopulmonary response to activity requiring mod < > min assistance and verbal cues for sit < > stand transitions, gait, and stairs to prevent falls due to weakness, narrow YUN, instability, fatigue.   In light of pt's current functional level and deficits, it is anticipated that pt will need to participate in a high intensity rehab program consisting of PT and OT in order to achieve full rehab potential to return to previous level of function and roles.  Pt remains motivated to participate in PT session and will cont to benefit from skilled PT intervention..    Rehab Prognosis: Good; patient would benefit from acute skilled PT services to address these deficits and reach maximum level of function.    Recent Surgery: Procedure(s) (LRB):  LAPAROTOMY with gastrostomy tube placement (N/A)  LARYNGOSCOPY, DIRECT  TRACHEOTOMY 7 Days Post-Op    Plan:     During this hospitalization, patient to be seen 4 x/week to address the identified rehab impairments via gait training, therapeutic activities, therapeutic exercises, neuromuscular re-education and progress toward the following goals:    Plan of Care Expires:  02/28/24    Subjective     Chief Complaint: fatigue, SOB, "My L hip is weak"  Pain/Comfort:  Pain Rating 1: 0/10  Pain " Rating Post-Intervention 1: 0/10      Objective:     3 attempts for tx session: 1. Pt requesting that PTA return later (11:39 am); 2. Pt visiting with friend at 12:15 pm    Communicated with nurse (Jen) prior to session.  Patient found  sitting in sofa  with G/J tube, DELFIN drain, peripheral IV, telemetry, Tracheostomy (sister present) upon PT entry to room.     General Precautions: Standard, aspiration, fall, NPO, respiratory  Orthopedic Precautions: N/A  Braces: N/A  Respiratory Status: Room air     Functional Mobility:  Transfers:     Sit to Stand:  from sofa/toilet seat with SBA with no AD  Gait: No AD about 10 steps with SBA/CGA then requiring R HHA and min/mod A for balance 75ft x2 trials (standing rest break in between trials).  Pt demonstrates increased mallory, narrow YUN/crossing of B LE's past midline, L lateral lean, and instability.  Vc's and tc's required.  Balance: Dynamic Standing Balance       Pt performs tandem walking forward/backwards with R HHA and mod A        Pt performs lateral stepping to the R and to the L with min A and no AD  Pt requires seated rest breaks in between activities  Pt's SpO2 monitored during tx session and in remains between 94% and 96% while on RW        AM-PAC 6 CLICK MOBILITY  Turning over in bed (including adjusting bedclothes, sheets and blankets)?: 4  Sitting down on and standing up from a chair with arms (e.g., wheelchair, bedside commode, etc.): 3  Moving from lying on back to sitting on the side of the bed?: 3  Moving to and from a bed to a chair (including a wheelchair)?: 3  Need to walk in hospital room?: 2  Climbing 3-5 steps with a railing?: 2  Basic Mobility Total Score: 17       Treatment & Education:  Patient provided with daily orientation and goals of this PT session. They were educated to call for assistance and to transfer with hospital staff or family only.  Also, pt was educated on the effects of prolonged immobility and the importance of performing OOB  activity and exercises to promote healing and reduce recovery time    Patient left  seated in sofa  with all lines intact, call button in reach, nurse notified, and sister present..    GOALS:   Multidisciplinary Problems       Physical Therapy Goals          Problem: Physical Therapy    Goal Priority Disciplines Outcome Goal Variances Interventions   Physical Therapy Goal     PT, PT/OT Ongoing, Progressing     Description: Goals to be met by: 2024     Patient will increase functional independence with mobility by performin. Supine to sit with [place]  2. Sit to supine with [place]  3. Sit to stand transfer with Supervision with no AD  4. Bed to chair transfer with Supervision using No Assistive Device  5. Gait  x 200 feet with Supervision using No Assistive Device.   6. Ascend/descend 5 stair with left or right Handrails Stand-by Assistance using No Assistive Device.   8. Lower extremity exercise program x10 reps per handout, with independence                             Time Tracking:     PT Received On: 02/10/24  PT Start Time: 1238     PT Stop Time: 1304  PT Total Time (min): 26 min     Billable Minutes: Gait Training 10 and Neuromuscular Re-education 16    Treatment Type: Treatment  PT/PTA: PTA     Number of PTA visits since last PT visit: 1     02/10/2024

## 2024-02-10 NOTE — ASSESSMENT & PLAN NOTE
Hx of    - C/w home remeron and fluoxetine  Psychiatry consulted   Increased home remeron to 30 mg  Changed to seroquel 50 mg qhs

## 2024-02-10 NOTE — SUBJECTIVE & OBJECTIVE
Interval History: Pt and family report much better night's sleep following medication adjustment  Pt feels much better this am, has done some walking   Alert and normally conversant    Review of Systems  Objective:     Vital Signs (Most Recent):  Temp: 98 °F (36.7 °C) (02/10/24 1134)  Pulse: 79 (02/10/24 1333)  Resp: 15 (02/10/24 1333)  BP: 121/60 (02/10/24 1134)  SpO2: 98 % (02/10/24 1333) Vital Signs (24h Range):  Temp:  [98 °F (36.7 °C)-98.9 °F (37.2 °C)] 98 °F (36.7 °C)  Pulse:  [73-98] 79  Resp:  [15-18] 15  SpO2:  [95 %-99 %] 98 %  BP: ()/(51-60) 121/60     Weight: 74.9 kg (165 lb 2 oz)  Body mass index is 25.11 kg/m².    Intake/Output Summary (Last 24 hours) at 2/10/2024 1346  Last data filed at 2/10/2024 0834  Gross per 24 hour   Intake 1050 ml   Output 5 ml   Net 1045 ml         Physical Exam  Constitutional:       General: He is not in acute distress.     Appearance: Normal appearance. He is normal weight. He is not ill-appearing, toxic-appearing or diaphoretic.   HENT:      Head:      Comments: Trache present  Eyes:      Extraocular Movements: Extraocular movements intact.      Conjunctiva/sclera: Conjunctivae normal.      Pupils: Pupils are equal, round, and reactive to light.   Neck:      Vascular: No carotid bruit.   Cardiovascular:      Rate and Rhythm: Normal rate and regular rhythm.      Pulses: Normal pulses.      Heart sounds: Normal heart sounds. No murmur heard.     No friction rub. No gallop.   Pulmonary:      Effort: Pulmonary effort is normal. No respiratory distress.      Breath sounds: Normal breath sounds.   Abdominal:      General: Abdomen is flat. Bowel sounds are normal. There is no distension.      Palpations: Abdomen is soft.      Tenderness: There is no abdominal tenderness. There is no guarding or rebound.      Comments: G tube clean and dry   Musculoskeletal:         General: No swelling. Normal range of motion.      Cervical back: Normal range of motion and neck supple. No  rigidity or tenderness.      Right lower leg: No edema.      Left lower leg: No edema.   Lymphadenopathy:      Cervical: No cervical adenopathy.   Skin:     General: Skin is warm and dry.      Coloration: Skin is not jaundiced or pale.   Neurological:      General: No focal deficit present.      Mental Status: He is alert and oriented to person, place, and time.             Significant Labs: All pertinent labs within the past 24 hours have been reviewed.    Significant Imaging: I have reviewed all pertinent imaging results/findings within the past 24 hours.

## 2024-02-10 NOTE — PROGRESS NOTES
[facility] - Neurosurgery ([facility])  Neurosurgery  Progress Note    Subjective:     History of Present Illness: Rebel Rodriguez is a 53 y.o. male with hx of psoriatic arthritis, hx TIA on Aspirin 81 mg, s/p C4-7 ACDF with Dr. Huff 10 years ago who presents for evaluation of gait imbalance. Last seen 1 year ago for neck pain and radicular pain into the left shoulder. He underwent C1-2 KENRICK with moderate relief of pain. Reports rapid functional decline over the last 3 weeks. Endorses hand clumsiness, difficulty typing, gait imbalance, difficulty butoning, dropping items, falls. Difficulty with ambulating also due to LLE weakness. States it feels like he has rubber bands around his wrists. Reports difficulty with overhead mobility and shock-like pains down spine when raising arms overhead. Denies b/b incontinence.     Post-Op Info:  Procedure(s) (LRB):  LAPAROTOMY with gastrostomy tube placement (N/A)  LARYNGOSCOPY, DIRECT  TRACHEOTOMY   7 Days Post-Op   Interval History: 2/10: Improved mental status this AM. Oriented, following commands. Infectious workup negative to date. CTH negative    Medications:  Continuous Infusions:   sodium chloride 0.9% Stopped (02/03/24 0801)    dextrose 10 % in water (D10W)       Scheduled Meds:   albuterol-ipratropium  3 mL Nebulization Q6H    atorvastatin  10 mg Per NG tube Daily    FLUoxetine  40 mg Per NG tube Daily    heparin (porcine)  5,000 Units Subcutaneous Q8H    mirtazapine  30 mg Per NG tube QHS    polyethylene glycol  17 g Per NG tube BID    QUEtiapine  50 mg Per G Tube QHS    senna-docusate 8.6-50 mg  2 tablet Per NG tube BID     PRN Meds:acetaminophen, bisacodyL, dextrose 10 % in water (D10W), dextrose 10%, dextrose 10%, glucagon (human recombinant), HYDROmorphone, insulin aspart U-100, ondansetron, oxyCODONE, prochlorperazine     Review of Systems  Objective:     Weight: 74.9 kg (165 lb 2 oz)  Body mass index is 25.11 kg/m².  Vital Signs (Most Recent):  Temp: 98 °F (36.7 °C)  (02/10/24 1134)  Pulse: 79 (02/10/24 1134)  Resp: 18 (02/10/24 1134)  BP: 121/60 (02/10/24 1134)  SpO2: 97 % (02/10/24 1134) Vital Signs (24h Range):  Temp:  [98 °F (36.7 °C)-98.9 °F (37.2 °C)] 98 °F (36.7 °C)  Pulse:  [73-98] 79  Resp:  [16-18] 18  SpO2:  [95 %-99 %] 97 %  BP: ()/(51-60) 121/60          Oxygen Concentration (%):  [28] 28         Closed/Suction Drain 01/30/24 1916 Right;Ventral Neck Bulb 15 Fr. (Active)   Site Description Healing 02/08/24 1915   Dressing Type Transparent (Tegaderm) 02/08/24 1915   Dressing Status Clean;Dry;Intact 02/08/24 1915   Dressing Intervention Integrity maintained 02/08/24 1915   Drainage Serous 02/08/24 1915   Status To bulb suction 02/08/24 1915   Output (mL) 5 mL 02/09/24 0515            Gastrostomy/Enterostomy 02/03/24 0934 Gastrostomy tube w/ balloon LUQ (Active)   Securement secured to abdomen 02/08/24 1915   Interventions Prior to Feeding patency checked;residual checked 02/08/24 1915   Suction Setting/Drainage Method dependent drainage 02/08/24 0728   Drainage brown 02/04/24 1701   Feeding Type continuous;by pump 02/08/24 1915   Clamp Status/Tolerance unclamped;no restlessness;no residual;no nausea;no emesis;no abdominal distention;no abdominal discomfort 02/08/24 1915   Feeding Action feeding continued 02/08/24 1915   Dressing no dressing 02/08/24 1915   Insertion Site no redness;no warmth;no drainage;no tenderness;no swelling 02/08/24 1915   Current Rate (mL/hr) 45 mL/hr 02/08/24 1915   Goal Rate (mL/hr) 45 mL/hr 02/08/24 1915   Intake (mL) 25 mL 02/06/24 0800   Water Bolus (mL) 240 mL 02/08/24 2105   Tube Output(mL)(Include Discarded Residual) 300 mL 02/04/24 0542   Formula Name Peptamen 02/08/24 1915   Tube Feeding Intake (mL) 540 02/08/24 1848   Residual Amount (ml) 0 ml 02/08/24 1915     Physical Exam  General: well developed, well nourished, no distress.   Head: normocephalic, atraumatic  Neurologic: Alert and oriented. Higher order confusion noted.  "  Cranial nerves: face symmetric, CN II-XII grossly intact, EOMI.   Sensory: intact to light touch throughout  Motor Strength: Moves all extremities spontaneously with good tone.  Full strength upper and lower extremities. No abnormal movements seen.      Strength   Deltoids Triceps Biceps Wrist Extension Wrist Flexion Hand    Upper: R 5/5 5/5 5/5 5/5 5/5 5/5     L 5/5 5/5 5/5 5/5 5/5 5/5       Iliopsoas Quadriceps Knee  Flexion Tibialis  anterior Gastro- cnemius EHL   Lower: R 5/5 5/5 5/5 5/5 5/5 5/5     L 5/5 5/5 5/5 5/5 5/5 5/5      Skin: Skin is warm, dry and intact.  Incision c/d/I with skin edges well approximated.     Significant Labs:  Recent Labs   Lab 02/09/24 0418 02/10/24  0403    85    138   K 4.1 3.9    103   CO2 24 23   BUN 23* 25*   CREATININE 0.8 0.8   CALCIUM 9.8 9.6   MG 2.0 2.0       Recent Labs   Lab 02/09/24 0418 02/10/24  0403   WBC 15.44* 14.66*   HGB 10.9* 10.9*   HCT 33.4* 33.6*    366       No results for input(s): "LABPT", "INR", "APTT" in the last 48 hours.  Microbiology Results (last 7 days)       ** No results found for the last 168 hours. **          All pertinent labs from the last 24 hours have been reviewed.    Significant Diagnostics:  I have reviewed and interpreted all pertinent imaging results/findings within the past 24 hours.  US Lower Extremity Veins Bilateral    Result Date: 2/10/2024  No evidence of deep venous thrombosis in either lower extremity. Electronically signed by: Noe Davalos Date:    02/10/2024 Time:    01:08    X-Ray Chest AP Portable    Result Date: 2/9/2024  See above Electronically signed by: Jerman Carter MD Date:    02/09/2024 Time:    14:54     Assessment/Plan:     * Cervical myelopathy  Rebel Rodriguez  Is a 54 y.o. male with cervical myelopathy and prior ACDF C4 C7 with adjacent level 3 4 who presented for elective C3-4 ACDF on 01/30, which complicated by retropharyngeal injury required repair by ENT. Now s/p trach/G tube " on 2/3.      - Neuro exam stable, q4h neuro checks  - Continue multimodal pain control   - S/p dex x3d  - ID consulted for abx recs given pharyngeal injury, s/p course of unasyn  - Cervical drain by ENT   - S/p trach/g tube - npo 4-6 wks minimum per ENT. TF per primary team/nutrition  - SubQ heparin for DVT prophylaxis  - infectious workup / CTH negative to date for delirium experienced 2/9  - Postop XR with satisfactory placement of hardware  - Notify with acute changes in exam. Will continue to follow for post-op needs. Discussed care plan with patient and family at bedside, all questions answered.  - Discussed with Dr. Ashwini Cochran MD  Neurosurgery  [facility] - Neurosurgery ([facility])

## 2024-02-10 NOTE — PROGRESS NOTES
[facility] - Neurosurgery ([facility])  Hospital Medicine  Progress Note    Patient Name: Rebel Rodriguez  MRN: [id number]  Patient Class: IP- Inpatient   Admission Date: 1/30/2024  Length of Stay: 11 days  Attending Physician: Ty Munoz*  Primary Care Provider: Nanda Smith MD        Subjective:     Principal Problem:Cervical myelopathy        HPI:  53 y.o. male with hx of psoriatic arthritis, hx TIA on Aspirin 81 mg, s/p C4-7 ACDF with Dr. Huff 10 years ago admitted to [facility] s/p C3-C4 ACDF. Per chart review, reports rapid functional decline over the last 3 weeks. Endorses hand clumsiness, difficulty typing, gait imbalance, difficulty butoning, dropping items, falls. Difficulty with ambulating also due to LLE weakness. States it feels like he has rubber bands around his wrists. Reports difficulty with overhead mobility and shock-like pains down spine when raising arms overhead. CT neck soft tissue pending, Patient admitted to [facility] for close monitoring and higher level of care.      Hospital Course: 01/31/2024 CT neck concerning for extensive soft tissue edema and air in neck, additionally w/ concern for DELFIN drain misplaced into hypopharynx. Taken class A to OR by ENT for pharyngeal repair, left intubated by ENT in setting of airway edema. On high dose steroids, no cuff leak this AM  02/01/2024 General surgery consulted for open G tube placement, family still in discussion regarding trach. D/c dex to allow for adequate wound healing, ok with NSGY.   2/2/24: possible G-tube placement today  2/3/24: G-tube today  2/4/24: ok to step down to hospital medicine     Interval History: trach collar and g-tube in place, ok to step down to Hospital medicine    ENT recs: Now s/p trach and open G tube on 2/3/24.     - Strict NPO, NPO for 4-6 weeks minimum  - ID consulted for abx recommendations in setting of hardware with pharyngeal injury          - Currently off all abx, s/p vanc and unasyn   - s/p tracheostomy, with  6-0 cuffed shiley in place          - CUFF TO REMAIN UP - until otherwise directed by ENT          - Tentative plan to deflate on Tuesday  - R neck drain to stay in place until at least Tuesday       Overview/Hospital Course:  2/6- Transferred to [facility] med, IMN. Has trach and g-tube. Rehab is planned. BP low received , TF not at goal due to abd cramping. Will give suppository. Rounded with ENT service. Plan is to remove trache first, remove drain last. Pt communicating fairly well and able to make request and advocate for self. He is sitting up in a chair. NPO with G-tube for six weeks. Trach care q 4 hours.     2/7- trach change planned tomorrow  Daily evaluating.  Strict NPO, NPO for 4-6 weeks minimum.  ID consulted in ICU- no abx needed.  Currently off all abx, s/p vanc and unasyn - R neck drain to stay in place until removed by ENT. 75 % of TF goal. Had one BM after suppository.  Wbc 16-> 14.  No cultures. 99/54 and other VSS. AF. Will bolus  cc for low BP.  CM plan: Pt and family may want to go to the [facility] ([facility]) with HH at discharge.  - ENT may dc pt home w trache.  2/8 ENT trach changed for 6-0 cuffless   Patient developed intermittent confusion. Infectious workup negative  Psychiatry consulted    Interval History: Pt and family report much better night's sleep following medication adjustment  Pt feels much better this am, has done some walking   Alert and normally conversant  Reports some episodes of diarrhea yesterday    Review of Systems  Objective:     Vital Signs (Most Recent):  Temp: 98 °F (36.7 °C) (02/10/24 1134)  Pulse: 79 (02/10/24 1333)  Resp: 15 (02/10/24 1333)  BP: 121/60 (02/10/24 1134)  SpO2: 98 % (02/10/24 1333) Vital Signs (24h Range):  Temp:  [98 °F (36.7 °C)-98.9 °F (37.2 °C)] 98 °F (36.7 °C)  Pulse:  [73-98] 79  Resp:  [15-18] 15  SpO2:  [95 %-99 %] 98 %  BP: ()/(51-60) 121/60     Weight: 74.9 kg (165 lb 2 oz)  Body mass index is 25.11  kg/m².    Intake/Output Summary (Last 24 hours) at 2/10/2024 1346  Last data filed at 2/10/2024 0834  Gross per 24 hour   Intake 1050 ml   Output 5 ml   Net 1045 ml         Physical Exam  Constitutional:       General: He is not in acute distress.     Appearance: Normal appearance. He is normal weight. He is not ill-appearing, toxic-appearing or diaphoretic.   HENT:      Head:      Comments: Trache present  Eyes:      Extraocular Movements: Extraocular movements intact.      Conjunctiva/sclera: Conjunctivae normal.      Pupils: Pupils are equal, round, and reactive to light.   Neck:      Vascular: No carotid bruit.   Cardiovascular:      Rate and Rhythm: Normal rate and regular rhythm.      Pulses: Normal pulses.      Heart sounds: Normal heart sounds. No murmur heard.     No friction rub. No gallop.   Pulmonary:      Effort: Pulmonary effort is normal. No respiratory distress.      Breath sounds: Normal breath sounds.   Abdominal:      General: Abdomen is flat. Bowel sounds are normal. There is no distension.      Palpations: Abdomen is soft.      Tenderness: There is no abdominal tenderness. There is no guarding or rebound.      Comments: G tube clean and dry   Musculoskeletal:         General: No swelling. Normal range of motion.      Cervical back: Normal range of motion and neck supple. No rigidity or tenderness.      Right lower leg: No edema.      Left lower leg: No edema.   Lymphadenopathy:      Cervical: No cervical adenopathy.   Skin:     General: Skin is warm and dry.      Coloration: Skin is not jaundiced or pale.   Neurological:      General: No focal deficit present.      Mental Status: He is alert and oriented to person, place, and time.             Significant Labs: All pertinent labs within the past 24 hours have been reviewed.    Significant Imaging: I have reviewed all pertinent imaging results/findings within the past 24 hours.    Assessment/Plan:      * Cervical myelopathy  Hx of prior ACDF C4  C7 with adjacent level 3-4 who presented for elective C3-4 ACDF on 01/30, complicated by retropharyngeal injury required repair by ENT.   - s/p NCC, intubation and ventilation, trache and G tube.  - will not keep trach for 6 weeks, but NPO with G tube for 6 weeks, f/u ENT  - has right neck drain to be removed by ENT    Postprocedural hypotension  2/6- RL 500cc  2/7  cc IV.      Abdominal cramping  Suspect CONSTIPATION  Ducolox suppository  2/7- one BM      Physical deconditioning  Has trache and G tube,   PT/OT  Wean oxygen  Teach trache and G-tube care      Gastrostomy status  Patient noted to have a percutaneous endoscopic gastrostomy tube in place. I have personally inspected the tube.Tube was placed on this admission, with date of procedure- 2/2  There are no signs of drainage or infection around the site. The tube is patent. Medications have not converted to liquid form if available.  Routine care to be done by wound care and nursing staff.      Isossource @ 35 cc/ h  Peptomen 1.5 w bio 45cc/ h  Water flushes    2/7 advance TF as tolerated to goal  Changed to bolus feeds    Anxiety  Hx of    - C/w home remeron and fluoxetine  Psychiatry consulted   Increased home remeron to 30 mg  Changed to seroquel 50 mg qhs    On mechanically assisted ventilation  S/p ventilation in NCC  Now on room air, has trache for six weeks  Sitting up in chair      Injury of pharynx  DELFIN drain noted to be in hypopharynx on post-op imaging, s/p emergent surgical repair on 1/30  - Dex 4q6hrs, discontinued  ENT recs: Now s/p trach and open G tube on 2/3/24.  - Strict NPO, NPO for 4-6 weeks minimum  - ID consulted for abx recommendations in setting of hardware with pharyngeal injury          - Currently off all abx, s/p vanc and unasyn   - s/p tracheostomy    2/7-trache care q 4 hours.  trach exchange planned 2/8 by ENT. Drain to be removed per ENT.       Hyperlipidemia  Hx of    - C/w home atorvastatin         VTE Risk Mitigation  (From admission, onward)           Ordered     heparin (porcine) injection 5,000 Units  Every 8 hours         02/05/24 0919     Place LAUREN hose  Until discontinued         01/30/24 0513     Place sequential compression device  Until discontinued         01/30/24 0513                    Discharge Planning   JUSTYN: 2/14/2024     Code Status: Full Code   Is the patient medically ready for discharge?: No    Reason for patient still in hospital (select all that apply): Patient trending condition, Treatment, and Consult recommendations  Discharge Plan A: Rehab   Discharge Delays: (!) Change in Medical Condition      Ty Munoz MD  Department of Hospital Medicine   [facility] - Neurosurgery ([facility])

## 2024-02-10 NOTE — RESPIRATORY THERAPY
"RAPID RESPONSE RESPIRATORY THERAPY PROACTIVE NOTE           Time of visit: 1411     Code Status: Full Code   : 1970  Bed: 930/930 A:   MRN: [id number]  Time spent at the bedside: < 15 min    SITUATION    Evaluated patient for: LDA Check     BACKGROUND    Patient has a past medical history of Achilles tendon rupture, Achilles tendon rupture, Allergy, Anemia, Anxiety, Arthritis, Degenerative disc disease, Drug-induced lupus erythematosus, Drug-induced lupus erythematosus, Hyperlipidemia, Inguinal lymphadenopathy, Kidney stone, Medication monitoring encounter, Neck pain, Psoriatic arthritis, Psoriatic arthritis, Recurrent fever, Recurrent nephrolithiasis, Sinusitis, Stroke, TIA (transient ischemic attack), Ulcer, and Unexplained night sweats.  Clinically Significant Surgical Hx: tracheostomy    24 Hours Vitals Range:  Temp:  [98 °F (36.7 °C)-98.9 °F (37.2 °C)]   Pulse:  [73-98]   Resp:  [15-18]   BP: ()/(51-60)   SpO2:  [95 %-99 %]     Labs:    Recent Labs     24  0415 24  0418 02/10/24  0403    138 138   K 4.1 4.1 3.9    103 103   CO2 24 24 23   BUN 17 23* 25*   CREATININE 0.8 0.8 0.8   GLU 89 104 85   PHOS 3.7 3.5 3.8   MG 1.9 2.0 2.0        No results for input(s): "PH", "PCO2", "PO2", "HCO3", "POCSATURATED", "BE" in the last 72 hours.    ASSESSMENT/INTERVENTIONS  Pt resting comfortably with no respiratory interventions required at this time.      Last VS   Temp: 98 °F (36.7 °C) (02/10 1134)  Pulse: 79 (02/10 1333)  Resp: 15 (02/10 1333)  BP: 121/60 (02/10 1134)  SpO2: 98 % (02/10 1333)      Extra trachs at bedside: y  Level of Consciousness: Level of Consciousness (AVPU): alert  Respiratory Effort: Respiratory Effort: Normal, Unlabored Expansion/Accessory Muscle Usage: Expansion/Accessory Muscles/Retractions: no use of accessory muscles, no retractions, expansion symmetric  All Lung Field Breath Sounds: All Lung Fields Breath Sounds: Anterior:, Lateral:, clear  EDGAR Breath Sounds: " Anterior:, Lateral:, coarse, clear (clear with cough)  LLL Breath Sounds: coarse  RUL Breath Sounds: coarse  RML Breath Sounds: coarse  RLL Breath Sounds: coarse  O2 Device/Concentration: T.A.  Surgical airway: Yes, Type: Shiley Size: 6, uncuffed  Ambu at bedside:       Active Orders   Respiratory Care    Inhalation Treatment Q6H     Frequency: Q6H     Number of Occurrences: Until Specified    Oxygen Continuous     Frequency: Continuous     Number of Occurrences: Until Specified     Order Questions:      Device type: Low flow      Device: Trach Collar      Titrate O2 per Oxygen Titration Protocol: Yes      To maintain SpO2 goal of: >= 90%      Notify MD of: Inability to achieve desired SpO2; Sudden change in patient status and requires 20% increase in FiO2; Patient requires >60% FiO2    Pulse Oximetry Q4H     Frequency: Q4H     Number of Occurrences: Until Specified    Routine tracheostomy care     Frequency: Q4H     Number of Occurrences: Until Specified       RECOMMENDATIONS    We recommend: RRT Recs: Continue POC per primary team.      FOLLOW-UP    Please call back the Rapid Response RT, Sergio Younger RRT at x [phone] for any questions or concerns.

## 2024-02-10 NOTE — PROGRESS NOTES
"CONSULTATION LIAISON PSYCHIATRY PROGRESS NOTE    Patient Name: Rebel Rodriguez  MRN: [id number]  Patient Class: IP- Inpatient  Admission Date: 1/30/2024  Attending Physician: Ty Munoz*      SUBJECTIVE:   Rebel Rodriguez is a 54 y.o. male with past psychiatric history of depression, grief, decrease appetite & past pertinent medical history of cervical myelopathy, s/p C3-4 ACDF surgery with post op dysphagia, odynophagia, neck pain, swelling and crepitus. CT and scope demonstrates DELFIN drain in the hypopharynx. Patient with difficulty with swallowing secretions.He is s/p neck exploration and pharyngeal repair on 1/30. Now s/p trach and open G tube on 2/3/24     Psychiatry consulted for hallucinations    Today, lying in bed comfortably but does report back pain with uncomfortable bed. Seated next to patient was sister. Patient reporting doing well and slept well until brought to ultrasound at 1130 PM. Sister agrees with this statement and reports patient appears to be doing better. Denied any hallucination or paranoia overnight. Denied any SI or HI.     OBJECTIVE:    Mental Status Exam:  General Appearance: appears stated age, well developed and nourished, adequately groomed and appropriately dressed, in no acute distress  Behavior: normal; cooperative; reasonably friendly, pleasant, and polite; appropriate eye-contact; under good behavioral control  Involuntary Movements and Motor Activity: no abnormal involuntary movements noted; no tics, no tremors, no akathisia, no dystonia, no evidence of tardive dyskinesia; no psychomotor agitation or retardation  Gait and Station: unable to assess - patient lying down or seated  Speech and Language: intact; normal rate, rhythm, volume, tone, and pitch; conversational, spontaneous, and coherent; speaks and understands English proficiently and fluently; repeats words and phrases, no word finding difficulties are noted  Mood: "Good"  Affect: mood-congruent  Thought Process " and Associations: intact; linear, goal-directed, organized, and logical; no loosening of associations noted  Thought Content and Perceptions:: no suicidal or homicidal ideation, no auditory or visual hallucinations, no paranoid ideation, no ideas of reference, no evidence of delusions or psychosis  Sensorium and Orientation: intact; alert with clear sensorium; oriented fully to person, place, time and situation  Recent and Remote Memory: grossly intact, able to recall relevant and salient information from the recent and remote past  Attention and Concentration: grossly intact, attentive to the conversation and not readily distractible  Fund of Knowledge: grossly intact, used appropriate vocabulary and demonstrated an awareness of current events, consistent with educational level achieved  Insight: good  Judgment: good    CAM ICU positive? no      ASSESSMENT & RECOMMENDATIONS     MDD mild/moderate/severe, BIPOLAR I/II, Unspecified mood etc  PSYCH MEDICATIONS  Scheduled- ok to continue prozac 40 mg daily     Insomnia  PSYCH MEDICATIONS  Scheduled- recommend continue remeron 30 mg at night     Delirium  Continue medical workup for delirium  PSYCH MEDICATIONS  Scheduled- Recommend increase seroquel to  100 mg at night  Qtc on 2/9 438  PRN- seroquel 50 mg q6 hours as needed for agitation/hallucinations     DELIRIUM  DELIRIUM BEHAVIOR MANAGEMENT  PLEASE utilize CHEMICAL restraints with PRN meds first for agitation. Minimize use of PHYSICAL restraints OR have periods of being out of physical restraints if possible.  Keep window shades open and room lit during day and room dim at night in order to promote normal sleep-wake cycles  Encourage family at bedside. [place] patient often to situation, location, date.  Continue to Limit or Discontinue use of Narcotics, Benzos and Anti-cholinergic medications as they may worsen delirium.  Continue medical workup for causative etiology of Delirium.         FOLLOW UP  Will follow up  while in house     DISPOSITION - once medically cleared:    Defer to medical team    Please contact ON CALL psychiatry service (24/7) for any acute issues that may arise.    Dr. Faisal Prescott   Psychiatry  [facility]  2/10/2024 12:34 PM        --------------------------------------------------------------------------------------------------------------------------------------------------------------------------------------------------------------------------------------    CONTINUED OBJECTIVE clinical data & findings reviewed and noted for above decision making    Current Medications:   Scheduled Meds:    albuterol-ipratropium  3 mL Nebulization Q6H    atorvastatin  10 mg Per NG tube Daily    FLUoxetine  40 mg Per NG tube Daily    heparin (porcine)  5,000 Units Subcutaneous Q8H    mirtazapine  30 mg Per NG tube QHS    polyethylene glycol  17 g Per NG tube BID    QUEtiapine  50 mg Per G Tube QHS    senna-docusate 8.6-50 mg  2 tablet Per NG tube BID     PRN Meds: acetaminophen, bisacodyL, dextrose 10 % in water (D10W), dextrose 10%, dextrose 10%, glucagon (human recombinant), HYDROmorphone, insulin aspart U-100, ondansetron, oxyCODONE, prochlorperazine    Allergies:   Review of patient's allergies indicates:   Allergen Reactions    Erythromycin Nausea And Vomiting    Infliximab Other (See Comments)     Lupus with fever and acute arthritis  Lupus with fever and acute arthritis       Vitals  Vitals:    02/10/24 1134   BP: 121/60   Pulse: 79   Resp: 18   Temp: 98 °F (36.7 °C)       Labs/Imaging/Studies:  Recent Results (from the past 24 hour(s))   EKG 12-lead    Collection Time: 02/09/24  3:17 PM   Result Value Ref Range    QRS Duration 74 ms    [facility] QTC Calculation 438 ms   Urinalysis, Reflex to Urine Culture Urine, Clean Catch    Collection Time: 02/09/24  9:54 PM    Specimen: Urine   Result Value Ref Range    Specimen UA Urine, Clean Catch     Color, UA Yellow Yellow, Straw, Karmen    Appearance, UA  Clear Clear    pH, UA 6.0 5.0 - 8.0    Specific Gravity, UA 1.025 1.005 - 1.030    Protein, UA Negative Negative    Glucose, UA Negative Negative    Ketones, UA 1+ (A) Negative    Bilirubin (UA) Negative Negative    Occult Blood UA Negative Negative    Nitrite, UA Negative Negative    Leukocytes, UA Negative Negative   CBC auto differential    Collection Time: 02/10/24  4:03 AM   Result Value Ref Range    WBC 14.66 (H) 3.90 - 12.70 K/uL    RBC 3.85 (L) 4.60 - 6.20 M/uL    Hemoglobin 10.9 (L) 14.0 - 18.0 g/dL    Hematocrit 33.6 (L) 40.0 - 54.0 %    MCV 87 82 - 98 fL    MCH 28.3 27.0 - 31.0 pg    MCHC 32.4 32.0 - 36.0 g/dL    RDW 14.0 11.5 - 14.5 %    Platelets 366 150 - 450 K/uL    MPV 10.0 9.2 - 12.9 fL    Immature Granulocytes 1.2 (H) 0.0 - 0.5 %    Gran # (ANC) 11.5 (H) 1.8 - 7.7 K/uL    Immature Grans (Abs) 0.17 (H) 0.00 - 0.04 K/uL    Lymph # 1.6 1.0 - 4.8 K/uL    Mono # 1.2 (H) 0.3 - 1.0 K/uL    Eos # 0.1 0.0 - 0.5 K/uL    Baso # 0.07 0.00 - 0.20 K/uL    nRBC 0 0 /100 WBC    Gran % 78.2 (H) 38.0 - 73.0 %    Lymph % 11.1 (L) 18.0 - 48.0 %    Mono % 8.3 4.0 - 15.0 %    Eosinophil % 0.7 0.0 - 8.0 %    Basophil % 0.5 0.0 - 1.9 %    Differential Method Automated    Comprehensive metabolic panel    Collection Time: 02/10/24  4:03 AM   Result Value Ref Range    Sodium 138 136 - 145 mmol/L    Potassium 3.9 3.5 - 5.1 mmol/L    Chloride 103 95 - 110 mmol/L    CO2 23 23 - 29 mmol/L    Glucose 85 70 - 110 mg/dL    BUN 25 (H) 6 - 20 mg/dL    Creatinine 0.8 0.5 - 1.4 mg/dL    Calcium 9.6 8.7 - 10.5 mg/dL    Total Protein 6.7 6.0 - 8.4 g/dL    Albumin 3.0 (L) 3.5 - 5.2 g/dL    Total Bilirubin 0.7 0.1 - 1.0 mg/dL    Alkaline Phosphatase 79 55 - 135 U/L    AST 44 (H) 10 - 40 U/L    ALT 40 10 - 44 U/L    eGFR >60.0 >60 mL/min/1.73 m^2    Anion Gap 12 8 - 16 mmol/L   Magnesium    Collection Time: 02/10/24  4:03 AM   Result Value Ref Range    Magnesium 2.0 1.6 - 2.6 mg/dL   Phosphorus    Collection Time: 02/10/24  4:03 AM   Result  Value Ref Range    Phosphorus 3.8 2.7 - 4.5 mg/dL

## 2024-02-10 NOTE — PROGRESS NOTES
[facility] ([facility])  Otorhinolaryngology-Head & Neck Surgery  Progress Note    Subjective:     Post-Op Info:  Procedure(s) (LRB):  LAPAROTOMY with gastrostomy tube placement (N/A)  LARYNGOSCOPY, DIRECT  TRACHEOTOMY   7 Days Post-Op  Hospital Day: 12     Interval History: NAEON. Patient doing well this morning, no complaints. PMV in place, speaking and breathing well on RA    Medications:  Continuous Infusions:   sodium chloride 0.9% Stopped (02/03/24 0801)    dextrose 10 % in water (D10W)       Scheduled Meds:   albuterol-ipratropium  3 mL Nebulization Q6H    atorvastatin  10 mg Per NG tube Daily    FLUoxetine  40 mg Per NG tube Daily    heparin (porcine)  5,000 Units Subcutaneous Q8H    mirtazapine  30 mg Per NG tube QHS    polyethylene glycol  17 g Per NG tube BID    QUEtiapine  50 mg Per G Tube QHS    senna-docusate 8.6-50 mg  2 tablet Per NG tube BID     PRN Meds:acetaminophen, bisacodyL, dextrose 10 % in water (D10W), dextrose 10%, dextrose 10%, glucagon (human recombinant), HYDROmorphone, insulin aspart U-100, ondansetron, oxyCODONE, prochlorperazine     Review of patient's allergies indicates:   Allergen Reactions    Erythromycin Nausea And Vomiting    Infliximab Other (See Comments)     Lupus with fever and acute arthritis  Lupus with fever and acute arthritis     Objective:     Vital Signs (24h Range):  Temp:  [98.2 °F (36.8 °C)-98.9 °F (37.2 °C)] 98.8 °F (37.1 °C)  Pulse:  [73-94] 73  Resp:  [16-22] 18  SpO2:  [95 %-99 %] 98 %  BP: ()/(51-74) 102/55       Lines/Drains/Airways       Drain  Duration                  Closed/Suction Drain 01/30/24 1916 Right;Ventral Neck Bulb 15 Fr. 10 days         Gastrostomy/Enterostomy 02/03/24 0934 Gastrostomy tube w/ balloon LUQ 6 days              Airway  Duration             Adult Surgical Airway 02/03/24 1055 Shiley Uncuffed 6.0/ 75mm 6 days              Peripheral Intravenous Line  Duration                  Peripheral IV - Single Lumen 02/05/24  1915 18 G Anterior;Right Forearm 4 days                     Physical Exam   Resting in bed   6-0 cuffless tracheostomy tube in good position, secured with soft collar, neck is soft and palpable  Neck incision c/d/i  Drain holding suction     Significant Labs:  CBC:   Recent Labs   Lab 02/10/24  0403   WBC 14.66*   RBC 3.85*   HGB 10.9*   HCT 33.6*      MCV 87   MCH 28.3   MCHC 32.4     CMP:   Recent Labs   Lab 02/10/24  0403   GLU 85   CALCIUM 9.6   ALBUMIN 3.0*   PROT 6.7      K 3.9   CO2 23      BUN 25*   CREATININE 0.8   ALKPHOS 79   ALT 40   AST 44*   BILITOT 0.7       Significant Diagnostics:  None  Assessment/Plan:     Injury of pharynx  Mr Rodriguez is a 53 yo male who is s/p C3-4 ACDF surgery with post op dysphagia, odynophagia, neck pain, swelling and crepitus. CT and scope demonstrates DELFIN drain in the hypopharynx. Patient with difficulty with swallowing secretions. No respiratory compromise. He is s/p neck exploration and pharyngeal repair on 1/30. Discussed with patient's mother and sister at bedside the recommendation for open G tube and tracheostomy to allow adequate time for pharynx to heal. Now s/p trach and open G tube on 2/3/24.    2/10/24: Patient was able to shower yesterday and received PMV. Appears to be doing much better this morning and is in good spirits.     - Strict NPO, NPO for 4-6 weeks minimum  - ID consulted for abx recommendations in setting of hardware with pharyngeal injury    - Currently off all abx, s/p vanc and unasyn   - s/p tracheostomy, with 6-0 cuffless shiley in place   - R neck drain to stay in place until removed by ENT   - Will transition to bolus tube feeding  - Rest of care for per primary  - Please page ENT with questions or concerns        Reji Ruiz MD  Otorhinolaryngology-Head & Neck Surgery  [facility] ([facility])

## 2024-02-11 PROCEDURE — 94761 N-INVAS EAR/PLS OXIMETRY MLT: CPT

## 2024-02-11 PROCEDURE — 25000242 PHARM REV CODE 250 ALT 637 W/ HCPCS: Performed by: NURSE PRACTITIONER

## 2024-02-11 PROCEDURE — 25000003 PHARM REV CODE 250: Performed by: PSYCHIATRY & NEUROLOGY

## 2024-02-11 PROCEDURE — 99900026 HC AIRWAY MAINTENANCE (STAT)

## 2024-02-11 PROCEDURE — 25000003 PHARM REV CODE 250: Performed by: STUDENT IN AN ORGANIZED HEALTH CARE EDUCATION/TRAINING PROGRAM

## 2024-02-11 PROCEDURE — 25000003 PHARM REV CODE 250

## 2024-02-11 PROCEDURE — 25000242 PHARM REV CODE 250 ALT 637 W/ HCPCS: Performed by: STUDENT IN AN ORGANIZED HEALTH CARE EDUCATION/TRAINING PROGRAM

## 2024-02-11 PROCEDURE — 99900035 HC TECH TIME PER 15 MIN (STAT)

## 2024-02-11 PROCEDURE — 20600001 HC STEP DOWN PRIVATE ROOM

## 2024-02-11 PROCEDURE — 99232 SBSQ HOSP IP/OBS MODERATE 35: CPT | Mod: ,,, | Performed by: PSYCHIATRY & NEUROLOGY

## 2024-02-11 PROCEDURE — 27200966 HC CLOSED SUCTION SYSTEM

## 2024-02-11 PROCEDURE — 94640 AIRWAY INHALATION TREATMENT: CPT

## 2024-02-11 PROCEDURE — 63600175 PHARM REV CODE 636 W HCPCS: Performed by: NURSE PRACTITIONER

## 2024-02-11 RX ORDER — IPRATROPIUM BROMIDE AND ALBUTEROL SULFATE 2.5; .5 MG/3ML; MG/3ML
3 SOLUTION RESPIRATORY (INHALATION)
Status: DISCONTINUED | OUTPATIENT
Start: 2024-02-11 | End: 2024-02-14 | Stop reason: HOSPADM

## 2024-02-11 RX ORDER — IPRATROPIUM BROMIDE AND ALBUTEROL SULFATE 2.5; .5 MG/3ML; MG/3ML
3 SOLUTION RESPIRATORY (INHALATION) EVERY 6 HOURS PRN
Status: DISCONTINUED | OUTPATIENT
Start: 2024-02-11 | End: 2024-02-14 | Stop reason: HOSPADM

## 2024-02-11 RX ADMIN — HEPARIN SODIUM 5000 UNITS: 5000 INJECTION INTRAVENOUS; SUBCUTANEOUS at 06:02

## 2024-02-11 RX ADMIN — FLUOXETINE HYDROCHLORIDE 40 MG: 20 CAPSULE ORAL at 08:02

## 2024-02-11 RX ADMIN — QUETIAPINE FUMARATE 50 MG: 25 TABLET ORAL at 08:02

## 2024-02-11 RX ADMIN — MIRTAZAPINE 30 MG: 30 TABLET, FILM COATED ORAL at 08:02

## 2024-02-11 RX ADMIN — HEPARIN SODIUM 5000 UNITS: 5000 INJECTION INTRAVENOUS; SUBCUTANEOUS at 02:02

## 2024-02-11 RX ADMIN — ATORVASTATIN CALCIUM 10 MG: 10 TABLET, FILM COATED ORAL at 08:02

## 2024-02-11 RX ADMIN — IPRATROPIUM BROMIDE AND ALBUTEROL SULFATE 3 ML: .5; 3 SOLUTION RESPIRATORY (INHALATION) at 08:02

## 2024-02-11 RX ADMIN — IPRATROPIUM BROMIDE AND ALBUTEROL SULFATE 3 ML: .5; 3 SOLUTION RESPIRATORY (INHALATION) at 12:02

## 2024-02-11 RX ADMIN — IPRATROPIUM BROMIDE AND ALBUTEROL SULFATE 3 ML: .5; 3 SOLUTION RESPIRATORY (INHALATION) at 07:02

## 2024-02-11 RX ADMIN — HEPARIN SODIUM 5000 UNITS: 5000 INJECTION INTRAVENOUS; SUBCUTANEOUS at 08:02

## 2024-02-11 RX ADMIN — ACETAMINOPHEN 1000 MG: 500 TABLET ORAL at 08:02

## 2024-02-11 NOTE — ASSESSMENT & PLAN NOTE
Rebel Rodriguez  Is a 54 y.o. male with cervical myelopathy and prior ACDF C4 C7 with adjacent level 3 4 who presented for elective C3-4 ACDF on 01/30, which complicated by retropharyngeal injury required repair by ENT. Now s/p trach/G tube on 2/3.      - Neuro exam stable, q4h neuro checks  - Continue multimodal pain control   - S/p dex x3d  - ID consulted for abx recs given pharyngeal injury, s/p course of unasyn  - Cervical drain by ENT   - S/p trach/g tube - npo 4-6 wks minimum per ENT. TF per primary team/nutrition  - SubQ heparin for DVT prophylaxis  - infectious workup / CTH negative to date for delirium experienced 2/9  - Postop XR with satisfactory placement of hardware  - Patient agreeable to discussions with palliative care, consult ordered  - Notify with acute changes in exam. Will continue to follow for post-op needs. Discussed care plan with patient and family at bedside, all questions answered.  - Discussed with Dr. Maradiaga

## 2024-02-11 NOTE — SUBJECTIVE & OBJECTIVE
Interval History: NAEON. Sister at bedside with questions regarding next steps and plans.     Medications:  Continuous Infusions:   sodium chloride 0.9% Stopped (02/03/24 0801)    dextrose 10 % in water (D10W)       Scheduled Meds:   albuterol-ipratropium  3 mL Nebulization Q6H    atorvastatin  10 mg Per NG tube Daily    FLUoxetine  40 mg Per NG tube Daily    heparin (porcine)  5,000 Units Subcutaneous Q8H    mirtazapine  30 mg Per NG tube QHS    QUEtiapine  50 mg Per G Tube QHS     PRN Meds:acetaminophen, bisacodyL, dextrose 10 % in water (D10W), dextrose 10%, dextrose 10%, glucagon (human recombinant), HYDROmorphone, insulin aspart U-100, ondansetron, oxyCODONE, prochlorperazine     Review of patient's allergies indicates:   Allergen Reactions    Erythromycin Nausea And Vomiting    Infliximab Other (See Comments)     Lupus with fever and acute arthritis  Lupus with fever and acute arthritis     Objective:     Vital Signs (24h Range):  Temp:  [96.9 °F (36.1 °C)-99.2 °F (37.3 °C)] 98 °F (36.7 °C)  Pulse:  [73-98] 73  Resp:  [14-18] 16  SpO2:  [96 %-98 %] 96 %  BP: (114-123)/(56-63) 118/56       Lines/Drains/Airways       Drain  Duration                  Closed/Suction Drain 01/30/24 1916 Right;Ventral Neck Bulb 15 Fr. 11 days         Gastrostomy/Enterostomy 02/03/24 0934 Gastrostomy tube w/ balloon LUQ 7 days              Airway  Duration             Adult Surgical Airway 02/03/24 1055 Shiley Uncuffed 6.0/ 75mm 7 days              Peripheral Intravenous Line  Duration                  Peripheral IV - Single Lumen 02/05/24 1915 18 G Anterior;Right Forearm 5 days                     Physical Exam  Resting in bed   6-0 cuffless tracheostomy tube in good position, secured with soft collar, PMV in place  Neck incision c/d/i  Drain holding suction. Minimal drainage.     Significant Labs:  CBC:   Recent Labs   Lab 02/10/24  0403   WBC 14.66*   RBC 3.85*   HGB 10.9*   HCT 33.6*      MCV 87   MCH 28.3   MCHC 32.4      CMP:   Recent Labs   Lab 02/10/24  0403   GLU 85   CALCIUM 9.6   ALBUMIN 3.0*   PROT 6.7      K 3.9   CO2 23      BUN 25*   CREATININE 0.8   ALKPHOS 79   ALT 40   AST 44*   BILITOT 0.7       Significant Diagnostics:  I have reviewed and interpreted all pertinent imaging results/findings within the past 24 hours.

## 2024-02-11 NOTE — RESPIRATORY THERAPY
"RAPID RESPONSE RESPIRATORY THERAPY PROACTIVE NOTE           Time of visit: 929     Code Status: Full Code   : 1970  Bed: 930/930 A:   MRN: [id number]  Time spent at the bedside: < 15 min    SITUATION    Evaluated patient for: LDA Check     BACKGROUND    Patient has a past medical history of Achilles tendon rupture, Achilles tendon rupture, Allergy, Anemia, Anxiety, Arthritis, Degenerative disc disease, Drug-induced lupus erythematosus, Drug-induced lupus erythematosus, Hyperlipidemia, Inguinal lymphadenopathy, Kidney stone, Medication monitoring encounter, Neck pain, Psoriatic arthritis, Psoriatic arthritis, Recurrent fever, Recurrent nephrolithiasis, Sinusitis, Stroke, TIA (transient ischemic attack), Ulcer, and Unexplained night sweats.  Clinically Significant Surgical Hx: tracheostomy    24 Hours Vitals Range:  Temp:  [96.9 °F (36.1 °C)-99.6 °F (37.6 °C)]   Pulse:  [73-85]   Resp:  [14-18]   BP: (105-131)/(56-67)   SpO2:  [96 %-98 %]     Labs:    Recent Labs     24  0418 02/10/24  0403    138   K 4.1 3.9    103   CO2 24 23   BUN 23* 25*   CREATININE 0.8 0.8    85   PHOS 3.5 3.8   MG 2.0 2.0        No results for input(s): "PH", "PCO2", "PO2", "HCO3", "POCSATURATED", "BE" in the last 72 hours.    ASSESSMENT/INTERVENTIONS  Pt resting comfortably with no respiratory interventions required at this time.      Last VS   Temp: 99.6 °F (37.6 °C) ( 111)  Pulse: 78 (1202)  Resp: 18 (1202)  BP: 105/59 ( 111)  SpO2: 98 % (1202)      Extra trachs at bedside: Y  Level of Consciousness: Level of Consciousness (AVPU): alert  Respiratory Effort: Respiratory Effort: Unlabored Expansion/Accessory Muscle Usage: Expansion/Accessory Muscles/Retractions: no use of accessory muscles  All Lung Field Breath Sounds: All Lung Fields Breath Sounds: Anterior:, Lateral:, coarse  EDGAR Breath Sounds: coarse  LLL Breath Sounds: coarse  RUL Breath Sounds: coarse  RML Breath Sounds: " coarse  RLL Breath Sounds: coarse  O2 Device/Concentration: R.A.  Surgical airway: Yes, Type: Shiley Size: 6, uncuffed  Ambu at bedside:       Active Orders   Respiratory Care    Inhalation Treatment Q6H     Frequency: Q6H     Number of Occurrences: Until Specified    Oxygen Continuous     Frequency: Continuous     Number of Occurrences: Until Specified     Order Questions:      Device type: Low flow      Device: Trach Collar      Titrate O2 per Oxygen Titration Protocol: Yes      To maintain SpO2 goal of: >= 90%      Notify MD of: Inability to achieve desired SpO2; Sudden change in patient status and requires 20% increase in FiO2; Patient requires >60% FiO2    Pulse Oximetry Q4H     Frequency: Q4H     Number of Occurrences: Until Specified    Routine tracheostomy care     Frequency: Q4H     Number of Occurrences: Until Specified       RECOMMENDATIONS    We recommend: RRT Recs: Continue POC per primary team.      FOLLOW-UP    Please call back the Rapid Response RT, Sergio Younger RRT at x [phone] for any questions or concerns.

## 2024-02-11 NOTE — PROGRESS NOTES
Frantz Weber - Neurosurgery ([facility])  Neurosurgery  Progress Note    Subjective:     History of Present Illness: Rebel Rodriguez is a 53 y.o. male with hx of psoriatic arthritis, hx TIA on Aspirin 81 mg, s/p C4-7 ACDF with Dr. Huff 10 years ago who presents for evaluation of gait imbalance. Last seen 1 year ago for neck pain and radicular pain into the left shoulder. He underwent C1-2 KENRICK with moderate relief of pain. Reports rapid functional decline over the last 3 weeks. Endorses hand clumsiness, difficulty typing, gait imbalance, difficulty butoning, dropping items, falls. Difficulty with ambulating also due to LLE weakness. States it feels like he has rubber bands around his wrists. Reports difficulty with overhead mobility and shock-like pains down spine when raising arms overhead. Denies b/b incontinence.     Post-Op Info:  Procedure(s) (LRB):  LAPAROTOMY with gastrostomy tube placement (N/A)  LARYNGOSCOPY, DIRECT  TRACHEOTOMY   8 Days Post-Op   Interval History: 2/11: MIKE PERKINS. Difficult day mentally yesterday, discussed with patient option of palliative care and their support. Patient and sister both expressed agreement they would like to be seen by palliative. Exam stable, oriented and following commands    Medications:  Continuous Infusions:   sodium chloride 0.9% Stopped (02/03/24 0801)    dextrose 10 % in water (D10W)       Scheduled Meds:   albuterol-ipratropium  3 mL Nebulization Q6H    atorvastatin  10 mg Per NG tube Daily    FLUoxetine  40 mg Per NG tube Daily    heparin (porcine)  5,000 Units Subcutaneous Q8H    mirtazapine  30 mg Per NG tube QHS    QUEtiapine  50 mg Per G Tube QHS     PRN Meds:acetaminophen, bisacodyL, dextrose 10 % in water (D10W), dextrose 10%, dextrose 10%, glucagon (human recombinant), HYDROmorphone, insulin aspart U-100, ondansetron, oxyCODONE, prochlorperazine     Review of Systems  Objective:     Weight: 74.9 kg (165 lb 2 oz)  Body mass index is 25.11 kg/m².  Vital Signs  (Most Recent):  Temp: 98 °F (36.7 °C) (02/11/24 0556)  Pulse: 73 (02/11/24 0556)  Resp: 16 (02/11/24 0556)  BP: (!) 118/56 (02/11/24 0556)  SpO2: 96 % (02/11/24 0556) Vital Signs (24h Range):  Temp:  [96.9 °F (36.1 °C)-99.2 °F (37.3 °C)] 98 °F (36.7 °C)  Pulse:  [73-98] 73  Resp:  [14-18] 16  SpO2:  [96 %-98 %] 96 %  BP: (102-123)/(55-63) 118/56                    Closed/Suction Drain 01/30/24 1916 Right;Ventral Neck Bulb 15 Fr. (Active)   Site Description Healing 02/08/24 1915   Dressing Type Transparent (Tegaderm) 02/08/24 1915   Dressing Status Clean;Dry;Intact 02/08/24 1915   Dressing Intervention Integrity maintained 02/08/24 1915   Drainage Serous 02/08/24 1915   Status To bulb suction 02/08/24 1915   Output (mL) 5 mL 02/09/24 0515            Gastrostomy/Enterostomy 02/03/24 0934 Gastrostomy tube w/ balloon LUQ (Active)   Securement secured to abdomen 02/08/24 1915   Interventions Prior to Feeding patency checked;residual checked 02/08/24 1915   Suction Setting/Drainage Method dependent drainage 02/08/24 0728   Drainage brown 02/04/24 1701   Feeding Type continuous;by pump 02/08/24 1915   Clamp Status/Tolerance unclamped;no restlessness;no residual;no nausea;no emesis;no abdominal distention;no abdominal discomfort 02/08/24 1915   Feeding Action feeding continued 02/08/24 1915   Dressing no dressing 02/08/24 1915   Insertion Site no redness;no warmth;no drainage;no tenderness;no swelling 02/08/24 1915   Current Rate (mL/hr) 45 mL/hr 02/08/24 1915   Goal Rate (mL/hr) 45 mL/hr 02/08/24 1915   Intake (mL) 25 mL 02/06/24 0800   Water Bolus (mL) 240 mL 02/08/24 2105   Tube Output(mL)(Include Discarded Residual) 300 mL 02/04/24 0542   Formula Name Peptamen 02/08/24 1915   Tube Feeding Intake (mL) 540 02/08/24 1848   Residual Amount (ml) 0 ml 02/08/24 1915     Physical Exam  General: well developed, well nourished, no distress.   Head: normocephalic, atraumatic  Neurologic: Alert and oriented. Higher order confusion  "noted.   Cranial nerves: face symmetric, CN II-XII grossly intact, EOMI.   Sensory: intact to light touch throughout  Motor Strength: Moves all extremities spontaneously with good tone.  Full strength upper and lower extremities. No abnormal movements seen.      Strength   Deltoids Triceps Biceps Wrist Extension Wrist Flexion Hand    Upper: R 5/5 5/5 5/5 5/5 5/5 5/5     L 5/5 5/5 5/5 5/5 5/5 5/5       Iliopsoas Quadriceps Knee  Flexion Tibialis  anterior Gastro- cnemius EHL   Lower: R 5/5 5/5 5/5 5/5 5/5 5/5     L 5/5 5/5 5/5 5/5 5/5 5/5      Skin: Skin is warm, dry and intact.  Incision c/d/I with skin edges well approximated.     Significant Labs:  Recent Labs   Lab 02/10/24  0403   GLU 85      K 3.9      CO2 23   BUN 25*   CREATININE 0.8   CALCIUM 9.6   MG 2.0       Recent Labs   Lab 02/10/24  0403   WBC 14.66*   HGB 10.9*   HCT 33.6*          No results for input(s): "LABPT", "INR", "APTT" in the last 48 hours.  Microbiology Results (last 7 days)       ** No results found for the last 168 hours. **          All pertinent labs from the last 24 hours have been reviewed.    Significant Diagnostics:  I have reviewed and interpreted all pertinent imaging results/findings within the past 24 hours.  US Lower Extremity Veins Bilateral    Result Date: 2/10/2024  No evidence of deep venous thrombosis in either lower extremity. Electronically signed by: Noe Davalos Date:    02/10/2024 Time:    01:08    X-Ray Chest AP Portable    Result Date: 2/9/2024  See above Electronically signed by: Jerman Carter MD Date:    02/09/2024 Time:    14:54     X-Ray Abdomen AP 1 View    Result Date: 2/10/2024  As above. Electronically signed by: Noe Davalos Date:    02/10/2024 Time:    20:48     Assessment/Plan:     * Cervical myelopathy  Rebel Rodriguez  Is a 54 y.o. male with cervical myelopathy and prior ACDF C4 C7 with adjacent level 3 4 who presented for elective C3-4 ACDF on 01/30, which complicated by " retropharyngeal injury required repair by ENT. Now s/p trach/G tube on 2/3.      - Neuro exam stable, q4h neuro checks  - Continue multimodal pain control   - S/p dex x3d  - ID consulted for abx recs given pharyngeal injury, s/p course of unasyn  - Cervical drain by ENT   - S/p trach/g tube - npo 4-6 wks minimum per ENT. TF per primary team/nutrition  - SubQ heparin for DVT prophylaxis  - infectious workup / CTH negative to date for delirium experienced 2/9  - Postop XR with satisfactory placement of hardware  - Patient agreeable to discussions with palliative care, consult ordered  - Notify with acute changes in exam. Will continue to follow for post-op needs. Discussed care plan with patient and family at bedside, all questions answered.  - Discussed with Dr. Ashwini Cochran MD  Neurosurgery  Frantz Weber - Neurosurgery ([facility])

## 2024-02-11 NOTE — SUBJECTIVE & OBJECTIVE
Interval History: No acute events. Pt reports had another good night of sleep. Afebrile  Had few episodes of diarrhea yesterday, reports two loose stools    Review of Systems  Objective:     Vital Signs (Most Recent):  Temp: 99.6 °F (37.6 °C) (02/11/24 1112)  Pulse: 78 (02/11/24 1202)  Resp: 18 (02/11/24 1202)  BP: (!) 105/59 (02/11/24 1112)  SpO2: 98 % (02/11/24 1202) Vital Signs (24h Range):  Temp:  [96.9 °F (36.1 °C)-99.6 °F (37.6 °C)] 99.6 °F (37.6 °C)  Pulse:  [73-85] 78  Resp:  [14-18] 18  SpO2:  [96 %-98 %] 98 %  BP: (105-131)/(56-67) 105/59     Weight: 74.9 kg (165 lb 2 oz)  Body mass index is 25.11 kg/m².    Intake/Output Summary (Last 24 hours) at 2/11/2024 1258  Last data filed at 2/11/2024 0606  Gross per 24 hour   Intake 450 ml   Output 2 ml   Net 448 ml         Physical Exam  Constitutional:       General: He is not in acute distress.     Appearance: Normal appearance. He is normal weight. He is not ill-appearing, toxic-appearing or diaphoretic.   HENT:      Head:      Comments: Trache present  Eyes:      Extraocular Movements: Extraocular movements intact.      Conjunctiva/sclera: Conjunctivae normal.      Pupils: Pupils are equal, round, and reactive to light.   Neck:      Vascular: No carotid bruit.      Comments: Drain in place  Cardiovascular:      Rate and Rhythm: Normal rate and regular rhythm.      Pulses: Normal pulses.      Heart sounds: Normal heart sounds. No murmur heard.     No friction rub. No gallop.   Pulmonary:      Effort: Pulmonary effort is normal. No respiratory distress.      Breath sounds: Normal breath sounds.   Abdominal:      General: Abdomen is flat. Bowel sounds are normal. There is no distension.      Palpations: Abdomen is soft.      Tenderness: There is no abdominal tenderness. There is no guarding or rebound.      Comments: G tube clean and dry   Musculoskeletal:         General: No swelling. Normal range of motion.      Cervical back: Normal range of motion and neck  supple. No rigidity or tenderness.      Right lower leg: No edema.      Left lower leg: No edema.   Lymphadenopathy:      Cervical: No cervical adenopathy.   Skin:     General: Skin is warm and dry.      Coloration: Skin is not jaundiced or pale.   Neurological:      General: No focal deficit present.      Mental Status: He is alert and oriented to person, place, and time.             Significant Labs: All pertinent labs within the past 24 hours have been reviewed.    Significant Imaging: I have reviewed all pertinent imaging results/findings within the past 24 hours.

## 2024-02-11 NOTE — PLAN OF CARE
Problem: Adult Inpatient Plan of Care  Goal: Plan of Care Review  Outcome: Ongoing, Progressing  Goal: Patient-Specific Goal (Individualized)  Outcome: Ongoing, Progressing  Goal: Absence of Hospital-Acquired Illness or Injury  Outcome: Ongoing, Progressing  Intervention: Identify and Manage Fall Risk  Flowsheets (Taken 2/11/2024 0425)  Safety Promotion/Fall Prevention:   assistive device/personal item within reach   bed alarm set   Fall Risk reviewed with patient/family   side rails raised x 2   medications reviewed   muscle strengthening facilitated   nonskid shoes/socks when out of bed   instructed to call staff for mobility  Intervention: Prevent and Manage VTE (Venous Thromboembolism) Risk  Flowsheets (Taken 2/11/2024 0425)  VTE Prevention/Management:   ambulation promoted   ROM (active) performed     Problem: Adjustment to Illness (Delirium)  Goal: Optimal Coping  Outcome: Ongoing, Progressing  Intervention: Optimize Psychosocial Adjustment to Delirium  Flowsheets (Taken 2/11/2024 0425)  Supportive Measures:   active listening utilized   relaxation techniques promoted   problem-solving facilitated  Family/Support System Care:   involvement promoted   presence promoted   self-care encouraged   support provided     Problem: Altered Behavior (Delirium)  Goal: Improved Behavioral Control  Outcome: Ongoing, Progressing     Problem: Attention and Thought Clarity Impairment (Delirium)  Goal: Improved Attention and Thought Clarity  Intervention: Maximize Cognitive Function  Flowsheets (Taken 2/11/2024 0425)  Reorientation Measures:   clock in view   familiar social contact encouraged   glasses use encouraged  Sensory Stimulation Regulation:   auditory stimulation minimized   lighting decreased   care clustered   quiet environment promoted     Problem: Sleep Disturbance (Delirium)  Goal: Improved Sleep  Outcome: Ongoing, Progressing  Intervention: Promote Sleep  Flowsheets (Taken 2/11/2024 0425)  Sleep/Rest  Enhancement:   awakenings minimized   consistent schedule promoted   noise level reduced   regular sleep/rest pattern promoted   room darkened

## 2024-02-11 NOTE — PROGRESS NOTES
Frantz Weber - Neurosurgery ([facility])  Hospital Medicine  Progress Note    Patient Name: Rebel Rodriguez  MRN: [id number]  Patient Class: IP- Inpatient   Admission Date: 1/30/2024  Length of Stay: 12 days  Attending Physician: Ty Munoz*  Primary Care Provider: Nanda Smith MD        Subjective:     Principal Problem:Cervical myelopathy        HPI:  53 y.o. male with hx of psoriatic arthritis, hx TIA on Aspirin 81 mg, s/p C4-7 ACDF with Dr. Huff 10 years ago admitted to [facility] s/p C3-C4 ACDF. Per chart review, reports rapid functional decline over the last 3 weeks. Endorses hand clumsiness, difficulty typing, gait imbalance, difficulty butoning, dropping items, falls. Difficulty with ambulating also due to LLE weakness. States it feels like he has rubber bands around his wrists. Reports difficulty with overhead mobility and shock-like pains down spine when raising arms overhead. CT neck soft tissue pending, Patient admitted to [facility] for close monitoring and higher level of care.      Hospital Course: 01/31/2024 CT neck concerning for extensive soft tissue edema and air in neck, additionally w/ concern for DELFIN drain misplaced into hypopharynx. Taken class A to OR by ENT for pharyngeal repair, left intubated by ENT in setting of airway edema. On high dose steroids, no cuff leak this AM  02/01/2024 General surgery consulted for open G tube placement, family still in discussion regarding trach. D/c dex to allow for adequate wound healing, ok with NSGY.   2/2/24: possible G-tube placement today  2/3/24: G-tube today  2/4/24: ok to step down to hospital medicine     Interval History: trach collar and g-tube in place, ok to step down to Hospital medicine    ENT recs: Now s/p trach and open G tube on 2/3/24.     - Strict NPO, NPO for 4-6 weeks minimum  - ID consulted for abx recommendations in setting of hardware with pharyngeal injury          - Currently off all abx, s/p vanc and unasyn   - s/p tracheostomy, with  6-0 cuffed shiley in place          - CUFF TO REMAIN UP - until otherwise directed by ENT          - Tentative plan to deflate on Tuesday  - R neck drain to stay in place until at least Tuesday       Overview/Hospital Course:  2/6- Transferred to [facility] med, IMN. Has trach and g-tube. Rehab is planned. BP low received , TF not at goal due to abd cramping. Will give suppository. Rounded with ENT service. Plan is to remove trache first, remove drain last. Pt communicating fairly well and able to make request and advocate for self. He is sitting up in a chair. NPO with G-tube for six weeks. Trach care q 4 hours.     2/7- trach change planned tomorrow  Daily evaluating.  Strict NPO, NPO for 4-6 weeks minimum.  ID consulted in ICU- no abx needed.  Currently off all abx, s/p vanc and unasyn - R neck drain to stay in place until removed by ENT. 75 % of TF goal. Had one BM after suppository.  Wbc 16-> 14.  No cultures. 99/54 and other VSS. AF. Will bolus  cc for low BP.  CM plan: Pt and family may want to go to the [facility] (attached [facility]) with HH at discharge.  - ENT may dc pt home w trache.  2/8 ENT trach changed for 6-0 cuffless   Patient developed intermittent confusion. Infectious workup negative  Psychiatry consulted    Interval History: No acute events. Pt reports had another good night of sleep. Afebrile  Had few episodes of diarrhea yesterday, reports two loose stools    Review of Systems  Objective:     Vital Signs (Most Recent):  Temp: 99.6 °F (37.6 °C) (02/11/24 1112)  Pulse: 78 (02/11/24 1202)  Resp: 18 (02/11/24 1202)  BP: (!) 105/59 (02/11/24 1112)  SpO2: 98 % (02/11/24 1202) Vital Signs (24h Range):  Temp:  [96.9 °F (36.1 °C)-99.6 °F (37.6 °C)] 99.6 °F (37.6 °C)  Pulse:  [73-85] 78  Resp:  [14-18] 18  SpO2:  [96 %-98 %] 98 %  BP: (105-131)/(56-67) 105/59     Weight: 74.9 kg (165 lb 2 oz)  Body mass index is 25.11 kg/m².    Intake/Output Summary (Last 24 hours) at 2/11/2024 1258  Last data  filed at 2/11/2024 0606  Gross per 24 hour   Intake 450 ml   Output 2 ml   Net 448 ml         Physical Exam  Constitutional:       General: He is not in acute distress.     Appearance: Normal appearance. He is normal weight. He is not ill-appearing, toxic-appearing or diaphoretic.   HENT:      Head:      Comments: Trache present  Eyes:      Extraocular Movements: Extraocular movements intact.      Conjunctiva/sclera: Conjunctivae normal.      Pupils: Pupils are equal, round, and reactive to light.   Neck:      Vascular: No carotid bruit.      Comments: Drain in place  Cardiovascular:      Rate and Rhythm: Normal rate and regular rhythm.      Pulses: Normal pulses.      Heart sounds: Normal heart sounds. No murmur heard.     No friction rub. No gallop.   Pulmonary:      Effort: Pulmonary effort is normal. No respiratory distress.      Breath sounds: Normal breath sounds.   Abdominal:      General: Abdomen is flat. Bowel sounds are normal. There is no distension.      Palpations: Abdomen is soft.      Tenderness: There is no abdominal tenderness. There is no guarding or rebound.      Comments: G tube clean and dry   Musculoskeletal:         General: No swelling. Normal range of motion.      Cervical back: Normal range of motion and neck supple. No rigidity or tenderness.      Right lower leg: No edema.      Left lower leg: No edema.   Lymphadenopathy:      Cervical: No cervical adenopathy.   Skin:     General: Skin is warm and dry.      Coloration: Skin is not jaundiced or pale.   Neurological:      General: No focal deficit present.      Mental Status: He is alert and oriented to person, place, and time.             Significant Labs: All pertinent labs within the past 24 hours have been reviewed.    Significant Imaging: I have reviewed all pertinent imaging results/findings within the past 24 hours.    Assessment/Plan:      * Cervical myelopathy  Hx of prior ACDF C4 C7 with adjacent level 3-4 who presented for  elective C3-4 ACDF on 01/30, complicated by retropharyngeal injury required repair by ENT.   - s/p NCC, intubation and ventilation, trache and G tube.  - will not keep trach for 6 weeks, but NPO with G tube for 6 weeks, f/u ENT  - has right neck drain to be removed by ENT    Postprocedural hypotension  2/6- RL 500cc  2/7  cc IV.      Abdominal cramping  Had episodes of diarrhea  KUB with no obstruction  Stopped stool softeners      Physical deconditioning  Has trache and G tube,   PT/OT  Wean oxygen  Teach trache and G-tube care      Gastrostomy status  Patient noted to have a percutaneous endoscopic gastrostomy tube in place. I have personally inspected the tube.Tube was placed on this admission, with date of procedure- 2/2  There are no signs of drainage or infection around the site. The tube is patent. Medications have not converted to liquid form if available.  Routine care to be done by wound care and nursing staff.      Isossource @ 35 cc/ h  Peptomen 1.5 w bio 45cc/ h  Water flushes    2/7 advance TF as tolerated to goal  Changed to bolus feeds    Anxiety  Hx of    - C/w home remeron and fluoxetine  Psychiatry consulted   Increased home remeron to 30 mg  Changed to seroquel 50 mg qhs    On mechanically assisted ventilation  S/p ventilation in NCC  Now on room air, has trache for six weeks  Sitting up in chair      Injury of pharynx  DELFIN drain noted to be in hypopharynx on post-op imaging, s/p emergent surgical repair on 1/30  - Dex 4q6hrs, discontinued  ENT recs: Now s/p trach and open G tube on 2/3/24.  - Strict NPO, NPO for 4-6 weeks minimum  - ID consulted for abx recommendations in setting of hardware with pharyngeal injury          - Currently off all abx, s/p vanc and unasyn   - s/p tracheostomy    2/7-trache care q 4 hours.  trach exchange planned 2/8 by ENT. Drain to be removed per ENT.       Hyperlipidemia  Hx of    - C/w home atorvastatin         VTE Risk Mitigation (From admission, onward)            Ordered     heparin (porcine) injection 5,000 Units  Every 8 hours         02/05/24 0919     Place LAURNE hose  Until discontinued         01/30/24 0513     Place sequential compression device  Until discontinued         01/30/24 0513                    Discharge Planning   JUSTYN: 2/14/2024     Code Status: Full Code   Is the patient medically ready for discharge?: No    Reason for patient still in hospital (select all that apply): Patient trending condition, Treatment, and Consult recommendations  Discharge Plan A: Rehab   Discharge Delays: (!) Change in Medical Condition      Ty Munoz MD  Department of Hospital Medicine   [facility] - Neurosurgery ([facility])

## 2024-02-11 NOTE — SUBJECTIVE & OBJECTIVE
Interval History: 2/11: MIKE PERKINS. Difficult day mentally yesterday, discussed with patient option of palliative care and their support. Patient and sister both expressed agreement they would like to be seen by palliative. Exam stable, oriented and following commands    Medications:  Continuous Infusions:   sodium chloride 0.9% Stopped (02/03/24 0801)    dextrose 10 % in water (D10W)       Scheduled Meds:   albuterol-ipratropium  3 mL Nebulization Q6H    atorvastatin  10 mg Per NG tube Daily    FLUoxetine  40 mg Per NG tube Daily    heparin (porcine)  5,000 Units Subcutaneous Q8H    mirtazapine  30 mg Per NG tube QHS    QUEtiapine  50 mg Per G Tube QHS     PRN Meds:acetaminophen, bisacodyL, dextrose 10 % in water (D10W), dextrose 10%, dextrose 10%, glucagon (human recombinant), HYDROmorphone, insulin aspart U-100, ondansetron, oxyCODONE, prochlorperazine     Review of Systems  Objective:     Weight: 74.9 kg (165 lb 2 oz)  Body mass index is 25.11 kg/m².  Vital Signs (Most Recent):  Temp: 98 °F (36.7 °C) (02/11/24 0556)  Pulse: 73 (02/11/24 0556)  Resp: 16 (02/11/24 0556)  BP: (!) 118/56 (02/11/24 0556)  SpO2: 96 % (02/11/24 0556) Vital Signs (24h Range):  Temp:  [96.9 °F (36.1 °C)-99.2 °F (37.3 °C)] 98 °F (36.7 °C)  Pulse:  [73-98] 73  Resp:  [14-18] 16  SpO2:  [96 %-98 %] 96 %  BP: (102-123)/(55-63) 118/56                    Closed/Suction Drain 01/30/24 1916 Right;Ventral Neck Bulb 15 Fr. (Active)   Site Description Healing 02/08/24 1915   Dressing Type Transparent (Tegaderm) 02/08/24 1915   Dressing Status Clean;Dry;Intact 02/08/24 1915   Dressing Intervention Integrity maintained 02/08/24 1915   Drainage Serous 02/08/24 1915   Status To bulb suction 02/08/24 1915   Output (mL) 5 mL 02/09/24 0515            Gastrostomy/Enterostomy 02/03/24 0934 Gastrostomy tube w/ balloon LUQ (Active)   Securement secured to abdomen 02/08/24 1915   Interventions Prior to Feeding patency checked;residual checked 02/08/24 1915  "  Suction Setting/Drainage Method dependent drainage 02/08/24 0728   Drainage brown 02/04/24 1701   Feeding Type continuous;by pump 02/08/24 1915   Clamp Status/Tolerance unclamped;no restlessness;no residual;no nausea;no emesis;no abdominal distention;no abdominal discomfort 02/08/24 1915   Feeding Action feeding continued 02/08/24 1915   Dressing no dressing 02/08/24 1915   Insertion Site no redness;no warmth;no drainage;no tenderness;no swelling 02/08/24 1915   Current Rate (mL/hr) 45 mL/hr 02/08/24 1915   Goal Rate (mL/hr) 45 mL/hr 02/08/24 1915   Intake (mL) 25 mL 02/06/24 0800   Water Bolus (mL) 240 mL 02/08/24 2105   Tube Output(mL)(Include Discarded Residual) 300 mL 02/04/24 0542   Formula Name Peptamen 02/08/24 1915   Tube Feeding Intake (mL) 540 02/08/24 1848   Residual Amount (ml) 0 ml 02/08/24 1915      Physical Exam  General: well developed, well nourished, no distress.   Head: normocephalic, atraumatic  Neurologic: Alert and oriented. Higher order confusion noted.   Cranial nerves: face symmetric, CN II-XII grossly intact, EOMI.   Sensory: intact to light touch throughout  Motor Strength: Moves all extremities spontaneously with good tone.  Full strength upper and lower extremities. No abnormal movements seen.      Strength   Deltoids Triceps Biceps Wrist Extension Wrist Flexion Hand    Upper: R 5/5 5/5 5/5 5/5 5/5 5/5     L 5/5 5/5 5/5 5/5 5/5 5/5       Iliopsoas Quadriceps Knee  Flexion Tibialis  anterior Gastro- cnemius EHL   Lower: R 5/5 5/5 5/5 5/5 5/5 5/5     L 5/5 5/5 5/5 5/5 5/5 5/5      Skin: Skin is warm, dry and intact.  Incision c/d/I with skin edges well approximated.     Significant Labs:  Recent Labs   Lab 02/10/24  0403   GLU 85      K 3.9      CO2 23   BUN 25*   CREATININE 0.8   CALCIUM 9.6   MG 2.0       Recent Labs   Lab 02/10/24  0403   WBC 14.66*   HGB 10.9*   HCT 33.6*          No results for input(s): "LABPT", "INR", "APTT" in the last 48 " hours.  Microbiology Results (last 7 days)       ** No results found for the last 168 hours. **          All pertinent labs from the last 24 hours have been reviewed.    Significant Diagnostics:  I have reviewed and interpreted all pertinent imaging results/findings within the past 24 hours.  US Lower Extremity Veins Bilateral    Result Date: 2/10/2024  No evidence of deep venous thrombosis in either lower extremity. Electronically signed by: Noe Davalos Date:    02/10/2024 Time:    01:08    X-Ray Chest AP Portable    Result Date: 2/9/2024  See above Electronically signed by: Jerman Carter MD Date:    02/09/2024 Time:    14:54     X-Ray Abdomen AP 1 View    Result Date: 2/10/2024  As above. Electronically signed by: Noe Davalos Date:    02/10/2024 Time:    20:48

## 2024-02-11 NOTE — PROGRESS NOTES
[facility] - Neurosurgery ([facility])  Otorhinolaryngology-Head & Neck Surgery  Progress Note    Subjective:     Post-Op Info:  Procedure(s) (LRB):  LAPAROTOMY with gastrostomy tube placement (N/A)  LARYNGOSCOPY, DIRECT  TRACHEOTOMY   8 Days Post-Op  Hospital Day: 13     Interval History: NAEON. Sister at bedside with questions regarding next steps and plans.     Medications:  Continuous Infusions:   sodium chloride 0.9% Stopped (02/03/24 0801)    dextrose 10 % in water (D10W)       Scheduled Meds:   albuterol-ipratropium  3 mL Nebulization Q6H    atorvastatin  10 mg Per NG tube Daily    FLUoxetine  40 mg Per NG tube Daily    heparin (porcine)  5,000 Units Subcutaneous Q8H    mirtazapine  30 mg Per NG tube QHS    QUEtiapine  50 mg Per G Tube QHS     PRN Meds:acetaminophen, bisacodyL, dextrose 10 % in water (D10W), dextrose 10%, dextrose 10%, glucagon (human recombinant), HYDROmorphone, insulin aspart U-100, ondansetron, oxyCODONE, prochlorperazine     Review of patient's allergies indicates:   Allergen Reactions    Erythromycin Nausea And Vomiting    Infliximab Other (See Comments)     Lupus with fever and acute arthritis  Lupus with fever and acute arthritis     Objective:     Vital Signs (24h Range):  Temp:  [96.9 °F (36.1 °C)-99.2 °F (37.3 °C)] 98 °F (36.7 °C)  Pulse:  [73-98] 73  Resp:  [14-18] 16  SpO2:  [96 %-98 %] 96 %  BP: (114-123)/(56-63) 118/56       Lines/Drains/Airways       Drain  Duration                  Closed/Suction Drain 01/30/24 1916 Right;Ventral Neck Bulb 15 Fr. 11 days         Gastrostomy/Enterostomy 02/03/24 0934 Gastrostomy tube w/ balloon LUQ 7 days              Airway  Duration             Adult Surgical Airway 02/03/24 1055 Juventino Uncuffed 6.0/ 75mm 7 days              Peripheral Intravenous Line  Duration                  Peripheral IV - Single Lumen 02/05/24 1915 18 G Anterior;Right Forearm 5 days                     Physical Exam  Resting in bed   6-0 cuffless tracheostomy tube in good  position, secured with soft collar, PMV in place  Neck incision c/d/i  Drain holding suction. Minimal drainage.     Significant Labs:  CBC:   Recent Labs   Lab 02/10/24  0403   WBC 14.66*   RBC 3.85*   HGB 10.9*   HCT 33.6*      MCV 87   MCH 28.3   MCHC 32.4     CMP:   Recent Labs   Lab 02/10/24  0403   GLU 85   CALCIUM 9.6   ALBUMIN 3.0*   PROT 6.7      K 3.9   CO2 23      BUN 25*   CREATININE 0.8   ALKPHOS 79   ALT 40   AST 44*   BILITOT 0.7       Significant Diagnostics:  I have reviewed and interpreted all pertinent imaging results/findings within the past 24 hours.  Assessment/Plan:     Injury of pharynx  Mr Rodriguez is a 53 yo male who is s/p C3-4 ACDF surgery with post op dysphagia, odynophagia, neck pain, swelling and crepitus. CT and scope demonstrates DELFIN drain in the hypopharynx. Patient with difficulty with swallowing secretions. No respiratory compromise. He is s/p neck exploration and pharyngeal repair on 1/30. Discussed with patient's mother and sister at bedside the recommendation for open G tube and tracheostomy to allow adequate time for pharynx to heal. Now s/p trach and open G tube on 2/3/24.    - Strict NPO, NPO for 4-6 weeks minimum  - ID consulted for abx recommendations in setting of hardware with pharyngeal injury    - Currently off all abx, s/p vanc and unasyn   - Routine trach care   - R neck drain to stay in place until removed by ENT   - Rest of care for per primary  - Please page ENT with questions or concerns        Melanie Mullen MD  Otorhinolaryngology-Head & Neck Surgery  [facility] - Neurosurgery ([facility])

## 2024-02-11 NOTE — PLAN OF CARE
Problem: Adult Inpatient Plan of Care  Goal: Plan of Care Review  Outcome: Ongoing, Progressing  Goal: Patient-Specific Goal (Individualized)  Outcome: Ongoing, Progressing  Goal: Absence of Hospital-Acquired Illness or Injury  Outcome: Ongoing, Progressing  Goal: Optimal Comfort and Wellbeing  Outcome: Ongoing, Progressing  Goal: Readiness for Transition of Care  Outcome: Ongoing, Progressing     Problem: Fall Injury Risk  Goal: Absence of Fall and Fall-Related Injury  Outcome: Ongoing, Progressing  Intervention: Identify and Manage Contributors  Flowsheets (Taken 2/11/2024 1736)  Self-Care Promotion: independence encouraged  Medication Review/Management:   medications reviewed   dosing adjusted  Intervention: Promote Injury-Free Environment  Flowsheets (Taken 2/11/2024 1736)  Safety Promotion/Fall Prevention:   bed alarm refused   family to remain at bedside   supervised activity     Problem: Altered Behavior (Delirium)  Goal: Improved Behavioral Control  Outcome: Ongoing, Progressing     Problem: Attention and Thought Clarity Impairment (Delirium)  Goal: Improved Attention and Thought Clarity  Outcome: Ongoing, Progressing  Intervention: Maximize Cognitive Function  Flowsheets (Taken 2/11/2024 1736)  Reorientation Measures:   calendar in view   clock in view  Sensory Stimulation Regulation: care clustered

## 2024-02-11 NOTE — PROGRESS NOTES
"CONSULTATION LIAISON PSYCHIATRY PROGRESS NOTE    Patient Name: Rebel Rodriguez  MRN: [id number]  Patient Class: IP- Inpatient  Admission Date: 1/30/2024  Attending Physician: Ty Munoz*      SUBJECTIVE:   Rebel Rodriguez is a 54 y.o. male with past psychiatric history of depression, grief, decrease appetite & past pertinent medical history of cervical myelopathy, s/p C3-4 ACDF surgery with post op dysphagia, odynophagia, neck pain, swelling and crepitus. CT and scope demonstrates DELFIN drain in the hypopharynx. Patient with difficulty with swallowing secretions.He is s/p neck exploration and pharyngeal repair on 1/30. Now s/p trach and open G tube on 2/3/24     Psychiatry consulted for hallucinations    Prior to interview patient resting in bed. Denies acute complaints or concerns but did report significant anxiety yesterday. Denies current side effects of medication. Denies any current paranoia, SI, or HI.       OBJECTIVE:    Mental Status Exam:  General Appearance: appears stated age, well developed and nourished, adequately groomed and appropriately dressed, in no acute distress  Behavior: normal; cooperative; reasonably friendly, pleasant, and polite; appropriate eye-contact; under good behavioral control  Involuntary Movements and Motor Activity: no abnormal involuntary movements noted; no tics, no tremors, no akathisia, no dystonia, no evidence of tardive dyskinesia; no psychomotor agitation or retardation  Gait and Station: unable to assess - patient lying down or seated  Speech and Language: intact; normal rate, rhythm, volume, tone, and pitch; conversational, spontaneous, and coherent; speaks and understands English proficiently and fluently; repeats words and phrases, no word finding difficulties are noted  Mood: "Good"  Affect: mood-congruent  Thought Process and Associations: intact; linear, goal-directed, organized, and logical; no loosening of associations noted  Thought Content and Perceptions:: " no suicidal or homicidal ideation, no auditory or visual hallucinations, no paranoid ideation, no ideas of reference, no evidence of delusions or psychosis  Sensorium and Orientation: intact; alert with clear sensorium; oriented fully to person, place, time and situation  Recent and Remote Memory: grossly intact, able to recall relevant and salient information from the recent and remote past  Attention and Concentration: grossly intact, attentive to the conversation and not readily distractible  Fund of Knowledge: grossly intact, used appropriate vocabulary and demonstrated an awareness of current events, consistent with educational level achieved  Insight: good  Judgment: good    CAM ICU positive? no      ASSESSMENT & RECOMMENDATIONS     MDD mild/moderate/severe, BIPOLAR I/II, Unspecified mood etc  PSYCH MEDICATIONS  Scheduled- ok to continue prozac 40 mg daily     Insomnia  PSYCH MEDICATIONS  Scheduled- recommend continue remeron 30 mg at night     Delirium  Continue medical workup for delirium  PSYCH MEDICATIONS  Scheduled- Recommend increase seroquel to  100 mg at night  Qtc on 2/9 438  PRN- seroquel 50 mg q6 hours as needed for agitation/hallucinations     DELIRIUM  DELIRIUM BEHAVIOR MANAGEMENT  PLEASE utilize CHEMICAL restraints with PRN meds first for agitation. Minimize use of PHYSICAL restraints OR have periods of being out of physical restraints if possible.  Keep window shades open and room lit during day and room dim at night in order to promote normal sleep-wake cycles  Encourage family at bedside. [place] patient often to situation, location, date.  Continue to Limit or Discontinue use of Narcotics, Benzos and Anti-cholinergic medications as they may worsen delirium.  Continue medical workup for causative etiology of Delirium.         FOLLOW UP  Will follow up while in house     DISPOSITION - once medically cleared:    Defer to medical team    Please contact ON CALL psychiatry service (24/7) for any  acute issues that may arise.    Dr. Eddie Mcfarland   Psychiatry  [facility]  2/11/2024 12:34 PM        --------------------------------------------------------------------------------------------------------------------------------------------------------------------------------------------------------------------------------------    CONTINUED OBJECTIVE clinical data & findings reviewed and noted for above decision making    Current Medications:   Scheduled Meds:    albuterol-ipratropium  3 mL Nebulization Q6H    atorvastatin  10 mg Per NG tube Daily    FLUoxetine  40 mg Per NG tube Daily    heparin (porcine)  5,000 Units Subcutaneous Q8H    mirtazapine  30 mg Per NG tube QHS    QUEtiapine  50 mg Per G Tube QHS     PRN Meds: acetaminophen, bisacodyL, dextrose 10 % in water (D10W), dextrose 10%, dextrose 10%, glucagon (human recombinant), HYDROmorphone, insulin aspart U-100, ondansetron, oxyCODONE, prochlorperazine    Allergies:   Review of patient's allergies indicates:   Allergen Reactions    Erythromycin Nausea And Vomiting    Infliximab Other (See Comments)     Lupus with fever and acute arthritis  Lupus with fever and acute arthritis       Vitals  Vitals:    02/11/24 0848   BP:    Pulse: 76   Resp: 18   Temp:        Labs/Imaging/Studies:  No results found for this or any previous visit (from the past 24 hour(s)).

## 2024-02-11 NOTE — ASSESSMENT & PLAN NOTE
Mr Rodriguez is a 55 yo male who is s/p C3-4 ACDF surgery with post op dysphagia, odynophagia, neck pain, swelling and crepitus. CT and scope demonstrates DELFIN drain in the hypopharynx. Patient with difficulty with swallowing secretions. No respiratory compromise. He is s/p neck exploration and pharyngeal repair on 1/30. Discussed with patient's mother and sister at bedside the recommendation for open G tube and tracheostomy to allow adequate time for pharynx to heal. Now s/p trach and open G tube on 2/3/24.    - Strict NPO, NPO for 4-6 weeks minimum  - ID consulted for abx recommendations in setting of hardware with pharyngeal injury    - Currently off all abx, s/p vanc and unasyn   - Routine trach care   - R neck drain to stay in place until removed by ENT   - Rest of care for per primary  - Please page ENT with questions or concerns

## 2024-02-12 ENCOUNTER — TELEPHONE (OUTPATIENT)
Dept: RHEUMATOLOGY | Facility: CLINIC | Age: 54
End: 2024-02-12
Payer: COMMERCIAL

## 2024-02-12 LAB
ANION GAP SERPL CALC-SCNC: 11 MMOL/L (ref 8–16)
BASOPHILS # BLD AUTO: 0.07 K/UL (ref 0–0.2)
BASOPHILS NFR BLD: 0.3 % (ref 0–1.9)
BUN SERPL-MCNC: 22 MG/DL (ref 6–20)
CALCIUM SERPL-MCNC: 10.2 MG/DL (ref 8.7–10.5)
CHLORIDE SERPL-SCNC: 106 MMOL/L (ref 95–110)
CO2 SERPL-SCNC: 22 MMOL/L (ref 23–29)
CREAT SERPL-MCNC: 0.9 MG/DL (ref 0.5–1.4)
DIFFERENTIAL METHOD BLD: ABNORMAL
EOSINOPHIL # BLD AUTO: 0.1 K/UL (ref 0–0.5)
EOSINOPHIL NFR BLD: 0.3 % (ref 0–8)
ERYTHROCYTE [DISTWIDTH] IN BLOOD BY AUTOMATED COUNT: 14.3 % (ref 11.5–14.5)
EST. GFR  (NO RACE VARIABLE): >60 ML/MIN/1.73 M^2
GLUCOSE SERPL-MCNC: 144 MG/DL (ref 70–110)
HCT VFR BLD AUTO: 35.8 % (ref 40–54)
HGB BLD-MCNC: 11.6 G/DL (ref 14–18)
IMM GRANULOCYTES # BLD AUTO: 0.26 K/UL (ref 0–0.04)
IMM GRANULOCYTES NFR BLD AUTO: 1.2 % (ref 0–0.5)
LYMPHOCYTES # BLD AUTO: 1.5 K/UL (ref 1–4.8)
LYMPHOCYTES NFR BLD: 7 % (ref 18–48)
MCH RBC QN AUTO: 28.1 PG (ref 27–31)
MCHC RBC AUTO-ENTMCNC: 32.4 G/DL (ref 32–36)
MCV RBC AUTO: 87 FL (ref 82–98)
MONOCYTES # BLD AUTO: 1.3 K/UL (ref 0.3–1)
MONOCYTES NFR BLD: 6.2 % (ref 4–15)
NEUTROPHILS # BLD AUTO: 18.1 K/UL (ref 1.8–7.7)
NEUTROPHILS NFR BLD: 85 % (ref 38–73)
NRBC BLD-RTO: 0 /100 WBC
PLATELET # BLD AUTO: 492 K/UL (ref 150–450)
PMV BLD AUTO: 10 FL (ref 9.2–12.9)
POTASSIUM SERPL-SCNC: 4 MMOL/L (ref 3.5–5.1)
RBC # BLD AUTO: 4.13 M/UL (ref 4.6–6.2)
SODIUM SERPL-SCNC: 139 MMOL/L (ref 136–145)
WBC # BLD AUTO: 21.36 K/UL (ref 3.9–12.7)

## 2024-02-12 PROCEDURE — 63600175 PHARM REV CODE 636 W HCPCS: Performed by: NURSE PRACTITIONER

## 2024-02-12 PROCEDURE — 36415 COLL VENOUS BLD VENIPUNCTURE: CPT | Performed by: STUDENT IN AN ORGANIZED HEALTH CARE EDUCATION/TRAINING PROGRAM

## 2024-02-12 PROCEDURE — 92507 TX SP LANG VOICE COMM INDIV: CPT

## 2024-02-12 PROCEDURE — 25000003 PHARM REV CODE 250: Performed by: PSYCHIATRY & NEUROLOGY

## 2024-02-12 PROCEDURE — 99900035 HC TECH TIME PER 15 MIN (STAT)

## 2024-02-12 PROCEDURE — 94640 AIRWAY INHALATION TREATMENT: CPT

## 2024-02-12 PROCEDURE — 25000003 PHARM REV CODE 250: Performed by: STUDENT IN AN ORGANIZED HEALTH CARE EDUCATION/TRAINING PROGRAM

## 2024-02-12 PROCEDURE — 97530 THERAPEUTIC ACTIVITIES: CPT

## 2024-02-12 PROCEDURE — 97535 SELF CARE MNGMENT TRAINING: CPT

## 2024-02-12 PROCEDURE — 25000242 PHARM REV CODE 250 ALT 637 W/ HCPCS: Performed by: STUDENT IN AN ORGANIZED HEALTH CARE EDUCATION/TRAINING PROGRAM

## 2024-02-12 PROCEDURE — 85025 COMPLETE CBC W/AUTO DIFF WBC: CPT | Performed by: STUDENT IN AN ORGANIZED HEALTH CARE EDUCATION/TRAINING PROGRAM

## 2024-02-12 PROCEDURE — 25000003 PHARM REV CODE 250

## 2024-02-12 PROCEDURE — 80048 BASIC METABOLIC PNL TOTAL CA: CPT | Performed by: STUDENT IN AN ORGANIZED HEALTH CARE EDUCATION/TRAINING PROGRAM

## 2024-02-12 PROCEDURE — 99900026 HC AIRWAY MAINTENANCE (STAT)

## 2024-02-12 PROCEDURE — 20600001 HC STEP DOWN PRIVATE ROOM

## 2024-02-12 PROCEDURE — 94761 N-INVAS EAR/PLS OXIMETRY MLT: CPT

## 2024-02-12 RX ADMIN — IPRATROPIUM BROMIDE AND ALBUTEROL SULFATE 3 ML: .5; 3 SOLUTION RESPIRATORY (INHALATION) at 07:02

## 2024-02-12 RX ADMIN — IPRATROPIUM BROMIDE AND ALBUTEROL SULFATE 3 ML: .5; 3 SOLUTION RESPIRATORY (INHALATION) at 08:02

## 2024-02-12 RX ADMIN — ATORVASTATIN CALCIUM 10 MG: 10 TABLET, FILM COATED ORAL at 09:02

## 2024-02-12 RX ADMIN — HEPARIN SODIUM 5000 UNITS: 5000 INJECTION INTRAVENOUS; SUBCUTANEOUS at 01:02

## 2024-02-12 RX ADMIN — QUETIAPINE FUMARATE 50 MG: 25 TABLET ORAL at 09:02

## 2024-02-12 RX ADMIN — FLUOXETINE HYDROCHLORIDE 40 MG: 20 CAPSULE ORAL at 09:02

## 2024-02-12 RX ADMIN — HEPARIN SODIUM 5000 UNITS: 5000 INJECTION INTRAVENOUS; SUBCUTANEOUS at 09:02

## 2024-02-12 RX ADMIN — IPRATROPIUM BROMIDE AND ALBUTEROL SULFATE 3 ML: .5; 3 SOLUTION RESPIRATORY (INHALATION) at 02:02

## 2024-02-12 RX ADMIN — HEPARIN SODIUM 5000 UNITS: 5000 INJECTION INTRAVENOUS; SUBCUTANEOUS at 05:02

## 2024-02-12 RX ADMIN — MIRTAZAPINE 30 MG: 30 TABLET, FILM COATED ORAL at 09:02

## 2024-02-12 RX ADMIN — APREMILAST 30 MG: 30 TABLET, FILM COATED ORAL at 09:02

## 2024-02-12 NOTE — PLAN OF CARE
Problem: Adult Inpatient Plan of Care  Goal: Plan of Care Review  Outcome: Ongoing, Progressing  Goal: Patient-Specific Goal (Individualized)  Outcome: Ongoing, Progressing  Goal: Absence of Hospital-Acquired Illness or Injury  Outcome: Ongoing, Progressing  Intervention: Identify and Manage Fall Risk  Flowsheets (Taken 2/12/2024 0447)  Safety Promotion/Fall Prevention:   assistive device/personal item within reach   instructed to call staff for mobility   medications reviewed   muscle strengthening facilitated   nonskid shoes/socks when out of bed   bed alarm set  Intervention: Prevent Skin Injury  Flowsheets (Taken 2/12/2024 0447)  Body Position: position changed independently  Skin Protection: adhesive use limited     Problem: Adjustment to Illness (Delirium)  Goal: Optimal Coping  Outcome: Ongoing, Progressing  Intervention: Optimize Psychosocial Adjustment to Delirium  Flowsheets (Taken 2/12/2024 0447)  Supportive Measures: active listening utilized  Family/Support System Care:   involvement promoted   caregiver stress acknowledged   presence promoted   self-care encouraged   support provided     Problem: Sleep Disturbance (Delirium)  Goal: Improved Sleep  Outcome: Ongoing, Progressing  Intervention: Promote Sleep  Flowsheets (Taken 2/12/2024 0447)  Sleep/Rest Enhancement:   awakenings minimized   noise level reduced   regular sleep/rest pattern promoted   relaxation techniques promoted

## 2024-02-12 NOTE — PT/OT/SLP PROGRESS
"Speech Language Pathology Treatment/  Discharge     Patient Name:  Rebel Rodriguez   MRN:  [id number]  Admitting Diagnosis: Cervical myelopathy    Recommendations:                 General Recommendations:  Follow-up not indicated  Diet recommendations:  NPO, Liquid Diet Level: NPO   Aspiration Precautions: Oral care and Strict aspiration precautions   General Precautions: Standard, NPO per ENT  Communication strategies:  One way speaking valve    Assessment:     Rebel Rodriguez is a 54 y.o. male with an SLP diagnosis of adequate tolerance and management of PMSV. No further acute ST needs.    Subjective     Pt awake/alert sitting upright on EOB with getting dressed with OT.  PMSV in place.    "I feel like a million bucks."    Pain/Comfort:  Pain Rating 1: 0/10  Pain Rating Post-Intervention 1: 0/10    Respiratory Status: Room air    Objective:     Has the patient been evaluated by SLP for swallowing?   No  Keep patient NPO? Yes     Pt seen bedside for ongoing one way speaking valve training. Pt wearing and tolerating PMSV upon entry to room and reports tolerance throughout the day. He reports compliance with trach care, cleaning and PMSV management. Pt able to independent don/doff PMSV x3 across session, and vocal quality appearing clear and strong with PMSV in place. Providing prompting for pt to perform oral care and instructed pt to continue cleaning mouth with swish and spit and damp swab 2-3x/day. Reviewed PMSV precautions and cleaning. Pt verbalized understanding of all information provide and in agreement. No further acute ST needs.     Goals:   Multidisciplinary Problems       SLP Goals          Problem: SLP    Goal Priority Disciplines Outcome   SLP Goal     SLP    Description: Speech Language Pathology Goals  Goals expected to be met by 2/16:  1. Pt will tolerated wearing PMSV for waking hours while maintaining SPO2 >94% and no signs of respiratory distress.   2. Pt will demonstrate consistent ability to don/doff " PMSV independently.                              Plan:     Patient to be seen:  4 x/week   Plan of Care expires:  03/10/24  Plan of Care reviewed with:  patient   SLP Follow-Up:  No       Barriers to Discharge:  None    Time Tracking:     SLP Treatment Date:   02/12/24  Speech Start Time:  1016  Speech Stop Time:  1031     Speech Total Time (min):  15 min    Billable Minutes: Speech Therapy Individual 7 and Self Care/Home Management Training 8    02/12/2024

## 2024-02-12 NOTE — PROGRESS NOTES
[facility] - Neurosurgery ([facility])  Otorhinolaryngology-Head & Neck Surgery  Progress Note    Subjective:     Post-Op Info:  Procedure(s) (LRB):  LAPAROTOMY with gastrostomy tube placement (N/A)  LARYNGOSCOPY, DIRECT  TRACHEOTOMY   9 Days Post-Op  Hospital Day: 14     Interval History:   Sleeping in bed this am. Sister at bedside with questions regarding steps moving forward. Also concerned about when he can restart his psoriatic arthritis meds because he is having significant pain.     Medications:  Continuous Infusions:   sodium chloride 0.9% Stopped (02/03/24 0801)    dextrose 10 % in water (D10W)       Scheduled Meds:   albuterol-ipratropium  3 mL Nebulization Q6H WAKE    atorvastatin  10 mg Per NG tube Daily    FLUoxetine  40 mg Per NG tube Daily    heparin (porcine)  5,000 Units Subcutaneous Q8H    mirtazapine  30 mg Per NG tube QHS    QUEtiapine  50 mg Per G Tube QHS     PRN Meds:acetaminophen, albuterol-ipratropium, bisacodyL, dextrose 10 % in water (D10W), dextrose 10%, dextrose 10%, glucagon (human recombinant), HYDROmorphone, insulin aspart U-100, ondansetron, oxyCODONE, prochlorperazine     Review of patient's allergies indicates:   Allergen Reactions    Erythromycin Nausea And Vomiting    Infliximab Other (See Comments)     Lupus with fever and acute arthritis  Lupus with fever and acute arthritis     Objective:     Vital Signs (24h Range):  Temp:  [98.7 °F (37.1 °C)-99.6 °F (37.6 °C)] 99.4 °F (37.4 °C)  Pulse:  [74-88] 74  Resp:  [16-18] 16  SpO2:  [97 %-99 %] 98 %  BP: (105-131)/(51-67) 105/51       Lines/Drains/Airways       Drain  Duration                  Closed/Suction Drain 01/30/24 1916 Right;Ventral Neck Bulb 15 Fr. 12 days         Gastrostomy/Enterostomy 02/03/24 0934 Gastrostomy tube w/ balloon LUQ 8 days              Airway  Duration             Adult Surgical Airway 02/03/24 1055 Juventino Uncuffed 6.0/ 75mm 8 days              Peripheral Intravenous Line  Duration                  Peripheral  IV - Single Lumen 02/05/24 1915 18 G Anterior;Right Forearm 6 days                     Physical Exam  Resting in bed   6-0 cuffless tracheostomy tube in good position, secured with soft collar  Neck incision c/d/I  Neck soft, no subq emphysema   Drain holding suction     Significant Labs:  CBC:   Recent Labs   Lab 02/10/24  0403   WBC 14.66*   RBC 3.85*   HGB 10.9*   HCT 33.6*      MCV 87   MCH 28.3   MCHC 32.4     CMP:   Recent Labs   Lab 02/10/24  0403   GLU 85   CALCIUM 9.6   ALBUMIN 3.0*   PROT 6.7      K 3.9   CO2 23      BUN 25*   CREATININE 0.8   ALKPHOS 79   ALT 40   AST 44*   BILITOT 0.7       Significant Diagnostics:  None  Assessment/Plan:     Injury of pharynx  Mr Rodriguez is a 55 yo male who is s/p C3-4 ACDF surgery with post op dysphagia, odynophagia, neck pain, swelling and crepitus. CT and scope demonstrates DELFIN drain in the hypopharynx. Patient with difficulty with swallowing secretions. No respiratory compromise. He is s/p neck exploration and pharyngeal repair on 1/30. Discussed with patient's mother and sister at bedside the recommendation for open G tube and tracheostomy to allow adequate time for pharynx to heal. Now s/p trach and open G tube on 2/3/24.    - Strict NPO, NPO for 4-6 weeks minimum  - ID consulted for abx recommendations in setting of hardware with pharyngeal injury    - Currently off all abx, s/p vanc and unasyn   - Routine trach care   - R neck drain to stay in place until removed by ENT   - Ok to restart psoriatic arthritis meds from ENT standpoint   - Rest of care for per primary  - Please page ENT with questions or concerns        Bonita Macias MD  Otorhinolaryngology-Head & Neck Surgery  [facility] - Neurosurgery ([facility])

## 2024-02-12 NOTE — SUBJECTIVE & OBJECTIVE
Interval History:       NAEON. AFVSS. Exam stable, oriented and following commands.  On bolus TF.  Pending placement    Medications:  Continuous Infusions:   sodium chloride 0.9% Stopped (02/03/24 0801)    dextrose 10 % in water (D10W)       Scheduled Meds:   albuterol-ipratropium  3 mL Nebulization Q6H    atorvastatin  10 mg Per NG tube Daily    FLUoxetine  40 mg Per NG tube Daily    heparin (porcine)  5,000 Units Subcutaneous Q8H    mirtazapine  30 mg Per NG tube QHS    QUEtiapine  50 mg Per G Tube QHS     PRN Meds:acetaminophen, bisacodyL, dextrose 10 % in water (D10W), dextrose 10%, dextrose 10%, glucagon (human recombinant), HYDROmorphone, insulin aspart U-100, ondansetron, oxyCODONE, prochlorperazine     Review of Systems  Objective:     Weight: 74.9 kg (165 lb 2 oz)  Body mass index is 25.11 kg/m².  Vital Signs (Most Recent):  Temp: 98 °F (36.7 °C) (02/11/24 0556)  Pulse: 73 (02/11/24 0556)  Resp: 16 (02/11/24 0556)  BP: (!) 118/56 (02/11/24 0556)  SpO2: 96 % (02/11/24 0556) Vital Signs (24h Range):  Temp:  [96.9 °F (36.1 °C)-99.2 °F (37.3 °C)] 98 °F (36.7 °C)  Pulse:  [73-98] 73  Resp:  [14-18] 16  SpO2:  [96 %-98 %] 96 %  BP: (102-123)/(55-63) 118/56                    Closed/Suction Drain 01/30/24 1916 Right;Ventral Neck Bulb 15 Fr. (Active)   Site Description Healing 02/08/24 1915   Dressing Type Transparent (Tegaderm) 02/08/24 1915   Dressing Status Clean;Dry;Intact 02/08/24 1915   Dressing Intervention Integrity maintained 02/08/24 1915   Drainage Serous 02/08/24 1915   Status To bulb suction 02/08/24 1915   Output (mL) 5 mL 02/09/24 0515            Gastrostomy/Enterostomy 02/03/24 0934 Gastrostomy tube w/ balloon LUQ (Active)   Securement secured to abdomen 02/08/24 1915   Interventions Prior to Feeding patency checked;residual checked 02/08/24 1915   Suction Setting/Drainage Method dependent drainage 02/08/24 0728   Drainage brown 02/04/24 1701   Feeding Type continuous;by pump 02/08/24 1915   Clamp  "Status/Tolerance unclamped;no restlessness;no residual;no nausea;no emesis;no abdominal distention;no abdominal discomfort 02/08/24 1915   Feeding Action feeding continued 02/08/24 1915   Dressing no dressing 02/08/24 1915   Insertion Site no redness;no warmth;no drainage;no tenderness;no swelling 02/08/24 1915   Current Rate (mL/hr) 45 mL/hr 02/08/24 1915   Goal Rate (mL/hr) 45 mL/hr 02/08/24 1915   Intake (mL) 25 mL 02/06/24 0800   Water Bolus (mL) 240 mL 02/08/24 2105   Tube Output(mL)(Include Discarded Residual) 300 mL 02/04/24 0542   Formula Name Peptamen 02/08/24 1915   Tube Feeding Intake (mL) 540 02/08/24 1848   Residual Amount (ml) 0 ml 02/08/24 1915      Physical Exam  General: well developed, well nourished, no distress.   Head: normocephalic, atraumatic  Neurologic: Alert and oriented. Higher order confusion noted.   Cranial nerves: face symmetric, CN II-XII grossly intact, EOMI.   Sensory: intact to light touch throughout  Motor Strength: Moves all extremities spontaneously with good tone.  Full strength upper and lower extremities. No abnormal movements seen.      Strength   Deltoids Triceps Biceps Wrist Extension Wrist Flexion Hand    Upper: R 5/5 5/5 5/5 5/5 5/5 5/5     L 5/5 5/5 5/5 5/5 5/5 5/5       Iliopsoas Quadriceps Knee  Flexion Tibialis  anterior Gastro- cnemius EHL   Lower: R 5/5 5/5 5/5 5/5 5/5 5/5     L 5/5 5/5 5/5 5/5 5/5 5/5      Skin: Skin is warm, dry and intact.  Incision c/d/I with skin edges well approximated.   DELFIN drain in place    Significant Labs:  Recent Labs   Lab 02/10/24  0403   GLU 85      K 3.9      CO2 23   BUN 25*   CREATININE 0.8   CALCIUM 9.6   MG 2.0       Recent Labs   Lab 02/10/24  0403   WBC 14.66*   HGB 10.9*   HCT 33.6*          No results for input(s): "LABPT", "INR", "APTT" in the last 48 hours.  Microbiology Results (last 7 days)       ** No results found for the last 168 hours. **          All pertinent labs from the last 24 hours have " been reviewed.    Significant Diagnostics:  I have reviewed and interpreted all pertinent imaging results/findings within the past 24 hours.  US Lower Extremity Veins Bilateral    Result Date: 2/10/2024  No evidence of deep venous thrombosis in either lower extremity. Electronically signed by: Noe Davalos Date:    02/10/2024 Time:    01:08    X-Ray Chest AP Portable    Result Date: 2/9/2024  See above Electronically signed by: Jerman Carter MD Date:    02/09/2024 Time:    14:54     X-Ray Abdomen AP 1 View    Result Date: 2/10/2024  As above. Electronically signed by: Noe Davalos Date:    02/10/2024 Time:    20:48

## 2024-02-12 NOTE — SUBJECTIVE & OBJECTIVE
Interval History:   Sleeping in bed this am. Sister at bedside with questions regarding steps moving forward. Also concerned about when he can restart his psoriatic arthritis meds because he is having significant pain.     Medications:  Continuous Infusions:   sodium chloride 0.9% Stopped (02/03/24 0801)    dextrose 10 % in water (D10W)       Scheduled Meds:   albuterol-ipratropium  3 mL Nebulization Q6H WAKE    atorvastatin  10 mg Per NG tube Daily    FLUoxetine  40 mg Per NG tube Daily    heparin (porcine)  5,000 Units Subcutaneous Q8H    mirtazapine  30 mg Per NG tube QHS    QUEtiapine  50 mg Per G Tube QHS     PRN Meds:acetaminophen, albuterol-ipratropium, bisacodyL, dextrose 10 % in water (D10W), dextrose 10%, dextrose 10%, glucagon (human recombinant), HYDROmorphone, insulin aspart U-100, ondansetron, oxyCODONE, prochlorperazine     Review of patient's allergies indicates:   Allergen Reactions    Erythromycin Nausea And Vomiting    Infliximab Other (See Comments)     Lupus with fever and acute arthritis  Lupus with fever and acute arthritis     Objective:     Vital Signs (24h Range):  Temp:  [98.7 °F (37.1 °C)-99.6 °F (37.6 °C)] 99.4 °F (37.4 °C)  Pulse:  [74-88] 74  Resp:  [16-18] 16  SpO2:  [97 %-99 %] 98 %  BP: (105-131)/(51-67) 105/51       Lines/Drains/Airways       Drain  Duration                  Closed/Suction Drain 01/30/24 1916 Right;Ventral Neck Bulb 15 Fr. 12 days         Gastrostomy/Enterostomy 02/03/24 0934 Gastrostomy tube w/ balloon LUQ 8 days              Airway  Duration             Adult Surgical Airway 02/03/24 1055 [place] Uncuffed 6.0/ 75mm 8 days              Peripheral Intravenous Line  Duration                  Peripheral IV - Single Lumen 02/05/24 1915 18 G Anterior;Right Forearm 6 days                     Physical Exam  Resting in bed   6-0 cuffless tracheostomy tube in good position, secured with soft collar  Neck incision c/d/I  Neck soft, no subq emphysema   Drain holding suction      Significant Labs:  CBC:   Recent Labs   Lab 02/10/24  0403   WBC 14.66*   RBC 3.85*   HGB 10.9*   HCT 33.6*      MCV 87   MCH 28.3   MCHC 32.4     CMP:   Recent Labs   Lab 02/10/24  0403   GLU 85   CALCIUM 9.6   ALBUMIN 3.0*   PROT 6.7      K 3.9   CO2 23      BUN 25*   CREATININE 0.8   ALKPHOS 79   ALT 40   AST 44*   BILITOT 0.7       Significant Diagnostics:  None

## 2024-02-12 NOTE — PT/OT/SLP PROGRESS
Occupational Therapy   Treatment    Name: Rebel Rodriguez  MRN: [id number]  Admitting Diagnosis:  Cervical myelopathy  9 Days Post-Op    Recommendations:     Discharge Recommendations: High Intensity Therapy  Discharge Equipment Recommendations:  bath bench, walker, rolling  Barriers to discharge:   (increased skill assistance required)    Assessment:     Rebel Rodriguez is a 54 y.o. male with a medical diagnosis of Cervical myelopathy.  He presents with good motivation and participation. Performance deficits affecting function are weakness, impaired endurance, impaired self care skills, impaired functional mobility, gait instability, impaired balance, decreased coordination, decreased lower extremity function, impaired fine motor, impaired cardiopulmonary response to activity, decreased safety awareness.     Pt agreeable to therapy and tolerated well. He participated in bed mobility, self-care, sit-to-stands, and functional mobility. He continues to demo increased independence with self-care tasks. Pt required assistance placing speaking valve into trach d/t reduce finger dexterity.Pt would continue to benefit from skilled OT services to maximize functional independence with ADLs and functional mobility, reduce caregiver burden, and facilitate safe discharge in the least restrictive environment. OT recommending high intensity therapy once medically appropriate for discharge.     Rehab Prognosis:  Good; patient would benefit from acute skilled OT services to address these deficits and reach maximum level of function.       Plan:     Patient to be seen 4 x/week to address the above listed problems via self-care/home management, therapeutic activities, therapeutic exercises, neuromuscular re-education  Plan of Care Expires: 02/29/24  Plan of Care Reviewed with: patient, mother    Subjective     Chief Complaint: back discomfort  Patient/Family Comments/goals: I want to change my clothes today  Pain/Comfort:  Pain Rating 1:  4/10  Location - Side 1: Bilateral  Location - Orientation 1: generalized  Location 1: back  Pain Addressed 1: Reposition, Distraction  Pain Rating Post-Intervention 1:  (not rated)    Objective:     Communicated with: RN prior to session.  Patient found HOB elevated with DELFIN drain, G/J tube, telemetry, Tracheostomy upon OT entry to room.    General Precautions: Standard, NPO, fall    Orthopedic Precautions:N/A  Braces: N/A  Respiratory Status: Room air     Occupational Performance:     Bed Mobility:    Patient completed Supine to Sit with supervision  Patient completed Sit to Supine with supervision     Functional Mobility/Transfers:  Patient completed Sit <> Stand Transfer with stand by assistance  with  hand-held assist   Functional Mobility: ambulated ~20' to bathroom for oral hygiene and voiding, SB/CGA. Req VC to slow down pace of ambulation due to reduced steadiness.    Activities of Daily Living:  Grooming: stand by assistance - oral hygiene and facial grooming completed standing at sink  Bathing: modified independence and minimum assistance - seated EOB, wiped all extremities, trunk, req assistance washing back  Upper Body Dressing: supervision and minimum assistance - doff/don shirt seated EOB. Req assistance getting shirt across back to don  Lower Body Dressing: independence - doff/don undergarment and pants while seated EOB.  Toileting: stand by assistance - void standing at toilet, min posture sway noted      [facility] 6 Click ADL: 22    Treatment & Education:  -Education on energy conservation and task modification to maximize safety and (I) during ADLs and mobility  -Education on importance of OOB activity to improve overall activity tolerance and promote recovery  -Pt educated to call for assistance and to transfer with hospital staff only  -Provided education regarding role of OT with pt verbalizing understanding.  Pt had no further questions & when asked whether there were any concerns pt reported  none.      Patient left sitting edge of bed with all lines intact, call button in reach, and SLP present    GOALS:   Multidisciplinary Problems       Occupational Therapy Goals          Problem: Occupational Therapy    Goal Priority Disciplines Outcome Interventions   Occupational Therapy Goal     OT, PT/OT Ongoing, Progressing    Description: Goals to be met by: 2/29/24     Patient will increase functional independence with ADLs by performing:    UE Dressing with [place].  LE Dressing with [place].  Grooming while standing with [place].  Toileting from toilet with [place] for hygiene and clothing management.   Stand pivot transfers with [place].  Step transfer with [place]  Toilet transfer to toilet with [place].                         Time Tracking:     OT Date of Treatment: 02/12/24  OT Start Time: 0958  OT Stop Time: 1026  OT Total Time (min): 28 min    Billable Minutes:Self Care/Home Management 14  Therapeutic Activity 14    OT/LUCY: OT          2/12/2024

## 2024-02-12 NOTE — ASSESSMENT & PLAN NOTE
Ok to resume PA meds per ENT  Resume apremilast   To resume tremfya 2 weeks post op as long as wound healed

## 2024-02-12 NOTE — PROGRESS NOTES
[facility] - Neurosurgery ([facility])  Neurosurgery  Progress Note    Subjective:     History of Present Illness: Rebel Rodriguez is a 53 y.o. male with hx of psoriatic arthritis, hx TIA on Aspirin 81 mg, s/p C4-7 ACDF with Dr. Huff 10 years ago who presents for evaluation of gait imbalance. Last seen 1 year ago for neck pain and radicular pain into the left shoulder. He underwent C1-2 KENRICK with moderate relief of pain. Reports rapid functional decline over the last 3 weeks. Endorses hand clumsiness, difficulty typing, gait imbalance, difficulty butoning, dropping items, falls. Difficulty with ambulating also due to LLE weakness. States it feels like he has rubber bands around his wrists. Reports difficulty with overhead mobility and shock-like pains down spine when raising arms overhead. Denies b/b incontinence.     Post-Op Info:  Procedure(s) (LRB):  LAPAROTOMY with gastrostomy tube placement (N/A)  LARYNGOSCOPY, DIRECT  TRACHEOTOMY   9 Days Post-Op   Interval History:       NAEON. AFVSS. Exam stable, oriented and following commands.  On bolus TF.  Pending placement    Medications:  Continuous Infusions:   sodium chloride 0.9% Stopped (02/03/24 0801)    dextrose 10 % in water (D10W)       Scheduled Meds:   albuterol-ipratropium  3 mL Nebulization Q6H    atorvastatin  10 mg Per NG tube Daily    FLUoxetine  40 mg Per NG tube Daily    heparin (porcine)  5,000 Units Subcutaneous Q8H    mirtazapine  30 mg Per NG tube QHS    QUEtiapine  50 mg Per G Tube QHS     PRN Meds:acetaminophen, bisacodyL, dextrose 10 % in water (D10W), dextrose 10%, dextrose 10%, glucagon (human recombinant), HYDROmorphone, insulin aspart U-100, ondansetron, oxyCODONE, prochlorperazine     Review of Systems  Objective:     Weight: 74.9 kg (165 lb 2 oz)  Body mass index is 25.11 kg/m².  Vital Signs (Most Recent):  Temp: 98 °F (36.7 °C) (02/11/24 0556)  Pulse: 73 (02/11/24 0556)  Resp: 16 (02/11/24 0556)  BP: (!) 118/56 (02/11/24 0556)  SpO2: 96 %  (02/11/24 0556) Vital Signs (24h Range):  Temp:  [96.9 °F (36.1 °C)-99.2 °F (37.3 °C)] 98 °F (36.7 °C)  Pulse:  [73-98] 73  Resp:  [14-18] 16  SpO2:  [96 %-98 %] 96 %  BP: (102-123)/(55-63) 118/56                    Closed/Suction Drain 01/30/24 1916 Right;Ventral Neck Bulb 15 Fr. (Active)   Site Description Healing 02/08/24 1915   Dressing Type Transparent (Tegaderm) 02/08/24 1915   Dressing Status Clean;Dry;Intact 02/08/24 1915   Dressing Intervention Integrity maintained 02/08/24 1915   Drainage Serous 02/08/24 1915   Status To bulb suction 02/08/24 1915   Output (mL) 5 mL 02/09/24 0515            Gastrostomy/Enterostomy 02/03/24 0934 Gastrostomy tube w/ balloon LUQ (Active)   Securement secured to abdomen 02/08/24 1915   Interventions Prior to Feeding patency checked;residual checked 02/08/24 1915   Suction Setting/Drainage Method dependent drainage 02/08/24 0728   Drainage brown 02/04/24 1701   Feeding Type continuous;by pump 02/08/24 1915   Clamp Status/Tolerance unclamped;no restlessness;no residual;no nausea;no emesis;no abdominal distention;no abdominal discomfort 02/08/24 1915   Feeding Action feeding continued 02/08/24 1915   Dressing no dressing 02/08/24 1915   Insertion Site no redness;no warmth;no drainage;no tenderness;no swelling 02/08/24 1915   Current Rate (mL/hr) 45 mL/hr 02/08/24 1915   Goal Rate (mL/hr) 45 mL/hr 02/08/24 1915   Intake (mL) 25 mL 02/06/24 0800   Water Bolus (mL) 240 mL 02/08/24 2105   Tube Output(mL)(Include Discarded Residual) 300 mL 02/04/24 0542   Formula Name Peptamen 02/08/24 1915   Tube Feeding Intake (mL) 540 02/08/24 1848   Residual Amount (ml) 0 ml 02/08/24 1915      Physical Exam  General: well developed, well nourished, no distress.   Head: normocephalic, atraumatic  Neurologic: Alert and oriented. Higher order confusion noted.   Cranial nerves: face symmetric, CN II-XII grossly intact, EOMI.   Sensory: intact to light touch throughout  Motor Strength: Moves all  "extremities spontaneously with good tone.  Full strength upper and lower extremities. No abnormal movements seen.      Strength   Deltoids Triceps Biceps Wrist Extension Wrist Flexion Hand    Upper: R 5/5 5/5 5/5 5/5 5/5 5/5     L 5/5 5/5 5/5 5/5 5/5 5/5       Iliopsoas Quadriceps Knee  Flexion Tibialis  anterior Gastro- cnemius EHL   Lower: R 5/5 5/5 5/5 5/5 5/5 5/5     L 5/5 5/5 5/5 5/5 5/5 5/5      Skin: Skin is warm, dry and intact.  Incision c/d/I with skin edges well approximated.   DELFIN drain in place    Significant Labs:  Recent Labs   Lab 02/10/24  0403   GLU 85      K 3.9      CO2 23   BUN 25*   CREATININE 0.8   CALCIUM 9.6   MG 2.0       Recent Labs   Lab 02/10/24  0403   WBC 14.66*   HGB 10.9*   HCT 33.6*          No results for input(s): "LABPT", "INR", "APTT" in the last 48 hours.  Microbiology Results (last 7 days)       ** No results found for the last 168 hours. **          All pertinent labs from the last 24 hours have been reviewed.    Significant Diagnostics:  I have reviewed and interpreted all pertinent imaging results/findings within the past 24 hours.  US Lower Extremity Veins Bilateral    Result Date: 2/10/2024  No evidence of deep venous thrombosis in either lower extremity. Electronically signed by: Noe Davalos Date:    02/10/2024 Time:    01:08    X-Ray Chest AP Portable    Result Date: 2/9/2024  See above Electronically signed by: Jerman Carter MD Date:    02/09/2024 Time:    14:54     X-Ray Abdomen AP 1 View    Result Date: 2/10/2024  As above. Electronically signed by: Noe Davalos Date:    02/10/2024 Time:    20:48  Assessment/Plan:     * Cervical myelopathy  Rebel Rodriguez  Is a 54 y.o. male with cervical myelopathy and prior ACDF C4 C7 with adjacent level 3 4 who presented for elective C3-4 ACDF on 01/30, which complicated by retropharyngeal injury required repair by ENT. Now s/p trach/G tube on 2/3.      - Neuro exam stable, q4h neuro checks  - Continue " multimodal pain control   - S/p dex x3d  - ID consulted for abx recs given pharyngeal injury, s/p course of unasyn  - Cervical drain by ENT   - S/p trach/g tube - npo 4-6 wks minimum per ENT. TF per primary team/nutrition  - SubQ heparin for DVT prophylaxis. DVT US 2/10 negative   - Okay to resume home meds from NSGY standpoint   - Infectious workup / CTH negative to date for experienced 2/9  - Postop XR with satisfactory placement of hardware  - Notify with acute changes in exam  --Will continue to follow for post-op needs. Discussed care plan with patient and family at bedside, all questions answered.      Discussed with Dr. Ashwini Davenport MD  Neurosurgery  [facility] - Neurosurgery ([facility])

## 2024-02-12 NOTE — PROGRESS NOTES
Frantz Weber - Neurosurgery ([facility])  Hospital Medicine  Progress Note    Patient Name: Rebel Rodriguez  MRN: [id number]  Patient Class: IP- Inpatient   Admission Date: 1/30/2024  Length of Stay: 13 days  Attending Physician: Ty Munoz*  Primary Care Provider: Nanda Smith MD        Subjective:     Principal Problem:Cervical myelopathy        HPI:  53 y.o. male with hx of psoriatic arthritis, hx TIA on Aspirin 81 mg, s/p C4-7 ACDF with Dr. Huff 10 years ago admitted to [facility] s/p C3-C4 ACDF. Per chart review, reports rapid functional decline over the last 3 weeks. Endorses hand clumsiness, difficulty typing, gait imbalance, difficulty butoning, dropping items, falls. Difficulty with ambulating also due to LLE weakness. States it feels like he has rubber bands around his wrists. Reports difficulty with overhead mobility and shock-like pains down spine when raising arms overhead. CT neck soft tissue pending, Patient admitted to [facility] for close monitoring and higher level of care.      Hospital Course: 01/31/2024 CT neck concerning for extensive soft tissue edema and air in neck, additionally w/ concern for DELFIN drain misplaced into hypopharynx. Taken class A to OR by ENT for pharyngeal repair, left intubated by ENT in setting of airway edema. On high dose steroids, no cuff leak this AM  02/01/2024 General surgery consulted for open G tube placement, family still in discussion regarding trach. D/c dex to allow for adequate wound healing, ok with NSGY.   2/2/24: possible G-tube placement today  2/3/24: G-tube today  2/4/24: ok to step down to hospital medicine     Interval History: trach collar and g-tube in place, ok to step down to Hospital medicine    ENT recs: Now s/p trach and open G tube on 2/3/24.     - Strict NPO, NPO for 4-6 weeks minimum  - ID consulted for abx recommendations in setting of hardware with pharyngeal injury          - Currently off all abx, s/p vanc and unasyn   - s/p tracheostomy, with  6-0 cuffed shiley in place          - CUFF TO REMAIN UP - until otherwise directed by ENT          - Tentative plan to deflate on Tuesday  - R neck drain to stay in place until at least Tuesday       Overview/Hospital Course:  2/6- Transferred to [facility] med, IMN. Has trach and g-tube. Rehab is planned. BP low received , TF not at goal due to abd cramping. Will give suppository. Rounded with ENT service. Plan is to remove trache first, remove drain last. Pt communicating fairly well and able to make request and advocate for self. He is sitting up in a chair. NPO with G-tube for six weeks. Trach care q 4 hours.     2/7- trach change planned tomorrow  Daily evaluating.  Strict NPO, NPO for 4-6 weeks minimum.  ID consulted in ICU- no abx needed.  Currently off all abx, s/p vanc and unasyn - R neck drain to stay in place until removed by ENT. 75 % of TF goal. Had one BM after suppository.  Wbc 16-> 14.  No cultures. 99/54 and other VSS. AF. Will bolus  cc for low BP.  CM plan: Pt and family may want to go to the [facility] ([facility] with HH at discharge.  - ENT may dc pt home w trache.  2/8 ENT trach changed for 6-0 cuffless   Patient developed intermittent confusion. Infectious workup negative  Psychiatry consulted. Remeron increased to 30 mg and quetiapine started 50 mg qhs    Interval History: No acute events  Pt reports some stiffness and aches in joints neck and back, hands. Afebrile  Notified outpt rheumatologist at patient request.  Resuming outpatient apremilast, as ok per ENT. Resume tremfya 2 weeks post op as long as wound healed.     Review of Systems  Objective:     Vital Signs (Most Recent):  Temp: 98.2 °F (36.8 °C) (02/12/24 0921)  Pulse: 77 (02/12/24 0921)  Resp: 16 (02/12/24 0921)  BP: (!) 114/59 (02/12/24 0921)  SpO2: 95 % (02/12/24 0921) Vital Signs (24h Range):  Temp:  [98.2 °F (36.8 °C)-99.4 °F (37.4 °C)] 98.2 °F (36.8 °C)  Pulse:  [69-88] 77  Resp:  [16-20] 16  SpO2:  [95 %-99  %] 95 %  BP: (105-114)/(51-64) 114/59     Weight: 74.9 kg (165 lb 2 oz)  Body mass index is 25.11 kg/m².    Intake/Output Summary (Last 24 hours) at 2/12/2024 1349  Last data filed at 2/11/2024 2200  Gross per 24 hour   Intake 450 ml   Output --   Net 450 ml         Physical Exam  Constitutional:       General: He is not in acute distress.     Appearance: Normal appearance. He is normal weight. He is not ill-appearing, toxic-appearing or diaphoretic.   HENT:      Head:      Comments: Trache present  Eyes:      Extraocular Movements: Extraocular movements intact.      Conjunctiva/sclera: Conjunctivae normal.      Pupils: Pupils are equal, round, and reactive to light.   Neck:      Vascular: No carotid bruit.      Comments: Drain in place  Cardiovascular:      Rate and Rhythm: Normal rate and regular rhythm.      Pulses: Normal pulses.      Heart sounds: Normal heart sounds. No murmur heard.     No friction rub. No gallop.   Pulmonary:      Effort: Pulmonary effort is normal. No respiratory distress.      Breath sounds: Normal breath sounds.   Abdominal:      General: Abdomen is flat. Bowel sounds are normal. There is no distension.      Palpations: Abdomen is soft.      Tenderness: There is no abdominal tenderness. There is no guarding or rebound.      Comments: G tube clean and dry   Musculoskeletal:         General: No swelling. Normal range of motion.      Cervical back: Normal range of motion and neck supple. No rigidity or tenderness.      Right lower leg: No edema.      Left lower leg: No edema.   Lymphadenopathy:      Cervical: No cervical adenopathy.   Skin:     General: Skin is warm and dry.      Coloration: Skin is not jaundiced or pale.   Neurological:      General: No focal deficit present.      Mental Status: He is alert and oriented to person, place, and time.             Significant Labs: All pertinent labs within the past 24 hours have been reviewed.    Significant Imaging: I have reviewed all  pertinent imaging results/findings within the past 24 hours.    Assessment/Plan:      * Cervical myelopathy  Hx of prior ACDF C4 C7 with adjacent level 3-4 who presented for elective C3-4 ACDF on 01/30, complicated by retropharyngeal injury required repair by ENT.   - s/p NCC, intubation and ventilation, trache and G tube.  - will not keep trach for 6 weeks, but NPO with G tube for 6 weeks, f/u ENT  - has right neck drain to be removed by ENT    Postprocedural hypotension  2/6- RL 500cc  2/7  cc IV.      Abdominal cramping  Had episodes of diarrhea  KUB with no obstruction  Stopped stool softeners      Physical deconditioning  Has trache and G tube,   PT/OT  Wean oxygen  Teach trache and G-tube care      Gastrostomy status  Patient noted to have a percutaneous endoscopic gastrostomy tube in place. I have personally inspected the tube.Tube was placed on this admission, with date of procedure- 2/2  There are no signs of drainage or infection around the site. The tube is patent. Medications have not converted to liquid form if available.  Routine care to be done by wound care and nursing staff.      Isossource @ 35 cc/ h  Peptomen 1.5 w bio 45cc/ h  Water flushes    2/7 advance TF as tolerated to goal  Changed to bolus feeds    Anxiety  Hx of    - C/w home remeron and fluoxetine  Psychiatry consulted   Increased home remeron to 30 mg  Changed to seroquel 50 mg qhs    On mechanically assisted ventilation  S/p ventilation in NCC  Now on room air, has trache for six weeks  Sitting up in chair      Injury of pharynx  DELFIN drain noted to be in hypopharynx on post-op imaging, s/p emergent surgical repair on 1/30  - Dex 4q6hrs, discontinued  ENT recs: Now s/p trach and open G tube on 2/3/24.  - Strict NPO, NPO for 4-6 weeks minimum  - ID consulted for abx recommendations in setting of hardware with pharyngeal injury          - Currently off all abx, s/p vanc and unasyn   - s/p tracheostomy    2/7-trache care q 4 hours.   trach exchange planned 2/8 by ENT. Drain to be removed per ENT.       Hyperlipidemia  Hx of    - C/w home atorvastatin        psoriatric Arthritis  Ok to resume PA meds per ENT  Resume apremilast   To resume tremfya 2 weeks post op as long as wound healed      VTE Risk Mitigation (From admission, onward)           Ordered     heparin (porcine) injection 5,000 Units  Every 8 hours         02/05/24 0919                    Discharge Planning   JUSTYN: 2/14/2024     Code Status: Full Code   Is the patient medically ready for discharge?: No    Reason for patient still in hospital (select all that apply): Patient trending condition, Treatment, and Consult recommendations  Discharge Plan A: Rehab   Discharge Delays: (!) Change in Medical Condition      Ty Munoz MD  Department of Hospital Medicine   [facility] [address] [facility] - Neurosurgery ([facility])

## 2024-02-12 NOTE — PROGRESS NOTES
CONSULTATION LIAISON PSYCHIATRY PROGRESS NOTE    Patient Name: Rebel Rodriguez  MRN: [id number]  Patient Class: IP- Inpatient  Admission Date: 1/30/2024  Attending Physician: Ty Munoz*      SUBJECTIVE:   Rebel Rodriguez is a 54 y.o. male with past psychiatric history of depression, grief, decrease appetite & past pertinent medical history of cervical myelopathy, s/p C3-4 ACDF surgery with post op dysphagia, odynophagia, neck pain, swelling and crepitus. CT and scope demonstrates DELFIN drain in the hypopharynx. Patient with difficulty with swallowing secretions.He is s/p neck exploration and pharyngeal repair on 1/30. Now s/p trach and open G tube on 2/3/24     Psychiatry consulted for hallucinations    Today, Mr. Rodriguez presents with his mother at bedside. He states that he has been sleeping well over the last couple of nights since adjusting medications. He states that he feels well rested in the morning and notes his mood has improved. He would like to speak to therapist, encourage him to reach out to his current outpatient therapist.  Spoke with mother and sister, both deny new episodes of hallucination or confusion.        OBJECTIVE:    Mental Status Exam:  General Appearance: appears stated age, well developed and nourished, adequately groomed and appropriately dressed, in no acute distress  Behavior: normal; cooperative; reasonably friendly, pleasant, and polite; appropriate eye-contact; under good behavioral control  Involuntary Movements and Motor Activity: no abnormal involuntary movements noted; no tics, no tremors, no akathisia, no dystonia, no evidence of tardive dyskinesia; no psychomotor agitation or retardation  Gait and Station: unable to assess - patient lying down or seated  Speech and Language: intact; normal rate, rhythm, volume, tone, and pitch; conversational, spontaneous, and coherent; speaks and understands English proficiently and fluently; repeats words and phrases, no word finding  "difficulties are noted  Mood: "rested"  Affect: normal, euthymic, reactive, full-range, mood-congruent, appropriate to situation and context  Thought Process and Associations: intact; linear, goal-directed, organized, and logical; no loosening of associations noted  Thought Content and Perceptions:: no suicidal or homicidal ideation, no auditory or visual hallucinations, no paranoid ideation, no ideas of reference, no evidence of delusions or psychosis  Sensorium and Orientation: intact; alert with clear sensorium; oriented fully to person, place, time and situation  Recent and Remote Memory: grossly intact, able to recall relevant and salient information from the recent and remote past  Attention and Concentration: grossly intact, attentive to the conversation and not readily distractible  Fund of Knowledge: grossly intact, used appropriate vocabulary and demonstrated an awareness of current events, consistent with educational level achieved  Insight: intact, demonstrates awareness of illness and situation  Judgment: intact, behavior is adequate/appropriate to the circumstances, compliant with health provider's recommendations and instructions    CAM ICU positive? no      ASSESSMENT & RECOMMENDATIONS     MDD mild/moderate/severe, BIPOLAR I/II, Unspecified mood etc  PSYCH MEDICATIONS  Scheduled- ok to continue prozac 40 mg daily  Recommend consult to psychology     Insomnia  PSYCH MEDICATIONS  Scheduled- recommend continue remeron 30 mg at night     Delirium  Continue medical workup for delirium  PSYCH MEDICATIONS  Scheduled- continue seroquel 50 mg at night  Qtc on 2/9 438  PRN- seroquel 50 mg q6 hours as needed for agitation/hallucinations     DELIRIUM  DELIRIUM BEHAVIOR MANAGEMENT  PLEASE utilize CHEMICAL restraints with PRN meds first for agitation. Minimize use of PHYSICAL restraints OR have periods of being out of physical restraints if possible.  Keep window shades open and room lit during day and room dim at " night in order to promote normal sleep-wake cycles  Encourage family at bedside. [place] patient often to situation, location, date.  Continue to Limit or Discontinue use of Narcotics, Benzos and Anti-cholinergic medications as they may worsen delirium.  Continue medical workup for causative etiology of Delirium.         FOLLOW UP  Will sign off.     DISPOSITION - once medically cleared:    Defer to medical team    Please contact ON CALL psychiatry service (24/7) for any acute issues that may arise.    Dr. Jaclyn Danielle   Psychiatry  [facility]  2/12/2024 11:45 AM        --------------------------------------------------------------------------------------------------------------------------------------------------------------------------------------------------------------------------------------    CONTINUED OBJECTIVE clinical data & findings reviewed and noted for above decision making    Current Medications:   Scheduled Meds:    albuterol-ipratropium  3 mL Nebulization Q6H WAKE    atorvastatin  10 mg Per NG tube Daily    FLUoxetine  40 mg Per NG tube Daily    heparin (porcine)  5,000 Units Subcutaneous Q8H    mirtazapine  30 mg Per NG tube QHS    QUEtiapine  50 mg Per G Tube QHS     PRN Meds: acetaminophen, albuterol-ipratropium, bisacodyL, dextrose 10 % in water (D10W), dextrose 10%, dextrose 10%, glucagon (human recombinant), HYDROmorphone, insulin aspart U-100, ondansetron, oxyCODONE, prochlorperazine    Allergies:   Review of patient's allergies indicates:   Allergen Reactions    Erythromycin Nausea And Vomiting    Infliximab Other (See Comments)     Lupus with fever and acute arthritis  Lupus with fever and acute arthritis       Vitals  Vitals:    02/12/24 0921   BP: (!) 114/59   Pulse: 77   Resp: 16   Temp: 98.2 °F (36.8 °C)       Labs/Imaging/Studies:  Recent Results (from the past 24 hour(s))   CBC auto differential    Collection Time: 02/12/24 10:32 AM   Result Value Ref Range     WBC 21.36 (H) 3.90 - 12.70 K/uL    RBC 4.13 (L) 4.60 - 6.20 M/uL    Hemoglobin 11.6 (L) 14.0 - 18.0 g/dL    Hematocrit 35.8 (L) 40.0 - 54.0 %    MCV 87 82 - 98 fL    MCH 28.1 27.0 - 31.0 pg    MCHC 32.4 32.0 - 36.0 g/dL    RDW 14.3 11.5 - 14.5 %    Platelets 492 (H) 150 - 450 K/uL    MPV 10.0 9.2 - 12.9 fL    Immature Granulocytes 1.2 (H) 0.0 - 0.5 %    Gran # (ANC) 18.1 (H) 1.8 - 7.7 K/uL    Immature Grans (Abs) 0.26 (H) 0.00 - 0.04 K/uL    Lymph # 1.5 1.0 - 4.8 K/uL    Mono # 1.3 (H) 0.3 - 1.0 K/uL    Eos # 0.1 0.0 - 0.5 K/uL    Baso # 0.07 0.00 - 0.20 K/uL    nRBC 0 0 /100 WBC    Gran % 85.0 (H) 38.0 - 73.0 %    Lymph % 7.0 (L) 18.0 - 48.0 %    Mono % 6.2 4.0 - 15.0 %    Eosinophil % 0.3 0.0 - 8.0 %    Basophil % 0.3 0.0 - 1.9 %    Differential Method Automated

## 2024-02-12 NOTE — SUBJECTIVE & OBJECTIVE
Interval History: No acute events  Pt reports some stiffness and aches in joints neck and back, hands. Afebrile  Notified outpt rheumatologist at patient request.  Resuming outpatient apremilast, as ok per ENT. Resume tremfya 2 weeks post op as long as wound healed.     Review of Systems  Objective:     Vital Signs (Most Recent):  Temp: 98.2 °F (36.8 °C) (02/12/24 0921)  Pulse: 77 (02/12/24 0921)  Resp: 16 (02/12/24 0921)  BP: (!) 114/59 (02/12/24 0921)  SpO2: 95 % (02/12/24 0921) Vital Signs (24h Range):  Temp:  [98.2 °F (36.8 °C)-99.4 °F (37.4 °C)] 98.2 °F (36.8 °C)  Pulse:  [69-88] 77  Resp:  [16-20] 16  SpO2:  [95 %-99 %] 95 %  BP: (105-114)/(51-64) 114/59     Weight: 74.9 kg (165 lb 2 oz)  Body mass index is 25.11 kg/m².    Intake/Output Summary (Last 24 hours) at 2/12/2024 1349  Last data filed at 2/11/2024 2200  Gross per 24 hour   Intake 450 ml   Output --   Net 450 ml         Physical Exam  Constitutional:       General: He is not in acute distress.     Appearance: Normal appearance. He is normal weight. He is not ill-appearing, toxic-appearing or diaphoretic.   HENT:      Head:      Comments: Trache present  Eyes:      Extraocular Movements: Extraocular movements intact.      Conjunctiva/sclera: Conjunctivae normal.      Pupils: Pupils are equal, round, and reactive to light.   Neck:      Vascular: No carotid bruit.      Comments: Drain in place  Cardiovascular:      Rate and Rhythm: Normal rate and regular rhythm.      Pulses: Normal pulses.      Heart sounds: Normal heart sounds. No murmur heard.     No friction rub. No gallop.   Pulmonary:      Effort: Pulmonary effort is normal. No respiratory distress.      Breath sounds: Normal breath sounds.   Abdominal:      General: Abdomen is flat. Bowel sounds are normal. There is no distension.      Palpations: Abdomen is soft.      Tenderness: There is no abdominal tenderness. There is no guarding or rebound.      Comments: G tube clean and dry    Musculoskeletal:         General: No swelling. Normal range of motion.      Cervical back: Normal range of motion and neck supple. No rigidity or tenderness.      Right lower leg: No edema.      Left lower leg: No edema.   Lymphadenopathy:      Cervical: No cervical adenopathy.   Skin:     General: Skin is warm and dry.      Coloration: Skin is not jaundiced or pale.   Neurological:      General: No focal deficit present.      Mental Status: He is alert and oriented to person, place, and time.             Significant Labs: All pertinent labs within the past 24 hours have been reviewed.    Significant Imaging: I have reviewed all pertinent imaging results/findings within the past 24 hours.

## 2024-02-12 NOTE — RESPIRATORY THERAPY
"RAPID RESPONSE RESPIRATORY THERAPY PROACTIVE NOTE           Time of visit: 906     Code Status: Full Code   : 1970  Bed: 930/930 A:   MRN: [id number]  Time spent at the bedside: < 15 min    SITUATION    Evaluated patient for: LDA Check     BACKGROUND    Patient has a past medical history of Achilles tendon rupture, Achilles tendon rupture, Allergy, Anemia, Anxiety, Arthritis, Degenerative disc disease, Drug-induced lupus erythematosus, Drug-induced lupus erythematosus, Hyperlipidemia, Inguinal lymphadenopathy, Kidney stone, Medication monitoring encounter, Neck pain, Psoriatic arthritis, Psoriatic arthritis, Recurrent fever, Recurrent nephrolithiasis, Sinusitis, Stroke, TIA (transient ischemic attack), Ulcer, and Unexplained night sweats.  Clinically Significant Surgical Hx: tracheostomy    24 Hours Vitals Range:  Temp:  [98.2 °F (36.8 °C)-99.6 °F (37.6 °C)]   Pulse:  [69-88]   Resp:  [16-20]   BP: (105-114)/(51-64)   SpO2:  [95 %-99 %]     Labs:    Recent Labs     02/10/24  0403      K 3.9      CO2 23   BUN 25*   CREATININE 0.8   GLU 85   PHOS 3.8   MG 2.0        No results for input(s): "PH", "PCO2", "PO2", "HCO3", "POCSATURATED", "BE" in the last 72 hours.    ASSESSMENT/INTERVENTIONS  Patient resting comfortably. No respiratory concerns at this time.      Last VS   Temp: 98.2 °F (36.8 °C) (921)  Pulse: 77 (921)  Resp: 16 (921)  BP: 114/59 (921)  SpO2: 95 % (921)      Extra trachs at bedside: #4 Shiley Cuffed, #6 Shiley Cuffed  Level of Consciousness: Level of Consciousness (AVPU): alert  Respiratory Effort: Respiratory Effort: Unlabored Expansion/Accessory Muscle Usage: Expansion/Accessory Muscles/Retractions: no retractions, expansion symmetric  All Lung Field Breath Sounds: All Lung Fields Breath Sounds: Anterior:, Lateral:, clear, diminished, equal bilaterally  EDGAR Breath Sounds: coarse  LLL Breath Sounds: coarse  RUL Breath Sounds: coarse  RML Breath Sounds: " coarse  RLL Breath Sounds: coarse  O2 Device/Concentration: RA  Surgical airway: Yes, Type: Shiley Size: 6, uncuffed  Ambu at bedside:       Active Orders   Respiratory Care    Inhalation Treatment Q6H PRN     Frequency: Q6H PRN     Number of Occurrences: Until Specified    Inhalation Treatment Q6H WAKE     Frequency: Q6H WAKE     Number of Occurrences: Until Specified     Order Comments: And PRN at night      Oxygen Continuous     Frequency: Continuous     Number of Occurrences: Until Specified     Order Questions:      Device type: Low flow      Device: Trach Collar      Titrate O2 per Oxygen Titration Protocol: Yes      To maintain SpO2 goal of: >= 90%      Notify MD of: Inability to achieve desired SpO2; Sudden change in patient status and requires 20% increase in FiO2; Patient requires >60% FiO2    Pulse Oximetry Q4H     Frequency: Q4H     Number of Occurrences: Until Specified    Routine tracheostomy care     Frequency: Q4H     Number of Occurrences: Until Specified       RECOMMENDATIONS    We recommend: RRT Recs: Continue POC per primary team.      FOLLOW-UP    Please call back the Rapid Response RT, Christiane Rodriguez RRT at x [phone] for any questions or concerns.

## 2024-02-12 NOTE — ASSESSMENT & PLAN NOTE
Rebel Rodriguez  Is a 54 y.o. male with cervical myelopathy and prior ACDF C4 C7 with adjacent level 3 4 who presented for elective C3-4 ACDF on 01/30, which complicated by retropharyngeal injury required repair by ENT. Now s/p trach/G tube on 2/3.      - Neuro exam stable, q4h neuro checks  - Continue multimodal pain control   - S/p dex x3d  - ID consulted for abx recs given pharyngeal injury, s/p course of unasyn  - Cervical drain by ENT   - S/p trach/g tube - npo 4-6 wks minimum per ENT. TF per primary team/nutrition  - SubQ heparin for DVT prophylaxis. DVT US 2/10 negative   - Okay to resume home meds from NSGY standpoint   - Infectious workup / CTH negative to date for experienced 2/9  - Postop XR with satisfactory placement of hardware  - Notify with acute changes in exam  --Will continue to follow for post-op needs. Discussed care plan with patient and family at bedside, all questions answered.      Discussed with Dr. Maradiaga   No

## 2024-02-12 NOTE — ASSESSMENT & PLAN NOTE
Mr Rodriguez is a 55 yo male who is s/p C3-4 ACDF surgery with post op dysphagia, odynophagia, neck pain, swelling and crepitus. CT and scope demonstrates DELFIN drain in the hypopharynx. Patient with difficulty with swallowing secretions. No respiratory compromise. He is s/p neck exploration and pharyngeal repair on 1/30. Discussed with patient's mother and sister at bedside the recommendation for open G tube and tracheostomy to allow adequate time for pharynx to heal. Now s/p trach and open G tube on 2/3/24.    - Strict NPO, NPO for 4-6 weeks minimum  - ID consulted for abx recommendations in setting of hardware with pharyngeal injury    - Currently off all abx, s/p vanc and unasyn   - Routine trach care   - R neck drain to stay in place until removed by ENT   - Ok to restart psoriatic arthritis meds from ENT standpoint   - Rest of care for per primary  - Please page ENT with questions or concerns

## 2024-02-12 NOTE — CONSULTS
Palliative medicine consult received for Mr. Rebel Rodriguez who is a 54-year-old man with a history of cervical myelopathy s/p C3-4 ACDF surgery, course notable for post-operative complications including non-malignant pain, odynophagia, profuse oropharyngeal secretion burden and intolerance to manage secretions due to post-operative course, s/p neck exploration and pharyngeal repair on 1/30, s/p trach and open G tube on 2/3/24. Consult received was for non-malignant pain/symptom management.     Unfortunately our palliative team will not manage non-malignant, post-operative pain. Trajectory of illness is presumed to be recoverable and no life-limiting illness is defined. If goals of care need to be explored in the future due to guarded/poor prognosis, please re-consult at that time. Thank you for your consideration.

## 2024-02-12 NOTE — TELEPHONE ENCOUNTER
----- Message from Rocio Morris sent at 2/12/2024  8:16 AM CST -----  Regarding: Hospital  Contact: Ginny [phone]  Ginny/ sister is calling to state pt is in hospital a [facility] [place] states his arthritis is flaring and she wants to know if provider can consult with hospital providers for pt to see if he can get meds please call

## 2024-02-13 LAB
ANION GAP SERPL CALC-SCNC: 12 MMOL/L (ref 8–16)
BASOPHILS # BLD AUTO: 0.07 K/UL (ref 0–0.2)
BASOPHILS NFR BLD: 0.4 % (ref 0–1.9)
BUN SERPL-MCNC: 23 MG/DL (ref 6–20)
CALCIUM SERPL-MCNC: 10.3 MG/DL (ref 8.7–10.5)
CHLORIDE SERPL-SCNC: 105 MMOL/L (ref 95–110)
CO2 SERPL-SCNC: 21 MMOL/L (ref 23–29)
CREAT SERPL-MCNC: 0.9 MG/DL (ref 0.5–1.4)
DIFFERENTIAL METHOD BLD: ABNORMAL
EOSINOPHIL # BLD AUTO: 0.1 K/UL (ref 0–0.5)
EOSINOPHIL NFR BLD: 0.8 % (ref 0–8)
ERYTHROCYTE [DISTWIDTH] IN BLOOD BY AUTOMATED COUNT: 14.2 % (ref 11.5–14.5)
EST. GFR  (NO RACE VARIABLE): >60 ML/MIN/1.73 M^2
GLUCOSE SERPL-MCNC: 105 MG/DL (ref 70–110)
HCT VFR BLD AUTO: 34.2 % (ref 40–54)
HGB BLD-MCNC: 11.3 G/DL (ref 14–18)
IMM GRANULOCYTES # BLD AUTO: 0.33 K/UL (ref 0–0.04)
IMM GRANULOCYTES NFR BLD AUTO: 2 % (ref 0–0.5)
LYMPHOCYTES # BLD AUTO: 1.6 K/UL (ref 1–4.8)
LYMPHOCYTES NFR BLD: 9.6 % (ref 18–48)
MCH RBC QN AUTO: 28.6 PG (ref 27–31)
MCHC RBC AUTO-ENTMCNC: 33 G/DL (ref 32–36)
MCV RBC AUTO: 87 FL (ref 82–98)
MONOCYTES # BLD AUTO: 1.4 K/UL (ref 0.3–1)
MONOCYTES NFR BLD: 8.5 % (ref 4–15)
NEUTROPHILS # BLD AUTO: 13.1 K/UL (ref 1.8–7.7)
NEUTROPHILS NFR BLD: 78.7 % (ref 38–73)
NRBC BLD-RTO: 0 /100 WBC
PLATELET # BLD AUTO: 502 K/UL (ref 150–450)
PMV BLD AUTO: 9.6 FL (ref 9.2–12.9)
POTASSIUM SERPL-SCNC: 4.1 MMOL/L (ref 3.5–5.1)
RBC # BLD AUTO: 3.95 M/UL (ref 4.6–6.2)
SODIUM SERPL-SCNC: 138 MMOL/L (ref 136–145)
WBC # BLD AUTO: 16.64 K/UL (ref 3.9–12.7)

## 2024-02-13 PROCEDURE — 99900026 HC AIRWAY MAINTENANCE (STAT)

## 2024-02-13 PROCEDURE — 20600001 HC STEP DOWN PRIVATE ROOM

## 2024-02-13 PROCEDURE — 36415 COLL VENOUS BLD VENIPUNCTURE: CPT | Performed by: STUDENT IN AN ORGANIZED HEALTH CARE EDUCATION/TRAINING PROGRAM

## 2024-02-13 PROCEDURE — 27000221 HC OXYGEN, UP TO 24 HOURS

## 2024-02-13 PROCEDURE — 25000242 PHARM REV CODE 250 ALT 637 W/ HCPCS: Performed by: STUDENT IN AN ORGANIZED HEALTH CARE EDUCATION/TRAINING PROGRAM

## 2024-02-13 PROCEDURE — 94640 AIRWAY INHALATION TREATMENT: CPT

## 2024-02-13 PROCEDURE — 25000003 PHARM REV CODE 250

## 2024-02-13 PROCEDURE — 63600175 PHARM REV CODE 636 W HCPCS: Performed by: NURSE PRACTITIONER

## 2024-02-13 PROCEDURE — 94761 N-INVAS EAR/PLS OXIMETRY MLT: CPT

## 2024-02-13 PROCEDURE — 85025 COMPLETE CBC W/AUTO DIFF WBC: CPT | Performed by: STUDENT IN AN ORGANIZED HEALTH CARE EDUCATION/TRAINING PROGRAM

## 2024-02-13 PROCEDURE — 25000003 PHARM REV CODE 250: Performed by: STUDENT IN AN ORGANIZED HEALTH CARE EDUCATION/TRAINING PROGRAM

## 2024-02-13 PROCEDURE — 80048 BASIC METABOLIC PNL TOTAL CA: CPT | Performed by: STUDENT IN AN ORGANIZED HEALTH CARE EDUCATION/TRAINING PROGRAM

## 2024-02-13 PROCEDURE — 25000003 PHARM REV CODE 250: Performed by: PSYCHIATRY & NEUROLOGY

## 2024-02-13 PROCEDURE — 99900035 HC TECH TIME PER 15 MIN (STAT)

## 2024-02-13 RX ORDER — POLYETHYLENE GLYCOL 3350 17 G/17G
17 POWDER, FOR SOLUTION ORAL DAILY PRN
Status: DISCONTINUED | OUTPATIENT
Start: 2024-02-13 | End: 2024-02-14 | Stop reason: HOSPADM

## 2024-02-13 RX ADMIN — HEPARIN SODIUM 5000 UNITS: 5000 INJECTION INTRAVENOUS; SUBCUTANEOUS at 05:02

## 2024-02-13 RX ADMIN — POLYETHYLENE GLYCOL 3350 17 G: 17 POWDER, FOR SOLUTION ORAL at 09:02

## 2024-02-13 RX ADMIN — QUETIAPINE FUMARATE 50 MG: 25 TABLET ORAL at 08:02

## 2024-02-13 RX ADMIN — APREMILAST 30 MG: 30 TABLET, FILM COATED ORAL at 09:02

## 2024-02-13 RX ADMIN — FLUOXETINE HYDROCHLORIDE 40 MG: 20 CAPSULE ORAL at 09:02

## 2024-02-13 RX ADMIN — HEPARIN SODIUM 5000 UNITS: 5000 INJECTION INTRAVENOUS; SUBCUTANEOUS at 02:02

## 2024-02-13 RX ADMIN — IPRATROPIUM BROMIDE AND ALBUTEROL SULFATE 3 ML: .5; 3 SOLUTION RESPIRATORY (INHALATION) at 07:02

## 2024-02-13 RX ADMIN — IPRATROPIUM BROMIDE AND ALBUTEROL SULFATE 3 ML: .5; 3 SOLUTION RESPIRATORY (INHALATION) at 01:02

## 2024-02-13 RX ADMIN — APREMILAST 30 MG: 30 TABLET, FILM COATED ORAL at 08:02

## 2024-02-13 RX ADMIN — HEPARIN SODIUM 5000 UNITS: 5000 INJECTION INTRAVENOUS; SUBCUTANEOUS at 08:02

## 2024-02-13 RX ADMIN — MIRTAZAPINE 30 MG: 30 TABLET, FILM COATED ORAL at 08:02

## 2024-02-13 RX ADMIN — ACETAMINOPHEN 1000 MG: 500 TABLET ORAL at 02:02

## 2024-02-13 RX ADMIN — ATORVASTATIN CALCIUM 10 MG: 10 TABLET, FILM COATED ORAL at 09:02

## 2024-02-13 NOTE — RESPIRATORY THERAPY
"RAPID RESPONSE RESPIRATORY THERAPY PROACTIVE NOTE           Time of visit: 905     Code Status: Full Code   : 1970  Bed: 930/930 A:   MRN: [id number]  Time spent at the bedside: < 15 min    SITUATION    Evaluated patient for: LDA Check     BACKGROUND    Patient has a past medical history of Achilles tendon rupture, Achilles tendon rupture, Allergy, Anemia, Anxiety, Arthritis, Degenerative disc disease, Drug-induced lupus erythematosus, Drug-induced lupus erythematosus, Hyperlipidemia, Inguinal lymphadenopathy, Kidney stone, Medication monitoring encounter, Neck pain, Psoriatic arthritis, Psoriatic arthritis, Recurrent fever, Recurrent nephrolithiasis, Sinusitis, Stroke, TIA (transient ischemic attack), Ulcer, and Unexplained night sweats.  Clinically Significant Surgical Hx: tracheostomy    24 Hours Vitals Range:  Temp:  [98.1 °F (36.7 °C)-98.5 °F (36.9 °C)]   Pulse:  [66-88]   Resp:  [16-18]   BP: ()/(51-90)   SpO2:  [97 %-99 %]     Labs:    Recent Labs     24  1032 24  0454    138   K 4.0 4.1    105   CO2 22* 21*   BUN 22* 23*   CREATININE 0.9 0.9   * 105        No results for input(s): "PH", "PCO2", "PO2", "HCO3", "POCSATURATED", "BE" in the last 72 hours.    ASSESSMENT/INTERVENTIONS  Supplies at bedside      Last VS   Temp: 98.4 °F (36.9 °C) ( 152)  Pulse: 78 ( 152)  Resp: 18 ( 152)  BP: 110/55 ( 152)  SpO2: 98 % ( 1649)      Extra trachs at bedside: 4/6 cuffed  Level of Consciousness: Level of Consciousness (AVPU): alert  Respiratory Effort: Respiratory Effort: Unlabored Expansion/Accessory Muscle Usage: Expansion/Accessory Muscles/Retractions: no use of accessory muscles, no retractions, expansion symmetric  All Lung Field Breath Sounds: All Lung Fields Breath Sounds: Anterior:, Lateral:, coarse  EDGAR Breath Sounds: coarse  LLL Breath Sounds: coarse  RUL Breath Sounds: coarse  RML Breath Sounds: coarse  RLL Breath Sounds: coarse  O2 " Device/Concentration: RA  Surgical airway: Yes, Type: Shiley Size: 6, uncuffed  Ambu at bedside:       Active Orders   Respiratory Care    Inhalation Treatment Q6H PRN     Frequency: Q6H PRN     Number of Occurrences: Until Specified    Inhalation Treatment Q6H WAKE     Frequency: Q6H WAKE     Number of Occurrences: Until Specified     Order Comments: And PRN at night      Oxygen Continuous     Frequency: Continuous     Number of Occurrences: Until Specified     Order Questions:      Device type: Low flow      Device: Trach Collar      Titrate O2 per Oxygen Titration Protocol: Yes      To maintain SpO2 goal of: >= 90%      Notify MD of: Inability to achieve desired SpO2; Sudden change in patient status and requires 20% increase in FiO2; Patient requires >60% FiO2    Pulse Oximetry Q4H     Frequency: Q4H     Number of Occurrences: Until Specified    Routine tracheostomy care     Frequency: Q4H     Number of Occurrences: Until Specified       RECOMMENDATIONS    We recommend: RRT Recs: Continue POC per primary team.      FOLLOW-UP    Please call back the Rapid Response [facility] RRT at x [phone] for any questions or concerns.

## 2024-02-13 NOTE — ASSESSMENT & PLAN NOTE
Hx of prior ACDF C4 C7 with adjacent level 3-4 who presented for elective C3-4 ACDF on 01/30, complicated by retropharyngeal injury required repair by ENT.   - s/p NCC, intubation and ventilation, trache and G tube.  - will not keep trach for 6 weeks, but NPO with G tube for 6 weeks, f/u ENT  - right neck drain removed by ENT

## 2024-02-13 NOTE — SUBJECTIVE & OBJECTIVE
Interval History: No acute events. Drain removed today per ENT    Review of Systems  Objective:     Vital Signs (Most Recent):  Temp: 98.2 °F (36.8 °C) (02/13/24 1159)  Pulse: 75 (02/13/24 1159)  Resp: 18 (02/13/24 1159)  BP: (!) 114/59 (02/13/24 1159)  SpO2: 98 % (02/13/24 1159) Vital Signs (24h Range):  Temp:  [98.1 °F (36.7 °C)-98.5 °F (36.9 °C)] 98.2 °F (36.8 °C)  Pulse:  [66-88] 75  Resp:  [16-20] 18  SpO2:  [97 %-99 %] 98 %  BP: ()/(51-90) 114/59     Weight: 58.1 kg (128 lb 1.4 oz)  Body mass index is 19.48 kg/m².    Intake/Output Summary (Last 24 hours) at 2/13/2024 1202  Last data filed at 2/13/2024 0634  Gross per 24 hour   Intake 1040 ml   Output 4 ml   Net 1036 ml         Physical Exam  Constitutional:       General: He is not in acute distress.     Appearance: Normal appearance. He is normal weight. He is not ill-appearing, toxic-appearing or diaphoretic.   HENT:      Head:      Comments: Trache present  Eyes:      Extraocular Movements: Extraocular movements intact.      Conjunctiva/sclera: Conjunctivae normal.      Pupils: Pupils are equal, round, and reactive to light.   Neck:      Vascular: No carotid bruit.   Cardiovascular:      Rate and Rhythm: Normal rate and regular rhythm.      Pulses: Normal pulses.      Heart sounds: Normal heart sounds. No murmur heard.     No friction rub. No gallop.   Pulmonary:      Effort: Pulmonary effort is normal. No respiratory distress.      Breath sounds: Normal breath sounds.   Abdominal:      General: Abdomen is flat. Bowel sounds are normal. There is no distension.      Palpations: Abdomen is soft.      Tenderness: There is no abdominal tenderness. There is no guarding or rebound.      Comments: G tube clean and dry   Musculoskeletal:         General: No swelling. Normal range of motion.      Cervical back: Normal range of motion and neck supple. No rigidity or tenderness.      Right lower leg: No edema.      Left lower leg: No edema.   Lymphadenopathy:       Cervical: No cervical adenopathy.   Skin:     General: Skin is warm and dry.      Coloration: Skin is not jaundiced or pale.   Neurological:      General: No focal deficit present.      Mental Status: He is alert and oriented to person, place, and time.             Significant Labs: All pertinent labs within the past 24 hours have been reviewed.    Significant Imaging: I have reviewed all pertinent imaging results/findings within the past 24 hours.

## 2024-02-13 NOTE — ASSESSMENT & PLAN NOTE
Rebel Rodriguez  Is a 54 y.o. male with cervical myelopathy and prior ACDF C4 C7 with adjacent level 3 4 who presented for elective C3-4 ACDF on 01/30, which complicated by retropharyngeal injury required repair by ENT. Now s/p trach/G tube on 2/3.      - Neuro exam stable, q4h neuro checks  - Continue multimodal pain control   - S/p dex x3d  - ID consulted for abx recs given pharyngeal injury, s/p course of unasyn  - Cervical drain by ENT   - S/p trach/g tube - npo 4-6 wks minimum per ENT. TF per primary team/nutrition  - SubQ heparin for DVT prophylaxis. DVT US 2/10 negative   - Okay to resume home meds from NSGY standpoint   - Infectious workup / CTH negative to date for experienced 2/9  - Postop XR with satisfactory placement of hardware  - Notify with acute changes in exam  --Will continue to follow for post-op needs. Discussed care plan with patient and family at bedside, all questions answered.      Discussed with Dr. Maradiaga

## 2024-02-13 NOTE — SUBJECTIVE & OBJECTIVE
Interval History: NAEON. Right DELFIN drain removed.     Medications:  Continuous Infusions:   sodium chloride 0.9% Stopped (02/03/24 0801)    dextrose 10 % in water (D10W)       Scheduled Meds:   albuterol-ipratropium  3 mL Nebulization Q6H WAKE    apremilast  30 mg Gastrostomy Tube BID    atorvastatin  10 mg Per NG tube Daily    FLUoxetine  40 mg Per NG tube Daily    heparin (porcine)  5,000 Units Subcutaneous Q8H    mirtazapine  30 mg Per NG tube QHS    QUEtiapine  50 mg Per G Tube QHS     PRN Meds:acetaminophen, albuterol-ipratropium, bisacodyL, dextrose 10 % in water (D10W), dextrose 10%, dextrose 10%, glucagon (human recombinant), HYDROmorphone, insulin aspart U-100, ondansetron, oxyCODONE, prochlorperazine     Review of patient's allergies indicates:   Allergen Reactions    Erythromycin Nausea And Vomiting    Infliximab Other (See Comments)     Lupus with fever and acute arthritis  Lupus with fever and acute arthritis     Objective:     Vital Signs (24h Range):  Temp:  [98.1 °F (36.7 °C)-98.5 °F (36.9 °C)] 98.2 °F (36.8 °C)  Pulse:  [66-88] 75  Resp:  [16-20] 18  SpO2:  [97 %-99 %] 98 %  BP: ()/(51-90) 114/59       Lines/Drains/Airways       Drain  Duration                  Closed/Suction Drain 01/30/24 1916 Right;Ventral Neck Bulb 15 Fr. 13 days         Gastrostomy/Enterostomy 02/03/24 0934 Gastrostomy tube w/ balloon LUQ 10 days              Airway  Duration             Adult Surgical Airway 02/03/24 1055 Shiley Uncuffed 6.0/ 75mm 10 days              Peripheral Intravenous Line  Duration                  Peripheral IV - Single Lumen 02/05/24 1915 18 G Anterior;Right Forearm 7 days                     Physical Exam  Resting in bed   6-0 cuffless tracheostomy tube in good position, secured with soft collar  Neck incision c/d/I  Neck soft, no subq emphysema   Drain holding suction     Significant Labs:  CBC:   Recent Labs   Lab 02/13/24  0454   WBC 16.64*   RBC 3.95*   HGB 11.3*   HCT 34.2*   *   MCV  87   MCH 28.6   MCHC 33.0     CMP:   Recent Labs   Lab 02/10/24  0403 02/12/24  1032 02/13/24  0454   GLU 85   < > 105   CALCIUM 9.6   < > 10.3   ALBUMIN 3.0*  --   --    PROT 6.7  --   --       < > 138   K 3.9   < > 4.1   CO2 23   < > 21*      < > 105   BUN 25*   < > 23*   CREATININE 0.8   < > 0.9   ALKPHOS 79  --   --    ALT 40  --   --    AST 44*  --   --    BILITOT 0.7  --   --     < > = values in this interval not displayed.       Significant Diagnostics:  None

## 2024-02-13 NOTE — CONSULTS
[facility] - Neurosurgery ([facility]  Physical Medicine & Rehab  Consult Note    Patient Name: Rebel Rodriguez  MRN: [id number]  Admission Date: 1/30/2024  Hospital Length of Stay: 13 days  Attending Physician: Ty Munoz*   Consults  Subjective:     Principal Problem: Cervical myelopathy    HPI: Mr. Rebel Rodriguez is a 54-year-old male with PMHx of psoriatic arthritis, hx TIA on Aspirin 81 mg, and s/p C4-7 ACDF with Dr. Huff 10 years ago . Patient was [facility] to [facility] [place]/30/2024 for a scheduled C3-4 anterior cervical discectomy and fusion  with Dr. Maradiaga.  Prior to admission, he presented to NS clinic for evaluation of gait imbalance. Last seen 1 year ago for neck pain and radicular pain into the left shoulder. He underwent C1-2 KENRICK with moderate relief of pain. Reports rapid functional decline over the last 3 weeks. Endorses hand clumsiness, difficulty typing, gait imbalance, difficulty butoning, dropping items, falls. Difficulty with ambulating also due to LLE weakness. States it feels like he has rubber bands around his wrists. Reports difficulty with overhead mobility and shock-like pains down spine when raising arms overhead. Denies b/b incontinence.   Post op complicated complicated by retropharyngeal injury required repair by ENT.  G tube and trach placed on 2/3/2024, he will need to be NPO for 4-6 weeks minimum.     Functional History: Patient lives in a split level home with roommates who are available to assist, although each individual has their own part of the home with 2-3 VIRIDIANA. Two story home with patient's bedroom and bathroom on the second story.   PLOF: Indep without use of assistive devices    Hospital Course: 2/12/2024 participated with OT: high intensity therapy recommended  Activities of Daily Living:  Grooming: stand by assistance - oral hygiene and facial grooming completed standing at sink  Bathing: modified independence and minimum assistance - seated EOB, wiped all extremities,  trunk, req assistance washing back  Upper Body Dressing: supervision and minimum assistance - doff/don shirt seated EOB. Req assistance getting shirt across back to don  Lower Body Dressing: independence - doff/don undergarment and pants while seated EOB.  Toileting: stand by assistance - void standing at toilet, min posture sway noted    2/12/2024 participated with ST:   Participated in PMSV training  NPO with g tube, tolerating bolus feeds    2/10/2024 participated with physical therapy: high intensity therapy recommended  Sit to stand: SBA with no device  Gait: 10 steps with SBA/CGA then requiring R HHA and min/mod A for balance    - 75' x2 trials (standing rest break in between trials).             Past Medical History:   Diagnosis Date    Achilles tendon rupture 10/09/2013    Achilles tendon rupture 10/09/2013    Allergy     Anemia 1/17/2024    Anxiety     Arthritis     psoriatric    Degenerative disc disease     Drug-induced lupus erythematosus     Drug-induced lupus erythematosus 03/09/2016    Hyperlipidemia     Inguinal lymphadenopathy 07/21/2015    Kidney stone     Medication monitoring encounter 12/07/2016    Neck pain 07/06/2017    Psoriatic arthritis     Psoriatic arthritis 12/07/2016    Recurrent fever 07/08/2015    Recurrent nephrolithiasis 05/19/2014    On low oxalate diet    Sinusitis 02/25/2016    Stroke     TIA (transient ischemic attack)     Ulcer     high school    Unexplained night sweats 07/13/2015     Past Surgical History:   Procedure Laterality Date    ACHILLES TENDON SURGERY      BACK SURGERY      DIRECT LARYNGOSCOPY  2/3/2024    Procedure: LARYNGOSCOPY, DIRECT;  Surgeon: Jodie Collier MD;  Location: [address];  Service: ENT;;    FUNCTIONAL ENDOSCOPIC SINUS SURGERY (FESS) USING COMPUTER-ASSISTED NAVIGATION Bilateral 9/14/2018    Procedure: FESS, USING COMPUTER-ASSISTED NAVIGATION;  Surgeon: Gerard Manzo MD;  Location: [address];  Service: ENT;  Laterality: Bilateral;     FUSION, SPINE, CERVICAL, ANTERIOR APPROACH N/A 1/30/2024    Procedure: C3-4 anterior cervical discectomy and fusion;  Surgeon: Rogerio Maradiaga MD;  Location: [facility] OR 2ND FLR;  Service: Neurosurgery;  Laterality: N/A;  C3-4 anterior cervical discectomy and fusion  anesthesia: general  nerve mon: EMG/SEP/MEP  radiology: C-arm  bed: reg. slider  position: supine  headrest: caspar, GW tongs/hanging weights, surgical pillow    INJECTION OF ANESTHETIC AGENT AROUND NERVE Left 5/13/2022    Procedure: Block, Nerve MEDIAL BRANCH BLOCK LEFT C2,3,4,5;  Surgeon: Kimberly Wilson MD;  Location: [facility] PAIN MGT;  Service: Pain Management;  Laterality: Left;    INJECTION OF ANESTHETIC AGENT AROUND NERVE Left 5/24/2022    Procedure: BLOCK, NERVE, LEFT C2-C5 MEDIAL BRANCH;  Surgeon: Kimberly Wilson MD;  Location: [facility] PAIN MGT;  Service: Pain Management;  Laterality: Left;    LAPAROTOMY N/A 2/3/2024    Procedure: LAPAROTOMY with gastrostomy tube placement;  Surgeon: Benigno Perez MD;  Location: [facility] OR [facility] FLR;  Service: General;  Laterality: N/A;    NASAL SEPTOPLASTY N/A 9/14/2018    Procedure: SEPTOPLASTY, NASAL;  Surgeon: Gerard Manzo MD;  [facility];  Service: ENT;  Laterality: N/A;    NASAL TURBINATE REDUCTION Bilateral 9/14/2018    Procedure: REDUCTION, NASAL TURBINATE;  Surgeon: Gerard Manzo MD;  [facility];  Service: ENT;  Laterality: Bilateral;    NECK EXPLORATION N/A 1/30/2024    Procedure: EXPLORATION, NECK, REPAIR OF PHARYNGEAL INJURY;  Surgeon: Senthil Agarwal MD;  Location: [facility] OR [facility] FLR;  Service: ENT;  Laterality: N/A;    RADIOFREQUENCY ABLATION Left 7/12/2022    Procedure: RADIOFREQUENCY ABLATION, LEFT C2-C3, C3-C4, C4-C5;  Surgeon: Kimberly Wilson MD;  Location: [facility] PAIN MGT;  Service: Pain Management;  Laterality: Left;    TRACHEOTOMY  2/3/2024    Procedure: TRACHEOTOMY;  Surgeon: Jodie Collier MD;  Location: [facility] OR [facility] FLR;  Service: ENT;;    UPPER ENDOSCOPY  W/ ESOPHAGEAL MANOMETRY      URETERAL STENT PLACEMENT       Review of patient's allergies indicates:   Allergen Reactions    Erythromycin Nausea And Vomiting    Infliximab Other (See Comments)     Lupus with fever and acute arthritis  Lupus with fever and acute arthritis       Scheduled Medications:    albuterol-ipratropium  3 mL Nebulization Q6H WAKE    apremilast  30 mg Gastrostomy Tube BID    atorvastatin  10 mg Per NG tube Daily    FLUoxetine  40 mg Per NG tube Daily    heparin (porcine)  5,000 Units Subcutaneous Q8H    mirtazapine  30 mg Per NG tube QHS    QUEtiapine  50 mg Per G Tube QHS       PRN Medications: acetaminophen, albuterol-ipratropium, bisacodyL, dextrose 10 % in water (D10W), dextrose 10%, dextrose 10%, glucagon (human recombinant), HYDROmorphone, insulin aspart U-100, ondansetron, oxyCODONE, prochlorperazine    Family History       Problem Relation (Age of Onset)    Cancer Father (61)    Cataracts Maternal Grandmother, Maternal Grandfather, Paternal Grandmother, Paternal Grandfather    Crohn's disease Mother, Maternal Grandmother    Osteoarthritis Maternal Grandmother    Pancreatic cancer Father    Ulcerative colitis Father          Tobacco Use    Smoking status: Never    Smokeless tobacco: Never   Substance and Sexual Activity    Alcohol use: No     Alcohol/week: 0.0 standard drinks of alcohol     Types: 1 - 2 Standard drinks or equivalent per week     Comment: cocktails    Drug use: No    Sexual activity: Not on file     Review of Systems   Constitutional:  Positive for activity change and fatigue.   HENT:  Positive for trouble swallowing.    Respiratory:          Trach in place   Gastrointestinal:  Negative for abdominal pain, constipation, diarrhea, nausea and vomiting.   Genitourinary:  Negative for difficulty urinating.   Musculoskeletal:  Positive for back pain and gait problem.   Neurological:  Positive for weakness and numbness.     Objective:     Vital Signs (Most Recent):  Temp: 98.5  °F (36.9 °C) (02/12/24 2313)  Pulse: 77 (02/12/24 2313)  Resp: 16 (02/12/24 2313)  BP: (!) 99/51 (02/12/24 2313)  SpO2: 98 % (02/12/24 2313)    Vital Signs (24h Range):  Temp:  [98.2 °F (36.8 °C)-98.5 °F (36.9 °C)] 98.5 °F (36.9 °C)  Pulse:  [69-88] 77  Resp:  [16-20] 16  SpO2:  [95 %-99 %] 98 %  BP: ()/(51-90) 99/51     Body mass index is 19.48 kg/m².     Physical Exam  Vitals and nursing note reviewed.   Constitutional:       Appearance: Normal appearance.      Comments: Trach present  G tube present  Right neck: DELFIN drain present with minimal drainage noted   HENT:      Nose: Nose normal.   Cardiovascular:      Rate and Rhythm: Normal rate.   Pulmonary:      Effort: Pulmonary effort is normal. No respiratory distress.   Abdominal:      General: There is no distension.      Tenderness: There is no abdominal tenderness.   Neurological:      Mental Status: He is alert and oriented to person, place, and time.      Motor: Weakness present.      Gait: Gait abnormal.   Psychiatric:         Mood and Affect: Mood normal.         Behavior: Behavior normal.        Diagnostic Results: Labs: Reviewed  Assessment/Plan:   Cervical myelopathy    - s/p elecive C3-4 ACDF on 1/30/2024   - post op complicated by retropharyngeal injury required repair by ENT.    - G tube and trach 2/3/2024   - NPO X 4- 6 weeks, tolerating bolus feedings   - right neck DELFIN drain    PM&R Recommendation:   At this time, the PM&R team has reviewed this patient's ongoing medical case including inpatient diagnosis, medical history, clinical examination, labs, vitals, current social and functional history to provide the post-acute recommendation as follows:     RECOMMENDATIONS: inpatient rehabilitation due to good motivation/participation with therapies, has been determined to tolerate 3 hours of therapy and good potential for recovery.     The patient will be admitted for comprehensive interdisciplinary inpatient rehabilitation to address the  impairments due to medical diagnosis of cervical myelopathy. The patient will benefit from an inpatient rehabilitation program to promote functional recovery, implement compensatory strategies and will undergo assessment for needs for durable medical equipment for safe discharge to the community. This patient will benefit from a coordinated interdisciplinary rehabilitation program services that require close monitoring and treatment with 24-hour rehabilitative nursing and physical/occupational therapies for 3 hours/day for 5 days/week.This interdisciplinary program will be performed under the direction of a physiatrist.    MEDICAL STABILITY:     At this time, there are no barriers for IP rehab.     I will sign off with the transfer of the patient to [facility] liaison who will continue to follow going forward until admission to [facility].     Thank you for your consult.     Alley Velez NP  Department of Physical Medicine & Rehab  [facility] Neurosurgery [facility])

## 2024-02-13 NOTE — HPI
Mr. Rebel Rodriguez is a 54-year-old male with PMHx of psoriatic arthritis, hx TIA on Aspirin 81 mg, and s/p C4-7 ACDF with Dr. Huff 10 years ago . Patient was admitted to [facility] on 1/20/2024 for a scheduled C3-4 anterior cervical discectomy and fusion  with Dr. Maradiaga.  Prior to admission, he presented to NS clinic for evaluation of gait imbalance. Last seen 1 year ago for neck pain and radicular pain into the left shoulder. He underwent C1-2 KENRICK with moderate relief of pain. Reports rapid functional decline over the last 3 weeks. Endorses hand clumsiness, difficulty typing, gait imbalance, difficulty butoning, dropping items, falls. Difficulty with ambulating also due to LLE weakness. States it feels like he has rubber bands around his wrists. Reports difficulty with overhead mobility and shock-like pains down spine when raising arms overhead. Denies b/b incontinence.   Post op complicated by soft tissue edema and air in neck requiring emergent trach and DELFIN drain placement by ENT on 2/3/2024.  PEG tube was also placed on 2/3/2024, he will need to be NPO for 4-6 weeks.     Functional History: Patient lives in a split level home with roommates who are available to assist, although each individual has their own part of the home with 2-3 VIRIDIANA. Two story home with patient's bedroom and bathroom on the second story.   PLOF: Indep without use of assistive devices

## 2024-02-13 NOTE — ASSESSMENT & PLAN NOTE
Mr Rodriguze is a 55 yo male who is s/p C3-4 ACDF surgery with post op dysphagia, odynophagia, neck pain, swelling and crepitus. CT and scope demonstrates DELFIN drain in the hypopharynx. Patient with difficulty with swallowing secretions. No respiratory compromise. He is s/p neck exploration and pharyngeal repair on 1/30. Discussed with patient's mother and sister at bedside the recommendation for open G tube and tracheostomy to allow adequate time for pharynx to heal. Now s/p trach and open G tube on 2/3/24.    - Strict NPO, NPO for 4-6 weeks minimum  - ID consulted for abx recommendations in setting of hardware with pharyngeal injury    - Currently off all abx, s/p vanc and unasyn   - Routine trach care   - R neck drain removed today.  - Ok to go to IPR from ENT standpoint, can follow up with ENT outpatient   - Ok to restart psoriatic arthritis meds from ENT standpoint   - Rest of care for per primary  - Please page ENT with questions or concerns

## 2024-02-13 NOTE — PROGRESS NOTES
[facility] - Neurosurgery ([facility])  Hospital Medicine  Progress Note    Patient Name: Rebel Rodriguez  MRN: [id number]  Patient Class: IP- Inpatient   Admission Date: 1/30/2024  Length of Stay: 14 days  Attending Physician: Ty Munoz*  Primary Care Provider: Nanda Smith MD        Subjective:     Principal Problem:Cervical myelopathy        HPI:  53 y.o. male with hx of psoriatic arthritis, hx TIA on Aspirin 81 mg, s/p C4-7 ACDF with Dr. Huff 10 years ago admitted to [facility] s/p C3-C4 ACDF. Per chart review, reports rapid functional decline over the last 3 weeks. Endorses hand clumsiness, difficulty typing, gait imbalance, difficulty butoning, dropping items, falls. Difficulty with ambulating also due to LLE weakness. States it feels like he has rubber bands around his wrists. Reports difficulty with overhead mobility and shock-like pains down spine when raising arms overhead. CT neck soft tissue pending, Patient admitted to [facility] for close monitoring and higher level of care.      Hospital Course: 01/31/2024 CT neck concerning for extensive soft tissue edema and air in neck, additionally w/ concern for DELFIN drain misplaced into hypopharynx. Taken class A to OR by ENT for pharyngeal repair, left intubated by ENT in setting of airway edema. On high dose steroids, no cuff leak this AM  02/01/2024 General surgery consulted for open G tube placement, family still in discussion regarding trach. D/c dex to allow for adequate wound healing, ok with NSGY.   2/2/24: possible G-tube placement today  2/3/24: G-tube today  2/4/24: ok to step down to hospital medicine     Interval History: trach collar and g-tube in place, ok to step down to Hospital medicine    ENT recs: Now s/p trach and open G tube on 2/3/24.     - Strict NPO, NPO for 4-6 weeks minimum  - ID consulted for abx recommendations in setting of hardware with pharyngeal injury          - Currently off all abx, s/p vanc and unasyn   - s/p tracheostomy, with  6-0 cuffed shiley in place          - CUFF TO REMAIN UP - until otherwise directed by ENT          - Tentative plan to deflate on Tuesday  - R neck drain to stay in place until at least Tuesday       Overview/Hospital Course:  2/6- Transferred to [facility] med, IMN. Has trach and g-tube. Rehab is planned. BP low received , TF not at goal due to abd cramping. Will give suppository. Rounded with ENT service. Plan is to remove trache first, remove drain last. Pt communicating fairly well and able to make request and advocate for self. He is sitting up in a chair. NPO with G-tube for six weeks. Trach care q 4 hours.     2/7- trach change planned tomorrow  Daily evaluating.  Strict NPO, NPO for 4-6 weeks minimum.  ID consulted in ICU- no abx needed.  Currently off all abx, s/p vanc and unasyn - R neck drain to stay in place until removed by ENT. 75 % of TF goal. Had one BM after suppository.  Wbc 16-> 14.  No cultures. 99/54 and other VSS. AF. Will bolus  cc for low BP.  CM plan: Pt and family may want to go to the [facility] (attached to [facility]) with HH at discharge.  - ENT may dc pt home w trache.  2/8 ENT trach changed for 6-0 cuffless   Patient developed intermittent confusion. Infectious workup negative  Psychiatry consulted. Remeron increased to 30 mg and quetiapine started 50 mg qhs with improvement  Drain removed by ENT. Apremilast resumed.     Interval History: No acute events. Drain removed today per ENT    Review of Systems  Objective:     Vital Signs (Most Recent):  Temp: 98.2 °F (36.8 °C) (02/13/24 1159)  Pulse: 75 (02/13/24 1159)  Resp: 18 (02/13/24 1159)  BP: (!) 114/59 (02/13/24 1159)  SpO2: 98 % (02/13/24 1159) Vital Signs (24h Range):  Temp:  [98.1 °F (36.7 °C)-98.5 °F (36.9 °C)] 98.2 °F (36.8 °C)  Pulse:  [66-88] 75  Resp:  [16-20] 18  SpO2:  [97 %-99 %] 98 %  BP: ()/(51-90) 114/59     Weight: 58.1 kg (128 lb 1.4 oz)  Body mass index is 19.48 kg/m².    Intake/Output Summary (Last 24  hours) at 2/13/2024 1202  Last data filed at 2/13/2024 0634  Gross per 24 hour   Intake 1040 ml   Output 4 ml   Net 1036 ml         Physical Exam  Constitutional:       General: He is not in acute distress.     Appearance: Normal appearance. He is normal weight. He is not ill-appearing, toxic-appearing or diaphoretic.   HENT:      Head:      Comments: Trache present  Eyes:      Extraocular Movements: Extraocular movements intact.      Conjunctiva/sclera: Conjunctivae normal.      Pupils: Pupils are equal, round, and reactive to light.   Neck:      Vascular: No carotid bruit.   Cardiovascular:      Rate and Rhythm: Normal rate and regular rhythm.      Pulses: Normal pulses.      Heart sounds: Normal heart sounds. No murmur heard.     No friction rub. No gallop.   Pulmonary:      Effort: Pulmonary effort is normal. No respiratory distress.      Breath sounds: Normal breath sounds.   Abdominal:      General: Abdomen is flat. Bowel sounds are normal. There is no distension.      Palpations: Abdomen is soft.      Tenderness: There is no abdominal tenderness. There is no guarding or rebound.      Comments: G tube clean and dry   Musculoskeletal:         General: No swelling. Normal range of motion.      Cervical back: Normal range of motion and neck supple. No rigidity or tenderness.      Right lower leg: No edema.      Left lower leg: No edema.   Lymphadenopathy:      Cervical: No cervical adenopathy.   Skin:     General: Skin is warm and dry.      Coloration: Skin is not jaundiced or pale.   Neurological:      General: No focal deficit present.      Mental Status: He is alert and oriented to person, place, and time.             Significant Labs: All pertinent labs within the past 24 hours have been reviewed.    Significant Imaging: I have reviewed all pertinent imaging results/findings within the past 24 hours.    Assessment/Plan:      * Cervical myelopathy  Hx of prior ACDF C4 C7 with adjacent level 3-4 who presented  for elective C3-4 ACDF on 01/30, complicated by retropharyngeal injury required repair by ENT.   - s/p NCC, intubation and ventilation, trache and G tube.  - will not keep trach for 6 weeks, but NPO with G tube for 6 weeks, f/u ENT  - right neck drain removed by ENT    Postprocedural hypotension  2/6- RL 500cc  2/7  cc IV.      Abdominal cramping  Had episodes of diarrhea  KUB with no obstruction  Stopped stool softeners      Physical deconditioning  Has trache and G tube,   PT/OT  Wean oxygen  Teach trache and G-tube care      Gastrostomy status  Patient noted to have a percutaneous endoscopic gastrostomy tube in place. I have personally inspected the tube.Tube was placed on this admission, with date of procedure- 2/2  There are no signs of drainage or infection around the site. The tube is patent. Medications have not converted to liquid form if available.  Routine care to be done by wound care and nursing staff.      Isossource @ 35 cc/ h  Peptomen 1.5 w bio 45cc/ h  Water flushes    2/7 advance TF as tolerated to goal  Changed to bolus feeds    Anxiety  Hx of    - C/w home remeron and fluoxetine  Psychiatry consulted   Increased home remeron to 30 mg  Changed to seroquel 50 mg qhs    On mechanically assisted ventilation  S/p ventilation in NCC  Now on room air, has trache for six weeks  Sitting up in chair      Injury of pharynx  DELFIN drain noted to be in hypopharynx on post-op imaging, s/p emergent surgical repair on 1/30  - Dex 4q6hrs, discontinued  ENT recs: Now s/p trach and open G tube on 2/3/24.  - Strict NPO, NPO for 4-6 weeks minimum  - ID consulted for abx recommendations in setting of hardware with pharyngeal injury          - Currently off all abx, s/p vanc and unasyn   - s/p tracheostomy    2/7-trache care q 4 hours.  trach exchange planned 2/8 by ENT. Drain to be removed per ENT.       Hyperlipidemia  Hx of    - C/w home atorvastatin        psoriatric Arthritis  Ok to resume PA meds per  ENT  Resume apremilast   To resume tremfya 2 weeks post op as long as wound healed      VTE Risk Mitigation (From admission, onward)           Ordered     heparin (porcine) injection 5,000 Units  Every 8 hours         02/05/24 0919                    Discharge Planning   JUSTYN: 2/14/2024     Code Status: Full Code   Is the patient medically ready for discharge?: Yes    Reason for patient still in hospital (select all that apply): Pending disposition  Discharge Plan A: Rehab   Discharge Delays: (!) Change in Medical Condition      Ty Munoz MD  Department of Hospital Medicine   [facility] - Neurosurgery ([facility])

## 2024-02-13 NOTE — SUBJECTIVE & OBJECTIVE
Interval History: NAEON. Exam stable. Pending placement    Medications:  Continuous Infusions:   sodium chloride 0.9% Stopped (02/03/24 0801)    dextrose 10 % in water (D10W)       Scheduled Meds:   albuterol-ipratropium  3 mL Nebulization Q6H WAKE    apremilast  30 mg Gastrostomy Tube BID    atorvastatin  10 mg Per NG tube Daily    FLUoxetine  40 mg Per NG tube Daily    heparin (porcine)  5,000 Units Subcutaneous Q8H    mirtazapine  30 mg Per NG tube QHS    QUEtiapine  50 mg Per G Tube QHS     PRN Meds:acetaminophen, albuterol-ipratropium, bisacodyL, dextrose 10 % in water (D10W), dextrose 10%, dextrose 10%, glucagon (human recombinant), HYDROmorphone, insulin aspart U-100, ondansetron, oxyCODONE, prochlorperazine     Review of Systems  Objective:     Weight: 58.1 kg (128 lb 1.4 oz)  Body mass index is 19.48 kg/m².  Vital Signs (Most Recent):  Temp: 98.2 °F (36.8 °C) (02/13/24 0700)  Pulse: 75 (02/13/24 0755)  Resp: 16 (02/13/24 0755)  BP: (!) 100/54 (02/13/24 0700)  SpO2: 98 % (02/13/24 0817) Vital Signs (24h Range):  Temp:  [98.1 °F (36.7 °C)-98.5 °F (36.9 °C)] 98.2 °F (36.8 °C)  Pulse:  [66-88] 75  Resp:  [16-20] 16  SpO2:  [97 %-99 %] 98 %  BP: ()/(51-90) 100/54                              Closed/Suction Drain 01/30/24 1916 Right;Ventral Neck Bulb 15 Fr. (Active)   Site Description Healing 02/13/24 0350   Dressing Type Other (Comment) 02/13/24 0350   Dressing Status Dry;Clean;Intact 02/13/24 0350   Dressing Intervention Integrity maintained 02/13/24 0350   Drainage Serous 02/13/24 0350   Status To bulb suction 02/13/24 0350   Output (mL) 0 mL 02/13/24 0634            Gastrostomy/Enterostomy 02/03/24 0934 Gastrostomy tube w/ balloon LUQ (Active)   Securement secured to abdomen 02/13/24 0350   Interventions Prior to Feeding residual checked;residual returned 02/13/24 0350   Suction Setting/Drainage Method dependent drainage 02/08/24 0728   Drainage brown 02/04/24 1701   Feeding Type bolus 02/13/24 0350    Clamp Status/Tolerance clamped;no restlessness;no nausea;no emesis;no abdominal distention;no abdominal discomfort;no residual 02/13/24 0350   Feeding Action feeding restarted 02/12/24 1800   Dressing no dressing 02/13/24 0350   Insertion Site dry;no swelling;no tenderness;no drainage;no warmth;no redness;suture(s) 02/13/24 0350   Flush/Irrigation flushed w/;no resistance met;water 02/13/24 0350   Current Rate (mL/hr) 45 mL/hr 02/09/24 0810   Goal Rate (mL/hr) 45 mL/hr 02/09/24 0810   Intake (mL) 25 mL 02/06/24 0800   Water Bolus (mL) 360 mL 02/12/24 1910   Tube Output(mL)(Include Discarded Residual) 300 mL 02/04/24 0542   Formula Name peptin 1.5 02/10/24 2005   Tube Feeding Intake (mL) 240 02/12/24 1910   Residual Amount (ml) 20 ml 02/12/24 2003   Length Of Feeding (Min) 10 02/10/24 0834          Physical Exam         Neurosurgery Physical Exam  General: well developed, well nourished, no distress.   Head: normocephalic, atraumatic  Neurologic: Alert and oriented. Higher order confusion noted.   Cranial nerves: face symmetric, CN II-XII grossly intact, EOMI.   Sensory: intact to light touch throughout  Motor Strength: Moves all extremities spontaneously with good tone.  Full strength upper and lower extremities. No abnormal movements seen.      Strength   Deltoids Triceps Biceps Wrist Extension Wrist Flexion Hand    Upper: R 5/5 5/5 5/5 5/5 5/5 5/5     L 5/5 5/5 5/5 5/5 5/5 5/5       Iliopsoas Quadriceps Knee  Flexion Tibialis  anterior Gastro- cnemius EHL   Lower: R 5/5 5/5 5/5 5/5 5/5 5/5     L 5/5 5/5 5/5 5/5 5/5 5/5      Skin: Skin is warm, dry and intact.  Incision c/d/I with skin edges well approximated.   DELFIN drain in place  Significant Labs:  Recent Labs   Lab 02/12/24  1032 02/13/24  0454   * 105    138   K 4.0 4.1    105   CO2 22* 21*   BUN 22* 23*   CREATININE 0.9 0.9   CALCIUM 10.2 10.3     Recent Labs   Lab 02/12/24  1032 02/13/24  0454   WBC 21.36* 16.64*   HGB 11.6* 11.3*  "  HCT 35.8* 34.2*   * 502*     No results for input(s): "LABPT", "INR", "APTT" in the last 48 hours.  Microbiology Results (last 7 days)       ** No results found for the last 168 hours. **          All pertinent labs from the last 24 hours have been reviewed.    Significant Diagnostics:  I have reviewed all pertinent imaging results/findings within the past 24 hours.  "

## 2024-02-13 NOTE — PROGRESS NOTES
[facility] - Neurosurgery ([facility])  Otorhinolaryngology-Head & Neck Surgery  Progress Note    Subjective:     Post-Op Info:  Procedure(s) (LRB):  LAPAROTOMY with gastrostomy tube placement (N/A)  LARYNGOSCOPY, DIRECT  TRACHEOTOMY   10 Days Post-Op  Hospital Day: 15     Interval History: NAEON. Right DELFIN drain removed.     Medications:  Continuous Infusions:   sodium chloride 0.9% Stopped (02/03/24 0801)    dextrose 10 % in water (D10W)       Scheduled Meds:   albuterol-ipratropium  3 mL Nebulization Q6H WAKE    apremilast  30 mg Gastrostomy Tube BID    atorvastatin  10 mg Per NG tube Daily    FLUoxetine  40 mg Per NG tube Daily    heparin (porcine)  5,000 Units Subcutaneous Q8H    mirtazapine  30 mg Per NG tube QHS    QUEtiapine  50 mg Per G Tube QHS     PRN Meds:acetaminophen, albuterol-ipratropium, bisacodyL, dextrose 10 % in water (D10W), dextrose 10%, dextrose 10%, glucagon (human recombinant), HYDROmorphone, insulin aspart U-100, ondansetron, oxyCODONE, prochlorperazine     Review of patient's allergies indicates:   Allergen Reactions    Erythromycin Nausea And Vomiting    Infliximab Other (See Comments)     Lupus with fever and acute arthritis  Lupus with fever and acute arthritis     Objective:     Vital Signs (24h Range):  Temp:  [98.1 °F (36.7 °C)-98.5 °F (36.9 °C)] 98.2 °F (36.8 °C)  Pulse:  [66-88] 75  Resp:  [16-20] 18  SpO2:  [97 %-99 %] 98 %  BP: ()/(51-90) 114/59       Lines/Drains/Airways       Drain  Duration                  Closed/Suction Drain 01/30/24 1916 Right;Ventral Neck Bulb 15 Fr. 13 days         Gastrostomy/Enterostomy 02/03/24 0934 Gastrostomy tube w/ balloon LUQ 10 days              Airway  Duration             Adult Surgical Airway 02/03/24 1055 Juventino Uncuffed 6.0/ 75mm 10 days              Peripheral Intravenous Line  Duration                  Peripheral IV - Single Lumen 02/05/24 1915 18 G Anterior;Right Forearm 7 days                     Physical Exam  Resting in bed   6-0  cuffless tracheostomy tube in good position, secured with soft collar  Neck incision c/d/I  Neck soft, no subq emphysema   Drain holding suction     Significant Labs:  CBC:   Recent Labs   Lab 02/13/24  0454   WBC 16.64*   RBC 3.95*   HGB 11.3*   HCT 34.2*   *   MCV 87   MCH 28.6   MCHC 33.0     CMP:   Recent Labs   Lab 02/10/24  0403 02/12/24  1032 02/13/24  0454   GLU 85   < > 105   CALCIUM 9.6   < > 10.3   ALBUMIN 3.0*  --   --    PROT 6.7  --   --       < > 138   K 3.9   < > 4.1   CO2 23   < > 21*      < > 105   BUN 25*   < > 23*   CREATININE 0.8   < > 0.9   ALKPHOS 79  --   --    ALT 40  --   --    AST 44*  --   --    BILITOT 0.7  --   --     < > = values in this interval not displayed.       Significant Diagnostics:  None  Assessment/Plan:     Injury of pharynx  Mr Rodriguez is a 55 yo male who is s/p C3-4 ACDF surgery with post op dysphagia, odynophagia, neck pain, swelling and crepitus. CT and scope demonstrates DELFIN drain in the hypopharynx. Patient with difficulty with swallowing secretions. No respiratory compromise. He is s/p neck exploration and pharyngeal repair on 1/30. Discussed with patient's mother and sister at bedside the recommendation for open G tube and tracheostomy to allow adequate time for pharynx to heal. Now s/p trach and open G tube on 2/3/24.    - Strict NPO, NPO for 4-6 weeks minimum  - ID consulted for abx recommendations in setting of hardware with pharyngeal injury    - Currently off all abx, s/p vanc and unasyn   - Routine trach care   - R neck drain removed today.  - Ok to go to IPR from ENT standpoint, can follow up with ENT outpatient   - Ok to restart psoriatic arthritis meds from ENT standpoint   - Rest of care for per primary  - Please page ENT with questions or concerns        Melanie Mullen MD  Otorhinolaryngology-Head & Neck Surgery  [facility] - Neurosurgery ([facility])

## 2024-02-13 NOTE — HOSPITAL COURSE
2/12/2024 participated with OT: high intensity therapy recommended  Activities of Daily Living:  Grooming: stand by assistance - oral hygiene and facial grooming completed standing at sink  Bathing: modified independence and minimum assistance - seated EOB, wiped all extremities, trunk, req assistance washing back  Upper Body Dressing: supervision and minimum assistance - doff/don shirt seated EOB. Req assistance getting shirt across back to don  Lower Body Dressing: independence - doff/don undergarment and pants while seated EOB.  Toileting: stand by assistance - void standing at toilet, min posture sway noted    2/12/2024 participated with ST:   Participated in PMSV training  NPO with g tube, tolerating bolus feeds    2/10/2024 participated with physical therapy: high intensity therapy recommended  Sit to stand: SBA with no device  Gait: 10 steps with SBA/CGA then requiring R HHA and min/mod A for balance    - 75' x2 trials (standing rest break in between trials).

## 2024-02-13 NOTE — SUBJECTIVE & OBJECTIVE
Past Medical History:   Diagnosis Date    Achilles tendon rupture 10/09/2013    Achilles tendon rupture 10/09/2013    Allergy     Anemia 1/17/2024    Anxiety     Arthritis     psoriatric    Degenerative disc disease     Drug-induced lupus erythematosus     Drug-induced lupus erythematosus 03/09/2016    Hyperlipidemia     Inguinal lymphadenopathy 07/21/2015    Kidney stone     Medication monitoring encounter 12/07/2016    Neck pain 07/06/2017    Psoriatic arthritis     Psoriatic arthritis 12/07/2016    Recurrent fever 07/08/2015    Recurrent nephrolithiasis 05/19/2014    On low oxalate diet    Sinusitis 02/25/2016    Stroke     TIA (transient ischemic attack)     Ulcer     high school    Unexplained night sweats 07/13/2015     Past Surgical History:   Procedure Laterality Date    ACHILLES TENDON SURGERY      BACK SURGERY      DIRECT LARYNGOSCOPY  2/3/2024    Procedure: LARYNGOSCOPY, DIRECT;  Surgeon: Jodie Collier MD;  Location: [facility] OR [address] [place] [address];  Service: ENT;;    FUNCTIONAL ENDOSCOPIC SINUS SURGERY (FESS) USING COMPUTER-ASSISTED NAVIGATION Bilateral 9/14/2018    Procedure: FESS, USING COMPUTER-ASSISTED NAVIGATION;  Surgeon: Gerard Manzo MD;  Location: [facility] OR [address] [place] [address];  Service: ENT;  Laterality: Bilateral;    FUSION, SPINE, CERVICAL, ANTERIOR APPROACH N/A 1/30/2024    Procedure: C3-4 anterior cervical discectomy and fusion;  Surgeon: Rogerio Maradiaga MD;  Location: [facility] OR [address] [place] [address];  Service: Neurosurgery;  Laterality: N/A;  C3-4 anterior cervical discectomy and fusion  anesthesia: general  nerve mon: EMG/SEP/MEP  radiology: C-arm  bed: reg. slider  position: supine  headrest: caspar, GW tongs/hanging weights, surgical pillow    INJECTION OF ANESTHETIC AGENT AROUND NERVE Left 5/13/2022    Procedure: Block, Nerve MEDIAL BRANCH BLOCK LEFT C2,3,4,5;  Surgeon: Kimberly Wilson MD;  Location: [facility] PAIN MGT;  Service: Pain Management;  Laterality: Left;    INJECTION OF ANESTHETIC AGENT AROUND NERVE Left 5/24/2022     Procedure: BLOCK, NERVE, LEFT C2-C5 MEDIAL BRANCH;  Surgeon: Kimberly Wilson MD;  Location: [facility] PAIN MGT;  Service: Pain Management;  Laterality: Left;    LAPAROTOMY N/A 2/3/2024    Procedure: LAPAROTOMY with gastrostomy tube placement;  Surgeon: Benigno Perez MD;  Location: [facility] OR [facility] FLR;  Service: General;  Laterality: N/A;    NASAL SEPTOPLASTY N/A 9/14/2018    Procedure: SEPTOPLASTY, NASAL;  Surgeon: Gerard Manzo MD;  [facility];  Service: ENT;  Laterality: N/A;    NASAL TURBINATE REDUCTION Bilateral 9/14/2018    Procedure: REDUCTION, NASAL TURBINATE;  Surgeon: Gerard Manzo MD;  [facility];  Service: ENT;  Laterality: Bilateral;    NECK EXPLORATION N/A 1/30/2024    Procedure: EXPLORATION, NECK, REPAIR OF PHARYNGEAL INJURY;  Surgeon: Senthil Agarwal MD;  [facility];  Service: ENT;  Laterality: N/A;    RADIOFREQUENCY ABLATION Left 7/12/2022    Procedure: RADIOFREQUENCY ABLATION, LEFT C2-C3, C3-C4, C4-C5;  Surgeon: Kimberly Wilson MD;  Location: [facility] PAIN MGT;  Service: Pain Management;  Laterality: Left;    TRACHEOTOMY  2/3/2024    Procedure: TRACHEOTOMY;  Surgeon: Jodie Collier MD;  [facility];  Service: ENT;;    UPPER ENDOSCOPY W/ ESOPHAGEAL MANOMETRY      URETERAL STENT PLACEMENT       Review of patient's allergies indicates:   Allergen Reactions    Erythromycin Nausea And Vomiting    Infliximab Other (See Comments)     Lupus with fever and acute arthritis  Lupus with fever and acute arthritis       Scheduled Medications:    albuterol-ipratropium  3 mL Nebulization Q6H WAKE    apremilast  30 mg Gastrostomy Tube BID    atorvastatin  10 mg Per NG tube Daily    FLUoxetine  40 mg Per NG tube Daily    heparin (porcine)  5,000 Units Subcutaneous Q8H    mirtazapine  30 mg Per NG tube QHS    QUEtiapine  50 mg Per G Tube QHS       PRN Medications: acetaminophen, albuterol-ipratropium, bisacodyL, dextrose 10 % in water (D10W), dextrose  10%, dextrose 10%, glucagon (human recombinant), HYDROmorphone, insulin aspart U-100, ondansetron, oxyCODONE, prochlorperazine    Family History       Problem Relation (Age of Onset)    Cancer Father (61)    Cataracts Maternal Grandmother, Maternal Grandfather, Paternal Grandmother, Paternal Grandfather    Crohn's disease Mother, Maternal Grandmother    Osteoarthritis Maternal Grandmother    Pancreatic cancer Father    Ulcerative colitis Father          Tobacco Use    Smoking status: Never    Smokeless tobacco: Never   Substance and Sexual Activity    Alcohol use: No     Alcohol/week: 0.0 standard drinks of alcohol     Types: 1 - 2 Standard drinks or equivalent per week     Comment: cocktails    Drug use: No    Sexual activity: Not on file     Review of Systems   Constitutional:  Positive for activity change and fatigue.   HENT:  Positive for trouble swallowing.    Respiratory:          Trach in place   Gastrointestinal:  Negative for abdominal pain, constipation, diarrhea, nausea and vomiting.   Genitourinary:  Negative for difficulty urinating.   Musculoskeletal:  Positive for back pain and gait problem.   Neurological:  Positive for weakness and numbness.     Objective:     Vital Signs (Most Recent):  Temp: 98.5 °F (36.9 °C) (02/12/24 2313)  Pulse: 77 (02/12/24 2313)  Resp: 16 (02/12/24 2313)  BP: (!) 99/51 (02/12/24 2313)  SpO2: 98 % (02/12/24 2313)    Vital Signs (24h Range):  Temp:  [98.2 °F (36.8 °C)-98.5 °F (36.9 °C)] 98.5 °F (36.9 °C)  Pulse:  [69-88] 77  Resp:  [16-20] 16  SpO2:  [95 %-99 %] 98 %  BP: ()/(51-90) 99/51     Body mass index is 19.48 kg/m².     Physical Exam  Vitals and nursing note reviewed.   Constitutional:       Appearance: Normal appearance.      Comments: Trach present  G tube present  Right neck: DELFIN drain present with minimal drainage noted   HENT:      Nose: Nose normal.   Cardiovascular:      Rate and Rhythm: Normal rate.   Pulmonary:      Effort: Pulmonary effort is normal. No  respiratory distress.   Abdominal:      General: There is no distension.      Tenderness: There is no abdominal tenderness.   Neurological:      Mental Status: He is alert and oriented to person, place, and time.      Motor: Weakness present.      Gait: Gait abnormal.   Psychiatric:         Mood and Affect: Mood normal.         Behavior: Behavior normal.        Diagnostic Results: Labs: Reviewed

## 2024-02-13 NOTE — PROGRESS NOTES
[facility] - Neurosurgery ([facility])  Neurosurgery  Progress Note    Subjective:     History of Present Illness: Rebel Rodriguez is a 53 y.o. male with hx of psoriatic arthritis, hx TIA on Aspirin 81 mg, s/p C4-7 ACDF with Dr. Huff 10 years ago who presents for evaluation of gait imbalance. Last seen 1 year ago for neck pain and radicular pain into the left shoulder. He underwent C1-2 KENRICK with moderate relief of pain. Reports rapid functional decline over the last 3 weeks. Endorses hand clumsiness, difficulty typing, gait imbalance, difficulty butoning, dropping items, falls. Difficulty with ambulating also due to LLE weakness. States it feels like he has rubber bands around his wrists. Reports difficulty with overhead mobility and shock-like pains down spine when raising arms overhead. Denies b/b incontinence.     Post-Op Info:  Procedure(s) (LRB):  LAPAROTOMY with gastrostomy tube placement (N/A)  LARYNGOSCOPY, DIRECT  TRACHEOTOMY   10 Days Post-Op   Interval History: NAEON. Exam stable. Pending placement    Medications:  Continuous Infusions:   sodium chloride 0.9% Stopped (02/03/24 0801)    dextrose 10 % in water (D10W)       Scheduled Meds:   albuterol-ipratropium  3 mL Nebulization Q6H WAKE    apremilast  30 mg Gastrostomy Tube BID    atorvastatin  10 mg Per NG tube Daily    FLUoxetine  40 mg Per NG tube Daily    heparin (porcine)  5,000 Units Subcutaneous Q8H    mirtazapine  30 mg Per NG tube QHS    QUEtiapine  50 mg Per G Tube QHS     PRN Meds:acetaminophen, albuterol-ipratropium, bisacodyL, dextrose 10 % in water (D10W), dextrose 10%, dextrose 10%, glucagon (human recombinant), HYDROmorphone, insulin aspart U-100, ondansetron, oxyCODONE, prochlorperazine     Review of Systems  Objective:     Weight: 58.1 kg (128 lb 1.4 oz)  Body mass index is 19.48 kg/m².  Vital Signs (Most Recent):  Temp: 98.2 °F (36.8 °C) (02/13/24 0700)  Pulse: 75 (02/13/24 0755)  Resp: 16 (02/13/24 0755)  BP: (!) 100/54 (02/13/24  0700)  SpO2: 98 % (02/13/24 0817) Vital Signs (24h Range):  Temp:  [98.1 °F (36.7 °C)-98.5 °F (36.9 °C)] 98.2 °F (36.8 °C)  Pulse:  [66-88] 75  Resp:  [16-20] 16  SpO2:  [97 %-99 %] 98 %  BP: ()/(51-90) 100/54                              Closed/Suction Drain 01/30/24 1916 Right;Ventral Neck Bulb 15 Fr. (Active)   Site Description Healing 02/13/24 0350   Dressing Type Other (Comment) 02/13/24 0350   Dressing Status Dry;Clean;Intact 02/13/24 0350   Dressing Intervention Integrity maintained 02/13/24 0350   Drainage Serous 02/13/24 0350   Status To bulb suction 02/13/24 0350   Output (mL) 0 mL 02/13/24 0634            Gastrostomy/Enterostomy 02/03/24 0934 Gastrostomy tube w/ balloon LUQ (Active)   Securement secured to abdomen 02/13/24 0350   Interventions Prior to Feeding residual checked;residual returned 02/13/24 0350   Suction Setting/Drainage Method dependent drainage 02/08/24 0728   Drainage brown 02/04/24 1701   Feeding Type bolus 02/13/24 0350   Clamp Status/Tolerance clamped;no restlessness;no nausea;no emesis;no abdominal distention;no abdominal discomfort;no residual 02/13/24 0350   Feeding Action feeding restarted 02/12/24 1800   Dressing no dressing 02/13/24 0350   Insertion Site dry;no swelling;no tenderness;no drainage;no warmth;no redness;suture(s) 02/13/24 0350   Flush/Irrigation flushed w/;no resistance met;water 02/13/24 0350   Current Rate (mL/hr) 45 mL/hr 02/09/24 0810   Goal Rate (mL/hr) 45 mL/hr 02/09/24 0810   Intake (mL) 25 mL 02/06/24 0800   Water Bolus (mL) 360 mL 02/12/24 1910   Tube Output(mL)(Include Discarded Residual) 300 mL 02/04/24 0542   Formula Name peptin 1.5 02/10/24 2005   Tube Feeding Intake (mL) 240 02/12/24 1910   Residual Amount (ml) 20 ml 02/12/24 2003   Length Of Feeding (Min) 10 02/10/24 0834          Physical Exam         Neurosurgery Physical Exam  General: well developed, well nourished, no distress.   Head: normocephalic, atraumatic  Neurologic: Alert and  "oriented. Higher order confusion noted.   Cranial nerves: face symmetric, CN II-XII grossly intact, EOMI.   Sensory: intact to light touch throughout  Motor Strength: Moves all extremities spontaneously with good tone.  Full strength upper and lower extremities. No abnormal movements seen.      Strength   Deltoids Triceps Biceps Wrist Extension Wrist Flexion Hand    Upper: R 5/5 5/5 5/5 5/5 5/5 5/5     L 5/5 5/5 5/5 5/5 5/5 5/5       Iliopsoas Quadriceps Knee  Flexion Tibialis  anterior Gastro- cnemius EHL   Lower: R 5/5 5/5 5/5 5/5 5/5 5/5     L 5/5 5/5 5/5 5/5 5/5 5/5      Skin: Skin is warm, dry and intact.  Incision c/d/I with skin edges well approximated.   DELFIN drain in place  Significant Labs:  Recent Labs   Lab 02/12/24  1032 02/13/24  0454   * 105    138   K 4.0 4.1    105   CO2 22* 21*   BUN 22* 23*   CREATININE 0.9 0.9   CALCIUM 10.2 10.3     Recent Labs   Lab 02/12/24  1032 02/13/24  0454   WBC 21.36* 16.64*   HGB 11.6* 11.3*   HCT 35.8* 34.2*   * 502*     No results for input(s): "LABPT", "INR", "APTT" in the last 48 hours.  Microbiology Results (last 7 days)       ** No results found for the last 168 hours. **          All pertinent labs from the last 24 hours have been reviewed.    Significant Diagnostics:  I have reviewed all pertinent imaging results/findings within the past 24 hours.  Assessment/Plan:     * Cervical myelopathy  Rebel Rodriguez  Is a 54 y.o. male with cervical myelopathy and prior ACDF C4 C7 with adjacent level 3 4 who presented for elective C3-4 ACDF on 01/30, which complicated by retropharyngeal injury required repair by ENT. Now s/p trach/G tube on 2/3.      - Neuro exam stable, q4h neuro checks  - Continue multimodal pain control   - S/p dex x3d  - ID consulted for abx recs given pharyngeal injury, s/p course of unasyn  - Cervical drain by ENT   - S/p trach/g tube - npo 4-6 wks minimum per ENT. TF per primary team/nutrition  - SubQ heparin for DVT " prophylaxis. DVT US 2/10 negative   - Okay to resume home meds from NSGY standpoint   - Infectious workup / CTH negative to date for experienced 2/9  - Postop XR with satisfactory placement of hardware  - Notify with acute changes in exam  --Will continue to follow for post-op needs. Discussed care plan with patient and family at bedside, all questions answered.      Discussed with Dr. Ashwini Berry MD  Neurosurgery  [facility] - Neurosurgery ([facility])

## 2024-02-14 VITALS
HEART RATE: 82 BPM | SYSTOLIC BLOOD PRESSURE: 102 MMHG | DIASTOLIC BLOOD PRESSURE: 67 MMHG | HEIGHT: 68 IN | WEIGHT: 128.06 LBS | TEMPERATURE: 98 F | OXYGEN SATURATION: 98 % | RESPIRATION RATE: 18 BRPM | BODY MASS INDEX: 19.41 KG/M2

## 2024-02-14 PROCEDURE — 25000003 PHARM REV CODE 250

## 2024-02-14 PROCEDURE — 25000003 PHARM REV CODE 250: Performed by: PSYCHIATRY & NEUROLOGY

## 2024-02-14 PROCEDURE — 97116 GAIT TRAINING THERAPY: CPT

## 2024-02-14 PROCEDURE — 99900035 HC TECH TIME PER 15 MIN (STAT)

## 2024-02-14 PROCEDURE — 63600175 PHARM REV CODE 636 W HCPCS: Performed by: NURSE PRACTITIONER

## 2024-02-14 PROCEDURE — 94640 AIRWAY INHALATION TREATMENT: CPT

## 2024-02-14 PROCEDURE — 25000242 PHARM REV CODE 250 ALT 637 W/ HCPCS: Performed by: STUDENT IN AN ORGANIZED HEALTH CARE EDUCATION/TRAINING PROGRAM

## 2024-02-14 PROCEDURE — 99900026 HC AIRWAY MAINTENANCE (STAT)

## 2024-02-14 PROCEDURE — 27000221 HC OXYGEN, UP TO 24 HOURS

## 2024-02-14 PROCEDURE — 94761 N-INVAS EAR/PLS OXIMETRY MLT: CPT

## 2024-02-14 PROCEDURE — 99221 1ST HOSP IP/OBS SF/LOW 40: CPT | Mod: ,,, | Performed by: INTERNAL MEDICINE

## 2024-02-14 RX ORDER — IPRATROPIUM BROMIDE AND ALBUTEROL SULFATE 2.5; .5 MG/3ML; MG/3ML
3 SOLUTION RESPIRATORY (INHALATION) EVERY 6 HOURS PRN
Qty: 75 ML | Refills: 0
Start: 2024-02-14 | End: 2024-03-27

## 2024-02-14 RX ORDER — QUETIAPINE FUMARATE 50 MG/1
50 TABLET, FILM COATED ORAL NIGHTLY
Qty: 30 TABLET | Refills: 11
Start: 2024-02-14 | End: 2024-03-14

## 2024-02-14 RX ORDER — POLYETHYLENE GLYCOL 3350 17 G/17G
17 POWDER, FOR SOLUTION ORAL DAILY
Refills: 0
Start: 2024-02-14 | End: 2024-04-30

## 2024-02-14 RX ORDER — MIRTAZAPINE 15 MG/1
30 TABLET, FILM COATED ORAL NIGHTLY
Qty: 90 TABLET | Refills: 3 | Status: SHIPPED | OUTPATIENT
Start: 2024-02-14 | End: 2024-03-14 | Stop reason: SDUPTHER

## 2024-02-14 RX ORDER — GUSELKUMAB 100 MG/ML
100 INJECTION SUBCUTANEOUS
Qty: 3 ML | Refills: 2 | Status: SHIPPED | OUTPATIENT
Start: 2024-02-14 | End: 2024-04-01 | Stop reason: SDUPTHER

## 2024-02-14 RX ADMIN — HEPARIN SODIUM 5000 UNITS: 5000 INJECTION INTRAVENOUS; SUBCUTANEOUS at 06:02

## 2024-02-14 RX ADMIN — APREMILAST 30 MG: 30 TABLET, FILM COATED ORAL at 09:02

## 2024-02-14 RX ADMIN — ATORVASTATIN CALCIUM 10 MG: 10 TABLET, FILM COATED ORAL at 09:02

## 2024-02-14 RX ADMIN — HEPARIN SODIUM 5000 UNITS: 5000 INJECTION INTRAVENOUS; SUBCUTANEOUS at 02:02

## 2024-02-14 RX ADMIN — IPRATROPIUM BROMIDE AND ALBUTEROL SULFATE 3 ML: .5; 3 SOLUTION RESPIRATORY (INHALATION) at 07:02

## 2024-02-14 RX ADMIN — IPRATROPIUM BROMIDE AND ALBUTEROL SULFATE 3 ML: .5; 3 SOLUTION RESPIRATORY (INHALATION) at 02:02

## 2024-02-14 RX ADMIN — FLUOXETINE HYDROCHLORIDE 40 MG: 20 CAPSULE ORAL at 09:02

## 2024-02-14 NOTE — PT/OT/SLP PROGRESS
Physical Therapy Treatment    Patient Name: Rebel Rodriguez   MRN: [id number]    Recommendations:     Discharge Recommendations: High Intensity Therapy  Discharge Equipment Recommendations: bath bench  Barriers to discharge: Increased level of assist    Assessment:     Rebel Rodriguez is a 54 y.o. male admitted with a medical diagnosis of Cervical myelopathy. He presents with the following impairments/functional limitations: weakness, impaired endurance, gait instability, impaired balance, impaired functional mobility, impaired self care skills, impaired coordination, decreased upper extremity function, decreased lower extremity function, decreased safety awareness, impaired fine motor, impaired cardiopulmonary response to activity. Pt with excellent tolerance to therapy session on this date. Pt with improved aerobic endurance as demonstrated by increased distance ambulated without requiring extended rest break. Functional gait assessment completed this date to assess dynamic postural stability in varying scenarios to mimic return to community ambulation. Pt receiving 17/30 placing pt at increased risk for falls. Pt continues to operate below his functional baseline and would benefit from high intensity therapy following discharge once medically stable. Pt would continue to benefit from skilled acute PT in order to address current deficits and progress functional mobility.     Rehab Prognosis: Good; patient continues to benefit from acute skilled PT services to address these deficits and reach maximum level of function.  Recent Surgery: Procedure(s) (LRB):  LAPAROTOMY with gastrostomy tube placement (N/A)  LARYNGOSCOPY, DIRECT  TRACHEOTOMY 11 Days Post-Op    Plan:     During this hospitalization, patient to be seen 4 x/week to address the identified rehab impairments via gait training, therapeutic activities, therapeutic exercises, neuromuscular re-education and progress toward the following goals:    Plan of Care  "Expires:  02/28/24    Subjective     Chief Complaint: None verbalized  Patient/Family Comments/Goals: "I walk to the atrium everyday just so I can smell the coffee."  Pain/Comfort:  Pain Rating 1: 0/10    Objective:     Communicated with RN prior to session. Patient found up in chair with Tracheostomy upon PT entry to room.     General Precautions: Standard, fall, NPO  Orthopedic Precautions: N/A  Braces: N/A    Functional Mobility:  Bed Mobility:    Seated in chair at start of session and returned to chair  Transfers:    Sit to Stand: supervision with no AD  Gait: Patient ambulated functional distances with no AD and contact guard assistance.   Patient demonstrates occasional unsteady gait, decreased step length, narrow base of support, decreased weight shift, decreased foot clearance, flexed posture, decreased mallory, decreased arm swing, and scissored gait.   Patient required cues for upright posture, gluteal activation, sequencing, obstacle navigation, increased step size, foot placement, increased foot clearance, and increased YUN.  All lines remained intact throughout ambulation trial, gait belt utilized.    Functional Gait Assessment:   1. Gait on level surface =  3   (3) Normal: less than 5.5 sec, no A.D., no imbalance, normal gait pattern, deviates< 6in   (2) Mild impairment: 7-5.6 sec, uses A.D., mild gait deviations, or deviates 6-10 in   (1) Moderate impairment: > 7 sec, slow speed, imbalance, deviates 10-15 in.   (0) Severe impairment: needs assist, deviates >15 in, reach/touch wall  2. Change in Gait Speed = 2   (3) Normal: smooth change w/o loss of balance or gait deviation, deviates < 6 in, significant difference between speeds   (2) Mild impairment: changes speed, but demonstrates mild gait deviations, deviates 6-10 in, OR no deviations but unable to significantly change speed, OR uses A.D.   (1) Moderate impairment: minor changes to speed, OR changes speed w/ significant deviations, deviates 10-15 " in, OR  Changes speed , but loses balance & recovers   (0) Severe impairment: cannot change speed, deviates >15 in, or loses balance & needs assist  3. Gait with horizontal head turns  = 2   (3) Normal: no change in gait, deviates <6 in   (2) Mild impairment: slight change in speed, deviates 6-10 in, OR uses A.D.   (1) Moderate impairment: moderate change in speed, deviates 10-15 in   (0) Severe impairment: severe disruption of gait, deviates >15in  4. Gait with vertical head turns = 2   (3) Normal: no change in gait, deviates <6 in   (2) Mild impairment: slight change in speed, deviates 6-10 in OR uses A.D.   (1) Moderate impairment: moderate change in speed, deviates 10-15 in   (0) Severe impairment: severe disruption of gait, deviates >15 in  5. Gait with pivot turns = 3   (3) Normal: performs safely in 3 sec, no LOB   (2) Mild impairment: performs in >3 sec & no LOB, OR turns safely & requires several steps to regain LOB   (1) Moderate impairment: turns slow, OR requires several small steps for balance following turn & stop   (0) Severe impairment: cannot turn safely, needs assist  6. Step over obstacle = 2   (3) Normal: steps over 2 stacked boxes w/o change in speed or LOB   (2) Mild impairment: able to step over 1 box w/o change in speed or LOB   (1) Moderate impairment: steps over 1 box but must slow down, may require VC   (0) Severe impairment: cannot perform w/o assist  7. Gait with Narrow YUN = 1   (3) Normal: 10 steps no staggering   (2) Mild impairment: 7-9 steps   (1) Moderate impairment: 4-7 steps   (0) Severe impairment: < 4 steps or cannot perform w/o assist  8. Gait with eyes closed = 0   (3) Normal: < 7 sec, no A.D., no LOB, normal gait pattern, deviates <6 in   (2) Mild impairment: 7.1-9 sec, mild gait deviations, deviates 6-10 in   (1) Moderate impairment: > 9 sec, abnormal pattern, LOB, deviates 10-15 in   (0) Severe impairment: cannot perform w/o assist, LOB, deviates >15in  9. Ambulating  Backwards = 0   (3) Normal: no A.D., no LOB, normal gait pattern, deviates <6in   (2) Mild impairment: uses A.D., slower speed, mild gait deviations, deviates 6-10 in   (1) Moderate impairment: slow speed, abnormal gait pattern, LOB, deviates 10-15 in   (0) Severe impairment: severe gait deviations or LOB, deviates >15in  10. Steps = 2   (3) Normal: alternating feet, no rail   (2) Mild Impairment: alternating feet, uses rail   (1) Moderate impairment: step-to, uses rail   (0) Severe impairment: cannot perform safely    Score 17/30     Score:   <22/30 fall risk   <20/30 fall risk in older adults   <18/30 fall risk in Parkinsons     AM-PAC 6 CLICK MOBILITY  Turning over in bed (including adjusting bedclothes, sheets and blankets)?: 4  Sitting down on and standing up from a chair with arms (e.g., wheelchair, bedside commode, etc.): 3  Moving from lying on back to sitting on the side of the bed?: 3  Moving to and from a bed to a chair (including a wheelchair)?: 3  Need to walk in hospital room?: 3  Climbing 3-5 steps with a railing?: 3  Basic Mobility Total Score: 19     Therapeutic Activities and Exercises:  Patient educated on role of acute care PT and PT POC, safety while in hospital including calling nurse for mobility, and call light usage  Pt educated on the effects of bed rest and the importance of OOB activity. Pt encouraged to sit UIC majority of day as tolerated and continue daily ambulation with nursing assist. Pt verbalized understanding.  Answered all questions within PT scope of practice and addressed functional mobility concerns.    Patient left up in chair with all lines intact, call button in reach, and RN notified.    GOALS:   Multidisciplinary Problems       Physical Therapy Goals          Problem: Physical Therapy    Goal Priority Disciplines Outcome Goal Variances Interventions   Physical Therapy Goal     PT, PT/OT Ongoing, Progressing     Description: Goals to be met by: 2/24/2024     Patient will  increase functional independence with mobility by performin. Supine to sit with [place]  2. Sit to supine with [place]  3. Sit to stand transfer with [place] with no AD  4. Bed to chair transfer with [place] using No Assistive Device  5. Gait  x 200 feet with [place] using No Assistive Device.   6. Ascend/descend 5 stair with left or right Handrails Stand-by Assistance using No Assistive Device.   8. Lower extremity exercise program x10 reps per handout, with independence                             Time Tracking:     PT Received On: 24  PT Start Time: 1103     PT Stop Time: 1115  PT Total Time (min): 12 min     Billable Minutes: Gait Training 12      Treatment Type: Treatment  PT/PTA: PT     Number of PTA visits since last PT visit: 0     2024

## 2024-02-14 NOTE — ASSESSMENT & PLAN NOTE
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 Managed on Tremfya and Otezla prior to admit, arava had been held just prior to admit due to reported neuropathy symptoms. Patient has had a complicated stay secondary to C3-4 ACDF with resulting pharyngeal injury. Plan is for discharge to rehab today. Rheumatology consulted for concerns for active PsA. Patient states he has no related complaints. ROS significant for no peripheral joint pain, PsO, uveitis, dactylitis, enthesitis, etc. There are some dystrophic nails on exam which does not need to be managed inpatient. He does report significant neck pain which is likely related to surgeries. Otezla restarted 2/12  -PsA not active by history of physical exam.   -can continue Otezla at this time  -Would hold off Tremfya until 2 wks after most recent surgery   -Will discuss restarting arava with attending Dr. Beverly.

## 2024-02-14 NOTE — PLAN OF CARE
[facility]    FACILITY TRANSFER ORDERS      Patient Name: Rebel Rodriguez  YOB: 1970    PCP: Nanda Smith MD   PCP Address: [address] [place] [address] [place] [address] [address]  PCP Phone Number: [phone]  PCP Fax: [phone]    Encounter Date: 02/14/2024    Admit to: IPR    Vital Signs:  Routine    Diagnoses:   Active Hospital Problems    Diagnosis  POA    *Cervical myelopathy [G95.9]  Yes    Postprocedural hypotension [I95.81]  No    Abdominal cramping [R10.9]  Yes    Pain [R52]  Yes     neck pain and radicular pain   S/p acdf  Prn tylenol      Weakness [R53.1]  Yes     Difficulty with ambulating also due to LLE weakness   S/p acdf  Therapy to evaluate and treat      Debility [R53.81]  Yes     Therapy recc high intensive therapy       Kidney stones [N20.0]  Unknown     As seen on xray on 1/30      Hyponatremia [E87.1]  No     Noted on labs  Monitored with cmp  improved      Gastrostomy status [Z93.1]  Not Applicable    Physical deconditioning [R53.81]  No    Injury of pharynx [S19.85XA]  Yes    On mechanically assisted ventilation [Z99.11]  Not Applicable    Anxiety [F41.9]  Yes    Hyperlipidemia [E78.5]  Yes     psoriatric Arthritis [M19.90]  Yes      Resolved Hospital Problems    Diagnosis Date Resolved POA    Dysphagia [R13.10] 02/03/2024 Yes       Allergies:  Review of patient's allergies indicates:   Allergen Reactions    Erythromycin Nausea And Vomiting    Infliximab Other (See Comments)     Lupus with fever and acute arthritis  Lupus with fever and acute arthritis       Diet: NPO    Activities: Activity as tolerated    Goals of Care Treatment Preferences:  Code Status: Full Code      Labs: Per facility protocol    CONSULTS:    Physical Therapy to evaluate and treat.  and Occupational Therapy to evaluate and treat.  Respiratory therapy    MISCELLANEOUS CARE:  Trach care per respiratory      Medications: Review discharge medications with patient and family and provide  education.      Current Discharge Medication List        START taking these medications    Details   albuterol-ipratropium (DUO-NEB) 2.5 mg-0.5 mg/3 mL nebulizer solution Take 3 mLs by nebulization every 6 (six) hours as needed for Wheezing or Shortness of Breath. Rescue  Qty: 75 mL, Refills: 0      polyethylene glycol (GLYCOLAX) 17 gram PwPk 17 g by Per G Tube route once daily.  Refills: 0      QUEtiapine (SEROQUEL) 50 MG tablet 1 tablet (50 mg total) by Per G Tube route every evening.  Qty: 30 tablet, Refills: 11           CONTINUE these medications which have CHANGED    Details   guselkumab (TREMFYA) 100 mg/mL AtIn Inject 100 mg into the skin every 8 weeks. Resume 2/17/24  Qty: 3 mL, Refills: 2    Associated Diagnoses: PSA (psoriatic arthritis)      mirtazapine (REMERON) 15 MG tablet Take 2 tablets (30 mg total) by mouth every evening.  Qty: 90 tablet, Refills: 3           CONTINUE these medications which have NOT CHANGED    Details   apremilast (OTEZLA) 30 mg Tab Take 1 tablet (30 mg total) by mouth 2 (two) times daily.  Qty: 180 tablet, Refills: 1    Associated Diagnoses: PSA (psoriatic arthritis)      aspirin (ECOTRIN) 81 MG EC tablet Take 81 mg by mouth once daily.      atorvastatin (LIPITOR) 10 MG tablet Take 1 tablet (10 mg total) by mouth every evening.  Qty: 90 tablet, Refills: 3    Associated Diagnoses: Hyperlipidemia      FLUoxetine 40 MG capsule Take 1 capsule (40 mg) by mouth daily  Qty: 90 capsule, Refills: 3                Immunizations Administered as of 2/14/2024       Name Date Dose VIS Date Route Exp Date    COVID-19, MRNA, LN-S, PF (Pfizer) (Purple Cap) 8/27/2021 10:11 AM 0.3 mL 12/12/2020 Intramuscular 10/31/2021    Site: Right deltoid     Given By: Kylee Patton RN     : [employer]     Lot: [id number]     COVID-19, MRNA, LN-S, PF (Pfizer) (Purple Cap) 3/19/2021 -- -- -- --    Lot: [id number]     COVID-19, MRNA, LN-S, PF (Pfizer) (Purple Cap) 2/25/2021 -- -- -- --    Lot: [id number]            Some patients may experience side effects after vaccination.  These may include fever, headache, muscle or joint aches.  Most symptoms resolve with 24-48 hours and do not require urgent medical evaluation unless they persist for more than 72 hours or symptoms are concerning for an unrelated medical condition.          _________________________________  Ty Munoz MD  02/14/2024

## 2024-02-14 NOTE — SUBJECTIVE & OBJECTIVE
Interval History: See HPI.     Current Facility-Administered Medications   Medication Frequency    0.9%  NaCl infusion Continuous    acetaminophen tablet 1,000 mg Q8H PRN    albuterol-ipratropium 2.5 mg-0.5 mg/3 mL nebulizer solution 3 mL Q6H WAKE    albuterol-ipratropium 2.5 mg-0.5 mg/3 mL nebulizer solution 3 mL Q6H PRN    apremilast Tab 30 mg BID    atorvastatin tablet 10 mg Daily    bisacodyL suppository 10 mg Daily PRN    dextrose 10 % infusion Continuous PRN    dextrose 10% bolus 125 mL 125 mL PRN    dextrose 10% bolus 250 mL 250 mL PRN    FLUoxetine capsule 40 mg Daily    glucagon (human recombinant) injection 1 mg PRN    heparin (porcine) injection 5,000 Units Q8H    HYDROmorphone injection 1 mg Q4H PRN    insulin aspart U-100 pen 0-10 Units Q4H PRN    mirtazapine tablet 30 mg QHS    ondansetron injection 4 mg Q6H PRN    oxyCODONE immediate release tablet Tab 10 mg Q4H PRN    polyethylene glycol packet 17 g Daily PRN    prochlorperazine injection Soln 5 mg Q6H PRN    QUEtiapine tablet 50 mg QHS     Objective:     Vital Signs (Most Recent):  Temp: 98.3 °F (36.8 °C) (02/14/24 0707)  Pulse: 92 (02/14/24 1424)  Resp: 17 (02/14/24 1424)  BP: 107/62 (02/14/24 0707)  SpO2: 98 % (02/14/24 1424) Vital Signs (24h Range):  Temp:  [98.1 °F (36.7 °C)-98.4 °F (36.9 °C)] 98.3 °F (36.8 °C)  Pulse:  [61-92] 92  Resp:  [17-20] 17  SpO2:  [97 %-99 %] 98 %  BP: (107-111)/(55-62) 107/62     Weight: 58.1 kg (128 lb 1.4 oz) (02/12/24 1700)  Body mass index is 19.48 kg/m².  Body surface area is 1.67 meters squared.      Intake/Output Summary (Last 24 hours) at 2/14/2024 1518  Last data filed at 2/14/2024 0707  Gross per 24 hour   Intake 1200 ml   Output --   Net 1200 ml        Physical Exam   Constitutional: He is oriented to person, place, and time. normal appearance. No distress. He does not appear ill.   HENT:   Head: Normocephalic and atraumatic.   Nose: Nose normal. No rhinorrhea or nasal congestion.   Mouth/Throat: Mucous  membranes are moist. No oropharyngeal exudate or posterior oropharyngeal erythema.   Eyes: Pupils are equal, round, and reactive to light. Conjunctivae are normal. Right eye exhibits no discharge. Left eye exhibits no discharge. No scleral icterus.   Cardiovascular: Normal rate, regular rhythm, normal heart sounds and normal pulses. Exam reveals no gallop and no friction rub.   No murmur heard.  Pulmonary/Chest: Effort normal and breath sounds normal. No stridor. No respiratory distress. He has no wheezes. He has no rhonchi. He has no rales. He exhibits no tenderness.   Abdominal: He exhibits no distension and no mass. There is no abdominal tenderness. There is no rebound and no guarding. No hernia.   Musculoskeletal:         General: No swelling, tenderness, deformity or signs of injury. Normal range of motion.   Neurological: He is alert and oriented to person, place, and time.   Skin: Skin is warm and dry. No bruising noted. No erythema. No jaundice or pallor.   Vitals reviewed.       Significant Labs:  All pertinent lab results from the last 24 hours have been reviewed.    Significant Imaging:  Imaging results within the past 24 hours have been reviewed.

## 2024-02-14 NOTE — ASSESSMENT & PLAN NOTE
Mr Rodriguez is a 53 yo male who is s/p C3-4 ACDF surgery with post op dysphagia, odynophagia, neck pain, swelling and crepitus. CT and scope demonstrates DELFIN drain in the hypopharynx. Patient with difficulty with swallowing secretions. No respiratory compromise. He is s/p neck exploration and pharyngeal repair on 1/30. Discussed with patient's mother and sister at bedside the recommendation for open G tube and tracheostomy to allow adequate time for pharynx to heal. Now s/p trach and open G tube on 2/3/24.    - Strict NPO, NPO for 4-6 weeks minimum from initial date of surgery 1/30  - ID consulted for abx recommendations in setting of hardware with pharyngeal injury    - Currently off all abx, s/p vanc and unasyn   - Routine trach care   - Ok to go to IPR from ENT standpoint, can follow up with ENT outpatient   - Ok to restart psoriatic arthritis meds from ENT standpoint   - Rest of care for per primary  - Please page ENT with questions or concerns

## 2024-02-14 NOTE — SUBJECTIVE & OBJECTIVE
Interval History: See HPI    Current Facility-Administered Medications   Medication Frequency    0.9%  NaCl infusion Continuous    acetaminophen tablet 1,000 mg Q8H PRN    albuterol-ipratropium 2.5 mg-0.5 mg/3 mL nebulizer solution 3 mL Q6H WAKE    albuterol-ipratropium 2.5 mg-0.5 mg/3 mL nebulizer solution 3 mL Q6H PRN    apremilast Tab 30 mg BID    atorvastatin tablet 10 mg Daily    bisacodyL suppository 10 mg Daily PRN    dextrose 10 % infusion Continuous PRN    dextrose 10% bolus 125 mL 125 mL PRN    dextrose 10% bolus 250 mL 250 mL PRN    FLUoxetine capsule 40 mg Daily    glucagon (human recombinant) injection 1 mg PRN    heparin (porcine) injection 5,000 Units Q8H    HYDROmorphone injection 1 mg Q4H PRN    insulin aspart U-100 pen 0-10 Units Q4H PRN    mirtazapine tablet 30 mg QHS    ondansetron injection 4 mg Q6H PRN    oxyCODONE immediate release tablet Tab 10 mg Q4H PRN    polyethylene glycol packet 17 g Daily PRN    prochlorperazine injection Soln 5 mg Q6H PRN    QUEtiapine tablet 50 mg QHS     Objective:     Vital Signs (Most Recent):  Temp: 98.3 °F (36.8 °C) (02/14/24 0707)  Pulse: 92 (02/14/24 1424)  Resp: 17 (02/14/24 1424)  BP: 107/62 (02/14/24 0707)  SpO2: 98 % (02/14/24 1424) Vital Signs (24h Range):  Temp:  [98.1 °F (36.7 °C)-98.3 °F (36.8 °C)] 98.3 °F (36.8 °C)  Pulse:  [61-92] 92  Resp:  [17-20] 17  SpO2:  [97 %-98 %] 98 %  BP: (107-111)/(55-62) 107/62     Weight: 58.1 kg (128 lb 1.4 oz) (02/12/24 1700)  Body mass index is 19.48 kg/m².  Body surface area is 1.67 meters squared.      Intake/Output Summary (Last 24 hours) at 2/14/2024 1539  Last data filed at 2/14/2024 0707  Gross per 24 hour   Intake 1200 ml   Output --   Net 1200 ml        Physical Exam   Constitutional: He is oriented to person, place, and time. normal appearance. No distress. He does not appear ill.   HENT:   Head: Normocephalic and atraumatic.   Nose: Nose normal. No rhinorrhea or nasal congestion.   Mouth/Throat: Mucous  membranes are moist. No oropharyngeal exudate or posterior oropharyngeal erythema.   Eyes: Pupils are equal, round, and reactive to light. Conjunctivae are normal. Right eye exhibits no discharge. Left eye exhibits no discharge. No scleral icterus.   Cardiovascular: Normal rate, regular rhythm, normal heart sounds and normal pulses. Exam reveals no gallop and no friction rub.   No murmur heard.  Pulmonary/Chest: Effort normal and breath sounds normal. No stridor. No respiratory distress. He has no wheezes. He has no rhonchi. He has no rales. He exhibits no tenderness.   Abdominal: He exhibits no distension and no mass. There is no abdominal tenderness. There is no rebound and no guarding. No hernia.   Musculoskeletal:         General: No swelling, tenderness, deformity or signs of injury. Normal range of motion.   Neurological: He is alert and oriented to person, place, and time.   Skin: Skin is warm and dry. No bruising noted. No erythema. No jaundice or pallor.   Vitals reviewed.       Significant Labs:  All pertinent lab results from the last 24 hours have been reviewed.  None    Significant Imaging:  Imaging results within the past 24 hours have been reviewed.

## 2024-02-14 NOTE — CONSULTS
[facility]  Psychology  Consult Note      PROGRESS NOTE - INTERVENTION  Patient Name: Rebel Rodriguez  MRN: [id number]  Date: 02/14/2024  Admission Date: 1/30/2024   Length of Stay: Hospital Day: 16   Attending Physician: Ty Munoz*    Inpatient consult to Psychology  Consult performed by: Olga Mcmillan  Consult ordered by: Ty Munoz MD      HISTORY OF PRESENTING ILLNESS: 53 y.o. male with hx of psoriatic arthritis, hx TIA on Aspirin 81 mg, s/p C4-7 ACDF with Dr. Huff 10 years ago admitted to [facility] s/p C3-C4 ACDF. Per chart review, reports rapid functional decline over the last 3 weeks. Endorses hand clumsiness, difficulty typing, gait imbalance, difficulty butoning, dropping items, falls. Difficulty with ambulating also due to LLE weakness. States it feels like he has rubber bands around his wrists. Reports difficulty with overhead mobility and shock-like pains down spine when raising arms overhead. CT neck soft tissue pending, Patient admitted to [facility] for close monitoring and higher level of care.      BRIEF ASSESSMENT  Mood: Denied low mood, anhedonia and S/I.   Anxiety: Endorsed some worries related to health challenges. Denied difficulty controlling those worries or being unable to relax.   Pain: Reported pain levels as 3/10. Denied concerns with pain management.   Sleep: Reported improvement in his sleep.  Appetite: Patient on feeding tube. Denied concerns with appetite.     INTERVENTION  Intervention Type: Motivational Interviewing  Session Content: Clinician engaged patient in motivational interviewing intervention. Patient expressed satisfaction with the arrangements for Rehab and shared he is looking forward to it. He shared that despite his prolong hospital course following what was initially an outpatient procedure, he is choosing to focus on the positive aspects such as his improvements in speech, current abilities and rehab therapies.      MENTAL STATUS EXAM & OBSERVATIONS  Appearance:  Good grooming and hygiene. Dressed in loungewear. Appeared stated age.      Orientation: Oriented x 4.   Speech: Normal rate, volume, and prosody.    Thought Processes: Logical and goal-oriented.      Thought Content: Normal. No suicidal or homicidal ideation. No indications of obsessions or delusions.   Mood: Euthymic.   Affect: Full range, congruent with mood and session content..   Attention & Concentration: Intact. No deficits noted.   Insight: Good   Judgment: Good.   Behavioral Observations: Cooperative and engaged. Sitting in chair. Good eye contact. No signs of psychomotor agitation or retardation.     DIAGNOSTIC IMPRESSION & PLAN  Patient Active Problem List   Diagnosis     psoriatric Arthritis    Psoriasis    Hx-TIA (transient ischemic attack)    Hyperlipidemia    Low back pain    Pain of left scapula    Cervical spondylosis    High risk medication use    Hyperreflexia of lower extremity    Nasal septal deviation    Decreased range of motion of neck    Decreased strength of trunk and back    Apnea    LU (obstructive sleep apnea)    Chronic neck pain    Painful cervical ROM    Anemia    Cervical myelopathy    Injury of pharynx    On mechanically assisted ventilation    Anxiety    Gastrostomy status    Physical deconditioning    Abdominal cramping    Pain    Weakness    Debility    Kidney stones    Hyponatremia    Postprocedural hypotension       Impression: 53 y.o. male with hx of psoriatic arthritis, hx TIA on Aspirin 81 mg, s/p C4-7 ACDF with Dr. Huff 10 years ago admitted to [facility] s/p C3-C4 ACDF. Per chart review, reports rapid functional decline over the last 3 weeks. Endorses hand clumsiness, difficulty typing, gait imbalance, difficulty butoning, dropping items, falls. Difficulty with ambulating also due to LLE weakness. States it feels like he has rubber bands around his wrists. Reports difficulty with overhead mobility and shock-like pains down  spine when raising arms overhead. CT neck soft tissue pending, Patient admitted to [facility] for close monitoring and higher level of care. Psychology was consulted to address anxiety. At the moment of the intervention, patient reported some worries related to health challenges however, denied difficulty controlling those worries or being unable to relax. He also denied low mood, anhedonia and S/I. Patient reported improvement in his overall well being since being put on Seroquel. Patient expressed he has been able to manage his worries effectively by focusing on the present and also on the positives. He is eagerly looking forward to rehab and expressed he is focusing in favorable aspects and the improvements he was been able to make. He also expressed enjoying social interactions in the hospital setting. Patient presents with adequate coping skills and good social support.     Plan: Psychology will sign off due to patient getting discharged today.     Recommendations  Patient was given psychology and psychiatric department contact information for outpatient services if needed.       Thank you for the opportunity to participate in this patient's care.      Length of Service: 30 minutes    Olga Yang  Dept. of Psychiatry  [facility]

## 2024-02-14 NOTE — PLAN OF CARE
[facility] - Neurosurgery (Hospital)  Discharge Final Note    Primary Care Provider: Nanda Smith MD    Expected Discharge Date: 2/14/2024    [facility] to be [facility] to [facility].  Care deferred to [facility].  [facility] to provide wheelchair transportation.    Nurse to call report to [phone] or [phone].  Wheelchair requested for 3:00 which is not a guaranteed arrival time.    Future Appointments   Date Time Provider Department Center   3/14/2024  2:15 PM [facility] OIC-XRAY [facility]   3/14/2024  3:00 PM Rogerio Maradiaga MD [facility] NEUROS8 [facility]   3/25/2024  8:00 AM LAB, APPOINTMENT [facility] LAB VNP [facility]   4/1/2024  9:30 AM Allie Hidalgo MD [facility] RHEUM [facility]        Final Discharge Note (most recent)       Final Note - 02/14/24 1208          Final Note    Assessment Type Final Discharge Note     Anticipated Discharge Disposition Rehab Facility        Post-Acute Status    Post-Acute Authorization Placement     Discharge Delays None known at this time                     Important Message from Medicare

## 2024-02-14 NOTE — HPI
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 Patient seen 12/23 by Dr. Beverly, with paresthesias and neuropathy at that time. Arava was held due to concern for neuropathy. Imaging revealed "Central disc protrusion at C3-C4 contributes to severe canal stenosis with associated myelomalacia involving the cord at this level, new from 2022 MRI. There is severe neural foraminal narrowing at C3-C4 on the left and C6-C7 on the right." Patient underwent C3-4 ACDF with resulting pharyngeal injury on 1/30. He has been in and out of the OR for further management since with last surgery 2/6. He required IV steroids and Abx in the initial post op period for management of swelling and infection prophylaxis. He get a G tube and trach placed earlier in the month as well. There has been significant concern regarding healing of the damaged pharyngeal injury expressed on several notes. His Apremilast was restarted  Monday 2/12. His Tremfya has been held with plans to hold x 2 weeks after surgery. Patient has significant neck pain, managed with tylenol, dilauded, and oxycodone.     Rheumatology has been consulted due to concerns for pain secondary to PsA. Patient reports no pain in his hands, feet, or any other joints other than neck. Also without active PsO, enthesitis, dactylitis, uveitis, bowel issues. 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   "

## 2024-02-14 NOTE — CONSULTS
"[facility] - Neurosurgery ([facility])  Rheumatology  Progress Note    Patient Name: Rebel Rodriguez  MRN: [id number]  Admission Date: 1/30/2024  Hospital Length of Stay: 15 days  Code Status: Full Code   Attending Provider: Ty Munoz*  Primary Care Physician: Nanda Smith MD  Principal Problem: Cervical myelopathy    Subjective:     HPI: 54 year old male with PMH PsA and TNF induced SLE, history of TIA. Patient seen 12/23 by Dr. Beverly, with paresthesias and neuropathy at that time. Arava was held due to concern for neuropathy. Imaging revealed "Central disc protrusion at C3-C4 contributes to severe canal stenosis with associated myelomalacia involving the cord at this level, new from 2022 MRI. There is severe neural foraminal narrowing at C3-C4 on the left and C6-C7 on the right." Patient underwent C3-4 ACDF with resulting pharyngeal injury on 1/30. He has been in and out of the OR for further management since with last surgery 2/6. He required IV steroids and Abx in the initial post op period for management of swelling and infection prophylaxis. He get a G tube and trach placed earlier in the month as well. There has been significant concern regarding healing of the damaged pharyngeal injury expressed on several notes. His Apremilast was restarted  Monday 2/12. His Tremfya has been held with plans to hold x 2 weeks after surgery. Patient has significant neck pain, managed with tylenol, dilauded, and oxycodone.     Rheumatology has been consulted due to concerns for pain secondary to PsA. Patient reports no pain in his hands, feet, or any other joints other than neck. Also without active PsO, enthesitis, dactylitis, uveitis, bowel issues. 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     Interval History: See HPI.     Current Facility-Administered Medications   Medication Frequency    0.9%  NaCl infusion Continuous    acetaminophen tablet 1,000 mg Q8H PRN    albuterol-ipratropium 2.5 mg-0.5 mg/3 mL nebulizer " solution 3 mL Q6H WAKE    albuterol-ipratropium 2.5 mg-0.5 mg/3 mL nebulizer solution 3 mL Q6H PRN    apremilast Tab 30 mg BID    atorvastatin tablet 10 mg Daily    bisacodyL suppository 10 mg Daily PRN    dextrose 10 % infusion Continuous PRN    dextrose 10% bolus 125 mL 125 mL PRN    dextrose 10% bolus 250 mL 250 mL PRN    FLUoxetine capsule 40 mg Daily    glucagon (human recombinant) injection 1 mg PRN    heparin (porcine) injection 5,000 Units Q8H    HYDROmorphone injection 1 mg Q4H PRN    insulin aspart U-100 pen 0-10 Units Q4H PRN    mirtazapine tablet 30 mg QHS    ondansetron injection 4 mg Q6H PRN    oxyCODONE immediate release tablet Tab 10 mg Q4H PRN    polyethylene glycol packet 17 g Daily PRN    prochlorperazine injection Soln 5 mg Q6H PRN    QUEtiapine tablet 50 mg QHS     Objective:     Vital Signs (Most Recent):  Temp: 98.3 °F (36.8 °C) (02/14/24 0707)  Pulse: 92 (02/14/24 1424)  Resp: 17 (02/14/24 1424)  BP: 107/62 (02/14/24 0707)  SpO2: 98 % (02/14/24 1424) Vital Signs (24h Range):  Temp:  [98.1 °F (36.7 °C)-98.4 °F (36.9 °C)] 98.3 °F (36.8 °C)  Pulse:  [61-92] 92  Resp:  [17-20] 17  SpO2:  [97 %-99 %] 98 %  BP: (107-111)/(55-62) 107/62     Weight: 58.1 kg (128 lb 1.4 oz) (02/12/24 1700)  Body mass index is 19.48 kg/m².  Body surface area is 1.67 meters squared.      Intake/Output Summary (Last 24 hours) at 2/14/2024 1518  Last data filed at 2/14/2024 0707  Gross per 24 hour   Intake 1200 ml   Output --   Net 1200 ml        Physical Exam   Constitutional: He is oriented to person, place, and time. normal appearance. No distress. He does not appear ill.   HENT:   Head: Normocephalic and atraumatic.   Nose: Nose normal. No rhinorrhea or nasal congestion.   Mouth/Throat: Mucous membranes are moist. No oropharyngeal exudate or posterior oropharyngeal erythema.   Eyes: Pupils are equal, round, and reactive to light. Conjunctivae are normal. Right eye exhibits no discharge. Left eye exhibits no discharge.  No scleral icterus.   Cardiovascular: Normal rate, regular rhythm, normal heart sounds and normal pulses. Exam reveals no gallop and no friction rub.   No murmur heard.  Pulmonary/Chest: Effort normal and breath sounds normal. No stridor. No respiratory distress. He has no wheezes. He has no rhonchi. He has no rales. He exhibits no tenderness.   Abdominal: He exhibits no distension and no mass. There is no abdominal tenderness. There is no rebound and no guarding. No hernia.   Musculoskeletal:         General: No swelling, tenderness, deformity or signs of injury. Normal range of motion.   Neurological: He is alert and oriented to person, place, and time.   Skin: Skin is warm and dry. No bruising noted. No erythema. No jaundice or pallor.   Vitals reviewed.       Significant Labs:  All pertinent lab results from the last 24 hours have been reviewed.    Significant Imaging:  Imaging results within the past 24 hours have been reviewed.  Assessment/Plan:     Orthopedic   psoriatric Arthritis  PSA: with history of drug induced SLE while on TNF, since resolved. Managed on Tremfya and Otezla prior to admit, arava had been held just prior to admit due to reported neuropathy symptoms. Patient has had a complicated stay secondary to C3-4 ACDF with resulting pharyngeal injury. Plan is for discharge to rehab today. Rheumatology consulted for concerns for active PsA. Patient states he has no related complaints. ROS significant for no peripheral joint pain, PsO, uveitis, dactylitis, enthesitis, etc. There are some dystrophic nails on exam which does not need to be managed inpatient. He does report significant neck pain which is likely related to surgeries. Otezla restarted 2/12  -PsA not active by history of physical exam.   -can continue Otezla at this time  -Would hold off Tremfya until 2 wks after most recent surgery   -Will discuss restarting arava with attending Dr. Beverly.           Javed Putnam MD  Rheumatology  [facility] - Neurosurgery ([facility])

## 2024-02-14 NOTE — PROGRESS NOTES
[facility] - Neurosurgery ([facility])  Otorhinolaryngology-Head & Neck Surgery  Progress Note    Subjective:     Post-Op Info:  Procedure(s) (LRB):  LAPAROTOMY with gastrostomy tube placement (N/A)  LARYNGOSCOPY, DIRECT  TRACHEOTOMY   11 Days Post-Op  Hospital Day: 16     Interval History:   No issues overnight. Plan for discharge to [facility] today.     Medications:  Continuous Infusions:   sodium chloride 0.9% Stopped (02/03/24 0801)    dextrose 10 % in water (D10W)       Scheduled Meds:   albuterol-ipratropium  3 mL Nebulization Q6H WAKE    apremilast  30 mg Gastrostomy Tube BID    atorvastatin  10 mg Per NG tube Daily    FLUoxetine  40 mg Per NG tube Daily    heparin (porcine)  5,000 Units Subcutaneous Q8H    mirtazapine  30 mg Per NG tube QHS    QUEtiapine  50 mg Per G Tube QHS     PRN Meds:acetaminophen, albuterol-ipratropium, bisacodyL, dextrose 10 % in water (D10W), dextrose 10%, dextrose 10%, glucagon (human recombinant), HYDROmorphone, insulin aspart U-100, ondansetron, oxyCODONE, polyethylene glycol, prochlorperazine     Review of patient's allergies indicates:   Allergen Reactions    Erythromycin Nausea And Vomiting    Infliximab Other (See Comments)     Lupus with fever and acute arthritis  Lupus with fever and acute arthritis     Objective:     Vital Signs (24h Range):  Temp:  [98.1 °F (36.7 °C)-98.4 °F (36.9 °C)] 98.3 °F (36.8 °C)  Pulse:  [61-79] 77  Resp:  [18-20] 18  SpO2:  [97 %-99 %] 97 %  BP: (107-111)/(55-62) 107/62     Date 02/14/24 0700 - 02/15/24 0659   Shift 2875-5754 0702-5788 1396-9174 24 Hour Total   INTAKE   NG/   600   Shift Total(mL/kg) 600(10.3)   600(10.3)   OUTPUT   Shift Total(mL/kg)       Weight (kg) 58.1 58.1 58.1 58.1     Lines/Drains/Airways       Drain  Duration                  Closed/Suction Drain 01/30/24 1916 Right;Ventral Neck Bulb 15 Fr. 14 days         Gastrostomy/Enterostomy 02/03/24 0934 Gastrostomy tube w/ balloon LUQ 11 days              Airway  Duration              Adult Surgical Airway 02/03/24 1055 Shiley Uncuffed 6.0/ 75mm 11 days              Peripheral Intravenous Line  Duration                  Peripheral IV - Single Lumen 02/05/24 1915 18 G Anterior;Right Forearm 8 days                     Physical Exam  Resting in bed   6-0 cuffless tracheostomy tube in good position, secured with soft collar  Neck incision c/d/I  Neck soft, no subq emphysema      Significant Labs:  CBC:   Recent Labs   Lab 02/13/24  0454   WBC 16.64*   RBC 3.95*   HGB 11.3*   HCT 34.2*   *   MCV 87   MCH 28.6   MCHC 33.0     CMP:   Recent Labs   Lab 02/10/24  0403 02/12/24  1032 02/13/24  0454   GLU 85   < > 105   CALCIUM 9.6   < > 10.3   ALBUMIN 3.0*  --   --    PROT 6.7  --   --       < > 138   K 3.9   < > 4.1   CO2 23   < > 21*      < > 105   BUN 25*   < > 23*   CREATININE 0.8   < > 0.9   ALKPHOS 79  --   --    ALT 40  --   --    AST 44*  --   --    BILITOT 0.7  --   --     < > = values in this interval not displayed.       Significant Diagnostics:  None  Assessment/Plan:     Injury of pharynx  Mr Rodriguez is a 55 yo male who is s/p C3-4 ACDF surgery with post op dysphagia, odynophagia, neck pain, swelling and crepitus. CT and scope demonstrates DELFIN drain in the hypopharynx. Patient with difficulty with swallowing secretions. No respiratory compromise. He is s/p neck exploration and pharyngeal repair on 1/30. Discussed with patient's mother and sister at bedside the recommendation for open G tube and tracheostomy to allow adequate time for pharynx to heal. Now s/p trach and open G tube on 2/3/24.    - Strict NPO, NPO for 4-6 weeks minimum from initial date of surgery 1/30  - ID consulted for abx recommendations in setting of hardware with pharyngeal injury    - Currently off all abx, s/p vanc and unasyn   - Routine trach care   - Ok to go to [facility] from ENT standpoint, can follow up with ENT outpatient   - Ok to restart psoriatic arthritis meds from ENT standpoint   - Rest of care for  per primary  - Please page ENT with questions or concerns        Bonita Macias MD  Otorhinolaryngology-Head & Neck Surgery  [facility] - Neurosurgery ([facility])

## 2024-02-14 NOTE — PROGRESS NOTES
"[facility] - Neurosurgery ([facility])  Rheumatology  Progress Note    Patient Name: Rebel Rodriguez  MRN: [id number]  Admission Date: 1/30/2024  Hospital Length of Stay: 15 days  Code Status: Full Code   Attending Provider: Ty Munoz*  Primary Care Physician: Nanda Smith MD  Principal Problem: Cervical myelopathy    Subjective:     HPI: 54 year old male with PMH PsA and TNF induced SLE, history of TIA. Patient seen 12/23 by Dr. Beverly, with paresthesias and neuropathy at that time. Arava was held due to concern for neuropathy. Imaging revealed "Central disc protrusion at C3-C4 contributes to severe canal stenosis with associated myelomalacia involving the cord at this level, new from 2022 MRI. There is severe neural foraminal narrowing at C3-C4 on the left and C6-C7 on the right." Patient underwent C3-4 ACDF with resulting pharyngeal injury on 1/30. He has been in and out of the OR for further management since with last surgery 2/6. He required IV steroids and Abx in the initial post op period for management of swelling and infection prophylaxis. He get a G tube and trach placed earlier in the month as well. There has been significant concern regarding healing of the damaged pharyngeal injury expressed on several notes. His Apremilast was restarted  Monday 2/12. His Tremfya has been held with plans to hold x 2 weeks after surgery. Patient has significant neck pain, managed with tylenol, dilauded, and oxycodone.     Rheumatology has been consulted due to concerns for pain secondary to PsA. Patient reports no pain in his hands, feet, or any other joints other than neck. Also without active PsO, enthesitis, dactylitis, uveitis, bowel issues. 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     Interval History: See HPI    Current Facility-Administered Medications   Medication Frequency    0.9%  NaCl infusion Continuous    acetaminophen tablet 1,000 mg Q8H PRN    albuterol-ipratropium 2.5 mg-0.5 mg/3 mL nebulizer " solution 3 mL Q6H WAKE    albuterol-ipratropium 2.5 mg-0.5 mg/3 mL nebulizer solution 3 mL Q6H PRN    apremilast Tab 30 mg BID    atorvastatin tablet 10 mg Daily    bisacodyL suppository 10 mg Daily PRN    dextrose 10 % infusion Continuous PRN    dextrose 10% bolus 125 mL 125 mL PRN    dextrose 10% bolus 250 mL 250 mL PRN    FLUoxetine capsule 40 mg Daily    glucagon (human recombinant) injection 1 mg PRN    heparin (porcine) injection 5,000 Units Q8H    HYDROmorphone injection 1 mg Q4H PRN    insulin aspart U-100 pen 0-10 Units Q4H PRN    mirtazapine tablet 30 mg QHS    ondansetron injection 4 mg Q6H PRN    oxyCODONE immediate release tablet Tab 10 mg Q4H PRN    polyethylene glycol packet 17 g Daily PRN    prochlorperazine injection Soln 5 mg Q6H PRN    QUEtiapine tablet 50 mg QHS     Objective:     Vital Signs (Most Recent):  Temp: 98.3 °F (36.8 °C) (02/14/24 0707)  Pulse: 92 (02/14/24 1424)  Resp: 17 (02/14/24 1424)  BP: 107/62 (02/14/24 0707)  SpO2: 98 % (02/14/24 1424) Vital Signs (24h Range):  Temp:  [98.1 °F (36.7 °C)-98.3 °F (36.8 °C)] 98.3 °F (36.8 °C)  Pulse:  [61-92] 92  Resp:  [17-20] 17  SpO2:  [97 %-98 %] 98 %  BP: (107-111)/(55-62) 107/62     Weight: 58.1 kg (128 lb 1.4 oz) (02/12/24 1700)  Body mass index is 19.48 kg/m².  Body surface area is 1.67 meters squared.      Intake/Output Summary (Last 24 hours) at 2/14/2024 1539  Last data filed at 2/14/2024 0707  Gross per 24 hour   Intake 1200 ml   Output --   Net 1200 ml        Physical Exam   Constitutional: He is oriented to person, place, and time. normal appearance. No distress. He does not appear ill.   HENT:   Head: Normocephalic and atraumatic.   Nose: Nose normal. No rhinorrhea or nasal congestion.   Mouth/Throat: Mucous membranes are moist. No oropharyngeal exudate or posterior oropharyngeal erythema.   Eyes: Pupils are equal, round, and reactive to light. Conjunctivae are normal. Right eye exhibits no discharge. Left eye exhibits no discharge.  No scleral icterus.   Cardiovascular: Normal rate, regular rhythm, normal heart sounds and normal pulses. Exam reveals no gallop and no friction rub.   No murmur heard.  Pulmonary/Chest: Effort normal and breath sounds normal. No stridor. No respiratory distress. He has no wheezes. He has no rhonchi. He has no rales. He exhibits no tenderness.   Abdominal: He exhibits no distension and no mass. There is no abdominal tenderness. There is no rebound and no guarding. No hernia.   Musculoskeletal:         General: No swelling, tenderness, deformity or signs of injury. Normal range of motion.   Neurological: He is alert and oriented to person, place, and time.   Skin: Skin is warm and dry. No bruising noted. No erythema. No jaundice or pallor.   Vitals reviewed.       Significant Labs:  All pertinent lab results from the last 24 hours have been reviewed.  None    Significant Imaging:  Imaging results within the past 24 hours have been reviewed.  Assessment/Plan:     Orthopedic   psoriatric Arthritis  PSA: with history of drug induced SLE while on TNF, since resolved. Managed on Tremfya and Otezla prior to admit, arava had been held just prior to admit due to reported neuropathy symptoms. Patient has had a complicated stay secondary to C3-4 ACDF with resulting pharyngeal injury. Plan is for discharge to rehab today. Rheumatology consulted for concerns for active PsA. Patient states he has no related complaints. ROS significant for no peripheral joint pain, PsO, uveitis, dactylitis, enthesitis, etc. There are some dystrophic nails on exam which does not need to be managed inpatient. He does report significant neck pain which is likely related to surgeries. Otezla restarted 2/12  -PsA not active by history of physical exam.   -can continue Otezla at this time  -Would hold off Tremfya until 2 wks after most recent surgery   -Will discuss restarting arava with attending Dr. Beverly.           Javed Putnam,  MD  Rheumatology  [facility] - Neurosurgery ([facility])    I have personally reviewed the history, confirmed exam findings, and discussed assessment and plan with fellow.       Neck pain related to ACDF  S/p pharyngeal injury during surgery,with trach/PEG  No evidence active PsA      Apremilast 30mg twice daily  Would resume guselkumab 2 wks post op as long as wound healing well   No NSAIDs with h/o TIA on NSAID  No glucocorticoids given need for optimal wound healing  PT/OT IPR      Esau Beverly MD  Rheumatology  [phone]

## 2024-02-22 PROCEDURE — G0180 MD CERTIFICATION HHA PATIENT: HCPCS | Mod: ,,, | Performed by: NURSE PRACTITIONER

## 2024-02-23 ENCOUNTER — HOSPITAL ENCOUNTER (EMERGENCY)
Facility: OTHER | Age: 54
Discharge: HOME | End: 2024-02-23
Attending: EMERGENCY MEDICINE
Payer: COMMERCIAL

## 2024-02-23 VITALS
WEIGHT: 125 LBS | OXYGEN SATURATION: 100 % | TEMPERATURE: 98 F | HEART RATE: 70 BPM | SYSTOLIC BLOOD PRESSURE: 107 MMHG | HEIGHT: 68 IN | BODY MASS INDEX: 18.94 KG/M2 | RESPIRATION RATE: 20 BRPM | DIASTOLIC BLOOD PRESSURE: 58 MMHG

## 2024-02-23 DIAGNOSIS — J95.00 COMPLICATION OF TRACHEOSTOMY TUBE: Primary | ICD-10-CM

## 2024-02-23 PROCEDURE — 99900031 HC PATIENT EDUCATION (STAT)

## 2024-02-23 PROCEDURE — 99900022

## 2024-02-23 PROCEDURE — 99283 EMERGENCY DEPT VISIT LOW MDM: CPT | Mod: 25

## 2024-02-23 PROCEDURE — 94760 N-INVAS EAR/PLS OXIMETRY 1: CPT

## 2024-02-23 PROCEDURE — L8501 TRACHEOSTOMY SPEAKING VALVE: HCPCS

## 2024-02-23 PROCEDURE — 31615 TRCHEOBRNCHSC EST TRACHS INC: CPT

## 2024-02-23 PROCEDURE — 99900035 HC TECH TIME PER 15 MIN (STAT)

## 2024-02-23 NOTE — ED PROVIDER NOTES
Encounter Date: 2/23/2024       History     Chief Complaint   Patient presents with    tracheostomy dislodge     Per EMS pt reports trach came out of place 30 PTA, VSS     54-year-old male presents with complaint of tracheostomy tube dislodgement.  He reports that his home health nurse was changing the dressing and he involuntarily coughed, which cause the tube to dislodge.  He is able to speak in a soft whisper, denies any shortness of breath or pain.  He has his old tracheostomy tube with him, and also a new kit.  Patient states the tracheostomy is relatively new, he was just discharged from the hospital 2 days ago after a cervical diskectomy which was complicated by esophageal perforation.    The history is provided by the patient.     Review of patient's allergies indicates:   Allergen Reactions    Erythromycin Nausea And Vomiting    Infliximab Other (See Comments)     Lupus with fever and acute arthritis  Lupus with fever and acute arthritis     Past Medical History:   Diagnosis Date    Achilles tendon rupture 10/09/2013    Achilles tendon rupture 10/09/2013    Allergy     Anemia 1/17/2024    Anxiety     Arthritis     psoriatric    Degenerative disc disease     Drug-induced lupus erythematosus     Drug-induced lupus erythematosus 03/09/2016    Hyperlipidemia     Inguinal lymphadenopathy 07/21/2015    Kidney stone     Medication monitoring encounter 12/07/2016    Neck pain 07/06/2017    Psoriatic arthritis     Psoriatic arthritis 12/07/2016    Recurrent fever 07/08/2015    Recurrent nephrolithiasis 05/19/2014    On low oxalate diet    Sinusitis 02/25/2016    Stroke     TIA (transient ischemic attack)     Ulcer     high school    Unexplained night sweats 07/13/2015     Past Surgical History:   Procedure Laterality Date    ACHILLES TENDON SURGERY      BACK SURGERY      DIRECT LARYNGOSCOPY  2/3/2024    Procedure: LARYNGOSCOPY, DIRECT;  Surgeon: Jodie Collier MD;  Location: [facility] OR [address] [place] [address];  Service: ENT;;     FUNCTIONAL ENDOSCOPIC SINUS SURGERY (FESS) USING COMPUTER-ASSISTED NAVIGATION Bilateral 9/14/2018    Procedure: FESS, USING COMPUTER-ASSISTED NAVIGATION;  Surgeon: Gerard Manzo MD;  Location: [facility] OR 2ND FLR;  Service: ENT;  Laterality: Bilateral;    FUSION, SPINE, CERVICAL, ANTERIOR APPROACH N/A 1/30/2024    Procedure: C3-4 anterior cervical discectomy and fusion;  Surgeon: Rogerio Maradiaga MD;  Location: [facility] OR 2ND FLR;  Service: Neurosurgery;  Laterality: N/A;  C3-4 anterior cervical discectomy and fusion  anesthesia: general  nerve mon: EMG/SEP/MEP  radiology: C-arm  bed: reg. slider  position: supine  headrest: caspar, GW tongs/hanging weights, surgical pillow    INJECTION OF ANESTHETIC AGENT AROUND NERVE Left 5/13/2022    Procedure: Block, Nerve MEDIAL BRANCH BLOCK LEFT C2,3,4,5;  Surgeon: Kimberly Wilson MD;  Location: [facility] PAIN MGT;  Service: Pain Management;  Laterality: Left;    INJECTION OF ANESTHETIC AGENT AROUND NERVE Left 5/24/2022    Procedure: BLOCK, NERVE, LEFT C2-C5 MEDIAL BRANCH;  Surgeon: Kimberly Wilson MD;  Location: [facility] PAIN MGT;  Service: Pain Management;  Laterality: Left;    LAPAROTOMY N/A 2/3/2024    Procedure: LAPAROTOMY with gastrostomy tube placement;  Surgeon: Benigno Perez MD;  Location: [facility] OR 2ND FLR;  Service: General;  Laterality: N/A;    NASAL SEPTOPLASTY N/A 9/14/2018    Procedure: SEPTOPLASTY, NASAL;  Surgeon: Gerard Manzo MD;  Location: [facility] OR 2ND FLR;  Service: ENT;  Laterality: N/A;    NASAL TURBINATE REDUCTION Bilateral 9/14/2018    Procedure: REDUCTION, NASAL TURBINATE;  Surgeon: Gerard Manzo MD;  Location: [facility] OR 2ND FLR;  Service: ENT;  Laterality: Bilateral;    NECK EXPLORATION N/A 1/30/2024    Procedure: EXPLORATION, NECK, REPAIR OF PHARYNGEAL INJURY;  Surgeon: Senthil Agarwal MD;  Location: [facility] OR 2ND FLR;  Service: ENT;  Laterality: N/A;    RADIOFREQUENCY ABLATION Left 7/12/2022    Procedure: RADIOFREQUENCY ABLATION, LEFT C2-C3,  C3-C4, C4-C5;  Surgeon: Kimberly Wilson MD;  Location: [facility];  Service: Pain Management;  Laterality: Left;    TRACHEOTOMY  2/3/2024    Procedure: TRACHEOTOMY;  Surgeon: Jodie Collier MD;  Location: [facility] OR [address] [place] [address];  Service: ENT;;    UPPER ENDOSCOPY W/ ESOPHAGEAL MANOMETRY      URETERAL STENT PLACEMENT       Family History   Problem Relation Age of Onset    Crohn's disease Mother     Pancreatic cancer Father     Ulcerative colitis Father     Cancer Father 61        pancreatic ca    Osteoarthritis Maternal Grandmother     Crohn's disease Maternal Grandmother     Cataracts Maternal Grandmother     Cataracts Maternal Grandfather     Cataracts Paternal Grandmother     Cataracts Paternal Grandfather     Amblyopia Neg Hx     Blindness Neg Hx      Social History     Tobacco Use    Smoking status: Never    Smokeless tobacco: Never   Substance Use Topics    Alcohol use: No     Alcohol/week: 0.0 standard drinks of alcohol     Types: 1 - 2 Standard drinks or equivalent per week     Comment: cocktails    Drug use: No     Review of Systems   Respiratory:  Negative for choking, chest tightness and shortness of breath.        Physical Exam     Initial Vitals   BP Pulse Resp Temp SpO2   02/23/24 1356 02/23/24 1356 02/23/24 1439 02/23/24 1356 02/23/24 1356   (!) 102/59 68 20 97.9 °F (36.6 °C) 98 %      MAP       --                Physical Exam    Constitutional: He appears well-developed and well-nourished. No distress.   HENT:   Head: Normocephalic and atraumatic.   Eyes: Conjunctivae and EOM are normal.   Neck: No stridor present. No tracheal tenderness present. No tracheal deviation present.   Central tracheostomy stoma present with pink granulation tissue.   Cardiovascular:  Normal rate and regular rhythm.           Pulmonary/Chest: No respiratory distress. He has no wheezes. He has rhonchi (Scattered, clear with cough).     Neurological: He is alert and oriented to person, place, and time.   Skin: Skin is warm and  dry.   Psychiatric: He has a normal mood and affect. Thought content normal.         ED Course   Procedures  Labs Reviewed - No data to display       Imaging Results    None          Medications - No data to display  Medical Decision Making  Emergent evaluation a 54-year-old male with dislodged tracheostomy tube.  Tube had been dislodged less than 1 hour at time of presentation.  Vital signs are benign, afebrile.  On exam there is a granulated stoma present.  Respiratory therapist came to bedside immediately and was able to replace the tube.  Patient is discharged in good condition.                                      Clinical Impression:  Final diagnoses:  [J95.00] Complication of tracheostomy tube (Primary)          ED Disposition Condition    Discharge Stable          ED Prescriptions    None       Follow-up Information       Follow up With Specialties Details Why Contact Info    Nanda Smith MD Internal Medicine Schedule an appointment as soon as possible for a visit  As needed [address] [facility] [address]  [facility] [address]  [phone]      [facility] - Emergency Dept Emergency Medicine  As needed, If symptoms worsen [address] [facility] [id number] [phone]             Juliana Barba MD  02/23/24 1519

## 2024-02-23 NOTE — ED TRIAGE NOTES
53 yo male reports to ed via ems after his trach came out of place during a dressing change with home health. Event occurred 30 min PTA. Pt does not appear to be in any respiratory distress. % RA. Denies SOB. Respiratory at bedside for re-insertion. Aaox4.

## 2024-02-23 NOTE — PLAN OF CARE
Reinserted cuff less #6 trach with obturator, confirmed placement with breath sounds equal,  and ETCO2 indicator, GOOD COLOR exchange, No signs of distress at this time. Patient tolerated well. Dr Barba at bed side during insertion .

## 2024-02-24 NOTE — DISCHARGE SUMMARY
[facility] Weber - Neurosurgery ([facility])  [facility] Medicine  Discharge Summary      Patient Name: Rebel Rodriguez  MRN: [id number]  CARLOS: [id number]  Patient Class: IP- Inpatient  Admission Date: 1/30/2024  Hospital Length of Stay: 15 days  Discharge Date and Time: 2/14/24  Attending Physician: No att. providers found   Discharging Provider: Ty Munoz MD  Primary Care Provider: Nanda Smith MD  [facility] Medicine Team: [facility]OSP MED N Ty Munoz MD  Primary Care Team: [facility] MED N    HPI:   53 y.o. male with hx of psoriatic arthritis, hx TIA on Aspirin 81 mg, s/p C4-7 ACDF with Dr. Huff 10 years ago admitted to [facility] s/p C3-C4 ACDF. Per chart review, reports rapid functional decline over the last 3 weeks. Endorses hand clumsiness, difficulty typing, gait imbalance, difficulty butoning, dropping items, falls. Difficulty with ambulating also due to LLE weakness. States it feels like he has rubber bands around his wrists. Reports difficulty with overhead mobility and shock-like pains down spine when raising arms overhead. CT neck soft tissue pending, Patient admitted to [facility] for close monitoring and higher level of care.      [facility] Hospital Course: 01/31/2024 CT neck concerning for extensive soft tissue edema and air in neck, additionally w/ concern for DELFIN drain misplaced into hypopharynx. Taken class A to OR by ENT for pharyngeal repair, left intubated by ENT in setting of airway edema. On high dose steroids, no cuff leak this AM  02/01/2024 General surgery consulted for open G tube placement, family still in discussion regarding trach. D/c dex to allow for adequate wound healing, ok with NSGY.   2/2/24: possible G-tube placement today  2/3/24: G-tube today  2/4/24: ok to step down to hospital medicine     Stepped down to Hospital medicine with G-tube and trach in place. ENT and NSGY following. Nutrition consulted for tube feeding recommendations. PTOT consulted for therapy recommendations.  Completed course of antibiotics per ID recommendations.     Procedure(s) (LRB):  LAPAROTOMY with gastrostomy tube placement (N/A)  LARYNGOSCOPY, DIRECT  TRACHEOTOMY      Hospital Course:   2/6 Transferred to [facility], N. Per ENT Strict NPO, NPO for 4-6 weeks minimum.  ID consulted in ICU- completed course of antibiotics and no further need. R neck drain to stay in place until removed by ENT. Overnight patient with development of delirium and Psych consulted for recommendations. Repeat infectious workup negative. Pt reported poor sleep over the last several days. Was started on seroquel and home remeron increased with improvement in both sleep and mentation. 2/8 ENT trach changed for 6-0 cuffless. Neck Drain removed by ENT. Apremilast resumed. Pt reported worsening of joint pains and rheumatology consulted for recommendations; goal to treat arthritis while limiting risk of infection and allowing for proper wound healing given recent surgeries. Patient improved and was accepted to [facility]. Patient deemed ready for discharge. Plan discussed with pt, who was agreeable and amenable; medications were discussed and reviewed, outpatient follow-up arranged, ER precautions were given, all questions were answered to the pt's satisfaction, and Rebel Rodriguez  was subsequently discharged.       Goals of Care Treatment Preferences:  Code Status: Full Code      Consults:   Consults (From admission, onward)          Status Ordering Provider     Inpatient consult to Rheumatology  Once        Provider:  (Not yet assigned)    Completed JENNIFER CHAPPELL     Inpatient consult to Palliative Care  Once        Provider:  (Not yet assigned)    Completed RENEE ZARATE     Inpatient consult to Psychology  Once        Provider:  (Not yet assigned)    Completed SCOTT FELDMAN     Inpatient consult to Physical Medicine Rehab  Once        Provider:  (Not yet assigned)    Completed SCOTT FELDMAN     Inpatient consult to  Registered Dietitian/Nutritionist  Once        Provider:  (Not yet assigned)    Completed SCOTT FELDMAN     Inpatient consult to Psychiatry  Once        Provider:  (Not yet assigned)    Completed SCOTT FELDMAN     Inpatient consult to Infectious Diseases  Once        Provider:  (Not yet assigned)    Completed BUZZ JHA     Inpatient consult to Registered Dietitian/Nutritionist  Once        Provider:  (Not yet assigned)    Completed ELISHA DE LA VEGA     Inpatient consult to General Surgery  Once        Provider:  (Not yet assigned)    Completed RONALDO SHANE     Inpatient consult to ENT  Once        Provider:  (Not yet assigned)    Completed JENNIFER CHAPPELL            No new Assessment & Plan notes have been filed under this hospital service since the last note was generated.  Service: Hospital Medicine      Physical Exam  Constitutional:       General: He is not in acute distress.     Appearance: Normal appearance. He is normal weight. He is not ill-appearing, toxic-appearing or diaphoretic.   HENT:      Head:      Comments: Trache present  Eyes:      Extraocular Movements: Extraocular movements intact.      Conjunctiva/sclera: Conjunctivae normal.      Pupils: Pupils are equal, round, and reactive to light.   Neck:      Vascular: No carotid bruit.   Cardiovascular:      Rate and Rhythm: Normal rate and regular rhythm.      Pulses: Normal pulses.      Heart sounds: Normal heart sounds. No murmur heard.     No friction rub. No gallop.   Pulmonary:      Effort: Pulmonary effort is normal. No respiratory distress.      Breath sounds: Normal breath sounds.   Abdominal:      General: Abdomen is flat. Bowel sounds are normal. There is no distension.      Palpations: Abdomen is soft.      Tenderness: There is no abdominal tenderness. There is no guarding or rebound.      Comments: G tube clean and dry   Musculoskeletal:         General: No swelling. Normal range of motion.       "Cervical back: Normal range of motion and neck supple. No rigidity or tenderness.      Right lower leg: No edema.      Left lower leg: No edema.   Lymphadenopathy:      Cervical: No cervical adenopathy.   Skin:     General: Skin is warm and dry.      Coloration: Skin is not jaundiced or pale.   Neurological:      General: No focal deficit present.      Mental Status: He is alert and oriented to person, place, and time.    Final Active Diagnoses:    Diagnosis Date Noted POA    PRINCIPAL PROBLEM:  Cervical myelopathy [G95.9] 01/30/2024 Yes    Postprocedural hypotension [I95.81] 02/07/2024 No    Abdominal cramping [R10.9] 02/06/2024 Yes    Pain [R52] 02/06/2024 Yes    Weakness [R53.1] 02/06/2024 Yes    Debility [R53.81] 02/06/2024 Yes    Kidney stones [N20.0] 02/06/2024 Unknown    Hyponatremia [E87.1] 02/06/2024 No    Gastrostomy status [Z93.1] 02/04/2024 Not Applicable    Physical deconditioning [R53.81] 02/04/2024 No    Injury of pharynx [S19.85XA] 01/31/2024 Yes    On mechanically assisted ventilation [Z99.11] 01/31/2024 Not Applicable    Anxiety [F41.9] 01/31/2024 Yes    Hyperlipidemia [E78.5] 08/12/2013 Yes     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 Yes    Disposable inter-cannula? Yes    Speaking valve? Yes    Trach cap? Yes    Split drain gauze? Yes    Does patient have medical equipment at home? none      SUCTION MACHINE FOR HOME USE     Order Specific Question Answer " "Comments   Height: 5' 8" (1.727 m)    Weight: 74.9 kg (165 lb 2 oz)    Type of suction: Intermittent    Frequency: QID    Disposable cannisters? Yes    Connective tubing? Yes    Yankauer? Yes    Length of need (1-99 months): 2    Does patient have medical equipment at home? none        Significant Diagnostic Studies: N/A    Pending Diagnostic Studies:       None           Medications:  Reconciled Home Medications:      Medication List        START taking these medications      albuterol-ipratropium 2.5 mg-0.5 mg/3 mL nebulizer solution  Commonly known as: DUO-NEB  Take 3 mLs by nebulization every 6 (six) hours as needed for Wheezing or Shortness of Breath. Rescue     polyethylene glycol 17 gram Pwpk  Commonly known as: GLYCOLAX  17 g by Per G Tube route once daily.     QUEtiapine 50 MG tablet  Commonly known as: SEROQUEL  1 tablet (50 mg total) by Per G Tube route every evening.            CHANGE how you take these medications      mirtazapine 15 MG tablet  Commonly known as: REMERON  Take 2 tablets (30 mg total) by mouth every evening.  What changed:   how much to take  how to take this  when to take this     TREMFYA 100 mg/mL Atin  Generic drug: guselkumab  Inject 100 mg into the skin every 8 weeks. Resume 2/17/24  What changed: additional instructions            CONTINUE taking these medications      aspirin 81 MG EC tablet  Commonly known as: ECOTRIN  Take 81 mg by mouth once daily.     atorvastatin 10 MG tablet  Commonly known as: LIPITOR  Take 1 tablet (10 mg total) by mouth every evening.     FLUoxetine 40 MG capsule  Take 1 capsule (40 mg) by mouth daily     OTEZLA 30 mg Tab  Generic drug: apremilast  Take 1 tablet (30 mg total) by mouth 2 (two) times daily.              Indwelling Lines/Drains at time of discharge:   Lines/Drains/Airways       Drain  Duration                  Closed/Suction Drain 01/30/24 1916 Right;Ventral Neck Bulb 15 Fr. 24 days         Gastrostomy/Enterostomy 02/03/24 0934 Gastrostomy " tube w/ balloon LUQ 20 days              Airway  Duration             Adult Surgical Airway 02/03/24 1055 Juventino Uncuffed 6.0/ 75mm 20 days                    Time spent on the discharge of patient: 35 minutes         Ty Munoz MD  Department of Hospital Medicine  [facility] - Neurosurgery ([facility])

## 2024-02-25 ENCOUNTER — HOSPITAL ENCOUNTER (EMERGENCY)
Facility: HOSPITAL | Age: 54
Discharge: HOME | End: 2024-02-25
Attending: EMERGENCY MEDICINE | Admitting: EMERGENCY MEDICINE
Payer: COMMERCIAL

## 2024-02-25 VITALS
BODY MASS INDEX: 18.94 KG/M2 | HEIGHT: 68 IN | WEIGHT: 125 LBS | RESPIRATION RATE: 18 BRPM | OXYGEN SATURATION: 100 % | HEART RATE: 71 BPM | SYSTOLIC BLOOD PRESSURE: 114 MMHG | DIASTOLIC BLOOD PRESSURE: 61 MMHG | TEMPERATURE: 98 F

## 2024-02-25 DIAGNOSIS — J95.00 TRACHEOSTOMY COMPLICATION, UNSPECIFIED COMPLICATION TYPE: Primary | ICD-10-CM

## 2024-02-25 PROBLEM — Z93.0 TRACHEOSTOMY IN PLACE: Status: ACTIVE | Noted: 2024-02-25

## 2024-02-25 PROCEDURE — 99283 EMERGENCY DEPT VISIT LOW MDM: CPT | Mod: 25

## 2024-02-25 PROCEDURE — 25000003 PHARM REV CODE 250: Performed by: EMERGENCY MEDICINE

## 2024-02-25 PROCEDURE — 31575 DIAGNOSTIC LARYNGOSCOPY: CPT

## 2024-02-25 RX ORDER — NAPROXEN 500 MG/1
500 TABLET ORAL
Status: DISCONTINUED | OUTPATIENT
Start: 2024-02-25 | End: 2024-02-25

## 2024-02-25 RX ORDER — ACETAMINOPHEN 160 MG/5ML
650 SOLUTION ORAL
Status: COMPLETED | OUTPATIENT
Start: 2024-02-25 | End: 2024-02-25

## 2024-02-25 RX ADMIN — ACETAMINOPHEN 649.6 MG: 160 SUSPENSION ORAL at 08:02

## 2024-02-25 NOTE — ED TRIAGE NOTES
"Patient identifiers for Rebel Rodriguez 54 y.o. male checked and correct.  Chief Complaint   Patient presents with    Tracheostomy Tube Change     Pt reports trach became dislodged yesterday and was replaced by paramedic. Pt states that since the paramedic fixed his trach, he has had a "poking" sensation to R clavicle and feels that it is misaligned. Pt called home health who told him to report to ED      Past Medical History:   Diagnosis Date    Achilles tendon rupture 10/09/2013    Achilles tendon rupture 10/09/2013    Allergy     Anemia 1/17/2024    Anxiety     Arthritis     psoriatric    Degenerative disc disease     Drug-induced lupus erythematosus     Drug-induced lupus erythematosus 03/09/2016    Hyperlipidemia     Inguinal lymphadenopathy 07/21/2015    Kidney stone     Medication monitoring encounter 12/07/2016    Neck pain 07/06/2017    Psoriatic arthritis     Psoriatic arthritis 12/07/2016    Recurrent fever 07/08/2015    Recurrent nephrolithiasis 05/19/2014    On low oxalate diet    Sinusitis 02/25/2016    Stroke     TIA (transient ischemic attack)     Ulcer     high school    Unexplained night sweats 07/13/2015     Allergies reported:   Review of patient's allergies indicates:   Allergen Reactions    Erythromycin Nausea And Vomiting    Infliximab Other (See Comments)     Lupus with fever and acute arthritis  Lupus with fever and acute arthritis         LOC: Patient is awake, alert, and aware of environment with an appropriate affect. Patient is oriented x 4 and speaking appropriately.  APPEARANCE: Patient resting comfortably and in no acute distress. Patient is clean and well groomed, patient's clothing is properly fastened.  HEENT: trach in place, reports right clavicle pain  SKIN: The skin is warm and dry. Patient has normal skin turgor and moist mucus membranes.   MUSKULOSKELETAL: Patient is moving all extremities well, no obvious deformities noted. Pulses intact.   RESPIRATORY: Airway is open and " patent. Respirations are spontaneous and non-labored with normal effort and rate.  CARDIAC: Patient has a normal rate and rhythm. Normal sinus on cardiac monitor. No peripheral edema noted.   ABDOMEN: No distention noted. Soft and non-tender upon palpation.  NEUROLOGICAL:, PERRL. Facial expression is symmetrical. Hand grasps are equal bilaterally. Normal sensation in all extremities when touched with finger.

## 2024-02-25 NOTE — HPI
The patient is a 54 year old male who was recently admitted after an ACDF that was complicated by pharyngeal injury requiring repair by ENT as well as tracheostomy placement and g-tube placement 1/30/24. He presents back to the ED today because his trach was dislodged two days ago and replaced by EMS. However since then, his trach has been very painful and is not laying flush against his skin. He is otherwise breathing well and able to sleep comfortably without any respiratory distress over the past 2 days.

## 2024-02-25 NOTE — ED PROVIDER NOTES
"Encounter Date: 2/25/2024       History     Chief Complaint   Patient presents with    Tracheostomy Tube Change     Pt reports trach became dislodged yesterday and was replaced by paramedic. Pt states that since the paramedic fixed his trach, he has had a "poking" sensation to R clavicle and feels that it is misaligned. Pt called home health who told him to report to ED      53 yo M with pmhx psoriatic arthritis, nephrolithiasis, CVA, chronic neck pain, HLD, cervical surgery complicated by trach perforation, cervical recent trach placement (2/2) presents as transfer for ENT evaluation of trach evaluation.  Patient's trach became dislodged 2 days ago was there.  A paramedic replaced his trach at that time.  He notes that there was a small amount of bleeding with replacement.  There has been persistent pain radiating from the trach down to his right clavicle and he was suspicious it is in the wrong place.  He denies any difficulty breathing.  He initially presented to [facility] and was accepted by transfer for ENT evaluation.  He received Tylenol for his pain but declined any additional analgesics        Review of patient's allergies indicates:   Allergen Reactions    Erythromycin Nausea And Vomiting    Infliximab Other (See Comments)     Lupus with fever and acute arthritis  Lupus with fever and acute arthritis     Past Medical History:   Diagnosis Date    Achilles tendon rupture 10/09/2013    Achilles tendon rupture 10/09/2013    Allergy     Anemia 1/17/2024    Anxiety     Arthritis     psoriatric    Degenerative disc disease     Drug-induced lupus erythematosus     Drug-induced lupus erythematosus 03/09/2016    Hyperlipidemia     Inguinal lymphadenopathy 07/21/2015    Kidney stone     Medication monitoring encounter 12/07/2016    Neck pain 07/06/2017    Psoriatic arthritis     Psoriatic arthritis 12/07/2016    Recurrent fever 07/08/2015    Recurrent nephrolithiasis 05/19/2014    On low oxalate diet    " Sinusitis 02/25/2016    Stroke     TIA (transient ischemic attack)     Ulcer     high school    Unexplained night sweats 07/13/2015     Past Surgical History:   Procedure Laterality Date    ACHILLES TENDON SURGERY      BACK SURGERY      DIRECT LARYNGOSCOPY  2/3/2024    Procedure: LARYNGOSCOPY, DIRECT;  Surgeon: Jodie Collier MD;  Location: [address];  Service: ENT;;    FUNCTIONAL ENDOSCOPIC SINUS SURGERY (FESS) USING COMPUTER-ASSISTED NAVIGATION Bilateral 9/14/2018    Procedure: FESS, USING COMPUTER-ASSISTED NAVIGATION;  Surgeon: Gerard Manzo MD;  Location: [facility] OR [address] [place] [address];  Service: ENT;  Laterality: Bilateral;    FUSION, SPINE, CERVICAL, ANTERIOR APPROACH N/A 1/30/2024    Procedure: C3-4 anterior cervical discectomy and fusion;  Surgeon: Rogerio Maradiaga MD;  Location: [address];  Service: Neurosurgery;  Laterality: N/A;  C3-4 anterior cervical discectomy and fusion  anesthesia: general  nerve mon: EMG/SEP/MEP  radiology: C-arm  bed: reg. slider  position: supine  headrest: caspar, GW tongs/hanging weights, surgical pillow    INJECTION OF ANESTHETIC AGENT AROUND NERVE Left 5/13/2022    Procedure: Block, Nerve MEDIAL BRANCH BLOCK LEFT C2,3,4,5;  Surgeon: Kimberly Wilson MD;  Location: [facility] PAIN MGT;  Service: Pain Management;  Laterality: Left;    INJECTION OF ANESTHETIC AGENT AROUND NERVE Left 5/24/2022    Procedure: BLOCK, NERVE, LEFT C2-C5 MEDIAL BRANCH;  Surgeon: Kimberly Wilson MD;  Location: [facility] PAIN MGT;  Service: Pain Management;  Laterality: Left;    LAPAROTOMY N/A 2/3/2024    Procedure: LAPAROTOMY with gastrostomy tube placement;  Surgeon: Benigno Perez MD;  Location: [facility] OR [address] [place] [address];  Service: General;  Laterality: N/A;    NASAL SEPTOPLASTY N/A 9/14/2018    Procedure: SEPTOPLASTY, NASAL;  Surgeon: Gerard Manzo MD;  Location: [address];  Service: ENT;  Laterality: N/A;    NASAL TURBINATE REDUCTION Bilateral 9/14/2018    Procedure: REDUCTION, NASAL TURBINATE;  Surgeon:  Gerard Manzo MD;  [address];  Service: ENT;  Laterality: Bilateral;    NECK EXPLORATION N/A 1/30/2024    Procedure: EXPLORATION, NECK, REPAIR OF PHARYNGEAL INJURY;  Surgeon: Senthil Agarwal MD;  [facility];  Service: ENT;  Laterality: N/A;    RADIOFREQUENCY ABLATION Left 7/12/2022    Procedure: RADIOFREQUENCY ABLATION, LEFT C2-C3, C3-C4, C4-C5;  Surgeon: Kimberly Wilson MD;  Location: [facility] PAIN MGT;  Service: Pain Management;  Laterality: Left;    TRACHEOTOMY  2/3/2024    Procedure: TRACHEOTOMY;  Surgeon: Jodie Collier MD;  Location: [facility] OR [address] [place] [address];  Service: ENT;;    UPPER ENDOSCOPY W/ ESOPHAGEAL MANOMETRY      URETERAL STENT PLACEMENT       Family History   Problem Relation Age of Onset    Crohn's disease Mother     Pancreatic cancer Father     Ulcerative colitis Father     Cancer Father 61        pancreatic ca    Osteoarthritis Maternal Grandmother     Crohn's disease Maternal Grandmother     Cataracts Maternal Grandmother     Cataracts Maternal Grandfather     Cataracts Paternal Grandmother     Cataracts Paternal Grandfather     Amblyopia Neg Hx     Blindness Neg Hx      Social History     Tobacco Use    Smoking status: Never    Smokeless tobacco: Never   Substance Use Topics    Alcohol use: No     Alcohol/week: 0.0 standard drinks of alcohol     Types: 1 - 2 Standard drinks or equivalent per week     Comment: cocktails    Drug use: No     Review of Systems    Physical Exam     Initial Vitals [02/25/24 0737]   BP Pulse Resp Temp SpO2   (!) 112/57 71 18 97.5 °F (36.4 °C) 100 %      MAP       --         Physical Exam    Nursing note and vitals reviewed.  Constitutional: He appears well-developed and well-nourished. He is not diaphoretic. No distress.   Sitting upright in chair in no acute distress   HENT:   Head: Normocephalic.   Neck:   Midline trach   No crepitus or submandibular fullness   Cardiovascular:  Normal rate.           Pulmonary/Chest: No stridor. No  respiratory distress.   Musculoskeletal:         General: Normal range of motion.     Neurological: He is alert.   Skin: Skin is warm.   Psychiatric: Thought content normal.         ED Course   Procedures  Labs Reviewed   HIV 1 / 2 ANTIBODY   HEPATITIS C ANTIBODY          Imaging Results    None          Medications   acetaminophen 32 mg/mL liquid (PEDS) 649.6 mg (649.6 mg Per G Tube Given 2/25/24 0816)     Medical Decision Making  55 yo M with pmhx psoriatic arthritis, nephrolithiasis, CVA, chronic neck pain, HLD, cervical surgery complicated by trach perforation, cervical recent trach placement (2/2) presents as transfer for ENT evaluation of trach evaluation.      My differential includes, but is not limited to:  Trauma from trach replacement, false track, trach    Patient appears comfortable.  ENT consulted for scope evaluation.    Reassessment:  Patient was scoped by ENT.  Trach appears to be entered through a false passage.  It was removed and a dressing was applied.  Scope of upper airway appears patent.  Patient informed of findings.  Will observe in ER for an hour.    Reassessment:  Patient remains breathing comfortably and resting on room air.  He has follow-up with Dr. Agarwal in 2 days.  Provided with dressing change supplies.  Return precautions.  Follow-up in clinic as scheduled.    Amount and/or Complexity of Data Reviewed  Labs: ordered.    Risk  OTC drugs.               ED Course as of 02/25/24 1147   Sun Feb 25, 2024   0811 Pt is a 54-year-old male with PMHx of psoriatic arthritis, hx TIA on Aspirin 81 mg, and s/p C4 10 years ago, C3-4 anterior cervical discectomy and fusion with Dr. Maradiaga complicated by pharyngeal injury required neck exploration and repair by ENT on 1/30/2024, G tube and trach placed on 2/3/2024.   He complains of anterior neck pain radiating to his medial clavicle pain after his tracheostomy became dislodged, was sudden replaced by paramedic last night.  Patient is comfortable  "appearing, clear breath sounds, without peristomal edema or erythema, no crepitus.  The initial differential included malalignment, malplacement.    Respiratory tach inspected the patient's tracheostomy.  She notes that the patient has a cuffed Shiley in place although pt has uncuffed Shiley documented. She deflated the cuff, however device appears malaligned and stoma appears "tight."  ENT is not available at this facility.  Given high-risk for complete loss of airway with further manipulation, lack of related / required equipment, will transfer.    [RC]   5940 I placed transfer request.  Patient states that he prefers to go directly by private vehicle instead of waiting for completion of the formal transfer process then going by ambulance.  I had a shared decision making conversation with the patient. The patient displays normal decision making capacity. I explained to the patient the nature of their illness, injury, or disease, and the risks and benefits of going directly by private vehicle without medical support versus medical transport. After a detailed discussion of these, pt states a preference for leaving by private vehicle, going directly to [facility]. All questions answered. I feel this decision is a reasonable balance of risks and benefits. The patient was encouraged to return at any point for continued, new, or concerning symptoms.   [RC]      ED Course User Index  [RC] Cale Hi MD            .: Patient refused recommended mode of transport, explained increased risk.              Clinical Impression:  Final diagnoses:  [J95.00] Tracheostomy complication, unspecified complication type (Primary)          ED Disposition Condition    Discharge Good          ED Prescriptions    None       Follow-up Information       Follow up With Specialties Details Why Contact Info    Senthil Agarwal MD Otolaryngology Go in 2 days  [address]  [facility] [address]  [phone]      [facility] - " Emergency Dept Emergency Medicine  As needed, If symptoms worsen [address]  [facility] [id number] [phone]             Fredrick Knapp MD  02/25/24 1141

## 2024-02-25 NOTE — ED PROVIDER NOTES
"  Source of History:  Medical record, patient       Chief complaint:  Per triage note: "Tracheostomy Tube Change (Pt reports trach became dislodged yesterday and was replaced by paramedic. Pt states that since the paramedic fixed his trach, he has had a "poking" sensation to R clavicle and feels that it is misaligned. Pt called home health who told him to report to ED )  "    HPI:    Patient presents with tracheostomy tube change with associated R neck pain radiating to his medial clavicle pain since yesterday. He reports that the tube was changed by a paramedic yesterday after it was dislodged; since then he has felt "poking" his R clavicle. He denies any dyspnea.     ROS:   See HPI for pertinent Review of Systems      Review of patient's allergies indicates:   Allergen Reactions    Erythromycin Nausea And Vomiting    Infliximab Other (See Comments)     Lupus with fever and acute arthritis  Lupus with fever and acute arthritis       PMH:  As per HPI and below:  Past Medical History:   Diagnosis Date    Achilles tendon rupture 10/09/2013    Achilles tendon rupture 10/09/2013    Allergy     Anemia 1/17/2024    Anxiety     Arthritis     psoriatric    Degenerative disc disease     Drug-induced lupus erythematosus     Drug-induced lupus erythematosus 03/09/2016    Hyperlipidemia     Inguinal lymphadenopathy 07/21/2015    Kidney stone     Medication monitoring encounter 12/07/2016    Neck pain 07/06/2017    Psoriatic arthritis     Psoriatic arthritis 12/07/2016    Recurrent fever 07/08/2015    Recurrent nephrolithiasis 05/19/2014    On low oxalate diet    Sinusitis 02/25/2016    Stroke     TIA (transient ischemic attack)     Ulcer     high school    Unexplained night sweats 07/13/2015       Past Surgical History:   Procedure Laterality Date    ACHILLES TENDON SURGERY      BACK SURGERY      DIRECT LARYNGOSCOPY  2/3/2024    Procedure: LARYNGOSCOPY, DIRECT;  Surgeon: Jodie Collier MD;  Location: [facility] OR [address] [place] [address];  Service: " ENT;;    FUNCTIONAL ENDOSCOPIC SINUS SURGERY (FESS) USING COMPUTER-ASSISTED NAVIGATION Bilateral 9/14/2018    Procedure: FESS, USING COMPUTER-ASSISTED NAVIGATION;  Surgeon: Gerard Manzo MD;  Location: [facility] OR 2ND FLR;  Service: ENT;  Laterality: Bilateral;    FUSION, SPINE, CERVICAL, ANTERIOR APPROACH N/A 1/30/2024    Procedure: C3-4 anterior cervical discectomy and fusion;  Surgeon: Rogerio Maradiaga MD;  Location: [facility] OR 2ND FLR;  Service: Neurosurgery;  Laterality: N/A;  C3-4 anterior cervical discectomy and fusion  anesthesia: general  nerve mon: EMG/SEP/MEP  radiology: C-arm  bed: reg. slider  position: supine  headrest: caspar, GW tongs/hanging weights, surgical pillow    INJECTION OF ANESTHETIC AGENT AROUND NERVE Left 5/13/2022    Procedure: Block, Nerve MEDIAL BRANCH BLOCK LEFT C2,3,4,5;  Surgeon: Kimberly Wilson MD;  Location: [facility] PAIN MGT;  Service: Pain Management;  Laterality: Left;    INJECTION OF ANESTHETIC AGENT AROUND NERVE Left 5/24/2022    Procedure: BLOCK, NERVE, LEFT C2-C5 MEDIAL BRANCH;  Surgeon: Kimberly Wilson MD;  Location: [facility] PAIN MGT;  Service: Pain Management;  Laterality: Left;    LAPAROTOMY N/A 2/3/2024    Procedure: LAPAROTOMY with gastrostomy tube placement;  Surgeon: Benigno Perez MD;  Location: [facility] OR 2ND FLR;  Service: General;  Laterality: N/A;    NASAL SEPTOPLASTY N/A 9/14/2018    Procedure: SEPTOPLASTY, NASAL;  Surgeon: Gerard Manzo MD;  Location: [facility] OR 2ND FLR;  Service: ENT;  Laterality: N/A;    NASAL TURBINATE REDUCTION Bilateral 9/14/2018    Procedure: REDUCTION, NASAL TURBINATE;  Surgeon: Gerard Manzo MD;  Location: [facility] OR [facility] FLR;  Service: ENT;  Laterality: Bilateral;    NECK EXPLORATION N/A 1/30/2024    Procedure: EXPLORATION, NECK, REPAIR OF PHARYNGEAL INJURY;  Surgeon: Senthil Agarwal MD;  Location: [facility] OR 2ND FLR;  Service: ENT;  Laterality: N/A;    RADIOFREQUENCY ABLATION Left 7/12/2022    Procedure: RADIOFREQUENCY ABLATION, LEFT  "C2-C3, C3-C4, C4-C5;  Surgeon: Kimberly Wilson MD;  [facility];  Service: Pain Management;  Laterality: Left;    TRACHEOTOMY  2/3/2024    Procedure: TRACHEOTOMY;  Surgeon: Jodie Collier MD;  Location: [facility] OR [address] [place] [address];  Service: ENT;;    UPPER ENDOSCOPY W/ ESOPHAGEAL MANOMETRY      URETERAL STENT PLACEMENT         Social History     Tobacco Use    Smoking status: Never    Smokeless tobacco: Never   Substance Use Topics    Alcohol use: No     Alcohol/week: 0.0 standard drinks of alcohol     Types: 1 - 2 Standard drinks or equivalent per week     Comment: cocktails    Drug use: No       Physical Exam:      Nursing note and vitals reviewed.  BP (!) 112/57 (BP Location: Left arm)   Pulse 71   Temp 97.5 °F (36.4 °C) (Oral)   Resp 18   Ht 5' 8" (1.727 m)   Wt 56.7 kg (125 lb)   SpO2 100%   BMI 19.01 kg/m²     Constitutional: No distress.  Eyes: EOMI. No discharge. Anicteric.  HENT:     Neck: Normal range of motion. Neck supple. Tracheostomy.  No stomal edema, erythema, or exudates.  No stridor.  Cardiovascular: Normal rate. No murmur, no gallop and no friction rub heard.   Pulmonary/Chest: No respiratory distress. Effort normal. No wheezes, no rales, no rhonchi.   Neurological: GCS 15. Alert and oriented to person, place, and time. No gross cranial nerve, light touch or strength deficit. Coordination normal.   Skin: Skin is warm and dry.   EXT: 2+ radial pulses.   Psychiatric: Behavior is normal. Judgment normal.      Medical Decision Making / Independent Interpretations / External Records Reviewed:      ED Course as of 02/25/24 0834   Sun Feb 25, 2024   0811 Pt is a 54-year-old male with PMHx of psoriatic arthritis, hx TIA on Aspirin 81 mg, and s/p C4 10 years ago, C3-4 anterior cervical discectomy and fusion with Dr. Maradiaga complicated by pharyngeal injury required neck exploration and repair by ENT on 1/30/2024, G tube and trach placed on 2/3/2024.   He complains of anterior neck pain radiating " "to his medial clavicle pain after his tracheostomy became dislodged, was sudden replaced by paramedic last night.  Patient is comfortable appearing, clear breath sounds, without peristomal edema or erythema, no crepitus.  The initial differential included malalignment, malplacement.    Respiratory tach inspected the patient's tracheostomy.  She notes that the patient has a cuffed Shiley in place although pt has uncuffed Shiley documented. She deflated the cuff, however device appears malaligned and stoma appears "tight."  ENT is not available at this facility.  Given high-risk for complete loss of airway with further manipulation, lack of related / required equipment, will transfer.    [RC]   2237 I placed transfer request.  Patient states that he prefers to go directly by private vehicle instead of waiting for completion of the formal transfer process then going by ambulance.  I had a shared decision making conversation with the patient. The patient displays normal decision making capacity. I explained to the patient the nature of their illness, injury, or disease, and the risks and benefits of going directly by private vehicle without medical support versus medical transport. After a detailed discussion of these, pt states a preference for leaving by private vehicle, going directly to [facility]. All questions answered. I feel this decision is a reasonable balance of risks and benefits. The patient was encouraged to return at any point for continued, new, or concerning symptoms.   [RC]      ED Course User Index  [RC] Cale Hi MD       I decided to obtain the patient's medical records. I reviewed patient's prior external notes / results: inpatient admission documentation.     Additional Medical Decision Making: Hospitalization or escalation of care considered    Medications   naproxen tablet 500 mg (has no administration in time range)              Diagnostic Impression:    1. Tracheostomy complication, " unspecified complication type          Future Appointments   Date Time Provider Department Center   2/27/2024  1:00 PM Senthil Agarwal MD [facility] HNSO [facility]   2/29/2024 11:00 AM Nanda Smith MD [facility] IM [facility] PCW   3/14/2024  2:15 PM [facility] OIC-XRAY [facility]   3/14/2024  3:00 PM Rogerio Maradiaga MD [facility] NEUROS8 [facility]   3/25/2024  8:00 AM LAB, APPOINTMENT [facility] LAB VNP [facility]   4/1/2024  9:30 AM Allie Hidalgo MD [facility] RHEUM [facility] Ort           ---  I, Heather Ocoha, scribed for, and in the presence of, Dr. Hi. I performed the scribed service and the documentation accurately describes the services I performed. I attest to the accuracy of the note.     Physician Attestation for Scribe:   I, Cale Hi MD, reviewed documentation as scribed in my presence, which is both accurate and complete.      Cale Hi MD  02/25/24 0820

## 2024-02-25 NOTE — SUBJECTIVE & OBJECTIVE
Medications:  Continuous Infusions:  Scheduled Meds:  PRN Meds:     No current facility-administered medications on file prior to encounter.     Current Outpatient Medications on File Prior to Encounter   Medication Sig    albuterol-ipratropium (DUO-NEB) 2.5 mg-0.5 mg/3 mL nebulizer solution Take 3 mLs by nebulization every 6 (six) hours as needed for Wheezing or Shortness of Breath. Rescue    apremilast (OTEZLA) 30 mg Tab Take 1 tablet (30 mg total) by mouth 2 (two) times daily.    aspirin (ECOTRIN) 81 MG EC tablet Take 81 mg by mouth once daily.    atorvastatin (LIPITOR) 10 MG tablet Take 1 tablet (10 mg total) by mouth every evening.    famotidine (PEPCID) 20 MG tablet Administer 1 tablet (20 mg total) per tube in the evening.    FLUoxetine 40 MG capsule Take 1 capsule (40 mg) by mouth daily    guselkumab (TREMFYA) 100 mg/mL AtIn Inject 100 mg into the skin every 8 weeks. Resume 2/17/24    mirtazapine (REMERON) 15 MG tablet Take 2 tablets (30 mg total) by mouth every evening.    polyethylene glycol (GLYCOLAX) 17 gram PwPk 17 g by Per G Tube route once daily.    QUEtiapine (SEROQUEL) 50 MG tablet 1 tablet (50 mg total) by Per G Tube route every evening.    QUEtiapine (SEROQUEL) 50 MG tablet Take 1 tablet (50 mg total) by mouth nightly.    Saccharomyces boulardii-FOS (FLORANEX ONE) 5 billion cell- 200 mg Cap ADMINISTER 1 EACH PER TUBE IN THE MORNING    traMADoL (ULTRAM) 50 mg tablet Take 0.5 tablets (25 mg total) by mouth every 8 (eight) hours as needed for moderate pain or severe pain Indications: Pain.       Review of patient's allergies indicates:   Allergen Reactions    Erythromycin Nausea And Vomiting    Infliximab Other (See Comments)     Lupus with fever and acute arthritis  Lupus with fever and acute arthritis       Past Medical History:   Diagnosis Date    Achilles tendon rupture 10/09/2013    Achilles tendon rupture 10/09/2013    Allergy     Anemia 1/17/2024    Anxiety     Arthritis     psoriatric     Degenerative disc disease     Drug-induced lupus erythematosus     Drug-induced lupus erythematosus 03/09/2016    Hyperlipidemia     Inguinal lymphadenopathy 07/21/2015    Kidney stone     Medication monitoring encounter 12/07/2016    Neck pain 07/06/2017    Psoriatic arthritis     Psoriatic arthritis 12/07/2016    Recurrent fever 07/08/2015    Recurrent nephrolithiasis 05/19/2014    On low oxalate diet    Sinusitis 02/25/2016    Stroke     TIA (transient ischemic attack)     Ulcer     high school    Unexplained night sweats 07/13/2015     Past Surgical History:   Procedure Laterality Date    ACHILLES TENDON SURGERY      BACK SURGERY      DIRECT LARYNGOSCOPY  2/3/2024    Procedure: LARYNGOSCOPY, DIRECT;  Surgeon: Jodie Collier MD;  Location: [facility] OR [address] [place] [address];  Service: ENT;;    FUNCTIONAL ENDOSCOPIC SINUS SURGERY (FESS) USING COMPUTER-ASSISTED NAVIGATION Bilateral 9/14/2018    Procedure: FESS, USING COMPUTER-ASSISTED NAVIGATION;  Surgeon: Gerard Manzo MD;  Location: [facility] OR [address] [place] [address];  Service: ENT;  Laterality: Bilateral;    FUSION, SPINE, CERVICAL, ANTERIOR APPROACH N/A 1/30/2024    Procedure: C3-4 anterior cervical discectomy and fusion;  Surgeon: Rogerio Maradiaga MD;  Location: [facility] OR [address] [place] [address];  Service: Neurosurgery;  Laterality: N/A;  C3-4 anterior cervical discectomy and fusion  anesthesia: general  nerve mon: EMG/SEP/MEP  radiology: C-arm  bed: reg. slider  position: supine  headrest: caspar, SHEREE tongs/hanging weights, surgical pillow    INJECTION OF ANESTHETIC AGENT AROUND NERVE Left 5/13/2022    Procedure: Block, Nerve MEDIAL BRANCH BLOCK LEFT C2,3,4,5;  Surgeon: Kimberly Wilson MD;  Location: [facility] PAIN MGT;  Service: Pain Management;  Laterality: Left;    INJECTION OF ANESTHETIC AGENT AROUND NERVE Left 5/24/2022    Procedure: BLOCK, NERVE, LEFT C2-C5 MEDIAL BRANCH;  Surgeon: Kimberly Wilson MD;  Location: [facility] PAIN MGT;  Service: Pain Management;  Laterality: Left;    LAPAROTOMY N/A 2/3/2024     Procedure: LAPAROTOMY with gastrostomy tube placement;  Surgeon: Benigno Perez MD;  [facility];  Service: General;  Laterality: N/A;    NASAL SEPTOPLASTY N/A 9/14/2018    Procedure: SEPTOPLASTY, NASAL;  Surgeon: Gerard Manzo MD;  [facility];  Service: ENT;  Laterality: N/A;    NASAL TURBINATE REDUCTION Bilateral 9/14/2018    Procedure: REDUCTION, NASAL TURBINATE;  Surgeon: Gerard Manzo MD;  [facility];  Service: ENT;  Laterality: Bilateral;    NECK EXPLORATION N/A 1/30/2024    Procedure: EXPLORATION, NECK, REPAIR OF PHARYNGEAL INJURY;  Surgeon: Senthil Agarwal MD;  [facility];  Service: ENT;  Laterality: N/A;    RADIOFREQUENCY ABLATION Left 7/12/2022    Procedure: RADIOFREQUENCY ABLATION, LEFT C2-C3, C3-C4, C4-C5;  Surgeon: Kimberly Wilson MD;  Location: [facility] PAIN MGT;  Service: Pain Management;  Laterality: Left;    TRACHEOTOMY  2/3/2024    Procedure: TRACHEOTOMY;  Surgeon: Jodie Collier MD;  [facility];  Service: ENT;;    UPPER ENDOSCOPY W/ ESOPHAGEAL MANOMETRY      URETERAL STENT PLACEMENT       Family History       Problem Relation (Age of Onset)    Cancer Father (61)    Cataracts Maternal Grandmother, Maternal Grandfather, Paternal Grandmother, Paternal Grandfather    Crohn's disease Mother, Maternal Grandmother    Osteoarthritis Maternal Grandmother    Pancreatic cancer Father    Ulcerative colitis Father          Tobacco Use    Smoking status: Never    Smokeless tobacco: Never   Substance and Sexual Activity    Alcohol use: No     Alcohol/week: 0.0 standard drinks of alcohol     Types: 1 - 2 Standard drinks or equivalent per week     Comment: cocktails    Drug use: No    Sexual activity: Not on file     Review of Systems   All other systems reviewed and are negative.    Objective:     Vital Signs (Most Recent):  Temp: 98 °F (36.7 °C) (02/25/24 0915)  Pulse: 70 (02/25/24 0915)  Resp: 19 (02/25/24 0915)  BP: (!)  107/59 (02/25/24 0915)  SpO2: 100 % (02/25/24 0915) Vital Signs (24h Range):  Temp:  [97.5 °F (36.4 °C)-98 °F (36.7 °C)] 98 °F (36.7 °C)  Pulse:  [70-75] 70  Resp:  [18-19] 19  SpO2:  [99 %-100 %] 100 %  BP: (107-112)/(57-62) 107/59     Weight: 56.7 kg (125 lb)  Body mass index is 19.01 kg/m².         Physical Exam  Neck is soft  No fullness or erythema of neck  4-0 cuffed trach in stoma    Procedure: Flexible tracheoscopy and laryngoscopy  The flexible laryngoscope was inserted into the tracheostomy tube and then placed into the nare and advanced to visualize the tracheostoma, nasal cavity, nasopharynx, the posterior oropharynx, hypopharynx, and the larynx with the findings noted. The scope was removed and the procedure terminated. The patient tolerated this procedure well without apparent complication.     OVERALL FINDINGS  The flexible scope was initially passed through the tracheostomy tube but there was dense granulation tissue encountered at the end of the tube. Therefore, the tracheostomy was deemed to be false passaged so it was removed. The scope was then passed through the stoma and this was noted to be very tight with dense granulation tissue. The scope could be carefully advanced and eventually the trachea was discovered, but this tract was now too narrow for a tracheostomy tube to pass comfortably. Next, the scope was passed through the patient's nose and his upper airway was noted to be widely patent with easily visualized vocal folds that were both mobile. Given his very open airway, the decision was made to leave the tracheostomy tube out. An occlusive dressing was placed over the tracheostomy stoma.     Significant Labs:  None    Significant Diagnostics:  None

## 2024-02-25 NOTE — DISCHARGE INSTRUCTIONS
As explained, your trach was placed into a false track so we removed it.  Follow up with Dr. Agarwal in clinic on Tuesday as scheduled.  Return to the ER for any fullness in your neck, difficulty in breathing, or other concerning symptoms

## 2024-02-25 NOTE — ED NOTES
Pt. Refused naproxen. Pt. States since they placed the trach he is unable to swallow anything until stiches heal. Also unable to take N-SAIDS.

## 2024-02-25 NOTE — ED NOTES
Patient arrived from [facility] [place], operated by [facility] to [facility] POV; ambulatory  for trach complication

## 2024-02-25 NOTE — ED TRIAGE NOTES
Pt arrived to the ED w/ right clavicle pain. He states it may be from his trach being incorrectly inserted. Describes the pain as a 6 out of 10. Denies shortness of breath. Respirations even and unlabored. Took tramadol at home with no relief.

## 2024-02-25 NOTE — ASSESSMENT & PLAN NOTE
The patient is a 54 year old male who was recently admitted after an ACDF that was complicated by pharyngeal injury requiring repair by ENT as well as tracheostomy placement and g-tube placement on 1/30/24. His tracheostomy was dislodged at home 2 days ago and was false passaged on his arrival to our ED. Given his widely open airway, the tracheostomy was removed and an occlusive dressing placed over the stoma.     - Change dressing as needed, at least daily  - Continue NPO and feeds via g-tube  - Keep follow up with Dr. Agarwal this week  - Recommend obs in ED for an hour or so to ensure no respiratory distress and patient is able to maintain sats

## 2024-02-25 NOTE — CONSULTS
[facility] - Emergency Dept  Otorhinolaryngology-Head & Neck Surgery  Consult Note    Patient Name: Rebel Rodriguez  MRN: [id number]  Code Status: Prior  Admission Date: 2/25/2024  Hospital Length of Stay: 0 days  Attending Physician: Fredrick Knapp MD  Primary Care Provider: Nanda Smith MD    Patient information was obtained from patient.     Inpatient consult to ENT  Consult performed by: Mona Gould MD  Consult ordered by: Fredrick Knapp MD        Subjective:     Chief Complaint/Reason for Admission: Trach issue    History of Present Illness: The patient is a 54 year old male who was recently admitted after an ACDF that was complicated by pharyngeal injury requiring repair by ENT as well as tracheostomy placement and g-tube placement 1/30/24. He presents back to the ED today because his trach was dislodged two days ago and replaced by EMS. However since then, his trach has been very painful and is not laying flush against his skin. He is otherwise breathing well and able to sleep comfortably without any respiratory distress over the past 2 days.     Medications:  Continuous Infusions:  Scheduled Meds:  PRN Meds:     No current facility-administered medications on file prior to encounter.     Current Outpatient Medications on File Prior to Encounter   Medication Sig    albuterol-ipratropium (DUO-NEB) 2.5 mg-0.5 mg/3 mL nebulizer solution Take 3 mLs by nebulization every 6 (six) hours as needed for Wheezing or Shortness of Breath. Rescue    apremilast (OTEZLA) 30 mg Tab Take 1 tablet (30 mg total) by mouth 2 (two) times daily.    aspirin (ECOTRIN) 81 MG EC tablet Take 81 mg by mouth once daily.    atorvastatin (LIPITOR) 10 MG tablet Take 1 tablet (10 mg total) by mouth every evening.    famotidine (PEPCID) 20 MG tablet Administer 1 tablet (20 mg total) per tube in the evening.    FLUoxetine 40 MG capsule Take 1 capsule (40 mg) by mouth daily    guselkumab (TREMFYA) 100 mg/mL AtIn Inject 100 mg  into the skin every 8 weeks. Resume 2/17/24    mirtazapine (REMERON) 15 MG tablet Take 2 tablets (30 mg total) by mouth every evening.    polyethylene glycol (GLYCOLAX) 17 gram PwPk 17 g by Per G Tube route once daily.    QUEtiapine (SEROQUEL) 50 MG tablet 1 tablet (50 mg total) by Per G Tube route every evening.    QUEtiapine (SEROQUEL) 50 MG tablet Take 1 tablet (50 mg total) by mouth nightly.    Saccharomyces boulardii-FOS (FLORANEX ONE) 5 billion cell- 200 mg Cap ADMINISTER 1 EACH PER TUBE IN THE MORNING    traMADoL (ULTRAM) 50 mg tablet Take 0.5 tablets (25 mg total) by mouth every 8 (eight) hours as needed for moderate pain or severe pain Indications: Pain.       Review of patient's allergies indicates:   Allergen Reactions    Erythromycin Nausea And Vomiting    Infliximab Other (See Comments)     Lupus with fever and acute arthritis  Lupus with fever and acute arthritis       Past Medical History:   Diagnosis Date    Achilles tendon rupture 10/09/2013    Achilles tendon rupture 10/09/2013    Allergy     Anemia 1/17/2024    Anxiety     Arthritis     psoriatric    Degenerative disc disease     Drug-induced lupus erythematosus     Drug-induced lupus erythematosus 03/09/2016    Hyperlipidemia     Inguinal lymphadenopathy 07/21/2015    Kidney stone     Medication monitoring encounter 12/07/2016    Neck pain 07/06/2017    Psoriatic arthritis     Psoriatic arthritis 12/07/2016    Recurrent fever 07/08/2015    Recurrent nephrolithiasis 05/19/2014    On low oxalate diet    Sinusitis 02/25/2016    Stroke     TIA (transient ischemic attack)     Ulcer     high school    Unexplained night sweats 07/13/2015     Past Surgical History:   Procedure Laterality Date    ACHILLES TENDON SURGERY      BACK SURGERY      DIRECT LARYNGOSCOPY  2/3/2024    Procedure: LARYNGOSCOPY, DIRECT;  Surgeon: Jodie Collier MD;  Location: [facility] OR [address] [place] [address];  Service: ENT;;    FUNCTIONAL ENDOSCOPIC SINUS SURGERY (FESS) USING COMPUTER-ASSISTED  NAVIGATION Bilateral 9/14/2018    Procedure: FESS, USING COMPUTER-ASSISTED NAVIGATION;  Surgeon: Gerard Manzo MD;  Location: [facility] OR 2ND FLR;  Service: ENT;  Laterality: Bilateral;    FUSION, SPINE, CERVICAL, ANTERIOR APPROACH N/A 1/30/2024    Procedure: C3-4 anterior cervical discectomy and fusion;  Surgeon: Rogerio Maradiaga MD;  Location: [facility] OR 2ND FLR;  Service: Neurosurgery;  Laterality: N/A;  C3-4 anterior cervical discectomy and fusion  anesthesia: general  nerve mon: EMG/SEP/MEP  radiology: C-arm  bed: reg. slider  position: supine  headrest: caspar, GW tongs/hanging weights, surgical pillow    INJECTION OF ANESTHETIC AGENT AROUND NERVE Left 5/13/2022    Procedure: Block, Nerve MEDIAL BRANCH BLOCK LEFT C2,3,4,5;  Surgeon: Kimberly Wilson MD;  Location: [facility] PAIN MGT;  Service: Pain Management;  Laterality: Left;    INJECTION OF ANESTHETIC AGENT AROUND NERVE Left 5/24/2022    Procedure: BLOCK, NERVE, LEFT C2-C5 MEDIAL BRANCH;  Surgeon: Kimberly Wilson MD;  Location: [facility] PAIN MGT;  Service: Pain Management;  Laterality: Left;    LAPAROTOMY N/A 2/3/2024    Procedure: LAPAROTOMY with gastrostomy tube placement;  Surgeon: Benigno Perez MD;  [facility];  Service: General;  Laterality: N/A;    NASAL SEPTOPLASTY N/A 9/14/2018    Procedure: SEPTOPLASTY, NASAL;  Surgeon: Gerard Manzo MD;  [facility];  Service: ENT;  Laterality: N/A;    NASAL TURBINATE REDUCTION Bilateral 9/14/2018    Procedure: REDUCTION, NASAL TURBINATE;  Surgeon: Gerard Manzo MD;  Location: [facility] OR [facility] FLR;  Service: ENT;  Laterality: Bilateral;    NECK EXPLORATION N/A 1/30/2024    Procedure: EXPLORATION, NECK, REPAIR OF PHARYNGEAL INJURY;  Surgeon: Senthil Agarwal MD;  Location: [facility] OR 2ND FLR;  Service: ENT;  Laterality: N/A;    RADIOFREQUENCY ABLATION Left 7/12/2022    Procedure: RADIOFREQUENCY ABLATION, LEFT C2-C3, C3-C4, C4-C5;  Surgeon: Kimberly Wilson MD;  Location: [facility] PAIN MGT;   Service: Pain Management;  Laterality: Left;    TRACHEOTOMY  2/3/2024    Procedure: TRACHEOTOMY;  Surgeon: Jodie Collier MD;  Location: [facility] OR [address] [place] [address];  Service: ENT;;    UPPER ENDOSCOPY W/ ESOPHAGEAL MANOMETRY      URETERAL STENT PLACEMENT       Family History       Problem Relation (Age of Onset)    Cancer Father (61)    Cataracts Maternal Grandmother, Maternal Grandfather, Paternal Grandmother, Paternal Grandfather    Crohn's disease Mother, Maternal Grandmother    Osteoarthritis Maternal Grandmother    Pancreatic cancer Father    Ulcerative colitis Father          Tobacco Use    Smoking status: Never    Smokeless tobacco: Never   Substance and Sexual Activity    Alcohol use: No     Alcohol/week: 0.0 standard drinks of alcohol     Types: 1 - 2 Standard drinks or equivalent per week     Comment: cocktails    Drug use: No    Sexual activity: Not on file     Review of Systems   All other systems reviewed and are negative.    Objective:     Vital Signs (Most Recent):  Temp: 98 °F (36.7 °C) (02/25/24 0915)  Pulse: 70 (02/25/24 0915)  Resp: 19 (02/25/24 0915)  BP: (!) 107/59 (02/25/24 0915)  SpO2: 100 % (02/25/24 0915) Vital Signs (24h Range):  Temp:  [97.5 °F (36.4 °C)-98 °F (36.7 °C)] 98 °F (36.7 °C)  Pulse:  [70-75] 70  Resp:  [18-19] 19  SpO2:  [99 %-100 %] 100 %  BP: (107-112)/(57-62) 107/59     Weight: 56.7 kg (125 lb)  Body mass index is 19.01 kg/m².         Physical Exam  Neck is soft  No fullness or erythema of neck  4-0 cuffed trach in stoma    Procedure: Flexible tracheoscopy and laryngoscopy  The flexible laryngoscope was inserted into the tracheostomy tube and then placed into the nare and advanced to visualize the tracheostoma, nasal cavity, nasopharynx, the posterior oropharynx, hypopharynx, and the larynx with the findings noted. The scope was removed and the procedure terminated. The patient tolerated this procedure well without apparent complication.     OVERALL FINDINGS  The flexible scope was  initially passed through the tracheostomy tube but there was dense granulation tissue encountered at the end of the tube. Therefore, the tracheostomy was deemed to be false passaged so it was removed. The scope was then passed through the stoma and this was noted to be very tight with dense granulation tissue. The scope could be carefully advanced and eventually the trachea was discovered, but this tract was now too narrow for a tracheostomy tube to pass comfortably. Next, the scope was passed through the patient's nose and his upper airway was noted to be widely patent with easily visualized vocal folds that were both mobile. Given his very open airway, the decision was made to leave the tracheostomy tube out. An occlusive dressing was placed over the tracheostomy stoma.     Significant Labs:  None    Significant Diagnostics:  None    Assessment/Plan:     Tracheostomy in place  The patient is a 54 year old male who was recently admitted after an ACDF that was complicated by pharyngeal injury requiring repair by ENT as well as tracheostomy placement and g-tube placement on 1/30/24. His tracheostomy was dislodged at home 2 days ago and was false passaged on his arrival to our ED. Given his widely open airway, the tracheostomy was removed and an occlusive dressing placed over the stoma.     - Change dressing as needed, at least daily  - Continue NPO and feeds via g-tube  - Keep follow up with Dr. Agarwal this week  - Recommend obs in ED for an hour or so to ensure no respiratory distress and patient is able to maintain sats        VTE Risk Mitigation (From admission, onward)      None              Mona Gould MD  Otorhinolaryngology-Head & Neck Surgery  [facility] - Emergency Dept

## 2024-02-27 ENCOUNTER — OFFICE VISIT (OUTPATIENT)
Dept: OTOLARYNGOLOGY | Facility: CLINIC | Age: 54
End: 2024-02-27
Payer: COMMERCIAL

## 2024-02-27 VITALS
BODY MASS INDEX: 19.34 KG/M2 | DIASTOLIC BLOOD PRESSURE: 65 MMHG | WEIGHT: 127.19 LBS | SYSTOLIC BLOOD PRESSURE: 102 MMHG | HEART RATE: 73 BPM

## 2024-02-27 DIAGNOSIS — J39.2 PHARYNGEAL LESION: Primary | ICD-10-CM

## 2024-02-27 DIAGNOSIS — Z93.0 TRACHEOSTOMY IN PLACE: ICD-10-CM

## 2024-02-27 PROCEDURE — 99024 POSTOP FOLLOW-UP VISIT: CPT | Mod: S$GLB,,, | Performed by: OTOLARYNGOLOGY

## 2024-02-27 PROCEDURE — 1160F RVW MEDS BY RX/DR IN RCRD: CPT | Mod: CPTII,S$GLB,, | Performed by: OTOLARYNGOLOGY

## 2024-02-27 PROCEDURE — 1159F MED LIST DOCD IN RCRD: CPT | Mod: CPTII,S$GLB,, | Performed by: OTOLARYNGOLOGY

## 2024-02-27 PROCEDURE — 3078F DIAST BP <80 MM HG: CPT | Mod: CPTII,S$GLB,, | Performed by: OTOLARYNGOLOGY

## 2024-02-27 PROCEDURE — 99999 PR PBB SHADOW E&M-EST. PATIENT-LVL IV: CPT | Mod: PBBFAC,,, | Performed by: OTOLARYNGOLOGY

## 2024-02-27 PROCEDURE — 3074F SYST BP LT 130 MM HG: CPT | Mod: CPTII,S$GLB,, | Performed by: OTOLARYNGOLOGY

## 2024-02-27 NOTE — PROGRESS NOTES
Rebel Rodriguez presents 1 month status post repair of pharyngeal perforation following ACDF.  His trach was removed over the weekend after accidental decannulation.  No complaints today.      On exam, his neck is without evidence of inflammation.  There is no warmth, erythema or drainage.  His incision is healing nicely.  Intraoral examined limited scope exam of the pharynx reveals no evidence of exposed hardware.      Assessment and plan:  Doing well.  Modified barium swallow ordered.  Plan to resume p.o. pending modified barium swallow results.

## 2024-02-29 ENCOUNTER — HOSPITAL ENCOUNTER (OUTPATIENT)
Dept: RADIOLOGY | Facility: HOSPITAL | Age: 54
Discharge: HOME | End: 2024-02-29
Attending: OTOLARYNGOLOGY
Payer: COMMERCIAL

## 2024-02-29 ENCOUNTER — CLINICAL SUPPORT (OUTPATIENT)
Dept: SPEECH THERAPY | Facility: HOSPITAL | Age: 54
End: 2024-02-29
Attending: OTOLARYNGOLOGY
Payer: COMMERCIAL

## 2024-02-29 ENCOUNTER — OFFICE VISIT (OUTPATIENT)
Dept: INTERNAL MEDICINE | Facility: CLINIC | Age: 54
End: 2024-02-29
Payer: COMMERCIAL

## 2024-02-29 VITALS
BODY MASS INDEX: 19.51 KG/M2 | SYSTOLIC BLOOD PRESSURE: 96 MMHG | OXYGEN SATURATION: 98 % | HEART RATE: 68 BPM | WEIGHT: 128.75 LBS | DIASTOLIC BLOOD PRESSURE: 56 MMHG | HEIGHT: 68 IN

## 2024-02-29 DIAGNOSIS — J39.2 PHARYNGEAL LESION: ICD-10-CM

## 2024-02-29 DIAGNOSIS — Z98.890 S/P CERVICAL DISCECTOMY: ICD-10-CM

## 2024-02-29 DIAGNOSIS — Z93.1 S/P PERCUTANEOUS ENDOSCOPIC GASTROSTOMY (PEG) TUBE PLACEMENT: ICD-10-CM

## 2024-02-29 DIAGNOSIS — Z98.890 H/O TRACHEOSTOMY: Primary | ICD-10-CM

## 2024-02-29 DIAGNOSIS — R13.13 DYSPHAGIA, PHARYNGEAL: Primary | ICD-10-CM

## 2024-02-29 DIAGNOSIS — S11.21XD: ICD-10-CM

## 2024-02-29 DIAGNOSIS — Z98.1 S/P CERVICAL SPINAL FUSION: ICD-10-CM

## 2024-02-29 PROCEDURE — 25500020 PHARM REV CODE 255: Performed by: OTOLARYNGOLOGY

## 2024-02-29 PROCEDURE — 92611 MOTION FLUOROSCOPY/SWALLOW: CPT | Mod: GN | Performed by: SPEECH-LANGUAGE PATHOLOGIST

## 2024-02-29 PROCEDURE — 99214 OFFICE O/P EST MOD 30 MIN: CPT | Mod: S$GLB,,, | Performed by: INTERNAL MEDICINE

## 2024-02-29 PROCEDURE — 74230 X-RAY XM SWLNG FUNCJ C+: CPT | Mod: 26,,, | Performed by: RADIOLOGY

## 2024-02-29 PROCEDURE — 99999 PR PBB SHADOW E&M-EST. PATIENT-LVL IV: CPT | Mod: PBBFAC,,, | Performed by: INTERNAL MEDICINE

## 2024-02-29 PROCEDURE — 74230 X-RAY XM SWLNG FUNCJ C+: CPT | Mod: TC

## 2024-02-29 PROCEDURE — 3078F DIAST BP <80 MM HG: CPT | Mod: CPTII,S$GLB,, | Performed by: INTERNAL MEDICINE

## 2024-02-29 PROCEDURE — A9698 NON-RAD CONTRAST MATERIALNOC: HCPCS | Performed by: OTOLARYNGOLOGY

## 2024-02-29 PROCEDURE — 3074F SYST BP LT 130 MM HG: CPT | Mod: CPTII,S$GLB,, | Performed by: INTERNAL MEDICINE

## 2024-02-29 RX ADMIN — BARIUM SULFATE 23 ML: 0.81 POWDER, FOR SUSPENSION ORAL at 02:02

## 2024-02-29 NOTE — PROGRESS NOTES
Subjective:       Patient ID: Rebel Rodriguez is a 54 y.o. male.    Chief Complaint: Hospital Follow Up     Rebel Rodriguez is a 54 y.o.  male who presents for Hospital Follow Up  .  Transitional Care Note    Family and/or Caretaker present at visit?  No.  Diagnostic tests reviewed/disposition: No diagnosic tests pending after this hospitalization.  Disease/illness education: done  Home health/community services discussion/referrals: Patient has home health established at [facility] and speech with [facility].   Establishment or re-establishment of referral orders for community resources: will discuss G tube removal with his surgeons pending MBSS results   Discussion with other health care providers: message sent to dr elliott re: pt trach healing questions.        Pt is a 55 yo male s/p complicated hospital course after being admitted for an Anterior cervical discectomy and fusion, C3-4 on 1/30/24 Due to Degenerative cervical myelopathy with stenosis, C3-4 with  Prior C4-7 ACD. On 01/31/2024 CT neck concerning for extensive soft tissue edema and air in neck, additionally w/ concern for DELFIN drain misplaced into hypopharynx. Taken class A to OR by ENT for pharyngeal repair. 2/6 Transferred to [facility] med, IMN. Per ENT Strict NPO, NPO for 4-6 weeks minimum Had LAPAROTOMY with gastrostomy tube placement   development of delirium and Psych consulted for recommendationsworsening of joint pains and rheumatology consulted for recommendations; goal to treat arthritis while limiting risk of infection and allowing for proper wound healing given recent surgeries. Patient improved and was accepted to [facility]  Two subsequent ED visits for dislodged trach tube and had his trach removed in the ED.     Has follow up with ns in 4 weeks.      Plans to restart his tremfya tomorrow.   Has GI tube- using it well.  One of the stitches has come out and is causing discomfort,      Has not needed seroquel since leaving the hospital.  Takiing th  epepcid.    Swallowing wstudy today.     Has reengaged with his therapist. Mood ok, has anger and frustrtaton appropriate to his situation.  Concerned with ability to complete work, cost of care.               Patient Active Problem List   Diagnosis     psoriatric Arthritis    Psoriasis    Hx-TIA (transient ischemic attack)    Hyperlipidemia    Low back pain    Pain of left scapula    Cervical spondylosis    High risk medication use    Psoriatic arthritis    Hyperreflexia of lower extremity    Nasal septal deviation    Decreased range of motion of neck    Decreased strength of trunk and back    Apnea    LU (obstructive sleep apnea)    Chronic neck pain    Painful cervical ROM    Anemia    Cervical myelopathy    Injury of pharynx    On mechanically assisted ventilation    Anxiety    Gastrostomy status    Physical deconditioning    Abdominal cramping    Pain    Weakness    Debility    Kidney stones    Hyponatremia    Postprocedural hypotension    Tracheostomy in place           Past Medical History:   Diagnosis Date    Achilles tendon rupture 10/09/2013    Achilles tendon rupture 10/09/2013    Allergy     Anemia 1/17/2024    Anxiety     Arthritis     psoriatric    Degenerative disc disease     Drug-induced lupus erythematosus     Drug-induced lupus erythematosus 03/09/2016    Hyperlipidemia     Inguinal lymphadenopathy 07/21/2015    Kidney stone     Medication monitoring encounter 12/07/2016    Neck pain 07/06/2017    Psoriatic arthritis     Psoriatic arthritis 12/07/2016    Recurrent fever 07/08/2015    Recurrent nephrolithiasis 05/19/2014    On low oxalate diet    Sinusitis 02/25/2016    Stroke     TIA (transient ischemic attack)     Ulcer     high school    Unexplained night sweats 07/13/2015       Past Surgical History:   Procedure Laterality Date    ACHILLES TENDON SURGERY      BACK SURGERY      DIRECT LARYNGOSCOPY  2/3/2024    Procedure: LARYNGOSCOPY, DIRECT;  Surgeon: Jodie Collier MD;  Location: [facility] OR [facility]  FLR;  Service: ENT;;    FUNCTIONAL ENDOSCOPIC SINUS SURGERY (FESS) USING COMPUTER-ASSISTED NAVIGATION Bilateral 9/14/2018    Procedure: FESS, USING COMPUTER-ASSISTED NAVIGATION;  Surgeon: Gerard Manzo MD;  Location: [facility] OR 2ND FLR;  Service: ENT;  Laterality: Bilateral;    FUSION, SPINE, CERVICAL, ANTERIOR APPROACH N/A 1/30/2024    Procedure: C3-4 anterior cervical discectomy and fusion;  Surgeon: Rogerio Maradiaga MD;  Location: [facility] OR [facility] FLR;  Service: Neurosurgery;  Laterality: N/A;  C3-4 anterior cervical discectomy and fusion  anesthesia: general  nerve mon: EMG/SEP/MEP  radiology: C-arm  bed: reg. slider  position: supine  headrest: caspar, GW tongs/hanging weights, surgical pillow    INJECTION OF ANESTHETIC AGENT AROUND NERVE Left 5/13/2022    Procedure: Block, Nerve MEDIAL BRANCH BLOCK LEFT C2,3,4,5;  Surgeon: Kimberly Wilson MD;  Location: [facility] PAIN MGT;  Service: Pain Management;  Laterality: Left;    INJECTION OF ANESTHETIC AGENT AROUND NERVE Left 5/24/2022    Procedure: BLOCK, NERVE, LEFT C2-C5 MEDIAL BRANCH;  Surgeon: Kimberly Wilson MD;  Location: [facility] PAIN MGT;  Service: Pain Management;  Laterality: Left;    LAPAROTOMY N/A 2/3/2024    Procedure: LAPAROTOMY with gastrostomy tube placement;  Surgeon: Benigno Perez MD;  Location: [facility] OR [facility] FLR;  Service: General;  Laterality: N/A;    NASAL SEPTOPLASTY N/A 9/14/2018    Procedure: SEPTOPLASTY, NASAL;  Surgeon: Gerard Manzo MD;  Location: [facility] OR [facility] FLR;  Service: ENT;  Laterality: N/A;    NASAL TURBINATE REDUCTION Bilateral 9/14/2018    Procedure: REDUCTION, NASAL TURBINATE;  Surgeon: Gerard Manzo MD;  Location: [facility] OR [facility] FLR;  Service: ENT;  Laterality: Bilateral;    NECK EXPLORATION N/A 1/30/2024    Procedure: EXPLORATION, NECK, REPAIR OF PHARYNGEAL INJURY;  Surgeon: Senthil Agarwal MD;  Location: [facility] OR [facility] FLR;  Service: ENT;  Laterality: N/A;    RADIOFREQUENCY ABLATION Left 7/12/2022    Procedure: RADIOFREQUENCY  "ABLATION, LEFT C2-C3, C3-C4, C4-C5;  Surgeon: Kimberly Wilson MD;  [facility];  Service: Pain Management;  Laterality: Left;    TRACHEOTOMY  2/3/2024    Procedure: TRACHEOTOMY;  Surgeon: Jodie Collier MD;  Location: [facility] OR [address] [place] [address];  Service: ENT;;    UPPER ENDOSCOPY W/ ESOPHAGEAL MANOMETRY      URETERAL STENT PLACEMENT         Family History   Problem Relation Age of Onset    Crohn's disease Mother     Pancreatic cancer Father     Ulcerative colitis Father     Cancer Father 61        pancreatic ca    Osteoarthritis Maternal Grandmother     Crohn's disease Maternal Grandmother     Cataracts Maternal Grandmother     Cataracts Maternal Grandfather     Cataracts Paternal Grandmother     Cataracts Paternal Grandfather     Amblyopia Neg Hx     Blindness Neg Hx        Social History     Tobacco Use    Smoking status: Never    Smokeless tobacco: Never   Substance Use Topics    Alcohol use: No     Alcohol/week: 0.0 standard drinks of alcohol     Types: 1 - 2 Standard drinks or equivalent per week     Comment: cocktails    Drug use: No         Review of Systems   Constitutional:  Negative for chills and fever.         Objective:   Blood pressure (!) 96/56, pulse 68, height 5' 8" (1.727 m), weight 58.4 kg (128 lb 12 oz), SpO2 98 %.     Physical Exam  Constitutional:       Comments: Thinner than usual   HENT:      Head: Normocephalic.      Mouth/Throat:      Mouth: Mucous membranes are moist.   Neck:      Comments: Trach wound healing well, no exudate or erythema  Cardiovascular:      Rate and Rhythm: Normal rate and regular rhythm.      Heart sounds: Normal heart sounds.   Pulmonary:      Effort: Pulmonary effort is normal.      Breath sounds: Normal breath sounds.   Abdominal:      Palpations: Abdomen is soft.      Comments: Peg tube in place with mild tenderness with manipulation   Musculoskeletal:      Right lower leg: No edema.      Left lower leg: No edema.   Skin:     General: Skin is warm and dry. "   Neurological:      Mental Status: He is alert and oriented to person, place, and time. Mental status is at baseline.   Psychiatric:         Mood and Affect: Mood normal.         Thought Content: Thought content normal.         Prior labs reviewed    Health Maintenance         Date Due Completion Date    Colorectal Cancer Screening 09/08/2020 9/8/2015    Influenza Vaccine (1) 09/01/2023 2/28/2023    COVID-19 Vaccine (7 - 2023-24 season) 09/01/2023 10/12/2022    High Dose Statin 03/14/2025 3/14/2024    TETANUS VACCINE 09/25/2028 9/25/2018    Lipid Panel 01/30/2029 1/30/2024    Pneumococcal Vaccines (Age 0-64) (4 of 4 - PPSV23 or PCV20) 01/20/2035 4/11/2022            Assessment/Plan:       1. H/O tracheostomy  Comments:  has questions about care  Follow up with ent, message sent to dr elliott    2. Pharyngeal laceration, subsequent encounter  Pt remains NPO   Has follow up MBSS, determination to remove peg will depend on study results    3. S/P percutaneous endoscopic gastrostomy (PEG) tube placement  Will reach out to provider who placed tube if not addressed after MBSS results    4. S/P cervical discectomy  Symptoms improved    5. S/P cervical spinal fusion  Symptoms improved         Medication List with Changes/Refills   New Medications    MIRTAZAPINE (REMERON) 15 MG TABLET    Take 1 tablet (15 mg) by mouth daily at bedtime   Current Medications    ACETAMINOPHEN (TYLENOL) 500 MG TABLET    1,000 mg by Tube route every 8 (eight) hours as needed.    ALBUTEROL-IPRATROPIUM (DUO-NEB) 2.5 MG-0.5 MG/3 ML NEBULIZER SOLUTION    Take 3 mLs by nebulization every 6 (six) hours as needed for Wheezing or Shortness of Breath. Rescue    APREMILAST (OTEZLA) 30 MG TAB    Take 1 tablet (30 mg total) by mouth 2 (two) times daily.    ASPIRIN (ECOTRIN) 81 MG EC TABLET    Take 81 mg by mouth once daily.    ATORVASTATIN (LIPITOR) 10 MG TABLET    Take 1 tablet (10 mg total) by mouth every evening.    FAMOTIDINE (PEPCID) 20 MG TABLET     Administer 1 tablet (20 mg total) per tube in the evening.    FLUOXETINE 40 MG CAPSULE    Take 1 capsule (40 mg) by mouth daily    GUSELKUMAB (TREMFYA) 100 MG/ML ATIN    Inject 100 mg into the skin every 8 weeks. Resume 2/17/24    POLYETHYLENE GLYCOL (GLYCOLAX) 17 GRAM PWPK    17 g by Per G Tube route once daily.    SACCHAROMYCES BOULARDII-FOS (FLORANEX ONE) 5 BILLION CELL- 200 MG CAP    ADMINISTER 1 EACH PER TUBE IN THE MORNING    TRAMADOL (ULTRAM) 50 MG TABLET    Take 0.5 tablets (25 mg total) by mouth every 8 (eight) hours as needed for moderate pain or severe pain Indications: Pain.   Changed and/or Refilled Medications    Modified Medication Previous Medication    MIRTAZAPINE (REMERON) 7.5 MG TAB mirtazapine (REMERON) 7.5 MG Tab       take one tab by mouth at bedtime    take one tablet by mouth every night at bedtime   Discontinued Medications    MIRTAZAPINE (REMERON) 15 MG TABLET    Take 2 tablets (30 mg total) by mouth every evening.    QUETIAPINE (SEROQUEL) 50 MG TABLET    1 tablet (50 mg total) by Per G Tube route every evening.    QUETIAPINE (SEROQUEL) 50 MG TABLET    Take 1 tablet (50 mg total) by mouth nightly.       Recommend patient complete vaccinations as listed in the overdue health maintenance report. Pt may obtain vaccinations at their local pharmacy, any [facility] pharmacy or request vaccination in our clinic.  Please note that some insurances will only cover vaccination cost at specific locations.Please check with your insurance provider regarding your coverage.  Vaccines that are not covered are still recommended.

## 2024-03-01 NOTE — PROGRESS NOTES
MODIFIED BARIUM SWALLOW STUDY    REASON FOR REFERRAL:  Rebel Rodriguez, age 54, was referred by Dr. Senthil Agarwal, head and neck surgical oncologist, for a Modified Barium Swallow Study to rule out aspiration, assess his overall swallowing function, and determine safest consistencies for oral intake.  He was unaccompanied.    Mr. Rodriguez has a history of C4-7 ACDF in past and C3-4 ACDF 1/30/24 with perforation of R posterior pharyngeal wall that was repaired the same day by head and neck surgery.   PEG and trach were placed 2/3/24.  The trach was accidentally decanulated this past weekend and, after assessment, was determined to be allowed to remain so.        Mr. Rodriguez had dysphagia as part of his initial complaint re: the pharyngeal wall injury and has remained NPO since then except for some liquid PO intake after seeing Dr. Agarwal yesterday.       MEDICAL HISTORY:  Past Medical History:   Diagnosis Date    Achilles tendon rupture 10/09/2013    Achilles tendon rupture 10/09/2013    Allergy     Anemia 1/17/2024    Anxiety     Arthritis     psoriatric    Degenerative disc disease     Drug-induced lupus erythematosus     Drug-induced lupus erythematosus 03/09/2016    Hyperlipidemia     Inguinal lymphadenopathy 07/21/2015    Kidney stone     Medication monitoring encounter 12/07/2016    Neck pain 07/06/2017    Psoriatic arthritis     Psoriatic arthritis 12/07/2016    Recurrent fever 07/08/2015    Recurrent nephrolithiasis 05/19/2014    On low oxalate diet    Sinusitis 02/25/2016    Stroke     TIA (transient ischemic attack)     Ulcer     high school    Unexplained night sweats 07/13/2015        SURGICAL HISTORY:  Past Surgical History:   Procedure Laterality Date    ACHILLES TENDON SURGERY      BACK SURGERY      DIRECT LARYNGOSCOPY  2/3/2024    Procedure: LARYNGOSCOPY, DIRECT;  Surgeon: Jodie Collier MD;  Location: [facility] OR [address] [place] [address];  Service: ENT;;    FUNCTIONAL ENDOSCOPIC SINUS SURGERY (FESS) USING COMPUTER-ASSISTED  NAVIGATION Bilateral 9/14/2018    Procedure: FESS, USING COMPUTER-ASSISTED NAVIGATION;  Surgeon: Gerard Manzo MD;  Location: [facility] OR 2ND FLR;  Service: ENT;  Laterality: Bilateral;    FUSION, SPINE, CERVICAL, ANTERIOR APPROACH N/A 1/30/2024    Procedure: C3-4 anterior cervical discectomy and fusion;  Surgeon: Rogerio Maradiaga MD;  Location: [facility] OR 2ND FLR;  Service: Neurosurgery;  Laterality: N/A;  C3-4 anterior cervical discectomy and fusion  anesthesia: general  nerve mon: EMG/SEP/MEP  radiology: C-arm  bed: reg. slider  position: supine  headrest: caspar, GW tongs/hanging weights, surgical pillow    INJECTION OF ANESTHETIC AGENT AROUND NERVE Left 5/13/2022    Procedure: Block, Nerve MEDIAL BRANCH BLOCK LEFT C2,3,4,5;  Surgeon: Kimberly Wilson MD;  Location: [facility] PAIN MGT;  Service: Pain Management;  Laterality: Left;    INJECTION OF ANESTHETIC AGENT AROUND NERVE Left 5/24/2022    Procedure: BLOCK, NERVE, LEFT C2-C5 MEDIAL BRANCH;  Surgeon: Kimberly Wilson MD;  Location: [facility] PAIN MGT;  Service: Pain Management;  Laterality: Left;    LAPAROTOMY N/A 2/3/2024    Procedure: LAPAROTOMY with gastrostomy tube placement;  Surgeon: Benigno Perez MD;  [facility];  Service: General;  Laterality: N/A;    NASAL SEPTOPLASTY N/A 9/14/2018    Procedure: SEPTOPLASTY, NASAL;  Surgeon: Gerard Manzo MD;  [facility];  Service: ENT;  Laterality: N/A;    NASAL TURBINATE REDUCTION Bilateral 9/14/2018    Procedure: REDUCTION, NASAL TURBINATE;  Surgeon: Gerard Manzo MD;  Location: [facility] OR [facility] FLR;  Service: ENT;  Laterality: Bilateral;    NECK EXPLORATION N/A 1/30/2024    Procedure: EXPLORATION, NECK, REPAIR OF PHARYNGEAL INJURY;  Surgeon: Senthil Agarwal MD;  Location: [facility] OR 2ND FLR;  Service: ENT;  Laterality: N/A;    RADIOFREQUENCY ABLATION Left 7/12/2022    Procedure: RADIOFREQUENCY ABLATION, LEFT C2-C3, C3-C4, C4-C5;  Surgeon: Kimberly Wilson MD;  Location: [facility] PAIN MGT;   Service: Pain Management;  Laterality: Left;    TRACHEOTOMY  2/3/2024    Procedure: TRACHEOTOMY;  Surgeon: Jodie Collier MD;  Location: [facility] OR [address] [place] [address];  Service: ENT;;    UPPER ENDOSCOPY W/ ESOPHAGEAL MANOMETRY      URETERAL STENT PLACEMENT         SWALLOWING HISTORY:  Took a regular consistency diet with thin liquids prior to surgery.  Took pills with with liquids.    FAMILY HISTORY:  Family History   Problem Relation Age of Onset    Crohn's disease Mother     Pancreatic cancer Father     Ulcerative colitis Father     Cancer Father 61        pancreatic ca    Osteoarthritis Maternal Grandmother     Crohn's disease Maternal Grandmother     Cataracts Maternal Grandmother     Cataracts Maternal Grandfather     Cataracts Paternal Grandmother     Cataracts Paternal Grandfather     Amblyopia Neg Hx     Blindness Neg Hx         BEHAVIOR:  Rebel Rodriguez was a very pleasant man who had normal affect and social interaction.  He was able to fully cooperate during the study.  Results of today's assessment were considered indicative of his current levels of swallowing functioning.      HEARING:  Subjectively, within normal limits.     ORAL PERIPHERAL:   Informal examination of the oral mechanism revealed structures and functioning within normal limits for swallowing and speech purposes.    Voice quality was within normal limits with no wet quality before, during, or after the study.      TEST FINDINGS:   Mr. Rodriguez was seen in Radiology with the radiology technician (Radiologist available for in-person input during a study as needed) for a Modified Barium Swallow Study.  He was seated on a stool for a left lateral videofluoroscopic view.    ACDF hardware noted.    Consistencies assessed using radiopaque barium contrast:  thin (single and 3-mL  swallows via open cup or spoon),   nectar  (3-mL bolus via spoon), and   honey (3-mL bolus via spoon).    Strategies:  Thickened liquids -- eliminated aspiration during swallow, but  residue was expressed from pyriforms into airway on subsequent swallows    Phases:  Oral:  Mr. Rodriguez was able to obtain liquid and strip utensils well with no loss of material from the oral cavity.  He moved boluses through the oral cavity with appropriate transit time.  There was no pooling of liquids in the mouth.  Swallow reflex was triggered within normal limits.    Pharyngeal:    Thin liquid boluses moved through the pharyngeal phase with garrett laryngeal penetration and aspiration and no nasal regurgitation.  The epiglottis abutted the PPW at about the level of the more superior ACDF hardware and there was not closure of the laryngeal vestibule.  Nectar-thick liquid bolus (3-mL) did not initially enter the airway, but had significant stasis in the valleculae and pyriforms.  Subsequent dry swallows then expressed material into the airway with no immediate cough response.  Honey-thick liquid bolus (3-mL) did not initially enter the airway, but had significant stasis in the valleculae and pyriforms.  Subsequent dry swallows then expressed material (much of the bolus) into the airway with no immediate cough response.  Cued coughs were effective in clearing material from airway, but expectorating it was necessary to remove as much as possible since subsequent attempts to swallow merely returned material to the airway.      - Timely initiation  - Adequate soft palate elevation  - Adequate to mildly reduced laryngeal elevation and anterior hyoid excursion  - Reduced tongue base retraction  - Incomplete epiglottic inversion; abuts PPW  - Incomplete laryngeal vestibular closure  - Reduced pharyngeal stripping wave  - Reduced PE segment opening.  -  Moderate to significant pharyngeal residue in valleculae and pyriforms      Cervical Esophageal:  Boluses entered the upper esophagus within normal limits.  No obstruction was noted.  Reduced PE segment opening noted above likely related to reduced swallowing  landry.    Bebeto 8-point Penetration-Aspiration Scale:  8 - Material enters the airway, passes below the vocal folds, and no effort is made to eject.  Mr. Rodriguez commented at times that he felt things going the wrong way, but his reaction to this was delayed or absent.      IMPRESSIONS:   This 54 y.o. man appears to present with  1.  Severe pharyngeal phase dysphagia characterized by incomplete inversion of the epiglottis (abuts PPW at level of superior-most ACDF hardware) resulting in incomplete closure of the laryngeal vestibule allowing thin liquids to be aspirated during a swallow and nectar- and honey-thick liquids to be expressed from the pyriforms on dry swallows following initial bolus swallow.  Additionally, there appeared to be mild weakness of pharyngeal contraction and mildly reduced hyolaryngeal elevation/excursion.  2.  Recently decannulated.  3.  PEG dependent.  4.  S/p repair of R posterior pharyngeal wall injury and R lateral pharyngotomy 1/30/24.  5.  S/p C3-4 ACDF 1/30/24.  6.  History cervical myelopathy due to C3-4 severe stenosis       RECOMMENDATIONS/PLAN OF CARE:   It is felt that Mr. Rodriguez would benefit from  1.  Remaining NPO for now.  2.  Course of ST 2-4 weeks with home program to address dysphagia.  3.  Repeat MBSS in 2-4 weeks.  4.  Follow up with Dr. Agarwal as directed.      Long-term goals:  Mr. Rodriguez will be able to consume a regular diet with thin liquids with no signs/symptoms of aspiration.      Short-term objectives:  Mr. Rodriguez will perform the following with % accuracy/consistency:  1.  Swallowing  A.  Demonstrate execution of swallowing exercises with % accuracy.   1.  CTAR or Shaker   2.  Effortful swallow   3.  Supraglottic swallow  B.  Perform home program 3-4x/day by report.  C.  Participate in repeat MBSS.

## 2024-03-05 ENCOUNTER — EXTERNAL HOME HEALTH (OUTPATIENT)
Dept: HOME HEALTH SERVICES | Facility: HOSPITAL | Age: 54
End: 2024-03-05
Payer: COMMERCIAL

## 2024-03-09 NOTE — PLAN OF CARE
IMPRESSIONS:   This 54 y.o. man appears to present with  1.  Severe pharyngeal phase dysphagia characterized by incomplete inversion of the epiglottis (abuts PPW at level of superior-most ACDF hardware) resulting in incomplete closure of the laryngeal vestibule allowing thin liquids to be aspirated during a swallow and nectar- and honey-thick liquids to be expressed from the pyriforms on dry swallows following initial bolus swallow.  Additionally, there appeared to be mild weakness of pharyngeal contraction and mildly reduced hyolaryngeal elevation/excursion.  2.  Recently decannulated.  3.  PEG dependent.  4.  S/p repair of R posterior pharyngeal wall injury and R lateral pharyngotomy 1/30/24.  5.  S/p C3-4 ACDF 1/30/24.  6.  History cervical myelopathy due to C3-4 severe stenosis       RECOMMENDATIONS/PLAN OF CARE:   It is felt that Mr. Rodriguez would benefit from  1.  Remaining NPO for now.  2.  Course of ST 2-4 weeks with home program to address dysphagia.  3.  Repeat MBSS in 2-4 weeks.  4.  Follow up with Dr. Agarwal as directed.      Long-term goals:  Mr. Rodriguez will be able to consume a regular diet with thin liquids with no signs/symptoms of aspiration.      Short-term objectives:  Mr. Rodriguez will perform the following with % accuracy/consistency:  1.  Swallowing  A.  Demonstrate execution of swallowing exercises with % accuracy.   1.  CTAR or Shaker   2.  Effortful swallow   3.  Supraglottic swallow  B.  Perform home program 3-4x/day by report.  C.  Participate in repeat MBSS.

## 2024-03-13 ENCOUNTER — TELEPHONE (OUTPATIENT)
Dept: NEUROSURGERY | Facility: CLINIC | Age: 54
End: 2024-03-13
Payer: COMMERCIAL

## 2024-03-13 NOTE — TELEPHONE ENCOUNTER
Called patient to confirm next appointment with Dr. Maradiaga.  Future Appointments   Date Time Provider Department Center   3/14/2024  2:15 PM [facility] OIC-XRAY [facility] XRAY [facility]   3/14/2024  3:00 PM Rogerio Maradiaga MD [facility] NEUROS [facility]   3/25/2024  8:00 AM LAB, APPOINTMENT [facility] LAB VNP [facility]   4/1/2024  9:30 AM Allie Hidalgo MD [facility] RHEUM [facility]      Patient voiced understanding and thanked me.

## 2024-03-14 ENCOUNTER — OFFICE VISIT (OUTPATIENT)
Dept: NEUROSURGERY | Facility: CLINIC | Age: 54
End: 2024-03-14
Payer: COMMERCIAL

## 2024-03-14 ENCOUNTER — HOSPITAL ENCOUNTER (OUTPATIENT)
Dept: RADIOLOGY | Facility: HOSPITAL | Age: 54
Discharge: HOME | End: 2024-03-14
Attending: NEUROLOGICAL SURGERY
Payer: COMMERCIAL

## 2024-03-14 VITALS
BODY MASS INDEX: 20.33 KG/M2 | WEIGHT: 134.13 LBS | HEART RATE: 76 BPM | DIASTOLIC BLOOD PRESSURE: 65 MMHG | TEMPERATURE: 98 F | HEIGHT: 68 IN | SYSTOLIC BLOOD PRESSURE: 110 MMHG

## 2024-03-14 DIAGNOSIS — Z98.1 S/P CERVICAL SPINAL FUSION: ICD-10-CM

## 2024-03-14 DIAGNOSIS — Z98.1 ARTHRODESIS STATUS: Primary | ICD-10-CM

## 2024-03-14 PROCEDURE — 3074F SYST BP LT 130 MM HG: CPT | Mod: CPTII,S$GLB,, | Performed by: PHYSICIAN ASSISTANT

## 2024-03-14 PROCEDURE — 72040 X-RAY EXAM NECK SPINE 2-3 VW: CPT | Mod: TC

## 2024-03-14 PROCEDURE — 99024 POSTOP FOLLOW-UP VISIT: CPT | Mod: S$GLB,,, | Performed by: PHYSICIAN ASSISTANT

## 2024-03-14 PROCEDURE — 3078F DIAST BP <80 MM HG: CPT | Mod: CPTII,S$GLB,, | Performed by: PHYSICIAN ASSISTANT

## 2024-03-14 PROCEDURE — 99999 PR PBB SHADOW E&M-EST. PATIENT-LVL III: CPT | Mod: PBBFAC,,, | Performed by: PHYSICIAN ASSISTANT

## 2024-03-14 PROCEDURE — 72040 X-RAY EXAM NECK SPINE 2-3 VW: CPT | Mod: 26,,, | Performed by: RADIOLOGY

## 2024-03-14 PROCEDURE — 1159F MED LIST DOCD IN RCRD: CPT | Mod: CPTII,S$GLB,, | Performed by: PHYSICIAN ASSISTANT

## 2024-03-14 RX ORDER — ACETAMINOPHEN 500 MG
1000 TABLET ORAL EVERY 8 HOURS PRN
COMMUNITY
Start: 2024-02-21 | End: 2024-04-30

## 2024-03-14 NOTE — PROGRESS NOTES
[facility] 8th Fl  Neurosurgery    SUBJECTIVE:     History of Present Illness:  Rebel Rodriguez is a 54 y.o. male with hx of cervical myelopathy with prior C4-7 ACDF now s/p C3-4 ACDF 1/30 by Dr. Maradiaga which was complicated by retropharyngeal injury requiring repair by Dr. Agarwal on 1/30. He is s/p trach/G tube placement on 2/3. Presents for 6 week post-op visit. Trach was dislodged 2/23 and replaced with significant discomfort into a false passage by EMS. The tracheostomy was removed in the ED 2/25 with dressing placed. Patient has not had any respiratory distress. Follows closely with ENT outpatient. MBSS done 2/29 with aspiration of thin/nectar thick liquids. He states he was cleared for soft diet yesterday and has been tolerating this well. From a neurological standpoint, his walking has significantly improved post-op compared to prior to surgery. He no longer feels a tight band around his hands. Continued residual numbness in the fingers, no worse than prior to surgery.    Review of patient's allergies indicates:   Allergen Reactions    Erythromycin Nausea And Vomiting    Infliximab Other (See Comments)     Lupus with fever and acute arthritis  Lupus with fever and acute arthritis       Past Medical History:   Diagnosis Date    Achilles tendon rupture 10/09/2013    Achilles tendon rupture 10/09/2013    Allergy     Anemia 1/17/2024    Anxiety     Arthritis     psoriatric    Degenerative disc disease     Drug-induced lupus erythematosus     Drug-induced lupus erythematosus 03/09/2016    Hyperlipidemia     Inguinal lymphadenopathy 07/21/2015    Kidney stone     Medication monitoring encounter 12/07/2016    Neck pain 07/06/2017    Psoriatic arthritis     Psoriatic arthritis 12/07/2016    Recurrent fever 07/08/2015    Recurrent nephrolithiasis 05/19/2014    On low oxalate diet    Sinusitis 02/25/2016    Stroke     TIA (transient ischemic attack)     Ulcer     high school    Unexplained night sweats  07/13/2015       Past Surgical History:   Procedure Laterality Date    ACHILLES TENDON SURGERY      BACK SURGERY      DIRECT LARYNGOSCOPY  2/3/2024    Procedure: LARYNGOSCOPY, DIRECT;  Surgeon: Jodie Collier MD;  Location: [facility] OR [address] [place] [address];  Service: ENT;;    FUNCTIONAL ENDOSCOPIC SINUS SURGERY (FESS) USING COMPUTER-ASSISTED NAVIGATION Bilateral 9/14/2018    Procedure: FESS, USING COMPUTER-ASSISTED NAVIGATION;  Surgeon: Gerard Manzo MD;  [facility];  Service: ENT;  Laterality: Bilateral;    FUSION, SPINE, CERVICAL, ANTERIOR APPROACH N/A 1/30/2024    Procedure: C3-4 anterior cervical discectomy and fusion;  Surgeon: Rogerio Maradiaga MD;  [facility];  Service: Neurosurgery;  Laterality: N/A;  C3-4 anterior cervical discectomy and fusion  anesthesia: general  nerve mon: EMG/SEP/MEP  radiology: C-arm  bed: reg. slider  position: supine  headrest: caspar, GW tongs/hanging weights, surgical pillow    INJECTION OF ANESTHETIC AGENT AROUND NERVE Left 5/13/2022    Procedure: Block, Nerve MEDIAL BRANCH BLOCK LEFT C2,3,4,5;  Surgeon: Kimberly Wilson MD;  Location: [facility] PAIN MGT;  Service: Pain Management;  Laterality: Left;    INJECTION OF ANESTHETIC AGENT AROUND NERVE Left 5/24/2022    Procedure: BLOCK, NERVE, LEFT C2-C5 MEDIAL BRANCH;  Surgeon: Kimberly Wilson MD;  Location: [facility] PAIN MGT;  Service: Pain Management;  Laterality: Left;    LAPAROTOMY N/A 2/3/2024    Procedure: LAPAROTOMY with gastrostomy tube placement;  Surgeon: Benigno Perez MD;  [facility];  Service: General;  Laterality: N/A;    NASAL SEPTOPLASTY N/A 9/14/2018    Procedure: SEPTOPLASTY, NASAL;  Surgeon: Gerard Manzo MD;  [facility];  Service: ENT;  Laterality: N/A;    NASAL TURBINATE REDUCTION Bilateral 9/14/2018    Procedure: REDUCTION, NASAL TURBINATE;  Surgeon: Gerard Manzo MD;  [facility];  Service: ENT;  Laterality: Bilateral;    NECK EXPLORATION N/A  1/30/2024    Procedure: EXPLORATION, NECK, REPAIR OF PHARYNGEAL INJURY;  Surgeon: Senthil Agarwal MD;  [facility];  Service: ENT;  Laterality: N/A;    RADIOFREQUENCY ABLATION Left 7/12/2022    Procedure: RADIOFREQUENCY ABLATION, LEFT C2-C3, C3-C4, C4-C5;  Surgeon: Kimberly Wilson MD;  Location: [facility] PAIN MGT;  Service: Pain Management;  Laterality: Left;    TRACHEOTOMY  2/3/2024    Procedure: TRACHEOTOMY;  Surgeon: Jodie Collier MD;  [facility];  Service: ENT;;    UPPER ENDOSCOPY W/ ESOPHAGEAL MANOMETRY      URETERAL STENT PLACEMENT          Family History   Problem Relation Age of Onset    Crohn's disease Mother     Pancreatic cancer Father     Ulcerative colitis Father     Cancer Father 61        pancreatic ca    Osteoarthritis Maternal Grandmother     Crohn's disease Maternal Grandmother     Cataracts Maternal Grandmother     Cataracts Maternal Grandfather     Cataracts Paternal Grandmother     Cataracts Paternal Grandfather     Amblyopia Neg Hx     Blindness Neg Hx        Social History     Socioeconomic History    Marital status: Single   Occupational History    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   Tobacco Use    Smoking status: Never    Smokeless tobacco: Never   Substance and Sexual Activity    Alcohol use: No     Alcohol/week: 0.0 standard drinks of alcohol     Types: 1 - 2 Standard drinks or equivalent per week     Comment: cocktails    Drug use: No   Social History Narrative    1 flight     Social Determinants of Health     Financial Resource Strain: Patient Unable To Answer (2/1/2024)    Overall Financial Resource Strain (CARDIA)     Difficulty of Paying Living Expenses: Patient unable to answer   Food Insecurity: Patient Unable To Answer (2/1/2024)    Hunger Vital Sign     Worried About Running Out of Food in the Last Year: Patient unable to answer     Ran Out of Food in the Last Year: Patient unable to answer   Transportation Needs: Patient  "Unable To Answer (2/1/2024)    PRAPARE - Transportation     Lack of Transportation (Medical): Patient unable to answer     Lack of Transportation (Non-Medical): Patient unable to answer   Recent Concern: Transportation Needs - Unmet Transportation Needs (12/4/2023)    PRAPARE - Transportation     Lack of Transportation (Medical): Yes     Lack of Transportation (Non-Medical): Yes   Physical Activity: Sufficiently Active (1/31/2024)    Exercise Vital Sign     Days of Exercise per Week: 7 days     Minutes of Exercise per Session: 60 min   Stress: Patient Unable To Answer (2/1/2024)    [facility] [facility] of Occupational Health - Occupational Stress Questionnaire     Feeling of Stress : Patient unable to answer   Social Connections: Unknown (1/31/2024)    Social Connection and Isolation Panel [NHANES]     Frequency of Communication with Friends and Family: More than three times a week     Frequency of Social Gatherings with Friends and Family: More than three times a week     Active Member of Clubs or Organizations: No     Attends Club or Organization Meetings: Never     Marital Status: Never    Housing Stability: Patient Unable To Answer (2/1/2024)    Housing Stability Vital Sign     Unable to Pay for Housing in the Last Year: Patient unable to answer     Number of Places Lived in the Last Year: 0     Unstable Housing in the Last Year: Patient unable to answer       Review of Systems:  Per HPI    OBJECTIVE:     Vital Signs (Most Recent):  Vitals:    03/14/24 1517   BP: 110/65   Pulse: 76   Temp: 98.4 °F (36.9 °C)   Weight: 60.9 kg (134 lb 2.4 oz)   Height: 5' 8" (1.727 m)       Physical Exam:  General: well developed, well nourished, no distress.   Head: normocephalic, atraumatic  Neurologic: Alert and oriented. Thought content appropriate.  GCS: Motor: 6/Verbal: 5/Eyes: 4 GCS Total: 15  Mental Status: Awake, Alert, Oriented x 4  Language: No aphasia  Speech: No dysarthria  Cranial nerves: face symmetric, tongue " midline, CN II-XII grossly intact.   Eyes: pupils equal, round, reactive to light with accomodation, EOMI.  Pulmonary: normal respirations, no signs of respiratory distress  Sensory: intact to light touch throughout  Motor Strength: Moves all extremities spontaneously with good tone.  Full strength upper and lower extremities. No abnormal movements seen.     Strength  Deltoids Triceps Biceps Wrist Extension Wrist Flexion Hand    Upper: R 5/5 5/5 5/5 5/5 5/5 5/5    L 5/5 5/5 5/5 5/5 5/5 5/5     Iliopsoas Quadriceps Knee  Flexion Tibialis  anterior Gastro- cnemius EHL   Lower: R 5/5 5/5 5/5 5/5 5/5 5/5    L 5/5 5/5 5/5 5/5 5/5 5/5   Hand intrinsics 5/5    Magallanes: present bilaterally  Skin: Skin is warm, dry and intact.  Gait: fluid    Diagnostic Results:  I have personally reviewed all pertinent imaging.    XR cervical spine 3/14/2024:  - satisfactory hardware placement     ASSESSMENT/PLAN:     Rebel Rodriguez is a 54 y.o. male s/p C3-4 ACDF 1/30 by Dr. Maradiaga which was complicated by retropharyngeal injury requiring repair by Dr. Agarwal on 1/30. He was seen today by Dr. Maradiaga. XR without hardware complication. He continues to make functional improvement s/p decompression. Will f/u in 6 weeks with CT to assess for bony fusion. We discussed concerning signs and symptoms that would prompt return to clinic or urgent medical attention, patient v/u. All questions answered. Encouraged to call the clinic with questions/concerns prior to the next visit.      Melanie Torres PA-C  Neurosurgery      Note dictated with voice recognition software, please excuse any grammatical errors.

## 2024-03-22 ENCOUNTER — DOCUMENT SCAN (OUTPATIENT)
Dept: HOME HEALTH SERVICES | Facility: HOSPITAL | Age: 54
End: 2024-03-22
Payer: COMMERCIAL

## 2024-03-25 ENCOUNTER — DOCUMENT SCAN (OUTPATIENT)
Dept: HOME HEALTH SERVICES | Facility: HOSPITAL | Age: 54
End: 2024-03-25
Payer: COMMERCIAL

## 2024-03-25 ENCOUNTER — PATIENT MESSAGE (OUTPATIENT)
Dept: INTERNAL MEDICINE | Facility: CLINIC | Age: 54
End: 2024-03-25
Payer: COMMERCIAL

## 2024-03-25 ENCOUNTER — LAB VISIT (OUTPATIENT)
Dept: LAB | Facility: HOSPITAL | Age: 54
End: 2024-03-25
Attending: INTERNAL MEDICINE
Payer: COMMERCIAL

## 2024-03-25 DIAGNOSIS — L40.50 PSA (PSORIATIC ARTHRITIS): ICD-10-CM

## 2024-03-25 DIAGNOSIS — Z79.899 ON STATIN THERAPY: ICD-10-CM

## 2024-03-25 DIAGNOSIS — Z98.1 HX OF FUSION OF CERVICAL SPINE: Primary | ICD-10-CM

## 2024-03-25 DIAGNOSIS — Z93.1 S/P PERCUTANEOUS ENDOSCOPIC GASTROSTOMY (PEG) TUBE PLACEMENT: Primary | ICD-10-CM

## 2024-03-25 LAB
ALBUMIN SERPL BCP-MCNC: 3.7 G/DL (ref 3.5–5.2)
ALP SERPL-CCNC: 99 U/L (ref 55–135)
ALT SERPL W/O P-5'-P-CCNC: 17 U/L (ref 10–44)
ANION GAP SERPL CALC-SCNC: 9 MMOL/L (ref 8–16)
AST SERPL-CCNC: 18 U/L (ref 10–40)
BASOPHILS # BLD AUTO: 0.03 K/UL (ref 0–0.2)
BASOPHILS NFR BLD: 0.4 % (ref 0–1.9)
BILIRUB SERPL-MCNC: 0.3 MG/DL (ref 0.1–1)
BUN SERPL-MCNC: 12 MG/DL (ref 6–20)
CALCIUM SERPL-MCNC: 10.2 MG/DL (ref 8.7–10.5)
CHLORIDE SERPL-SCNC: 103 MMOL/L (ref 95–110)
CK SERPL-CCNC: 97 U/L (ref 20–200)
CO2 SERPL-SCNC: 28 MMOL/L (ref 23–29)
CREAT SERPL-MCNC: 0.8 MG/DL (ref 0.5–1.4)
CRP SERPL-MCNC: 3.7 MG/L (ref 0–8.2)
DIFFERENTIAL METHOD BLD: ABNORMAL
EOSINOPHIL # BLD AUTO: 0.2 K/UL (ref 0–0.5)
EOSINOPHIL NFR BLD: 2.8 % (ref 0–8)
ERYTHROCYTE [DISTWIDTH] IN BLOOD BY AUTOMATED COUNT: 14.1 % (ref 11.5–14.5)
ERYTHROCYTE [SEDIMENTATION RATE] IN BLOOD BY PHOTOMETRIC METHOD: 29 MM/HR (ref 0–23)
EST. GFR  (NO RACE VARIABLE): >60 ML/MIN/1.73 M^2
GLUCOSE SERPL-MCNC: 66 MG/DL (ref 70–110)
HCT VFR BLD AUTO: 39.7 % (ref 40–54)
HGB BLD-MCNC: 12.4 G/DL (ref 14–18)
IMM GRANULOCYTES # BLD AUTO: 0.05 K/UL (ref 0–0.04)
IMM GRANULOCYTES NFR BLD AUTO: 0.7 % (ref 0–0.5)
LYMPHOCYTES # BLD AUTO: 2 K/UL (ref 1–4.8)
LYMPHOCYTES NFR BLD: 29.1 % (ref 18–48)
MCH RBC QN AUTO: 27.7 PG (ref 27–31)
MCHC RBC AUTO-ENTMCNC: 31.2 G/DL (ref 32–36)
MCV RBC AUTO: 89 FL (ref 82–98)
MONOCYTES # BLD AUTO: 0.7 K/UL (ref 0.3–1)
MONOCYTES NFR BLD: 9.4 % (ref 4–15)
NEUTROPHILS # BLD AUTO: 4 K/UL (ref 1.8–7.7)
NEUTROPHILS NFR BLD: 57.6 % (ref 38–73)
NRBC BLD-RTO: 0 /100 WBC
PLATELET # BLD AUTO: 331 K/UL (ref 150–450)
PMV BLD AUTO: 9.1 FL (ref 9.2–12.9)
POTASSIUM SERPL-SCNC: 4.9 MMOL/L (ref 3.5–5.1)
PROT SERPL-MCNC: 6.8 G/DL (ref 6–8.4)
RBC # BLD AUTO: 4.47 M/UL (ref 4.6–6.2)
SODIUM SERPL-SCNC: 140 MMOL/L (ref 136–145)
WBC # BLD AUTO: 6.88 K/UL (ref 3.9–12.7)

## 2024-03-25 PROCEDURE — 82550 ASSAY OF CK (CPK): CPT | Performed by: INTERNAL MEDICINE

## 2024-03-25 PROCEDURE — 80053 COMPREHEN METABOLIC PANEL: CPT | Performed by: INTERNAL MEDICINE

## 2024-03-25 PROCEDURE — 85025 COMPLETE CBC W/AUTO DIFF WBC: CPT | Performed by: INTERNAL MEDICINE

## 2024-03-25 PROCEDURE — 86140 C-REACTIVE PROTEIN: CPT | Performed by: INTERNAL MEDICINE

## 2024-03-25 PROCEDURE — 36415 COLL VENOUS BLD VENIPUNCTURE: CPT | Performed by: INTERNAL MEDICINE

## 2024-03-25 PROCEDURE — 85652 RBC SED RATE AUTOMATED: CPT | Performed by: INTERNAL MEDICINE

## 2024-03-25 NOTE — PROGRESS NOTES
Sed rate borderline above normal range. CRP, CPK within normal limits. CBC stable with mild anemia. CMP also all normal ranges apart from hypoglycemia likely in setting of no food intake prior to labs. Will discuss further at upcoming clinic visit next week.

## 2024-03-26 ENCOUNTER — TELEPHONE (OUTPATIENT)
Dept: SPEECH THERAPY | Facility: HOSPITAL | Age: 54
End: 2024-03-26
Payer: COMMERCIAL

## 2024-04-01 ENCOUNTER — OFFICE VISIT (OUTPATIENT)
Dept: RHEUMATOLOGY | Facility: CLINIC | Age: 54
End: 2024-04-01
Payer: COMMERCIAL

## 2024-04-01 VITALS
SYSTOLIC BLOOD PRESSURE: 126 MMHG | DIASTOLIC BLOOD PRESSURE: 65 MMHG | HEART RATE: 81 BPM | BODY MASS INDEX: 21.15 KG/M2 | WEIGHT: 139.13 LBS

## 2024-04-01 DIAGNOSIS — Z79.899 HIGH RISK MEDICATION USE: ICD-10-CM

## 2024-04-01 DIAGNOSIS — Z51.81 THERAPEUTIC DRUG MONITORING: ICD-10-CM

## 2024-04-01 DIAGNOSIS — Z79.899 IMMUNOSUPPRESSION DUE TO DRUG THERAPY: ICD-10-CM

## 2024-04-01 DIAGNOSIS — D84.821 IMMUNOSUPPRESSION DUE TO DRUG THERAPY: ICD-10-CM

## 2024-04-01 DIAGNOSIS — M85.9 LOW BONE DENSITY: ICD-10-CM

## 2024-04-01 DIAGNOSIS — L40.50 PSA (PSORIATIC ARTHRITIS): Primary | ICD-10-CM

## 2024-04-01 PROCEDURE — 3078F DIAST BP <80 MM HG: CPT | Mod: CPTII,S$GLB,, | Performed by: STUDENT IN AN ORGANIZED HEALTH CARE EDUCATION/TRAINING PROGRAM

## 2024-04-01 PROCEDURE — 3008F BODY MASS INDEX DOCD: CPT | Mod: CPTII,S$GLB,, | Performed by: STUDENT IN AN ORGANIZED HEALTH CARE EDUCATION/TRAINING PROGRAM

## 2024-04-01 PROCEDURE — 99999 PR PBB SHADOW E&M-EST. PATIENT-LVL IV: CPT | Mod: PBBFAC,,, | Performed by: STUDENT IN AN ORGANIZED HEALTH CARE EDUCATION/TRAINING PROGRAM

## 2024-04-01 PROCEDURE — 1159F MED LIST DOCD IN RCRD: CPT | Mod: CPTII,S$GLB,, | Performed by: STUDENT IN AN ORGANIZED HEALTH CARE EDUCATION/TRAINING PROGRAM

## 2024-04-01 PROCEDURE — 3074F SYST BP LT 130 MM HG: CPT | Mod: CPTII,S$GLB,, | Performed by: STUDENT IN AN ORGANIZED HEALTH CARE EDUCATION/TRAINING PROGRAM

## 2024-04-01 PROCEDURE — 1160F RVW MEDS BY RX/DR IN RCRD: CPT | Mod: CPTII,S$GLB,, | Performed by: STUDENT IN AN ORGANIZED HEALTH CARE EDUCATION/TRAINING PROGRAM

## 2024-04-01 PROCEDURE — 99214 OFFICE O/P EST MOD 30 MIN: CPT | Mod: S$GLB,,, | Performed by: STUDENT IN AN ORGANIZED HEALTH CARE EDUCATION/TRAINING PROGRAM

## 2024-04-01 RX ORDER — APREMILAST 30 MG/1
30 TABLET, FILM COATED ORAL 2 TIMES DAILY
Qty: 180 TABLET | Refills: 1 | Status: SHIPPED | OUTPATIENT
Start: 2024-04-01

## 2024-04-01 RX ORDER — GUSELKUMAB 100 MG/ML
100 INJECTION SUBCUTANEOUS
Qty: 3 ML | Refills: 2 | Status: SHIPPED | OUTPATIENT
Start: 2024-04-01 | End: 2024-05-03 | Stop reason: SDUPTHER

## 2024-04-01 ASSESSMENT — ROUTINE ASSESSMENT OF PATIENT INDEX DATA (RAPID3)
PSYCHOLOGICAL DISTRESS SCORE: 1.1
PAIN SCORE: 2.5
FATIGUE SCORE: 6
MDHAQ FUNCTION SCORE: 1
TOTAL RAPID3 SCORE: 2.61
WHEN YOU AWAKENED IN THE MORNING OVER THE LAST WEEK, PLEASE INDICATE THE AMOUNT OF TIME IT TAKES UNTIL YOU ARE AS LIMBER AS YOU WILL BE FOR THE DAY: 0.25
AM STIFFNESS SCORE: 1, YES
PATIENT GLOBAL ASSESSMENT SCORE: 2

## 2024-04-01 NOTE — PROGRESS NOTES
3/29/2024    10:16 AM   Rapid3 Question Responses and Scores   MDHAQ Score 1   Psychologic Score 1.1   Pain Score 2.5   When you awakened in the morning OVER THE LAST WEEK, did you feel stiff? Yes   If Yes, please indicate the number of hours until you are as limber as you will be for the day 0.25   Fatigue Score 6   Global Health Score 2   RAPID3 Score 2.61         Answers submitted by the patient for this visit:  Rheumatology Questionnaire (Submitted on 3/29/2024)  fever: No  eye redness: No  mouth sores: No  headaches: No  shortness of breath: No  chest pain: No  trouble swallowing: Yes  diarrhea: No  constipation: No  unexpected weight change: Yes  genital sore: No  During the last 3 days, have you had a skin rash?: No  Bruises or bleeds easily: No  cough: No

## 2024-04-01 NOTE — Clinical Note
Good Morning,  I am the rheum fellow who saw this pt in clinic. He had seen Dr. Agarwal regarding a trach from prior hospitalization s/p decannulation. Pt had shown us his trach site and has a little bit of drainage on his bandage. He was not completely sure about further skin care / management, so I just wanted to reach out to see if someone can help guide him regarding this.  Thank you very much!  Regards, Allie Hidalgo MD Rheumatology

## 2024-04-01 NOTE — PROGRESS NOTES
Subjective:      Patient ID: Rebel Rodriguez is a 54 y.o. male.    Chief Complaint: follow up for PsA    Rebel Rodriguez is a 52yo M with history of psoriatic arthritis and infliximab induced lupus. He also has a PMH of TIA, KUSHAL, cervical degenerative disease s/p ACDF C4-7 (about 10yrs ago) and now s/p C3-4 ACDF (2/2024).    Rheum History:  - Psoriatic arthritis dx 2000s  - Infliximab induced lupus dx 2016 when pt had recurrent intermittent fevers, chills, arthralgias, rash  - +anti histone Ab 1.3 (2/2016)  - +ds DNA 1:1280 (2/2016), positive through 9/2017 and has been negative since then  - +DEE 1:1280 (2/2016)  - CH50 mildly decreased (3/2016), increased after stopping infliximab  - +anti cardiolipin Ab IgM 14.2 (2011, 2016)  - Most recently, primary symptoms were neck and lower back stiffness/pain with mild hip and hand stiffness  - Disease has been very well controlled on tremfya and otezla  - Notably, pt had recent prolonged hospitalization (1/30-2/23) from cervical fusion surgery which resulted in complication of pharyngeal laceration requiring repair, pt then was intubated and transitioned to trach, and had g tube placed as well    Prior Treatments:  - Infliximab (~7136-7130) d/c'd due to concern for drug induced lupus; symptoms resolved with stopping med  - Stelara (4/2016-11/2016)  - Cosentyx (4/2017-5/2020) d/c'd due to loss of response  - Taltz (8/2020-6/2021) d/c'd due to poor symptom control  - Hydroxychloroquine (3/2016-4/2022) d/c'd since drug induced lupus completed resolved  - Sulfasalazine 1000 BID (11/2019-self d/c'd 12/2020)  - Methotrexate (d/c'd 1/2013)  - Cannot take TNFi due to drug induced lupus  - Cannot take [place] due to history of TIA  - Cannot take NSAIDs due to history of TIA  - Arava (1/2013-12/2023, d/c'd prior to cervical surgery with neuropathy symptoms - ok to restart if needed for disease activity in the future)    Current Treatment:  - Tremfya (6/2021-current)  - Otezla  (10/2021-current)    Interval History:  Pt presents today for follow up. He had a complicated and prolonged hospitalization recently due to complications from his planned cervical spine surgery. He required ICU, vent, trach, g tube, and has been slowly recovering with time. He required PT/OT initially and ST especially for swallowing. A lot of this has improved now but he is not back to his baseline completely. Pt also cannot recall a lot of what happened during the hospital stay and has related long term psychological impact.     Fortunately, during this time, his PsA has not caused any additional problems for him. No new psoriasis rashes/lesions, still with the dry ridged/pitting nails. No joint pain, swelling, or stiffness most recently. He is primarily dealing with his muscle weakness from the hospitalization and is trying to exercise and regain weight and strength. Has been taking the otezla BID and resumed tremfya about 2-3 weeks ago. He has remained off the arava since December when we saw him last.      Review of Systems   Constitutional:  Positive for unexpected weight change. Negative for fever.   HENT:  Positive for trouble swallowing. Negative for mouth sores.    Eyes:  Negative for redness.   Respiratory:  Negative for cough and shortness of breath.    Cardiovascular:  Negative for chest pain.   Gastrointestinal:  Negative for constipation and diarrhea.   Genitourinary:  Negative for genital sores.   Musculoskeletal:  Negative for arthralgias and joint swelling.   Skin:  Negative for rash.   Neurological:  Negative for headaches.   Hematological:  Does not bruise/bleed easily.      Objective:   /65   Pulse 81   Wt 63.1 kg (139 lb 1.8 oz)   BMI 21.15 kg/m²   Physical Exam   Constitutional: He is oriented to person, place, and time. normal appearance. He appears well-developed and well-nourished. No distress.   HENT:   Head: Normocephalic and atraumatic.   Right Ear: External ear normal.   Left  Ear: External ear normal.   Nose: Nose normal. No nasal congestion.   Mouth/Throat: Mucous membranes are moist. Oropharynx is clear.   Eyes: Conjunctivae are normal.   Neck:   Mildly limited neck ROM in setting of prior c-spine surgery   Cardiovascular: Normal rate, regular rhythm, normal heart sounds and normal pulses.   Pulmonary/Chest: Effort normal and breath sounds normal. No respiratory distress. He has no wheezes.   Abdominal: Soft. Bowel sounds are normal. He exhibits no distension. There is no abdominal tenderness.   Musculoskeletal:         General: No swelling, tenderness or signs of injury. Normal range of motion.      Cervical back: Neck supple.      Comments: Refer to homunculus for full joint exam. No acute synovitis or enthesitis noted.   Neurological: He is alert and oriented to person, place, and time.   Reflexes: 3+ in LLE patellar/Achilles, 2+ else where in RLE/BUE (triceps, biceps, brachioradialis, patellar, achilles).    Skin: Skin is warm and dry. No lesion and no rash noted.   Nails with some ridging and pitting.   Psychiatric: His behavior is normal. Mood normal.   Vitals reviewed.    DAPSA 4.2, low disease activity    Assessment:     1. PSA (psoriatic arthritis)    2. High risk medication use    3. Immunosuppression due to drug therapy    4. Low bone density    5. Therapeutic drug monitoring      - Pt with low disease activity for PsA   - Continues to be in remission with drug induced lupus and psoriasis  - Labs stable and mostly within normal ranges last week  - He is still regaining strength and recuperating from the significant hospitalization    Plan:     Problem List Items Addressed This Visit          Immunology/Multi System    Immunosuppression due to drug therapy       Palliative Care    High risk medication use     Other Visit Diagnoses       PSA (psoriatic arthritis)    -  Primary    Relevant Medications    apremilast (OTEZLA) 30 mg Tab    guselkumab (TREMFYA) 100 mg/mL AtIn     Low bone density        Relevant Orders    DXA Bone Density Axial Skeleton 1 or more sites    Therapeutic drug monitoring              - Continue current management: apremilast 30mg PO BID, and guselkumab 100mg SQ Q8wks  - Continue to hold off on leflunomide for now, low threshold to resume if needed for disease activity in the future since it was well tolerated and efficacious  - Labs before next visit: CBC, CMP, CRP, ESR  - Advised pt to get new covid booster   - DEXA ordered as it is due in about 1 month, will schedule accordingly    Follow up here in clinic in about 3 months or earlier if needed.    Assessment and plan discussed with supervising attending, Dr. Beverly.      Allie Hidalgo MD  PGY-4, Rheumatology

## 2024-04-01 NOTE — PROGRESS NOTES
I have personally reviewed the history, confirmed exam findings, and discussed assessment and plan with fellow.     Latest Reference Range & Units 03/25/24 11:17   WBC 3.90 - 12.70 K/uL 6.88   RBC 4.60 - 6.20 M/uL 4.47 (L)   Hemoglobin 14.0 - 18.0 g/dL 12.4 (L)   Hematocrit 40.0 - 54.0 % 39.7 (L)   MCV 82 - 98 fL 89   MCH 27.0 - 31.0 pg 27.7   MCHC 32.0 - 36.0 g/dL 31.2 (L)   RDW 11.5 - 14.5 % 14.1   Platelet Count 150 - 450 K/uL 331   MPV 9.2 - 12.9 fL 9.1 (L)   Gran % 38.0 - 73.0 % 57.6   Lymph % 18.0 - 48.0 % 29.1   Mono % 4.0 - 15.0 % 9.4   Eos % 0.0 - 8.0 % 2.8   Basophil % 0.0 - 1.9 % 0.4   Immature Granulocytes 0.0 - 0.5 % 0.7 (H)   Gran # (ANC) 1.8 - 7.7 K/uL 4.0   Lymph # 1.0 - 4.8 K/uL 2.0   Mono # 0.3 - 1.0 K/uL 0.7   Eos # 0.0 - 0.5 K/uL 0.2   Baso # 0.00 - 0.20 K/uL 0.03   Immature Grans (Abs) 0.00 - 0.04 K/uL 0.05 (H)   nRBC 0 /100 WBC 0   Differential Method  Automated   Sed Rate 0 - 23 mm/Hr 29 (H)   Sodium 136 - 145 mmol/L 140   Potassium 3.5 - 5.1 mmol/L 4.9   Chloride 95 - 110 mmol/L 103   CO2 23 - 29 mmol/L 28   Anion Gap 8 - 16 mmol/L 9   BUN 6 - 20 mg/dL 12   Creatinine 0.5 - 1.4 mg/dL 0.8   eGFR >60 mL/min/1.73 m^2 >60.0   Glucose 70 - 110 mg/dL 66 (L)   Calcium 8.7 - 10.5 mg/dL 10.2   ALP 55 - 135 U/L 99   PROTEIN TOTAL 6.0 - 8.4 g/dL 6.8   Albumin 3.5 - 5.2 g/dL 3.7   BILIRUBIN TOTAL 0.1 - 1.0 mg/dL 0.3   AST 10 - 40 U/L 18   ALT 10 - 44 U/L 17   CRP 0.0 - 8.2 mg/L 3.7   CPK 20 - 200 U/L 97   (L): Data is abnormally low  (H): Data is abnormally high      Surgery Date: 1/30/2024      Surgeon(s) and Role:     * Rogerio Maradiaga MD - Primary     * Shyla Davenport MD - Fellow     Assisting Surgeon: None     Pre-op Diagnosis:  DDD (degenerative disc disease), cervical [M50.30]  Cervical spinal stenosis [M48.02]  Myelopathy [G95.9]     Post-op Diagnosis:  Post-Op Diagnosis Codes:     * DDD (degenerative disc disease), cervical [M50.30]     * Cervical spinal stenosis [M48.02]     *  Myelopathy [G95.9]     Procedure(s) (LRB):  C3-4 anterior cervical discectomy and fusion (N/A)            Psoriasis in complete remission   PsA  TJ  0   SJ  0   Pt global 20   ESR 29     CRP 3.7  DAS28 2.64(LDA)  MKJ02-WBJ 1.8(remission)  CDAI=  2(remission)  DAPSA  TJ 0 SJ 0 Pt global  2  Pt pain 2.5 CRP 0.37=4.87(LDA)  Leflunomide discontinued b/o paresthesiae found to be d/t cervical myelopathy rather than polyneuropathy. No need to resume just now as in remission, but could be restarted in future if needed.   S/p inflximab induced SLE  S/p C3-4 ACDF 1/30/24 with pharyngeal injury requiring repair, tracheostomy(wound site still slightly bloody drainage) and laparotomy with gastric tube placement   Still dysphagia, repeat swallowing study upcoming  Hyperlipidemia   LDL 61 10/10/23 on atorvastatin 10mg nightly    Message Dr. Agarwal about blood tinged drainage trach site.    *Covid vaccine  DXA > 5/12/24  Cont guselkumab 100mg sc q 8 wks  Cont apremilast 30mg twice daily   Cont atorvastatin 10mg nightly   RTC 3 months with CBC, CMP, ESR, CRP

## 2024-04-03 ENCOUNTER — PATIENT MESSAGE (OUTPATIENT)
Dept: SURGERY | Facility: CLINIC | Age: 54
End: 2024-04-03
Payer: COMMERCIAL

## 2024-04-08 ENCOUNTER — CLINICAL SUPPORT (OUTPATIENT)
Dept: SPEECH THERAPY | Facility: HOSPITAL | Age: 54
End: 2024-04-08
Attending: OTOLARYNGOLOGY
Payer: COMMERCIAL

## 2024-04-08 ENCOUNTER — HOSPITAL ENCOUNTER (OUTPATIENT)
Dept: RADIOLOGY | Facility: HOSPITAL | Age: 54
Discharge: HOME | End: 2024-04-08
Attending: OTOLARYNGOLOGY
Payer: COMMERCIAL

## 2024-04-08 DIAGNOSIS — Z98.1 HX OF FUSION OF CERVICAL SPINE: ICD-10-CM

## 2024-04-08 DIAGNOSIS — J39.2 PHARYNGEAL LESION: ICD-10-CM

## 2024-04-08 DIAGNOSIS — R13.13 DYSPHAGIA, PHARYNGEAL: Primary | ICD-10-CM

## 2024-04-08 PROCEDURE — 74230 X-RAY XM SWLNG FUNCJ C+: CPT | Mod: 26,,, | Performed by: RADIOLOGY

## 2024-04-08 PROCEDURE — A9698 NON-RAD CONTRAST MATERIALNOC: HCPCS | Performed by: OTOLARYNGOLOGY

## 2024-04-08 PROCEDURE — 25500020 PHARM REV CODE 255: Performed by: OTOLARYNGOLOGY

## 2024-04-08 PROCEDURE — 92611 MOTION FLUOROSCOPY/SWALLOW: CPT | Mod: GN | Performed by: SPEECH-LANGUAGE PATHOLOGIST

## 2024-04-08 PROCEDURE — 74230 X-RAY XM SWLNG FUNCJ C+: CPT | Mod: TC

## 2024-04-08 RX ADMIN — BARIUM SULFATE 100 ML: 0.81 POWDER, FOR SUSPENSION ORAL at 02:04

## 2024-04-11 ENCOUNTER — OFFICE VISIT (OUTPATIENT)
Dept: SURGERY | Facility: CLINIC | Age: 54
End: 2024-04-11
Payer: COMMERCIAL

## 2024-04-11 VITALS
BODY MASS INDEX: 21.09 KG/M2 | HEART RATE: 72 BPM | HEIGHT: 68 IN | OXYGEN SATURATION: 100 % | DIASTOLIC BLOOD PRESSURE: 58 MMHG | WEIGHT: 139.13 LBS | SYSTOLIC BLOOD PRESSURE: 107 MMHG

## 2024-04-11 DIAGNOSIS — Z93.1 S/P PERCUTANEOUS ENDOSCOPIC GASTROSTOMY (PEG) TUBE PLACEMENT: ICD-10-CM

## 2024-04-11 DIAGNOSIS — Z43.1 PEG (PERCUTANEOUS ENDOSCOPIC GASTROSTOMY) ADJUSTMENT/REPLACEMENT/REMOVAL: Primary | ICD-10-CM

## 2024-04-11 PROCEDURE — 99999 PR PBB SHADOW E&M-EST. PATIENT-LVL IV: CPT | Mod: PBBFAC,,, | Performed by: STUDENT IN AN ORGANIZED HEALTH CARE EDUCATION/TRAINING PROGRAM

## 2024-04-11 PROCEDURE — 99499 UNLISTED E&M SERVICE: CPT | Mod: S$GLB,,, | Performed by: STUDENT IN AN ORGANIZED HEALTH CARE EDUCATION/TRAINING PROGRAM

## 2024-04-11 NOTE — PROGRESS NOTES
[facility] 2nd [facility]  Progress Note      Subjective:     Rebel Rodriguez is a 54 y.o. male s/p open carmela gastrostomy on 2/3/24. He was initially seen in the hospital after an ACDF c/b pharyngeal injury requiring eventual trach and g-tube. He has recovered from all of this. Trach has been out for a while and the track is nearly closed. Has not been using the gastrostomy tube for a few weeks as well and has been eating a regular diet. He says he has an MBSS today.       Objective:     Vitals:    04/11/24 1018   BP: (!) 107/58   Pulse: 72        Physical Exam:  Gen: well appearing, no acute distress  Neck: Trach site with small opening but with a piece of granulation tissue about 0.7 mm coming from the opening  CV: RRR, extremities warm and well perfused  Respit: Breathing non-labored  Abd: Soft, non-tender, non-distended. Midline incision well healed. G-tube in LUQ. Balloon deflated and tube removed without difficulty. Small granulation tissue at the orifice  Neuro: alert, CN II - XII grossly intact    Diagnostics:  Reviewed    Assessment/Plan:     Rebel Rodriguez is a 54 y.o. male s/p open gastrostomy tube on 2/3/24 for pharyngeal injury following ACDF    - Shortly after the tube was removed, he started to feel light headed, nauseated, diaphoretic, and weak. A set of vitals was rechecked and he was normocardic and normotensive. The event passed spontaneously. Overall consistent with a vasovagal response. He felt fine after this. Given he was HDS and was feeling back to baseline after, did not feel it was medically necessary for him to go to the ED. He did not want to go either.   - Uneventful post op recovery from the g-tube standpoint  - Granulation tissue at the trach site and g-tube site were cauterized to assist with healing  - RTC PRN    Cynthia Almaraz MD  General Surgery, PGY-5  [phone]

## 2024-04-13 NOTE — PLAN OF CARE
IMPRESSIONS:   This 54 y.o. man appears to present with  1.  Markedly improved swallowing functioning, now with mild pharyngeal phase dysphagia characterized by incomplete inversion of the epiglottis (abuts PPW at level of superior-most ACDF hardware) resulting in retention of bolus material in valleculae.  Epiglottis now able to approximate arytenoid complex and effectively  close.  Exception was during continuous swallows of thin liquid when retained fluid also collected in pyriforms and a portion was expressed into the laryngeal vestibule at the initiation of the next swallow contraction.  Mildly reduced hyolaryngeal elevation/excursion and mild reduced UES aperture persisted.  2.  S/p repair of R posterior pharyngeal wall injury and R lateral pharyngotomy 1/30/24.  3.  S/p C3-4 ACDF 1/30/24.  4.  History cervical myelopathy due to C3-4 severe stenosis       RECOMMENDATIONS/PLAN OF CARE:   It is felt that Mr. Rodriguez would benefit from  1.  Resumption of a regular consistency diet as tolerated with thin liquids using the following strategies and common aspiration precautions, including, but not limited to  A.  Appropriate upright seating for all eating and drinking.  B.  Single sips.  C.  Effortful swallows to continue to exercise swallow mechanism.  D.  Pills with liquids.  E.  Monitoring for any signs/symptoms of aspiration (such as wet/gurgly voice that does not clear with coughing, inability to make any voice sounds, any persistent coughing with oral intake, otherwise unexplained fever, unexplained increased or new difficulty or discomfort breathing, unexplained increase in sleepiness/lethargy/significant fatigue, unexplained increase or new onset confusion or change in cognitive functioning, or any other unexplained change in health or well-being that could be related to swallowing).   2.  Concur with removal of PEG as planned.  3.  Repeat MBSS as needed  4.  Follow up with Dr. Agarwal as directed.

## 2024-04-13 NOTE — PROGRESS NOTES
MODIFIED BARIUM SWALLOW STUDY    REASON FOR REFERRAL:  Rebel Rodriguez, age 54, was referred by Dr. Senthil Agarwal, head and neck surgical oncologist, for a repeat Modified Barium Swallow Study to rule out aspiration, assess his overall swallowing function, and determine safest consistencies for oral intake.  He was unaccompanied.    Mr. Rodriguez has a history of C4-7 ACDF in past and C3-4 ACDF 1/30/24 with perforation of R posterior pharyngeal wall that was repaired the same day by head and neck surgery.   PEG and trach were placed 2/3/24.  The trach was accidentally decanulated and, after assessment, was determined to be allowed to remain so.        Mr. Rodriguez had dysphagia as part of his initial complaint re: the pharyngeal wall injury and remained NPO from that point except for some liquid PO intake 2/28/24.  His previous MBSS was performed 2/29/24 and revealed severe pharyngeal phase dysphagia characterized by incomplete inversion of the epiglottis (abuts PPW at level of superior-most ACDF hardware) resulting in incomplete closure of the laryngeal vestibule allowing thin liquids to be aspirated during a swallow and nectar- and honey-thick liquids to be expressed from the pyriforms on dry swallows following initial bolus swallow. Additionally, there appeared to be mild weakness of pharyngeal contraction and mildly reduced hyolaryngeal elevation/excursion.       Recommendation was for continued NPO status, but today, Mr. Rodriguez reported that in the interim he began eating and drinking and has not used his PEG in about a month and is scheduled for its removal later this week.      MEDICAL HISTORY:  Past Medical History:   Diagnosis Date    Achilles tendon rupture 10/09/2013    Achilles tendon rupture 10/09/2013    Allergy     Anemia 1/17/2024    Anxiety     Arthritis     psoriatric    Degenerative disc disease     Drug-induced lupus erythematosus     Drug-induced lupus erythematosus 03/09/2016    Hyperlipidemia      Inguinal lymphadenopathy 07/21/2015    Kidney stone     Medication monitoring encounter 12/07/2016    Neck pain 07/06/2017    Psoriatic arthritis     Psoriatic arthritis 12/07/2016    Recurrent fever 07/08/2015    Recurrent nephrolithiasis 05/19/2014    On low oxalate diet    Sinusitis 02/25/2016    Stroke     TIA (transient ischemic attack)     Ulcer     high school    Unexplained night sweats 07/13/2015        SURGICAL HISTORY:  Past Surgical History:   Procedure Laterality Date    ACHILLES TENDON SURGERY      BACK SURGERY      DIRECT LARYNGOSCOPY  2/3/2024    Procedure: LARYNGOSCOPY, DIRECT;  Surgeon: Jodie Collier MD;  Location: [facility] OR [address] [place] [address];  Service: ENT;;    FUNCTIONAL ENDOSCOPIC SINUS SURGERY (FESS) USING COMPUTER-ASSISTED NAVIGATION Bilateral 9/14/2018    Procedure: FESS, USING COMPUTER-ASSISTED NAVIGATION;  Surgeon: Gerard Manzo MD;  Location: [facility] OR [address] [place] [address];  Service: ENT;  Laterality: Bilateral;    FUSION, SPINE, CERVICAL, ANTERIOR APPROACH N/A 1/30/2024    Procedure: C3-4 anterior cervical discectomy and fusion;  Surgeon: Rogerio Maradiaga MD;  Location: [address];  Service: Neurosurgery;  Laterality: N/A;  C3-4 anterior cervical discectomy and fusion  anesthesia: general  nerve mon: EMG/SEP/MEP  radiology: C-arm  bed: reg. slider  position: supine  headrest: caspar, SHEREE tongs/hanging weights, surgical pillow    INJECTION OF ANESTHETIC AGENT AROUND NERVE Left 5/13/2022    Procedure: Block, Nerve MEDIAL BRANCH BLOCK LEFT C2,3,4,5;  Surgeon: Kimberly Wilson MD;  Location: [facility] PAIN [facility]GT;  Service: Pain Management;  Laterality: Left;    INJECTION OF ANESTHETIC AGENT AROUND NERVE Left 5/24/2022    Procedure: BLOCK, NERVE, LEFT C2-C5 MEDIAL BRANCH;  Surgeon: Kimberly Wilson MD;  Location: [facility] PAIN [facility]GT;  Service: Pain Management;  Laterality: Left;    LAPAROTOMY N/A 2/3/2024    Procedure: LAPAROTOMY with gastrostomy tube placement;  Surgeon: Benigno Perez MD;  Location: [facility] OR [address] [place] [address];   Service: General;  Laterality: N/A;    NASAL SEPTOPLASTY N/A 9/14/2018    Procedure: SEPTOPLASTY, NASAL;  Surgeon: Gerard Manzo MD;  Location: [facility] OR [address] [place] [address];  Service: ENT;  Laterality: N/A;    NASAL TURBINATE REDUCTION Bilateral 9/14/2018    Procedure: REDUCTION, NASAL TURBINATE;  Surgeon: Gerard Manzo MD;  Location: [facility] OR [address] [place] [address];  Service: ENT;  Laterality: Bilateral;    NECK EXPLORATION N/A 1/30/2024    Procedure: EXPLORATION, NECK, REPAIR OF PHARYNGEAL INJURY;  Surgeon: Senthil Agarwal MD;  Location: [facility] OR [address] [place] [address];  Service: ENT;  Laterality: N/A;    RADIOFREQUENCY ABLATION Left 7/12/2022    Procedure: RADIOFREQUENCY ABLATION, LEFT C2-C3, C3-C4, C4-C5;  Surgeon: Kimberly Wilson MD;  Location: [facility] PAIN MGT;  Service: Pain Management;  Laterality: Left;    TRACHEOTOMY  2/3/2024    Procedure: TRACHEOTOMY;  Surgeon: Jodie Collier MD;  Location: [facility] OR [address] [place] [address];  Service: ENT;;    UPPER ENDOSCOPY W/ ESOPHAGEAL MANOMETRY      URETERAL STENT PLACEMENT         SWALLOWING HISTORY:  Took a regular consistency diet with thin liquids prior to surgery.  Took pills with with liquids.  Taking most soft foods with some regular consistency foods.      FAMILY HISTORY:  Family History   Problem Relation Name Age of Onset    Crohn's disease Mother      Pancreatic cancer Father      Ulcerative colitis Father      Cancer Father  61        pancreatic ca    Osteoarthritis Maternal Grandmother      Crohn's disease Maternal Grandmother      Cataracts Maternal Grandmother      Cataracts Maternal Grandfather      Cataracts Paternal Grandmother      Cataracts Paternal Grandfather      Amblyopia Neg Hx      Blindness Neg Hx          BEHAVIOR:  Rebel Rodriguez remained a very pleasant man who had normal affect and social interaction.  He was able to fully cooperate during the study.  Results of today's assessment were considered indicative of his current levels of swallowing functioning.      HEARING:  Subjectively,  within normal limits.     ORAL PERIPHERAL:   Informal examination of the oral mechanism revealed structures and functioning within normal limits for swallowing and speech purposes.    Voice quality was within normal limits with no wet quality before, during, or after the study.      TEST FINDINGS:   Mr. Rodriguez was seen in Radiology with the radiology technician (Radiologist available for in-person input during a study as needed) for a Modified Barium Swallow Study.  He was seated on a stool for a left lateral videofluoroscopic view.    ACDF hardware noted.    Consistencies assessed using radiopaque barium contrast:  thin (single and 3-mL  swallows via open cup or spoon),   Thin puree (1-teaspoon boluses),  Thick puree (1-teaspoon boluses),  Masticated solid (whole cracker), and   13 mm barium tablet with water.    Strategies:  Single sips -- safe; had eventual small-volume aspiration with consecutive swallows of thin liquid.  Dry swallows -- multiple (2-3) cleared masticated solid residue    Phases:  Oral:  Mr. Rodriguez was able to obtain liquid and strip utensils well with no loss of material from the oral cavity.  He moved boluses through the oral cavity with appropriate transit time.  There was no pooling of liquids in the mouth.  Swallow reflex was triggered within normal limits.    Pharyngeal:    Safe with single sips and solid boluses (no penetration or aspiration).  Had trace SILENT aspiration with thin liquid in continuous swallowing when pyriform sinus residue developed and a portion was expressed into the laryngeal vestibule and eventually aspirated at the initiation of the next swallow contraction.     - Timely initiation  - Adequate soft palate elevation  - Adequate to mildly reduced laryngeal elevation and anterior hyoid excursion  - Adequate to mildly reduced tongue base retraction  - Incomplete epiglottic inversion; abuts PPW  - Complete laryngeal vestibular closure; epiglottic body approximates arytenoid  complex  - Adequate to mildly reduced pharyngeal stripping wave  - Reduced PE segment opening.  -  Mild pharyngeal residue in valleculae with thin liquids in single sips and in larger volumes in the vallecuale and pyriforms with continuous swallowing.  Mild vallecular stasis with thin puree.  Moderate-severe vallecular stasis with masticated solid which Mr. Rodriguez cleared with spontaneous dry swallows (x2-3). No stasis with barium tablet.      Cervical Esophageal:  Boluses entered the upper esophagus within normal limits.  No obstruction was noted.  Reduced PE segment opening noted above likely related to reduced swallowing pressures.  Only impact appeared to occur with continuous swallows of thin liquids.    Rosenbeck 8-point Penetration-Aspiration Scale:   Best:    1 - Material does not enter airway.   Worst: 8 - Material enters the airway, passes below the vocal folds, and no effort is made to eject.  -- trace SILENT aspiration of thin liquid during continuous swallowing      IMPRESSIONS:   This 54 y.o. man appears to present with  1.  Markedly improved swallowing functioning, now with mild pharyngeal phase dysphagia characterized by incomplete inversion of the epiglottis (abuts PPW at level of superior-most ACDF hardware) resulting in retention of bolus material in valleculae.  Epiglottis now able to approximate arytenoid complex and effectively  close.  Exception was during continuous swallows of thin liquid when retained fluid also collected in pyriforms and a portion was expressed into the laryngeal vestibule at the initiation of the next swallow contraction.  Mildly reduced hyolaryngeal elevation/excursion and mild reduced UES aperture persisted.  2.  S/p repair of R posterior pharyngeal wall injury and R lateral pharyngotomy 1/30/24.  3.  S/p C3-4 ACDF 1/30/24.  4.  History cervical myelopathy due to C3-4 severe stenosis       RECOMMENDATIONS/PLAN OF CARE:   It is felt that Mr. Rodriguez would benefit from  1.   Resumption of a regular consistency diet as tolerated with thin liquids using the following strategies and common aspiration precautions, including, but not limited to  A.  Appropriate upright seating for all eating and drinking.  B.  Single sips.  C.  Effortful swallows to continue to exercise swallow mechanism.  D.  Pills with liquids.  E.  Monitoring for any signs/symptoms of aspiration (such as wet/gurgly voice that does not clear with coughing, inability to make any voice sounds, any persistent coughing with oral intake, otherwise unexplained fever, unexplained increased or new difficulty or discomfort breathing, unexplained increase in sleepiness/lethargy/significant fatigue, unexplained increase or new onset confusion or change in cognitive functioning, or any other unexplained change in health or well-being that could be related to swallowing).   2.  Concur with removal of PEG as planned.  3.  Repeat MBSS as needed  4.  Follow up with Dr. Agarwal as directed.

## 2024-04-25 ENCOUNTER — PATIENT MESSAGE (OUTPATIENT)
Dept: NEUROSURGERY | Facility: CLINIC | Age: 54
End: 2024-04-25

## 2024-04-25 ENCOUNTER — OFFICE VISIT (OUTPATIENT)
Dept: NEUROSURGERY | Facility: CLINIC | Age: 54
End: 2024-04-25
Payer: COMMERCIAL

## 2024-04-25 ENCOUNTER — HOSPITAL ENCOUNTER (OUTPATIENT)
Dept: RADIOLOGY | Facility: HOSPITAL | Age: 54
Discharge: HOME | End: 2024-04-25
Attending: PHYSICIAN ASSISTANT
Payer: COMMERCIAL

## 2024-04-25 VITALS — DIASTOLIC BLOOD PRESSURE: 67 MMHG | HEART RATE: 68 BPM | SYSTOLIC BLOOD PRESSURE: 108 MMHG

## 2024-04-25 DIAGNOSIS — Z98.1 S/P CERVICAL SPINAL FUSION: Primary | ICD-10-CM

## 2024-04-25 DIAGNOSIS — Z98.1 ARTHRODESIS STATUS: ICD-10-CM

## 2024-04-25 DIAGNOSIS — Z93.1 GASTROSTOMY STATUS: Primary | ICD-10-CM

## 2024-04-25 PROCEDURE — 3074F SYST BP LT 130 MM HG: CPT | Mod: CPTII,S$GLB,, | Performed by: PHYSICIAN ASSISTANT

## 2024-04-25 PROCEDURE — 72125 CT NECK SPINE W/O DYE: CPT | Mod: TC

## 2024-04-25 PROCEDURE — 72125 CT NECK SPINE W/O DYE: CPT | Mod: 26,,, | Performed by: RADIOLOGY

## 2024-04-25 PROCEDURE — 99024 POSTOP FOLLOW-UP VISIT: CPT | Mod: S$GLB,,, | Performed by: PHYSICIAN ASSISTANT

## 2024-04-25 PROCEDURE — 99999 PR PBB SHADOW E&M-EST. PATIENT-LVL III: CPT | Mod: PBBFAC,,, | Performed by: PHYSICIAN ASSISTANT

## 2024-04-25 PROCEDURE — 1159F MED LIST DOCD IN RCRD: CPT | Mod: CPTII,S$GLB,, | Performed by: PHYSICIAN ASSISTANT

## 2024-04-25 PROCEDURE — 3078F DIAST BP <80 MM HG: CPT | Mod: CPTII,S$GLB,, | Performed by: PHYSICIAN ASSISTANT

## 2024-04-26 ENCOUNTER — TELEPHONE (OUTPATIENT)
Dept: WOUND CARE | Facility: HOSPITAL | Age: 54
End: 2024-04-26
Payer: COMMERCIAL

## 2024-04-26 NOTE — TELEPHONE ENCOUNTER
Left voicemail requesting return call in regards to wound care referral and assisting with scheduling an appointment

## 2024-04-30 ENCOUNTER — PATIENT MESSAGE (OUTPATIENT)
Dept: WOUND CARE | Facility: HOSPITAL | Age: 54
End: 2024-04-30
Payer: COMMERCIAL

## 2024-04-30 NOTE — PROGRESS NOTES
[facility] - [facility] 8th Fl  Neurosurgery    SUBJECTIVE:     History of Present Illness:  Rebel Rodriguez is a 54 y.o. male with hx of cervical myelopathy with prior C4-7 ACDF now s/p C3-4 ACDF 1/30 by Dr. Maradiaga which was complicated by retropharyngeal injury requiring repair by Dr. Agarwal on 1/30. He is s/p trach/G tube placement on 2/3. Presents for 3 month post-op visit. Tolerating regular diet. G tube removed 4/11/24. Numbness in fingers has improved. He has noticed he drifts left when ambulating due to mild gait instability. He has noticed this as his other post-op problems have resolved. He denies significant gait imbalance like he was having pre-op. Denies falls. Mild hoarse voice.       Review of patient's allergies indicates:   Allergen Reactions    Erythromycin Nausea And Vomiting    Infliximab Other (See Comments)     Lupus with fever and acute arthritis  Lupus with fever and acute arthritis       Past Medical History:   Diagnosis Date    Achilles tendon rupture 10/09/2013    Achilles tendon rupture 10/09/2013    Allergy     Anemia 1/17/2024    Anxiety     Arthritis     psoriatric    Degenerative disc disease     Drug-induced lupus erythematosus     Drug-induced lupus erythematosus 03/09/2016    Hyperlipidemia     Inguinal lymphadenopathy 07/21/2015    Kidney stone     Medication monitoring encounter 12/07/2016    Neck pain 07/06/2017    Psoriatic arthritis     Psoriatic arthritis 12/07/2016    Recurrent fever 07/08/2015    Recurrent nephrolithiasis 05/19/2014    On low oxalate diet    Sinusitis 02/25/2016    Stroke     TIA (transient ischemic attack)     Ulcer     high school    Unexplained night sweats 07/13/2015       Past Surgical History:   Procedure Laterality Date    ACHILLES TENDON SURGERY      BACK SURGERY      DIRECT LARYNGOSCOPY  2/3/2024    Procedure: LARYNGOSCOPY, DIRECT;  Surgeon: Jodie Collier MD;  Location: [facility] OR [address] [place] [address];  Service: ENT;;    FUNCTIONAL ENDOSCOPIC SINUS SURGERY (FESS)  USING COMPUTER-ASSISTED NAVIGATION Bilateral 9/14/2018    Procedure: FESS, USING COMPUTER-ASSISTED NAVIGATION;  Surgeon: Gerard Manzo MD;  Location: [facility] OR 2ND FLR;  Service: ENT;  Laterality: Bilateral;    FUSION, SPINE, CERVICAL, ANTERIOR APPROACH N/A 1/30/2024    Procedure: C3-4 anterior cervical discectomy and fusion;  Surgeon: Rogerio Maradiaga MD;  Location: [facility] OR 2ND FLR;  Service: Neurosurgery;  Laterality: N/A;  C3-4 anterior cervical discectomy and fusion  anesthesia: general  nerve mon: EMG/SEP/MEP  radiology: C-arm  bed: reg. slider  position: supine  headrest: caspar, GW tongs/hanging weights, surgical pillow    INJECTION OF ANESTHETIC AGENT AROUND NERVE Left 5/13/2022    Procedure: Block, Nerve MEDIAL BRANCH BLOCK LEFT C2,3,4,5;  Surgeon: Kimberly Wilson MD;  Location: [facility] PAIN MGT;  Service: Pain Management;  Laterality: Left;    INJECTION OF ANESTHETIC AGENT AROUND NERVE Left 5/24/2022    Procedure: BLOCK, NERVE, LEFT C2-C5 MEDIAL BRANCH;  Surgeon: Kimberly Wilson MD;  Location: [facility] PAIN MGT;  Service: Pain Management;  Laterality: Left;    LAPAROTOMY N/A 2/3/2024    Procedure: LAPAROTOMY with gastrostomy tube placement;  Surgeon: Benigno Perez MD;  [facility];  Service: General;  Laterality: N/A;    NASAL SEPTOPLASTY N/A 9/14/2018    Procedure: SEPTOPLASTY, NASAL;  Surgeon: Gerard Manzo MD;  [facility];  Service: ENT;  Laterality: N/A;    NASAL TURBINATE REDUCTION Bilateral 9/14/2018    Procedure: REDUCTION, NASAL TURBINATE;  Surgeon: Gerard Manzo MD;  [facility];  Service: ENT;  Laterality: Bilateral;    NECK EXPLORATION N/A 1/30/2024    Procedure: EXPLORATION, NECK, REPAIR OF PHARYNGEAL INJURY;  Surgeon: Senthil Agarwal MD;  [facility];  Service: ENT;  Laterality: N/A;    RADIOFREQUENCY ABLATION Left 7/12/2022    Procedure: RADIOFREQUENCY ABLATION, LEFT C2-C3, C3-C4, C4-C5;  Surgeon: Kimberly Wilson MD;   Location: [facility] MGT;  Service: Pain Management;  Laterality: Left;    TRACHEOTOMY  2/3/2024    Procedure: TRACHEOTOMY;  Surgeon: Jodie Collier MD;  Location: [facility] OR [address] [place] [address];  Service: ENT;;    UPPER ENDOSCOPY W/ ESOPHAGEAL MANOMETRY      URETERAL STENT PLACEMENT          Family History   Problem Relation Name Age of Onset    Crohn's disease Mother      Pancreatic cancer Father      Ulcerative colitis Father      Cancer Father  61        pancreatic ca    Osteoarthritis Maternal Grandmother      Crohn's disease Maternal Grandmother      Cataracts Maternal Grandmother      Cataracts Maternal Grandfather      Cataracts Paternal Grandmother      Cataracts Paternal Grandfather      Amblyopia Neg Hx      Blindness Neg Hx         Social History     Socioeconomic History    Marital status: Single   Occupational History    Occupation: [employer]     Employer: self employed   Tobacco Use    Smoking status: Never    Smokeless tobacco: Never   Substance and Sexual Activity    Alcohol use: No     Alcohol/week: 0.0 standard drinks of alcohol     Types: 1 - 2 Standard drinks or equivalent per week     Comment: cocktails    Drug use: No   Social History Narrative    1 flight     Social Determinants of Health     Financial Resource Strain: Patient Unable To Answer (2/1/2024)    Overall Financial Resource Strain (CARDIA)     Difficulty of Paying Living Expenses: Patient unable to answer   Food Insecurity: Patient Unable To Answer (2/1/2024)    Hunger Vital Sign     Worried About Running Out of Food in the Last Year: Patient unable to answer     Ran Out of Food in the Last Year: Patient unable to answer   Transportation Needs: Patient Unable To Answer (2/1/2024)    PRAPARE - Transportation     Lack of Transportation (Medical): Patient unable to answer     Lack of Transportation (Non-Medical): Patient unable to answer   Recent Concern: Transportation Needs - Unmet Transportation Needs (12/4/2023)    PRAPARE - Transportation      Lack of Transportation (Medical): Yes     Lack of Transportation (Non-Medical): Yes   Physical Activity: Sufficiently Active (1/31/2024)    Exercise Vital Sign     Days of Exercise per Week: 7 days     Minutes of Exercise per Session: 60 min   Stress: Patient Unable To Answer (2/1/2024)    [place] [employer] - Occupational Stress Questionnaire     Feeling of Stress : Patient unable to answer   Social Connections: Unknown (1/31/2024)    Social Connection and Isolation Panel [NHANES]     Frequency of Communication with Friends and Family: More than three times a week     Frequency of Social Gatherings with Friends and Family: More than three times a week     Active Member of Clubs or Organizations: No     Attends Club or Organization Meetings: Never     Marital Status: Never    Housing Stability: Patient Unable To Answer (2/1/2024)    Housing Stability Vital Sign     Unable to Pay for Housing in the Last Year: Patient unable to answer     Number of Places Lived in the Last Year: 0     Unstable Housing in the Last Year: Patient unable to answer       Review of Systems:  Per HPI    OBJECTIVE:     Vital Signs (Most Recent):  Vitals:    04/25/24 1005   BP: 108/67   Pulse: 68       Physical Exam:  General: well developed, well nourished, no distress.   Head: normocephalic, atraumatic  Neurologic: Alert and oriented. Thought content appropriate.  GCS: Motor: 6/Verbal: 5/Eyes: 4 GCS Total: 15  Mental Status: Awake, Alert, Oriented x 4  Language: No aphasia  Speech: No dysarthria  Cranial nerves: face symmetric, tongue midline, CN II-XII grossly intact.   Eyes: pupils equal, round, reactive to light with accomodation, EOMI.  Pulmonary: normal respirations, no signs of respiratory distress  Sensory: intact to light touch throughout  Motor Strength: Moves all extremities spontaneously with good tone.  Full strength upper and lower extremities. No abnormal movements seen.     Strength  Deltoids  Triceps Biceps Wrist Extension Wrist Flexion Hand    Upper: R 5/5 5/5 5/5 5/5 5/5 5/5    L 5/5 5/5 5/5 5/5 5/5 5/5     Iliopsoas Quadriceps Knee  Flexion Tibialis  anterior Gastro- cnemius EHL   Lower: R 5/5 5/5 5/5 5/5 5/5 5/5    L 5/5 5/5 5/5 5/5 5/5 5/5   Hand intrinsics 5/5    Magallanes: present bilaterally  Skin: Skin is warm, dry and intact.  Gait: fluid    Diagnostic Results:  I have personally reviewed all pertinent imaging.    CT cervical spine 4/25/24:  - fusion noted C3-4, no hardware complication    ASSESSMENT/PLAN:     Rebel Rodriguez is a 54 y.o. male s/p C3-4 ACDF 1/30 by Dr. Maradiaga which was complicated by retropharyngeal injury requiring repair by Dr. Agarwal on 1/30. He was seen today by Dr. Maradiaga. CT shows bony fusion. Will refer to Neuro PT for gait training in the setting of prior history of cervical myelopathy. We discussed concerning signs and symptoms that would prompt return to clinic or urgent medical attention, patient v/u. All questions answered. Encouraged to call the clinic with questions/concerns prior to the next visit. F/u at 1 year post-op.       Melanie Torres PA-C  Neurosurgery      Note dictated with voice recognition software, please excuse any grammatical errors.

## 2024-05-03 DIAGNOSIS — L40.50 PSA (PSORIATIC ARTHRITIS): ICD-10-CM

## 2024-05-03 RX ORDER — GUSELKUMAB 100 MG/ML
100 INJECTION SUBCUTANEOUS
Qty: 3 ML | Refills: 2 | OUTPATIENT
Start: 2024-05-03 | End: 2024-05-06 | Stop reason: SDUPTHER

## 2024-05-06 DIAGNOSIS — L40.50 PSA (PSORIATIC ARTHRITIS): ICD-10-CM

## 2024-05-07 RX ORDER — GUSELKUMAB 100 MG/ML
100 INJECTION SUBCUTANEOUS
Qty: 3 ML | Refills: 2 | Status: ACTIVE | OUTPATIENT
Start: 2024-05-07

## 2024-05-10 ENCOUNTER — HOSPITAL ENCOUNTER (OUTPATIENT)
Dept: WOUND CARE | Facility: HOSPITAL | Age: 54
Discharge: HOME | End: 2024-05-10
Attending: PREVENTIVE MEDICINE
Payer: COMMERCIAL

## 2024-05-10 VITALS
HEIGHT: 68 IN | BODY MASS INDEX: 21.09 KG/M2 | DIASTOLIC BLOOD PRESSURE: 54 MMHG | SYSTOLIC BLOOD PRESSURE: 106 MMHG | WEIGHT: 139.13 LBS | HEART RATE: 69 BPM | RESPIRATION RATE: 18 BRPM | TEMPERATURE: 98 F

## 2024-05-10 DIAGNOSIS — D84.821 IMMUNOSUPPRESSION DUE TO DRUG THERAPY: ICD-10-CM

## 2024-05-10 DIAGNOSIS — K94.23 DRAINAGE FROM GASTROSTOMY TUBE SITE: Primary | ICD-10-CM

## 2024-05-10 DIAGNOSIS — Z79.899 IMMUNOSUPPRESSION DUE TO DRUG THERAPY: ICD-10-CM

## 2024-05-10 PROCEDURE — 99203 OFFICE O/P NEW LOW 30 MIN: CPT

## 2024-05-10 NOTE — PROGRESS NOTES
[facility]    Subjective:       Patient ID: Rebel Rodriguez is a 54 y.o. male.    Chief Complaint: Wound Consult    New referral for left abdomen wound. Patient s/p PEG tube removal on 4/11/24. Reports area was previously draining and was treated by outside provider with silver nitrate. No opening noted today, area has closed. Recommend bandaid daily to protect area. Will discharge from clinic at this time.     Review of Systems   All other systems reviewed and are negative.        Objective:     Vitals:    05/10/24 0811   BP: (!) 106/54   Pulse: 69   Resp: 18   Temp: 97.9 °F (36.6 °C)         Physical Exam       Wound 05/10/24 0813 Other (comment) Left Abdomen (Active)   05/10/24 0813   Present on Original Admission: Y   Primary Wound Type: Other   Side: Left   Orientation:    Location: Abdomen   Wound Approximate Age at First Assessment (Weeks):    Wound Number:    Is this injury device related?:    Incision Type:    Closure Method:    Wound Description (Comments):    Type:    Additional Comments:    Ankle-Brachial Index:    Pulses:    Removal Indication and Assessment:    Wound Outcome:    Wound Image   05/10/24 0813   Dressing Appearance Open to air 05/10/24 0813   Drainage Amount None 05/10/24 0813   Appearance Epithelialization 05/10/24 0813   Periwound Area Intact 05/10/24 0813   Wound Edges Rolled/closed 05/10/24 0813   Wound Length (cm) 0 cm 05/10/24 0813   Wound Width (cm) 0 cm 05/10/24 0813   Wound Depth (cm) 0 cm 05/10/24 0813   Wound Volume (cm^3) 0 cm^3 05/10/24 0813   Wound Surface Area (cm^2) 0 cm^2 05/10/24 0813   Care Cleansed with:;Sterile normal saline 05/10/24 0813   Dressing Applied;Bandaid 05/10/24 0813   Dressing Change Due 05/11/24 05/10/24 0813         Assessment/Plan:         ICD-10-CM ICD-9-CM   1. Drainage from gastrostomy tube site  K94.23 536.42   2. Immunosuppression due to drug therapy  D84.821 V58.69    Z79.899        No open wounds  at this time. Keep site moist and protected.    Tissue pathology and/or culture taken:  [] Yes [x] No   Sharp debridement performed:   [] Yes [x] No   Labs ordered this visit:   [] Yes [x] No   Imaging ordered this visit:   [] Yes [x] No           Orders Placed This Encounter   Procedures    Ambulatory referral/consult to Wound Clinic     Standing Status:   Standing     Number of Occurrences:   1     Referral Priority:   Routine     Referral Type:   Consultation     Referral Reason:   Specialty Services Required     Requested Specialty:   Wound Care     Number of Visits Requested:   1    Change dressing     Left abdomen: Band-Aid daily and prn    Healed Wound Instructions:Your wound is healed, it is extremely fragile at this stage; protect it from friction, wash it gently and pat dry.  If the wound should re-open, please call [phone] for further instructions.        Follow up if symptoms worsen or fail to improve.            This includes face to face time and non-face to face time preparing to see the patient (eg, review of tests), obtaining and/or reviewing separately obtained history, documenting clinical information in the electronic or other health record, independently interpreting results and communicating results to the patient/family/caregiver, or care coordinator.

## 2024-05-12 PROBLEM — G47.00 INSOMNIA: Status: ACTIVE | Noted: 2024-05-12

## 2024-05-12 PROBLEM — F32.A DEPRESSION: Status: ACTIVE | Noted: 2024-05-12

## 2024-05-12 PROBLEM — R44.1 VISUAL HALLUCINATIONS: Status: ACTIVE | Noted: 2024-05-12

## 2024-05-14 ENCOUNTER — OFFICE VISIT (OUTPATIENT)
Dept: DERMATOLOGY | Facility: CLINIC | Age: 54
End: 2024-05-14
Payer: COMMERCIAL

## 2024-05-14 DIAGNOSIS — L90.5 SCAR: ICD-10-CM

## 2024-05-14 DIAGNOSIS — D22.9 MULTIPLE BENIGN NEVI: ICD-10-CM

## 2024-05-14 DIAGNOSIS — L81.4 LENTIGINES: ICD-10-CM

## 2024-05-14 DIAGNOSIS — Z12.83 SCREENING EXAM FOR SKIN CANCER: ICD-10-CM

## 2024-05-14 DIAGNOSIS — D48.5 NEOPLASM OF UNCERTAIN BEHAVIOR OF SKIN: Primary | ICD-10-CM

## 2024-05-14 PROCEDURE — 1159F MED LIST DOCD IN RCRD: CPT | Mod: CPTII,S$GLB,, | Performed by: STUDENT IN AN ORGANIZED HEALTH CARE EDUCATION/TRAINING PROGRAM

## 2024-05-14 PROCEDURE — 88305 TISSUE EXAM BY PATHOLOGIST: CPT | Mod: 26,,, | Performed by: PATHOLOGY

## 2024-05-14 PROCEDURE — 1160F RVW MEDS BY RX/DR IN RCRD: CPT | Mod: CPTII,S$GLB,, | Performed by: STUDENT IN AN ORGANIZED HEALTH CARE EDUCATION/TRAINING PROGRAM

## 2024-05-14 PROCEDURE — 88342 IMHCHEM/IMCYTCHM 1ST ANTB: CPT | Mod: 26,,, | Performed by: PATHOLOGY

## 2024-05-14 PROCEDURE — 88341 IMHCHEM/IMCYTCHM EA ADD ANTB: CPT | Mod: 26,,, | Performed by: PATHOLOGY

## 2024-05-14 PROCEDURE — 88342 IMHCHEM/IMCYTCHM 1ST ANTB: CPT | Performed by: PATHOLOGY

## 2024-05-14 PROCEDURE — 88341 IMHCHEM/IMCYTCHM EA ADD ANTB: CPT | Performed by: PATHOLOGY

## 2024-05-14 PROCEDURE — 11102 TANGNTL BX SKIN SINGLE LES: CPT | Mod: S$GLB,,, | Performed by: STUDENT IN AN ORGANIZED HEALTH CARE EDUCATION/TRAINING PROGRAM

## 2024-05-14 PROCEDURE — 88305 TISSUE EXAM BY PATHOLOGIST: CPT | Performed by: PATHOLOGY

## 2024-05-14 PROCEDURE — 99999 PR PBB SHADOW E&M-EST. PATIENT-LVL III: CPT | Mod: PBBFAC,,, | Performed by: STUDENT IN AN ORGANIZED HEALTH CARE EDUCATION/TRAINING PROGRAM

## 2024-05-14 PROCEDURE — 99203 OFFICE O/P NEW LOW 30 MIN: CPT | Mod: 25,S$GLB,, | Performed by: STUDENT IN AN ORGANIZED HEALTH CARE EDUCATION/TRAINING PROGRAM

## 2024-05-14 NOTE — PROGRESS NOTES
"  Subjective:      Patient ID:  Rebel Rodriguez is a 54 y.o. male who presents for   Chief Complaint   Patient presents with    Skin Check     TBSE     Rebel Rodriguez is a 54 y.o. male who presents for: FBSE screening exam for skin cancer.    Last office visit 12/10/20 with Dr. Huertas for cyst and rosacea.     The patient has the following lesions of concern:  Location: scarring on neck and abdomen from trach/g-tube, now removed  Duration: end of January 2024  Symptoms: wants to know what can be done for the scarring   Relieving factors/Previous treatments: none  " 54 y.o. male with hx of cervical myelopathy with prior C4-7 ACDF now s/p C3-4 ACDF 1/30 by Dr. Maradiaga which was complicated by retropharyngeal injury requiring repair by Dr. Agarwal on 1/30. He is s/p trach/G tube placement on 2/3"    Pertinent history:  History of blistering sunburns: Yes  History of tanning bed use: No  Family history of melanoma: No  Personal history of mole removal: Yes  Personal history of skin cancer: No         Review of Systems   Skin:  Positive for activity-related sunscreen use. Negative for daily sunscreen use and recent sunburn.   Hematologic/Lymphatic: Does not bruise/bleed easily.       Objective:   Physical Exam   Constitutional: He appears well-developed and well-nourished. No distress.   Neurological: He is alert and oriented to person, place, and time. He is not disoriented.   Psychiatric: He has a normal mood and affect.   Skin:   Areas Examined (abnormalities noted in diagram):   Scalp / Hair Palpated and Inspected  Head / Face Inspection Performed  Neck Inspection Performed  Chest / Axilla Inspection Performed  Abdomen Inspection Performed  Genitals / Buttocks / Groin Inspection Performed  Back Inspection Performed  RUE Inspected  LUE Inspection Performed  RLE Inspected  LLE Inspection Performed  Nails and Digits Inspection Performed            Diagram Legend     Erythematous scaling macule/papule c/w actinic " keratosis       Vascular papule c/w angioma      Pigmented verrucoid papule/plaque c/w seborrheic keratosis      Yellow umbilicated papule c/w sebaceous hyperplasia      Irregularly shaped tan macule c/w lentigo     1-2 mm smooth white papules consistent with Milia      Movable subcutaneous cyst with punctum c/w epidermal inclusion cyst      Subcutaneous movable cyst c/w pilar cyst      Firm pink to brown papule c/w dermatofibroma      Pedunculated fleshy papule(s) c/w skin tag(s)      Evenly pigmented macule c/w junctional nevus     Mildly variegated pigmented, slightly irregular-bordered macule c/w mildly atypical nevus      Flesh colored to evenly pigmented papule c/w intradermal nevus       Pink pearly papule/plaque c/w basal cell carcinoma      Erythematous hyperkeratotic cursted plaque c/w SCC      Surgical scar with no sign of skin cancer recurrence      Open and closed comedones      Inflammatory papules and pustules      Verrucoid papule consistent consistent with wart     Erythematous eczematous patches and plaques     Dystrophic onycholytic nail with subungual debris c/w onychomycosis     Umbilicated papule    Erythematous-base heme-crusted tan verrucoid plaque consistent with inflamed seborrheic keratosis     Erythematous Silvery Scaling Plaque c/w Psoriasis     See annotation              Assessment / Plan:      Pathology Orders:       Normal Orders This Visit    Specimen to Pathology, Dermatology     Comments:    Number of Specimens:->1  ------------------------->-------------------------  Spec 1 Procedure:->Biopsy  Spec 1 Clinical Impression:->3 mm brown aymmetric macule with  irregular globules under dermoscopy and background reticular  network- ddx dysplastic nevus v melanoma v benign nevus  Spec 1 Source:->Left upper back  Release to patient->Immediate    Questions:    Procedure Type: Dermatology and skin neoplasms    Number of Specimens: 1    ------------------------: -------------------------     Spec 1 Procedure: Biopsy    Spec 1 Clinical Impression: 3 mm brown aymmetric macule with irregular globules under dermoscopy and background reticular network- ddx dysplastic nevus v melanoma v benign nevus    Spec 1 Source: Left upper back    Release to patient: Immediate    Release to patient:           Neoplasm of uncertain behavior of skin  -     Specimen to Pathology, Dermatology  Shave biopsy procedure note:    Shave biopsy performed after verbal consent including risk of infection, scar, recurrence, need for additional treatment of site. Area prepped with alcohol, anesthetized with approximately 1.0cc of 1% lidocaine with epinephrine. Lesional tissue shaved with razor blade. Hemostasis achieved with application of aluminum chloride followed by hyfrecation. No complications. Dressing applied. Wound care explained.    Lentigines  This is a benign hyperpigmented sun induced lesion. Recommend daily sun protection/avoidance and use of at least SPF 30, broad spectrum sunscreen (OTC drug) will reduce the number of new lesions.     Multiple benign nevi  Reassurance    Scar  Neck, abdomen from trach/g-tube  Neck depressed scar- topicals/laser unlikely to be effective may consider surgical revision  To abdomen:  Recommed silicone scar gel (scar away) massage scar 3 x per day for 5 mins. Scars take 1 year to heal. Can consider laser treatment for residual erythema    Screening exam for skin cancer  Total body skin examination performed today including at least 12 points as noted in physical examination. Suspicious lesions noted.    Recommend daily sun protection/avoidance, use of at least SPF 30, broad spectrum sunscreen (OTC drug), skin self examinations, and routine physician surveillance to optimize early detection    RTC 1 year, sooner pending bx results

## 2024-05-28 ENCOUNTER — PATIENT MESSAGE (OUTPATIENT)
Dept: DERMATOLOGY | Facility: CLINIC | Age: 54
End: 2024-05-28
Payer: COMMERCIAL

## 2024-05-28 LAB
COMMENT: NORMAL
FINAL PATHOLOGIC DIAGNOSIS: NORMAL
GROSS: NORMAL
Lab: NORMAL
MICROSCOPIC EXAM: NORMAL

## 2024-06-03 ENCOUNTER — HOSPITAL ENCOUNTER (OUTPATIENT)
Dept: RADIOLOGY | Facility: CLINIC | Age: 54
Discharge: HOME | End: 2024-06-03
Attending: INTERNAL MEDICINE
Payer: COMMERCIAL

## 2024-06-03 DIAGNOSIS — M85.9 LOW BONE DENSITY: ICD-10-CM

## 2024-06-03 PROCEDURE — 77080 DXA BONE DENSITY AXIAL: CPT | Mod: 26,,, | Performed by: INTERNAL MEDICINE

## 2024-06-03 PROCEDURE — 77080 DXA BONE DENSITY AXIAL: CPT | Mod: TC

## 2024-06-05 NOTE — PROGRESS NOTES
DEXA BMD shows osteopenia. Would recommend adequate dietary calcium intake, daily vitamin D supplementation, and weight bearing exercises. Will discuss further at upcoming clinic appt in a few weeks.

## 2024-06-10 ENCOUNTER — PATIENT MESSAGE (OUTPATIENT)
Dept: INTERNAL MEDICINE | Facility: CLINIC | Age: 54
End: 2024-06-10
Payer: COMMERCIAL

## 2024-07-01 ENCOUNTER — OFFICE VISIT (OUTPATIENT)
Dept: RHEUMATOLOGY | Facility: CLINIC | Age: 54
End: 2024-07-01
Payer: COMMERCIAL

## 2024-07-01 VITALS
HEART RATE: 64 BPM | DIASTOLIC BLOOD PRESSURE: 52 MMHG | SYSTOLIC BLOOD PRESSURE: 103 MMHG | BODY MASS INDEX: 21.12 KG/M2 | WEIGHT: 138.88 LBS

## 2024-07-01 DIAGNOSIS — Z79.899 IMMUNOSUPPRESSION DUE TO DRUG THERAPY: ICD-10-CM

## 2024-07-01 DIAGNOSIS — L40.50 PSORIATIC ARTHRITIS: Primary | ICD-10-CM

## 2024-07-01 DIAGNOSIS — D84.821 IMMUNOSUPPRESSION DUE TO DRUG THERAPY: ICD-10-CM

## 2024-07-01 DIAGNOSIS — Z79.899 HIGH RISK MEDICATION USE: ICD-10-CM

## 2024-07-01 PROCEDURE — 3078F DIAST BP <80 MM HG: CPT | Mod: CPTII,S$GLB,, | Performed by: STUDENT IN AN ORGANIZED HEALTH CARE EDUCATION/TRAINING PROGRAM

## 2024-07-01 PROCEDURE — 99999 PR PBB SHADOW E&M-EST. PATIENT-LVL III: CPT | Mod: PBBFAC,,, | Performed by: STUDENT IN AN ORGANIZED HEALTH CARE EDUCATION/TRAINING PROGRAM

## 2024-07-01 PROCEDURE — 3008F BODY MASS INDEX DOCD: CPT | Mod: CPTII,S$GLB,, | Performed by: STUDENT IN AN ORGANIZED HEALTH CARE EDUCATION/TRAINING PROGRAM

## 2024-07-01 PROCEDURE — 99214 OFFICE O/P EST MOD 30 MIN: CPT | Mod: S$GLB,,, | Performed by: STUDENT IN AN ORGANIZED HEALTH CARE EDUCATION/TRAINING PROGRAM

## 2024-07-01 PROCEDURE — 1160F RVW MEDS BY RX/DR IN RCRD: CPT | Mod: CPTII,S$GLB,, | Performed by: STUDENT IN AN ORGANIZED HEALTH CARE EDUCATION/TRAINING PROGRAM

## 2024-07-01 PROCEDURE — 3074F SYST BP LT 130 MM HG: CPT | Mod: CPTII,S$GLB,, | Performed by: STUDENT IN AN ORGANIZED HEALTH CARE EDUCATION/TRAINING PROGRAM

## 2024-07-01 PROCEDURE — 1159F MED LIST DOCD IN RCRD: CPT | Mod: CPTII,S$GLB,, | Performed by: STUDENT IN AN ORGANIZED HEALTH CARE EDUCATION/TRAINING PROGRAM

## 2024-07-01 NOTE — PROGRESS NOTES
Subjective:      Patient ID: Rebel Rodriguez is a 54 y.o. male.    Chief Complaint: follow up for PsA    Rebel Rodriguez is a 52yo M with history of psoriatic arthritis and infliximab induced lupus. He also has a PMH of TIA, KUSHAL, cervical degenerative disease s/p ACDF C4-7 (about 10yrs ago) and now s/p C3-4 ACDF (2/2024).    Rheum History:  - Psoriatic arthritis dx 2000s  - Infliximab induced lupus dx 2016 when pt had recurrent intermittent fevers, chills, arthralgias, rash  - +anti histone Ab 1.3 (2/2016)  - +ds DNA 1:1280 (2/2016), positive through 9/2017 and has been negative since then  - +DEE 1:1280 (2/2016)  - CH50 mildly decreased (3/2016), increased after stopping infliximab  - +anti cardiolipin Ab IgM 14.2 (2011, 2016)  - Most recently, primary symptoms were neck and lower back stiffness/pain with mild hip and hand stiffness  - Disease has been very well controlled on tremfya and otezla  - Notably, pt had recent prolonged hospitalization (1/30-2/23) from cervical fusion surgery which resulted in complication of pharyngeal laceration requiring repair, pt then was intubated and transitioned to trach, and had g tube placed as well - has been de-cannulated and continuing to improve    Prior Treatments:  - Infliximab (~[id number]) d/c'd due to concern for drug induced lupus; symptoms resolved with stopping med  - Stelara (4/2016-11/2016)  - Cosentyx (4/2017-5/2020) d/c'd due to loss of response  - Taltz (8/2020-6/2021) d/c'd due to poor symptom control  - Hydroxychloroquine (3/2016-4/2022) d/c'd since drug induced lupus completed resolved  - Sulfasalazine 1000 BID (11/2019-self d/c'd 12/2020)  - Methotrexate (d/c'd 1/2013)  - Cannot take TNFi due to drug induced lupus  - Cannot take [place] due to history of TIA  - Cannot take NSAIDs due to history of TIA  - Arava (1/2013-12/2023, d/c'd prior to cervical surgery with neuropathy symptoms - ok to restart if needed for disease activity in the future)    Current  Treatment:  - Tremfya (6/2021-current)  - Otezla (10/2021-current)    Interval History:  Pt presents today for follow up. Overall, has been stable and with no new complaints. Tolerating meds well. Has been exercising and trying to regain strength and slowly get back to baseline. Still not back to completely normal swallowing function. No new psoriasis rashes/lesions, still with the dry ridged/pitting nails. No joint pain, swelling, or stiffness most recently. PsA has remained well controlled.      Review of Systems   Constitutional:  Positive for unexpected weight change. Negative for fever.   HENT:  Positive for trouble swallowing. Negative for mouth sores.    Eyes:  Negative for redness.   Respiratory:  Negative for cough and shortness of breath.    Cardiovascular:  Negative for chest pain.   Gastrointestinal:  Negative for constipation and diarrhea.   Genitourinary:  Negative for genital sores.   Musculoskeletal:  Negative for arthralgias and joint swelling.   Skin:  Negative for rash.   Neurological:  Negative for headaches.   Hematological:  Does not bruise/bleed easily.      Objective:   BP (!) 103/52   Pulse 64   Wt 63 kg (138 lb 14.2 oz)   BMI 21.12 kg/m²   Physical Exam   Constitutional: He is oriented to person, place, and time. normal appearance. He appears well-developed and well-nourished. No distress.   HENT:   Head: Normocephalic and atraumatic.   Right Ear: External ear normal.   Left Ear: External ear normal.   Nose: Nose normal. No nasal congestion.   Mouth/Throat: Mucous membranes are moist. Oropharynx is clear.   Eyes: Conjunctivae are normal.   Neck:   Mildly limited neck ROM in setting of prior c-spine surgery   Cardiovascular: Normal rate, regular rhythm, normal heart sounds and normal pulses.   Pulmonary/Chest: Effort normal and breath sounds normal. No respiratory distress. He has no wheezes.   Abdominal: Soft. Bowel sounds are normal. He exhibits no distension. There is no abdominal  tenderness.   Musculoskeletal:         General: No swelling, tenderness or signs of injury. Normal range of motion.      Cervical back: Neck supple.      Comments: Refer to homunculus for full joint exam. No acute synovitis or enthesitis noted.   Neurological: He is alert and oriented to person, place, and time.   Reflexes: 3+ in LLE patellar/Achilles, 2+ else where in RLE/BUE (triceps, biceps, brachioradialis, patellar, achilles).    Skin: Skin is warm and dry. No lesion and no rash noted.   Nails with some ridging and pitting.   Psychiatric: His behavior is normal. Mood normal.   Vitals reviewed.        DAPSA 4.11, low disease activity    Assessment:     1. Psoriatic arthritis    2. Immunosuppression due to drug therapy    3. High risk medication use      - Pt with low disease activity for PsA   - Continues to be in remission with drug induced lupus and psoriasis  - Labs remain stable and mostly within normal ranges   - Still regaining strength and recuperating from the significant hospitalization but notably improved from prior    Plan:     Problem List Items Addressed This Visit          Immunology/Multi System    Immunosuppression due to drug therapy       Orthopedic    Psoriatic arthritis - Primary       Palliative Care    High risk medication use     - Continue current management: apremilast 30mg PO BID, and guselkumab 100mg SQ Q8wks  - Continue to hold off on leflunomide for now, low threshold to resume if needed for disease activity in the future since it was well tolerated and efficacious  - Labs before next visit: CBC, CMP, CRP, ESR  - Advised pt to get new covid booster     Follow up here in clinic in about 3-4 months or earlier if needed.    Assessment and plan discussed with supervising attending, Dr. Beverly.      Allie Hidalgo MD  PGY-4, Rheumatology

## 2024-07-01 NOTE — PROGRESS NOTES
I have personally reviewed the history, confirmed exam findings, and discussed assessment and plan with fellow.           EXAMINATION:  FL MODIFIED BARIUM SWALLOW SPEECH STUDY     CLINICAL HISTORY:  Arthrodesis status     TECHNIQUE:  Video fluoroscopic swallowing examination was performed in conjunction with the Speech Language Pathology Department.  Both thin and variably thickened liquid barium products as well as pureed food substances were used to assess swallowing.     Fluoroscopy time: 1.5 minutes     COMPARISON:  Modified barium swallow 02/29/2024     FINDINGS:  Pharyngeal Phase:     - Penetration: Laryngeal penetration with thin liquids.  No penetration with other consistencies.     - Aspiration: None.     - Residual/static material: Vallecular and piriform stasis.     Esophageal Phase (if visualized): N/A     Anatomic Observations: Postoperative change of anterior cervical spinal fusion.     Impression:     Laryngeal penetration with thin liquids.  No aspiration.     Please see speech pathology report for further details.     Electronically signed by resident: Esau Rivas  Date:                                            04/08/2024  Time:                                           14:32     Electronically signed by:Joseph Larsen MD  Date:                                            04/08/2024  Time:                                           14:42      ASSESSMENT:          Psoriasis in complete remission   PsA  TJ  0   SJ  0   Pt global 20   ESR 24     CRP 2.1    DAS28 2.64(LDA)  NFQ69-ODD 1.8(remission)  CDAI=  2(remission)  DAPSA  TJ 0 SJ 0 Pt global  2.5  Pt pain 2.5 CRP 0.11=4.87(LDA)  Leflunomide discontinued b/o paresthesiae found to be d/t cervical myelopathy rather than polyneuropathy. No need to resume just now as in remission, but could be restarted in future if needed.   S/p inflximab induced SLE  S/p C3-4 ACDF 1/30/24 with pharyngeal injury requiring repair, tracheostomy(wound site still slightly  bloody drainage) and laparotomy with gastric tube placement   Still dysphagia, swallowing study as above   Hyperlipidemia   LDL 61 10/10/23 on atorvastatin 10mg nightly  Low bone density DXA 6/3/24:  FRAX hip 0.4% MOF 2.2%    PLAN:          *Covid vaccine  Cont guselkumab 100mg sc q 8 wks  Cont apremilast 30mg twice daily   Cont atorvastatin 10mg nightly   RTC 3 months with CBC, CMP, ESR, CRP

## 2024-07-11 DIAGNOSIS — L40.50 PSA (PSORIATIC ARTHRITIS): ICD-10-CM

## 2024-07-11 RX ORDER — APREMILAST 30 MG/1
30 TABLET, FILM COATED ORAL 2 TIMES DAILY
Qty: 180 TABLET | Refills: 1 | Status: ACTIVE | OUTPATIENT
Start: 2024-07-11

## 2024-07-23 ENCOUNTER — PATIENT MESSAGE (OUTPATIENT)
Dept: OTOLARYNGOLOGY | Facility: CLINIC | Age: 54
End: 2024-07-23
Payer: COMMERCIAL

## 2024-07-24 ENCOUNTER — TELEPHONE (OUTPATIENT)
Dept: SPEECH THERAPY | Facility: HOSPITAL | Age: 54
End: 2024-07-24
Payer: COMMERCIAL

## 2024-07-24 DIAGNOSIS — R13.10 DYSPHAGIA, UNSPECIFIED TYPE: Primary | ICD-10-CM

## 2024-08-02 ENCOUNTER — CLINICAL SUPPORT (OUTPATIENT)
Dept: SPEECH THERAPY | Facility: HOSPITAL | Age: 54
End: 2024-08-02
Attending: OTOLARYNGOLOGY
Payer: COMMERCIAL

## 2024-08-02 DIAGNOSIS — R13.13 DYSPHAGIA, PHARYNGEAL: Primary | ICD-10-CM

## 2024-08-02 DIAGNOSIS — Z98.1 HX OF FUSION OF CERVICAL SPINE: ICD-10-CM

## 2024-08-02 DIAGNOSIS — R13.10 DYSPHAGIA, UNSPECIFIED TYPE: ICD-10-CM

## 2024-08-02 PROCEDURE — 92610 EVALUATE SWALLOWING FUNCTION: CPT | Mod: GN | Performed by: SPEECH-LANGUAGE PATHOLOGIST

## 2024-08-05 ENCOUNTER — PATIENT MESSAGE (OUTPATIENT)
Dept: SPEECH THERAPY | Facility: HOSPITAL | Age: 54
End: 2024-08-05
Payer: COMMERCIAL

## 2024-08-09 ENCOUNTER — CLINICAL SUPPORT (OUTPATIENT)
Dept: SPEECH THERAPY | Facility: HOSPITAL | Age: 54
End: 2024-08-09
Payer: COMMERCIAL

## 2024-08-09 DIAGNOSIS — R13.13 DYSPHAGIA, PHARYNGEAL: Primary | ICD-10-CM

## 2024-08-09 DIAGNOSIS — Z98.1 HX OF FUSION OF CERVICAL SPINE: ICD-10-CM

## 2024-08-09 PROCEDURE — 92526 ORAL FUNCTION THERAPY: CPT | Mod: GN | Performed by: SPEECH-LANGUAGE PATHOLOGIST

## 2024-08-21 ENCOUNTER — CLINICAL SUPPORT (OUTPATIENT)
Dept: SPEECH THERAPY | Facility: HOSPITAL | Age: 54
End: 2024-08-21
Payer: COMMERCIAL

## 2024-08-21 DIAGNOSIS — R13.13 DYSPHAGIA, PHARYNGEAL: Primary | ICD-10-CM

## 2024-08-21 DIAGNOSIS — Z98.1 HX OF FUSION OF CERVICAL SPINE: ICD-10-CM

## 2024-08-21 PROCEDURE — 92526 ORAL FUNCTION THERAPY: CPT | Mod: GN | Performed by: SPEECH-LANGUAGE PATHOLOGIST

## 2024-08-21 NOTE — PATIENT INSTRUCTIONS
"For meals:  Cut dry and/or dense foods into smaller bits as needed to help reduce time involved in chewing.  Prepare foods to tender, moist state and/or add condiments, gravies, sauces, rouxs and/or add a tiny sip of liquid to the mouth while chewing.  Swallow hard for at least the first 5 bites.      For home exercise program:  Daily, 3-4x/day  Swallow bread consistency food in 3/4" cubes.  Swallow HARD and until you  a bite is gone.  Try to swallow it all at once.  Take a sip of water as needed after you have cleared all you can with swallowing.       Bring your food of choice next visit: po-boy, pizza, breakfast sandwich    "

## 2024-08-21 NOTE — PROGRESS NOTES
"DIAGNOSIS:  Dysphagia, pharyngeal (R13.13), Hx fusion c-spine (Z98.1)  REFERRING DOCTOR:  Senthil Agarwal M.D., Head and Neck Surgical Oncologist  LENGTH OF SESSION:  50 minutes    REASON FOR VISIT:  Dysphagia therapy    INTERVAL HISTORY:  Reported recently dealing with the reality of what he went through in the aftermath of his surgery and the payments that are associated with this.  Has been a bit down and felt this may be coloring his perception of how his swallow function is working, but generally did not feel he had made much progress since last visit.  Mentioned feeling that water sometimes gets "stuck."      INTERVENTION:  Trialed ice water vs tap water.  He endorsed that ice water did seem to give the sensation of difficulty clearing bolus more than tap water (trialed later in session to allow time for any effects of ice water to cease).  He also observed that he never has the problem with hot coffee.  Recommended trying to use liquids that were room temp or warmer.    Short-term objectives:  Mr. Rodriguez will perform the following with % accuracy/consistency:  Demonstrate effortful swallow with all home program swallows.  Being met per patient report.    Modified MDTP protocol:  Advance per reduction of number of swallows per given bolus.  Thick puree  5 mL -- goal met  10 mL -- goal met per patient report -- using 1 swallow  B.  Mechanical soft and/or mixed consistency              I.  5 mL -- 1 swallow              Ii.  10 mL -- 2 swallows   This appeared to primarily rely on the size of the bolus.  C.  Masticated solid              I.  5 mL -- 1 swallow of raudel cracker              Ii.  10 mL -- 2 swallows   Again, appeared related primarily to size of bolus.     Trials with raudel cracker and saltine revealed need to use liquid wash with saltine more than raudel cracker.  Illustrated need for strategies with dry and/or dense foods.      3.  Demonstrate and/or report effortful swallow with first 5 " bites of any meal/snack.   Being met per report.     He currently is concerned with eating certain foods in public:  pizza, po-boys, breakfast sandwiches, and sushi.  Asked him to bring one of these to next visit.    IMPRESSIONS:  This 53 yo man appears to present with   Mild pharyngeal phase dysphagia characterized by inefficiency per perceived pharyngeal residue with solids in particular.  This aligns with the findings of his MBSS 4/8/24, but had not spontaneously improved since that time.  S/p repair of R posterior pharyngeal wall injury and R lateral pharyngotomy 1/30/24.  S/p C3-4 ACDF 1/30/24.  History cervical myelopathy due to C3-4 severe stenosis.     RECOMMENDATIONS/PLAN OF CARE:   It is felt that Mr. Rodriguez would benefit from  1.  Continuation  of a regular consistency diet as tolerated with thin liquids using the following strategies and common aspiration precautions, including, but not limited to  A.  Appropriate upright seating for all eating and drinking.  B.  Single or consecutive sips.  C.  Effortful swallows.  D.  Dry swallows as needed.  E.  Prepare foods to tender, moist state and/or add condiments, gravies, sauces, rouxs and/or add a tiny sip of liquid to the mouth while chewing.  F.  Pills with liquids.  G.  Monitoring for any signs/symptoms of aspiration (such as wet/gurgly voice that does not clear with coughing, inability to make any voice sounds, any persistent coughing with oral intake, otherwise unexplained fever, unexplained increased or new difficulty or discomfort breathing, unexplained increase in sleepiness/lethargy/significant fatigue, unexplained increase or new onset confusion or change in cognitive functioning, or any other unexplained change in health or well-being that could be related to swallowing).   2.  Short course of dysphagia therapy (3-5 visits) once weekly with home program.  (This element of the POC expires 9/30/24.)  3.  Follow up with Dr. Agarwal as directed.      Long-term goals:  Mr. Rodriguez will have improved efficiency of clearing boluses per reduction in number of swallows +/- liquid wash to clear a given bolus.

## 2024-08-29 ENCOUNTER — CLINICAL SUPPORT (OUTPATIENT)
Dept: SPEECH THERAPY | Facility: HOSPITAL | Age: 54
End: 2024-08-29
Payer: COMMERCIAL

## 2024-08-29 DIAGNOSIS — R13.13 DYSPHAGIA, PHARYNGEAL: Primary | ICD-10-CM

## 2024-08-29 DIAGNOSIS — Z98.1 HX OF FUSION OF CERVICAL SPINE: ICD-10-CM

## 2024-08-29 PROCEDURE — 92526 ORAL FUNCTION THERAPY: CPT | Mod: GN | Performed by: SPEECH-LANGUAGE PATHOLOGIST

## 2024-08-29 NOTE — PROGRESS NOTES
"DIAGNOSIS:  Dysphagia, pharyngeal (R13.13), Hx fusion c-spine (Z98.1)  REFERRING DOCTOR:  Senthil Agarwal M.D., Head and Neck Surgical Oncologist  LENGTH OF SESSION:  20 minutes    REASON FOR VISIT:  Dysphagia therapy    INTERVAL HISTORY:  Reported doing alright with swallowing.  Had a muffin today that was  cleared with use of effortful swallowing.  Mostly does not bring food back into the oral cavity to re-swallow.      INTERVENTION:  Brought [employer] sandwich (Italian meat, tomato, onion, condiments on v-sliced soft hogie bun).    Short-term objectives:  Mr. Rodriguez will perform the following with % accuracy/consistency:  Demonstrate effortful swallow with all home program swallows.  Being met per patient report.    Modified MDTP protocol:  Advance per reduction of number of swallows per given bolus.  Thick puree  5 mL -- goal met  10 mL -- goal met per patient report -- using 1 swallow  B.  Mechanical soft and/or mixed consistency              I.  5 mL --               Ii.  10 mL --    Deferred; goal met last visit with 1-2 swallows depending on bolus size.  C.  Masticated solid              I.  5 mL --               Ii.  10 mL --    Appeared related primarily to size of bolus and whether or not sip of liquid was added during mastication.  He took regular to "ambitious" bites of the sandwich.  Some contained more bun than others.  This, in particular, appeared to dictate how many swallow were needed (4-8).    Mr. Rodriguez reported that he would feel comfortable having a meal with this density of food while out in a restaurant with friends.  I agreed that nothing he was doing should draw negative attention to him while eating and advised that I would discharge him from therapy. He was amenable with that plan.     3.  Demonstrate and/or report effortful swallow with first 5 bites of any meal/snack.   Being met per report.       IMPRESSIONS:  This 53 yo man appears to present with   Mild pharyngeal phase " dysphagia characterized by reduced efficiency per perceived pharyngeal residue with solids in particular.  This aligns with the findings of his MBSS 4/8/24.  Some improvement in efficiency with use of effortful swallow and addition of moisture during mastication, but still appears to be related to bolus size and available space in pharynx through which a given bolus must travel. No more regurgitation to oral cavity.  No s/s concerning for airway threat.  S/p repair of R posterior pharyngeal wall injury and R lateral pharyngotomy 1/30/24.  S/p C3-4 ACDF 1/30/24.  History cervical myelopathy due to C3-4 severe stenosis.     RECOMMENDATIONS/PLAN OF CARE:   It is felt that Mr. Rodriguez would benefit from  1.  Continuation  of a regular consistency diet as tolerated with thin liquids using the following strategies and common aspiration precautions, including, but not limited to  A.  Appropriate upright seating for all eating and drinking.  B.  Single or consecutive sips.  C.  Effortful swallows.  D.  Dry swallows as needed.  E.  Prepare foods to tender, moist state and/or add condiments, gravies, sauces, rouxs and/or add a tiny sip of liquid to the mouth while chewing.  F.  Pills with liquids.  G.  Monitoring for any signs/symptoms of aspiration (such as wet/gurgly voice that does not clear with coughing, inability to make any voice sounds, any persistent coughing with oral intake, otherwise unexplained fever, unexplained increased or new difficulty or discomfort breathing, unexplained increase in sleepiness/lethargy/significant fatigue, unexplained increase or new onset confusion or change in cognitive functioning, or any other unexplained change in health or well-being that could be related to swallowing).   2.  Discharge from therapy based on progress.  (This element of the POC expires 9/30/24.)  3.  Follow up with Dr. Agarwal as directed.     Long-term goals:  Mr. Rodriguez will have improved efficiency of clearing boluses  per reduction in number of swallows +/- liquid wash to clear a given bolus.   Goal met.

## 2024-08-29 NOTE — PLAN OF CARE
IMPRESSIONS:  This 55 yo man appears to present with   Mild pharyngeal phase dysphagia characterized by reduced efficiency per perceived pharyngeal residue with solids in particular.  This aligns with the findings of his MBSS 4/8/24.  Some improvement in efficiency with use of effortful swallow and addition of moisture during mastication, but still appears to be related to bolus size and available space in pharynx through which a given bolus must travel. No more regurgitation to oral cavity.  No s/s concerning for airway threat.  S/p repair of R posterior pharyngeal wall injury and R lateral pharyngotomy 1/30/24.  S/p C3-4 ACDF 1/30/24.  History cervical myelopathy due to C3-4 severe stenosis.     RECOMMENDATIONS/PLAN OF CARE:   It is felt that Mr. Rodriguez would benefit from  1.  Continuation  of a regular consistency diet as tolerated with thin liquids using the following strategies and common aspiration precautions, including, but not limited to  A.  Appropriate upright seating for all eating and drinking.  B.  Single or consecutive sips.  C.  Effortful swallows.  D.  Dry swallows as needed.  E.  Prepare foods to tender, moist state and/or add condiments, gravies, sauces, rouxs and/or add a tiny sip of liquid to the mouth while chewing.  F.  Pills with liquids.  G.  Monitoring for any signs/symptoms of aspiration (such as wet/gurgly voice that does not clear with coughing, inability to make any voice sounds, any persistent coughing with oral intake, otherwise unexplained fever, unexplained increased or new difficulty or discomfort breathing, unexplained increase in sleepiness/lethargy/significant fatigue, unexplained increase or new onset confusion or change in cognitive functioning, or any other unexplained change in health or well-being that could be related to swallowing).   2.  Discharge from therapy based on progress.  (This element of the POC expires 9/30/24.)  3.  Follow up with Dr. Agarwal as directed.      Long-term goals:  Mr. Rodriguez will have improved efficiency of clearing boluses per reduction in number of swallows +/- liquid wash to clear a given bolus.   Goal met.

## 2024-09-03 ENCOUNTER — TELEPHONE (OUTPATIENT)
Dept: PHARMACY | Facility: CLINIC | Age: 54
End: 2024-09-03
Payer: COMMERCIAL

## 2024-09-03 NOTE — TELEPHONE ENCOUNTER
[facility]/Population Health Chart Review & Patient Outreach Details For Medication Adherence Project    Reason for Outreach Encounter: 3rd Party payor non-compliance report ([employer], [employer], C, etc)    2.  Patient Outreach Method: Reviewed patient chart     3.   Medication in question:   Hyperlipidemia Medications               atorvastatin (LIPITOR) 10 MG tablet Take 1 tablet (10 mg total) by mouth every evening.                LAST FILLED: 6/14/24 for 90 day supply    4.  Reviewed and or Updates Made To: Patient Chart    5. Outreach Outcomes and/or actions taken: Patient filled medication and is on track to be adherent    Additional Notes:

## 2024-11-19 ENCOUNTER — PATIENT MESSAGE (OUTPATIENT)
Dept: GASTROENTEROLOGY | Facility: CLINIC | Age: 54
End: 2024-11-19
Payer: COMMERCIAL

## 2024-11-29 ENCOUNTER — OFFICE VISIT (OUTPATIENT)
Dept: INTERNAL MEDICINE | Facility: CLINIC | Age: 54
End: 2024-11-29
Payer: COMMERCIAL

## 2024-11-29 ENCOUNTER — NURSE TRIAGE (OUTPATIENT)
Dept: ADMINISTRATIVE | Facility: CLINIC | Age: 54
End: 2024-11-29
Payer: COMMERCIAL

## 2024-11-29 ENCOUNTER — HOSPITAL ENCOUNTER (OUTPATIENT)
Dept: RADIOLOGY | Facility: OTHER | Age: 54
Discharge: HOME | End: 2024-11-29
Attending: NURSE PRACTITIONER
Payer: COMMERCIAL

## 2024-11-29 VITALS
SYSTOLIC BLOOD PRESSURE: 110 MMHG | BODY MASS INDEX: 21.72 KG/M2 | HEIGHT: 68 IN | WEIGHT: 143.31 LBS | HEART RATE: 70 BPM | DIASTOLIC BLOOD PRESSURE: 64 MMHG | OXYGEN SATURATION: 100 %

## 2024-11-29 DIAGNOSIS — R10.9 ABDOMINAL PAIN, UNSPECIFIED ABDOMINAL LOCATION: ICD-10-CM

## 2024-11-29 DIAGNOSIS — R10.9 ACUTE LEFT FLANK PAIN: Primary | ICD-10-CM

## 2024-11-29 DIAGNOSIS — R10.9 ACUTE LEFT FLANK PAIN: ICD-10-CM

## 2024-11-29 LAB
BACTERIA #/AREA URNS AUTO: ABNORMAL /HPF
BILIRUB UR QL STRIP: NEGATIVE
CLARITY UR REFRACT.AUTO: CLEAR
COLOR UR AUTO: YELLOW
GLUCOSE UR QL STRIP: NEGATIVE
HGB UR QL STRIP: NEGATIVE
KETONES UR QL STRIP: NEGATIVE
LEUKOCYTE ESTERASE UR QL STRIP: ABNORMAL
MICROSCOPIC COMMENT: ABNORMAL
NITRITE UR QL STRIP: NEGATIVE
PH UR STRIP: 6 [PH] (ref 5–8)
PROT UR QL STRIP: NEGATIVE
RBC #/AREA URNS AUTO: 5 /HPF (ref 0–4)
SP GR UR STRIP: 1.01 (ref 1–1.03)
URN SPEC COLLECT METH UR: ABNORMAL
WBC #/AREA URNS AUTO: 8 /HPF (ref 0–5)

## 2024-11-29 PROCEDURE — 74176 CT ABD & PELVIS W/O CONTRAST: CPT | Mod: 26,,, | Performed by: RADIOLOGY

## 2024-11-29 PROCEDURE — 74176 CT ABD & PELVIS W/O CONTRAST: CPT | Mod: TC

## 2024-11-29 PROCEDURE — 81001 URINALYSIS AUTO W/SCOPE: CPT | Performed by: NURSE PRACTITIONER

## 2024-11-29 PROCEDURE — 99999 PR PBB SHADOW E&M-EST. PATIENT-LVL IV: CPT | Mod: PBBFAC,,, | Performed by: NURSE PRACTITIONER

## 2024-11-29 RX ORDER — HYDROCODONE BITARTRATE AND ACETAMINOPHEN 5; 325 MG/1; MG/1
1 TABLET ORAL EVERY 8 HOURS PRN
Qty: 10 TABLET | Refills: 0 | Status: SHIPPED | OUTPATIENT
Start: 2024-11-29

## 2024-11-29 RX ORDER — TAMSULOSIN HYDROCHLORIDE 0.4 MG/1
0.4 CAPSULE ORAL DAILY
Qty: 30 CAPSULE | Refills: 0 | Status: SHIPPED | OUTPATIENT
Start: 2024-11-29 | End: 2024-12-29

## 2024-11-29 NOTE — PROGRESS NOTES
"Subjective     Patient ID: Rebel Rodriguez is a 54 y.o. male.  /64 (BP Location: Left arm, Patient Position: Sitting)   Pulse 70   Ht 5' 8" (1.727 m)   Wt 65 kg (143 lb 4.8 oz)   SpO2 100%   BMI 21.79 kg/m²      Chief Complaint: Flank Pain    Flank Pain  Pertinent negatives include no fever.       History of Present Illness    CHIEF COMPLAINT:  Rebel presents today with left-sided flank pain.    KIDNEY STONE:  He reports left-sided pain at a level of 4/10, describing it as burning but not acute. The pain is unpredictable, coming and going. This episode is similar to previous kidney stones. He has a history of recurrent calcium oxalate stones and hemoglobin in urinalysis. He has increased hydration and taken half a Percocet for pain relief. He avoids NSAIDs.      ROS:  Gastrointestinal: +abdominal pain  Musculoskeletal: -muscle pain         Patient Active Problem List   Diagnosis     psoriatric Arthritis    Psoriasis    Hx-TIA (transient ischemic attack)    Hyperlipidemia    Low back pain    Pain of left scapula    Cervical spondylosis    High risk medication use    Psoriatic arthritis    Hyperreflexia of lower extremity    Nasal septal deviation    Decreased range of motion of neck    Decreased strength of trunk and back    Apnea    LU (obstructive sleep apnea)    Chronic neck pain    Painful cervical ROM    Anemia    Cervical myelopathy    Injury of pharynx    On mechanically assisted ventilation    Anxiety    Gastrostomy status    Physical deconditioning    Abdominal cramping    Pain    Weakness    Debility    Kidney stones    Hyponatremia    Postprocedural hypotension    Tracheostomy in place    Immunosuppression due to drug therapy    Visual hallucinations    Insomnia    Depression        Current Outpatient Medications   Medication Sig Dispense Refill    apremilast (OTEZLA) 30 mg Tab Take 1 tablet (30 mg total) by mouth 2 (two) times daily. 180 tablet 1    aspirin (ECOTRIN) 81 MG EC tablet Take 81 mg " by mouth once daily.      atorvastatin (LIPITOR) 10 MG tablet Take 1 tablet (10 mg total) by mouth every evening. 90 tablet 3    FLUoxetine 40 MG capsule Take 1 capsule (40 mg) by mouth daily 90 capsule 3    guselkumab (TREMFYA) 100 mg/mL AtIn Inject 100 mg into the skin every 8 weeks. 3 mL 2    mirtazapine (REMERON) 15 MG tablet Take 1 tablet (15 mg) by mouth daily at bedtime 90 tablet 3    mirtazapine (REMERON) 7.5 MG Tab take one tab by mouth at bedtime 90 tablet 3    FLUoxetine 40 MG capsule Take 1 capsule (40 mg) by mouth daily 90 capsule 3    HYDROcodone-acetaminophen (NORCO) 5-325 mg per tablet Take 1 tablet by mouth every 8 (eight) hours as needed for Pain. 10 tablet 0    tamsulosin (FLOMAX) 0.4 mg Cap Take 1 capsule (0.4 mg total) by mouth once daily. 30 capsule 0     No current facility-administered medications for this visit.        Review of Systems   Constitutional:  Negative for fever.   Genitourinary:  Positive for flank pain.   All other systems reviewed and are negative.         Objective     Physical Exam  Constitutional:       Appearance: Normal appearance.   HENT:      Head: Normocephalic and atraumatic.   Cardiovascular:      Rate and Rhythm: Normal rate and regular rhythm.   Pulmonary:      Effort: Pulmonary effort is normal.   Abdominal:      General: Abdomen is flat. There is no distension.      Palpations: Abdomen is soft. There is no mass.      Tenderness: There is no abdominal tenderness. There is left CVA tenderness. There is no right CVA tenderness, guarding or rebound.      Hernia: No hernia is present.   Skin:     General: Skin is warm and dry.   Neurological:      Mental Status: He is alert and oriented to person, place, and time.   Psychiatric:         Mood and Affect: Mood normal.         Behavior: Behavior normal.          Assessment and Plan     1. Acute left flank pain  -     tamsulosin (FLOMAX) 0.4 mg Cap; Take 1 capsule (0.4 mg total) by mouth once daily.  Dispense: 30 capsule;  Refill: 0  -     HYDROcodone-acetaminophen (NORCO) 5-325 mg per tablet; Take 1 tablet by mouth every 8 (eight) hours as needed for Pain.  Dispense: 10 tablet; Refill: 0  -     Urinalysis, Reflex to Urine Culture Urine, Clean Catch  -     CT Renal Stone Study ABD Pelvis WO; Future; Expected date: 11/29/2024    2. Abdominal pain, unspecified abdominal location  -     CT Renal Stone Study ABD Pelvis WO; Future; Expected date: 11/29/2024        Assessment & Plan    Suspected kidney stone based on patient's history and presentation  Ordered CT to assess stone size and determine if safe for outpatient management  Prescribed pain medication and Flomax to facilitate stone passage  Ordered urinalysis with reflex to culture to rule out infection    KIDNEY STONES:  - Explained typical pain pattern for kidney stones.  - Rebel to increase hydration significantly.  - Started Flomax 30 pills, daily. Discontinue once stone has passed   - Started Norco (hydrocodone/acetaminophen) 10 pills, take every 8 hours as needed for pain.  - CT ordered to assess kidney stone size.  - Urinalysis with reflex to culture ordered.  - Contact the office if unable to pass stone.    MEDICATION SAFETY:  - Educated on medication safety, including avoiding alcohol with hydrocodone and proper disposal of unused medication.           Alpesh Yu NP   Internal Medicine           Follow up if symptoms worsen or fail to improve.    This note was generated with the assistance of ambient listening technology. Verbal consent was obtained by the patient and accompanying visitor(s) for the recording of patient appointment to facilitate this note. I attest to having reviewed and edited the generated note for accuracy, though some syntax or spelling errors may persist. Please contact the author of this note for any clarification.

## 2024-11-29 NOTE — TELEPHONE ENCOUNTER
Pt states he is having kidney problems on his left side. Started last night in the middle of the night. Has a history of kidney stones. Left sided flank pain. Rates current pain 7/10. No difficulty urinating or blood in urine. No fever.     Dispo- see in office today. Appt sched within timeframe. Care advice given.   Reason for Disposition   MODERATE pain (e.g., interferes with normal activities or awakens from sleep)    Additional Information   Negative: Passed out (e.g., fainted, lost consciousness, blacked out and was not responding)   Negative: Shock suspected (e.g., cold/pale/clammy skin, too weak to stand, low BP, rapid pulse)   Negative: Sounds like a life-threatening emergency to the triager   Negative: SEVERE pain (e.g., excruciating, scale 8-10) and present > 1 hour   Negative: Sudden onset of severe flank pain and age > 60 years   Negative: Abdominal pain and age > 60 years   Negative: Unable to urinate (or only a few drops) > 4 hours and bladder feels very full (e.g., palpable bladder or strong urge to urinate)   Negative: Pain radiates into groin, scrotum   Negative: Blood in urine (red, pink, or tea-colored)   Negative: Vomiting   Negative: Weakness of a leg or foot (e.g., unable to bear weight, dragging foot)   Negative: Patient sounds very sick or weak to the triager   Negative: Fever > 100.4 F (38.0 C)   Negative: Pain or burning with passing urine (urination)    Protocols used: Flank Pain-A-[place]

## 2024-12-01 ENCOUNTER — PATIENT MESSAGE (OUTPATIENT)
Dept: INTERNAL MEDICINE | Facility: CLINIC | Age: 54
End: 2024-12-01
Payer: COMMERCIAL

## 2024-12-16 ENCOUNTER — PATIENT MESSAGE (OUTPATIENT)
Dept: NEUROSURGERY | Facility: CLINIC | Age: 54
End: 2024-12-16
Payer: COMMERCIAL

## 2025-01-09 ENCOUNTER — OFFICE VISIT (OUTPATIENT)
Dept: NEUROSURGERY | Facility: CLINIC | Age: 55
End: 2025-01-09
Payer: COMMERCIAL

## 2025-01-09 VITALS — HEART RATE: 76 BPM | SYSTOLIC BLOOD PRESSURE: 104 MMHG | DIASTOLIC BLOOD PRESSURE: 68 MMHG

## 2025-01-09 DIAGNOSIS — Z98.1 S/P CERVICAL SPINAL FUSION: Primary | ICD-10-CM

## 2025-01-09 DIAGNOSIS — G95.9 CERVICAL MYELOPATHY: ICD-10-CM

## 2025-01-09 PROCEDURE — 3074F SYST BP LT 130 MM HG: CPT | Mod: CPTII,S$GLB,, | Performed by: PHYSICIAN ASSISTANT

## 2025-01-09 PROCEDURE — 1159F MED LIST DOCD IN RCRD: CPT | Mod: CPTII,S$GLB,, | Performed by: PHYSICIAN ASSISTANT

## 2025-01-09 PROCEDURE — 99999 PR PBB SHADOW E&M-EST. PATIENT-LVL III: CPT | Mod: PBBFAC,,, | Performed by: PHYSICIAN ASSISTANT

## 2025-01-09 PROCEDURE — 3078F DIAST BP <80 MM HG: CPT | Mod: CPTII,S$GLB,, | Performed by: PHYSICIAN ASSISTANT

## 2025-01-09 PROCEDURE — 99214 OFFICE O/P EST MOD 30 MIN: CPT | Mod: S$GLB,,, | Performed by: PHYSICIAN ASSISTANT

## 2025-01-09 NOTE — PROGRESS NOTES
CHIEF COMPLAINT:  Post op    I, Ines Booker, attest that this documentation has been prepared under the direction and in the presence of Rogerio Maradiaga MD.    HPI:  Rebel Rodriguez is a 54 y.o. male with hx of cervical myelopathy with prior C4-7 ACDF now s/p C3-4 ACDF 1/30 by Dr. Maradiaga which was complicated by retropharyngeal injury requiring repair by Dr. Agarwal on 1/30. He is s/p trach/G tube placement on 2/3. G tube removed 4/11/24. Today patient presents for 1 year post op. Reports continued numbness in fingers and mild gait dysfunction. He states it is not as severe as it was preop. He was following with SLP and made some progress. He states he continues to have difficulty with some foods.       Review of patient's allergies indicates:   Allergen Reactions    Erythromycin Nausea And Vomiting    Infliximab Other (See Comments)     Lupus with fever and acute arthritis  Lupus with fever and acute arthritis       Past Medical History:   Diagnosis Date    Achilles tendon rupture 10/09/2013    Achilles tendon rupture 10/09/2013    Allergy     Anemia 1/17/2024    Anxiety     Arthritis     psoriatric    Degenerative disc disease     Depression 5/12/2024    Drug-induced lupus erythematosus     Drug-induced lupus erythematosus 03/09/2016    Hyperlipidemia     Inguinal lymphadenopathy 07/21/2015    Kidney stone     Medication monitoring encounter 12/07/2016    Neck pain 07/06/2017    Psoriatic arthritis     Psoriatic arthritis 12/07/2016    Recurrent fever 07/08/2015    Recurrent nephrolithiasis 05/19/2014    On low oxalate diet    Sinusitis 02/25/2016    Stroke     TIA (transient ischemic attack)     Ulcer     high school    Unexplained night sweats 07/13/2015     Past Surgical History:   Procedure Laterality Date    ACHILLES TENDON SURGERY      BACK SURGERY      DIRECT LARYNGOSCOPY  2/3/2024    Procedure: LARYNGOSCOPY, DIRECT;  Surgeon: Jodie Collier MD;  Location: [facility] OR [address] [place] [address];  Service: ENT;;    FUNCTIONAL  ENDOSCOPIC SINUS SURGERY (FESS) USING COMPUTER-ASSISTED NAVIGATION Bilateral 9/14/2018    Procedure: FESS, USING COMPUTER-ASSISTED NAVIGATION;  Surgeon: Gerard Manzo MD;  Location: [facility] OR 2ND FLR;  Service: ENT;  Laterality: Bilateral;    FUSION, SPINE, CERVICAL, ANTERIOR APPROACH N/A 1/30/2024    Procedure: C3-4 anterior cervical discectomy and fusion;  Surgeon: Rogerio Maradiaga MD;  Location: [facility] OR 2ND FLR;  Service: Neurosurgery;  Laterality: N/A;  C3-4 anterior cervical discectomy and fusion  anesthesia: general  nerve mon: EMG/SEP/MEP  radiology: C-arm  bed: reg. slider  position: supine  headrest: caspar, GW tongs/hanging weights, surgical pillow    INJECTION OF ANESTHETIC AGENT AROUND NERVE Left 5/13/2022    Procedure: Block, Nerve MEDIAL BRANCH BLOCK LEFT C2,3,4,5;  Surgeon: Kimberly Wilson MD;  Location: [facility] PAIN MGT;  Service: Pain Management;  Laterality: Left;    INJECTION OF ANESTHETIC AGENT AROUND NERVE Left 5/24/2022    Procedure: BLOCK, NERVE, LEFT C2-C5 MEDIAL BRANCH;  Surgeon: Kimberly Wilson MD;  Location: [facility] PAIN MGT;  Service: Pain Management;  Laterality: Left;    LAPAROTOMY N/A 2/3/2024    Procedure: LAPAROTOMY with gastrostomy tube placement;  Surgeon: Benigno Perez MD;  Location: [facility] OR 2ND FLR;  Service: General;  Laterality: N/A;    NASAL SEPTOPLASTY N/A 9/14/2018    Procedure: SEPTOPLASTY, NASAL;  Surgeon: Gerard Manzo MD;  Location: [facility] OR [facility] FLR;  Service: ENT;  Laterality: N/A;    NASAL TURBINATE REDUCTION Bilateral 9/14/2018    Procedure: REDUCTION, NASAL TURBINATE;  Surgeon: Gerard Manzo MD;  Location: [facility] OR [facility] FLR;  Service: ENT;  Laterality: Bilateral;    NECK EXPLORATION N/A 1/30/2024    Procedure: EXPLORATION, NECK, REPAIR OF PHARYNGEAL INJURY;  Surgeon: Senthil Agarwal MD;  Location: [facility] OR 2ND FLR;  Service: ENT;  Laterality: N/A;    RADIOFREQUENCY ABLATION Left 7/12/2022    Procedure: RADIOFREQUENCY ABLATION, LEFT C2-C3, C3-C4, C4-C5;   Surgeon: Kimberly Wilson MD;  Location: [facility] PAIN MGT;  Service: Pain Management;  Laterality: Left;    TRACHEOTOMY  2/3/2024    Procedure: TRACHEOTOMY;  Surgeon: Jodie Collier MD;  Location: [facility] OR [address] [place] [address];  Service: ENT;;    UPPER ENDOSCOPY W/ ESOPHAGEAL MANOMETRY      URETERAL STENT PLACEMENT       Family History   Problem Relation Name Age of Onset    Crohn's disease Mother      Pancreatic cancer Father      Ulcerative colitis Father      Cancer Father  61        pancreatic ca    Osteoarthritis Maternal Grandmother      Crohn's disease Maternal Grandmother      Cataracts Maternal Grandmother      Cataracts Maternal Grandfather      Cataracts Paternal Grandmother      Cataracts Paternal Grandfather      Amblyopia Neg Hx      Blindness Neg Hx       Social History     Tobacco Use    Smoking status: Never    Smokeless tobacco: Never   Substance Use Topics    Alcohol use: No     Alcohol/week: 0.0 standard drinks of alcohol     Types: 1 - 2 Standard drinks or equivalent per week     Comment: cocktails    Drug use: No        Review of Systems   HENT:  Positive for trouble swallowing.    Neurological:  Positive for numbness.        Balance issues.   All other systems reviewed and are negative.      OBJECTIVE:   Vital Signs:       Physical Exam:  General: well developed, well nourished, no distress.   Head: normocephalic, atraumatic  Neurologic: Alert and oriented. Thought content appropriate.  GCS: Motor: 6/Verbal: 5/Eyes: 4 GCS Total: 15  Mental Status: Awake, Alert, Oriented x 4  Language: No aphasia  Speech: No dysarthria  Cranial nerves: face symmetric, tongue midline, CN II-XII grossly intact.   Eyes: pupils equal, round, reactive to light with accomodation, EOMI.   Pulmonary: normal respirations, no signs of respiratory distress  Sensory: intact to light touch throughout  Motor Strength: Moves all extremities spontaneously with good tone.  Full strength upper and lower extremities. No abnormal movements seen.      Strength  Deltoids Triceps Biceps Wrist Extension Wrist Flexion Hand    Upper: R 5/5 5/5 5/5 5/5 5/5 5/5    L 5/5 5/5 5/5 5/5 5/5 5/5     Iliopsoas Quadriceps Knee  Flexion Tibialis  anterior Gastro- cnemius EHL   Lower: R 5/5 5/5 5/5 5/5 5/5 5/5    L 5/5 5/5 5/5 5/5 5/5 5/5     Magallanes: present bilaterally  Clonus: absent  Skin: Skin is warm, dry and intact.    Incision well healed.    ASSESSMENT/PLAN:     Rebel Rodriguez is a 54 y.o. male s/p C3-4 ACDF 1/30 by Dr. Maradiaga which was complicated by retropharyngeal injury requiring repair by Dr. Agarwal on 1/30. He was seen today by Dr. Maradiaga. Presents for 1 year post-op visit. Referral to neuro PT for gait training in the setting of cervical myelopathy. Message sent to Dr. Agarwal's clinic for follow-up of dysphagia. We discussed concerning signs and symptoms that would prompt return to clinic or urgent medical attention, patient v/u. All questions answered. Encouraged to call the clinic with questions/concerns prior to the next visit. F/u in 6 months or sooner if needed.       Melanie Torres PA-C  Neurosurgery  [facility]

## 2025-02-03 DIAGNOSIS — L40.50 PSA (PSORIATIC ARTHRITIS): ICD-10-CM

## 2025-02-04 RX ORDER — APREMILAST 30 MG/1
30 TABLET, FILM COATED ORAL 2 TIMES DAILY
Qty: 180 TABLET | Refills: 1 | Status: ACTIVE | OUTPATIENT
Start: 2025-02-04

## 2025-02-05 ENCOUNTER — PATIENT MESSAGE (OUTPATIENT)
Dept: RHEUMATOLOGY | Facility: CLINIC | Age: 55
End: 2025-02-05
Payer: COMMERCIAL

## 2025-02-11 ENCOUNTER — IMMUNIZATION (OUTPATIENT)
Dept: INTERNAL MEDICINE | Facility: CLINIC | Age: 55
End: 2025-02-11
Payer: COMMERCIAL

## 2025-02-11 ENCOUNTER — LAB VISIT (OUTPATIENT)
Dept: LAB | Facility: OTHER | Age: 55
End: 2025-02-11
Attending: STUDENT IN AN ORGANIZED HEALTH CARE EDUCATION/TRAINING PROGRAM
Payer: COMMERCIAL

## 2025-02-11 DIAGNOSIS — Z23 NEED FOR VACCINATION: Primary | ICD-10-CM

## 2025-02-11 DIAGNOSIS — L40.50 PSA (PSORIATIC ARTHRITIS): ICD-10-CM

## 2025-02-11 LAB
ALBUMIN SERPL BCP-MCNC: 3.8 G/DL (ref 3.5–5.2)
ALP SERPL-CCNC: 100 U/L (ref 40–150)
ALT SERPL W/O P-5'-P-CCNC: 21 U/L (ref 10–44)
ANION GAP SERPL CALC-SCNC: 11 MMOL/L (ref 8–16)
AST SERPL-CCNC: 23 U/L (ref 10–40)
BASOPHILS # BLD AUTO: 0.03 K/UL (ref 0–0.2)
BASOPHILS NFR BLD: 0.4 % (ref 0–1.9)
BILIRUB SERPL-MCNC: 0.4 MG/DL (ref 0.1–1)
BUN SERPL-MCNC: 13 MG/DL (ref 6–20)
CALCIUM SERPL-MCNC: 9.9 MG/DL (ref 8.7–10.5)
CHLORIDE SERPL-SCNC: 104 MMOL/L (ref 95–110)
CO2 SERPL-SCNC: 25 MMOL/L (ref 23–29)
CREAT SERPL-MCNC: 1 MG/DL (ref 0.5–1.4)
CRP SERPL-MCNC: 3.4 MG/L (ref 0–8.2)
DIFFERENTIAL METHOD BLD: ABNORMAL
EOSINOPHIL # BLD AUTO: 0.1 K/UL (ref 0–0.5)
EOSINOPHIL NFR BLD: 1.3 % (ref 0–8)
ERYTHROCYTE [DISTWIDTH] IN BLOOD BY AUTOMATED COUNT: 13.3 % (ref 11.5–14.5)
ERYTHROCYTE [SEDIMENTATION RATE] IN BLOOD BY PHOTOMETRIC METHOD: 35 MM/HR (ref 0–23)
EST. GFR  (NO RACE VARIABLE): >60 ML/MIN/1.73 M^2
GLUCOSE SERPL-MCNC: 134 MG/DL (ref 70–110)
HBV CORE AB SERPL QL IA: NORMAL
HBV SURFACE AB SER-ACNC: 6.36 MIU/ML
HBV SURFACE AB SER-ACNC: NORMAL M[IU]/ML
HBV SURFACE AG SERPL QL IA: NORMAL
HCT VFR BLD AUTO: 42.1 % (ref 40–54)
HCV AB SERPL QL IA: NEGATIVE
HGB BLD-MCNC: 13.4 G/DL (ref 14–18)
IMM GRANULOCYTES # BLD AUTO: 0.04 K/UL (ref 0–0.04)
IMM GRANULOCYTES NFR BLD AUTO: 0.6 % (ref 0–0.5)
LYMPHOCYTES # BLD AUTO: 1.5 K/UL (ref 1–4.8)
LYMPHOCYTES NFR BLD: 21 % (ref 18–48)
MCH RBC QN AUTO: 27.6 PG (ref 27–31)
MCHC RBC AUTO-ENTMCNC: 31.8 G/DL (ref 32–36)
MCV RBC AUTO: 87 FL (ref 82–98)
MONOCYTES # BLD AUTO: 0.5 K/UL (ref 0.3–1)
MONOCYTES NFR BLD: 6.8 % (ref 4–15)
NEUTROPHILS # BLD AUTO: 4.9 K/UL (ref 1.8–7.7)
NEUTROPHILS NFR BLD: 69.9 % (ref 38–73)
NRBC BLD-RTO: 0 /100 WBC
PLATELET # BLD AUTO: 298 K/UL (ref 150–450)
PMV BLD AUTO: 9 FL (ref 9.2–12.9)
POTASSIUM SERPL-SCNC: 4.7 MMOL/L (ref 3.5–5.1)
PROT SERPL-MCNC: 7.7 G/DL (ref 6–8.4)
RBC # BLD AUTO: 4.85 M/UL (ref 4.6–6.2)
SODIUM SERPL-SCNC: 140 MMOL/L (ref 136–145)
WBC # BLD AUTO: 7.01 K/UL (ref 3.9–12.7)

## 2025-02-11 PROCEDURE — 86140 C-REACTIVE PROTEIN: CPT | Performed by: STUDENT IN AN ORGANIZED HEALTH CARE EDUCATION/TRAINING PROGRAM

## 2025-02-11 PROCEDURE — 86803 HEPATITIS C AB TEST: CPT | Performed by: STUDENT IN AN ORGANIZED HEALTH CARE EDUCATION/TRAINING PROGRAM

## 2025-02-11 PROCEDURE — 85025 COMPLETE CBC W/AUTO DIFF WBC: CPT | Performed by: STUDENT IN AN ORGANIZED HEALTH CARE EDUCATION/TRAINING PROGRAM

## 2025-02-11 PROCEDURE — 80053 COMPREHEN METABOLIC PANEL: CPT | Performed by: STUDENT IN AN ORGANIZED HEALTH CARE EDUCATION/TRAINING PROGRAM

## 2025-02-11 PROCEDURE — 86480 TB TEST CELL IMMUN MEASURE: CPT | Performed by: STUDENT IN AN ORGANIZED HEALTH CARE EDUCATION/TRAINING PROGRAM

## 2025-02-11 PROCEDURE — 86706 HEP B SURFACE ANTIBODY: CPT | Mod: 91 | Performed by: STUDENT IN AN ORGANIZED HEALTH CARE EDUCATION/TRAINING PROGRAM

## 2025-02-11 PROCEDURE — 86704 HEP B CORE ANTIBODY TOTAL: CPT | Performed by: STUDENT IN AN ORGANIZED HEALTH CARE EDUCATION/TRAINING PROGRAM

## 2025-02-11 PROCEDURE — 87340 HEPATITIS B SURFACE AG IA: CPT | Performed by: STUDENT IN AN ORGANIZED HEALTH CARE EDUCATION/TRAINING PROGRAM

## 2025-02-11 PROCEDURE — 91320 SARSCV2 VAC 30MCG TRS-SUC IM: CPT | Mod: S$GLB,,, | Performed by: INTERNAL MEDICINE

## 2025-02-11 PROCEDURE — 85652 RBC SED RATE AUTOMATED: CPT | Performed by: STUDENT IN AN ORGANIZED HEALTH CARE EDUCATION/TRAINING PROGRAM

## 2025-02-11 PROCEDURE — 90480 ADMN SARSCOV2 VAC 1/ONLY CMP: CPT | Mod: S$GLB,,, | Performed by: INTERNAL MEDICINE

## 2025-02-11 PROCEDURE — 36415 COLL VENOUS BLD VENIPUNCTURE: CPT | Performed by: STUDENT IN AN ORGANIZED HEALTH CARE EDUCATION/TRAINING PROGRAM

## 2025-02-13 LAB
GAMMA INTERFERON BACKGROUND BLD IA-ACNC: 0 IU/ML
M TB IFN-G CD4+ BCKGRND COR BLD-ACNC: 0 IU/ML
M TB IFN-G CD4+ BCKGRND COR BLD-ACNC: 0 IU/ML
MITOGEN IGNF BCKGRD COR BLD-ACNC: 1.19 IU/ML
TB GOLD PLUS: NEGATIVE

## 2025-02-17 ENCOUNTER — OFFICE VISIT (OUTPATIENT)
Dept: OTOLARYNGOLOGY | Facility: CLINIC | Age: 55
End: 2025-02-17
Payer: COMMERCIAL

## 2025-02-17 VITALS
SYSTOLIC BLOOD PRESSURE: 113 MMHG | WEIGHT: 142.19 LBS | BODY MASS INDEX: 21.62 KG/M2 | DIASTOLIC BLOOD PRESSURE: 64 MMHG

## 2025-02-17 DIAGNOSIS — R13.10 DYSPHAGIA, UNSPECIFIED TYPE: Primary | ICD-10-CM

## 2025-02-17 NOTE — PROGRESS NOTES
Chief complaint: Dysphagia    HPI   55 y.o. male presents roughly 1 year status post anterior cervical disc fusion complicated by pharyngeal perforation requiring repair.  He subsequently underwent PEG and trach tube placement.  He reports some persistent difficulty swallowing thin liquids and dry foods such as bread.  He has compensated well and reports that he is able to swallow dry foods if he follows them with liquid.  No other complaints.    Review of Systems   Constitutional: Negative for fatigue and unexpected weight change.   HENT: Per HPI.  Eyes: Negative for visual disturbance.   Respiratory: Negative for shortness of breath, hemoptysis   Cardiovascular: Negative for chest pain and palpitations.   Musculoskeletal: Negative for decreased ROM, back pain.   Skin: Negative for rash, sunburn, itching.   Neurological: Negative for dizziness and seizures.   Hematological: Negative for adenopathy. Does not bruise/bleed easily.   Endocrine: Negative for rapid weight loss/weight gain, heat/cold intolerance.     Past Medical History   Problem List[1]        Past Surgical History   Past Surgical History:   Procedure Laterality Date    ACHILLES TENDON SURGERY      BACK SURGERY      DIRECT LARYNGOSCOPY  2/3/2024    Procedure: LARYNGOSCOPY, DIRECT;  Surgeon: Jodie Collier MD;  Location: [address];  Service: ENT;;    FUNCTIONAL ENDOSCOPIC SINUS SURGERY (FESS) USING COMPUTER-ASSISTED NAVIGATION Bilateral 9/14/2018    Procedure: FESS, USING COMPUTER-ASSISTED NAVIGATION;  Surgeon: Gerard Manzo MD;  Location: [address];  Service: ENT;  Laterality: Bilateral;    FUSION, SPINE, CERVICAL, ANTERIOR APPROACH N/A 1/30/2024    Procedure: C3-4 anterior cervical discectomy and fusion;  Surgeon: Rogerio Maradiaga MD;  Location: [address];  Service: Neurosurgery;  Laterality: N/A;  C3-4 anterior cervical discectomy and fusion  anesthesia: general  nerve mon: EMG/SEP/MEP  radiology: C-arm  bed: reg. slider  position:  supine  headrest: caspar, GW tongs/hanging weights, surgical pillow    INJECTION OF ANESTHETIC AGENT AROUND NERVE Left 5/13/2022    Procedure: Block, Nerve MEDIAL BRANCH BLOCK LEFT C2,3,4,5;  Surgeon: Kimberly Wilson MD;  Location: [facility] PAIN [facility]GT;  Service: Pain Management;  Laterality: Left;    INJECTION OF ANESTHETIC AGENT AROUND NERVE Left 5/24/2022    Procedure: BLOCK, NERVE, LEFT C2-C5 MEDIAL BRANCH;  Surgeon: Kimberly Wilson MD;  Location: [facility]IN [facility]GT;  Service: Pain Management;  Laterality: Left;    LAPAROTOMY N/A 2/3/2024    Procedure: LAPAROTOMY with gastrostomy tube placement;  Surgeon: Benigno Perez MD;  [facility];  Service: General;  Laterality: N/A;    NASAL SEPTOPLASTY N/A 9/14/2018    Procedure: SEPTOPLASTY, NASAL;  Surgeon: Gerard Manzo MD;  [facility];  Service: ENT;  Laterality: N/A;    NASAL TURBINATE REDUCTION Bilateral 9/14/2018    Procedure: REDUCTION, NASAL TURBINATE;  Surgeon: Gerard Manzo MD;  [facility];  Service: ENT;  Laterality: Bilateral;    NECK EXPLORATION N/A 1/30/2024    Procedure: EXPLORATION, NECK, REPAIR OF PHARYNGEAL INJURY;  Surgeon: Senthil Agarwal MD;  Location: [facility] OR 2ND FLR;  Service: ENT;  Laterality: N/A;    RADIOFREQUENCY ABLATION Left 7/12/2022    Procedure: RADIOFREQUENCY ABLATION, LEFT C2-C3, C3-C4, C4-C5;  Surgeon: Kimberly Wilson MD;  Location: [facility];  Service: Pain Management;  Laterality: Left;    TRACHEOTOMY  2/3/2024    Procedure: TRACHEOTOMY;  Surgeon: Jodie Collier MD;  [facility];  Service: ENT;;    UPPER ENDOSCOPY W/ ESOPHAGEAL MANOMETRY      URETERAL STENT PLACEMENT           Family History   Family History   Problem Relation Name Age of Onset    Crohn's disease Mother      Pancreatic cancer Father      Ulcerative colitis Father      Cancer Father  61        pancreatic ca    Osteoarthritis Maternal Grandmother      Crohn's disease Maternal Grandmother       Cataracts Maternal Grandmother      Cataracts Maternal Grandfather      Cataracts Paternal Grandmother      Cataracts Paternal Grandfather      Amblyopia Neg Hx      Blindness Neg Hx             Social History   .Social History[2]      Allergies   Review of patient's allergies indicates:   Allergen Reactions    Erythromycin Nausea And Vomiting    Infliximab Other (See Comments)     Lupus with fever and acute arthritis  Lupus with fever and acute arthritis           Physical Exam     Vitals:    02/17/25 1305   BP: 113/64         Body mass index is 21.62 kg/m².      General: AOx3, NAD   Respiratory:  Symmetric chest rise, normal effort  Nose: No gross nasal septal deviation. Inferior Turbinates WNL bilaterally. No septal perforation. No masses/lesions.   Oral Cavity:  Oral Tongue mobile, no lesions noted. Hard Palate WNL. No buccal or FOM lesions.  Oropharynx:  No masses/lesions of the posterior pharyngeal wall. Tonsillar fossa without lesions. Soft palate without masses. Midline uvula.   Neck:  Well-healed right neck scars.  No cervical lymphadenopathy, thyromegaly or thyroid nodules.  Normal range of motion.    Face: House Brackmann I bilaterally.     Flex Naso Emma Hypo Procedures #2    Procedure:  Diagnostic flexible nasopharyngoscopy, laryngoscopy and hypopharyngoscopy:    Routine preparation with local atomizer with 1% neosynephrine/pontocaine with customary flexible endoscope.    Nasopharynx:  No lesions.   Mucosa:  No lesions.   Adenoids:  Present.  Posterior Choanae:  Patent.  Eustachian Tubes:  Patent.  Posterior pharynx:  No lesions.  Larynx/hypopharynx:   Epiglottis:  No lesions, without edema.   AE Folds:  No lesions.   Vocal cords:  No polyps, nodules, ulcers or lesions.   Mobility:  Equal and normal bilateral.   Hypopharynx:  No lesions.   Piriform sinus:  No pooling, no lesions.   Post Cricoid:  No erythema, no edema.    Additional Findings:  Posterior pharyngeal bulge consistent with  hardware    Assessment/Plan  Problem List Items Addressed This Visit          GI    Dysphagia - Primary    Exam reveals only stigmata of surgery.  He has compensated well.  I offered to order a repeat modified barium swallow but he is comfortable observing for now.  Return p.r.n.                         [1]   Patient Active Problem List  Diagnosis     psoriatric Arthritis    Psoriasis    Hx-TIA (transient ischemic attack)    Hyperlipidemia    Low back pain    Pain of left scapula    Cervical spondylosis    High risk medication use    Psoriatic arthritis    Hyperreflexia of lower extremity    Nasal septal deviation    Decreased range of motion of neck    Decreased strength of trunk and back    Apnea    LU (obstructive sleep apnea)    Chronic neck pain    Painful cervical ROM    Anemia    Cervical myelopathy    Dysphagia    Injury of pharynx    On mechanically assisted ventilation    Anxiety    Gastrostomy status    Physical deconditioning    Abdominal cramping    Pain    Weakness    Debility    Kidney stones    Hyponatremia    Postprocedural hypotension    Tracheostomy in place    Immunosuppression due to drug therapy    Visual hallucinations    Insomnia    Depression   [2]   Social History  Socioeconomic History    Marital status: Single   Occupational History    Occupation: [employer]     Employer: self employed   Tobacco Use    Smoking status: Never    Smokeless tobacco: Never   Substance and Sexual Activity    Alcohol use: No     Alcohol/week: 0.0 standard drinks of alcohol     Types: 1 - 2 Standard drinks or equivalent per week     Comment: cocktails    Drug use: No   Social History Narrative    1 flight     Social Drivers of Health     Financial Resource Strain: Low Risk  (2/10/2025)    Overall Financial Resource Strain (CARDIA)     Difficulty of Paying Living Expenses: Not hard at all   Food Insecurity: No Food Insecurity (2/10/2025)    Hunger Vital Sign     Worried About Running Out of Food in the Last  Year: Never true     Ran Out of Food in the Last Year: Never true   Transportation Needs: Patient Unable To Answer (2/1/2024)    PRAPARE - Transportation     Lack of Transportation (Medical): Patient unable to answer     Lack of Transportation (Non-Medical): Patient unable to answer   Recent Concern: Transportation Needs - Unmet Transportation Needs (12/4/2023)    PRAPARE - Transportation     Lack of Transportation (Medical): Yes     Lack of Transportation (Non-Medical): Yes   Physical Activity: Sufficiently Active (2/10/2025)    Exercise Vital Sign     Days of Exercise per Week: 3 days     Minutes of Exercise per Session: 60 min   Stress: No Stress Concern Present (2/10/2025)    [employer] - Occupational Stress Questionnaire     Feeling of Stress : Only a little   Housing Stability: Unknown (2/10/2025)    Housing Stability Vital Sign     Unable to Pay for Housing in the Last Year: No

## 2025-03-23 DIAGNOSIS — E78.5 HYPERLIPIDEMIA: ICD-10-CM

## 2025-03-24 RX ORDER — ATORVASTATIN CALCIUM 10 MG/1
10 TABLET, FILM COATED ORAL NIGHTLY
Qty: 90 TABLET | Refills: 3 | Status: SHIPPED | OUTPATIENT
Start: 2025-03-24

## 2025-04-03 NOTE — SUBJECTIVE & OBJECTIVE
Interval History:   No issues overnight. Plan for discharge to [facility] today.     Medications:  Continuous Infusions:   sodium chloride 0.9% Stopped (02/03/24 0801)    dextrose 10 % in water (D10W)       Scheduled Meds:   albuterol-ipratropium  3 mL Nebulization Q6H WAKE    apremilast  30 mg Gastrostomy Tube BID    atorvastatin  10 mg Per NG tube Daily    FLUoxetine  40 mg Per NG tube Daily    heparin (porcine)  5,000 Units Subcutaneous Q8H    mirtazapine  30 mg Per NG tube QHS    QUEtiapine  50 mg Per G Tube QHS     PRN Meds:acetaminophen, albuterol-ipratropium, bisacodyL, dextrose 10 % in water (D10W), dextrose 10%, dextrose 10%, glucagon (human recombinant), HYDROmorphone, insulin aspart U-100, ondansetron, oxyCODONE, polyethylene glycol, prochlorperazine     Review of patient's allergies indicates:   Allergen Reactions    Erythromycin Nausea And Vomiting    Infliximab Other (See Comments)     Lupus with fever and acute arthritis  Lupus with fever and acute arthritis     Objective:     Vital Signs (24h Range):  Temp:  [98.1 °F (36.7 °C)-98.4 °F (36.9 °C)] 98.3 °F (36.8 °C)  Pulse:  [61-79] 77  Resp:  [18-20] 18  SpO2:  [97 %-99 %] 97 %  BP: (107-111)/(55-62) 107/62     Date 02/14/24 0700 - 02/15/24 0659   Shift 3423-9164 6283-0732 6716-7237 24 Hour Total   INTAKE   NG/   600   Shift Total(mL/kg) 600(10.3)   600(10.3)   OUTPUT   Shift Total(mL/kg)       Weight (kg) 58.1 58.1 58.1 58.1     Lines/Drains/Airways       Drain  Duration                  Closed/Suction Drain 01/30/24 1916 Right;Ventral Neck Bulb 15 Fr. 14 days         Gastrostomy/Enterostomy 02/03/24 0934 Gastrostomy tube w/ balloon LUQ 11 days              Airway  Duration             Adult Surgical Airway 02/03/24 1055 Shiley Uncuffed 6.0/ 75mm 11 days              Peripheral Intravenous Line  Duration                  Peripheral IV - Single Lumen 02/05/24 1915 18 G Anterior;Right Forearm 8 days                     Physical Exam  Resting in bed   6-0  cuffless tracheostomy tube in good position, secured with soft collar  Neck incision c/d/I  Neck soft, no subq emphysema      Significant Labs:  CBC:   Recent Labs   Lab 02/13/24  0454   WBC 16.64*   RBC 3.95*   HGB 11.3*   HCT 34.2*   *   MCV 87   MCH 28.6   MCHC 33.0     CMP:   Recent Labs   Lab 02/10/24  0403 02/12/24  1032 02/13/24  0454   GLU 85   < > 105   CALCIUM 9.6   < > 10.3   ALBUMIN 3.0*  --   --    PROT 6.7  --   --       < > 138   K 3.9   < > 4.1   CO2 23   < > 21*      < > 105   BUN 25*   < > 23*   CREATININE 0.8   < > 0.9   ALKPHOS 79  --   --    ALT 40  --   --    AST 44*  --   --    BILITOT 0.7  --   --     < > = values in this interval not displayed.       Significant Diagnostics:  None   Duration Of Freeze Thaw-Cycle (Seconds): 0 Detail Level: Detailed Post-Care Instructions: I reviewed with the patient in detail post-care instructions. Patient is to wear sunprotection and avoid picking at any of the treated lesions. Pt may apply Vaseline, Aquaphor or other topical ointment as directed by the provider.  Topical antibiotic ointments can be used if there are no allergies to the ingredients. Show Aperture Variable?: Yes Number Of Freeze-Thaw Cycles: 2 freeze-thaw cycles Aperture Size (Optional): C Consent: The patient's consent was obtained including but not limited to risks of crusting, scabbing, blistering, scarring, darker or lighter pigmentary change, recurrence, incomplete removal and infection. Render Note In Bullet Format When Appropriate: No

## 2025-04-07 ENCOUNTER — OFFICE VISIT (OUTPATIENT)
Dept: RHEUMATOLOGY | Facility: CLINIC | Age: 55
End: 2025-04-07
Payer: COMMERCIAL

## 2025-04-07 VITALS
HEART RATE: 61 BPM | DIASTOLIC BLOOD PRESSURE: 72 MMHG | HEIGHT: 68 IN | WEIGHT: 141.44 LBS | BODY MASS INDEX: 21.44 KG/M2 | SYSTOLIC BLOOD PRESSURE: 114 MMHG

## 2025-04-07 DIAGNOSIS — D84.821 IMMUNOSUPPRESSION DUE TO DRUG THERAPY: ICD-10-CM

## 2025-04-07 DIAGNOSIS — L40.50 PSA (PSORIATIC ARTHRITIS): Primary | ICD-10-CM

## 2025-04-07 DIAGNOSIS — Z79.899 IMMUNOSUPPRESSION DUE TO DRUG THERAPY: ICD-10-CM

## 2025-04-07 DIAGNOSIS — M19.072 PRIMARY OSTEOARTHRITIS OF LEFT FOOT: ICD-10-CM

## 2025-04-07 DIAGNOSIS — Z51.81 THERAPEUTIC DRUG MONITORING: ICD-10-CM

## 2025-04-07 DIAGNOSIS — Z79.899 HIGH RISK MEDICATION USE: ICD-10-CM

## 2025-04-07 DIAGNOSIS — E78.5 HYPERLIPIDEMIA, UNSPECIFIED HYPERLIPIDEMIA TYPE: ICD-10-CM

## 2025-04-07 PROCEDURE — 3078F DIAST BP <80 MM HG: CPT | Mod: CPTII,S$GLB,, | Performed by: STUDENT IN AN ORGANIZED HEALTH CARE EDUCATION/TRAINING PROGRAM

## 2025-04-07 PROCEDURE — 1159F MED LIST DOCD IN RCRD: CPT | Mod: CPTII,S$GLB,, | Performed by: STUDENT IN AN ORGANIZED HEALTH CARE EDUCATION/TRAINING PROGRAM

## 2025-04-07 PROCEDURE — 3008F BODY MASS INDEX DOCD: CPT | Mod: CPTII,S$GLB,, | Performed by: STUDENT IN AN ORGANIZED HEALTH CARE EDUCATION/TRAINING PROGRAM

## 2025-04-07 PROCEDURE — 3074F SYST BP LT 130 MM HG: CPT | Mod: CPTII,S$GLB,, | Performed by: STUDENT IN AN ORGANIZED HEALTH CARE EDUCATION/TRAINING PROGRAM

## 2025-04-07 PROCEDURE — 99214 OFFICE O/P EST MOD 30 MIN: CPT | Mod: S$GLB,,, | Performed by: STUDENT IN AN ORGANIZED HEALTH CARE EDUCATION/TRAINING PROGRAM

## 2025-04-07 PROCEDURE — 1160F RVW MEDS BY RX/DR IN RCRD: CPT | Mod: CPTII,S$GLB,, | Performed by: STUDENT IN AN ORGANIZED HEALTH CARE EDUCATION/TRAINING PROGRAM

## 2025-04-07 PROCEDURE — 99999 PR PBB SHADOW E&M-EST. PATIENT-LVL IV: CPT | Mod: PBBFAC,,, | Performed by: STUDENT IN AN ORGANIZED HEALTH CARE EDUCATION/TRAINING PROGRAM

## 2025-04-07 ASSESSMENT — ROUTINE ASSESSMENT OF PATIENT INDEX DATA (RAPID3)
TOTAL RAPID3 SCORE: 2.72
PATIENT GLOBAL ASSESSMENT SCORE: 3
PAIN SCORE: 3.5
MDHAQ FUNCTION SCORE: 0.5
FATIGUE SCORE: 3.5
PSYCHOLOGICAL DISTRESS SCORE: 2.2
WHEN YOU AWAKENED IN THE MORNING OVER THE LAST WEEK, PLEASE INDICATE THE AMOUNT OF TIME IT TAKES UNTIL YOU ARE AS LIMBER AS YOU WILL BE FOR THE DAY: 17
AM STIFFNESS SCORE: 1, YES

## 2025-04-07 NOTE — PROGRESS NOTES
3/31/2025    10:52 AM   Rapid3 Question Responses and Scores   MDHAQ Score 0.5   Psychologic Score 2.2   Pain Score 3.5   When you awakened in the morning OVER THE LAST WEEK, did you feel stiff? Yes   If Yes, please indicate the number of hours until you are as limber as you will be for the day 17   Fatigue Score 3.5   Global Health Score 3   RAPID3 Score 2.72    Answers submitted by the patient for this visit:  Rheumatology Questionnaire (Submitted on 3/31/2025)  fever: No  eye redness: No  mouth sores: No  headaches: No  shortness of breath: No  chest pain: No  trouble swallowing: Yes  diarrhea: No  constipation: No  unexpected weight change: No  genital sore: No  During the last 3 days, have you had a skin rash?: No  Bruises or bleeds easily: No  cough: No

## 2025-04-07 NOTE — PROGRESS NOTES
I have personally reviewed the history, confirmed exam findings, and discussed assessment and plan with fellow.        Latest Reference Range & Units 02/11/25 10:20   WBC 3.90 - 12.70 K/uL 7.01   RBC 4.60 - 6.20 M/uL 4.85   Hemoglobin 14.0 - 18.0 g/dL 13.4 (L)   Hematocrit 40.0 - 54.0 % 42.1   MCV 82 - 98 fL 87   MCH 27.0 - 31.0 pg 27.6   MCHC 32.0 - 36.0 g/dL 31.8 (L)   RDW 11.5 - 14.5 % 13.3   Platelet Count 150 - 450 K/uL 298   MPV 9.2 - 12.9 fL 9.0 (L)   Gran % 38.0 - 73.0 % 69.9   Lymph % 18.0 - 48.0 % 21.0   Mono % 4.0 - 15.0 % 6.8   Eos % 0.0 - 8.0 % 1.3   Basophil % 0.0 - 1.9 % 0.4   Immature Granulocytes 0.0 - 0.5 % 0.6 (H)   Gran # (ANC) 1.8 - 7.7 K/uL 4.9   Lymph # 1.0 - 4.8 K/uL 1.5   Mono # 0.3 - 1.0 K/uL 0.5   Eos # 0.0 - 0.5 K/uL 0.1   Baso # 0.00 - 0.20 K/uL 0.03   Immature Grans (Abs) 0.00 - 0.04 K/uL 0.04   nRBC 0 /100 WBC 0   Differential Method  Automated   Sed Rate 0 - 23 mm/Hr 35 (H)   Sodium 136 - 145 mmol/L 140   Potassium 3.5 - 5.1 mmol/L 4.7   Chloride 95 - 110 mmol/L 104   CO2 23 - 29 mmol/L 25   Anion Gap 8 - 16 mmol/L 11   BUN 6 - 20 mg/dL 13   Creatinine 0.5 - 1.4 mg/dL 1.0   eGFR >60 mL/min/1.73 m^2 >60   Glucose 70 - 110 mg/dL 134 (H)   Calcium 8.7 - 10.5 mg/dL 9.9   ALP 40 - 150 U/L 100   PROTEIN TOTAL 6.0 - 8.4 g/dL 7.7   Albumin 3.5 - 5.2 g/dL 3.8   BILIRUBIN TOTAL 0.1 - 1.0 mg/dL 0.4   AST 10 - 40 U/L 23   ALT 10 - 44 U/L 21   CRP 0.0 - 8.2 mg/L 3.4   Hep B Core Total Ab Non-reactive  Non-reactive   Hep B S Ab mIU/mL  -  6.36  Non-reactive   Hepatitis B Surface Ag Non-reactive  Non-reactive   Hepatitis C Ab Negative  Negative   TB Gold Plus Negative  Negative   NIL IU/mL 0.70306   TB1 - Nil IU/mL 0.000   TB2 - Nil IU/mL 0.000   Mitogen - Nil IU/mL 1.190   (L): Data is abnormally low  (H): Data is abnormally high        ASSESSMENT:              Psoriasis in complete remission   PsA  TJ  0   SJ  0   Pt global 30  ESR 35    CRP 3.4   DAS28 2.91(LDA)  QEF54-IRS 1.91 (remission)  CDAI=   3.5(LDA)  DAPSA  TJ 1 SJ 0 Pt global  3 Pt pain 3.5 CRP 0.34=7.84(LDA)  Leflunomide discontinued b/o paresthesiae found to be d/t cervical myelopathy rather than polyneuropathy. No need to resume just now as in remission, but could be restarted in future if needed.   S/p inflximab induced SLE  S/p C3-4 ACDF 1/30/24 with pharyngeal injury requiring repair, tracheostomy(wound site still slightly bloody drainage) and laparotomy with gastric tube placement   Still dysphagia, swallowing study as above   Hyperlipidemia   LDL 61 10/10/23 on atorvastatin 10mg nightly  Low bone density DXA 6/3/24:  FRAX hip 0.4% MOF 2.2%     PLAN:              *Covid vaccine  Cont guselkumab 100mg sc q 8 wks  Cont apremilast 30mg twice daily   Cont atorvastatin 10mg nightly   RTC 3 months with CBC, CMP, ESR, CRP lipid panel

## 2025-04-07 NOTE — PROGRESS NOTES
Subjective:      Patient ID: Rebel Rodriguez is a 55 y.o. male.    Chief Complaint: follow up for PsA    Rebel Rodriguez is a 54yo M with history of psoriatic arthritis and infliximab induced lupus. He also has a PMH of TIA, KUSHAL, cervical degenerative disease s/p ACDF C4-7 (>10yrs ago) and now s/p C3-4 ACDF (2/2024).    Rheum History:  - Psoriatic arthritis dx 2000s  - Infliximab induced lupus dx 2016 when pt had recurrent intermittent fevers, chills, arthralgias, rash  - Serologies: +DEE 1:1280 (2/2016), +anti histone Ab 1.3 (2/2016), +ds DNA 1:1280 (2/2016)- positive through 9/2017 and has been negative since then, +anti cardiolipin Ab IgM 14.2 (2011, 2016)  - CH50 mildly decreased (3/2016), increased after stopping infliximab  - Disease has been very well controlled on tremfya and otezla for the past few years  - Notably, pt had recent prolonged hospitalization (1/30/24-2/23/24) from cervical fusion surgery which resulted in complication of pharyngeal laceration requiring repair, pt then was intubated and transitioned to trach, and had g tube placed as well - has been de-cannulated and improved clinically overall but had a prolonged post hospital course of slow improvement initially    Prior Treatments:  - Infliximab (~5399-5194) d/c'd due to concern for drug induced lupus; symptoms resolved with stopping med  - Stelara (4/2016-11/2016)  - Cosentyx (4/2017-5/2020) d/c'd due to loss of response  - Taltz (8/2020-6/2021) d/c'd due to poor symptom control  - Hydroxychloroquine (3/2016-4/2022) d/c'd since drug induced lupus completed resolved  - Sulfasalazine 1000 BID (11/2019-self d/c'd 12/2020)  - Methotrexate (d/c'd 1/2013)  - Cannot take TNFi due to drug induced lupus  - Cannot take [place] due to history of TIA  - Cannot take NSAIDs due to history of TIA  - Arava (1/2013-12/2023, d/c'd prior to cervical surgery with neuropathy symptoms - ok to restart if needed for disease activity in the future)    Current  "Treatment:  - Tremfya SQ Q8wks (6/2021-current)  - Otezla PO BID (10/2021-current)    Interval History:  Pt presents today for follow up, was last seen in 7/2024. Overall, has been stable without any disease flares. Tolerating meds well. Has been very active, especially with multiple festivals around the city currently. Will not be participating in [employer] this year. Still with mild swallowing issues with drier foods like bread/sandwiches, biscuits - needs to have water close by to help swallow sometimes. No new psoriasis rashes/lesions, still with the dry ridged/pitting nails. He has noticed some aching of the L 1st MTP area with ambulation, but no swelling/erythema and cannot recall any injuries. No other joint pain, swelling, or stiffness most recently. PsA has remained well controlled.      Review of Systems   Constitutional:  Negative for fever and unexpected weight change.   HENT:  Positive for trouble swallowing. Negative for mouth sores.    Eyes:  Negative for redness.   Respiratory:  Negative for cough and shortness of breath.    Cardiovascular:  Negative for chest pain.   Gastrointestinal:  Negative for constipation and diarrhea.   Genitourinary:  Negative for genital sores.   Skin:  Negative for rash.   Neurological:  Negative for headaches.   Hematological:  Does not bruise/bleed easily.      Objective:   /72   Pulse 61   Ht 5' 8" (1.727 m)   Wt 64.1 kg (141 lb 6.8 oz)   BMI 21.50 kg/m²   Physical Exam   Constitutional: He is oriented to person, place, and time. normal appearance. He appears well-developed and well-nourished. No distress.   HENT:   Head: Normocephalic and atraumatic.   Right Ear: External ear normal.   Left Ear: External ear normal.   Nose: Nose normal. No nasal congestion.   Mouth/Throat: Mucous membranes are moist. Oropharynx is clear.   Eyes: Conjunctivae are normal.   Neck:   Mildly limited neck ROM in setting of prior c-spine surgery   Cardiovascular: Normal rate, " regular rhythm, normal heart sounds and normal pulses.   Pulmonary/Chest: Effort normal and breath sounds normal. No respiratory distress. He has no wheezes.   Abdominal: Soft. Bowel sounds are normal. He exhibits no distension. There is no abdominal tenderness.   Musculoskeletal:         General: No swelling or signs of injury. Normal range of motion.      Cervical back: Neck supple.      Comments: Refer to homunculus for full joint exam. No acute synovitis or enthesitis noted. L 1st MTP with very mild tenderness to deep palpation - bony hypertrophy/OA appearing change. Loss of foot arch as well.   Neurological: He is alert and oriented to person, place, and time.   Skin: Skin is warm and dry. No lesion and no rash noted.   Nails with some ridging and pitting.   Psychiatric: His behavior is normal. Mood normal.   Vitals reviewed.        DAPSA 4.2, low disease activity    Assessment:     1. PSA (psoriatic arthritis)    2. Immunosuppression due to drug therapy    3. High risk medication use    4. Therapeutic drug monitoring    5. Primary osteoarthritis of left foot      - Pt with low disease activity for PsA, well controlled on current therapies  - Continues to be in remission with drug induced lupus and psoriasis  - Labs remain stable and mostly within normal ranges     Plan:     Problem List Items Addressed This Visit       High risk medication use    Relevant Orders    Sedimentation rate    C-Reactive Protein    Comprehensive Metabolic Panel    CBC Auto Differential    Immunosuppression due to drug therapy    Relevant Orders    Sedimentation rate    C-Reactive Protein    Comprehensive Metabolic Panel    CBC Auto Differential     Other Visit Diagnoses         PSA (psoriatic arthritis)    -  Primary    Relevant Orders    Sedimentation rate    C-Reactive Protein    Comprehensive Metabolic Panel    CBC Auto Differential    X-Ray Foot Complete Bilateral      Therapeutic drug monitoring        Relevant Orders     Sedimentation rate    C-Reactive Protein    Comprehensive Metabolic Panel    CBC Auto Differential      Primary osteoarthritis of left foot        Relevant Orders    X-Ray Foot Complete Bilateral          - Continue current management: apremilast 30mg PO BID and guselkumab 100mg SQ Q8wks  - Continue to monitor labs Q3-4mos: CBC, CMP, CRP, ESR  - Obtain bilateral foot x-rays for monitoring  - Suspect L MTP symptoms to be primarily OA related based on physical exam findings  - Advised pt to apply topical voltaren gel up to QID prn    Follow up here in clinic in about 6 months or earlier if needed.    Assessment and plan discussed with supervising attending, Dr. Beverly.      Allie Hidalgo MD  PGY-5, Rheumatology

## 2025-06-09 DIAGNOSIS — L40.50 PSA (PSORIATIC ARTHRITIS): ICD-10-CM

## 2025-06-12 RX ORDER — GUSELKUMAB 100 MG/ML
100 INJECTION SUBCUTANEOUS
Qty: 3 ML | Refills: 2 | Status: ACTIVE | OUTPATIENT
Start: 2025-06-12

## 2025-07-08 ENCOUNTER — LAB VISIT (OUTPATIENT)
Dept: LAB | Facility: OTHER | Age: 55
End: 2025-07-08
Attending: INTERNAL MEDICINE
Payer: COMMERCIAL

## 2025-07-08 DIAGNOSIS — Z51.81 THERAPEUTIC DRUG MONITORING: ICD-10-CM

## 2025-07-08 DIAGNOSIS — E78.5 HYPERLIPIDEMIA, UNSPECIFIED HYPERLIPIDEMIA TYPE: ICD-10-CM

## 2025-07-08 DIAGNOSIS — Z79.899 IMMUNOSUPPRESSION DUE TO DRUG THERAPY: ICD-10-CM

## 2025-07-08 DIAGNOSIS — L40.50 PSA (PSORIATIC ARTHRITIS): ICD-10-CM

## 2025-07-08 DIAGNOSIS — Z79.899 HIGH RISK MEDICATION USE: ICD-10-CM

## 2025-07-08 DIAGNOSIS — D84.821 IMMUNOSUPPRESSION DUE TO DRUG THERAPY: ICD-10-CM

## 2025-07-08 LAB
ABSOLUTE EOSINOPHIL (OHS): 0.27 K/UL
ABSOLUTE MONOCYTE (OHS): 0.74 K/UL (ref 0.3–1)
ABSOLUTE NEUTROPHIL COUNT (OHS): 5.51 K/UL (ref 1.8–7.7)
ALBUMIN SERPL BCP-MCNC: 3.8 G/DL (ref 3.5–5.2)
ALP SERPL-CCNC: 94 UNIT/L (ref 40–150)
ALT SERPL W/O P-5'-P-CCNC: 27 UNIT/L (ref 10–44)
ANION GAP (OHS): 12 MMOL/L (ref 8–16)
AST SERPL-CCNC: 20 UNIT/L (ref 11–45)
BASOPHILS # BLD AUTO: 0.05 K/UL
BASOPHILS NFR BLD AUTO: 0.6 %
BILIRUB SERPL-MCNC: 0.3 MG/DL (ref 0.1–1)
BUN SERPL-MCNC: 18 MG/DL (ref 6–20)
CALCIUM SERPL-MCNC: 9.6 MG/DL (ref 8.7–10.5)
CHLORIDE SERPL-SCNC: 103 MMOL/L (ref 95–110)
CHOLEST SERPL-MCNC: 148 MG/DL (ref 120–199)
CHOLEST/HDLC SERPL: 2.5 {RATIO} (ref 2–5)
CO2 SERPL-SCNC: 24 MMOL/L (ref 23–29)
CREAT SERPL-MCNC: 1.2 MG/DL (ref 0.5–1.4)
CRP SERPL-MCNC: 1.1 MG/L
ERYTHROCYTE [DISTWIDTH] IN BLOOD BY AUTOMATED COUNT: 14.2 % (ref 11.5–14.5)
ERYTHROCYTE [SEDIMENTATION RATE] IN BLOOD BY PHOTOMETRIC METHOD: 20 MM/HR
GFR SERPLBLD CREATININE-BSD FMLA CKD-EPI: >60 ML/MIN/1.73/M2
GLUCOSE SERPL-MCNC: 132 MG/DL (ref 70–110)
HCT VFR BLD AUTO: 42.7 % (ref 40–54)
HDLC SERPL-MCNC: 60 MG/DL (ref 40–75)
HDLC SERPL: 40.5 % (ref 20–50)
HGB BLD-MCNC: 14.3 GM/DL (ref 14–18)
IMM GRANULOCYTES # BLD AUTO: 0.04 K/UL (ref 0–0.04)
IMM GRANULOCYTES NFR BLD AUTO: 0.5 % (ref 0–0.5)
LDLC SERPL CALC-MCNC: 70.2 MG/DL (ref 63–159)
LYMPHOCYTES # BLD AUTO: 2.09 K/UL (ref 1–4.8)
MCH RBC QN AUTO: 28.1 PG (ref 27–31)
MCHC RBC AUTO-ENTMCNC: 33.5 G/DL (ref 32–36)
MCV RBC AUTO: 84 FL (ref 82–98)
NONHDLC SERPL-MCNC: 88 MG/DL
NUCLEATED RBC (/100WBC) (OHS): 0 /100 WBC
PLATELET # BLD AUTO: 311 K/UL (ref 150–450)
PMV BLD AUTO: 9 FL (ref 9.2–12.9)
POTASSIUM SERPL-SCNC: 4.3 MMOL/L (ref 3.5–5.1)
PROT SERPL-MCNC: 7.3 GM/DL (ref 6–8.4)
RBC # BLD AUTO: 5.09 M/UL (ref 4.6–6.2)
RELATIVE EOSINOPHIL (OHS): 3.1 %
RELATIVE LYMPHOCYTE (OHS): 24 % (ref 18–48)
RELATIVE MONOCYTE (OHS): 8.5 % (ref 4–15)
RELATIVE NEUTROPHIL (OHS): 63.3 % (ref 38–73)
SODIUM SERPL-SCNC: 139 MMOL/L (ref 136–145)
TRIGL SERPL-MCNC: 89 MG/DL (ref 30–150)
WBC # BLD AUTO: 8.7 K/UL (ref 3.9–12.7)

## 2025-07-08 PROCEDURE — 36415 COLL VENOUS BLD VENIPUNCTURE: CPT

## 2025-07-08 PROCEDURE — 83718 ASSAY OF LIPOPROTEIN: CPT

## 2025-07-08 PROCEDURE — 84460 ALANINE AMINO (ALT) (SGPT): CPT

## 2025-07-08 PROCEDURE — 85652 RBC SED RATE AUTOMATED: CPT

## 2025-07-08 PROCEDURE — 85025 COMPLETE CBC W/AUTO DIFF WBC: CPT

## 2025-07-08 PROCEDURE — 86140 C-REACTIVE PROTEIN: CPT

## 2025-08-13 DIAGNOSIS — L40.50 PSA (PSORIATIC ARTHRITIS): ICD-10-CM

## 2025-08-13 RX ORDER — APREMILAST 30 MG/1
30 TABLET, FILM COATED ORAL 2 TIMES DAILY
Qty: 180 TABLET | Refills: 3 | Status: ACTIVE | OUTPATIENT
Start: 2025-08-13

## 2025-08-14 ENCOUNTER — PATIENT MESSAGE (OUTPATIENT)
Dept: RHEUMATOLOGY | Facility: CLINIC | Age: 55
End: 2025-08-14
Payer: COMMERCIAL

## 2025-08-19 ENCOUNTER — HOSPITAL ENCOUNTER (OUTPATIENT)
Dept: RADIOLOGY | Facility: OTHER | Age: 55
Discharge: HOME | End: 2025-08-19
Attending: STUDENT IN AN ORGANIZED HEALTH CARE EDUCATION/TRAINING PROGRAM
Payer: COMMERCIAL

## 2025-08-19 DIAGNOSIS — L40.50 PSA (PSORIATIC ARTHRITIS): ICD-10-CM

## 2025-08-19 DIAGNOSIS — M19.072 PRIMARY OSTEOARTHRITIS OF LEFT FOOT: ICD-10-CM

## 2025-08-19 PROCEDURE — 73630 X-RAY EXAM OF FOOT: CPT | Mod: TC,50

## 2025-08-22 ENCOUNTER — PATIENT MESSAGE (OUTPATIENT)
Dept: RHEUMATOLOGY | Facility: CLINIC | Age: 55
End: 2025-08-22
Payer: COMMERCIAL

## 2025-08-22 ENCOUNTER — TELEPHONE (OUTPATIENT)
Dept: RHEUMATOLOGY | Facility: CLINIC | Age: 55
End: 2025-08-22
Payer: COMMERCIAL

## (undated) DEVICE — DRAPE T THYROID STERILE

## (undated) DEVICE — SUT 1 48IN PDS II VIO MONO

## (undated) DEVICE — SUT SILK 2-0 PS 18IN BLACK

## (undated) DEVICE — BLADE SCALP OPHTL RND TIP

## (undated) DEVICE — PROBE CATH TEMP 16 FRFOLEY 400

## (undated) DEVICE — MARKER SKIN STND TIP BLUE BARR

## (undated) DEVICE — DRAPE CORETEMP FLD WRM 56X62IN

## (undated) DEVICE — TRAY CATH FOL SIL URIMTR 16FR

## (undated) DEVICE — WARMER DRAPE STERILE LF

## (undated) DEVICE — TRAY NEURO OMC

## (undated) DEVICE — DRESSING SURGICAL 1X3

## (undated) DEVICE — BUR BONE CUT MICRO TPS 3X3.8MM

## (undated) DEVICE — POWDER ARISTA AH 1GM

## (undated) DEVICE — ELECTRODE BLADE INSULATED 1 IN

## (undated) DEVICE — SPONGE LAP 18X18 PREWASHED

## (undated) DEVICE — SUT LIGACLIP SMALL XTRA

## (undated) DEVICE — SUT COATED VICRYL 4/0 27IN

## (undated) DEVICE — TUBE KANGAROO FEED 12FR 109CM

## (undated) DEVICE — APPLICATOR ARISTA FLEX XL

## (undated) DEVICE — DRAPE ABDOMINAL TIBURON 14X11

## (undated) DEVICE — DRESSING TELFA STRL 4X3 LF

## (undated) DEVICE — BLADE SURG #15 CARBON STEEL

## (undated) DEVICE — GAUZE SPONGE PEANUT STRL

## (undated) DEVICE — SUT MONOCRYL 4-0 PS-1 UND

## (undated) DEVICE — SUT VICRYL PLUS 3-0 SH 18IN

## (undated) DEVICE — PACK ECLIPSE SET-UP W/O DRAPE

## (undated) DEVICE — MARKER FN REG DUAL UTIL RULER

## (undated) DEVICE — TRAY MINOR GEN SURG OMC

## (undated) DEVICE — TUBE FRAZIER 5MM 2FT SOFT TIP

## (undated) DEVICE — KIT EVACUATOR 3-SPRING 1/8 DRN

## (undated) DEVICE — HOOK LONE STAR BLUNT 12MM

## (undated) DEVICE — DRESSING LEUKOPLAST FLEX 1X3IN

## (undated) DEVICE — SUT 4-0 CHROMIC GUT / RB1

## (undated) DEVICE — NDL SPINAL 18GX3.5 SPINOCAN

## (undated) DEVICE — SUT CTD VICRYL 3-0 CR/SH

## (undated) DEVICE — CONTAINER SPECIMEN STRL 4OZ

## (undated) DEVICE — SUT 2-0 12-18IN SILK

## (undated) DEVICE — SEE MEDLINE ITEM 157144

## (undated) DEVICE — KIT SURGIFLO HEMOSTATIC MATRIX

## (undated) DEVICE — TRACKER PATIENT NON INVASIVE

## (undated) DEVICE — CLIP MED TICALL

## (undated) DEVICE — BLADE INFERIOR TURBINATE 5/PK

## (undated) DEVICE — CATH IV INTROCAN 14G X 2.

## (undated) DEVICE — TOWEL OR DISP STRL BLUE 4/PK

## (undated) DEVICE — TUBE FEEDING ENTUIT 18FR

## (undated) DEVICE — DRAPE EENT SPLIT STERILE

## (undated) DEVICE — DRAPE STERI-DRAPE 1000 17X11IN

## (undated) DEVICE — GOWN SURGICAL X-LARGE

## (undated) DEVICE — TIP YANKAUERS BULB NO VENT

## (undated) DEVICE — SEE MEDLINE ITEM 156902

## (undated) DEVICE — SUT VICRYL 3-0 27 SH

## (undated) DEVICE — DRAPE STERI INSTRUMENT 1018

## (undated) DEVICE — SPONGE PATTY SURGICAL .5X3IN

## (undated) DEVICE — STAPLER SKIN PROXIMATE WIDE

## (undated) DEVICE — RESTRAINT WRIST NON LOCK BLUE

## (undated) DEVICE — DRESSING SURGICAL 1/2X1/2

## (undated) DEVICE — SUT SILK 3-0 SH 18IN BLACK

## (undated) DEVICE — NDL HYPO REG 25G X 1 1/2

## (undated) DEVICE — CLIPPER BLADE MOD 4406 (CAREF)

## (undated) DEVICE — BACITRACIN ZINC OINT 0.9GM

## (undated) DEVICE — CORD BIPOLAR 12 FOOT

## (undated) DEVICE — DRAPE OPMI STERILE

## (undated) DEVICE — SEE MEDLINE ITEM 152622

## (undated) DEVICE — SUT 3-0 12-18IN SILK

## (undated) DEVICE — TRAY SKIN SCRUB WET PREMIUM

## (undated) DEVICE — DRAPE HALF SURGICAL 40X58IN

## (undated) DEVICE — SPONGE COTTON TRAY 4X4IN

## (undated) DEVICE — COVER LIGHT HANDLE 80/CA

## (undated) DEVICE — DRAPE INSTR MAGNETIC 10X16IN

## (undated) DEVICE — SUT PLAIN 4-0 SC-1 18IN

## (undated) DEVICE — BLADE 4IN EDGE INSULATED

## (undated) DEVICE — SYR 50CC LL

## (undated) DEVICE — SEE MEDLINE ITEM 156913

## (undated) DEVICE — ELECTRODE REM PLYHSV RETURN 9

## (undated) DEVICE — TAPE SILK 3IN

## (undated) DEVICE — DRAPE INCISE IOBAN 2 23X17IN

## (undated) DEVICE — ADHESIVE DERMABOND ADVANCED